# Patient Record
Sex: FEMALE | Race: WHITE | NOT HISPANIC OR LATINO | Employment: OTHER | URBAN - METROPOLITAN AREA
[De-identification: names, ages, dates, MRNs, and addresses within clinical notes are randomized per-mention and may not be internally consistent; named-entity substitution may affect disease eponyms.]

---

## 2017-01-20 ENCOUNTER — LAB CONVERSION - ENCOUNTER (OUTPATIENT)
Dept: OTHER | Facility: OTHER | Age: 77
End: 2017-01-20

## 2017-01-20 LAB
DEPRECATED RDW RBC AUTO: 14.3 % (ref 11–15)
HCT VFR BLD AUTO: 39.9 % (ref 35–45)
HGB BLD-MCNC: 13.1 G/DL (ref 11.7–15.5)
MCH RBC QN AUTO: 31.6 PG (ref 27–33)
MCHC RBC AUTO-ENTMCNC: 32.9 G/DL (ref 32–36)
MCV RBC AUTO: 96.3 FL (ref 80–100)
PLATELET # BLD AUTO: 255 THOUSAND/UL (ref 140–400)
PMV BLD AUTO: 8.2 FL (ref 7.5–11.5)
RBC # BLD AUTO: 4.15 MILLION/UL (ref 3.8–5.1)
TSH SERPL DL<=0.05 MIU/L-ACNC: 2.53 MIU/L (ref 0.4–4.5)
WBC # BLD AUTO: 5.1 THOUSAND/UL (ref 3.8–10.8)

## 2017-01-25 ENCOUNTER — ALLSCRIPTS OFFICE VISIT (OUTPATIENT)
Dept: OTHER | Facility: OTHER | Age: 77
End: 2017-01-25

## 2017-06-01 ENCOUNTER — ALLSCRIPTS OFFICE VISIT (OUTPATIENT)
Dept: OTHER | Facility: OTHER | Age: 77
End: 2017-06-01

## 2017-07-07 ENCOUNTER — GENERIC CONVERSION - ENCOUNTER (OUTPATIENT)
Dept: OTHER | Facility: OTHER | Age: 77
End: 2017-07-07

## 2017-07-25 ENCOUNTER — GENERIC CONVERSION - ENCOUNTER (OUTPATIENT)
Dept: OTHER | Facility: OTHER | Age: 77
End: 2017-07-25

## 2018-01-13 VITALS
HEART RATE: 76 BPM | RESPIRATION RATE: 16 BRPM | SYSTOLIC BLOOD PRESSURE: 128 MMHG | TEMPERATURE: 97.8 F | WEIGHT: 211 LBS | BODY MASS INDEX: 36.02 KG/M2 | DIASTOLIC BLOOD PRESSURE: 72 MMHG | HEIGHT: 64 IN

## 2018-01-15 VITALS
DIASTOLIC BLOOD PRESSURE: 72 MMHG | HEART RATE: 72 BPM | BODY MASS INDEX: 35.77 KG/M2 | WEIGHT: 209.5 LBS | RESPIRATION RATE: 20 BRPM | TEMPERATURE: 97.5 F | HEIGHT: 64 IN | SYSTOLIC BLOOD PRESSURE: 124 MMHG

## 2018-05-21 DIAGNOSIS — M19.90 ARTHRITIS: Primary | ICD-10-CM

## 2018-05-22 RX ORDER — MELOXICAM 15 MG/1
TABLET ORAL
Qty: 90 TABLET | Refills: 3 | Status: SHIPPED | OUTPATIENT
Start: 2018-05-22 | End: 2019-03-23 | Stop reason: SDUPTHER

## 2018-08-22 ENCOUNTER — OFFICE VISIT (OUTPATIENT)
Dept: FAMILY MEDICINE CLINIC | Facility: CLINIC | Age: 78
End: 2018-08-22
Payer: MEDICARE

## 2018-08-22 VITALS
RESPIRATION RATE: 16 BRPM | SYSTOLIC BLOOD PRESSURE: 132 MMHG | TEMPERATURE: 99 F | HEART RATE: 68 BPM | BODY MASS INDEX: 36.5 KG/M2 | DIASTOLIC BLOOD PRESSURE: 74 MMHG | WEIGHT: 213.8 LBS | HEIGHT: 64 IN

## 2018-08-22 DIAGNOSIS — N39.0 URINARY TRACT INFECTION WITHOUT HEMATURIA, SITE UNSPECIFIED: Primary | ICD-10-CM

## 2018-08-22 DIAGNOSIS — R30.0 DYSURIA: ICD-10-CM

## 2018-08-22 LAB
SL AMB  POCT GLUCOSE, UA: ABNORMAL
SL AMB LEUKOCYTE ESTERASE,UA: ABNORMAL
SL AMB POCT BILIRUBIN,UA: ABNORMAL
SL AMB POCT BLOOD,UA: ABNORMAL
SL AMB POCT COLOR,UA: YELLOW
SL AMB POCT KETONES,UA: ABNORMAL
SL AMB POCT NITRITE,UA: ABNORMAL
SL AMB POCT PH,UA: 5
SL AMB POCT SPECIFIC GRAVITY,UA: 1
SL AMB POCT URINE PROTEIN: ABNORMAL
SL AMB POCT UROBILINOGEN: ABNORMAL

## 2018-08-22 PROCEDURE — 99213 OFFICE O/P EST LOW 20 MIN: CPT | Performed by: FAMILY MEDICINE

## 2018-08-22 PROCEDURE — 81002 URINALYSIS NONAUTO W/O SCOPE: CPT | Performed by: FAMILY MEDICINE

## 2018-08-22 RX ORDER — DOXYCYCLINE HYCLATE 100 MG/1
100 CAPSULE ORAL EVERY 12 HOURS SCHEDULED
Qty: 20 CAPSULE | Refills: 0 | Status: SHIPPED | OUTPATIENT
Start: 2018-08-22 | End: 2018-09-02

## 2018-08-22 RX ORDER — AMLODIPINE BESYLATE 5 MG/1
1 TABLET ORAL DAILY
COMMUNITY
Start: 2016-01-17 | End: 2018-11-12 | Stop reason: SDUPTHER

## 2018-08-22 RX ORDER — B-COMPLEX WITH VITAMIN C
1 CAPSULE ORAL DAILY
COMMUNITY
End: 2018-10-04 | Stop reason: ALTCHOICE

## 2018-08-22 RX ORDER — DIPHENHYDRAMINE HYDROCHLORIDE 25 MG/1
1 TABLET ORAL DAILY
COMMUNITY
End: 2018-10-04 | Stop reason: ALTCHOICE

## 2018-08-22 RX ORDER — PHENAZOPYRIDINE HYDROCHLORIDE 200 MG/1
200 TABLET, FILM COATED ORAL
Qty: 9 TABLET | Refills: 0 | Status: SHIPPED | OUTPATIENT
Start: 2018-08-22 | End: 2018-09-02

## 2018-08-22 RX ORDER — LISINOPRIL 5 MG/1
1 TABLET ORAL DAILY
COMMUNITY
Start: 2011-06-29 | End: 2018-11-12 | Stop reason: SDUPTHER

## 2018-08-22 NOTE — PROGRESS NOTES
Patient ID: Nhi Willson is a 76 y o  female  HPI: 76 y  o female presenting with dysuria and urinary frequency  2 weeks ago she was treated with keflex and symptoms have reoccurred  SUBJECTIVE    Family History   Problem Relation Age of Onset    No Known Problems Mother      Social History     Social History    Marital status:      Spouse name: N/A    Number of children: N/A    Years of education: N/A     Occupational History    Not on file  Social History Main Topics    Smoking status: Former Smoker    Smokeless tobacco: Never Used    Alcohol use No    Drug use: No    Sexual activity: Not on file     Other Topics Concern    Not on file     Social History Narrative    No narrative on file     History reviewed  No pertinent past medical history    Past Surgical History:   Procedure Laterality Date    EYE SURGERY       Allergies   Allergen Reactions    Ciprofloxacin      Chest discomfort and dizziness    Diclofenac     Diphenhydramine        Current Outpatient Prescriptions:     amLODIPine (NORVASC) 5 mg tablet, Take 1 tablet by mouth daily, Disp: , Rfl:     aspirin 81 MG tablet, Take 2 tablets by mouth daily, Disp: , Rfl:     B Complex-C CAPS, Take 1 capsule by mouth daily, Disp: , Rfl:     Biotin (BIOTIN 5000) 5 MG CAPS, Take 1 capsule by mouth daily, Disp: , Rfl:     Cholecalciferol (RA VITAMIN D-3) 5000 units capsule, Take 1 tablet by mouth daily, Disp: , Rfl:     lisinopril (ZESTRIL) 5 mg tablet, Take 1 tablet by mouth daily, Disp: , Rfl:     Melatonin 5 MG TABS, Take by mouth, Disp: , Rfl:     meloxicam (MOBIC) 15 mg tablet, TAKE 1 TABLET EVERY DAY WITH FOOD, Disp: 90 tablet, Rfl: 3    Multiple Vitamins-Minerals (CENTRUM SILVER PO), Take 1 tablet by mouth daily, Disp: , Rfl:     Review of Systems  Constitutional:     Denies fever, chills ,fatigue ,weakness ,weight loss, weight gain     ENT: Denies earache ,loss of hearing ,nosebleed, nasal discharge,nasal congestion ,sore throat ,hoarseness  Pulmonary: Denies shortness of breath ,cough  ,dyspnea on exertion, orthopnea  ,PND   Cardiovascular:  Denies bradycardia , tachycardia  ,palpations, lower extremity edema leg, claudication  Breast:  Denies new or changing breast lumps ,nipple discharge ,nipple changes  Abdomen:  Denies abdominal pain , anorexia , indigestion, nausea, vomiting, constipation, diarrhea  Musculoskeletal: Denies myalgias, arthralgias, joint swelling, joint stiffness , limb pain, limb swelling  Gu: + dysuria,+ polyuria  Skin: Denies skin rash, skin lesion, skin wound, itching, dry skin  Neuro: Denies headache, numbness, tingling, confusion, loss of consciousness, dizziness, vertigo  Psychiatric: Denies feelings of depression, suicidal ideation, anxiety, sleep disturbances    OBJECTIVE  /74 (BP Location: Right arm, Patient Position: Sitting, Cuff Size: Large)   Pulse 68   Temp 99 °F (37 2 °C)   Resp 16   Ht 5' 4" (1 626 m)   Wt 97 kg (213 lb 12 8 oz)   BMI 36 70 kg/m²   Constitutional:   NAD, well appearing and well nourished      ENT:   Conjunctiva and lids: no injection, edema, or discharge     Pupils and iris: REENA bilaterally    External inspection of ears and nose: normal without deformities or discharge  Otoscopic exam: Canals patent without erythema  Nasal mucosa, septum and turbinates: Normal or edema or discharge         Oropharynx:  Moist mucosa, normal tongue and tonsils without lesions  No erythema        Pulmonary:Respiratory effort normal rate and rhythm, no increased work of breathing   Auscultation of lungs:  Clear bilaterally with no adventitious breath sounds       Cardiovascular: regular rate and rhythm, S1 and S2, no murmur, no edema and/or varicosities of LE      Abdomen: Soft and non-distended     Positive bowel sounds      No heptomegaly or splenomegaly      Gu: no suprapubic tenderness or CVA tenderness, no urethral discharge  Lymphatic:  No anterior or posterior cervical lymphadenopathy         Musculoskeletal:  Gait and station: Normal gait      Digits and nails normal without clubbing or cyanosis       Inspection/palpation of joints, bones, and muscles:  No joint tenderness, swelling, full active and passive range of motion       Skin: Normal skin turgor and no rashes      Neuro:       Normal reflexes      Psych:   alert and oriented to person, place and time     normal mood and affect       Assessment/Plan:Diagnoses and all orders for this visit:    Dysuria  -     POCT urine dip    Urinary tract infection without hematuria, site unspecified  -     doxycycline hyclate (VIBRAMYCIN) 100 mg capsule; Take 1 capsule (100 mg total) by mouth every 12 (twelve) hours for 10 days  -     phenazopyridine (PYRIDIUM) 200 mg tablet; Take 1 tablet (200 mg total) by mouth 3 (three) times a day with meals    Other orders  -     Cholecalciferol (RA VITAMIN D-3) 5000 units capsule; Take 1 tablet by mouth daily  -     lisinopril (ZESTRIL) 5 mg tablet; Take 1 tablet by mouth daily  -     Melatonin 5 MG TABS; Take by mouth  -     Multiple Vitamins-Minerals (CENTRUM SILVER PO); Take 1 tablet by mouth daily  -     Biotin (BIOTIN 5000) 5 MG CAPS; Take 1 capsule by mouth daily  -     B Complex-C CAPS; Take 1 capsule by mouth daily  -     aspirin 81 MG tablet; Take 2 tablets by mouth daily  -     amLODIPine (NORVASC) 5 mg tablet; Take 1 tablet by mouth daily        Reviewed with patient plan to treat with the above      Patient instructed to call in 72 hours if not feeling better or if symptoms worsen

## 2018-08-27 ENCOUNTER — TELEPHONE (OUTPATIENT)
Dept: FAMILY MEDICINE CLINIC | Facility: CLINIC | Age: 78
End: 2018-08-27

## 2018-08-27 NOTE — TELEPHONE ENCOUNTER
HER UTI FEELS BETTER   BUT SHE FEELS SICK TO HER STOMACH AND NOT SLEEPING     CAN THIS BE FROM THE MEDS     PLEASE ADVISE

## 2018-08-28 DIAGNOSIS — R11.0 NAUSEA: Primary | ICD-10-CM

## 2018-08-28 RX ORDER — ONDANSETRON 4 MG/1
4 TABLET, FILM COATED ORAL EVERY 8 HOURS PRN
Qty: 20 TABLET | Refills: 1 | Status: SHIPPED | OUTPATIENT
Start: 2018-08-28 | End: 2018-09-28 | Stop reason: SDUPTHER

## 2018-08-31 ENCOUNTER — APPOINTMENT (OUTPATIENT)
Dept: LAB | Facility: CLINIC | Age: 78
End: 2018-08-31
Payer: MEDICARE

## 2018-08-31 ENCOUNTER — TELEPHONE (OUTPATIENT)
Dept: FAMILY MEDICINE CLINIC | Facility: CLINIC | Age: 78
End: 2018-08-31

## 2018-08-31 ENCOUNTER — OFFICE VISIT (OUTPATIENT)
Dept: FAMILY MEDICINE CLINIC | Facility: CLINIC | Age: 78
End: 2018-08-31
Payer: MEDICARE

## 2018-08-31 VITALS
TEMPERATURE: 99.3 F | DIASTOLIC BLOOD PRESSURE: 78 MMHG | WEIGHT: 213 LBS | HEART RATE: 68 BPM | HEIGHT: 64 IN | SYSTOLIC BLOOD PRESSURE: 122 MMHG | BODY MASS INDEX: 36.37 KG/M2

## 2018-08-31 DIAGNOSIS — R53.83 OTHER FATIGUE: ICD-10-CM

## 2018-08-31 DIAGNOSIS — I10 ESSENTIAL HYPERTENSION: ICD-10-CM

## 2018-08-31 DIAGNOSIS — F41.9 ANXIETY: ICD-10-CM

## 2018-08-31 DIAGNOSIS — N39.0 URINARY TRACT INFECTION WITHOUT HEMATURIA, SITE UNSPECIFIED: Primary | ICD-10-CM

## 2018-08-31 DIAGNOSIS — E78.00 HYPERCHOLESTEROLEMIA: ICD-10-CM

## 2018-08-31 DIAGNOSIS — N39.0 URINARY TRACT INFECTION WITHOUT HEMATURIA, SITE UNSPECIFIED: ICD-10-CM

## 2018-08-31 LAB
BASOPHILS # BLD AUTO: 0.03 THOUSANDS/ΜL (ref 0–0.1)
BASOPHILS NFR BLD AUTO: 0 % (ref 0–1)
EOSINOPHIL # BLD AUTO: 0.03 THOUSAND/ΜL (ref 0–0.61)
EOSINOPHIL NFR BLD AUTO: 0 % (ref 0–6)
ERYTHROCYTE [DISTWIDTH] IN BLOOD BY AUTOMATED COUNT: 13.9 % (ref 11.6–15.1)
HCT VFR BLD AUTO: 40.8 % (ref 34.8–46.1)
HGB BLD-MCNC: 13.1 G/DL (ref 11.5–15.4)
IMM GRANULOCYTES # BLD AUTO: 0.02 THOUSAND/UL (ref 0–0.2)
IMM GRANULOCYTES NFR BLD AUTO: 0 % (ref 0–2)
LYMPHOCYTES # BLD AUTO: 0.91 THOUSANDS/ΜL (ref 0.6–4.47)
LYMPHOCYTES NFR BLD AUTO: 13 % (ref 14–44)
MCH RBC QN AUTO: 32.1 PG (ref 26.8–34.3)
MCHC RBC AUTO-ENTMCNC: 32.1 G/DL (ref 31.4–37.4)
MCV RBC AUTO: 100 FL (ref 82–98)
MONOCYTES # BLD AUTO: 0.37 THOUSAND/ΜL (ref 0.17–1.22)
MONOCYTES NFR BLD AUTO: 5 % (ref 4–12)
NEUTROPHILS # BLD AUTO: 5.48 THOUSANDS/ΜL (ref 1.85–7.62)
NEUTS SEG NFR BLD AUTO: 82 % (ref 43–75)
NRBC BLD AUTO-RTO: 0 /100 WBCS
PLATELET # BLD AUTO: 290 THOUSANDS/UL (ref 149–390)
PMV BLD AUTO: 9.8 FL (ref 8.9–12.7)
RBC # BLD AUTO: 4.08 MILLION/UL (ref 3.81–5.12)
SL AMB  POCT GLUCOSE, UA: NEGATIVE
SL AMB LEUKOCYTE ESTERASE,UA: NORMAL
SL AMB POCT BILIRUBIN,UA: NEGATIVE
SL AMB POCT BLOOD,UA: NEGATIVE
SL AMB POCT CLARITY,UA: CLEAR
SL AMB POCT COLOR,UA: YELLOW
SL AMB POCT KETONES,UA: NEGATIVE
SL AMB POCT NITRITE,UA: NEGATIVE
SL AMB POCT PH,UA: NEGATIVE
SL AMB POCT SPECIFIC GRAVITY,UA: 1.01
SL AMB POCT URINE PROTEIN: NEGATIVE
SL AMB POCT UROBILINOGEN: NEGATIVE
WBC # BLD AUTO: 6.84 THOUSAND/UL (ref 4.31–10.16)

## 2018-08-31 PROCEDURE — 85025 COMPLETE CBC W/AUTO DIFF WBC: CPT

## 2018-08-31 PROCEDURE — 36415 COLL VENOUS BLD VENIPUNCTURE: CPT

## 2018-08-31 PROCEDURE — 81002 URINALYSIS NONAUTO W/O SCOPE: CPT | Performed by: FAMILY MEDICINE

## 2018-08-31 PROCEDURE — 99214 OFFICE O/P EST MOD 30 MIN: CPT | Performed by: FAMILY MEDICINE

## 2018-08-31 RX ORDER — ALPRAZOLAM 0.25 MG/1
0.25 TABLET ORAL 3 TIMES DAILY PRN
Qty: 30 TABLET | Refills: 0 | Status: SHIPPED | OUTPATIENT
Start: 2018-08-31 | End: 2018-09-26 | Stop reason: SDUPTHER

## 2018-08-31 RX ORDER — CEPHALEXIN 500 MG/1
CAPSULE ORAL
COMMUNITY
Start: 2018-08-11 | End: 2018-09-02

## 2018-08-31 NOTE — TELEPHONE ENCOUNTER
If no bm with the linzess tab today, she should take 2 linzess tabs at same time tomorrow  If no bm after that, will need to go to er to makes sure no blockage

## 2018-08-31 NOTE — PROGRESS NOTES
Patient ID: Daniela Musa is a 76 y o  female  HPI: 76 y  o female presenting with constipation for the past 3 days  She is also finishing an antibiotic for recurrrent UTIs and we discussed her going for a formal culture next week  She would also like some labs and does complain of some fatigue  She also intermittently feels anxious and wants a prn med to have on hand  SUBJECTIVE    Family History   Problem Relation Age of Onset    No Known Problems Mother      Social History     Social History    Marital status:      Spouse name: N/A    Number of children: N/A    Years of education: N/A     Occupational History    Not on file  Social History Main Topics    Smoking status: Former Smoker    Smokeless tobacco: Never Used    Alcohol use No    Drug use: No    Sexual activity: Not on file     Other Topics Concern    Not on file     Social History Narrative    No narrative on file     No past medical history on file    Past Surgical History:   Procedure Laterality Date    EYE SURGERY       Allergies   Allergen Reactions    Ciprofloxacin      Chest discomfort and dizziness    Diclofenac     Diphenhydramine        Current Outpatient Prescriptions:     amLODIPine (NORVASC) 5 mg tablet, Take 1 tablet by mouth daily, Disp: , Rfl:     aspirin 81 MG tablet, Take 2 tablets by mouth daily, Disp: , Rfl:     B Complex-C CAPS, Take 1 capsule by mouth daily, Disp: , Rfl:     Biotin (BIOTIN 5000) 5 MG CAPS, Take 1 capsule by mouth daily, Disp: , Rfl:     cephalexin (KEFLEX) 500 mg capsule, , Disp: , Rfl:     Cholecalciferol (RA VITAMIN D-3) 5000 units capsule, Take 1 tablet by mouth daily, Disp: , Rfl:     doxycycline hyclate (VIBRAMYCIN) 100 mg capsule, Take 1 capsule (100 mg total) by mouth every 12 (twelve) hours for 10 days, Disp: 20 capsule, Rfl: 0    lisinopril (ZESTRIL) 5 mg tablet, Take 1 tablet by mouth daily, Disp: , Rfl:     Melatonin 5 MG TABS, Take by mouth, Disp: , Rfl:    meloxicam (MOBIC) 15 mg tablet, TAKE 1 TABLET EVERY DAY WITH FOOD, Disp: 90 tablet, Rfl: 3    Multiple Vitamins-Minerals (CENTRUM SILVER PO), Take 1 tablet by mouth daily, Disp: , Rfl:     ondansetron (ZOFRAN) 4 mg tablet, Take 1 tablet (4 mg total) by mouth every 8 (eight) hours as needed for nausea or vomiting, Disp: 20 tablet, Rfl: 1    phenazopyridine (PYRIDIUM) 200 mg tablet, Take 1 tablet (200 mg total) by mouth 3 (three) times a day with meals, Disp: 9 tablet, Rfl: 0    ALPRAZolam (XANAX) 0 25 mg tablet, Take 1 tablet (0 25 mg total) by mouth 3 (three) times a day as needed for anxiety, Disp: 30 tablet, Rfl: 0    Review of Systems  Constitutional:     Denies fever, chills ,+fatigue ,no weakness ,weight loss, or weight gain     ENT: Denies earache ,loss of hearing ,nosebleed, nasal discharge,nasal congestion ,sore throat ,hoarseness  Pulmonary: Denies shortness of breath ,cough  ,dyspnea on exertion, orthopnea  ,PND   Cardiovascular:  Denies bradycardia , tachycardia  ,palpations, lower extremity edema leg, claudication  Breast:  Denies new or changing breast lumps ,nipple discharge ,nipple changes  Abdomen:  Denies abdominal pain , anorexia , indigestion, nausea, vomiting, +constipation, no diarrhea  Musculoskeletal: Denies myalgias, arthralgias, joint swelling, joint stiffness , limb pain, limb swelling  Gu: denies dysuria, polyuria  Skin: Denies skin rash, skin lesion, skin wound, itching, dry skin  Neuro: Denies headache, numbness, tingling, confusion, loss of consciousness, dizziness, vertigo  Psychiatric: Denies feelings of depression, suicidal ideation, +anxiety without sleep disturbances    OBJECTIVE  /78   Pulse 68   Temp 99 3 °F (37 4 °C)   Ht 5' 4" (1 626 m)   Wt 96 6 kg (213 lb)   BMI 36 56 kg/m²   Constitutional:   NAD, well appearing and well nourished      ENT:   Conjunctiva and lids: no injection, edema, or discharge     Pupils and iris: REENA bilaterally    External inspection of ears and nose: normal without deformities or discharge  Otoscopic exam: Canals patent without erythema  Nasal mucosa, septum and turbinates: Normal or edema or discharge         Oropharynx:  Moist mucosa, normal tongue and tonsils without lesions  No erythema        Pulmonary:Respiratory effort normal rate and rhythm, no increased work of breathing  Auscultation of lungs:  Clear bilaterally with no adventitious breath sounds       Cardiovascular: regular rate and rhythm, S1 and S2, no murmur, no edema and/or varicosities of LE      Abdomen: Soft and non-distended     Positive bowel sounds      No heptomegaly or splenomegaly      Gu: no suprapubic tenderness or CVA tenderness, no urethral discharge  Lymphatic:  No anterior or posterior cervical lymphadenopathy         Musculoskeletal:  Gait and station: Normal gait      Digits and nails normal without clubbing or cyanosis       Inspection/palpation of joints, bones, and muscles:  No joint tenderness, swelling, full active and passive range of motion       Skin: Normal skin turgor and no rashes      Neuro:       Normal reflexes       Psych:   alert and oriented to person, place and time     normal mood and affect       Assessment/Plan:Diagnoses and all orders for this visit:    Urinary tract infection without hematuria, site unspecified  -     UA w Reflex to Microscopic w Reflex to Culture -Lab Collect  -     CBC and differential; Future  -     POCT urine dip    Essential hypertension  -     Comprehensive metabolic panel; Future    Anxiety  -     ALPRAZolam (XANAX) 0 25 mg tablet; Take 1 tablet (0 25 mg total) by mouth 3 (three) times a day as needed for anxiety    Other fatigue  -     TSH, 3rd generation; Future    Hypercholesterolemia  -     Lipid Panel with Direct LDL reflex; Future    Other orders  -     cephalexin (KEFLEX) 500 mg capsule; Reviewed with patient plan to treat with the above      Patient instructed to call in 72 hours if not feeling better or if symptoms worsen

## 2018-08-31 NOTE — TELEPHONE ENCOUNTER
Was seen today, If she doesn't have a BM today or grant then what should she do?  Its the weekend  Pl adv,

## 2018-09-02 ENCOUNTER — APPOINTMENT (EMERGENCY)
Dept: RADIOLOGY | Facility: HOSPITAL | Age: 78
End: 2018-09-02
Payer: MEDICARE

## 2018-09-02 ENCOUNTER — HOSPITAL ENCOUNTER (EMERGENCY)
Facility: HOSPITAL | Age: 78
Discharge: HOME | End: 2018-09-02
Attending: EMERGENCY MEDICINE | Admitting: EMERGENCY MEDICINE
Payer: MEDICARE

## 2018-09-02 VITALS
RESPIRATION RATE: 18 BRPM | BODY MASS INDEX: 34.99 KG/M2 | HEART RATE: 66 BPM | DIASTOLIC BLOOD PRESSURE: 70 MMHG | HEIGHT: 65 IN | TEMPERATURE: 97.8 F | SYSTOLIC BLOOD PRESSURE: 162 MMHG | OXYGEN SATURATION: 100 % | WEIGHT: 210 LBS

## 2018-09-02 DIAGNOSIS — K59.00 CONSTIPATION: Primary | ICD-10-CM

## 2018-09-02 DIAGNOSIS — N39.0 UTI (URINARY TRACT INFECTION): ICD-10-CM

## 2018-09-02 LAB
ANION GAP SERPL CALCULATED.3IONS-SCNC: 5 MMOL/L (ref 4–13)
BACTERIA UR QL AUTO: NORMAL /HPF
BILIRUB UR QL STRIP: NEGATIVE
BUN SERPL-MCNC: 13 MG/DL (ref 5–25)
CALCIUM SERPL-MCNC: 9.1 MG/DL (ref 8.3–10.1)
CHLORIDE SERPL-SCNC: 98 MMOL/L (ref 100–108)
CLARITY UR: CLEAR
CO2 SERPL-SCNC: 30 MMOL/L (ref 21–32)
COLOR UR: YELLOW
CREAT SERPL-MCNC: 0.81 MG/DL (ref 0.6–1.3)
GFR SERPL CREATININE-BSD FRML MDRD: 69 ML/MIN/1.73SQ M
GLUCOSE SERPL-MCNC: 117 MG/DL (ref 65–140)
GLUCOSE UR STRIP-MCNC: NEGATIVE MG/DL
HGB UR QL STRIP.AUTO: NEGATIVE
KETONES UR STRIP-MCNC: NEGATIVE MG/DL
LEUKOCYTE ESTERASE UR QL STRIP: ABNORMAL
NITRITE UR QL STRIP: NEGATIVE
NON-SQ EPI CELLS URNS QL MICRO: NORMAL /HPF
PH UR STRIP.AUTO: 6 [PH] (ref 4.5–8)
POTASSIUM SERPL-SCNC: 3.9 MMOL/L (ref 3.5–5.3)
PROT UR STRIP-MCNC: NEGATIVE MG/DL
RBC #/AREA URNS AUTO: NORMAL /HPF
SODIUM SERPL-SCNC: 133 MMOL/L (ref 136–145)
SP GR UR STRIP.AUTO: 1 (ref 1–1.03)
TSH SERPL DL<=0.05 MIU/L-ACNC: 1.27 UIU/ML (ref 0.36–3.74)
UROBILINOGEN UR QL STRIP.AUTO: 0.2 E.U./DL
WBC #/AREA URNS AUTO: NORMAL /HPF

## 2018-09-02 PROCEDURE — 99284 EMERGENCY DEPT VISIT MOD MDM: CPT

## 2018-09-02 PROCEDURE — 36415 COLL VENOUS BLD VENIPUNCTURE: CPT | Performed by: EMERGENCY MEDICINE

## 2018-09-02 PROCEDURE — 96361 HYDRATE IV INFUSION ADD-ON: CPT

## 2018-09-02 PROCEDURE — 80048 BASIC METABOLIC PNL TOTAL CA: CPT | Performed by: EMERGENCY MEDICINE

## 2018-09-02 PROCEDURE — 81001 URINALYSIS AUTO W/SCOPE: CPT | Performed by: EMERGENCY MEDICINE

## 2018-09-02 PROCEDURE — 96360 HYDRATION IV INFUSION INIT: CPT

## 2018-09-02 PROCEDURE — 84443 ASSAY THYROID STIM HORMONE: CPT | Performed by: EMERGENCY MEDICINE

## 2018-09-02 PROCEDURE — 74022 RADEX COMPL AQT ABD SERIES: CPT

## 2018-09-02 PROCEDURE — 82272 OCCULT BLD FECES 1-3 TESTS: CPT

## 2018-09-02 RX ORDER — BISACODYL 10 MG
10 SUPPOSITORY, RECTAL RECTAL ONCE
Status: COMPLETED | OUTPATIENT
Start: 2018-09-02 | End: 2018-09-02

## 2018-09-02 RX ORDER — BISACODYL 10 MG
10 SUPPOSITORY, RECTAL RECTAL DAILY
Qty: 12 SUPPOSITORY | Refills: 0 | Status: SHIPPED | OUTPATIENT
Start: 2018-09-02 | End: 2018-10-04 | Stop reason: ALTCHOICE

## 2018-09-02 RX ORDER — NITROFURANTOIN 25; 75 MG/1; MG/1
100 CAPSULE ORAL 2 TIMES DAILY
Qty: 14 CAPSULE | Refills: 0 | Status: SHIPPED | OUTPATIENT
Start: 2018-09-02 | End: 2018-09-02

## 2018-09-02 RX ORDER — NITROFURANTOIN 25; 75 MG/1; MG/1
100 CAPSULE ORAL ONCE
Qty: 1 CAPSULE | Refills: 0 | Status: SHIPPED | OUTPATIENT
Start: 2018-09-02 | End: 2018-09-02

## 2018-09-02 RX ORDER — NITROFURANTOIN 25; 75 MG/1; MG/1
100 CAPSULE ORAL 2 TIMES DAILY
Qty: 14 CAPSULE | Refills: 0 | Status: SHIPPED | OUTPATIENT
Start: 2018-09-02 | End: 2018-09-09

## 2018-09-02 RX ORDER — NITROFURANTOIN 25; 75 MG/1; MG/1
100 CAPSULE ORAL ONCE
Status: COMPLETED | OUTPATIENT
Start: 2018-09-02 | End: 2018-09-02

## 2018-09-02 RX ADMIN — NITROFURANTOIN (MONOHYDRATE/MACROCRYSTALS) 100 MG: 75; 25 CAPSULE ORAL at 10:02

## 2018-09-02 RX ADMIN — BISACODYL 10 MG: 10 SUPPOSITORY RECTAL at 10:32

## 2018-09-02 RX ADMIN — SODIUM CHLORIDE 1000 ML: 0.9 INJECTION, SOLUTION INTRAVENOUS at 08:57

## 2018-09-02 NOTE — DISCHARGE INSTRUCTIONS
Constipation   WHAT YOU NEED TO KNOW:   Constipation is when you have hard, dry bowel movements, or you go longer than usual between bowel movements  DISCHARGE INSTRUCTIONS:   Return to the emergency department if:   · You have blood in your bowel movements  · You have a fever and abdominal pain with the constipation  Contact your healthcare provider if:   · Your constipation gets worse  · You start to vomit  · You have questions or concerns about your condition or care  Medicines:   · Medicine or a fiber supplement  may help make your bowel movement softer  A laxative may help relax and loosen your intestines to help you have a bowel movement  You may also be given medicine to increase fluid in your intestines  The fluid may help move bowel movements through your intestines  · Take your medicine as directed  Contact your healthcare provider if you think your medicine is not helping or if you have side effects  Tell him of her if you are allergic to any medicine  Keep a list of the medicines, vitamins, and herbs you take  Include the amounts, and when and why you take them  Bring the list or the pill bottles to follow-up visits  Carry your medicine list with you in case of an emergency  Manage your constipation:   · Drink liquids as directed  You may need to drink extra liquids to help soften and move your bowels  Ask how much liquid to drink each day and which liquids are best for you  · Eat high-fiber foods  This may help decrease constipation by adding bulk to your bowel movements  High-fiber foods include fruit, vegetables, whole-grain breads and cereals, and beans  Your healthcare provider or dietitian can help you create a high-fiber meal plan  · Exercise regularly  Regular physical activity can help stimulate your intestines  Ask which exercises are best for you  · Schedule a time each day to have a bowel movement    This may help train your body to have regular bowel movements  Bend forward while you are on the toilet to help move the bowel movement out  Sit on the toilet for at least 10 minutes, even if you do not have a bowel movement  Follow up with your healthcare provider as directed:  Write down your questions so you remember to ask them during your visits  © 2017 [employer] Information is for End User's use only and may not be sold, redistributed or otherwise used for commercial purposes  All illustrations and images included in CareNotes® are the copyrighted property of [employer]  or [employer]  The above information is an  only  It is not intended as medical advice for individual conditions or treatments  Talk to your doctor, nurse or pharmacist before following any medical regimen to see if it is safe and effective for you

## 2018-09-02 NOTE — ED ATTENDING ATTESTATION
Rayshawn Banuelos MD, saw and evaluated the patient  I have discussed the patient with the resident/non-physician practitioner and agree with the resident's/non-physician practitioner's findings, Plan of Care, and MDM as documented in the resident's/non-physician practitioner's note, except where noted  All available labs and Radiology studies were reviewed  At this point I agree with the current assessment done in the Emergency Department  I have conducted an independent evaluation of this patient a history and physical is as follows:    Constipation  Pt has uti on antibiotics  Pt ahs had antibiotics X2 keflex both times Pt states since on antibiotics has had constipation no BM for 5 days  No abd pain Pt was put on Linez  For 2 days with no result   Also used miralax without help Pt took BP pills here PE: alert nad heart reg lungs clear abd soft nontender MDM: will get xray attempt enema and suppository check urine for uti  Critical Care Time  CritCare Time    Procedures

## 2018-09-02 NOTE — ED PROVIDER NOTES
History  Chief Complaint   Patient presents with    Constipation     pt reports constipation for 5 days after starting antibiotis for UTI  States no relief of constipation from saline enema at home, miralax, and lizness   Possible UTI      75-year-old female presents emergency department constipation as 5 days  Patient states that she was recently treated for a URI with 2 different antibiotics  Patient states that she initially presented to her primary care physician with urinary frequency as well as dysuria was given antibiotic for her UTI that she was diagnosed with  Patient states her symptoms continued for 7 days of antibiotics when backed her primary care physician and received Keflex for 5 days  Patient states that she is on her last day of Keflex and continues to have increased frequency of urination but no dysuria  Patient also states that since she began taking the antibiotics 5 days ago she has not had a bowel movement  Patient is concerned that she has had a bowel movement and has went to her primary care physician was given her some constipation medicines which has not helped over the past 3 days  History provided by:  Patient      Prior to Admission Medications   Prescriptions Last Dose Informant Patient Reported? Taking?    ALPRAZolam (XANAX) 0 25 mg tablet   No Yes   Sig: Take 1 tablet (0 25 mg total) by mouth 3 (three) times a day as needed for anxiety   B Complex-C CAPS  Self Yes Yes   Sig: Take 1 capsule by mouth daily   Biotin (BIOTIN 5000) 5 MG CAPS  Self Yes Yes   Sig: Take 1 capsule by mouth daily   Cholecalciferol (RA VITAMIN D-3) 5000 units capsule  Self Yes Yes   Sig: Take 1 tablet by mouth daily   Melatonin 5 MG TABS  Self Yes Yes   Sig: Take by mouth   Multiple Vitamins-Minerals (CENTRUM SILVER PO)  Self Yes Yes   Sig: Take 1 tablet by mouth daily   amLODIPine (NORVASC) 5 mg tablet  Self Yes Yes   Sig: Take 1 tablet by mouth daily   aspirin 81 MG tablet  Self Yes Yes Sig: Take 2 tablets by mouth daily   lisinopril (ZESTRIL) 5 mg tablet  Self Yes Yes   Sig: Take 1 tablet by mouth daily   meloxicam (MOBIC) 15 mg tablet  Self No Yes   Sig: TAKE 1 TABLET EVERY DAY WITH FOOD   ondansetron (ZOFRAN) 4 mg tablet  Self No Yes   Sig: Take 1 tablet (4 mg total) by mouth every 8 (eight) hours as needed for nausea or vomiting      Facility-Administered Medications: None       Past Medical History:   Diagnosis Date    Hypertension     UTI (urinary tract infection)        Past Surgical History:   Procedure Laterality Date    EYE SURGERY         Family History   Problem Relation Age of Onset    No Known Problems Mother     Diabetes Father     Stroke Father         syndrome     I have reviewed and agree with the history as documented  Social History   Substance Use Topics    Smoking status: Former Smoker     Quit date: 1990    Smokeless tobacco: Never Used    Alcohol use No        Review of Systems   Constitutional: Negative for chills, diaphoresis, fatigue and fever  HENT: Negative for congestion, ear discharge, facial swelling, hearing loss, rhinorrhea, sinus pain, sinus pressure, sneezing, sore throat, tinnitus and trouble swallowing  Eyes: Negative for pain, discharge and redness  Respiratory: Negative for cough, choking, chest tightness, shortness of breath, wheezing and stridor  Cardiovascular: Negative for chest pain, palpitations and leg swelling  Gastrointestinal: Positive for constipation  Negative for abdominal distention, abdominal pain, blood in stool, diarrhea, nausea and vomiting  Endocrine: Negative for cold intolerance, polydipsia and polyuria  Genitourinary: Positive for frequency  Negative for difficulty urinating, dysuria, enuresis, flank pain and hematuria  Musculoskeletal: Negative for arthralgias, back pain, gait problem and neck stiffness  Skin: Negative for rash and wound     Neurological: Negative for dizziness, seizures, syncope, weakness, numbness and headaches  Hematological: Negative for adenopathy  Psychiatric/Behavioral: Negative for agitation, confusion, hallucinations, sleep disturbance and suicidal ideas  All other systems reviewed and are negative  Physical Exam  ED Triage Vitals [09/02/18 0548]   Temperature Pulse Respirations Blood Pressure SpO2   97 8 °F (36 6 °C) 75 18 (!) 202/86 98 %      Temp Source Heart Rate Source Patient Position - Orthostatic VS BP Location FiO2 (%)   Tympanic Monitor Sitting Right arm --      Pain Score       No Pain           Orthostatic Vital Signs  Vitals:    09/02/18 0548 09/02/18 0718 09/02/18 0855 09/02/18 1000   BP: (!) 202/86 136/87 150/67 162/70   Pulse: 75 72 63 66   Patient Position - Orthostatic VS: Sitting Sitting Sitting Sitting       Physical Exam   Constitutional: She is oriented to person, place, and time  She appears well-developed and well-nourished  No distress  HENT:   Head: Normocephalic and atraumatic  Right Ear: External ear normal    Left Ear: External ear normal    Nose: No sinus tenderness  No epistaxis  Mouth/Throat: No oropharyngeal exudate  Eyes: Conjunctivae and EOM are normal  Pupils are equal, round, and reactive to light  Right eye exhibits no discharge  Left eye exhibits no discharge  Neck: No JVD present  Cardiovascular: Normal rate, regular rhythm, normal heart sounds and intact distal pulses  Exam reveals no gallop and no friction rub  No murmur heard  Pulmonary/Chest: Effort normal and breath sounds normal  No stridor  No respiratory distress  She has no wheezes  She has no rales  Abdominal: Soft  Bowel sounds are normal  She exhibits no distension and no mass  There is no tenderness  There is no rebound and no guarding  Genitourinary: Rectal exam shows guaiac negative stool  Musculoskeletal: Normal range of motion  She exhibits no edema, tenderness or deformity  Lymphadenopathy:     She has no cervical adenopathy     Neurological: She is alert and oriented to person, place, and time  She has normal strength  No cranial nerve deficit or sensory deficit  GCS eye subscore is 4  GCS verbal subscore is 5  GCS motor subscore is 6  Reflex Scores:       Patellar reflexes are 2+ on the right side and 2+ on the left side  UE and LE 5/5 strength, No focal neuro deficits  Skin: Skin is warm, dry and intact  Capillary refill takes less than 2 seconds  She is not diaphoretic  Psychiatric: She has a normal mood and affect  Her speech is normal and behavior is normal  Judgment and thought content normal    Nursing note and vitals reviewed  ED Medications  Medications   sodium chloride 0 9 % bolus 1,000 mL (1,000 mL Intravenous New Bag 9/2/18 0857)   bisacodyl (DULCOLAX) rectal suppository 10 mg (10 mg Rectal Given 9/2/18 1032)   nitrofurantoin (MACROBID) extended-release capsule 100 mg (100 mg Oral Given 9/2/18 1002)       Diagnostic Studies  Results Reviewed     Procedure Component Value Units Date/Time    Urine Microscopic [[id number]]  (Normal) Collected:  09/02/18 0736    Lab Status:  Final result Specimen:  Urine from Urine, Clean Catch Updated:  09/02/18 0911     RBC, UA None Seen /hpf      WBC, UA None Seen /hpf      Epithelial Cells Occasional /hpf      Bacteria, UA None Seen /hpf     Basic metabolic panel [[id number]]  (Abnormal) Collected:  09/02/18 0737    Lab Status:  Final result Specimen:  Blood from Arm, Right Updated:  09/02/18 3652     Sodium 133 (L) mmol/L      Potassium 3 9 mmol/L      Chloride 98 (L) mmol/L      CO2 30 mmol/L      ANION GAP 5 mmol/L      BUN 13 mg/dL      Creatinine 0 81 mg/dL      Glucose 117 mg/dL      Calcium 9 1 mg/dL      eGFR 69 ml/min/1 73sq m     Narrative:         National Kidney Disease Education Program recommendations are as follows:  GFR calculation is accurate only with a steady state creatinine  Chronic Kidney disease less than 60 ml/min/1 73 sq  meters  Kidney failure less than 15 ml/min/1 73 sq  meters  TSH [[id number]]  (Normal) Collected:  09/02/18 0737    Lab Status:  Final result Specimen:  Blood from Arm, Right Updated:  09/02/18 0826     TSH 3RD GENERATON 1 270 uIU/mL     Narrative:         Patients undergoing fluorescein dye angiography may retain small amounts of fluorescein in the body for 48-72 hours post procedure  Samples containing fluorescein can produce falsely depressed TSH values  If the patient had this procedure,a specimen should be resubmitted post fluorescein clearance  The recommended reference ranges for TSH during pregnancy are as follows:  First trimester 0 1 to 2 5 uIU/mL  Second trimester  0 2 to 3 0 uIU/mL  Third trimester 0 3 to 3 0 uIU/m      UA w Reflex to Microscopic w Reflex to Culture [[id number]]  (Abnormal) Collected:  09/02/18 0736    Lab Status:  Final result Specimen:  Urine from Urine, Clean Catch Updated:  09/02/18 0758     Color, UA Yellow     Clarity, UA Clear     Specific [place], UA 1 003     pH, UA 6 0     Leukocytes, UA Trace (A)     Nitrite, UA Negative     Protein, UA Negative mg/dl      Glucose, UA Negative mg/dl      Ketones, UA Negative mg/dl      Urobilinogen, UA 0 2 E U /dl      Bilirubin, UA Negative     Blood, UA Negative                 XR abdomen obstruction series    (Results Pending)         Procedures  Procedures      Phone Consults  ED Phone Contact    ED Course                               MDM  Number of Diagnoses or Management Options  Constipation: new and requires workup  UTI (urinary tract infection): new and requires workup  Diagnosis management comments: 64-year-old female constipation presents for evaluation  Patient's  Rectal exam showed no impaction, hemoccult was negative, however after rectal examination and internal hemorrhoid had some bleeding  Patient was given a soapsuds enema  Patient was found to have 2 separate trials of Keflex for this recurrent UTI    UA workup repeated showed leukocytes,  Patient was given a soapsuds enema which did not provide a bowel movement  Patient is given Dulcolax suppository  1   Constipation  - GoLYTELY,  Addition to Dulcolax suppository  - discharged to PCP for follow-up    2  UTI  - Macrobid x7 days b i d  CritCare Time    Disposition  Final diagnoses:   Constipation   UTI (urinary tract infection)     Time reflects when diagnosis was documented in both MDM as applicable and the Disposition within this note     Time User Action Codes Description Comment    9/2/2018 10:36 AM Heather Mclean Add [K59 00] Constipation     9/2/2018 10:38 AM Heather Mclean Add [N39 0] UTI (urinary tract infection)       ED Disposition     ED Disposition Condition Comment    Discharge  [facility] discharge to home/self care  Condition at discharge: Good        Follow-up Information     Follow up With Specialties Details Why Contact Info Additional [facility],  Family Medicine Call in 2 days  [address] [facility] [address] [place] [address],9 Floor Emergency Department Emergency Medicine  If symptoms worsen, As needed [address] [place] [address]  [phone]  ED, [address] [place] [address], Formerly [facility]          Patient's Medications   Discharge Prescriptions    BISACODYL (DULCOLAX) 10 MG SUPPOSITORY    Insert 1 suppository (10 mg total) into the rectum daily       Start Date: 9/2/2018  End Date: --       Order Dose: 10 mg       Quantity: 12 suppository    Refills: 0    NITROFURANTOIN (MACROBID) 100 MG CAPSULE    Take 1 capsule (100 mg total) by mouth 2 (two) times a day for 7 days       Start Date: 9/2/2018  End Date: 9/9/2018       Order Dose: 100 mg       Quantity: 14 capsule    Refills: 0    POLYETHYLENE GLYCOL (GOLYTELY) 4000 ML SOLUTION    Take 240 mL by mouth once for 1 dose       Start Date: 9/2/2018  End Date: 9/2/2018       Order Dose: 240 mL       Quantity: 240 mL    Refills: 0     No discharge procedures on file  ED Provider  Attending physically available and evaluated Jose Daniel Dariusz GROVER managed the patient along with the ED Attending      Electronically Signed by         Skylar Posey,   09/02/18 1943

## 2018-09-04 ENCOUNTER — APPOINTMENT (OUTPATIENT)
Dept: LAB | Facility: CLINIC | Age: 78
End: 2018-09-04
Payer: MEDICARE

## 2018-09-04 DIAGNOSIS — R53.83 OTHER FATIGUE: ICD-10-CM

## 2018-09-04 DIAGNOSIS — I10 ESSENTIAL HYPERTENSION: ICD-10-CM

## 2018-09-04 DIAGNOSIS — E78.00 HYPERCHOLESTEROLEMIA: ICD-10-CM

## 2018-09-04 LAB
ALBUMIN SERPL BCP-MCNC: 3.5 G/DL (ref 3.5–5)
ALP SERPL-CCNC: 101 U/L (ref 46–116)
ALT SERPL W P-5'-P-CCNC: 25 U/L (ref 12–78)
ANION GAP SERPL CALCULATED.3IONS-SCNC: 6 MMOL/L (ref 4–13)
AST SERPL W P-5'-P-CCNC: 21 U/L (ref 5–45)
BACTERIA UR QL AUTO: ABNORMAL /HPF
BILIRUB SERPL-MCNC: 0.56 MG/DL (ref 0.2–1)
BILIRUB UR QL STRIP: NEGATIVE
BUN SERPL-MCNC: 10 MG/DL (ref 5–25)
CALCIUM SERPL-MCNC: 8.7 MG/DL (ref 8.3–10.1)
CHLORIDE SERPL-SCNC: 99 MMOL/L (ref 100–108)
CHOLEST SERPL-MCNC: 152 MG/DL (ref 50–200)
CLARITY UR: CLEAR
CO2 SERPL-SCNC: 28 MMOL/L (ref 21–32)
COLOR UR: YELLOW
CREAT SERPL-MCNC: 0.73 MG/DL (ref 0.6–1.3)
GFR SERPL CREATININE-BSD FRML MDRD: 79 ML/MIN/1.73SQ M
GLUCOSE P FAST SERPL-MCNC: 104 MG/DL (ref 65–99)
GLUCOSE UR STRIP-MCNC: NEGATIVE MG/DL
HDLC SERPL-MCNC: 76 MG/DL (ref 40–60)
HGB UR QL STRIP.AUTO: NEGATIVE
HYALINE CASTS #/AREA URNS LPF: ABNORMAL /LPF
KETONES UR STRIP-MCNC: ABNORMAL MG/DL
LDLC SERPL CALC-MCNC: 62 MG/DL (ref 0–100)
LEUKOCYTE ESTERASE UR QL STRIP: ABNORMAL
NITRITE UR QL STRIP: NEGATIVE
NON-SQ EPI CELLS URNS QL MICRO: ABNORMAL /HPF
PH UR STRIP.AUTO: 6.5 [PH] (ref 4.5–8)
POTASSIUM SERPL-SCNC: 3.9 MMOL/L (ref 3.5–5.3)
PROT SERPL-MCNC: 7.4 G/DL (ref 6.4–8.2)
PROT UR STRIP-MCNC: NEGATIVE MG/DL
RBC #/AREA URNS AUTO: ABNORMAL /HPF
SODIUM SERPL-SCNC: 133 MMOL/L (ref 136–145)
SP GR UR STRIP.AUTO: 1.01 (ref 1–1.03)
TRIGL SERPL-MCNC: 68 MG/DL
TSH SERPL DL<=0.05 MIU/L-ACNC: 1.89 UIU/ML (ref 0.36–3.74)
UROBILINOGEN UR QL STRIP.AUTO: 0.2 E.U./DL
WBC #/AREA URNS AUTO: ABNORMAL /HPF

## 2018-09-04 PROCEDURE — 80061 LIPID PANEL: CPT

## 2018-09-04 PROCEDURE — 80053 COMPREHEN METABOLIC PANEL: CPT

## 2018-09-04 PROCEDURE — 81001 URINALYSIS AUTO W/SCOPE: CPT | Performed by: FAMILY MEDICINE

## 2018-09-04 PROCEDURE — 84443 ASSAY THYROID STIM HORMONE: CPT

## 2018-09-04 PROCEDURE — 36415 COLL VENOUS BLD VENIPUNCTURE: CPT

## 2018-09-05 ENCOUNTER — TELEPHONE (OUTPATIENT)
Dept: FAMILY MEDICINE CLINIC | Facility: CLINIC | Age: 78
End: 2018-09-05

## 2018-09-05 DIAGNOSIS — N39.0 URINARY TRACT INFECTION WITHOUT HEMATURIA, SITE UNSPECIFIED: Primary | ICD-10-CM

## 2018-09-05 NOTE — TELEPHONE ENCOUNTER
Was in the ER [facility] on Sunday for constipation and uti, was given the colonoscopy prep and did have a BM and on Miralax ,  She is also on antib for uti,    Do you need to see? Can you view the ER notes

## 2018-09-05 NOTE — TELEPHONE ENCOUNTER
She will need a formal urine culture once off antibiotic for 3 days and if constipation persists, I need to see her

## 2018-09-07 ENCOUNTER — OFFICE VISIT (OUTPATIENT)
Dept: FAMILY MEDICINE CLINIC | Facility: CLINIC | Age: 78
End: 2018-09-07
Payer: MEDICARE

## 2018-09-07 ENCOUNTER — TELEPHONE (OUTPATIENT)
Dept: FAMILY MEDICINE CLINIC | Facility: CLINIC | Age: 78
End: 2018-09-07

## 2018-09-07 VITALS
HEIGHT: 65 IN | SYSTOLIC BLOOD PRESSURE: 132 MMHG | WEIGHT: 211.1 LBS | BODY MASS INDEX: 35.17 KG/M2 | HEART RATE: 68 BPM | TEMPERATURE: 98.9 F | DIASTOLIC BLOOD PRESSURE: 72 MMHG

## 2018-09-07 DIAGNOSIS — N39.0 URINARY TRACT INFECTION WITHOUT HEMATURIA, SITE UNSPECIFIED: Primary | ICD-10-CM

## 2018-09-07 LAB
SL AMB  POCT GLUCOSE, UA: ABNORMAL
SL AMB LEUKOCYTE ESTERASE,UA: ABNORMAL
SL AMB POCT BILIRUBIN,UA: ABNORMAL
SL AMB POCT BLOOD,UA: ABNORMAL
SL AMB POCT CLARITY,UA: CLEAR
SL AMB POCT COLOR,UA: YELLOW
SL AMB POCT KETONES,UA: ABNORMAL
SL AMB POCT NITRITE,UA: ABNORMAL
SL AMB POCT PH,UA: 5
SL AMB POCT SPECIFIC GRAVITY,UA: 1.02
SL AMB POCT URINE PROTEIN: ABNORMAL
SL AMB POCT UROBILINOGEN: ABNORMAL

## 2018-09-07 PROCEDURE — 99213 OFFICE O/P EST LOW 20 MIN: CPT | Performed by: FAMILY MEDICINE

## 2018-09-07 PROCEDURE — 81002 URINALYSIS NONAUTO W/O SCOPE: CPT | Performed by: FAMILY MEDICINE

## 2018-09-07 RX ORDER — DOXYCYCLINE HYCLATE 100 MG/1
100 CAPSULE ORAL EVERY 12 HOURS SCHEDULED
Qty: 20 CAPSULE | Refills: 0 | Status: SHIPPED | OUTPATIENT
Start: 2018-09-07 | End: 2018-09-17

## 2018-09-07 RX ORDER — POLYETHYLENE GLYCOL 3350 17 G/17G
17 POWDER, FOR SOLUTION ORAL DAILY
COMMUNITY
End: 2018-10-04 | Stop reason: ALTCHOICE

## 2018-09-07 NOTE — PROGRESS NOTES
Patient ID: Mustapha Madsen is a 76 y o  female  HPI: 76 y  o female presenting with recurrent utis  She had a neg KUB and I had a long discussion with patient and her daughter about the probability that she most likely isn't empying her bladder  I gave them the choice of treating this infection and then putting her on a prophylactic antibiotic vs  Sending her to urology for a cystoscopy   She would like to try the first suggestion  SUBJECTIVE    Family History   Problem Relation Age of Onset    No Known Problems Mother     Diabetes Father     Stroke Father         syndrome     Social History     Social History    Marital status:      Spouse name: N/A    Number of children: N/A    Years of education: N/A     Occupational History    Not on file       Social History Main Topics    Smoking status: Former Smoker     Quit date: 1990    Smokeless tobacco: Never Used    Alcohol use No    Drug use: No    Sexual activity: Not on file     Other Topics Concern    Not on file     Social History Narrative    Daily coffee consumption (__cups/day)     Daily cola consumption (__cans/day)     Daily tea consumption (__cups/day)      Past Medical History:   Diagnosis Date    Hypertension     UTI (urinary tract infection)      Past Surgical History:   Procedure Laterality Date    EYE SURGERY       Allergies   Allergen Reactions    Ciprofloxacin      Chest discomfort and dizziness    Diclofenac     Diphenhydramine        Current Outpatient Prescriptions:     ALPRAZolam (XANAX) 0 25 mg tablet, Take 1 tablet (0 25 mg total) by mouth 3 (three) times a day as needed for anxiety, Disp: 30 tablet, Rfl: 0    amLODIPine (NORVASC) 5 mg tablet, Take 1 tablet by mouth daily, Disp: , Rfl:     aspirin 81 MG tablet, Take 2 tablets by mouth daily, Disp: , Rfl:     B Complex-C CAPS, Take 1 capsule by mouth daily, Disp: , Rfl:     Biotin (BIOTIN 5000) 5 MG CAPS, Take 1 capsule by mouth daily, Disp: , Rfl:    Cholecalciferol (RA VITAMIN D-3) 5000 units capsule, Take 1 tablet by mouth daily, Disp: , Rfl:     lisinopril (ZESTRIL) 5 mg tablet, Take 1 tablet by mouth daily, Disp: , Rfl:     Melatonin 5 MG TABS, Take by mouth, Disp: , Rfl:     meloxicam (MOBIC) 15 mg tablet, TAKE 1 TABLET EVERY DAY WITH FOOD, Disp: 90 tablet, Rfl: 3    Multiple Vitamins-Minerals (CENTRUM SILVER PO), Take 1 tablet by mouth daily, Disp: , Rfl:     nitrofurantoin (MACROBID) 100 mg capsule, Take 1 capsule (100 mg total) by mouth 2 (two) times a day for 7 days, Disp: 14 capsule, Rfl: 0    ondansetron (ZOFRAN) 4 mg tablet, Take 1 tablet (4 mg total) by mouth every 8 (eight) hours as needed for nausea or vomiting, Disp: 20 tablet, Rfl: 1    polyethylene glycol (MIRALAX) 17 g packet, Take 17 g by mouth daily, Disp: , Rfl:     bisacodyl (DULCOLAX) 10 mg suppository, Insert 1 suppository (10 mg total) into the rectum daily (Patient not taking: Reported on 9/7/2018 ), Disp: 12 suppository, Rfl: 0    doxycycline hyclate (VIBRAMYCIN) 100 mg capsule, Take 1 capsule (100 mg total) by mouth every 12 (twelve) hours for 10 days, Disp: 20 capsule, Rfl: 0    polyethylene glycol (GOLYTELY) 4000 mL solution, Take 240 mL by mouth once for 1 dose, Disp: 240 mL, Rfl: 0    Review of Systems  Constitutional:     Denies fever, chills ,fatigue ,weakness ,weight loss, weight gain     ENT: Denies earache ,loss of hearing ,nosebleed, nasal discharge,nasal congestion ,sore throat ,hoarseness  Pulmonary: Denies shortness of breath ,cough  ,dyspnea on exertion, orthopnea  ,PND   Cardiovascular:  Denies bradycardia , tachycardia  ,palpations, lower extremity edema leg, claudication  Breast:  Denies new or changing breast lumps ,nipple discharge ,nipple changes  Abdomen:  Denies abdominal pain , anorexia , indigestion, nausea, vomiting, constipation, diarrhea  Musculoskeletal: Denies myalgias, arthralgias, joint swelling, joint stiffness , limb pain, limb swelling  Gu: +dysuria,+ polyuria  Skin: Denies skin rash, skin lesion, skin wound, itching, dry skin  Neuro: Denies headache, numbness, tingling, confusion, loss of consciousness, dizziness, vertigo  Psychiatric: Denies feelings of depression, suicidal ideation, anxiety, sleep disturbances    OBJECTIVE  /72   Pulse 68   Temp 98 9 °F (37 2 °C)   Ht 5' 5" (1 651 m)   Wt 95 8 kg (211 lb 1 6 oz)   BMI 35 13 kg/m²   Constitutional:   NAD, well appearing and well nourished      ENT:   Conjunctiva and lids: no injection, edema, or discharge     Pupils and iris: REENA bilaterally    External inspection of ears and nose: normal without deformities or discharge  Otoscopic exam: Canals patent without erythema  Nasal mucosa, septum and turbinates: Normal or edema or discharge       Oropharynx:  Moist mucosa, normal tongue and tonsils without lesions  No erythema        Pulmonary:Respiratory effort normal rate and rhythm, no increased work of breathing   Auscultation of lungs:  Clear bilaterally with no adventitious breath sounds       Cardiovascular: regular rate and rhythm, S1 and S2, no murmur, no edema and/or varicosities of LE     Abdomen: Soft and non-distended     Positive bowel sounds      No heptomegaly or splenomegaly      Gu: no suprapubic tenderness or CVA tenderness, no urethral discharge  Lymphatic:  No anterior or posterior cervical lymphadenopathy         Musculoskeletal:  Gait and station: Normal gait      Digits and nails normal without clubbing or cyanosis       Inspection/palpation of joints, bones, and muscles:  No joint tenderness, swelling, full active and passive range of motion       Skin: Normal skin turgor and no rashes      Neuro:    Normal reflexes      Psych:   alert and oriented to person, place and time     normal mood and affect       Assessment/Plan:Diagnoses and all orders for this visit:    Urinary tract infection without hematuria, site unspecified  -     doxycycline hyclate (VIBRAMYCIN) 100 mg capsule; Take 1 capsule (100 mg total) by mouth every 12 (twelve) hours for 10 days  -     POCT urine dip    Other orders  -     polyethylene glycol (MIRALAX) 17 g packet; Take 17 g by mouth daily        Reviewed with patient plan to treat with the above      Patient instructed to call in 72 hours if not feeling better or if symptoms worsen

## 2018-09-13 ENCOUNTER — OFFICE VISIT (OUTPATIENT)
Dept: FAMILY MEDICINE CLINIC | Facility: CLINIC | Age: 78
End: 2018-09-13
Payer: MEDICARE

## 2018-09-13 ENCOUNTER — TELEPHONE (OUTPATIENT)
Dept: UROLOGY | Facility: AMBULATORY SURGERY CENTER | Age: 78
End: 2018-09-13

## 2018-09-13 VITALS
HEART RATE: 72 BPM | SYSTOLIC BLOOD PRESSURE: 142 MMHG | HEIGHT: 65 IN | TEMPERATURE: 99.5 F | RESPIRATION RATE: 16 BRPM | BODY MASS INDEX: 34.72 KG/M2 | DIASTOLIC BLOOD PRESSURE: 80 MMHG | WEIGHT: 208.4 LBS

## 2018-09-13 DIAGNOSIS — N39.0 URINARY TRACT INFECTION WITHOUT HEMATURIA, SITE UNSPECIFIED: Primary | ICD-10-CM

## 2018-09-13 LAB
SL AMB  POCT GLUCOSE, UA: ABNORMAL
SL AMB LEUKOCYTE ESTERASE,UA: ABNORMAL
SL AMB POCT BILIRUBIN,UA: ABNORMAL
SL AMB POCT BLOOD,UA: 5
SL AMB POCT CLARITY,UA: CLEAR
SL AMB POCT COLOR,UA: YELLOW
SL AMB POCT KETONES,UA: ABNORMAL
SL AMB POCT NITRITE,UA: ABNORMAL
SL AMB POCT PH,UA: 5
SL AMB POCT SPECIFIC GRAVITY,UA: 1.01
SL AMB POCT URINE PROTEIN: ABNORMAL
SL AMB POCT UROBILINOGEN: 0.2

## 2018-09-13 PROCEDURE — 81002 URINALYSIS NONAUTO W/O SCOPE: CPT | Performed by: FAMILY MEDICINE

## 2018-09-13 PROCEDURE — 99213 OFFICE O/P EST LOW 20 MIN: CPT | Performed by: FAMILY MEDICINE

## 2018-09-13 NOTE — TELEPHONE ENCOUNTER
New patient being referred for same day appointment for UTI antibiotic resistant  The PCP wants her seen as soon as possible  Triaged to nurses for same day appointment  Reason for appointment/Complaint/Diagnosis : antibiotic resistant UTI    Insurance: Medicare A/B, [employer]    History of Cancer? no                       If yes, what kind? none    Previous urologist?     no                  Records requested/where? [facility]    Outside testing/where? none    Location Preference for office visit?  [facility]

## 2018-09-13 NOTE — TELEPHONE ENCOUNTER
Spoke with Sarah Jose @ Dr Yolande Miller office  Scheduled patient for 9/14/18 at [facility]  Sarah Jose will contact patient with appt details

## 2018-09-13 NOTE — PROGRESS NOTES
Patient ID: Renay Rg is a 76 y o  female  HPI: 76 y  o female presents for follow up s/p hospitalization for a uti and retreatment for unresolved infection one week later in office  She finished a full course of keflex and doxycyline  She continues to complain of continued symtpoms of bladder spasms, dysuria, and urinary frequency  She denies any hematuria, fever or chills  SUBJECTIVE    Family History   Problem Relation Age of Onset    No Known Problems Mother     Diabetes Father     Stroke Father         syndrome     Social History     Social History    Marital status:      Spouse name: N/A    Number of children: N/A    Years of education: N/A     Occupational History    Not on file       Social History Main Topics    Smoking status: Former Smoker     Quit date: 1990    Smokeless tobacco: Never Used    Alcohol use No    Drug use: No    Sexual activity: Not on file     Other Topics Concern    Not on file     Social History Narrative    Daily coffee consumption (__cups/day)     Daily cola consumption (__cans/day)     Daily tea consumption (__cups/day)      Past Medical History:   Diagnosis Date    Hypertension     UTI (urinary tract infection)      Past Surgical History:   Procedure Laterality Date    EYE SURGERY       Allergies   Allergen Reactions    Ciprofloxacin      Chest discomfort and dizziness    Diclofenac     Diphenhydramine        Current Outpatient Prescriptions:     ALPRAZolam (XANAX) 0 25 mg tablet, Take 1 tablet (0 25 mg total) by mouth 3 (three) times a day as needed for anxiety, Disp: 30 tablet, Rfl: 0    amLODIPine (NORVASC) 5 mg tablet, Take 1 tablet by mouth daily, Disp: , Rfl:     aspirin 81 MG tablet, Take 2 tablets by mouth daily, Disp: , Rfl:     B Complex-C CAPS, Take 1 capsule by mouth daily, Disp: , Rfl:     Biotin (BIOTIN 5000) 5 MG CAPS, Take 1 capsule by mouth daily, Disp: , Rfl:     bisacodyl (DULCOLAX) 10 mg suppository, Insert 1 suppository (10 mg total) into the rectum daily, Disp: 12 suppository, Rfl: 0    Cholecalciferol (RA VITAMIN D-3) 5000 units capsule, Take 1 tablet by mouth daily, Disp: , Rfl:     doxycycline hyclate (VIBRAMYCIN) 100 mg capsule, Take 1 capsule (100 mg total) by mouth every 12 (twelve) hours for 10 days, Disp: 20 capsule, Rfl: 0    lisinopril (ZESTRIL) 5 mg tablet, Take 1 tablet by mouth daily, Disp: , Rfl:     Melatonin 5 MG TABS, Take by mouth, Disp: , Rfl:     meloxicam (MOBIC) 15 mg tablet, TAKE 1 TABLET EVERY DAY WITH FOOD, Disp: 90 tablet, Rfl: 3    Multiple Vitamins-Minerals (CENTRUM SILVER PO), Take 1 tablet by mouth daily, Disp: , Rfl:     ondansetron (ZOFRAN) 4 mg tablet, Take 1 tablet (4 mg total) by mouth every 8 (eight) hours as needed for nausea or vomiting, Disp: 20 tablet, Rfl: 1    polyethylene glycol (MIRALAX) 17 g packet, Take 17 g by mouth daily, Disp: , Rfl:     polyethylene glycol (GOLYTELY) 4000 mL solution, Take 240 mL by mouth once for 1 dose, Disp: 240 mL, Rfl: 0    Review of Systems  Constitutional:     Denies fever, chills ,fatigue ,weakness ,weight loss, weight gain     ENT: Denies earache ,loss of hearing ,nosebleed, nasal discharge,nasal congestion ,sore throat ,hoarseness  Pulmonary: Denies shortness of breath ,cough  ,dyspnea on exertion, orthopnea  ,PND   Cardiovascular:  Denies bradycardia , tachycardia  ,palpations, lower extremity edema leg, claudication  Breast:  Denies new or changing breast lumps ,nipple discharge ,nipple changes  Abdomen:  Denies abdominal pain , anorexia , indigestion, nausea, vomiting, constipation, diarrhea  Musculoskeletal: Denies myalgias, arthralgias, joint swelling, joint stiffness , limb pain, limb swelling  Gu: +dysuria, polyuria and intermittent bladder spasms  Skin: Denies skin rash, skin lesion, skin wound, itching, dry skin  Neuro: Denies headache, numbness, tingling, confusion, loss of consciousness, dizziness, vertigo  Psychiatric: Denies feelings of depression, suicidal ideation, anxiety, sleep disturbances    OBJECTIVE  /80   Pulse 72   Temp 99 5 °F (37 5 °C)   Resp 16   Ht 5' 5" (1 651 m)   Wt 94 5 kg (208 lb 6 4 oz)   BMI 34 68 kg/m²   Constitutional:   NAD, well appearing and well nourished      ENT:   Conjunctiva and lids: no injection, edema, or discharge     Pupils and iris: REENA bilaterally    External inspection of ears and nose: normal without deformities or discharge  Otoscopic exam: Canals patent without erythema  Nasal mucosa, septum and turbinates: Normal or edema or discharge         Oropharynx:  Moist mucosa, normal tongue and tonsils without lesions  No erythema        Pulmonary:Respiratory effort normal rate and rhythm, no increased work of breathing  Auscultation of lungs:  Clear bilaterally with no adventitious breath sounds       Cardiovascular: regular rate and rhythm, S1 and S2, no murmur, no edema and/or varicosities of LE      Abdomen: Soft and non-distended     Positive bowel sounds      No heptomegaly or splenomegaly      Gu: no suprapubic tenderness or CVA tenderness, no urethral discharge  Lymphatic:  No anterior or posterior cervical lymphadenopathy         Musculoskeletal:  Gait and station: Normal gait      Digits and nails normal without clubbing or cyanosis       Inspection/palpation of joints, bones, and muscles:  No joint tenderness, swelling, full active and passive range of motion       Skin: Normal skin turgor and no rashes      Neuro:      Normal reflexes      Psych:   alert and oriented to person, place and time     normal mood and affect       Assessment/Plan:Diagnoses and all orders for this visit:    Urinary tract infection without hematuria, site unspecified  -     Cancel: POCT urine dip  -     US kidney and bladder; Future  -     POCT urine dip      Our office called over to [facility] and they will see patient in office tomorrow       Reviewed with patient plan to treat with   Patient instructed to call in 72 hours if not feeling better or if symptoms worsen

## 2018-09-14 ENCOUNTER — OFFICE VISIT (OUTPATIENT)
Dept: UROLOGY | Facility: CLINIC | Age: 78
End: 2018-09-14
Payer: MEDICARE

## 2018-09-14 VITALS
DIASTOLIC BLOOD PRESSURE: 60 MMHG | SYSTOLIC BLOOD PRESSURE: 136 MMHG | WEIGHT: 207.8 LBS | HEART RATE: 90 BPM | HEIGHT: 63 IN | BODY MASS INDEX: 36.82 KG/M2

## 2018-09-14 DIAGNOSIS — R35.0 URINARY FREQUENCY: ICD-10-CM

## 2018-09-14 DIAGNOSIS — R35.1 NOCTURIA: Primary | ICD-10-CM

## 2018-09-14 LAB
POST-VOID RESIDUAL VOLUME, ML POC: 0 ML
SL AMB  POCT GLUCOSE, UA: NORMAL
SL AMB LEUKOCYTE ESTERASE,UA: NORMAL
SL AMB POCT BLOOD,UA: NORMAL
SL AMB POCT CLARITY,UA: CLEAR
SL AMB POCT COLOR,UA: YELLOW
SL AMB POCT KETONES,UA: NORMAL
SL AMB POCT NITRITE,UA: NORMAL
SL AMB POCT PH,UA: 5
SL AMB POCT SPECIFIC GRAVITY,UA: 1
SL AMB POCT URINE PROTEIN: NORMAL

## 2018-09-14 PROCEDURE — 51798 US URINE CAPACITY MEASURE: CPT | Performed by: PHYSICIAN ASSISTANT

## 2018-09-14 PROCEDURE — 99203 OFFICE O/P NEW LOW 30 MIN: CPT | Performed by: PHYSICIAN ASSISTANT

## 2018-09-14 PROCEDURE — 81002 URINALYSIS NONAUTO W/O SCOPE: CPT | Performed by: PHYSICIAN ASSISTANT

## 2018-09-14 PROCEDURE — 87086 URINE CULTURE/COLONY COUNT: CPT | Performed by: PHYSICIAN ASSISTANT

## 2018-09-14 NOTE — PROGRESS NOTES
1  Nocturia  POCT Measure PVR    POCT urine dip    Urine culture   2  Urinary frequency  Mirabegron ER 25 MG TB24         Assessment and plan:       1  Urinary urgency, frequency, suprapubic pressure  - we will send patient's urine sample out for urine culture to ensure no true urinary infection  - we discussed other etiologies that could be contributing to her urinary frequency and urgency outside of urinary infection  - patient has been scheduled for an ultrasound of the kidney and bladder through her primary care provider and I agree with this further evaluation  - we also discussed management of the urinary symptoms with a beta 3 agonist versus anticholinergic  Side effect profile reviewed  Patient will be initiated on Myrbetriq 25 mg daily  Should her insurance not cover this medications, recommendations would be for Sanctura 20mg daily  - patient will follow up in 6 weeks time with a PVR in symptom reassessment  - patient verbalized understanding  All questions answered  Donaldo Zuñiga PA-C      Chief Complaint     Chief Complaint   Patient presents with    Recurrent UTi         History of Present Illness     Daniela Musa is a 76 y o  female presenting as a new patient today for recurrent urinary infections  Patient was in the emergency department 09/02/2018 for constipation and possible urinary infection  Patient was treated 2 separate antibiotics for urinary infection which precipitated severe constipation  Patient unfortunately has not had any urine cultures performed this interval   Her urinalysis had revealed leukocytes, however has been nitrite negative  Patient states her urinary symptoms include urinary urgency, frequency, and suprapubic pressure  She denies any dysuria, gross hematuria, flank pain, nausea, vomiting, fevers, or chills  Patient states that her urinary symptoms have been progressive over the past few months    She denies any precipitating or alleviating factors  Patient is currently completing a course of doxycycline  She did has not felt that any of the antibiotics have improved her urinary symptoms  Urine dip in the office today is leukocyte, nitrite, and blood negative  Patient admits to occasional constipation in which she takes as needed MiraLax which provides good control  She typically drinks 1 cup of coffee in the morning followed by multiple cups of water throughout the day  She makes a conscious effort to remain well hydrated on a daily basis  Postvoid residual revealed 0 mL  Laboratory     Lab Results   Component Value Date    CREATININE 0 73 09/04/2018       Review of Systems     Review of Systems   Constitutional: Negative for activity change, appetite change, chills, diaphoresis, fatigue, fever and unexpected weight change  Respiratory: Negative for chest tightness and shortness of breath  Cardiovascular: Negative for chest pain, palpitations and leg swelling  Gastrointestinal: Negative for abdominal distention, abdominal pain, constipation, diarrhea, nausea and vomiting  Genitourinary: Positive for frequency, pelvic pain and urgency  Negative for decreased urine volume, difficulty urinating, dysuria, enuresis, flank pain, genital sores and hematuria  Musculoskeletal: Negative for back pain, gait problem and myalgias  Skin: Negative for color change, pallor, rash and wound  Psychiatric/Behavioral: Negative for behavioral problems  The patient is not nervous/anxious  Allergies     Allergies   Allergen Reactions    Ciprofloxacin      Chest discomfort and dizziness    Diclofenac     Diphenhydramine        Physical Exam     Physical Exam   Constitutional: She appears well-developed and well-nourished  No distress  HENT:   Head: Normocephalic and atraumatic  Eyes: Conjunctivae are normal    Neck: Normal range of motion  No tracheal deviation present     Pulmonary/Chest: Effort normal    Skin: She is not diaphoretic  Vital Signs     Vitals:    09/14/18 0929   BP: 136/60   Pulse: 90   Weight: 94 3 kg (207 lb 12 8 oz)   Height: 5' 3" (1 6 m)         Current Medications       Current Outpatient Prescriptions:     ALPRAZolam (XANAX) 0 25 mg tablet, Take 1 tablet (0 25 mg total) by mouth 3 (three) times a day as needed for anxiety, Disp: 30 tablet, Rfl: 0    amLODIPine (NORVASC) 5 mg tablet, Take 1 tablet by mouth daily, Disp: , Rfl:     aspirin 81 MG tablet, Take 2 tablets by mouth daily, Disp: , Rfl:     B Complex-C CAPS, Take 1 capsule by mouth daily, Disp: , Rfl:     Biotin (BIOTIN 5000) 5 MG CAPS, Take 1 capsule by mouth daily, Disp: , Rfl:     Cholecalciferol (RA VITAMIN D-3) 5000 units capsule, Take 1 tablet by mouth daily, Disp: , Rfl:     doxycycline hyclate (VIBRAMYCIN) 100 mg capsule, Take 1 capsule (100 mg total) by mouth every 12 (twelve) hours for 10 days, Disp: 20 capsule, Rfl: 0    lisinopril (ZESTRIL) 5 mg tablet, Take 1 tablet by mouth daily, Disp: , Rfl:     Melatonin 5 MG TABS, Take by mouth, Disp: , Rfl:     meloxicam (MOBIC) 15 mg tablet, TAKE 1 TABLET EVERY DAY WITH FOOD, Disp: 90 tablet, Rfl: 3    Multiple Vitamins-Minerals (CENTRUM SILVER PO), Take 1 tablet by mouth daily, Disp: , Rfl:     ondansetron (ZOFRAN) 4 mg tablet, Take 1 tablet (4 mg total) by mouth every 8 (eight) hours as needed for nausea or vomiting, Disp: 20 tablet, Rfl: 1    polyethylene glycol (MIRALAX) 17 g packet, Take 17 g by mouth daily, Disp: , Rfl:     bisacodyl (DULCOLAX) 10 mg suppository, Insert 1 suppository (10 mg total) into the rectum daily (Patient not taking: Reported on 9/14/2018 ), Disp: 12 suppository, Rfl: 0    Mirabegron ER 25 MG TB24, Take 25 mg by mouth daily, Disp: 30 mg, Rfl: 3    polyethylene glycol (GOLYTELY) 4000 mL solution, Take 240 mL by mouth once for 1 dose, Disp: 240 mL, Rfl: 0      Active Problems     There is no problem list on file for this patient          Past Medical History     Past Medical History:   Diagnosis Date    Hypertension     UTI (urinary tract infection)          Surgical History     Past Surgical History:   Procedure Laterality Date    EYE SURGERY           Family History     Family History   Problem Relation Age of Onset    No Known Problems Mother     Diabetes Father     Stroke Father         syndrome         Social History     Social History       Radiology

## 2018-09-15 LAB — BACTERIA UR CULT: NORMAL

## 2018-09-18 ENCOUNTER — TELEPHONE (OUTPATIENT)
Dept: FAMILY MEDICINE CLINIC | Facility: CLINIC | Age: 78
End: 2018-09-18

## 2018-09-18 ENCOUNTER — TELEPHONE (OUTPATIENT)
Dept: UROLOGY | Facility: CLINIC | Age: 78
End: 2018-09-18

## 2018-09-18 NOTE — TELEPHONE ENCOUNTER
Per chart notes ,patient contacted PCP today and was advised to stop antibiotic and use Zofran as needed for nausea

## 2018-09-18 NOTE — TELEPHONE ENCOUNTER
Most recent urine culture did not have any growth  I believe patient was on doxycycline at the time of urine culture however so may have been sterilized at that point  The patient is having significant difficulties with the doxycycline my recommendations would be to discontinue and monitor urinary symptoms  She should contact Dr Maggie Chris however who prescribed the medication  Patient should complete course of antibiosis prior to initiation of Myrbetriq

## 2018-09-18 NOTE — TELEPHONE ENCOUNTER
She took last antibiotic yesterday, for UTI, she has been nauseous ever since  Urology prescribed Myrbetriq, they wanted to check with you to see if she should continue the Zofran ? U/C showed no growth

## 2018-09-18 NOTE — TELEPHONE ENCOUNTER
Josie Jasmyn patient, seen in follow up on 9/14/18 with [facility] [place],  Patient was prescribed Myrbetriq 25 mg,  Patient states she completed Doxycycline yesterday and has had a lot of nausea,  Doxycycline was ordered for UTI by PCP, Dr Agatha Marcelino, patient states she has a prescription for Zofran,  I advised her to contact Dr Agatha Marcelino regarding the nausea  Patient inquires if she should delay starting Myrbetriq due to her nausea she has not started yet  Advised patient to hold off on starting a new medication until her nausea is resolved  Patient inquired about urine culture results from 9/14/18, informed patient urine culture no growth  Please advise of further recommendation and route to [facility] pool

## 2018-09-21 ENCOUNTER — HOSPITAL ENCOUNTER (OUTPATIENT)
Dept: RADIOLOGY | Facility: MEDICAL CENTER | Age: 78
Discharge: HOME | End: 2018-09-21
Payer: MEDICARE

## 2018-09-21 DIAGNOSIS — N39.0 URINARY TRACT INFECTION WITHOUT HEMATURIA, SITE UNSPECIFIED: ICD-10-CM

## 2018-09-21 PROCEDURE — 76770 US EXAM ABDO BACK WALL COMP: CPT

## 2018-09-24 NOTE — TELEPHONE ENCOUNTER
We should monitor for results of her ultrasound of the kidney and bladder  Patient should initiate her Myrbetriq medication to address her urinary frequency and urgency  In the meantime she should be properly hydrated, avoid bladder irritants, ensure she is not having constipation as this can exacerbate lower urinary symptoms

## 2018-09-24 NOTE — TELEPHONE ENCOUNTER
Patient called stating she had ultrasound done on 9/21/18 and she is calling for results  Advised patient this testing was ordered by [place] [place] DO and results will be sent to her to review , advised patient that results are not yet available and to contact Dr Umang Lamb office  Patient further updates she had been constipated, had bowel movement today and now his urinary urgency/frequency  She had completed 10 days of Doxycycline and has not started Myrbetriq as she was experiencing nausea  Patient had contacted Dr Mitch Carter early last week regarding nausea she was experiencing and has prescription for Zofran prn  Patient states she is still experiencing intermittent nausea, she is using Zofran as needed, she denies vomiting , denies diarrhea, denies fever, denies chills  Advised her to also discuss these symptoms with PCP office  Her urinary complaint is increased urinary frequency/urgency   She is calling for recommendation  Advised her update will be sent to [facility] to review and clinical will call her back with recommendation

## 2018-09-24 NOTE — TELEPHONE ENCOUNTER
Called and spoke to patient , instructed to start Myrbetriq to address her urinary urgency/ frequency and that we will also monitor for her ultrasound results  Instructed to hydrate well, avoid bladder irritants and monitor for signs of constipation

## 2018-09-25 ENCOUNTER — TELEPHONE (OUTPATIENT)
Dept: FAMILY MEDICINE CLINIC | Facility: CLINIC | Age: 78
End: 2018-09-25

## 2018-09-25 NOTE — TELEPHONE ENCOUNTER
Patient left message requesting ultrasound results  Ultrasound is final      Please advise and route to [facility] clinical pool  Thank you

## 2018-09-25 NOTE — TELEPHONE ENCOUNTER
Ultrasound is negative for obstructive uropathy and is demonstrating excellent bladder emptying  Last urine culture was negative for any growth  Patient should remain well hydrated, managed bowel movements, and continue with Myrbetriq  Follow-up as scheduled for review  Thank you

## 2018-09-26 DIAGNOSIS — F41.9 ANXIETY: ICD-10-CM

## 2018-09-26 RX ORDER — ALPRAZOLAM 0.25 MG/1
0.25 TABLET ORAL 3 TIMES DAILY PRN
Qty: 90 TABLET | Refills: 0 | Status: SHIPPED | OUTPATIENT
Start: 2018-09-26 | End: 2018-11-06 | Stop reason: SDUPTHER

## 2018-09-26 NOTE — TELEPHONE ENCOUNTER
Called and spoke to patient, advised ultrasound was reviewed by [facility] and that it was negative for any obstructive uropathy and demonstrating excellent bladder emptying, advised last urine culture negative for any growth,  Recommendation is to stay well hydrated, manage bowel movements, avoid constipation and continue Myrbetriq  Follow up as scheduled

## 2018-09-28 DIAGNOSIS — R11.0 NAUSEA: Primary | ICD-10-CM

## 2018-09-28 RX ORDER — ONDANSETRON 4 MG/1
4 TABLET, FILM COATED ORAL EVERY 8 HOURS PRN
Qty: 30 TABLET | Refills: 0 | Status: SHIPPED | OUTPATIENT
Start: 2018-09-28 | End: 2018-10-04 | Stop reason: ALTCHOICE

## 2018-09-28 RX ORDER — PROMETHAZINE HYDROCHLORIDE 25 MG/1
25 TABLET ORAL EVERY 8 HOURS PRN
Qty: 30 TABLET | Refills: 0 | Status: SHIPPED | OUTPATIENT
Start: 2018-09-28 | End: 2019-09-19 | Stop reason: ALTCHOICE

## 2018-10-04 ENCOUNTER — OFFICE VISIT (OUTPATIENT)
Dept: FAMILY MEDICINE CLINIC | Facility: CLINIC | Age: 78
End: 2018-10-04
Payer: MEDICARE

## 2018-10-04 VITALS
SYSTOLIC BLOOD PRESSURE: 120 MMHG | DIASTOLIC BLOOD PRESSURE: 78 MMHG | HEIGHT: 63 IN | TEMPERATURE: 99.7 F | HEART RATE: 78 BPM | WEIGHT: 202 LBS | BODY MASS INDEX: 35.79 KG/M2

## 2018-10-04 DIAGNOSIS — R68.81 EARLY SATIETY: ICD-10-CM

## 2018-10-04 DIAGNOSIS — R11.0 NAUSEA: Primary | ICD-10-CM

## 2018-10-04 PROCEDURE — 99213 OFFICE O/P EST LOW 20 MIN: CPT | Performed by: FAMILY MEDICINE

## 2018-10-05 NOTE — PROGRESS NOTES
Patient ID: Shelia Hilario is a 76 y o  female  HPI: 76 y  o female presents for evaluation of ongoing nausea, early satiety and bloating  She denies any change in her bowels, vomiting, acid reflux, epigastric pain, or heartburn symptoms  SUBJECTIVE    Family History   Problem Relation Age of Onset    No Known Problems Mother     Diabetes Father     Stroke Father         syndrome     Social History     Social History    Marital status:      Spouse name: N/A    Number of children: N/A    Years of education: N/A     Occupational History    Not on file       Social History Main Topics    Smoking status: Former Smoker     Quit date: 1990    Smokeless tobacco: Never Used    Alcohol use No    Drug use: No    Sexual activity: Not on file     Other Topics Concern    Not on file     Social History Narrative    Daily coffee consumption (__cups/day)     Daily cola consumption (__cans/day)     Daily tea consumption (__cups/day)      Past Medical History:   Diagnosis Date    Hypertension     UTI (urinary tract infection)      Past Surgical History:   Procedure Laterality Date    EYE SURGERY       Allergies   Allergen Reactions    Ciprofloxacin      Chest discomfort and dizziness    Diclofenac     Diphenhydramine        Current Outpatient Prescriptions:     ALPRAZolam (XANAX) 0 25 mg tablet, Take 1 tablet (0 25 mg total) by mouth 3 (three) times a day as needed for anxiety, Disp: 90 tablet, Rfl: 0    amLODIPine (NORVASC) 5 mg tablet, Take 1 tablet by mouth daily, Disp: , Rfl:     lisinopril (ZESTRIL) 5 mg tablet, Take 1 tablet by mouth daily, Disp: , Rfl:     Melatonin 5 MG TABS, Take by mouth, Disp: , Rfl:     meloxicam (MOBIC) 15 mg tablet, TAKE 1 TABLET EVERY DAY WITH FOOD, Disp: 90 tablet, Rfl: 3    Mirabegron ER 25 MG TB24, Take 25 mg by mouth daily, Disp: 30 mg, Rfl: 3    promethazine (PHENERGAN) 25 mg tablet, Take 1 tablet (25 mg total) by mouth every 8 (eight) hours as needed for nausea or vomiting, Disp: 30 tablet, Rfl: 0    Review of Systems  Constitutional:     Denies fever, chills ,fatigue ,weakness ,weight loss, weight gain     ENT: Denies earache ,loss of hearing ,nosebleed, nasal discharge,nasal congestion ,sore throat ,hoarseness  Pulmonary: Denies shortness of breath ,cough  ,dyspnea on exertion, orthopnea  ,PND   Cardiovascular:  Denies bradycardia , tachycardia  ,palpations, lower extremity edema leg, claudication  Breast:  Denies new or changing breast lumps ,nipple discharge ,nipple changes  Abdomen:  Denies abdominal pain , anorexia , indigestion,, vomiting, constipation, diarrhea + nausea and early satiety  Musculoskeletal: Denies myalgias, arthralgias, joint swelling, joint stiffness , limb pain, limb swelling  Gu: denies dysuria, polyuria  Skin: Denies skin rash, skin lesion, skin wound, itching, dry skin  Neuro: Denies headache, numbness, tingling, confusion, loss of consciousness, dizziness, vertigo  Psychiatric: Denies feelings of depression, suicidal ideation, anxiety, sleep disturbances    OBJECTIVE  /78   Pulse 78   Temp 99 7 °F (37 6 °C)   Ht 5' 3" (1 6 m)   Wt 91 6 kg (202 lb)   BMI 35 78 kg/m²   Constitutional:   NAD, well appearing and well nourished      ENT:   Conjunctiva and lids: no injection, edema, or discharge     Pupils and iris: REENA bilaterally    External inspection of ears and nose: normal without deformities or discharge  Otoscopic exam: Canals patent without erythema  Nasal mucosa, septum and turbinates: Normal or edema or discharge         Oropharynx:  Moist mucosa, normal tongue and tonsils without lesions  No erythema        Pulmonary:Respiratory effort normal rate and rhythm, no increased work of breathing   Auscultation of lungs:  Clear bilaterally with no adventitious breath sounds       Cardiovascular: regular rate and rhythm, S1 and S2, no murmur, no edema and/or varicosities of LE      Abdomen: Soft and non-distended ; no rigidity, no rebound or guarding    Positive bowel sounds      No heptomegaly or splenomegaly      Gu: no suprapubic tenderness or CVA tenderness, no urethral discharge  Lymphatic:  No anterior or posterior cervical lymphadenopathy         Musculoskeletal:  Gait and station: Normal gait    Digits and nails normal without clubbing or cyanosis       Inspection/palpation of joints, bones, and muscles:  No joint tenderness, swelling, full active and passive range of motion       Skin: Normal skin turgor and no rashes      Neuro:    Normal reflexes     Psych:   alert and oriented to person, place and time     normal mood and affect       Assessment/Plan:Diagnoses and all orders for this visit:    Nausea  -     NM gastric emptying; Future    Early satiety  -     NM gastric emptying; Future      Pt instructed to continue with promethazine prn nausea in interim and eat small, more frequent meals in interim  Will await gastric emptying study results to rule out gastroparesis and will treat accordingly pending results

## 2018-10-18 ENCOUNTER — HOSPITAL ENCOUNTER (OUTPATIENT)
Dept: RADIOLOGY | Facility: HOSPITAL | Age: 78
Discharge: HOME | End: 2018-10-18
Payer: MEDICARE

## 2018-10-18 DIAGNOSIS — R68.81 EARLY SATIETY: ICD-10-CM

## 2018-10-18 DIAGNOSIS — R11.0 NAUSEA: ICD-10-CM

## 2018-10-18 PROCEDURE — 78264 GASTRIC EMPTYING IMG STUDY: CPT

## 2018-10-18 PROCEDURE — A9541 TC99M SULFUR COLLOID: HCPCS

## 2018-10-19 DIAGNOSIS — R14.0 BLOATING: ICD-10-CM

## 2018-10-19 DIAGNOSIS — R11.0 NAUSEA: Primary | ICD-10-CM

## 2018-11-06 DIAGNOSIS — F41.9 ANXIETY: ICD-10-CM

## 2018-11-07 RX ORDER — ALPRAZOLAM 0.25 MG/1
0.25 TABLET ORAL 3 TIMES DAILY PRN
Qty: 90 TABLET | Refills: 0 | Status: SHIPPED | OUTPATIENT
Start: 2018-11-07 | End: 2018-12-04 | Stop reason: SDUPTHER

## 2018-11-09 ENCOUNTER — OFFICE VISIT (OUTPATIENT)
Dept: UROLOGY | Facility: CLINIC | Age: 78
End: 2018-11-09
Payer: MEDICARE

## 2018-11-09 VITALS — WEIGHT: 203 LBS | HEIGHT: 63 IN | BODY MASS INDEX: 35.97 KG/M2

## 2018-11-09 DIAGNOSIS — R35.0 FREQUENCY OF URINATION: Primary | ICD-10-CM

## 2018-11-09 LAB — POST-VOID RESIDUAL VOLUME, ML POC: 253 ML

## 2018-11-09 PROCEDURE — 51798 US URINE CAPACITY MEASURE: CPT | Performed by: PHYSICIAN ASSISTANT

## 2018-11-09 PROCEDURE — 99213 OFFICE O/P EST LOW 20 MIN: CPT | Performed by: PHYSICIAN ASSISTANT

## 2018-11-09 NOTE — PROGRESS NOTES
1  Frequency of urination  POCT Measure PVR         Assessment and plan:       1  Urinary urgency, frequency, suprapubic pressure   - improved well on Myrbetriq 25 mg daily  - she will continue to take this medication on a daily basis  We reviewed the importance of proper hydration, timed voiding, and avoidance of constipation  - patient will follow up in 1 year with a PVR for symptom reassessment  There were to contact us sooner with any urologic concern  All questions answered  Ken Jarquin PA-C      Chief Complaint     Chief Complaint   Patient presents with    Nocturia    Urinary Frequency    Urinary Tract Infection         History of Present Illness     Jose Alejandro Stoddard is a 76 y o  pre of recurrent urinary infections lower urinary tract symptoms presenting for follow-up  Patient had been seen in consultation in September 2018 due to recurrent uric the area infections  She was treated on multiple antibiotics, however did not have a culture proven infection  Patient's main urinary in symptoms included urinary frequency, urgency, suprapubic pressure  Patient did admit to occasional constipation as well  She has demonstrated adequate bladder emptying in the past with 0 mL postvoid residual     At her last appointment she was initiated on Myrbetriq 25 mg daily  Patient had ultrasound of the kidney and bladder 09/21/2018 which was negative for any urologic abnormalities  She had an estimated voided postvoid residual at that time approximately 93 mL  Patient had a bladder scan in the office of 253 mL, however was able to void approximately 120 mL  Patient has noticed symptom improvement  She denies any further suprapubic pressure  She feels like she can hold her urine for an adequate amount of time and denies any urinary incontinence  She denies any dysuria, gross hematuria, flank pain, fevers, or chills     Laboratory     Lab Results   Component Value Date    CREATININE 0 73 09/04/2018       Review of Systems     Review of Systems   Constitutional: Negative for activity change, appetite change, chills, diaphoresis, fatigue, fever and unexpected weight change  Respiratory: Negative for chest tightness and shortness of breath  Cardiovascular: Negative for chest pain, palpitations and leg swelling  Gastrointestinal: Negative for abdominal distention, abdominal pain, constipation, diarrhea, nausea and vomiting  Genitourinary: Negative for decreased urine volume, difficulty urinating, dysuria, enuresis, flank pain, frequency, genital sores, hematuria and urgency  Musculoskeletal: Negative for back pain, gait problem and myalgias  Skin: Negative for color change, pallor, rash and wound  Psychiatric/Behavioral: Negative for behavioral problems  The patient is not nervous/anxious  Allergies     Allergies   Allergen Reactions    Ciprofloxacin      Chest discomfort and dizziness    Diclofenac     Diphenhydramine        Physical Exam     Physical Exam   Constitutional: She is oriented to person, place, and time  She appears well-developed and well-nourished  No distress  HENT:   Head: Normocephalic and atraumatic  Eyes: Conjunctivae are normal    Neck: Normal range of motion  No tracheal deviation present  Pulmonary/Chest: Effort normal    Musculoskeletal: Normal range of motion  She exhibits no edema or deformity  Neurological: She is alert and oriented to person, place, and time  Skin: Skin is warm and dry  She is not diaphoretic  No erythema  No pallor  Psychiatric: She has a normal mood and affect   Her behavior is normal          Vital Signs     Vitals:    11/09/18 0907   Weight: 92 1 kg (203 lb)   Height: 5' 3" (1 6 m)         Current Medications       Current Outpatient Prescriptions:     ALPRAZolam (XANAX) 0 25 mg tablet, Take 1 tablet (0 25 mg total) by mouth 3 (three) times a day as needed for anxiety, Disp: 90 tablet, Rfl: 0    amLODIPine (NORVASC) 5 mg tablet, Take 1 tablet by mouth daily, Disp: , Rfl:     lisinopril (ZESTRIL) 5 mg tablet, Take 1 tablet by mouth daily, Disp: , Rfl:     Melatonin 3 MG CAPS, Take 3 mg by mouth  , Disp: , Rfl:     meloxicam (MOBIC) 15 mg tablet, TAKE 1 TABLET EVERY DAY WITH FOOD, Disp: 90 tablet, Rfl: 3    Mirabegron ER 25 MG TB24, Take 25 mg by mouth daily, Disp: 30 mg, Rfl: 3    Multiple Vitamins-Minerals (PRESERVISION AREDS PO), Take by mouth, Disp: , Rfl:     promethazine (PHENERGAN) 25 mg tablet, Take 1 tablet (25 mg total) by mouth every 8 (eight) hours as needed for nausea or vomiting (Patient not taking: Reported on 11/9/2018 ), Disp: 30 tablet, Rfl: 0      Active Problems     There is no problem list on file for this patient          Past Medical History     Past Medical History:   Diagnosis Date    Hypertension     UTI (urinary tract infection)          Surgical History     Past Surgical History:   Procedure Laterality Date    EYE SURGERY           Family History     Family History   Problem Relation Age of Onset    No Known Problems Mother     Diabetes Father     Stroke Father         syndrome         Social History     Social History       Radiology

## 2018-11-12 DIAGNOSIS — I10 ESSENTIAL HYPERTENSION: Primary | ICD-10-CM

## 2018-11-12 RX ORDER — LISINOPRIL 5 MG/1
TABLET ORAL
Qty: 90 TABLET | Refills: 3 | Status: SHIPPED | OUTPATIENT
Start: 2018-11-12 | End: 2020-02-04

## 2018-11-12 RX ORDER — AMLODIPINE BESYLATE 5 MG/1
TABLET ORAL
Qty: 90 TABLET | Refills: 3 | Status: SHIPPED | OUTPATIENT
Start: 2018-11-12 | End: 2020-02-04

## 2018-11-23 ENCOUNTER — TELEPHONE (OUTPATIENT)
Dept: GASTROENTEROLOGY | Facility: CLINIC | Age: 78
End: 2018-11-23

## 2018-11-27 ENCOUNTER — IMMUNIZATION (OUTPATIENT)
Dept: FAMILY MEDICINE CLINIC | Facility: CLINIC | Age: 78
End: 2018-11-27
Payer: MEDICARE

## 2018-11-27 DIAGNOSIS — Z23 NEED FOR IMMUNIZATION AGAINST INFLUENZA: Primary | ICD-10-CM

## 2018-11-27 PROCEDURE — G0008 ADMIN INFLUENZA VIRUS VAC: HCPCS

## 2018-11-27 PROCEDURE — 90662 IIV NO PRSV INCREASED AG IM: CPT

## 2018-12-04 DIAGNOSIS — F41.9 ANXIETY: ICD-10-CM

## 2018-12-04 RX ORDER — ALPRAZOLAM 0.25 MG/1
TABLET ORAL
Qty: 90 TABLET | Refills: 0 | Status: SHIPPED | OUTPATIENT
Start: 2018-12-04 | End: 2019-03-21 | Stop reason: SDUPTHER

## 2018-12-26 DIAGNOSIS — R35.0 URINARY FREQUENCY: ICD-10-CM

## 2018-12-26 NOTE — TELEPHONE ENCOUNTER
Called and spoke to patient and advised her that prescription for Myrbetriq 25 mg, 30 day supply with 11 refills was sent to her [facility]

## 2018-12-26 NOTE — TELEPHONE ENCOUNTER
[facility] patient, seen in follow up on 11/9/18 with VON Rodrigez,  Spoke to patient she has good symptom control on Myrbetriq 25 mg daily,  She is scheduled for one year follow up on 11/12/19   Confirmed pharmacy [facility], [place], patient would like 30 day supply with refills  Advised message will be sent to provider to request refill

## 2019-01-21 DIAGNOSIS — R35.0 URINARY FREQUENCY: ICD-10-CM

## 2019-01-21 NOTE — TELEPHONE ENCOUNTER
[facility] patient, seen in follow up on 11/9/18 with VON Shetty urinary urgency, reporting good response on Myrbetriq 25 mg daily, scheduled for one year follow up on 11/12/19  Refill request entered

## 2019-01-21 NOTE — TELEPHONE ENCOUNTER
Patient called requesting a 90 day mail order supply of Mirabegron ER 25 mg be sent to [address]  Their fax # is [phone]  Any questions, she can be reached at [phone]

## 2019-02-26 ENCOUNTER — TELEPHONE (OUTPATIENT)
Dept: UROLOGY | Facility: AMBULATORY SURGERY CENTER | Age: 79
End: 2019-02-26

## 2019-02-26 ENCOUNTER — TELEPHONE (OUTPATIENT)
Dept: FAMILY MEDICINE CLINIC | Facility: CLINIC | Age: 79
End: 2019-02-26

## 2019-02-26 DIAGNOSIS — R35.0 URINARY FREQUENCY: Primary | ICD-10-CM

## 2019-02-26 DIAGNOSIS — R30.0 DYSURIA: ICD-10-CM

## 2019-02-26 NOTE — TELEPHONE ENCOUNTER
[facility] patient, seen in follow up on 11/9/18 with VON ROJAS, history of recurrent UTI, urinary urgency/frequency  Called and spoke to patient, since last Thursday she has been experiencing increased urinary frequency and occasional dysuria and occasional cloudy urine  She is currently on Myrbetriq  She denies fever and denies chills  Instructed patient to increase hydration and to go [facility] testing  Patient will go to [facility] 2/27/19 in the AM for testing as the lab closes at 1 pm today and she prefers this lab location  Orders entered, instructed patient to hydrate well with water, continue Myrbetriq and that urinalysis results within 24 hours and urine culture within 2 to 3 days and office will call her with results  Pharmacy is [address] [place] [address]

## 2019-02-26 NOTE — TELEPHONE ENCOUNTER
She has a urologist you ref'd to for UTI's, she is on med for OAB,  She has uti symp  Do you want her to call the urologist or come in for nurse visit to ck urine    Pl ad

## 2019-02-26 NOTE — TELEPHONE ENCOUNTER
Patient called thinks she may be getting a UTI she has increased frequency and slight burning at times

## 2019-02-27 ENCOUNTER — APPOINTMENT (OUTPATIENT)
Dept: LAB | Facility: CLINIC | Age: 79
End: 2019-02-27
Payer: MEDICARE

## 2019-02-27 DIAGNOSIS — R35.0 URINARY FREQUENCY: ICD-10-CM

## 2019-02-27 DIAGNOSIS — R30.0 DYSURIA: ICD-10-CM

## 2019-02-27 DIAGNOSIS — N30.00 ACUTE CYSTITIS WITHOUT HEMATURIA: Primary | ICD-10-CM

## 2019-02-27 LAB
BACTERIA UR QL AUTO: ABNORMAL /HPF
BILIRUB UR QL STRIP: NEGATIVE
CLARITY UR: ABNORMAL
COLOR UR: YELLOW
GLUCOSE UR STRIP-MCNC: NEGATIVE MG/DL
HGB UR QL STRIP.AUTO: ABNORMAL
HYALINE CASTS #/AREA URNS LPF: ABNORMAL /LPF
KETONES UR STRIP-MCNC: NEGATIVE MG/DL
LEUKOCYTE ESTERASE UR QL STRIP: ABNORMAL
NITRITE UR QL STRIP: NEGATIVE
NON-SQ EPI CELLS URNS QL MICRO: ABNORMAL /HPF
PH UR STRIP.AUTO: 6 [PH] (ref 4.5–8)
PROT UR STRIP-MCNC: ABNORMAL MG/DL
RBC #/AREA URNS AUTO: ABNORMAL /HPF
SP GR UR STRIP.AUTO: 1.01 (ref 1–1.03)
UROBILINOGEN UR QL STRIP.AUTO: 0.2 E.U./DL
WBC #/AREA URNS AUTO: ABNORMAL /HPF

## 2019-02-27 PROCEDURE — 87077 CULTURE AEROBIC IDENTIFY: CPT

## 2019-02-27 PROCEDURE — 87086 URINE CULTURE/COLONY COUNT: CPT

## 2019-02-27 PROCEDURE — 87186 SC STD MICRODIL/AGAR DIL: CPT

## 2019-02-27 PROCEDURE — 81001 URINALYSIS AUTO W/SCOPE: CPT

## 2019-02-27 RX ORDER — NITROFURANTOIN 25; 75 MG/1; MG/1
100 CAPSULE ORAL 2 TIMES DAILY
Qty: 10 CAPSULE | Refills: 0 | Status: SHIPPED | OUTPATIENT
Start: 2019-02-27 | End: 2019-03-04

## 2019-02-27 NOTE — TELEPHONE ENCOUNTER
Called and spoke to patient, advised her that urinalysis is positive for infection and that urine culture is pending  Advised patient that prescription for antibiotic, Nitrofurantoin, 1 tablet twice a day for 5 days was sent to her [facility]  Instructed to start today and ok to continue her daily Myrbetriq  Advised patient that she will be contact if antibiotic needs to change after final urine culture results

## 2019-02-27 NOTE — TELEPHONE ENCOUNTER
UA was positive  Prescribed nitrofurantoin to her pharmacy  If culture shows resistant bacteria, we will change antibiotic

## 2019-02-27 NOTE — PROGRESS NOTES
UA revealed microhematuria and moderate bacteria  Sent prescription of nitrofurantoin to her preferred pharmacy    Will await sensitivities and change antibiotic if needed

## 2019-03-01 LAB — BACTERIA UR CULT: ABNORMAL

## 2019-03-04 NOTE — TELEPHONE ENCOUNTER
Called and spoke to patient, advised final urine culture from 2/27/19 was sensitive to Nitrofurantoin, the antibiotic she was prescribed  Patient states that she completed antibiotic yesterday  She did have a return of increased urgency and frequency of urination last night  Symptoms have improved today  Patient denies fever and denies chills  Advised patient to go for a repeat urine culture this Wednesday 3/6/19 to check for resolution of infection now that she has completed 5 days of antibiotic  Instructed patient to increase water intake and to avoid bladder irritants such as caffeine, ice tea, spicy/acidic foods  Advised ok to drink cranberry juice and use OTC probiotic  Order entered for urine culture  Instructed patient to call office on Friday 3/6/19 for results

## 2019-03-06 ENCOUNTER — APPOINTMENT (OUTPATIENT)
Dept: LAB | Facility: CLINIC | Age: 79
End: 2019-03-06
Payer: MEDICARE

## 2019-03-06 DIAGNOSIS — R35.0 URINARY FREQUENCY: ICD-10-CM

## 2019-03-06 PROCEDURE — 87086 URINE CULTURE/COLONY COUNT: CPT

## 2019-03-07 LAB — BACTERIA UR CULT: NORMAL

## 2019-03-07 NOTE — TELEPHONE ENCOUNTER
Please let the patient know that her urine specimen yesterday showed no bacteria  This is great news

## 2019-03-07 NOTE — TELEPHONE ENCOUNTER
Called and informed patient repeat urine culture has no bacteria, this is good news  Patient will continue her Myrbetriq and call us with any further concerns

## 2019-03-21 ENCOUNTER — TELEPHONE (OUTPATIENT)
Dept: FAMILY MEDICINE CLINIC | Facility: CLINIC | Age: 79
End: 2019-03-21

## 2019-03-21 DIAGNOSIS — F41.9 ANXIETY: ICD-10-CM

## 2019-03-21 RX ORDER — ALPRAZOLAM 0.25 MG/1
0.25 TABLET ORAL 3 TIMES DAILY PRN
Qty: 90 TABLET | Refills: 0 | Status: SHIPPED | OUTPATIENT
Start: 2019-03-21 | End: 2019-08-30 | Stop reason: SDUPTHER

## 2019-03-23 DIAGNOSIS — M19.90 ARTHRITIS: ICD-10-CM

## 2019-03-23 RX ORDER — MELOXICAM 15 MG/1
TABLET ORAL
Qty: 90 TABLET | Refills: 3 | Status: SHIPPED | OUTPATIENT
Start: 2019-03-23 | End: 2019-11-22 | Stop reason: ALTCHOICE

## 2019-08-30 DIAGNOSIS — F41.9 ANXIETY: ICD-10-CM

## 2019-08-30 RX ORDER — ALPRAZOLAM 0.25 MG/1
0.25 TABLET ORAL 3 TIMES DAILY PRN
Qty: 90 TABLET | Refills: 0 | Status: SHIPPED | OUTPATIENT
Start: 2019-08-30 | End: 2019-12-02 | Stop reason: ALTCHOICE

## 2019-09-19 ENCOUNTER — OFFICE VISIT (OUTPATIENT)
Dept: FAMILY MEDICINE CLINIC | Facility: CLINIC | Age: 79
End: 2019-09-19
Payer: MEDICARE

## 2019-09-19 VITALS
DIASTOLIC BLOOD PRESSURE: 82 MMHG | BODY MASS INDEX: 34.29 KG/M2 | SYSTOLIC BLOOD PRESSURE: 132 MMHG | HEART RATE: 70 BPM | WEIGHT: 193.5 LBS | TEMPERATURE: 99.1 F | HEIGHT: 63 IN

## 2019-09-19 DIAGNOSIS — Z00.00 MEDICARE ANNUAL WELLNESS VISIT, INITIAL: Primary | ICD-10-CM

## 2019-09-19 DIAGNOSIS — E78.00 HYPERCHOLESTEROLEMIA: ICD-10-CM

## 2019-09-19 DIAGNOSIS — I10 ESSENTIAL HYPERTENSION: ICD-10-CM

## 2019-09-19 DIAGNOSIS — M25.562 CHRONIC PAIN OF LEFT KNEE: ICD-10-CM

## 2019-09-19 DIAGNOSIS — M81.0 OSTEOPOROSIS, UNSPECIFIED OSTEOPOROSIS TYPE, UNSPECIFIED PATHOLOGICAL FRACTURE PRESENCE: ICD-10-CM

## 2019-09-19 DIAGNOSIS — G89.29 CHRONIC PAIN OF LEFT KNEE: ICD-10-CM

## 2019-09-19 DIAGNOSIS — Z23 NEED FOR VACCINATION: ICD-10-CM

## 2019-09-19 PROCEDURE — 90714 TD VACC NO PRESV 7 YRS+ IM: CPT | Performed by: FAMILY MEDICINE

## 2019-09-19 PROCEDURE — 90471 IMMUNIZATION ADMIN: CPT | Performed by: FAMILY MEDICINE

## 2019-09-19 PROCEDURE — G0008 ADMIN INFLUENZA VIRUS VAC: HCPCS | Performed by: FAMILY MEDICINE

## 2019-09-19 PROCEDURE — G0439 PPPS, SUBSEQ VISIT: HCPCS | Performed by: FAMILY MEDICINE

## 2019-09-19 PROCEDURE — 90662 IIV NO PRSV INCREASED AG IM: CPT | Performed by: FAMILY MEDICINE

## 2019-09-19 PROCEDURE — 99213 OFFICE O/P EST LOW 20 MIN: CPT | Performed by: FAMILY MEDICINE

## 2019-09-19 NOTE — PATIENT INSTRUCTIONS
Medicare Preventive Visit Patient Instructions  Thank you for completing your Welcome to Medicare Visit or Medicare Annual Wellness Visit today  Your next wellness visit will be due in one year (9/19/2020)  The screening/preventive services that you may require over the next 5-10 years are detailed below  Some tests may not apply to you based off risk factors and/or age  Screening tests ordered at today's visit but not completed yet may show as past due  Also, please note that scanned in results may not display below  Preventive Screenings:  Service Recommendations Previous Testing/Comments   Colorectal Cancer Screening  * Colonoscopy    * Fecal Occult Blood Test (FOBT)/Fecal Immunochemical Test (FIT)  * Fecal DNA/Cologuard Test  * Flexible Sigmoidoscopy Age: 54-65 years old   Colonoscopy: every 10 years (may be performed more frequently if at higher risk)  OR  FOBT/FIT: every 1 year  OR  Cologuard: every 3 years  OR  Sigmoidoscopy: every 5 years  Screening may be recommended earlier than age 48 if at higher risk for colorectal cancer  Also, an individualized decision between you and your healthcare provider will decide whether screening between the ages of 74-80 would be appropriate  Colonoscopy: Not on file  FOBT/FIT: Not on file  Cologuard: Not on file  Sigmoidoscopy: Not on file         Breast Cancer Screening Age: 36 years old  Frequency: every 1-2 years  Not required if history of left and right mastectomy Mammogram: 09/30/2010       Cervical Cancer Screening Between the ages of 21-29, pap smear recommended once every 3 years  Between the ages of 33-67, can perform pap smear with HPV co-testing every 5 years     Recommendations may differ for women with a history of total hysterectomy, cervical cancer, or abnormal pap smears in past  Pap Smear: Not on file    Screening Not Indicated   Hepatitis C Screening Once for adults born between [facility]  More frequently in patients at high risk for Hepatitis C Hep C Antibody: Not on file       Diabetes Screening 1-2 times per year if you're at risk for diabetes or have pre-diabetes Fasting glucose: 104 mg/dL   A1C: 5 7 % of total Hgb       Cholesterol Screening Once every 5 years if you don't have a lipid disorder  May order more often based on risk factors  Lipid panel: 09/04/2018    Screening Current     Other Preventive Screenings Covered by Medicare:  1  Abdominal Aortic Aneurysm (AAA) Screening: covered once if your at risk  You're considered to be at risk if you have a family history of AAA  2  Lung Cancer Screening: covers low dose CT scan once per year if you meet all of the following conditions: (1) Age 50-69; (2) No signs or symptoms of lung cancer; (3) Current smoker or have quit smoking within the last 15 years; (4) You have a tobacco smoking history of at least 30 pack years (packs per day multiplied by number of years you smoked); (5) You get a written order from a healthcare provider  3  Glaucoma Screening: covered annually if you're considered high risk: (1) You have diabetes OR (2) Family history of glaucoma OR (3)  aged 48 and older OR (3)  American aged 72 and older  3  Osteoporosis Screening: covered every 2 years if you meet one of the following conditions: (1) You're estrogen deficient and at risk for osteoporosis based off medical history and other findings; (2) Have a vertebral abnormality; (3) On glucocorticoid therapy for more than 3 months; (4) Have primary hyperparathyroidism; (5) On osteoporosis medications and need to assess response to drug therapy  · Last bone density test (DXA Scan): Not on file  5  HIV Screening: covered annually if you're between the age of 12-76  Also covered annually if you are younger than 13 and older than 72 with risk factors for HIV infection  For pregnant patients, it is covered up to 3 times per pregnancy      Immunizations:  Immunization Recommendations   Influenza Vaccine Annual influenza vaccination during flu season is recommended for all persons aged >= 6 months who do not have contraindications   Pneumococcal Vaccine (Prevnar and Pneumovax)  * Prevnar = PCV13  * Pneumovax = PPSV23   Adults 25-60 years old: 1-3 doses may be recommended based on certain risk factors  Adults 72 years old: Prevnar (PCV13) vaccine recommended followed by Pneumovax (PPSV23) vaccine  If already received PPSV23 since turning 65, then PCV13 recommended at least one year after PPSV23 dose  Hepatitis B Vaccine 3 dose series if at intermediate or high risk (ex: diabetes, end stage renal disease, liver disease)   Tetanus (Td) Vaccine - COST NOT COVERED BY MEDICARE PART B Following completion of primary series, a booster dose should be given every 10 years to maintain immunity against tetanus  Td may also be given as tetanus wound prophylaxis  Tdap Vaccine - COST NOT COVERED BY MEDICARE PART B Recommended at least once for all adults  For pregnant patients, recommended with each pregnancy  Shingles Vaccine (Shingrix) - COST NOT COVERED BY MEDICARE PART B  2 shot series recommended in those aged 48 and above     Health Maintenance Due:      Topic Date Due    DXA SCAN  1940     Immunizations Due:      Topic Date Due    DTaP,Tdap,and Td Vaccines (1 - Tdap) 12/[birth year]/1959    INFLUENZA VACCINE  07/01/2019     Advance Directives   What are advance directives? Advance directives are legal documents that state your wishes and plans for medical care  These plans are made ahead of time in case you lose your ability to make decisions for yourself  Advance directives can apply to any medical decision, such as the treatments you want, and if you want to donate organs  What are the types of advance directives? There are many types of advance directives, and each state has rules about how to use them  You may choose a combination of any of the following:  · Living will:   This is a written record of the treatment you want  You can also choose which treatments you do not want, which to limit, and which to stop at a certain time  This includes surgery, medicine, IV fluid, and tube feedings  · Durable power of  for healthcare [facility]): This is a written record that states who you want to make healthcare choices for you when you are unable to make them for yourself  This person, called a proxy, is usually a family member or a friend  You may choose more than 1 proxy  · Do not resuscitate (DNR) order:  A DNR order is used in case your heart stops beating or you stop breathing  It is a request not to have certain forms of treatment, such as CPR  A DNR order may be included in other types of advance directives  · Medical directive: This covers the care that you want if you are in a coma, near death, or unable to make decisions for yourself  You can list the treatments you want for each condition  Treatment may include pain medicine, surgery, blood transfusions, dialysis, IV or tube feedings, and a ventilator (breathing machine)  · Values history: This document has questions about your views, beliefs, and how you feel and think about life  This information can help others choose the care that you would choose  Why are advance directives important? An advance directive helps you control your care  Although spoken wishes may be used, it is better to have your wishes written down  Spoken wishes can be misunderstood, or not followed  Treatments may be given even if you do not want them  An advance directive may make it easier for your family to make difficult choices about your care  Weight Management   Why it is important to manage your weight:  Being overweight increases your risk of health conditions such as heart disease, high blood pressure, type 2 diabetes, and certain types of cancer  It can also increase your risk for osteoarthritis, sleep apnea, and other respiratory problems  Aim for a slow, steady weight loss  Even a small amount of weight loss can lower your risk of health problems  How to lose weight safely:  A safe and healthy way to lose weight is to eat fewer calories and get regular exercise  You can lose up about 1 pound a week by decreasing the number of calories you eat by 500 calories each day  Healthy meal plan for weight management:  A healthy meal plan includes a variety of foods, contains fewer calories, and helps you stay healthy  A healthy meal plan includes the following:  · Eat whole-grain foods more often  A healthy meal plan should contain fiber  Fiber is the part of grains, fruits, and vegetables that is not broken down by your body  Whole-grain foods are healthy and provide extra fiber in your diet  Some examples of whole-grain foods are whole-wheat breads and pastas, oatmeal, brown rice, and bulgur  · Eat a variety of vegetables every day  Include dark, leafy greens such as spinach, kale, jonna greens, and mustard greens  Eat yellow and orange vegetables such as carrots, sweet potatoes, and winter squash  · Eat a variety of fruits every day  Choose fresh or canned fruit (canned in its own juice or light syrup) instead of juice  Fruit juice has very little or no fiber  · Eat low-fat dairy foods  Drink fat-free (skim) milk or 1% milk  Eat fat-free yogurt and low-fat cottage cheese  Try low-fat cheeses such as mozzarella and other reduced-fat cheeses  · Choose meat and other protein foods that are low in fat  Choose beans or other legumes such as split peas or lentils  Choose fish, skinless poultry (chicken or turkey), or lean cuts of red meat (beef or pork)  Before you cook meat or poultry, cut off any visible fat  · Use less fat and oil  Try baking foods instead of frying them  Add less fat, such as margarine, sour cream, regular salad dressing and mayonnaise to foods  Eat fewer high-fat foods   Some examples of high-fat foods include french fries, doughnuts, ice cream, and cakes   · Eat fewer sweets  Limit foods and drinks that are high in sugar  This includes candy, cookies, regular soda, and sweetened drinks  Exercise:  Exercise at least 30 minutes per day on most days of the week  Some examples of exercise include walking, biking, dancing, and swimming  You can also fit in more physical activity by taking the stairs instead of the elevator or parking farther away from stores  Ask your healthcare provider about the best exercise plan for you  © Copyright [employer] 2018 Information is for End User's use only and may not be sold, redistributed or otherwise used for commercial purposes   All illustrations and images included in CareNotes® are the copyrighted property of [employer]  or [address] [place] [address]

## 2019-09-19 NOTE — PROGRESS NOTES
Patient ID: Randall Philip is a [de-identified] y o  female  HPI: [de-identified] y  o female presents for followup of hypertension, hypercholesterolemia, is due for a dexa and complains of left knee pain and instabiltiy  She has an appt coming up with ortho  SUBJECTIVE    Family History   Problem Relation Age of Onset    No Known Problems Mother     Diabetes Father     Stroke Father         syndrome     Social History     Socioeconomic History    Marital status:       Spouse name: Not on file    Number of children: Not on file    Years of education: Not on file    Highest education level: Not on file   Occupational History    Not on file   Social Needs    Financial resource strain: Not on file    Food insecurity:     Worry: Not on file     Inability: Not on file    Transportation needs:     Medical: Not on file     Non-medical: Not on file   Tobacco Use    Smoking status: Former Smoker     Last attempt to quit:      Years since quittin 7    Smokeless tobacco: Never Used   Substance and Sexual Activity    Alcohol use: No    Drug use: No    Sexual activity: Not on file   Lifestyle    Physical activity:     Days per week: Not on file     Minutes per session: Not on file    Stress: Not on file   Relationships    Social connections:     Talks on phone: Not on file     Gets together: Not on file     Attends [employer] service: Not on file     Active member of club or organization: Not on file     Attends meetings of clubs or organizations: Not on file     Relationship status: Not on file    Intimate partner violence:     Fear of current or ex partner: Not on file     Emotionally abused: Not on file     Physically abused: Not on file     Forced sexual activity: Not on file   Other Topics Concern    Not on file   Social History Narrative    Daily coffee consumption (__cups/day)     Daily cola consumption (__cans/day)     Daily tea consumption (__cups/day)      Past Medical History:   Diagnosis Date    Hypertension     UTI (urinary tract infection)      Past Surgical History:   Procedure Laterality Date    EYE SURGERY       Allergies   Allergen Reactions    Ciprofloxacin      Chest discomfort and dizziness    Diclofenac     Diphenhydramine        Current Outpatient Medications:     ALPRAZolam (XANAX) 0 25 mg tablet, Take 1 tablet (0 25 mg total) by mouth 3 (three) times a day as needed for anxiety, Disp: 90 tablet, Rfl: 0    amLODIPine (NORVASC) 5 mg tablet, TAKE 1 TABLET EVERY DAY, Disp: 90 tablet, Rfl: 3    lisinopril (ZESTRIL) 5 mg tablet, TAKE 1 TABLET EVERY DAY, Disp: 90 tablet, Rfl: 3    Melatonin 3 MG CAPS, Take 3 mg by mouth  , Disp: , Rfl:     meloxicam (MOBIC) 15 mg tablet, TAKE 1 TABLET EVERY DAY WITH FOOD, Disp: 90 tablet, Rfl: 3    Mirabegron ER 25 MG TB24, Take 25 mg by mouth daily, Disp: 90 tablet, Rfl: 3    Multiple Vitamins-Minerals (PRESERVISION AREDS PO), Take by mouth, Disp: , Rfl:     Review of Systems  Constitutional:     Denies fever, chills ,fatigue ,weakness ,weight loss, weight gain     ENT: Denies earache ,loss of hearing ,nosebleed, nasal discharge,nasal congestion ,sore throat ,hoarseness  Pulmonary: Denies shortness of breath ,cough  ,dyspnea on exertion, orthopnea  ,PND   Cardiovascular:  Denies bradycardia , tachycardia  ,palpations, lower extremity edema leg, claudication  Breast:  Denies new or changing breast lumps ,nipple discharge ,nipple changes  Abdomen:  Denies abdominal pain , anorexia , indigestion, nausea, vomiting, constipation, diarrhea  Musculoskeletal: Denies myalgias,  joint swelling, joint stiffness , limb pain, limb swelling+ left knee pain and instability  Gu: denies dysuria, polyuria  Skin: Denies skin rash, skin lesion, skin wound, itching, dry skin  Neuro: Denies headache, numbness, tingling, confusion, loss of consciousness, dizziness, vertigo  Psychiatric: Denies feelings of depression, suicidal ideation, anxiety, sleep disturbances    OBJECTIVE  /82   Pulse 70   Temp 99 1 °F (37 3 °C)   Ht 5' 3" (1 6 m)   Wt 87 8 kg (193 lb 8 oz)   BMI 34 28 kg/m²   Constitutional:   NAD, well appearing and well nourished      ENT:   Conjunctiva and lids: no injection, edema, or discharge     Pupils and iris: REENA bilaterally    External inspection of ears and nose: normal without deformities or discharge  Otoscopic exam: Canals patent without erythema  Nasal mucosa, septum and turbinates: Normal or edema or discharge         Oropharynx:  Moist mucosa, normal tongue and tonsils without lesions  No erythema        Pulmonary:Respiratory effort normal rate and rhythm, no increased work of breathing  Auscultation of lungs:  Clear bilaterally with no adventitious breath sounds       Cardiovascular: regular rate and rhythm, S1 and S2, no murmur, no edema and/or varicosities of LE      Abdomen: Soft and non-distended     Positive bowel sounds      No heptomegaly or splenomegaly      Gu: no suprapubic tenderness or CVA tenderness, no urethral discharge  Lymphatic:  No anterior or posterior cervical lymphadenopathy         Musculoskeletal:  Gait and station: Normal gait      Digits and nails normal without clubbing or cyanosis       Inspection/palpation of joints, bones, and muscles:  No joint tenderness, swelling, full active and passive range of motion    + valgus deformity of both knees bilaterally and crepitus bilaterally   Skin: Normal skin turgor and no rashes      Neuro:      Normal reflexes     Psych:   alert and oriented to person, place and time     normal mood and affect       Assessment/Plan:Diagnoses and all orders for this visit:    Medicare annual wellness visit, initial    Chronic pain of left knee  -     Brace    Essential hypertension  -     Comprehensive metabolic panel;  Future    Osteoporosis, unspecified osteoporosis type, unspecified pathological fracture presence  -     DXA bone density spine hip and pelvis; Future    Need for vaccination  -     TD VACCINE GREATER THAN OR EQUAL TO 8YO PRESERVATIVE FREE IM  -     influenza vaccine, 8100-1523, high-dose, PF 0 5 mL (FLUZONE HIGH-DOSE)    Hypercholesterolemia  -     Lipid Panel with Direct LDL reflex; Future        Reviewed with patient plan to treat with plan as above      Patient instructed to call in 72 hours if not feeling better or if symptoms worsen

## 2019-09-19 NOTE — PROGRESS NOTES
Assessment and Plan:     Problem List Items Addressed This Visit     None           Preventive health issues were discussed with patient, and age appropriate screening tests were ordered as noted in patient's After Visit Summary  Personalized health advice and appropriate referrals for health education or preventive services given if needed, as noted in patient's After Visit Summary  History of Present Illness:     Patient presents for Medicare Annual Wellness visit  She is due for a td, flu shot and a dexa scan   She is up to date with all other preventative care  Patient Care Team:  Boubacar Worrell DO as PCP - MD Amy Díaz MD     Problem List:     There is no problem list on file for this patient  Past Medical and Surgical History:     Past Medical History:   Diagnosis Date    Hypertension     UTI (urinary tract infection)      Past Surgical History:   Procedure Laterality Date    EYE SURGERY        Family History:     Family History   Problem Relation Age of Onset    No Known Problems Mother     Diabetes Father     Stroke Father         syndrome      Social History:     Social History     Socioeconomic History    Marital status:       Spouse name: Not on file    Number of children: Not on file    Years of education: Not on file    Highest education level: Not on file   Occupational History    Not on file   Social Needs    Financial resource strain: Not on file    Food insecurity:     Worry: Not on file     Inability: Not on file    Transportation needs:     Medical: Not on file     Non-medical: Not on file   Tobacco Use    Smoking status: Former Smoker     Last attempt to quit:      Years since quittin 7    Smokeless tobacco: Never Used   Substance and Sexual Activity    Alcohol use: No    Drug use: No    Sexual activity: Not on file   Lifestyle    Physical activity:     Days per week: Not on file Minutes per session: Not on file    Stress: Not on file   Relationships    Social connections:     Talks on phone: Not on file     Gets together: Not on file     Attends Caodaism service: Not on file     Active member of club or organization: Not on file     Attends meetings of clubs or organizations: Not on file     Relationship status: Not on file    Intimate partner violence:     Fear of current or ex partner: Not on file     Emotionally abused: Not on file     Physically abused: Not on file     Forced sexual activity: Not on file   Other Topics Concern    Not on file   Social History Narrative    Daily coffee consumption (__cups/day)     Daily cola consumption (__cans/day)     Daily tea consumption (__cups/day)        Medications and Allergies:     Current Outpatient Medications   Medication Sig Dispense Refill    ALPRAZolam (XANAX) 0 25 mg tablet Take 1 tablet (0 25 mg total) by mouth 3 (three) times a day as needed for anxiety 90 tablet 0    amLODIPine (NORVASC) 5 mg tablet TAKE 1 TABLET EVERY DAY 90 tablet 3    lisinopril (ZESTRIL) 5 mg tablet TAKE 1 TABLET EVERY DAY 90 tablet 3    Melatonin 3 MG CAPS Take 3 mg by mouth        meloxicam (MOBIC) 15 mg tablet TAKE 1 TABLET EVERY DAY WITH FOOD 90 tablet 3    Mirabegron ER 25 MG TB24 Take 25 mg by mouth daily 90 tablet 3    Multiple Vitamins-Minerals (PRESERVISION AREDS PO) Take by mouth      promethazine (PHENERGAN) 25 mg tablet Take 1 tablet (25 mg total) by mouth every 8 (eight) hours as needed for nausea or vomiting (Patient not taking: Reported on 11/9/2018 ) 30 tablet 0     No current facility-administered medications for this visit        Allergies   Allergen Reactions    Ciprofloxacin      Chest discomfort and dizziness    Diclofenac     Diphenhydramine       Immunizations:     Immunization History   Administered Date(s) Administered    Influenza Split High Dose Preservative Free IM 11/08/2012, 11/05/2013, 11/05/2013, 10/14/2014, 11/03/2015, 09/21/2016    Influenza TIV (IM) 10/12/2017    Influenza, high dose seasonal 0 5 mL 11/27/2018    Pneumococcal Conjugate 13-Valent 11/03/2015    Pneumococcal Polysaccharide PPV23 10/30/2006, 06/01/2017    Zoster 04/08/2009      Health Maintenance:         Topic Date Due    DXA SCAN  1940         Topic Date Due    DTaP,Tdap,and Td Vaccines (1 - Tdap) 12/15/1959    INFLUENZA VACCINE  07/01/2019      Medicare Health Risk Assessment:     There were no vitals taken for this visit  Omega Tello is here for her Subsequent Wellness visit  Last Medicare Wellness visit information reviewed, patient interviewed, no change since last AWV  Health Risk Assessment:   Patient rates overall health as good  Patient feels that their physical health rating is same  Eyesight was rated as slightly worse  Hearing was rated as slightly worse  Patient feels that their emotional and mental health rating is same  Pain experienced in the last 7 days has been some  Patient's pain rating has been 5/10  Patient states that she has experienced no weight loss or gain in last 6 months  Depression Screening:   PHQ-2 Score: 0      Fall Risk Screening: In the past year, patient has experienced: no history of falling in past year      Urinary Incontinence Screening:   Patient has not leaked urine accidently in the last six months  Home Safety:  Patient has trouble with stairs inside or outside of their home  Patient has working smoke alarms and has working carbon monoxide detector  Home safety hazards include: none  Nutrition:   Current diet is Regular, Low Saturated Fat and Limited junk food  Medications:   Patient is currently taking over-the-counter supplements  OTC medications include: see medication list  Patient is able to manage medications       Activities of Daily Living (ADLs)/Instrumental Activities of Daily Living (IADLs):   Walk and transfer into and out of bed and chair?: Yes  Dress and groom yourself?: Yes    Bathe or shower yourself?: Yes    Feed yourself? Yes  Do your laundry/housekeeping?: Yes  Manage your money, pay your bills and track your expenses?: Yes  Make your own meals?: Yes    Do your own shopping?: Yes    Previous Hospitalizations:   Any hospitalizations or ED visits within the last 12 months?: No      Advance Care Planning:   Living will: Yes    Durable POA for healthcare: Yes    Advanced directive: Yes    Advanced directive counseling given: Yes    Five wishes given: Yes    Patient declined ACP directive: No    End of Life Decisions reviewed with patient: Yes    Provider agrees with end of life decisions: Yes      PREVENTIVE SCREENINGS      Cardiovascular Screening:    General: Screening Current      Diabetes Screening:       Due for: Blood Glucose      Colorectal Cancer Screening:     General: Screening Current      Breast Cancer Screening:       Due for: Mammogram        Cervical Cancer Screening:    General: Screening Not Indicated      Osteoporosis Screening:    General: History Osteoporosis    Due for: DXA Appendicular      Abdominal Aortic Aneurysm (AAA) Screening:        General: Screening Not Indicated      Lung Cancer Screening:     General: Screening Not Indicated    BMI Counseling: Body mass index is 34 28 kg/m²  The BMI is above normal  Nutrition recommendations include reducing portion sizes and decreasing overall calorie intake    Lolis Raphael, DO

## 2019-09-20 ENCOUNTER — TELEPHONE (OUTPATIENT)
Dept: FAMILY MEDICINE CLINIC | Facility: CLINIC | Age: 79
End: 2019-09-20

## 2019-09-20 NOTE — TELEPHONE ENCOUNTER
Please call Johana, a neoprene brace, not hinged or lockable- one seh can velcro with a knee cut out and use delano of osteoarthritis of knee

## 2019-09-20 NOTE — TELEPHONE ENCOUNTER
They need to know what type of knee brace, (hinged, j brace, free style )  and a different diagnosis, insurance will not cover pain    Re Fax order to :                       [phone]

## 2019-09-23 DIAGNOSIS — M17.12 OSTEOARTHRITIS OF LEFT KNEE, UNSPECIFIED OSTEOARTHRITIS TYPE: Primary | ICD-10-CM

## 2019-11-04 DIAGNOSIS — R35.0 URINARY FREQUENCY: ICD-10-CM

## 2019-11-04 RX ORDER — MIRABEGRON 25 MG/1
TABLET, FILM COATED, EXTENDED RELEASE ORAL
Qty: 90 TABLET | Refills: 3 | Status: SHIPPED | OUTPATIENT
Start: 2019-11-04 | End: 2020-02-11 | Stop reason: SDUPTHER

## 2019-11-21 ENCOUNTER — TRANSITIONAL CARE MANAGEMENT (OUTPATIENT)
Dept: FAMILY MEDICINE CLINIC | Facility: CLINIC | Age: 79
End: 2019-11-21

## 2019-11-21 ENCOUNTER — NURSING HOME VISIT (OUTPATIENT)
Dept: GERIATRICS | Facility: CLINIC | Age: 79
End: 2019-11-21
Payer: MEDICARE

## 2019-11-21 VITALS
DIASTOLIC BLOOD PRESSURE: 58 MMHG | WEIGHT: 200.8 LBS | SYSTOLIC BLOOD PRESSURE: 125 MMHG | RESPIRATION RATE: 17 BRPM | HEART RATE: 85 BPM | TEMPERATURE: 98 F | BODY MASS INDEX: 35.57 KG/M2 | OXYGEN SATURATION: 96 %

## 2019-11-21 DIAGNOSIS — R26.2 AMBULATORY DYSFUNCTION: Primary | ICD-10-CM

## 2019-11-21 DIAGNOSIS — Z96.651 AFTERCARE FOLLOWING RIGHT KNEE JOINT REPLACEMENT SURGERY: ICD-10-CM

## 2019-11-21 DIAGNOSIS — F41.9 ANXIETY: ICD-10-CM

## 2019-11-21 DIAGNOSIS — K59.03 DRUG-INDUCED CONSTIPATION: ICD-10-CM

## 2019-11-21 DIAGNOSIS — Z47.1 AFTERCARE FOLLOWING RIGHT KNEE JOINT REPLACEMENT SURGERY: ICD-10-CM

## 2019-11-21 DIAGNOSIS — I10 ESSENTIAL HYPERTENSION: ICD-10-CM

## 2019-11-21 PROCEDURE — 99309 SBSQ NF CARE MODERATE MDM 30: CPT | Performed by: NURSE PRACTITIONER

## 2019-11-21 NOTE — ASSESSMENT & PLAN NOTE
· Right knee staples intact, clean and dry without signs symptoms of infection  · Continue with scheduled Tylenol, p r n  oxycodone for pain control  · Patient using occasional dosing of 2 5/5 mg depending on pain counseled on using 30-45 minutes prior to therapy and in HS as needed  · Continue PT OT for strength and balance training  · Will request incentive spirometer so patient can keep lungs clear  · On b i d  aspirin for DVT prophylaxis  · Monitor closely  · Has follow-up ortho appointment on 12/09

## 2019-11-21 NOTE — PROGRESS NOTES
[facility]  [address]  [phone]  [facility] Progress Note  [id number]      NAME: Tee Kwan  AGE: [de-identified] y o   SEX: female    DATE OF ENCOUNTER: 11/21/2019    Assessment and Plan     Problem List Items Addressed This Visit        Digestive    Drug-induced constipation     · Currently taking senna S b i d  which not enough  · Will switch to Colace and MiraLax, hold MiraLax for loose stool  · Ensure adequate hydration and dietary fiber  · Continue to monitor            Cardiovascular and Mediastinum    Hypertension     · BP is fairly stable at present  · Continue lisinopril, Norvasc at this time  · Will monitor closely in case patient needs titration of medications            Other    Aftercare following right knee joint replacement surgery     · Right knee staples intact, clean and dry without signs symptoms of infection  · Continue with scheduled Tylenol, p r n  oxycodone for pain control  · Patient using occasional dosing of 2 5/5 mg depending on pain counseled on using 30-45 minutes prior to therapy and in HS as needed  · Continue PT OT for strength and balance training  · Will request incentive spirometer so patient can keep lungs clear  · On b i d  aspirin for DVT prophylaxis  · Monitor closely  · Has follow-up ortho appointment on 12/09         Ambulatory dysfunction - Primary     · Continue PT OT via skilled level for strength and balance training after knee replacement  · Fall precautions         Anxiety     · Has Xanax on med chart but not using  · This is high risk medication for falls in elderly so will decrease to the 2 times a dosing of initially attempt to taper off  · Would recommend discussing with PCP long acting antidepressant/antianxiety medication rather than short-acting               No orders of the defined types were placed in this encounter       - Counseling Documentation: patient was counseled regarding: instructions for management, patient and family education and impressions    Chief Complaint     No chief complaint on file  History of Present Illness     Mrs Harriet Ta is an 18-year-old female who is here for rehab status post right total knee replacement  Patient states she is doing well without complaint  Pain is managed with ice, scheduled Tylenol, p r n  Oxy IR report constipation is requesting something stronger than which she has  Patient denies CP/SOB/cough  Denies GI/ distress  Patient is participating in therapy and feels she is doing well  She states she is eating and sleeping well  She states she has a follow-up with Orthopedics on 12/09  She was seen for wound care rounds and knee is looking okay  The following portions of the patient's history were reviewed and updated as appropriate: allergies, current medications, past family history, past medical history, past social history, past surgical history and problem list     Review of Systems     Review of Systems   Constitutional: Negative for activity change, appetite change, chills and fatigue  HENT: Negative for congestion  Respiratory: Negative for cough and shortness of breath  Cardiovascular: Negative for chest pain  Gastrointestinal: Positive for constipation  Negative for abdominal pain, diarrhea, nausea and vomiting  Genitourinary: Negative for difficulty urinating  Musculoskeletal: Positive for arthralgias and gait problem  Skin: Positive for wound (Surgical)  Neurological: Negative for dizziness and light-headedness  Psychiatric/Behavioral: Positive for decreased concentration (forgetful)         Active Problem List     Patient Active Problem List   Diagnosis    Aftercare following right knee joint replacement surgery    Drug-induced constipation    Ambulatory dysfunction    Hypertension    Anxiety       Objective     /58   Pulse 85   Temp 98 °F (36 7 °C)   Resp 17   Wt 91 1 kg (200 lb 12 8 oz)   SpO2 96%   BMI 35 57 kg/m² Physical Exam   Constitutional: She is oriented to person, place, and time  She appears well-developed and well-nourished  No distress  HENT:   Head: Normocephalic  Cardiovascular: Normal rate and normal heart sounds  Exam reveals no gallop and no friction rub  No murmur heard  Pulmonary/Chest: Effort normal and breath sounds normal  No respiratory distress  She has no wheezes  She has no rales  Abdominal: Soft  Bowel sounds are normal  She exhibits no distension  There is no tenderness  There is no rebound  Musculoskeletal: Normal range of motion  Neurological: She is alert and oriented to person, place, and time  Skin: Skin is warm and dry  She is not diaphoretic  Right midline incision of knee  Staples intact, clean dry and intact mild ecchymosis surrounding knee but no signs or symptoms of infection   Psychiatric: She has a normal mood and affect  Her behavior is normal    Nursing note and vitals reviewed  Pertinent Laboratory/Diagnostic Studies:  Labs reviewed in facility paper chart  Current Medications   Medications were reviewed and updated  Please refer to paper chart for updated list of medications      PALAK Torres  11/21/2019 9:30 AM

## 2019-11-21 NOTE — ASSESSMENT & PLAN NOTE
· Currently taking senna S b i d  which not enough  · Will switch to Colace and MiraLax, hold MiraLax for loose stool  · Ensure adequate hydration and dietary fiber  · Continue to monitor

## 2019-11-21 NOTE — ASSESSMENT & PLAN NOTE
· Has Xanax on med chart but not using  · This is high risk medication for falls in elderly so will decrease to the 2 times a dosing of initially attempt to taper off  · Would recommend discussing with PCP long acting antidepressant/antianxiety medication rather than short-acting

## 2019-11-21 NOTE — ASSESSMENT & PLAN NOTE
· Continue PT OT via skilled level for strength and balance training after knee replacement  · Fall precautions

## 2019-11-21 NOTE — ASSESSMENT & PLAN NOTE
· BP is fairly stable at present  · Continue lisinopril, Norvasc at this time  · Will monitor closely in case patient needs titration of medications

## 2019-11-22 ENCOUNTER — NURSING HOME VISIT (OUTPATIENT)
Dept: GERIATRICS | Facility: CLINIC | Age: 79
End: 2019-11-22
Payer: MEDICARE

## 2019-11-22 VITALS
RESPIRATION RATE: 17 BRPM | WEIGHT: 200.8 LBS | SYSTOLIC BLOOD PRESSURE: 128 MMHG | OXYGEN SATURATION: 99 % | TEMPERATURE: 98 F | HEART RATE: 84 BPM | DIASTOLIC BLOOD PRESSURE: 58 MMHG | BODY MASS INDEX: 35.57 KG/M2

## 2019-11-22 DIAGNOSIS — M25.561 ACUTE PAIN OF RIGHT KNEE: ICD-10-CM

## 2019-11-22 DIAGNOSIS — Z96.651 AFTERCARE FOLLOWING RIGHT KNEE JOINT REPLACEMENT SURGERY: Primary | ICD-10-CM

## 2019-11-22 DIAGNOSIS — F41.9 ANXIETY: ICD-10-CM

## 2019-11-22 DIAGNOSIS — Z47.1 AFTERCARE FOLLOWING RIGHT KNEE JOINT REPLACEMENT SURGERY: Primary | ICD-10-CM

## 2019-11-22 DIAGNOSIS — D64.9 ANEMIA, UNSPECIFIED TYPE: ICD-10-CM

## 2019-11-22 DIAGNOSIS — K59.03 DRUG-INDUCED CONSTIPATION: ICD-10-CM

## 2019-11-22 DIAGNOSIS — R26.2 AMBULATORY DYSFUNCTION: ICD-10-CM

## 2019-11-22 DIAGNOSIS — I10 ESSENTIAL HYPERTENSION: ICD-10-CM

## 2019-11-22 PROCEDURE — 99316 NF DSCHRG MGMT 30 MIN+: CPT | Performed by: NURSE PRACTITIONER

## 2019-11-22 RX ORDER — ACETAMINOPHEN 160 MG
2000 TABLET,DISINTEGRATING ORAL DAILY
COMMUNITY

## 2019-11-22 RX ORDER — OXYCODONE HYDROCHLORIDE 5 MG/1
5 TABLET ORAL EVERY 6 HOURS PRN
Qty: 10 TABLET | Refills: 0 | Status: SHIPPED | OUTPATIENT
Start: 2019-11-22 | End: 2020-02-06 | Stop reason: CLARIF

## 2019-11-22 RX ORDER — ACETAMINOPHEN 325 MG/1
975 TABLET ORAL EVERY 8 HOURS
COMMUNITY
End: 2021-12-02 | Stop reason: HOSPADM

## 2019-11-22 RX ORDER — ASPIRIN 81 MG/1
81 TABLET, CHEWABLE ORAL 2 TIMES DAILY
COMMUNITY
End: 2020-09-24 | Stop reason: ALTCHOICE

## 2019-11-22 NOTE — PROGRESS NOTES
[address] [facility]  [address]  [address]  [phone]  [facility] DISCHARGE Note  [id number]      NAME: Leonarda Sanchez  AGE: [de-identified] y o  SEX: female    DATE OF ENCOUNTER: 11/22/2019    Assessment and Plan     Problem List Items Addressed This Visit        Digestive    Drug-induced constipation     · Stable at present  · Reviewed with patient that risk for constipation is increased while using narcotics  · Increase dietary fiber fluids, mobility  · MiraLax p r n , Colace daily  · Follow-up outpatient            Cardiovascular and Mediastinum    Hypertension     · Blood pressure stable during stay  · Continue amlodipine  · And lisinopril follow-up outpatient with monitoring  · Continue lifestyle and dietary interventions            Other    Aftercare following right knee joint replacement surgery - Primary     · Continue PT OT:  Recommend home care as is taxing effort for patient to leave home secondary to recent knee surgery and use of an assistive device  · Celebrex was discontinued during stay, aspirin b i d  continues until seen by Ortho  · Follow-up with Ortho is 12/5 per patient  · Monitor incision for signs and symptoms of infection  · Fall precautions  · Ensure adequate hydration and nutrition  · Recommend home care SN to monitor wound healing         Relevant Medications    oxyCODONE (ROXICODONE) 5 mg immediate release tablet    Ambulatory dysfunction     · Continue PT OT  Fall precautions         Anxiety     · Hand X decrease to b i d , which continue decreasing dose and taper off  · Recommend discussing with PCP start of long acting antianxiety medicine as benzos inc risk for falls  · Continues melatonin for rest and sleep         Right knee pain     · Pain fairly controlled with Tylenol    · Wean down Tylenol as pain becomes more controlled  · Recommend ice on knee  · Continue Neo stocking  · Continue PT OT  · Will continue Oxy IR 5 mg-1/2 to 1 tab p o  q 6 hours p r n  for transitional pain E scribed #10  Tabs   · patient aware to monitor for increased or different pain and notify Ortho if it occur  · Patient aware to monitor for constipation  · Patient aware she cannot drive while using oxycodone         Relevant Medications    oxyCODONE (ROXICODONE) 5 mg immediate release tablet    Anemia     · H/h 9 2/27 8 at rehab  · No active bleeding, s/p TKR  · F/u outpt for long term management               No orders of the defined types were placed in this encounter       - Counseling Documentation: patient was counseled regarding: instructions for management, patient and family education and impressions    Chief Complaint     No chief complaint on file  History of Present Illness     Mrs Kitty Askew is an 49-year-old female who comes to [address] [place] [address] for right TKR  Patient was admitted 11/18/2019 and will discharge home with family on 11/22/2019  PMH includes HTN, anxiety, overactive bladder  During stay vital signs were monitored and found to be stable  Patient's incision was monitored by wound care nurse team as well as nursing, is currently clean dry and intact with ecchymosis mild edema  Patient reports pain is controlled scheduled Tylenol and p r n  Oxy IR  Patient is using b i d  aspirin  Her Celebrex was DC'd during stay  Patient participated in therapy  She is currently ambulating with walker and supervision  She has 1 assist for ADLs  Slums was 26/30 (within normal limits)  Patient was initially scheduled to discharge on Tuesday  Patient & her family requesting that she be discharged on Saturday  We did discuss with therapy who is confident that patient will do well at home  We discussed with nursing and looked at knee which is clean dry and intact with ecchymosis and mild edema  We discussed with Dr Nohemy Navas who is okay with patient discharging to home with family as long as there are no medical needs    Patient has history of hypertension which is controlled during rehab so she is okay for patient to be discharged  Patient did reports pain is controlled with Tylenol and  Oxy IR  Patient denies CP/SOB//cough  Denies GI/ distress  Is ambulating and does not fear falling  Feels that she will do better at home  Her niece is a physical therapist and will be watching over her also  She has follow-up with Ortho on 12/05 and she has no concerns presently      The following portions of the patient's history were reviewed and updated as appropriate: allergies, current medications, past family history, past medical history, past social history, past surgical history and problem list     Review of Systems     Review of Systems   Constitutional: Negative for activity change, appetite change, chills and fatigue  HENT: Negative for congestion  Respiratory: Negative for cough and shortness of breath  Cardiovascular: Negative for chest pain  Gastrointestinal: Positive for constipation (occ - miralax helps)  Negative for abdominal pain, diarrhea, nausea and vomiting  Genitourinary: Negative for difficulty urinating  Musculoskeletal: Positive for arthralgias (right knee pain) and gait problem  Skin: Positive for wound (right knee, surg)  Neurological: Negative for dizziness and light-headedness  Active Problem List     Patient Active Problem List   Diagnosis    Aftercare following right knee joint replacement surgery    Drug-induced constipation    Ambulatory dysfunction    Hypertension    Anxiety    Right knee pain    Anemia       Objective     /58   Pulse 84   Temp 98 °F (36 7 °C)   Resp 17   Wt 91 1 kg (200 lb 12 8 oz)   SpO2 99%   BMI 35 57 kg/m²     Physical Exam   Constitutional: She is oriented to person, place, and time  She appears well-developed and well-nourished  No distress  HENT:   Head: Normocephalic  Cardiovascular: Normal rate and normal heart sounds   Exam reveals no gallop and no friction rub  No murmur heard  Pulmonary/Chest: Effort normal and breath sounds normal  No respiratory distress  She has no wheezes  She has no rales  Abdominal: Soft  Bowel sounds are normal  She exhibits no distension  There is no tenderness  There is no rebound  Musculoskeletal: Normal range of motion  Neurological: She is alert and oriented to person, place, and time  Skin: Skin is warm and dry  She is not diaphoretic  Midline knee incision with staples - cd&i  Ecchymosis, mild swelling  No s/s infection  See kV wound notes for details   Psychiatric: She has a normal mood and affect  Her behavior is normal    Nursing note and vitals reviewed  Pertinent Laboratory/Diagnostic Studies:  Labs reviewed in facility paper chart  Current Medications   Medications were reviewed in [facility] and updated in [facility]  Med list was reviewed with pt  Rx #10 oxyIR escribed to pt's pharmacy        Current Outpatient Medications:     acetaminophen (TYLENOL) 325 mg tablet, Take 975 mg by mouth every 8 (eight) hours, Disp: , Rfl:     aspirin 81 mg chewable tablet, Chew 81 mg 2 (two) times a day, Disp: , Rfl:     Cholecalciferol (VITAMIN D3) 50 MCG (2000 UT) capsule, Take 2,000 Units by mouth daily, Disp: , Rfl:     ALPRAZolam (XANAX) 0 25 mg tablet, Take 1 tablet (0 25 mg total) by mouth 3 (three) times a day as needed for anxiety, Disp: 90 tablet, Rfl: 0    amLODIPine (NORVASC) 5 mg tablet, TAKE 1 TABLET EVERY DAY, Disp: 90 tablet, Rfl: 3    lisinopril (ZESTRIL) 5 mg tablet, TAKE 1 TABLET EVERY DAY, Disp: 90 tablet, Rfl: 3    Melatonin 3 MG CAPS, Take 3 mg by mouth  , Disp: , Rfl:     Multiple Vitamins-Minerals (PRESERVISION AREDS PO), Take by mouth, Disp: , Rfl:     MYRBETRIQ 25 MG TB24, TAKE 1 TABLET EVERY DAY, Disp: 90 tablet, Rfl: 3    oxyCODONE (ROXICODONE) 5 mg immediate release tablet, Take 1 tablet (5 mg total) by mouth every 6 (six) hours as needed for severe pain (use 1/2-1 tab per dose)Max Daily Amount: 20 mg, Disp: 10 tablet, Rfl: 0    >30min spent on discharge:  Pt visit, med rec, coordination of care, chart review  PCP update:  Twin texted info to Dr Portia Feldman to fax rehab info to office      PALAK Salas  11/22/2019 2:26 PM

## 2019-11-22 NOTE — ASSESSMENT & PLAN NOTE
· Stable at present  · Reviewed with patient that risk for constipation is increased while using narcotics  · Increase dietary fiber fluids, mobility  · MiraLax p r n , Colace daily  · Follow-up outpatient

## 2019-11-22 NOTE — ASSESSMENT & PLAN NOTE
· Hand X decrease to b i d , which continue decreasing dose and taper off  · Recommend discussing with PCP start of long acting antianxiety medicine as benzos inc risk for falls      · Continues melatonin for rest and sleep

## 2019-11-22 NOTE — ASSESSMENT & PLAN NOTE
· Blood pressure stable during stay  · Continue amlodipine  · And lisinopril follow-up outpatient with monitoring  · Continue lifestyle and dietary interventions

## 2019-11-22 NOTE — ASSESSMENT & PLAN NOTE
· Continue PT OT:  Recommend home care as is taxing effort for patient to leave home secondary to recent knee surgery and use of an assistive device  · Celebrex was discontinued during stay, aspirin b i d  continues until seen by Ortho  · Follow-up with Ortho is 12/5 per patient  · Monitor incision for signs and symptoms of infection  · Fall precautions  · Ensure adequate hydration and nutrition  · Recommend home care SN to monitor wound healing

## 2019-11-22 NOTE — ASSESSMENT & PLAN NOTE
· Pain fairly controlled with Tylenol  · Wean down Tylenol as pain becomes more controlled  · Recommend ice on knee  · Continue Neo stocking  · Continue PT OT  · Will continue Oxy IR 5 mg-1/2 to 1 tab p o  q 6 hours p r n  for transitional pain E scribed #10  Tabs   · patient aware to monitor for increased or different pain and notify Ortho if it occur  · Patient aware to monitor for constipation      · Patient aware she cannot drive while using oxycodone

## 2019-11-27 ENCOUNTER — HOSPITAL ENCOUNTER (INPATIENT)
Facility: HOSPITAL | Age: 79
LOS: 1 days | Discharge: HOME | DRG: 812 | End: 2019-11-28
Attending: EMERGENCY MEDICINE | Admitting: INTERNAL MEDICINE
Payer: MEDICARE

## 2019-11-27 ENCOUNTER — APPOINTMENT (EMERGENCY)
Dept: CT IMAGING | Facility: HOSPITAL | Age: 79
DRG: 812 | End: 2019-11-27
Payer: MEDICARE

## 2019-11-27 DIAGNOSIS — K92.2 GI BLEED: ICD-10-CM

## 2019-11-27 DIAGNOSIS — R55 SYNCOPE: ICD-10-CM

## 2019-11-27 DIAGNOSIS — D64.9 ANEMIA: Primary | ICD-10-CM

## 2019-11-27 LAB
ABO GROUP BLD: NORMAL
ALBUMIN SERPL BCP-MCNC: 2.5 G/DL (ref 3.5–5)
ALP SERPL-CCNC: 76 U/L (ref 46–116)
ALT SERPL W P-5'-P-CCNC: 22 U/L (ref 12–78)
ANION GAP SERPL CALCULATED.3IONS-SCNC: 10 MMOL/L (ref 4–13)
APTT PPP: 25 SECONDS (ref 23–37)
AST SERPL W P-5'-P-CCNC: 19 U/L (ref 5–45)
BASOPHILS # BLD AUTO: 0.02 THOUSANDS/ΜL (ref 0–0.1)
BASOPHILS NFR BLD AUTO: 0 % (ref 0–1)
BILIRUB SERPL-MCNC: 0.38 MG/DL (ref 0.2–1)
BLD GP AB SCN SERPL QL: NEGATIVE
BUN SERPL-MCNC: 51 MG/DL (ref 5–25)
CALCIUM SERPL-MCNC: 7.8 MG/DL (ref 8.3–10.1)
CHLORIDE SERPL-SCNC: 107 MMOL/L (ref 100–108)
CO2 SERPL-SCNC: 24 MMOL/L (ref 21–32)
CREAT SERPL-MCNC: 0.87 MG/DL (ref 0.6–1.3)
EOSINOPHIL # BLD AUTO: 0.05 THOUSAND/ΜL (ref 0–0.61)
EOSINOPHIL NFR BLD AUTO: 0 % (ref 0–6)
ERYTHROCYTE [DISTWIDTH] IN BLOOD BY AUTOMATED COUNT: 16.5 % (ref 11.6–15.1)
GFR SERPL CREATININE-BSD FRML MDRD: 63 ML/MIN/1.73SQ M
GLUCOSE SERPL-MCNC: 178 MG/DL (ref 65–140)
HCT VFR BLD AUTO: 17.3 % (ref 34.8–46.1)
HGB BLD-MCNC: 5.3 G/DL (ref 11.5–15.4)
IMM GRANULOCYTES # BLD AUTO: 0.13 THOUSAND/UL (ref 0–0.2)
IMM GRANULOCYTES NFR BLD AUTO: 1 % (ref 0–2)
INR PPP: 1.2 (ref 0.84–1.19)
LYMPHOCYTES # BLD AUTO: 1.46 THOUSANDS/ΜL (ref 0.6–4.47)
LYMPHOCYTES NFR BLD AUTO: 12 % (ref 14–44)
MCH RBC QN AUTO: 33.8 PG (ref 26.8–34.3)
MCHC RBC AUTO-ENTMCNC: 30.6 G/DL (ref 31.4–37.4)
MCV RBC AUTO: 110 FL (ref 82–98)
MONOCYTES # BLD AUTO: 0.45 THOUSAND/ΜL (ref 0.17–1.22)
MONOCYTES NFR BLD AUTO: 4 % (ref 4–12)
NEUTROPHILS # BLD AUTO: 9.8 THOUSANDS/ΜL (ref 1.85–7.62)
NEUTS SEG NFR BLD AUTO: 83 % (ref 43–75)
NRBC BLD AUTO-RTO: 0 /100 WBCS
PLATELET # BLD AUTO: 316 THOUSANDS/UL (ref 149–390)
PMV BLD AUTO: 9.8 FL (ref 8.9–12.7)
POTASSIUM SERPL-SCNC: 4.2 MMOL/L (ref 3.5–5.3)
PROT SERPL-MCNC: 5.5 G/DL (ref 6.4–8.2)
PROTHROMBIN TIME: 14.6 SECONDS (ref 11.6–14.5)
RBC # BLD AUTO: 1.57 MILLION/UL (ref 3.81–5.12)
RH BLD: POSITIVE
SODIUM SERPL-SCNC: 141 MMOL/L (ref 136–145)
SPECIMEN EXPIRATION DATE: NORMAL
TROPONIN I SERPL-MCNC: 0.02 NG/ML
TSH SERPL DL<=0.05 MIU/L-ACNC: 3.34 UIU/ML (ref 0.36–3.74)
WBC # BLD AUTO: 11.91 THOUSAND/UL (ref 4.31–10.16)

## 2019-11-27 PROCEDURE — 86920 COMPATIBILITY TEST SPIN: CPT

## 2019-11-27 PROCEDURE — 86901 BLOOD TYPING SEROLOGIC RH(D): CPT | Performed by: PHYSICIAN ASSISTANT

## 2019-11-27 PROCEDURE — 85610 PROTHROMBIN TIME: CPT | Performed by: PHYSICIAN ASSISTANT

## 2019-11-27 PROCEDURE — 36415 COLL VENOUS BLD VENIPUNCTURE: CPT

## 2019-11-27 PROCEDURE — 74178 CT ABD&PLV WO CNTR FLWD CNTR: CPT

## 2019-11-27 PROCEDURE — 84443 ASSAY THYROID STIM HORMONE: CPT | Performed by: PHYSICIAN ASSISTANT

## 2019-11-27 PROCEDURE — 1123F ACP DISCUSS/DSCN MKR DOCD: CPT | Performed by: PHYSICIAN ASSISTANT

## 2019-11-27 PROCEDURE — 80053 COMPREHEN METABOLIC PANEL: CPT | Performed by: EMERGENCY MEDICINE

## 2019-11-27 PROCEDURE — 85025 COMPLETE CBC W/AUTO DIFF WBC: CPT | Performed by: EMERGENCY MEDICINE

## 2019-11-27 PROCEDURE — 84484 ASSAY OF TROPONIN QUANT: CPT | Performed by: EMERGENCY MEDICINE

## 2019-11-27 PROCEDURE — 30233N1 TRANSFUSION OF NONAUTOLOGOUS RED BLOOD CELLS INTO PERIPHERAL VEIN, PERCUTANEOUS APPROACH: ICD-10-PCS | Performed by: EMERGENCY MEDICINE

## 2019-11-27 PROCEDURE — 86850 RBC ANTIBODY SCREEN: CPT | Performed by: PHYSICIAN ASSISTANT

## 2019-11-27 PROCEDURE — 99285 EMERGENCY DEPT VISIT HI MDM: CPT

## 2019-11-27 PROCEDURE — P9051 BLOOD, L/R, CMV-NEG: HCPCS

## 2019-11-27 PROCEDURE — 82272 OCCULT BLD FECES 1-3 TESTS: CPT

## 2019-11-27 PROCEDURE — 93005 ELECTROCARDIOGRAM TRACING: CPT

## 2019-11-27 PROCEDURE — 99291 CRITICAL CARE FIRST HOUR: CPT | Performed by: PHYSICIAN ASSISTANT

## 2019-11-27 PROCEDURE — 96374 THER/PROPH/DIAG INJ IV PUSH: CPT

## 2019-11-27 PROCEDURE — 36430 TRANSFUSION BLD/BLD COMPNT: CPT

## 2019-11-27 PROCEDURE — 86900 BLOOD TYPING SEROLOGIC ABO: CPT | Performed by: PHYSICIAN ASSISTANT

## 2019-11-27 PROCEDURE — 96361 HYDRATE IV INFUSION ADD-ON: CPT

## 2019-11-27 PROCEDURE — 85730 THROMBOPLASTIN TIME PARTIAL: CPT | Performed by: PHYSICIAN ASSISTANT

## 2019-11-27 RX ORDER — ONDANSETRON 2 MG/ML
4 INJECTION INTRAMUSCULAR; INTRAVENOUS ONCE
Status: COMPLETED | OUTPATIENT
Start: 2019-11-27 | End: 2019-11-27

## 2019-11-27 RX ORDER — OXYCODONE HYDROCHLORIDE 5 MG/1
5 TABLET ORAL ONCE
Status: COMPLETED | OUTPATIENT
Start: 2019-11-27 | End: 2019-11-27

## 2019-11-27 RX ORDER — ONDANSETRON 2 MG/ML
1 INJECTION INTRAMUSCULAR; INTRAVENOUS ONCE
Status: COMPLETED | OUTPATIENT
Start: 2019-11-27 | End: 2019-11-27

## 2019-11-27 RX ADMIN — ONDANSETRON 4 MG: 2 INJECTION INTRAMUSCULAR; INTRAVENOUS at 21:20

## 2019-11-27 RX ADMIN — IOHEXOL 100 ML: 350 INJECTION, SOLUTION INTRAVENOUS at 23:36

## 2019-11-27 RX ADMIN — OXYCODONE HYDROCHLORIDE 5 MG: 5 TABLET ORAL at 21:18

## 2019-11-27 RX ADMIN — SODIUM CHLORIDE 250 ML: 0.9 INJECTION, SOLUTION INTRAVENOUS at 21:11

## 2019-11-28 VITALS
BODY MASS INDEX: 36.91 KG/M2 | RESPIRATION RATE: 18 BRPM | WEIGHT: 208.34 LBS | HEART RATE: 90 BPM | OXYGEN SATURATION: 94 % | DIASTOLIC BLOOD PRESSURE: 57 MMHG | SYSTOLIC BLOOD PRESSURE: 124 MMHG | TEMPERATURE: 98.4 F | HEIGHT: 63 IN

## 2019-11-28 PROBLEM — R19.5 GUAIAC POSITIVE STOOLS: Status: ACTIVE | Noted: 2019-11-28

## 2019-11-28 PROBLEM — I10 ESSENTIAL HYPERTENSION: Chronic | Status: ACTIVE | Noted: 2019-11-21

## 2019-11-28 PROBLEM — D64.9 SYMPTOMATIC ANEMIA: Status: ACTIVE | Noted: 2019-11-28

## 2019-11-28 LAB
ABO GROUP BLD BPU: NORMAL
ABO GROUP BLD BPU: NORMAL
ANION GAP SERPL CALCULATED.3IONS-SCNC: 8 MMOL/L (ref 4–13)
ATRIAL RATE: 84 BPM
BASOPHILS # BLD AUTO: 0.03 THOUSANDS/ΜL (ref 0–0.1)
BASOPHILS NFR BLD AUTO: 0 % (ref 0–1)
BPU ID: NORMAL
BPU ID: NORMAL
BUN SERPL-MCNC: 45 MG/DL (ref 5–25)
CALCIUM SERPL-MCNC: 8.5 MG/DL (ref 8.3–10.1)
CHLORIDE SERPL-SCNC: 108 MMOL/L (ref 100–108)
CO2 SERPL-SCNC: 27 MMOL/L (ref 21–32)
CREAT SERPL-MCNC: 0.88 MG/DL (ref 0.6–1.3)
CROSSMATCH: NORMAL
CROSSMATCH: NORMAL
EOSINOPHIL # BLD AUTO: 0.02 THOUSAND/ΜL (ref 0–0.61)
EOSINOPHIL NFR BLD AUTO: 0 % (ref 0–6)
ERYTHROCYTE [DISTWIDTH] IN BLOOD BY AUTOMATED COUNT: 21.2 % (ref 11.6–15.1)
FOLATE SERPL-MCNC: >20 NG/ML (ref 3.1–17.5)
GFR SERPL CREATININE-BSD FRML MDRD: 62 ML/MIN/1.73SQ M
GLUCOSE SERPL-MCNC: 105 MG/DL (ref 65–140)
HCT VFR BLD AUTO: 23.5 % (ref 34.8–46.1)
HCT VFR BLD AUTO: 24.5 % (ref 34.8–46.1)
HGB BLD-MCNC: 7.4 G/DL (ref 11.5–15.4)
HGB BLD-MCNC: 7.5 G/DL (ref 11.5–15.4)
IMM GRANULOCYTES # BLD AUTO: 0.11 THOUSAND/UL (ref 0–0.2)
IMM GRANULOCYTES NFR BLD AUTO: 1 % (ref 0–2)
LYMPHOCYTES # BLD AUTO: 1.18 THOUSANDS/ΜL (ref 0.6–4.47)
LYMPHOCYTES NFR BLD AUTO: 9 % (ref 14–44)
MCH RBC QN AUTO: 31.1 PG (ref 26.8–34.3)
MCHC RBC AUTO-ENTMCNC: 31.5 G/DL (ref 31.4–37.4)
MCV RBC AUTO: 99 FL (ref 82–98)
MONOCYTES # BLD AUTO: 0.82 THOUSAND/ΜL (ref 0.17–1.22)
MONOCYTES NFR BLD AUTO: 7 % (ref 4–12)
NEUTROPHILS # BLD AUTO: 10.45 THOUSANDS/ΜL (ref 1.85–7.62)
NEUTS SEG NFR BLD AUTO: 83 % (ref 43–75)
NRBC BLD AUTO-RTO: 0 /100 WBCS
P AXIS: 40 DEGREES
PLATELET # BLD AUTO: 280 THOUSANDS/UL (ref 149–390)
PMV BLD AUTO: 9.3 FL (ref 8.9–12.7)
POTASSIUM SERPL-SCNC: 4.2 MMOL/L (ref 3.5–5.3)
PR INTERVAL: 170 MS
QRS AXIS: 59 DEGREES
QRSD INTERVAL: 92 MS
QT INTERVAL: 376 MS
QTC INTERVAL: 437 MS
RBC # BLD AUTO: 2.38 MILLION/UL (ref 3.81–5.12)
SODIUM SERPL-SCNC: 143 MMOL/L (ref 136–145)
T WAVE AXIS: 44 DEGREES
UNIT DISPENSE STATUS: NORMAL
UNIT DISPENSE STATUS: NORMAL
UNIT PRODUCT CODE: NORMAL
UNIT PRODUCT CODE: NORMAL
UNIT RH: NORMAL
UNIT RH: NORMAL
VENTRICULAR RATE: 81 BPM
VIT B12 SERPL-MCNC: 355 PG/ML (ref 100–900)
WBC # BLD AUTO: 12.61 THOUSAND/UL (ref 4.31–10.16)

## 2019-11-28 PROCEDURE — 80048 BASIC METABOLIC PNL TOTAL CA: CPT | Performed by: PHYSICIAN ASSISTANT

## 2019-11-28 PROCEDURE — 85025 COMPLETE CBC W/AUTO DIFF WBC: CPT | Performed by: PHYSICIAN ASSISTANT

## 2019-11-28 PROCEDURE — 82746 ASSAY OF FOLIC ACID SERUM: CPT | Performed by: PHYSICIAN ASSISTANT

## 2019-11-28 PROCEDURE — 99236 HOSP IP/OBS SAME DATE HI 85: CPT | Performed by: INTERNAL MEDICINE

## 2019-11-28 PROCEDURE — 82607 VITAMIN B-12: CPT | Performed by: PHYSICIAN ASSISTANT

## 2019-11-28 PROCEDURE — 36415 COLL VENOUS BLD VENIPUNCTURE: CPT | Performed by: PHYSICIAN ASSISTANT

## 2019-11-28 PROCEDURE — C9113 INJ PANTOPRAZOLE SODIUM, VIA: HCPCS | Performed by: PHYSICIAN ASSISTANT

## 2019-11-28 PROCEDURE — 85018 HEMOGLOBIN: CPT | Performed by: PHYSICIAN ASSISTANT

## 2019-11-28 PROCEDURE — 85014 HEMATOCRIT: CPT | Performed by: PHYSICIAN ASSISTANT

## 2019-11-28 PROCEDURE — 93010 ELECTROCARDIOGRAM REPORT: CPT | Performed by: INTERNAL MEDICINE

## 2019-11-28 PROCEDURE — P9051 BLOOD, L/R, CMV-NEG: HCPCS

## 2019-11-28 RX ORDER — PANTOPRAZOLE SODIUM 40 MG/1
40 TABLET, DELAYED RELEASE ORAL DAILY
Qty: 30 TABLET | Refills: 0 | Status: SHIPPED | OUTPATIENT
Start: 2019-11-28 | End: 2019-12-18 | Stop reason: SDUPTHER

## 2019-11-28 RX ORDER — OXYBUTYNIN CHLORIDE 5 MG/1
5 TABLET, EXTENDED RELEASE ORAL DAILY
Status: DISCONTINUED | OUTPATIENT
Start: 2019-11-28 | End: 2019-11-28 | Stop reason: HOSPADM

## 2019-11-28 RX ORDER — AMLODIPINE BESYLATE 5 MG/1
5 TABLET ORAL DAILY
Status: DISCONTINUED | OUTPATIENT
Start: 2019-11-28 | End: 2019-11-28 | Stop reason: HOSPADM

## 2019-11-28 RX ORDER — OXYCODONE HYDROCHLORIDE 5 MG/1
2.5 TABLET ORAL EVERY 4 HOURS PRN
Status: DISCONTINUED | OUTPATIENT
Start: 2019-11-28 | End: 2019-11-28 | Stop reason: HOSPADM

## 2019-11-28 RX ORDER — LISINOPRIL 5 MG/1
5 TABLET ORAL DAILY
Status: DISCONTINUED | OUTPATIENT
Start: 2019-11-28 | End: 2019-11-28 | Stop reason: HOSPADM

## 2019-11-28 RX ORDER — OXYCODONE HYDROCHLORIDE 5 MG/1
5 TABLET ORAL EVERY 6 HOURS PRN
Status: DISCONTINUED | OUTPATIENT
Start: 2019-11-28 | End: 2019-11-28 | Stop reason: HOSPADM

## 2019-11-28 RX ORDER — ACETAMINOPHEN 325 MG/1
975 TABLET ORAL EVERY 8 HOURS
Status: DISCONTINUED | OUTPATIENT
Start: 2019-11-28 | End: 2019-11-28 | Stop reason: HOSPADM

## 2019-11-28 RX ORDER — LANOLIN ALCOHOL/MO/W.PET/CERES
3 CREAM (GRAM) TOPICAL ONCE
Status: DISCONTINUED | OUTPATIENT
Start: 2019-11-28 | End: 2019-11-28 | Stop reason: HOSPADM

## 2019-11-28 RX ORDER — ONDANSETRON 2 MG/ML
4 INJECTION INTRAMUSCULAR; INTRAVENOUS EVERY 6 HOURS PRN
Status: DISCONTINUED | OUTPATIENT
Start: 2019-11-28 | End: 2019-11-28 | Stop reason: HOSPADM

## 2019-11-28 RX ORDER — ALPRAZOLAM 0.25 MG/1
0.25 TABLET ORAL 3 TIMES DAILY PRN
Status: DISCONTINUED | OUTPATIENT
Start: 2019-11-28 | End: 2019-11-28 | Stop reason: HOSPADM

## 2019-11-28 RX ORDER — METOCLOPRAMIDE HYDROCHLORIDE 5 MG/ML
10 INJECTION INTRAMUSCULAR; INTRAVENOUS ONCE
Status: COMPLETED | OUTPATIENT
Start: 2019-11-28 | End: 2019-11-28

## 2019-11-28 RX ORDER — PANTOPRAZOLE SODIUM 40 MG/1
40 INJECTION, POWDER, FOR SOLUTION INTRAVENOUS EVERY 12 HOURS SCHEDULED
Status: DISCONTINUED | OUTPATIENT
Start: 2019-11-28 | End: 2019-11-28 | Stop reason: HOSPADM

## 2019-11-28 RX ORDER — SODIUM CHLORIDE 9 MG/ML
100 INJECTION, SOLUTION INTRAVENOUS CONTINUOUS
Status: DISCONTINUED | OUTPATIENT
Start: 2019-11-28 | End: 2019-11-28 | Stop reason: HOSPADM

## 2019-11-28 RX ORDER — PANTOPRAZOLE SODIUM 40 MG/1
40 INJECTION, POWDER, FOR SOLUTION INTRAVENOUS EVERY 12 HOURS SCHEDULED
Status: DISCONTINUED | OUTPATIENT
Start: 2019-11-28 | End: 2019-11-28

## 2019-11-28 RX ADMIN — SODIUM CHLORIDE 100 ML/HR: 0.9 INJECTION, SOLUTION INTRAVENOUS at 07:49

## 2019-11-28 RX ADMIN — PANTOPRAZOLE SODIUM 40 MG: 40 INJECTION, POWDER, FOR SOLUTION INTRAVENOUS at 02:33

## 2019-11-28 RX ADMIN — CALCIUM GLUCONATE 1 G: 98 INJECTION, SOLUTION INTRAVENOUS at 01:50

## 2019-11-28 RX ADMIN — METOCLOPRAMIDE 10 MG: 5 INJECTION, SOLUTION INTRAMUSCULAR; INTRAVENOUS at 00:58

## 2019-11-28 RX ADMIN — OXYCODONE HYDROCHLORIDE 2.5 MG: 5 TABLET ORAL at 13:40

## 2019-11-28 NOTE — DISCHARGE INSTRUCTIONS
Anemia   WHAT YOU NEED TO KNOW:   Anemia is a low number of red blood cells or a low amount of hemoglobin in your red blood cells  Hemoglobin is a protein that helps carry oxygen throughout your body  Red blood cells use iron to create hemoglobin  Anemia may develop if your body does not have enough iron  It may also develop if your body does not make enough red blood cells or they die faster than your body can make them  DISCHARGE INSTRUCTIONS:   Call 911 or have someone call 911 for any of the following:   · You lose consciousness  · You have severe chest pain  Seek care immediately if:   · You have dark or bloody bowel movements  Contact your healthcare provider if:   · Your symptoms are worse, even after treatment  · You have questions or concerns about your condition or care  Medicines:   · Iron or folic acid supplements  help increase your red blood cell and hemoglobin levels  · Vitamin B12 injections  may help boost your red blood cell level and decrease your symptoms  Ask your healthcare provider how to inject B12  · Take your medicine as directed  Contact your healthcare provider if you think your medicine is not helping or if you have side effects  Tell him of her if you are allergic to any medicine  Keep a list of the medicines, vitamins, and herbs you take  Include the amounts, and when and why you take them  Bring the list or the pill bottles to follow-up visits  Carry your medicine list with you in case of an emergency  Prevent anemia:  Eat healthy foods rich in iron and vitamin C  Nuts, meat, dark leafy green vegetables, and beans are high in iron and protein  Vitamin C helps your body absorb iron  Foods rich in vitamin C include oranges and other citrus fruits  Ask your healthcare provider for a list of other foods that are high in iron or vitamin C  Ask if you need to be on a special diet     Follow up with your healthcare provider as directed:  Write down your questions so you remember to ask them during your visits  © 2017 [address] [employer] Information is for End User's use only and may not be sold, redistributed or otherwise used for commercial purposes  All illustrations and images included in CareNotes® are the copyrighted property of A D A M [employer] Inc  or [employer]  The above information is an  only  It is not intended as medical advice for individual conditions or treatments  Talk to your doctor, nurse or pharmacist before following any medical regimen to see if it is safe and effective for you

## 2019-11-28 NOTE — CONSULTS
Consultation - [address] [facility]  Juani Lopez [de-identified] y o  female MRN: [id number]  Unit/Bed#: S -01 Encounter: [id number]        ASSESSMENT/PLAN:   [de-identified]y o  year old female with a PMH of recent right TKA and hypertension presented to the hospital after syncopal episode  1  Acute blood loss anemia  2  Hemorrhoids     -she presented to the hospital with a hemoglobin of 5 3, she was given 2 units packed red blood cells and her hemoglobin came up to 7 4    -she states she has not seen any rectal bleeding or melena at home; rectal exam today reveals brown stool  Nonbleeding hemorrhoid noted, likely secondary to constipation    -she did have vomiting yesterday, none today  She denies hematemesis  -lower GI bleed CT scan was negative   -on physical exam, she has significant swelling in her right knee  Her acute blood-loss anemia may be secondary to her recent knee surgery  Dr Shreya Leahy discussed case with Dr Killian Sanchez    -She was on NSAIDs however she has no alarm symptoms to suggest PUD  -Continue to monitor hemoglobin and transfuse as necessary, continue to monitor bowel movements for color and consistency  -no indication for urgent endoscopy at this time    3  Constipation   -she states she has chronic constipation and has struggled with this for many years however recently this became worse after she started taking the oxycodone after her knee surgery    -she is using MiraLax 17 g daily at home, she can increase this to twice daily  -follow up as an outpatient for chronic constipation    4  Pancreatic lesions   -multiple subcentimeter pancreatic cystic lesions noted incidentally on CT   -radiology recommended repeat imaging with MRI/MRCP with and without contrast in 2 years  Could consider this sooner as an outpatient to further define the lesions     -recommend follow-up in the office to discuss    Inpatient consult to gastroenterology  Consult performed by: Arzella Aschoff, JEFFREY  Consult ordered by: Flaco Washington PA-C          Reason for Consult / Principal Problem: Symptomatic anemia    HPI: Eve Brown is a [de-identified]y o  year old female with a PMH of recent right TKA and hypertension presented to the hospital after syncopal episode  She states she passed out while having a bowel movement yesterday and she notes she was lightheaded prior to this  She had an episode of nausea and vomiting yesterday which has resolved today  She denies any hematemesis  She denies any , abdominal pain, hematochezia, melena, change in appetite, unintended weight loss, diarrhea or jaundice  She does note that she has chronic constipation which has acutely worsened recently as she has been on oxycodone after her knee surgery  She is using MiraLax for this at home  She is also taking meloxicam   She denies blood thinners aside from baby aspirin  She states she had an upper endoscopy about 20 years ago and she recalls this being normal, she has not had a colonoscopy  She denies any family history of gastric, pancreatic or colon cancer  Review of Systems: as per HPI  Review of Systems   Constitutional: Negative for activity change, appetite change, chills, fatigue, fever and unexpected weight change  HENT: Negative for mouth sores, sore throat and trouble swallowing  Respiratory: Negative for shortness of breath  Cardiovascular: Negative for chest pain  Gastrointestinal: Negative for abdominal distention, abdominal pain, blood in stool, constipation, diarrhea, nausea and vomiting  Skin: Negative for color change, pallor, rash and wound  Neurological: Positive for syncope and light-headedness  Negative for tremors  All other systems reviewed and are negative        Historical Information   Past Medical History:   Diagnosis Date    Hypertension     UTI (urinary tract infection)      Past Surgical History:   Procedure Laterality Date    EYE SURGERY      JOINT REPLACEMENT Right Knee 11/15/19     Social History   Social History     Substance and Sexual Activity   Alcohol Use No     Social History     Substance and Sexual Activity   Drug Use No     Social History     Tobacco Use   Smoking Status Former Smoker    Last attempt to quit: [place] Years since quittin 9   Smokeless Tobacco Never Used     Family History   Problem Relation Age of Onset    No Known Problems Mother     Diabetes Father     Stroke Father         syndrome       Meds/Allergies     Medications Prior to Admission   Medication    acetaminophen (TYLENOL) 325 mg tablet    ALPRAZolam (XANAX) 0 25 mg tablet    amLODIPine (NORVASC) 5 mg tablet    aspirin 81 mg chewable tablet    Cholecalciferol (VITAMIN D3) 50 MCG (2000) capsule    lisinopril (ZESTRIL) 5 mg tablet    Melatonin 3 MG CAPS    Multiple Vitamins-Minerals (PRESERVISION AREDS PO)    MYRBETRIQ 25 MG TB24    oxyCODONE (ROXICODONE) 5 mg immediate release tablet     Current Facility-Administered Medications   Medication Dose Route Frequency    acetaminophen (TYLENOL) tablet 975 mg  975 mg Oral Q8H    ALPRAZolam (XANAX) tablet 0 25 mg  0 25 mg Oral TID PRN    amLODIPine (NORVASC) tablet 5 mg  5 mg Oral Daily    lisinopril (ZESTRIL) tablet 5 mg  5 mg Oral Daily    melatonin tablet 3 mg  3 mg Oral Once    ondansetron (ZOFRAN) injection 4 mg  4 mg Intravenous Q6H PRN    oxybutynin (DITROPAN-XL) 24 hr tablet 5 mg  5 mg Oral Daily    oxyCODONE (ROXICODONE) IR tablet 5 mg  5 mg Oral Q6H PRN    pantoprazole (PROTONIX) injection 40 mg  40 mg Intravenous Q12H FLORENTIN    sodium chloride 0 9 % infusion  100 mL/hr Intravenous Continuous       Allergies   Allergen Reactions    Ciprofloxacin      Chest discomfort and dizziness    Diclofenac     Diphenhydramine        Objective     Blood pressure 124/57, pulse 90, temperature 98 4 °F (36 9 °C), temperature source Oral, resp   rate 18, height 5' 3" (1 6 m), weight 94 5 kg (208 lb 5 4 oz), SpO2 94 %       Intake/Output Summary (Last 24 hours) at 11/28/2019 1254  Last data filed at 11/28/2019 0745  Gross per 24 hour   Intake 950 ml   Output 925 ml   Net 25 ml       PHYSICAL EXAM     Physical Exam   Constitutional: She is oriented to person, place, and time  She appears well-developed and well-nourished  No distress  HENT:   Head: Normocephalic and atraumatic  Eyes: Right eye exhibits no discharge  Left eye exhibits no discharge  No scleral icterus  Neck: Neck supple  No tracheal deviation present  Cardiovascular: Normal rate, regular rhythm and normal heart sounds  Exam reveals no gallop and no friction rub  No murmur heard  Pulmonary/Chest: Effort normal and breath sounds normal  No stridor  No respiratory distress  She has no wheezes  She has no rales  Abdominal: Soft  Bowel sounds are normal  She exhibits no distension and no mass  There is no tenderness  There is no rebound and no guarding  Genitourinary:   Genitourinary Comments: Rectal exam showed brown stool, no masses appreciated  Small nonbleeding hemorrhoid noted   Musculoskeletal:   Right knee swollen   Neurological: She is alert and oriented to person, place, and time  Skin: Skin is warm and dry  Psychiatric: She has a normal mood and affect         Lab Results:   CBC:   Lab Results   Component Value Date    WBC 12 61 (H) 11/28/2019    HGB 7 4 (L) 11/28/2019    HCT 23 5 (L) 11/28/2019    MCV 99 (H) 11/28/2019     11/28/2019    MCH 31 1 11/28/2019    MCHC 31 5 11/28/2019    RDW 21 2 (H) 11/28/2019    MPV 9 3 11/28/2019    NRBC 0 11/28/2019   ,   CMP:   Lab Results   Component Value Date    K 4 2 11/28/2019     11/28/2019    CO2 27 11/28/2019    BUN 45 (H) 11/28/2019    CREATININE 0 88 11/28/2019    CALCIUM 8 5 11/28/2019    AST 19 11/27/2019    ALT 22 11/27/2019    ALKPHOS 76 11/27/2019    EGFR 62 11/28/2019   ,   Lipase: No results found for: LIPASE,  PT/INR:   Lab Results   Component Value Date    INR 1 20 (H) 11/27/2019   ,   Troponin:   Lab Results   Component Value Date    TROPONINI 0 02 11/27/2019   ,   Magnesium: No components found for: MAG,   Phosphorous: No results found for: PHOS  Imaging Studies: I have personally reviewed pertinent reports  CT ABDOMEN AND PELVIS - WITHOUT AND WITH IV CONTRAST     INDICATION:   anemia, +guaiac      COMPARISON: None      TECHNIQUE:  CT examination of the abdomen and pelvis was performed both prior to and after the administration of intravenous contrast  Axial, sagittal, and coronal 2D reformatted images were created from the source data and submitted for interpretation      Radiation dose length product (DLP) for this visit:  3004 mGy-cm   This examination, like all CT scans performed in the [facility], was performed utilizing techniques to minimize radiation dose exposure, including the use of iterative   reconstruction and automated exposure control      IV Contrast:  100 mL of iohexol (OMNIPAQUE)  Enteric Contrast:  Enteric contrast was not administered      FINDINGS:     ABDOMEN     BOWEL:  There is no active extravasation of intravenous contrast into the lumen of stomach, small bowel, or large bowel loops  There is no abnormal bowel wall thickening or pathologic bowel wall enhancement  Colonic diverticulosis without evidence of   acute diverticulitis          LOWER CHEST:  Subsegmental atelectasis versus scarring in the right lower lobe  Elevated right hemidiaphragm      LIVER/BILIARY TREE:  Unremarkable      GALLBLADDER:  There are gallstone(s) within the gallbladder, without pericholecystic inflammatory changes      SPLEEN:  Unremarkable      PANCREAS:  Several subcentimeter cystic foci throughout the pancreas  For example, 9 mm in the head (series 6, image 69), 6 mm in the body (series 6, image 66), and 9 mm in the tail (series 6, image 59)    The main pancreatic duct is normal in caliber      ADRENAL GLANDS:  Unremarkable      KIDNEYS/URETERS: Unremarkable  No hydronephrosis      PELVIS     REPRODUCTIVE ORGANS:  Unremarkable for patient's age      URINARY BLADDER:  Moderately distended      APPENDIX: No findings to suggest appendicitis      ADDITIONAL ABDOMINAL AND PELVIC STRUCTURES     ABDOMINOPELVIC CAVITY:  No ascites or free intraperitoneal air  No lymphadenopathy  ABDOMINAL WALL/INGUINAL REGIONS:  Small fat-containing umbilical hernia      OSSEOUS STRUCTURES:  No acute fracture or destructive osseous lesion  Degenerative changes throughout the lumbar spine      IMPRESSION:     No CT evidence of active high volume gastrointestinal hemorrhage      Colonic diverticulosis without evidence of acute diverticulitis      Multiple subcentimeter pancreatic cystic lesions  Recommend next follow-up in 2 years  Preferred imaging modality: abdomen MRI and MRCP with and without IV contrast, or triple phase abdomen CT with IV contrast, or abdomen MRI and MRCP without IV   contrast      The recommendations regarding pancreatic findings assumes that patient does not have family history of pancreatic cancer nor have any symptoms potentially attributable to pancreatic cystic lesions (hyperamylasemia, recent-onset diabetes, severe   epigastric pain, weight loss, steatorrhea, or jaundice ) If these conditions are not true, then management should be deferred to judgement of specialists such as gastroenterologists or oncologic surgeons  Recommendations are based on recent consensus   statements on management of pancreatic cystic lesions from [address] [place] [address] Gastro[employer]ology Association, [employer] of Radiology, the journal Pancreatology, and our own institutional consensus  Patient was seen and examined by Dr Bambi Block  All payne medical decisions were made by Dr Bambi Block  Thank you for allowing us to participate in the care of this present patient  We will follow-up with you closely

## 2019-11-28 NOTE — H&P
H&P- Karina Pino 1940, [de-identified] y o  female MRN: [id number]  Unit/Bed#: S -01 Encounter: [id number]  Primary Care Provider: Rodrigo Dunn DO   Date and time admitted to hospital: 11/27/2019  7:22 PM    Guaiac positive stools  Assessment & Plan  · Patient has guaiac-positive stool in the emergency department  Does not complain of any sanjay red bleeding, however she has been constipated as of late  · CT scan of the abdomen and pelvis did reveal high volume GI bleed  · No vomiting, hematemesis  · Gastroenterology consultation  Anxiety  Assessment & Plan  · Continue Xanax  Essential hypertension  Assessment & Plan   Blood pressure is reviewed and acceptable   o Last recorded pressure: 134/61   Continue amlodipine, lisinopril   Monitor blood pressure  Drug-induced constipation  Assessment & Plan  · Patient had been constipated for a week, likely secondary to opioid use  · Has had bowel movements today, continue with bowel regimen  * Symptomatic anemia  Assessment & Plan  · Symptomatic due to lightheadedness, syncopal episode  She also reports generalized weakness  · Hemoglobin today is 5 3, last documented hemoglobin was on 11/17/2019 and it was 9 7  · Only evidence of active bleeding at this time is guaiac-positive stool in the emergency department  · Patient has also been severely constipated recently, likely secondary to oxycodone use  · She is postop secondary to right total knee arthroplasty, with no neurovascular complaints, pain is controlled  Her right leg is swollen, however suspects may be postop  She has orthopedic appointment on 12/05/2019  · Obtain folate and B12 levels as the anemia is macrocytic  · Consented and transfused for 2 units of blood  · Monitor hemoglobin levels      VTE Prophylaxis: Pharmacologic VTE Prophylaxis contraindicated due to GI bleed  / sequential compression device   Code Status: FULL CODE  POLST: POLST form is not discussed and not completed at this time  Discussion with family: patient and daughters    Anticipated Length of Stay:  Patient will be admitted on an Inpatient basis with an anticipated length of stay of greater than 2 midnights  Justification for Hospital Stay: asymptomatic anemia    Total Time for Visit, including Counseling / Coordination of Care: 1 hour  Greater than 50% of this total time spent on direct patient counseling and coordination of care  Chief Complaint:   Weakness, fatigue, syncope    History of Present Illness:    Mora Mena is a [de-identified] y o  female who is approximately 2 weeks postop from right total knee arthroplasty who presents with reports of increasing weakness, fatigue, as well as 1 syncopal episode today  The single episode happened while she was in the bathroom  She has been constipated for the past week and a half secondary to opioid use after her right knee replacement  Reports that she did have 2 good bowel movements earlier today  She does not have any blood in her stool, dark or tarry stools  Denies any vomiting, nausea  Reports the pain has been controlled in her right knee  She is sensation in her right leg as well as her left leg  No new medications besides the oxycodone  Denies any headaches  Has never had colonoscopy in the past     Review of Systems:    Review of Systems   Constitutional: Positive for fatigue  Negative for activity change, appetite change, chills and fever  HENT: Negative for congestion, rhinorrhea, sinus pressure and sore throat  Eyes: Negative for photophobia, pain and visual disturbance  Respiratory: Negative for cough, shortness of breath and wheezing  Cardiovascular: Negative for chest pain, palpitations and leg swelling  Gastrointestinal: Positive for constipation  Negative for abdominal distention, abdominal pain, diarrhea, nausea and vomiting  Endocrine: Negative for cold intolerance, heat intolerance, polydipsia and polyuria  Genitourinary: Negative for difficulty urinating, dysuria, flank pain, frequency and hematuria  Musculoskeletal: Negative for arthralgias, back pain and joint swelling  Skin: Negative for color change, pallor and rash  Allergic/Immunologic: Negative  Neurological: Positive for syncope and weakness  Negative for dizziness, light-headedness and headaches  Hematological: Negative  Psychiatric/Behavioral: Negative  Past Medical and Surgical History:     Past Medical History:   Diagnosis Date    Hypertension     UTI (urinary tract infection)        Past Surgical History:   Procedure Laterality Date    EYE SURGERY      JOINT REPLACEMENT Right     Knee 11/15/19       Meds/Allergies:    Prior to Admission medications    Medication Sig Start Date End Date Taking?  Authorizing Provider   acetaminophen (TYLENOL) 325 mg tablet Take 975 mg by mouth every 8 (eight) hours   Yes Historical Provider, MD   ALPRAZolam (XANAX) 0 25 mg tablet Take 1 tablet (0 25 mg total) by mouth 3 (three) times a day as needed for anxiety 8/30/19  Yes Agnieszka James,    amLODIPine (NORVASC) 5 mg tablet TAKE 1 TABLET EVERY DAY 11/12/18  Yes Gabriela James DO   aspirin 81 mg chewable tablet Chew 81 mg 2 (two) times a day   Yes Historical Provider, MD   Cholecalciferol (VITAMIN D3) 50 MCG (2000 UT) capsule Take 2,000 Units by mouth daily   Yes Historical Provider, MD   lisinopril (ZESTRIL) 5 mg tablet TAKE 1 TABLET EVERY DAY 11/12/18  Yes Gabriela James DO   Melatonin 3 MG CAPS Take 3 mg by mouth     Yes Historical Provider, MD   Multiple Vitamins-Minerals (PRESERVISION AREDS PO) Take by mouth   Yes Historical Provider, MD   MYRBETRIQ 25 MG TB24 TAKE 1 TABLET EVERY DAY 11/4/19  Yes Krysta Crandall PA-C   oxyCODONE (ROXICODONE) 5 mg immediate release tablet Take 1 tablet (5 mg total) by mouth every 6 (six) hours as needed for severe pain (use 1/2-1 tab per dose)Max Daily Amount: 20 mg 19  Yes PALAK Mehta     I have reviewed home medications with patient personally  Allergies: Allergies   Allergen Reactions    Ciprofloxacin      Chest discomfort and dizziness    Diclofenac     Diphenhydramine        Social History:     Marital Status:    Occupation: non-contributory   Patient Pre-hospital Living Situation: home  Patient Pre-hospital Level of Mobility: full  Patient Pre-hospital Diet Restrictions: none   Substance Use History:   Social History     Substance and Sexual Activity   Alcohol Use No     Social History     Tobacco Use   Smoking Status Former Smoker    Last attempt to quit: [employer] Years since quittin 9   Smokeless Tobacco Never Used     Social History     Substance and Sexual Activity   Drug Use No       Family History:    Family History   Problem Relation Age of Onset    No Known Problems Mother     Diabetes Father     Stroke Father         syndrome       Physical Exam:     Vitals:   Blood Pressure: 134/61 (19)  Pulse: 92 (19)  Temperature: 98 3 °F (36 8 °C) (19)  Temp Source: Oral (19)  Respirations: 18 (19)  Height: 5' 3" (160 cm) (19)  Weight - Scale: 94 5 kg (208 lb 5 4 oz) (19)  SpO2: 96 % (19)    Physical Exam   Constitutional: She is oriented to person, place, and time  She appears well-developed and well-nourished  No distress  HENT:   Head: Normocephalic and atraumatic  Mouth/Throat: Oropharynx is clear and moist    Eyes: Pupils are equal, round, and reactive to light  EOM are normal  No scleral icterus  Neck: Neck supple  Cardiovascular: Normal rate, regular rhythm, normal heart sounds and intact distal pulses  No murmur heard  Pulses:       Dorsalis pedis pulses are 2+ on the right side, and 2+ on the left side  Posterior tibial pulses are 2+ on the right side, and 2+ on the left side     Pulmonary/Chest: Effort normal and breath sounds normal  No respiratory distress  She has no wheezes  She has no rales  Abdominal: Soft  Bowel sounds are normal  She exhibits no distension  There is no tenderness  There is no rebound and no guarding  Musculoskeletal: She exhibits edema (right leg)  She exhibits no deformity  Some bruising and swelling of the right leg, however she is neurovascularly intact  No reports of pain  Neurological: She is alert and oriented to person, place, and time  Skin: Skin is warm and dry  Capillary refill takes less than 2 seconds  No rash noted  She is not diaphoretic  No erythema  There is pallor  Psychiatric: She has a normal mood and affect  Nursing note and vitals reviewed  Additional Data:     Lab Results: I have personally reviewed pertinent reports  Results from last 7 days   Lab Units 11/1940   WBC Thousand/uL 11 91*   HEMOGLOBIN g/dL 5 3*   HEMATOCRIT % 17 3*   PLATELETS Thousands/uL 316   NEUTROS PCT % 83*   LYMPHS PCT % 12*   MONOS PCT % 4   EOS PCT % 0     Results from last 7 days   Lab Units 11/1940   SODIUM mmol/L 141   POTASSIUM mmol/L 4 2   CHLORIDE mmol/L 107   CO2 mmol/L 24   BUN mg/dL 51*   CREATININE mg/dL 0 87   ANION GAP mmol/L 10   CALCIUM mg/dL 7 8*   ALBUMIN g/dL 2 5*   TOTAL BILIRUBIN mg/dL 0 38   ALK PHOS U/L 76   ALT U/L 22   AST U/L 19   GLUCOSE RANDOM mg/dL 178*     Results from last 7 days   Lab Units 11/1940   INR  1 20*                   Imaging: I have personally reviewed pertinent reports  CT high volume lower GI bleed   Final Result by Naila Le MD (11/28 0005)      No CT evidence of active high volume gastrointestinal hemorrhage  Colonic diverticulosis without evidence of acute diverticulitis  Multiple subcentimeter pancreatic cystic lesions  Recommend next follow-up in 2 years   Preferred imaging modality: abdomen MRI and MRCP with and without IV contrast, or triple phase abdomen CT with IV contrast, or abdomen MRI and MRCP without IV    contrast       The recommendations regarding pancreatic findings assumes that patient does not have family history of pancreatic cancer nor have any symptoms potentially attributable to pancreatic cystic lesions (hyperamylasemia, recent-onset diabetes, severe    epigastric pain, weight loss, steatorrhea, or jaundice ) If these conditions are not true, then management should be deferred to judgement of specialists such as gastroenterologists or oncologic surgeons  Recommendations are based on recent consensus    statements on management of pancreatic cystic lesions from [address] [place] [address] [employer], [employer], the journal Pancreatology, and our own institutional consensus  1            Workstation performed: [id number]             EKG, Pathology, and Other Studies Reviewed on Admission:   · None     Allscripts / Epic Records Reviewed: Yes     ** Please Note: This note has been constructed using a voice recognition system   **

## 2019-11-28 NOTE — ED PROVIDER NOTES
Pt Name: Candiss Severe  MRN: [id number]  [place]: 1940  Age/Sex: [de-identified] y o  female  Date of evaluation: 2019  PCP: Jennifer Norris, [address] [place] [address]    Chief Complaint   Patient presents with    Syncope     Per EMS, 911 was called due to pt feeling lightheaded  Witnessed syncopal episode by EMS  Pt had right knee replaced 11/15/19  Pt denies SOB/CP  Pt was at [facility] for rehab and was discharged this passed saturday  (+) Vomiting  HPI    Chancy Johnny presents to the Emergency Department complaining of Syncope  Candiss Severe is a [de-identified] y o  female who presents due to Syncope  Pt brought via EMS for syncopal episode witnessed by family, with prior lightheaded sensations all day  Significant PMH 2 weeks post-op right total knee arthroplasty performed at [facility]  Pt reports no prior similar episodes, no prodromal symptoms of dizziness, CP, SOB, headache, visual disturbances  Family reports patient back to baseline, no headstrike, fall            History provided by:  Patient and relative   used: No    Syncope   Associated symptoms: weakness    Associated symptoms: no chest pain, no diaphoresis, no dizziness, no fever, no headaches, no palpitations, no seizures and no shortness of breath          Past Medical and Surgical History    Past Medical History:   Diagnosis Date    Hypertension     UTI (urinary tract infection)        Past Surgical History:   Procedure Laterality Date    EYE SURGERY      JOINT REPLACEMENT Right     Knee 11/15/19       Family History   Problem Relation Age of Onset    No Known Problems Mother     Diabetes Father     Stroke Father         syndrome       Social History     Tobacco Use    Smoking status: Former Smoker     Last attempt to quit:      Years since quittin 9    Smokeless tobacco: Never Used   Substance Use Topics    Alcohol use: No    Drug use: No       Allergies    Allergies   Allergen Reactions    Ciprofloxacin      Chest discomfort and dizziness    Diclofenac     Diphenhydramine        Home Medications:    Prior to Admission medications    Medication Sig Start Date End Date Taking? Authorizing Provider   acetaminophen (TYLENOL) 325 mg tablet Take 975 mg by mouth every 8 (eight) hours   Yes Historical Provider, MD   ALPRAZolam (XANAX) 0 25 mg tablet Take 1 tablet (0 25 mg total) by mouth 3 (three) times a day as needed for anxiety 8/30/19  Yes Pooja James, DO   amLODIPine (NORVASC) 5 mg tablet TAKE 1 TABLET EVERY DAY 11/12/18  Yes Gabriela James, DO   aspirin 81 mg chewable tablet Chew 81 mg 2 (two) times a day   Yes Historical Provider, MD   Cholecalciferol (VITAMIN D3) 50 MCG (2000 UT) capsule Take 2,000 Units by mouth daily   Yes Historical Provider, MD   lisinopril (ZESTRIL) 5 mg tablet TAKE 1 TABLET EVERY DAY 11/12/18  Yes Gabriela James DO   Melatonin 3 MG CAPS Take 3 mg by mouth     Yes Historical Provider, MD   Multiple Vitamins-Minerals (PRESERVISION AREDS PO) Take by mouth   Yes Historical Provider, MD   MYRBETRIQ 25 MG TB24 TAKE 1 TABLET EVERY DAY 11/4/19  Yes Krysta Crandall PA-C   oxyCODONE (ROXICODONE) 5 mg immediate release tablet Take 1 tablet (5 mg total) by mouth every 6 (six) hours as needed for severe pain (use 1/2-1 tab per dose)Max Daily Amount: 20 mg 11/22/19  Yes PALAK Andre           Review of Systems    Review of Systems   Constitutional: Positive for fatigue  Negative for activity change, appetite change, chills, diaphoresis and fever  HENT: Positive for tinnitus  Negative for congestion, drooling, ear discharge, ear pain, facial swelling, hearing loss, postnasal drip, rhinorrhea, sinus pressure, sinus pain, sore throat, trouble swallowing and voice change  Eyes: Negative for photophobia, pain, discharge and visual disturbance     Respiratory: Negative for apnea, cough, choking, chest tightness, shortness of breath, wheezing and stridor  Cardiovascular: Positive for syncope  Negative for chest pain, palpitations and leg swelling  Gastrointestinal: Negative for abdominal pain  Musculoskeletal: Negative for arthralgias, joint swelling, neck pain and neck stiffness  Skin: Positive for pallor  Negative for rash  Neurological: Positive for syncope, weakness and light-headedness  Negative for dizziness, tremors, seizures, facial asymmetry, speech difficulty, numbness and headaches  Psychiatric/Behavioral: The patient is not nervous/anxious  All other systems reviewed and are negative  All other systems reviewed and negative  Physical Exam      ED Triage Vitals   Temperature Pulse Respirations Blood Pressure SpO2   11/27/19 1929 11/27/19 1929 11/27/19 1929 11/27/19 1929 11/27/19 1929   97 5 °F (36 4 °C) 79 16 134/59 96 %      Temp Source Heart Rate Source Patient Position - Orthostatic VS BP Location FiO2 (%)   11/27/19 1929 11/27/19 1929 11/27/19 1929 11/27/19 1929 --   Oral Monitor Sitting Right arm       Pain Score       11/27/19 2213       5               Physical Exam   Constitutional: She is oriented to person, place, and time  She appears well-developed and well-nourished  No distress  HENT:   Head: Normocephalic and atraumatic  Right Ear: Hearing, tympanic membrane, external ear and ear canal normal  No lacerations  No drainage, swelling or tenderness  No foreign bodies  No mastoid tenderness  Tympanic membrane is not injected, not scarred, not perforated, not erythematous, not retracted and not bulging  No middle ear effusion  No hemotympanum  No decreased hearing is noted  Left Ear: Hearing, tympanic membrane, external ear and ear canal normal  No lacerations  No drainage, swelling or tenderness  No foreign bodies  No mastoid tenderness  Tympanic membrane is not injected, not scarred, not perforated, not erythematous, not retracted and not bulging  No middle ear effusion  No hemotympanum   No decreased hearing is noted  Nose: Nose normal  Right sinus exhibits no maxillary sinus tenderness and no frontal sinus tenderness  Left sinus exhibits no maxillary sinus tenderness and no frontal sinus tenderness  Mouth/Throat: Uvula is midline, oropharynx is clear and moist and mucous membranes are normal  No trismus in the jaw  No uvula swelling  No oropharyngeal exudate, posterior oropharyngeal edema, posterior oropharyngeal erythema or tonsillar abscesses  Tonsils are 1+ on the right  Tonsils are 1+ on the left  No tonsillar exudate  Eyes: Pupils are equal, round, and reactive to light  Conjunctivae and EOM are normal  Right eye exhibits no discharge  Left eye exhibits no discharge  Neck: Normal range of motion  Neck supple  No hepatojugular reflux and no JVD present  Carotid bruit is not present  Cardiovascular: Normal rate, regular rhythm, normal heart sounds, intact distal pulses and normal pulses  No extrasystoles are present  PMI is not displaced  Exam reveals no gallop and no friction rub  Pulses:       Radial pulses are 2+ on the right side, and 2+ on the left side  Dorsalis pedis pulses are 2+ on the right side, and 2+ on the left side  Posterior tibial pulses are 2+ on the right side, and 2+ on the left side  Pulmonary/Chest: Effort normal and breath sounds normal  No stridor  No respiratory distress  She has no wheezes  She has no rales  She exhibits no tenderness  Abdominal: Soft  Bowel sounds are normal  She exhibits no distension and no mass  There is no hepatosplenomegaly  There is no tenderness  There is no rigidity, no rebound, no guarding, no CVA tenderness, no tenderness at McBurney's point and negative Núñez's sign  No hernia  Negative Núñez's  Negative Appendiceal signs (Psoas, Rovsing's, Obturator)  Negative Peritoneal Signs   Genitourinary: Rectal exam shows guaiac positive stool (brown, no melena or brb)  Musculoskeletal: Normal range of motion  Lymphadenopathy:     She has no cervical adenopathy  Neurological: She is alert and oriented to person, place, and time  Skin: Skin is warm and dry  Capillary refill takes less than 2 seconds  No rash noted  She is not diaphoretic  There is pallor  Nursing note and vitals reviewed            Diagnostic Results    ECG      Labs:    Results for orders placed or performed during the hospital encounter of 11/27/19   CBC and differential   Result Value Ref Range    WBC 11 91 (H) 4 31 - 10 16 Thousand/uL    RBC 1 57 (L) 3 81 - 5 12 Million/uL    Hemoglobin 5 3 (LL) 11 5 - 15 4 g/dL    Hematocrit 17 3 (L) 34 8 - 46 1 %     (H) 82 - 98 fL    MCH 33 8 26 8 - 34 3 pg    MCHC 30 6 (L) 31 4 - 37 4 g/dL    RDW 16 5 (H) 11 6 - 15 1 %    MPV 9 8 8 9 - 12 7 fL    Platelets 378 074 - 734 Thousands/uL    nRBC 0 /100 WBCs    Neutrophils Relative 83 (H) 43 - 75 %    Immat GRANS % 1 0 - 2 %    Lymphocytes Relative 12 (L) 14 - 44 %    Monocytes Relative 4 4 - 12 %    Eosinophils Relative 0 0 - 6 %    Basophils Relative 0 0 - 1 %    Neutrophils Absolute 9 80 (H) 1 85 - 7 62 Thousands/µL    Immature Grans Absolute 0 13 0 00 - 0 20 Thousand/uL    Lymphocytes Absolute 1 46 0 60 - 4 47 Thousands/µL    Monocytes Absolute 0 45 0 17 - 1 22 Thousand/µL    Eosinophils Absolute 0 05 0 00 - 0 61 Thousand/µL    Basophils Absolute 0 02 0 00 - 0 10 Thousands/µL   Comprehensive metabolic panel   Result Value Ref Range    Sodium 141 136 - 145 mmol/L    Potassium 4 2 3 5 - 5 3 mmol/L    Chloride 107 100 - 108 mmol/L    CO2 24 21 - 32 mmol/L    ANION GAP 10 4 - 13 mmol/L    BUN 51 (H) 5 - 25 mg/dL    Creatinine 0 87 0 60 - 1 30 mg/dL    Glucose 178 (H) 65 - 140 mg/dL    Calcium 7 8 (L) 8 3 - 10 1 mg/dL    AST 19 5 - 45 U/L    ALT 22 12 - 78 U/L    Alkaline Phosphatase 76 46 - 116 U/L    Total Protein 5 5 (L) 6 4 - 8 2 g/dL    Albumin 2 5 (L) 3 5 - 5 0 g/dL    Total Bilirubin 0 38 0 20 - 1 00 mg/dL    eGFR 63 ml/min/1 73sq m   Troponin I Result Value Ref Range    Troponin I 0 02 <=0 04 ng/mL   TSH, 3rd generation with Free T4 reflex   Result Value Ref Range    TSH 3RD GENERATON 3 339 0 358 - 3 740 uIU/mL   Protime-INR   Result Value Ref Range    Protime 14 6 (H) 11 6 - 14 5 seconds    INR 1 20 (H) 0 84 - 1 19   APTT   Result Value Ref Range    PTT 25 23 - 37 seconds   Vitamin B12   Result Value Ref Range    Vitamin B-12 355 100 - 900 pg/mL   CBC and differential   Result Value Ref Range    WBC 12 61 (H) 4 31 - 10 16 Thousand/uL    RBC 2 38 (L) 3 81 - 5 12 Million/uL    Hemoglobin 7 4 (L) 11 5 - 15 4 g/dL    Hematocrit 23 5 (L) 34 8 - 46 1 %    MCV 99 (H) 82 - 98 fL    MCH 31 1 26 8 - 34 3 pg    MCHC 31 5 31 4 - 37 4 g/dL    RDW 21 2 (H) 11 6 - 15 1 %    MPV 9 3 8 9 - 12 7 fL    Platelets 967 375 - 180 Thousands/uL    nRBC 0 /100 WBCs    Neutrophils Relative 83 (H) 43 - 75 %    Immat GRANS % 1 0 - 2 %    Lymphocytes Relative 9 (L) 14 - 44 %    Monocytes Relative 7 4 - 12 %    Eosinophils Relative 0 0 - 6 %    Basophils Relative 0 0 - 1 %    Neutrophils Absolute 10 45 (H) 1 85 - 7 62 Thousands/µL    Immature Grans Absolute 0 11 0 00 - 0 20 Thousand/uL    Lymphocytes Absolute 1 18 0 60 - 4 47 Thousands/µL    Monocytes Absolute 0 82 0 17 - 1 22 Thousand/µL    Eosinophils Absolute 0 02 0 00 - 0 61 Thousand/µL    Basophils Absolute 0 03 0 00 - 0 10 Thousands/µL   Basic metabolic panel   Result Value Ref Range    Sodium 143 136 - 145 mmol/L    Potassium 4 2 3 5 - 5 3 mmol/L    Chloride 108 100 - 108 mmol/L    CO2 27 21 - 32 mmol/L    ANION GAP 8 4 - 13 mmol/L    BUN 45 (H) 5 - 25 mg/dL    Creatinine 0 88 0 60 - 1 30 mg/dL    Glucose 105 65 - 140 mg/dL    Calcium 8 5 8 3 - 10 1 mg/dL    eGFR 62 ml/min/1 73sq m   Folate   Result Value Ref Range    Folate >20 0 (H) 3 1 - 17 5 ng/mL   Hemoglobin and hematocrit, blood   Result Value Ref Range    Hemoglobin 7 5 (L) 11 5 - 15 4 g/dL    Hematocrit 24 5 (L) 34 8 - 46 1 %   ECG 12 lead   Result Value Ref Range Ventricular Rate 81 BPM    Atrial Rate 84 BPM    UT Interval 170 ms    QRSD Interval 92 ms    QT Interval 376 ms    QTC Interval 437 ms    P Axis 40 degrees    QRS Axis 59 degrees    T Wave Axis 44 degrees   Type and screen   Result Value Ref Range    ABO Grouping B     Rh Factor Positive     Antibody Screen Negative     Specimen Expiration Date [id number]    Prepare RBC:Has consent been obtained? Yes, 2 Units, CMV Negative   Result Value Ref Range    Unit Product Code K0004B05     Unit Number [id number]     Unit ABO O     Unit [facility] POS     Crossmatch Compatible     Unit Dispense Status Presumed Trans     Unit Product Code I8190U95     Unit Number [id number]     Unit ABO B     Unit RH POS     Crossmatch Compatible     Unit Dispense Status Presumed Trans        All labs reviewed and utilized in the medical decision making process    Radiology:    CT high volume lower GI bleed   Final Result      No CT evidence of active high volume gastrointestinal hemorrhage  Colonic diverticulosis without evidence of acute diverticulitis  Multiple subcentimeter pancreatic cystic lesions  Recommend next follow-up in 2 years  Preferred imaging modality: abdomen MRI and MRCP with and without IV contrast, or triple phase abdomen CT with IV contrast, or abdomen MRI and MRCP without IV    contrast       The recommendations regarding pancreatic findings assumes that patient does not have family history of pancreatic cancer nor have any symptoms potentially attributable to pancreatic cystic lesions (hyperamylasemia, recent-onset diabetes, severe    epigastric pain, weight loss, steatorrhea, or jaundice ) If these conditions are not true, then management should be deferred to judgement of specialists such as gastroenterologists or oncologic surgeons   Recommendations are based on recent consensus    statements on management of pancreatic cystic lesions from [address] [place] [address], [employer] of Radiology, the journal Pancreatology, and our own institutional consensus  1            Workstation performed: [id number]             All radiology studies independently viewed by me and interpreted by the radiologist     Procedure    ECG 12 Lead Documentation Only  Date/Time: 11/27/2019 7:31 PM  Performed by: Melchor Schmidt PA-C  Authorized by: Melchor Schmidt PA-C     ECG reviewed by me, the ED Provider: yes    Patient location:  ED  Previous ECG:     Previous ECG:  Unavailable    Comparison to cardiac monitor: Yes    Interpretation:     Interpretation: normal    Rate:     ECG rate:  81    ECG rate assessment: normal    Rhythm:     Rhythm: sinus rhythm    Ectopy:     Ectopy: none    QRS:     QRS axis:  Normal    QRS intervals:  Normal  Conduction:     Conduction: normal    ST segments:     ST segments:  Normal  T waves:     T waves: normal    CriticalCare Time  Performed by: Melchor Schmidt PA-C  Authorized by: Melchor Schmidt PA-C     Critical care provider statement:     Critical care time (minutes):  30    Critical care time was exclusive of:  Separately billable procedures and treating other patients    Critical care was necessary to treat or prevent imminent or life-threatening deterioration of the following conditions: blood transfusion      Critical care was time spent personally by me on the following activities:  Blood draw for specimens, obtaining history from patient or surrogate, development of treatment plan with patient or surrogate, discussions with consultants, discussions with primary provider, evaluation of patient's response to treatment, examination of patient, interpretation of cardiac output measurements, ordering and performing treatments and interventions, ordering and review of laboratory studies, ordering and review of radiographic studies, re-evaluation of patient's condition and review of old charts          Assessment and Plan    MDM  Number of Diagnoses or Management Options  Anemia: new, needed workup  GI bleed: new, needed workup  Syncope: new, needed workup     Amount and/or Complexity of Data Reviewed  Clinical lab tests: reviewed and ordered  Tests in the radiology section of CPT®: ordered and reviewed  Tests in the medicine section of CPT®: reviewed and ordered  Obtain history from someone other than the patient: yes  Review and summarize past medical records: yes  Discuss the patient with other providers: yes  Independent visualization of images, tracings, or specimens: yes    Risk of Complications, Morbidity, and/or Mortality  Presenting problems: high  Diagnostic procedures: high  Management options: high    Patient Progress  Patient progress: stable      Initial ED assessment:  Abhinav Gaming is a [de-identified] y o  female with significant PMH for HTN, Postop 2 weeks R knee arthroscopy who brought to ED via EMS for Syncope  Vitals signs reviewed  Physical examination remarkable for pallor  Initial Ddx  includes but is not limited to:   vasovagal syncope, cardiac arrhythmia, MI, ACS, PE, metabolic abnormality, concussion, intracranial process, seizure, anemia, GI bleed, dehydration, ectopic pregnancy    Initial ED plan:   Plan will be to perform diagnostic testing of CBC, CMP, TSH, Troponin, Coags, Occult stool, EKG and treat symptomatically  Final ED summary/disposition:    Discussed course of care with patient, who is agreeable to admission for further eval, treatment  Called inpatient Amie Cespedes PA-C/Hospitalist who is aware of the patient, case discussed including HPI, pertinent PMH, ED Course, and workup   Hospitalist agreed with plan and will accept for admission/observation under the medicine service      MDM  Reviewed previous: labs  Interpretation: labs, ECG and CT scan        ED Course of Care and Re-Assessments    ED Course as of Dec 02 0444   Wed Nov 27, 2019   2045 Troponin I: 0 02                          Medications   ondansetron (FOR EMS ONLY) (ZOFRAN) 4 mg/2 mL injection 4 mg (0 mg Does not apply Given to EMS 11/27/19 1949)   sodium chloride 0 9 % bolus 250 mL (0 mL Intravenous Stopped 11/27/19 2213)   oxyCODONE (ROXICODONE) IR tablet 5 mg (5 mg Oral Given 11/27/19 2118)   ondansetron (ZOFRAN) injection 4 mg (4 mg Intravenous Given 11/27/19 2120)   iohexol (OMNIPAQUE) 350 MG/ML injection (MULTI-DOSE) 100 mL (100 mL Intravenous Given 11/27/19 2336)   metoclopramide (REGLAN) injection 10 mg (10 mg Intravenous Given 11/28/19 0058)   calcium gluconate 1 g in sodium chloride 0 9 % 100 mL IVPB (1 g Intravenous New Bag 11/28/19 0150)         FINAL IMPRESSION    Final diagnoses:   Syncope   Anemia   GI bleed         DISPOSITION/PLAN  Time reflects when diagnosis was documented in both MDM as applicable and the Disposition within this note     Time User Action Codes Description Comment    11/28/2019 12:34 AM Larayne Gang Add [R55] Syncope     11/28/2019 12:34 AM Larayne Gang Add [D64 9] Anemia     11/28/2019 12:34 AM Larayne Gang Add [K92 2] GI bleed     11/28/2019 12:34 AM Larayne Gang Modify [R55] Syncope     11/28/2019 12:34 AM Larayne Gang Modify [D64 9] Anemia       ED Disposition     ED Disposition Condition Date/Time Comment    Admit Stable Thu Nov 28, 2019 12:34 AM Case was discussed with [facility] and the patient's admission status was agreed to be Admission Status: inpatient status to the service of Dr Mario Kaba   Follow-up Information     Follow up With Specialties Details Why DO [facility] Follow up  [id number] [address]  [facility] [address] [address] [phone]                PATIENT REFERRED TO:    Ruben Payne DO  [address]    [address] [phone]    Follow up        DISCHARGE MEDICATIONS:    Discharge Medication List as of 11/28/2019  2:09 PM      START taking these medications    Details   pantoprazole (PROTONIX) 40 mg tablet Take 1 tablet (40 mg total) by mouth daily, Starting Thu 11/28/2019, Normal         CONTINUE these medications which have NOT CHANGED    Details   acetaminophen (TYLENOL) 325 mg tablet Take 975 mg by mouth every 8 (eight) hours, Historical Med      ALPRAZolam (XANAX) 0 25 mg tablet Take 1 tablet (0 25 mg total) by mouth 3 (three) times a day as needed for anxiety, Starting Fri 8/30/2019, Normal      amLODIPine (NORVASC) 5 mg tablet TAKE 1 TABLET EVERY DAY, Normal      aspirin 81 mg chewable tablet Chew 81 mg 2 (two) times a day, Historical Med      Cholecalciferol (VITAMIN D3) 50 MCG (2000 UT) capsule Take 2,000 Units by mouth daily, Historical Med      lisinopril (ZESTRIL) 5 mg tablet TAKE 1 TABLET EVERY DAY, Normal      Melatonin 3 MG CAPS Take 3 mg by mouth  , Historical Med      Multiple Vitamins-Minerals (PRESERVISION AREDS PO) Take by mouth, Historical Med      MYRBETRIQ 25 MG TB24 TAKE 1 TABLET EVERY DAY, Normal      oxyCODONE (ROXICODONE) 5 mg immediate release tablet Take 1 tablet (5 mg total) by mouth every 6 (six) hours as needed for severe pain (use 1/2-1 tab per dose)Max Daily Amount: 20 mg, Starting Fri 11/22/2019, Normal             Outpatient Discharge Orders   Ambulatory referral to Gastroenterology   Standing Status: Future Standing Exp   Date: 11/28/20      Discharge Diet     Activity as tolerated            JEFFREY Robles, [place]  12/02/19 8951

## 2019-11-28 NOTE — PLAN OF CARE
Problem: Potential for Falls  Goal: Patient will remain free of falls  Description  INTERVENTIONS:  - Assess patient frequently for physical needs  -  Identify cognitive and physical deficits and behaviors that affect risk of falls    -  [place] fall precautions as indicated by assessment   - Educate patient/family on patient safety including physical limitations  - Instruct patient to call for assistance with activity based on assessment  - Modify environment to reduce risk of injury  - Consider OT/PT consult to assist with strengthening/mobility  Outcome: Progressing

## 2019-11-28 NOTE — ASSESSMENT & PLAN NOTE
· Patient had been constipated for a week, likely secondary to opioid use  · Has had bowel movements today, continue with bowel regimen

## 2019-11-28 NOTE — DISCHARGE SUMMARY
Discharge Summary - [facility] Internal Medicine    Patient Information: Maria E Naranjo [de-identified] y o  female MRN: [id number]  Unit/Bed#: S -01 Encounter: [id number]    Discharging Physician / Practitioner: Erum Stover MD  PCP: Kinjal Sultana DO  Admission Date: 11/27/2019  Discharge Date: 11/28/19    Disposition:     Home    Reason for Admission:  Anemia    Discharge Diagnoses:     Principal Problem:    Symptomatic anemia  Active Problems:    Drug-induced constipation    Essential hypertension    Anxiety    Guaiac positive stools  Resolved Problems:    * No resolved hospital problems  *      Consultations During Hospital Stay:  Hayley Bio and    Procedures Performed:     · CTA abdomen and pelvis :  Diverticulosis noted but no evidence of any active bleeding    Significant Findings / Test Results:     · As above      Hospital Course:     Maria E Naranjo is a [de-identified] y o  female patient who originally presented to the hospital on 11/27/2019 due to syncopal episode  Patient recently had right total knee replacement, when she was discharged from hospital her hemoglobin was 9 9  She went to rehab and then went to home  She reports while she was in bathroom felt very lightheaded and dizzy and passed out  She was brought to the emergency department for evaluation  She was noted to have a hemoglobin of 5 2  Stool was positive for occult blood  Stool was brown  Patient denied any rectal bleeding or melena  She was subsequently admitted and received 2 units of packed red blood cells  She felt lot better  Patient was seen by Gastroenterology and recommended no further inpatient workup  Patient is being discharged home in stable condition  She should follow with GI for nonemergent colonoscopy endoscopy  Discussed with patient and family they are agreeable  Patient was was not admitted as inpatient given severity of anemia and suspected GI bleeding    However patient improved quickly and being discharged with outpatient workup  Condition at Discharge: good     Discharge Day Visit / Exam:     Subjective:  Feels better today and anxious to go home  Vitals: Blood Pressure: 124/57 (11/28/19 0800)  Pulse: 90 (11/28/19 0800)  Temperature: 98 4 °F (36 9 °C) (11/28/19 0800)  Temp Source: Oral (11/28/19 0800)  Respirations: 18 (11/28/19 0800)  Height: 5' 3" (160 cm) (11/28/19 0136)  Weight - Scale: 94 5 kg (208 lb 5 4 oz) (11/28/19 0136)  SpO2: 94 % (11/28/19 0800)  Exam:   Physical Exam     Gen -Patient comfortable   Pallor  Neck- Supple  No thyromegaly or lymphadenopathy  Lungs-Clear bilaterally without any wheeze or rales   Heart S1-S2, regular rate and rhythm, systolic murmur  Abdomen-soft nontender, no organomegaly  Bowel sounds present  Extremities-no cyanosi,  clubbing or edema  Skin- no rash  Neuro-nonfocal       Discussion with Family:  Daughter at bedside    Discharge instructions/Information to patient and family:   See after visit summary for information provided to patient and family  Provisions for Follow-Up Care:  See after visit summary for information related to follow-up care and any pertinent home health orders  Planned Readmission:  No     Discharge Statement:  I spent 35 minutes discharging the patient  This time was spent on the day of discharge  I had direct contact with the patient on the day of discharge  Greater than 50% of the total time was spent examining patient, answering all patient questions, arranging and discussing plan of care with patient as well as directly providing post-discharge instructions  Additional time then spent on discharge activities  Discharge Medications:  See after visit summary for reconciled discharge medications provided to patient and family        ** Please Note: This note has been constructed using a voice recognition system **

## 2019-11-28 NOTE — ASSESSMENT & PLAN NOTE
 Blood pressure is reviewed and acceptable   o Last recorded pressure: 134/61   Continue amlodipine, lisinopril   Monitor blood pressure

## 2019-11-28 NOTE — ASSESSMENT & PLAN NOTE
· Symptomatic due to lightheadedness, syncopal episode  She also reports generalized weakness  · Hemoglobin today is 5 3, last documented hemoglobin was on 11/17/2019 and it was 9 7  · Only evidence of active bleeding at this time is guaiac-positive stool in the emergency department  · Patient has also been severely constipated recently, likely secondary to oxycodone use  · She is postop secondary to right total knee arthroplasty, with no neurovascular complaints, pain is controlled  Her right leg is swollen, however suspects may be postop  She has orthopedic appointment on 12/05/2019  · Obtain folate and B12 levels as the anemia is macrocytic  · Consented and transfused for 2 units of blood  · Monitor hemoglobin levels

## 2019-11-29 ENCOUNTER — TRANSITIONAL CARE MANAGEMENT (OUTPATIENT)
Dept: FAMILY MEDICINE CLINIC | Facility: CLINIC | Age: 79
End: 2019-11-29

## 2019-11-29 PROCEDURE — 99223 1ST HOSP IP/OBS HIGH 75: CPT | Performed by: INTERNAL MEDICINE

## 2019-12-02 ENCOUNTER — OFFICE VISIT (OUTPATIENT)
Dept: FAMILY MEDICINE CLINIC | Facility: CLINIC | Age: 79
End: 2019-12-02
Payer: MEDICARE

## 2019-12-02 VITALS
HEART RATE: 80 BPM | HEIGHT: 63 IN | TEMPERATURE: 99.3 F | DIASTOLIC BLOOD PRESSURE: 78 MMHG | WEIGHT: 195 LBS | BODY MASS INDEX: 34.55 KG/M2 | SYSTOLIC BLOOD PRESSURE: 122 MMHG

## 2019-12-02 DIAGNOSIS — D64.9 ANEMIA, UNSPECIFIED TYPE: Primary | ICD-10-CM

## 2019-12-02 DIAGNOSIS — Z96.651 AFTERCARE FOLLOWING RIGHT KNEE JOINT REPLACEMENT SURGERY: ICD-10-CM

## 2019-12-02 DIAGNOSIS — R01.1 HEART MURMUR: ICD-10-CM

## 2019-12-02 DIAGNOSIS — R09.89 BRUIT OF LEFT CAROTID ARTERY: ICD-10-CM

## 2019-12-02 DIAGNOSIS — Z47.1 AFTERCARE FOLLOWING RIGHT KNEE JOINT REPLACEMENT SURGERY: ICD-10-CM

## 2019-12-02 PROCEDURE — 1111F DSCHRG MED/CURRENT MED MERGE: CPT | Performed by: FAMILY MEDICINE

## 2019-12-02 PROCEDURE — 99495 TRANSJ CARE MGMT MOD F2F 14D: CPT | Performed by: FAMILY MEDICINE

## 2019-12-03 ENCOUNTER — TELEPHONE (OUTPATIENT)
Dept: FAMILY MEDICINE CLINIC | Facility: CLINIC | Age: 79
End: 2019-12-03

## 2019-12-03 ENCOUNTER — TELEPHONE (OUTPATIENT)
Dept: UROLOGY | Facility: MEDICAL CENTER | Age: 79
End: 2019-12-03

## 2019-12-03 DIAGNOSIS — D64.9 ANEMIA, UNSPECIFIED TYPE: Primary | ICD-10-CM

## 2019-12-03 DIAGNOSIS — K92.1 MELENA: Primary | ICD-10-CM

## 2019-12-03 RX ORDER — IRON, FOLIC ACID, CYANOCOBALAMIN, ASCORBIC ACID, AND DOCUSATE SODIUM 90; 1; 12; 120; 50 MG/1; MG/1; UG/1; MG/1; MG/1
1 TABLET, FILM COATED ORAL DAILY
Qty: 30 EACH | Refills: 4 | Status: SHIPPED | OUTPATIENT
Start: 2019-12-03 | End: 2020-09-24 | Stop reason: ALTCHOICE

## 2019-12-03 NOTE — PROGRESS NOTES
Patient ID: Matthew Ramírez is a [de-identified] y o  female  HPI: [de-identified] y  o female presents after dischrge from a nursing home after rehab s/p right TKR,  She needs me to sign orders and review mediciatons  She was found to be anemic with a hgb of a5 7 and received two units of blood in a tranfusion, Blood count came up to 7 3  She was also found to have a new heart murmur and carotid bruit that was new  In hospital, she had black, tarry stools, but did not have a gi workup  She denies any dyspnea, lightheadedness or peripheral edema at present  SUBJECTIVE  TCM Call (since 11/2/2019)     Date and time call was made  11/29/2019 11:41 AM    Hospital care reviewed  Records reviewed    Patient was hospitialized at  [address] [place] [address]    Date of Admission  11/27/19    Date of discharge  11/28/19    Diagnosis  Symptomatic anemia    Disposition  Home    Were the patients medications reviewed and updated  No    Current Symptoms  None      TCM Call (since 11/2/2019)     Post hospital issues  None    Should patient be enrolled in anticoag monitoring? No    Scheduled for follow up? Yes    Did you obtain your prescribed medications  Yes    Do you need help managing your prescriptions or medications  No    Is transportation to your appointment needed  No    I have advised the patient to call PCP with any new or worsening symptoms  LORAINE Avitia     Living Arrangements  Family members    Are you recieving any outpatient services  No    Are you recieving home care services  No    Are you using any community resources  No    Current waiver services  No    Have you fallen in the last 12 months  No    Interperter language line needed  No    Counseling  Family        Family History   Problem Relation Age of Onset    No Known Problems Mother     Diabetes Father     Stroke Father         syndrome     Social History     Socioeconomic History    Marital status:       Spouse name: Not on file    Number of children: Not on file    Years of education: Not on file    Highest education level: Not on file   Occupational History    Not on file   Social Needs    Financial resource strain: Not on file    Food insecurity:     Worry: Not on file     Inability: Not on file    Transportation needs:     Medical: Not on file     Non-medical: Not on file   Tobacco Use    Smoking status: Former Smoker     Last attempt to quit:      Years since quittin     Smokeless tobacco: Never Used   Substance and Sexual Activity    Alcohol use: No    Drug use: Never    Sexual activity: Not Currently     Partners: Male     Birth control/protection: Post-menopausal   Lifestyle    Physical activity:     Days per week: Not on file     Minutes per session: Not on file    Stress: Not on file   Relationships    Social connections:     Talks on phone: Not on file     Gets together: Not on file     Attends Sikh service: Not on file     Active member of club or organization: Not on file     Attends meetings of clubs or organizations: Not on file     Relationship status: Not on file    Intimate partner violence:     Fear of current or ex partner: Not on file     Emotionally abused: Not on file     Physically abused: Not on file     Forced sexual activity: Not on file   Other Topics Concern    Not on file   Social History Narrative    Daily coffee consumption (__cups/day)     Daily cola consumption (__cans/day)     Daily tea consumption (__cups/day)      Past Medical History:   Diagnosis Date    Hypertension     UTI (urinary tract infection)      Past Surgical History:   Procedure Laterality Date    EYE SURGERY      JOINT REPLACEMENT Right     Knee 11/15/19     Allergies   Allergen Reactions    Ciprofloxacin      Chest discomfort and dizziness    Diclofenac     Diphenhydramine        Current Outpatient Medications:     acetaminophen (TYLENOL) 325 mg tablet, Take 975 mg by mouth every 8 (eight) hours, Disp: , Rfl:     amLODIPine (NORVASC) 5 mg tablet, TAKE 1 TABLET EVERY DAY, Disp: 90 tablet, Rfl: 3    aspirin 81 mg chewable tablet, Chew 81 mg 2 (two) times a day, Disp: , Rfl:     Cholecalciferol (VITAMIN D3) 50 MCG (2000 UT) capsule, Take 2,000 Units by mouth daily, Disp: , Rfl:     lisinopril (ZESTRIL) 5 mg tablet, TAKE 1 TABLET EVERY DAY, Disp: 90 tablet, Rfl: 3    Melatonin 3 MG CAPS, Take 3 mg by mouth  , Disp: , Rfl:     Multiple Vitamins-Minerals (PRESERVISION AREDS PO), Take by mouth, Disp: , Rfl:     MYRBETRIQ 25 MG TB24, TAKE 1 TABLET EVERY DAY, Disp: 90 tablet, Rfl: 3    oxyCODONE (ROXICODONE) 5 mg immediate release tablet, Take 1 tablet (5 mg total) by mouth every 6 (six) hours as needed for severe pain (use 1/2-1 tab per dose)Max Daily Amount: 20 mg, Disp: 10 tablet, Rfl: 0    pantoprazole (PROTONIX) 40 mg tablet, Take 1 tablet (40 mg total) by mouth daily, Disp: 30 tablet, Rfl: 0    Review of Systems  Constitutional:     Denies fever, chills ,fatigue ,weakness ,weight loss, weight gain     ENT: Denies earache ,loss of hearing ,nosebleed, nasal discharge,nasal congestion ,sore throat ,hoarseness  Pulmonary: Denies shortness of breath ,cough  ,dyspnea on exertion, orthopnea  ,PND   Cardiovascular:  Denies bradycardia , tachycardia  ,palpations, lower extremity edema leg, claudication  Breast:  Denies new or changing breast lumps ,nipple discharge ,nipple changes  Abdomen:  Denies abdominal pain , anorexia , indigestion, nausea, vomiting, constipation, diarrhea  Musculoskeletal: Denies myalgias, arthralgias, joint swelling, joint stiffness , limb pain, limb swelling  Gu: denies dysuria, polyuria  Skin: Denies skin rash, skin lesion, skin wound, itching, dry skin  Neuro: Denies headache, numbness, tingling, confusion, loss of consciousness, dizziness, vertigo  Psychiatric: Denies feelings of depression, suicidal ideation, anxiety, sleep disturbances    OBJECTIVE  /78   Pulse 80   Temp 99 3 °F (37 4 °C)   Ht 5' 3" (1 6 m)   Wt 88 5 kg (195 lb)   BMI 34 54 kg/m²   Constitutional:   NAD, well appearing and well nourished      ENT:   Conjunctiva and lids: no injection, edema, or discharge     Pupils and iris: REENA bilaterally    External inspection of ears and nose: normal without deformities or discharge  Otoscopic exam: Canals patent without erythema  Nasal mucosa, septum and turbinates: Normal or edema or discharge         Oropharynx:  Moist mucosa, normal tongue and tonsils without lesions  No erythema        Pulmonary:Respiratory effort normal rate and rhythm, no increased work of breathing  Auscultation of lungs:  Clear bilaterally with no adventitious breat+ murmur radiating to carotids, no edema and/or varicosities of LE      Abdomen: Soft and non-distended     Positive bowel sounds      No heptomegaly or splenomegaly      Gu: no suprapubic tenderness or CVA tenderness, no urethral discharge  Lymphatic:  No anterior or posterior cervical lymphadenopathy         Musculoskeletal:  Gait and station: Normal gait      Digits and nails normal without clubbing or cyanosis       Inspection/palpation of joints, bones, and muscles:  No joint tenderness, swelling, full active and passive range of motion       Skin: Normal skin turgor and no rashes      Neuro:      Normal reflexes       Psych:   alert and oriented to person, place and time     normal mood and affect     Assessment/Plan:  1- Anemia; check cbc, 3 hemoccult cat with results  If hgb has dropped or card are positive, pt will order EGD and colonoscopy  If negative, will inititate iron therapy with ferrous sequel and order endoscopies a few weeks out  2- left carotid bruit- check carotid duplex  3- heart murmur- will check 2 D echo  4- s/p TKR- will order PT , OT and VNA services      Reviewed with patient plan to treat with   Patient instructed to call  if not feeling better or if symptoms worsen

## 2019-12-03 NOTE — TELEPHONE ENCOUNTER
Can we call to see if we can move these up? Pt has new onset heart murmur and bruit following a joint replacement and profound post op anemia

## 2019-12-03 NOTE — TELEPHONE ENCOUNTER
She was to call back to let you know when she could get in for testing, she has Echo and Carotid ultrasound in [facility] on January 15th       She thinks she is getting a urinary tract infection so she will call her urologist

## 2019-12-03 NOTE — TELEPHONE ENCOUNTER
Patient of Miya Crespo seen in OS office  Patient daughter called in and advised that the patient had knee surgery and advised that for the past 3 days has had cloudy urine, burning, and urinary frequency  Patient daughter would like to know if an antibiotic can be called in to the pharmacy because the patient can not move around to much  Please advise

## 2019-12-03 NOTE — TELEPHONE ENCOUNTER
Pt aware - states she does not have anyone in mind, would like you to put order in for Luis Penaloza   Also, stated that she had 3 bowel movements that were light brown today

## 2019-12-03 NOTE — TELEPHONE ENCOUNTER
Called and spoke with patient at this time  She recently had knee replacement surgery  For the past 3 days she has had urinary frequency, dysuria and cloudy urine  She feels like she is getting a UTI  She has previously been prescribed macrobid and would like a prescription sent to her preferred pharmacy as she is unable to travel to [facility] lab and provide a sample  Please advise

## 2019-12-04 DIAGNOSIS — N30.00 ACUTE CYSTITIS WITHOUT HEMATURIA: Primary | ICD-10-CM

## 2019-12-04 RX ORDER — NITROFURANTOIN 25; 75 MG/1; MG/1
100 CAPSULE ORAL 2 TIMES DAILY
Qty: 3 CAPSULE | Refills: 0 | Status: SHIPPED | OUTPATIENT
Start: 2019-12-04 | End: 2019-12-04 | Stop reason: SDUPTHER

## 2019-12-04 RX ORDER — NITROFURANTOIN 25; 75 MG/1; MG/1
100 CAPSULE ORAL 2 TIMES DAILY
Qty: 6 CAPSULE | Refills: 0 | Status: SHIPPED | OUTPATIENT
Start: 2019-12-04 | End: 2019-12-07

## 2019-12-04 NOTE — TELEPHONE ENCOUNTER
Called and made patient aware that a Prescription of macrobid sent to her pharmacy  Patient understood

## 2019-12-04 NOTE — PHYSICIAN ADVISOR
Current patient class: Inpatient  The patient is currently on Hospital Day: 2 at [facility]      The patient was admitted to the hospital at [id number] on 11/28/19 for the following diagnosis:  Syncope [R55]  Vomiting [R11 10]  Anemia [D64 9]  GI bleed [K92 2]       There is documentation in the medical record of an expected length of stay of at least 2 midnights  The patient is therefore expected to satisfy the 2 midnight benchmark and given the 2 midnight presumption is appropriate for INPATIENT ADMISSION  Given this expectation of a satisfying stay, CMS instructs us that the patient is most often appropriate for inpatient admission under part A provided medical necessity is documented in the chart  After review of the relevant documentation, labs, vital signs and test results, the patient is appropriate for INPATIENT ADMISSION  Admission to the hospital as an inpatient is a complex decision making process which requires the practitioner to consider the patients presenting complaint, history and physical examination and all relevant testing  With this in mind, in this case, the patient was deemed appropriate for INPATIENT ADMISSION  After review of the documentation and testing available at the time of the admission I concur with this clinical determination of medical necessity  Rationale is as follows: The patient is a [de-identified] yrs old Female who presented to the ED at 11/27/2019  7:22 PM with a chief complaint of Syncope (Per EMS, 911 was called due to pt feeling lightheaded  Witnessed syncopal episode by EMS  Pt had right knee replaced 11/15/19  Pt denies SOB/CP  Pt was at [facility] for rehab and was discharged this passed saturday  (+) Vomiting )     Patient admitted with a report of increasing weakness and fatigue and having a syncopal episode  It is noted that she recently had a total right knee arthroplasty    Abnormal labs include a BUN of 51 and a hemoglobin of 5 3   She was transfused and her hemoglobin marguerite to 7 5  A CT revealed no evidence of active high volume GI bleeding  She was evaluated by GI and they agreed with the diagnosis of acute blood loss anemia but no GI intervention was necessary  It is noted that the patient improved much quicker than expected and was discharged in satisfactory condition  However, with the severity of the anemia and her presentation, a two night admission class to the hospital would be considered appropriate  The patients vitals on arrival were ED Triage Vitals   Temperature Pulse Respirations Blood Pressure SpO2   11/27/19 1929 11/27/19 1929 11/27/19 1929 11/27/19 1929 11/27/19 1929   97 5 °F (36 4 °C) 79 16 134/59 96 %      Temp Source Heart Rate Source Patient Position - Orthostatic VS BP Location FiO2 (%)   11/27/19 1929 11/27/19 1929 11/27/19 1929 11/27/19 1929 --   Oral Monitor Sitting Right arm       Pain Score       11/27/19 2213       5           Past Medical History:   Diagnosis Date    Hypertension     UTI (urinary tract infection)      Past Surgical History:   Procedure Laterality Date    EYE SURGERY      JOINT REPLACEMENT Right     Knee 11/15/19           Consults have been placed to:   IP CONSULT TO GASTROENTEROLOGY    Vitals:    11/28/19 0322 11/28/19 0405 11/28/19 0610 11/28/19 0800   BP: 134/60 129/58 128/60 124/57   BP Location: Right arm  Right arm Right arm   Pulse: 88 94 92 90   Resp: 18 18 18 18   Temp: 98 °F (36 7 °C) 98 8 °F (37 1 °C) 98 6 °F (37 °C) 98 4 °F (36 9 °C)   TempSrc: Oral Oral Oral Oral   SpO2: 94% 95% 92% 94%   Weight:       Height:           Most recent labs:    No results for input(s): WBC, HGB, HCT, PLT, K, NA, CALCIUM, BUN, CREATININE, LIPASE, AMYLASE, INR, TROPONINI, CKTOTAL, AST, ALT, ALKPHOS, BILITOT in the last 72 hours  Scheduled Meds:  Continuous Infusions:  No current facility-administered medications for this encounter  PRN Meds:      Surgical procedures (if appropriate):

## 2019-12-04 NOTE — TELEPHONE ENCOUNTER
Aliyah Heard  The original script for the Macrobid was 100 mg 3 capsule 2x/day  The patient already picked up that script  I assume you wanted 3 days worth of Macrobid  Could you please send another script with 3 capsules    Thanks

## 2019-12-04 NOTE — TELEPHONE ENCOUNTER
Patient is calling to ask for a script for urine culture  Stated visiting nurse is coming tomorrow and she can draw labs  She is not sure where she will take it

## 2019-12-04 NOTE — TELEPHONE ENCOUNTER
Contacted and spoke with patient's daughter, Caleb Sloan, about visiting nurse coming to patient's home tomorrow and can obtain specimen at that time  Caleb Sloan states patient already started the [address] today and is concerned patient only has 3 days worth of medication  Discussed with Luissurinder Torress will not obtain culture while patient is taking Macrobid  If patient remains symptomatic after completion of the [address] will obtain urine testing at that time  Caleb Sloan requesting our office to give "standing order" to [employer] so they can obtain testing when needed  Shaniqua Jasso I will speak with [facility]  Spoke with Mauricio Nyhan at [employer] [phone] and explained the situation to her  Plan is patient's visiting nurse, Sadie Garay, will evaluate patient's symptoms after completion of the Macrobid and if patient remains symptomatic Sadie Garay will contact our office for urine testing order  No " standing order" given to [facility]

## 2019-12-05 ENCOUNTER — OFFICE VISIT (OUTPATIENT)
Dept: GASTROENTEROLOGY | Facility: AMBULARY SURGERY CENTER | Age: 79
End: 2019-12-05
Payer: MEDICARE

## 2019-12-05 VITALS
WEIGHT: 195 LBS | RESPIRATION RATE: 16 BRPM | TEMPERATURE: 98.9 F | DIASTOLIC BLOOD PRESSURE: 60 MMHG | SYSTOLIC BLOOD PRESSURE: 128 MMHG | BODY MASS INDEX: 34.55 KG/M2 | HEIGHT: 63 IN | HEART RATE: 78 BPM

## 2019-12-05 DIAGNOSIS — D50.8 OTHER IRON DEFICIENCY ANEMIA: Primary | ICD-10-CM

## 2019-12-05 DIAGNOSIS — K92.1 MELENA: ICD-10-CM

## 2019-12-05 PROBLEM — D64.9 ABSOLUTE ANEMIA: Status: ACTIVE | Noted: 2019-12-05

## 2019-12-05 PROCEDURE — 99214 OFFICE O/P EST MOD 30 MIN: CPT | Performed by: INTERNAL MEDICINE

## 2019-12-05 NOTE — ASSESSMENT & PLAN NOTE
Appear to most likely from peptic ulcer disease especially with history of NSAID use in the past   Rule out colorectal lesions including polyps or malignancy  Patient never had colonoscopy in the past     -continue Protonix    -Schedule for both EGD and colonoscopy soon     -High-fiber diet     -Patient was given instructions about the colonoscopy prep     -Patient was explained about  the risks and benefits of the procedure  Risks including but not limited to bleeding, infection, perforation were explained in detail   Also explained about less than 100% sensitivity with the exam and other alternatives     -advised patient to call immediately for any recurrent episodes of darker stool with dizziness

## 2019-12-05 NOTE — TELEPHONE ENCOUNTER
Called and scheduled appoint for echo for tomorrow at 9:00 am at [facility] ,[address] aware and will keep appoint on 1-15-19 for the carotid ultrasound

## 2019-12-05 NOTE — PROGRESS NOTES
Follow-up Note -  [facility]  Jose Alejandro Nettles 1940 female         Reason:  Anemia, Received a call from Dr Faith Hanks to see pt ASAP    HPI:  Ms Brandi Mcgregor was admitted to [facility] on November 27th because of syncopal episode and the hemoglobin drop from 7 7 to 5 3  She reports having dark colored stool at that time  She had brown colored bowel movement at the hospital and the stool was negative for occult blood  She was discharged home on Protonix  Denies any abdominal pain, nausea or vomiting  She gives history of Mobic use regularly prior to her right total knee replacement on November 15th  She had 1 episode of dark colored bowel movement couple of days ago but having brown colored stool now  She never had colonoscopy in the past     REVIEW OF SYSTEMS: Review of Systems   Constitutional: Negative for activity change, appetite change, chills, diaphoresis, fatigue, fever and unexpected weight change  HENT: Negative for ear discharge, ear pain, facial swelling, hearing loss, nosebleeds, sore throat, tinnitus and voice change  Eyes: Negative for pain, discharge, redness, itching and visual disturbance  Respiratory: Negative for apnea, cough, chest tightness, shortness of breath and wheezing  Cardiovascular: Negative for chest pain and palpitations  Gastrointestinal:        As noted in HPI   Endocrine: Negative for cold intolerance, heat intolerance and polyuria  Genitourinary: Negative for difficulty urinating, dysuria, flank pain, hematuria and urgency  Musculoskeletal: Positive for arthralgias  Negative for back pain, gait problem, joint swelling and myalgias  Skin: Negative for rash and wound  Neurological: Negative for dizziness, tremors, seizures, speech difficulty, light-headedness, numbness and headaches  Hematological: Negative for adenopathy  Does not bruise/bleed easily     Psychiatric/Behavioral: Negative for agitation, behavioral problems and confusion  The patient is not nervous/anxious  Past Medical History:   Diagnosis Date    Hypertension     UTI (urinary tract infection)       Past Surgical History:   Procedure Laterality Date    EYE SURGERY      JOINT REPLACEMENT Right     Knee 11/15/19     Social History     Socioeconomic History    Marital status:       Spouse name: Not on file    Number of children: Not on file    Years of education: Not on file    Highest education level: Not on file   Occupational History    Not on file   Social Needs    Financial resource strain: Not on file    Food insecurity:     Worry: Not on file     Inability: Not on file    Transportation needs:     Medical: Not on file     Non-medical: Not on file   Tobacco Use    Smoking status: Former Smoker     Last attempt to quit: 1990     Years since quittin 9    Smokeless tobacco: Never Used   Substance and Sexual Activity    Alcohol use: No    Drug use: Never    Sexual activity: Not Currently     Partners: Male     Birth control/protection: Post-menopausal   Lifestyle    Physical activity:     Days per week: Not on file     Minutes per session: Not on file    Stress: Not on file   Relationships    Social connections:     Talks on phone: Not on file     Gets together: Not on file     Attends Hinduism service: Not on file     Active member of club or organization: Not on file     Attends meetings of clubs or organizations: Not on file     Relationship status: Not on file    Intimate partner violence:     Fear of current or ex partner: Not on file     Emotionally abused: Not on file     Physically abused: Not on file     Forced sexual activity: Not on file   Other Topics Concern    Not on file   Social History Narrative    Daily coffee consumption (__cups/day)     Daily cola consumption (__cans/day)     Daily tea consumption (__cups/day)      Family History   Problem Relation Age of Onset    No Known Problems Mother    Juan C Cam Diabetes Father     Stroke Father         syndrome     Ciprofloxacin; Diclofenac; and Diphenhydramine  Current Outpatient Medications   Medication Sig Dispense Refill    acetaminophen (TYLENOL) 325 mg tablet Take 975 mg by mouth every 8 (eight) hours      amLODIPine (NORVASC) 5 mg tablet TAKE 1 TABLET EVERY DAY 90 tablet 3    aspirin 81 mg chewable tablet Chew 81 mg 2 (two) times a day      Cholecalciferol (VITAMIN D3) 50 MCG (2000 UT) capsule Take 2,000 Units by mouth daily      Fe Cbn-Fe Gluc-FA-B12-C-DSS (FERRALET 90) 90-1 MG TABS Take 1 tablet by mouth daily 30 each 4    lisinopril (ZESTRIL) 5 mg tablet TAKE 1 TABLET EVERY DAY 90 tablet 3    Melatonin 3 MG CAPS Take 3 mg by mouth        Multiple Vitamins-Minerals (PRESERVISION AREDS PO) Take by mouth      MYRBETRIQ 25 MG TB24 TAKE 1 TABLET EVERY DAY 90 tablet 3    nitrofurantoin (MACROBID) 100 mg capsule Take 1 capsule (100 mg total) by mouth 2 (two) times a day for 3 days 6 capsule 0    oxyCODONE (ROXICODONE) 5 mg immediate release tablet Take 1 tablet (5 mg total) by mouth every 6 (six) hours as needed for severe pain (use 1/2-1 tab per dose)Max Daily Amount: 20 mg 10 tablet 0    pantoprazole (PROTONIX) 40 mg tablet Take 1 tablet (40 mg total) by mouth daily 30 tablet 0    bisacodyl (DULCOLAX) 5 mg EC tablet Take 2 tablets (10 mg total) by mouth once for 1 dose 2 tablet 0    polyethylene glycol (GOLYTELY) 4000 mL solution Take 4,000 mL by mouth once for 1 dose 4000 mL 0     No current facility-administered medications for this visit  Blood pressure 128/60, pulse 78, temperature 98 9 °F (37 2 °C), temperature source Tympanic, resp  rate 16, height 5' 3" (1 6 m), weight 88 5 kg (195 lb)  PHYSICAL EXAM: Physical Exam   Constitutional: She is oriented to person, place, and time  She appears well-developed and well-nourished  HENT:   Head: Normocephalic and atraumatic     Nose: Nose normal    Mouth/Throat: Oropharynx is clear and moist  Eyes: Conjunctivae are normal  Right eye exhibits no discharge  Left eye exhibits no discharge  No scleral icterus  Neck: Neck supple  No JVD present  No tracheal deviation present  No thyromegaly present  Cardiovascular: Normal rate, regular rhythm and normal heart sounds  Exam reveals no gallop and no friction rub  No murmur heard  Pulmonary/Chest: Effort normal and breath sounds normal  No respiratory distress  She has no wheezes  She has no rales  She exhibits no tenderness  Abdominal: Soft  Bowel sounds are normal  She exhibits no distension and no mass  There is no tenderness  There is no rebound and no guarding  No hernia  Musculoskeletal: She exhibits no edema, tenderness or deformity  Lymphadenopathy:     She has no cervical adenopathy  Neurological: She is alert and oriented to person, place, and time  Skin: Skin is warm and dry  No rash noted  No erythema  Psychiatric: She has a normal mood and affect  Her behavior is normal  Thought content normal         Lab Results   Component Value Date    WBC 12 61 (H) 11/28/2019    HGB 7 5 (L) 11/28/2019    HCT 24 5 (L) 11/28/2019    MCV 99 (H) 11/28/2019     11/28/2019     Lab Results   Component Value Date    CALCIUM 8 5 11/28/2019     01/19/2017    K 4 2 11/28/2019    CO2 27 11/28/2019     11/28/2019    BUN 45 (H) 11/28/2019    CREATININE 0 88 11/28/2019     Lab Results   Component Value Date    ALT 22 11/27/2019    AST 19 11/27/2019    ALKPHOS 76 11/27/2019    BILITOT 0 5 01/19/2017     Lab Results   Component Value Date    INR 1 20 (H) 11/27/2019    PROTIME 14 6 (H) 11/27/2019       Ct High Volume Lower Gi Bleed    Result Date: 11/28/2019  Impression: No CT evidence of active high volume gastrointestinal hemorrhage  Colonic diverticulosis without evidence of acute diverticulitis  Multiple subcentimeter pancreatic cystic lesions  Recommend next follow-up in 2 years   Preferred imaging modality: abdomen MRI and MRCP with and without IV contrast, or triple phase abdomen CT with IV contrast, or abdomen MRI and MRCP without IV contrast  The recommendations regarding pancreatic findings assumes that patient does not have family history of pancreatic cancer nor have any symptoms potentially attributable to pancreatic cystic lesions (hyperamylasemia, recent-onset diabetes, severe epigastric pain, weight loss, steatorrhea, or jaundice ) If these conditions are not true, then management should be deferred to judgement of specialists such as gastroenterologists or oncologic surgeons  Recommendations are based on recent consensus statements on management of pancreatic cystic lesions from [address] [place] [address] Gastro[employer] Association, [employer] of [employer], the journal Pancreatology, and our own institutional consensus  1   Workstation performed: [id number]       ASSESSMENT & PLAN:    Melena  Appear to most likely from peptic ulcer disease especially with history of NSAID use in the past   Rule out colorectal lesions including polyps or malignancy  Patient never had colonoscopy in the past     -continue Protonix    -Schedule for both EGD and colonoscopy soon     -High-fiber diet     -Patient was given instructions about the colonoscopy prep     -Patient was explained about  the risks and benefits of the procedure  Risks including but not limited to bleeding, infection, perforation were explained in detail   Also explained about less than 100% sensitivity with the exam and other alternatives     -advised patient to call immediately for any recurrent episodes of darker stool with dizziness

## 2019-12-06 ENCOUNTER — HOSPITAL ENCOUNTER (OUTPATIENT)
Dept: NON INVASIVE DIAGNOSTICS | Facility: CLINIC | Age: 79
Discharge: HOME | End: 2019-12-06
Payer: MEDICARE

## 2019-12-06 ENCOUNTER — TELEPHONE (OUTPATIENT)
Dept: FAMILY MEDICINE CLINIC | Facility: CLINIC | Age: 79
End: 2019-12-06

## 2019-12-06 DIAGNOSIS — R01.1 HEART MURMUR: ICD-10-CM

## 2019-12-06 PROCEDURE — 93306 TTE W/DOPPLER COMPLETE: CPT

## 2019-12-06 PROCEDURE — 93306 TTE W/DOPPLER COMPLETE: CPT | Performed by: INTERNAL MEDICINE

## 2019-12-11 DIAGNOSIS — I05.0 MITRAL VALVE STENOSIS, UNSPECIFIED ETIOLOGY: Primary | ICD-10-CM

## 2019-12-12 ENCOUNTER — TELEPHONE (OUTPATIENT)
Dept: FAMILY MEDICINE CLINIC | Facility: CLINIC | Age: 79
End: 2019-12-12

## 2019-12-12 NOTE — TELEPHONE ENCOUNTER
Patient said you gave her a script for a right knee brace in September, she wants it faxed to [facility] @ [phone],   I don't see it in the chart,

## 2019-12-12 NOTE — TELEPHONE ENCOUNTER
Please write on a script knee brace with a patellar cut-out and fax it to all  Use osteoarthritis of right knee on it

## 2019-12-17 ENCOUNTER — ANESTHESIA EVENT (OUTPATIENT)
Dept: GASTROENTEROLOGY | Facility: HOSPITAL | Age: 79
End: 2019-12-17

## 2019-12-18 ENCOUNTER — HOSPITAL ENCOUNTER (OUTPATIENT)
Dept: GASTROENTEROLOGY | Facility: HOSPITAL | Age: 79
Setting detail: OUTPATIENT SURGERY
Discharge: HOME | End: 2019-12-18
Attending: INTERNAL MEDICINE | Admitting: INTERNAL MEDICINE
Payer: MEDICARE

## 2019-12-18 ENCOUNTER — ANESTHESIA (OUTPATIENT)
Dept: GASTROENTEROLOGY | Facility: HOSPITAL | Age: 79
End: 2019-12-18

## 2019-12-18 VITALS
HEIGHT: 63 IN | TEMPERATURE: 97.5 F | BODY MASS INDEX: 34.55 KG/M2 | HEART RATE: 71 BPM | RESPIRATION RATE: 19 BRPM | OXYGEN SATURATION: 100 % | SYSTOLIC BLOOD PRESSURE: 147 MMHG | WEIGHT: 195 LBS | DIASTOLIC BLOOD PRESSURE: 64 MMHG

## 2019-12-18 DIAGNOSIS — K26.9 DUODENAL ULCER: Primary | ICD-10-CM

## 2019-12-18 DIAGNOSIS — D64.9 ANEMIA: ICD-10-CM

## 2019-12-18 DIAGNOSIS — D50.8 OTHER IRON DEFICIENCY ANEMIA: ICD-10-CM

## 2019-12-18 DIAGNOSIS — K92.1 MELENA: ICD-10-CM

## 2019-12-18 PROCEDURE — NC001 PR NO CHARGE: Performed by: INTERNAL MEDICINE

## 2019-12-18 PROCEDURE — 43235 EGD DIAGNOSTIC BRUSH WASH: CPT | Performed by: INTERNAL MEDICINE

## 2019-12-18 PROCEDURE — 45378 DIAGNOSTIC COLONOSCOPY: CPT | Performed by: INTERNAL MEDICINE

## 2019-12-18 RX ORDER — PROPOFOL 10 MG/ML
INJECTION, EMULSION INTRAVENOUS AS NEEDED
Status: DISCONTINUED | OUTPATIENT
Start: 2019-12-18 | End: 2019-12-18 | Stop reason: SURG

## 2019-12-18 RX ORDER — LIDOCAINE HYDROCHLORIDE 10 MG/ML
0.5 INJECTION, SOLUTION EPIDURAL; INFILTRATION; INTRACAUDAL; PERINEURAL ONCE AS NEEDED
Status: DISCONTINUED | OUTPATIENT
Start: 2019-12-18 | End: 2019-12-22 | Stop reason: HOSPADM

## 2019-12-18 RX ORDER — PANTOPRAZOLE SODIUM 40 MG/1
40 TABLET, DELAYED RELEASE ORAL DAILY
Qty: 30 TABLET | Refills: 2 | Status: SHIPPED | OUTPATIENT
Start: 2019-12-18 | End: 2020-03-02 | Stop reason: SDUPTHER

## 2019-12-18 RX ORDER — PROPOFOL 10 MG/ML
INJECTION, EMULSION INTRAVENOUS CONTINUOUS PRN
Status: DISCONTINUED | OUTPATIENT
Start: 2019-12-18 | End: 2019-12-18 | Stop reason: SURG

## 2019-12-18 RX ORDER — SODIUM CHLORIDE, SODIUM LACTATE, POTASSIUM CHLORIDE, CALCIUM CHLORIDE 600; 310; 30; 20 MG/100ML; MG/100ML; MG/100ML; MG/100ML
125 INJECTION, SOLUTION INTRAVENOUS CONTINUOUS
Status: DISCONTINUED | OUTPATIENT
Start: 2019-12-18 | End: 2019-12-22 | Stop reason: HOSPADM

## 2019-12-18 RX ADMIN — PROPOFOL 50 MG: 10 INJECTION, EMULSION INTRAVENOUS at 12:18

## 2019-12-18 RX ADMIN — PROPOFOL 30 MG: 10 INJECTION, EMULSION INTRAVENOUS at 12:30

## 2019-12-18 RX ADMIN — PROPOFOL 130 MCG/KG/MIN: 10 INJECTION, EMULSION INTRAVENOUS at 12:19

## 2019-12-18 RX ADMIN — SODIUM CHLORIDE, SODIUM LACTATE, POTASSIUM CHLORIDE, AND CALCIUM CHLORIDE: .6; .31; .03; .02 INJECTION, SOLUTION INTRAVENOUS at 12:08

## 2019-12-18 NOTE — INTERVAL H&P NOTE
H&P reviewed  After examining the patient I find no changes in the patients condition since the H&P had been written      Vitals:    12/18/19 1200   BP: 170/69   Pulse: 72   Resp: 20   Temp: 97 6 °F (36 4 °C)   SpO2: 100%

## 2019-12-18 NOTE — DISCHARGE INSTRUCTIONS
Colonoscopy   WHAT YOU NEED TO KNOW:   A colonoscopy is a procedure to examine the inside of your colon (intestine) with a scope  Polyps or tissue growths may have been removed during your colonoscopy  It is normal to feel bloated and to have some abdominal discomfort  You should be passing gas  If you have hemorrhoids or you had polyps removed, you may have a small amount of bleeding  DISCHARGE INSTRUCTIONS:   Seek care immediately if:   · You have a large amount of bright red blood in your bowel movements  · Your abdomen is hard and firm and you have severe pain  · You have sudden trouble breathing  Contact your healthcare provider if:   · You develop a rash or hives  · You have a fever within 24 hours of your procedure       · You have not had a bowel movement for 3 days after your procedure  · You have questions or concerns about your condition or care  Activity:   · Do not lift, strain, or run  for 3 days after your procedure  · Rest after your procedure  You have been given medicine to relax you  Do not  drive or make important decisions until the day after your procedure  Return to your normal activity as directed  · Relieve gas and discomfort from bloating  by lying on your right side with a heating pad on your abdomen  You may need to take short walks to help the gas move out  Eat small meals until bloating is relieved  If you had polyps removed: For 7 days after your procedure:  · Do not  take aspirin  · Do not  go on long car rides  Follow up with your healthcare provider as directed:  Write down your questions so you remember to ask them during your visits  © 2017 [employer] is for End User's use only and may not be sold, redistributed or otherwise used for commercial purposes  All illustrations and images included in CareNotes® are the copyrighted property of [employer]  or Codey [employer]    The above information is an  only  It is not intended as medical advice for individual conditions or treatments  Talk to your doctor, nurse or pharmacist before following any medical regimen to see if it is safe and effective for you  Upper Endoscopy   WHAT YOU NEED TO KNOW:   An upper endoscopy is also called an upper gastrointestinal (GI) endoscopy, or an esophagogastroduodenoscopy (EGD)  You may feel bloated, gassy, or have some abdominal discomfort after your procedure  Your throat may be sore for 24 to 36 hours  You may burp or pass gas from air that is still inside your body  DISCHARGE INSTRUCTIONS:   Call 911 if:   · You have sudden chest pain or trouble breathing  Seek care immediately if:   · You feel dizzy or faint  · You have trouble swallowing  · You have severe throat pain  · Your bowel movements are very dark or black  · Your abdomen is hard and firm and you have severe pain  · You vomit blood  Contact your healthcare provider if:   · You feel full or bloated and cannot burp or pass gas  · You have not had a bowel movement for 3 days after your procedure  · You have neck pain  · You have a fever or chills  · You have nausea or are vomiting  · You have a rash or hives  · You have questions or concerns about your endoscopy  Relieve a sore throat:  Suck on throat lozenges or crushed ice  Gargle with a small amount of warm salt water  Mix 1 teaspoon of salt and 1 cup of warm water to make salt water  Relieve gas and discomfort from bloating:  Lie on your right side with a heating pad on your abdomen  Take short walks to help pass gas  Eat small meals until bloating is relieved  Rest after your procedure:  Do not drive or make important decisions until the day after your procedure  Return to your normal activity as directed  You can usually return to work the day after your procedure    Follow up with your healthcare provider as directed:  Write down your questions so you remember to ask them during your visits  © 2017 [address] [employer] Information is for End User's use only and may not be sold, redistributed or otherwise used for commercial purposes  All illustrations and images included in CareNotes® are the copyrighted property of A D A M [employer] Inc  or Codey Edwards  The above information is an  only  It is not intended as medical advice for individual conditions or treatments  Talk to your doctor, nurse or pharmacist before following any medical regimen to see if it is safe and effective for you

## 2019-12-18 NOTE — ANESTHESIA PREPROCEDURE EVALUATION
Review of Systems/Medical History  Patient summary reviewed  Chart reviewed  History of anesthetic complications PONV    Cardiovascular  EKG reviewed, Hypertension controlled, Valvular heart disease , mitral valve stenosis and mitral regurgitation,   Comment: Echo 70%EF  Mod MR, MOD MS,  Pulmonary       GI/Hepatic    Bowel prep            Endo/Other    Obesity    GYN       Hematology  Anemia ,     Musculoskeletal       Neurology   Psychology   Anxiety,              Physical Exam    Airway    Mallampati score: I  TM Distance: >3 FB  Neck ROM: full     Dental       Cardiovascular      Pulmonary      Other Findings  Upper partial denture      Anesthesia Plan  ASA Score- 3     Anesthesia Type- IV sedation with anesthesia with ASA Monitors  Additional Monitors:   Airway Plan:         Plan Factors-  Patient did not smoke on day of surgery  Induction-     Postoperative Plan-     Informed Consent- Anesthetic plan and risks discussed with patient  I personally reviewed this patient with the CRNA  Discussed and agreed on the Anesthesia Plan with the CRNA  Rio Leyva

## 2019-12-18 NOTE — ANESTHESIA POSTPROCEDURE EVALUATION
Post-Op Assessment Note    CV Status:  Stable  Pain Score: 0    Pain management: adequate     Mental Status:  Alert and awake   Hydration Status:  Euvolemic   PONV Controlled:  Controlled   Airway Patency:  Patent   Post Op Vitals Reviewed: Yes      Staff: CRNA           BP   116/58   Temp      Pulse  68 nsr   Resp   14   SpO2   100% ra

## 2019-12-23 ENCOUNTER — TELEPHONE (OUTPATIENT)
Dept: UROLOGY | Facility: MEDICAL CENTER | Age: 79
End: 2019-12-23

## 2019-12-23 DIAGNOSIS — N30.00 ACUTE CYSTITIS WITHOUT HEMATURIA: Primary | ICD-10-CM

## 2019-12-23 RX ORDER — NITROFURANTOIN 25; 75 MG/1; MG/1
100 CAPSULE ORAL 2 TIMES DAILY
Qty: 10 CAPSULE | Refills: 0 | Status: SHIPPED | OUTPATIENT
Start: 2019-12-23 | End: 2019-12-28

## 2019-12-23 NOTE — TELEPHONE ENCOUNTER
Called and spoke with patient at this time  Informed her of the prescription of macrobid sent to her preferred pharmacy  Also informed patient we will not be able to send antibiotics without a specimen after today  Also reiterated the need for follow up  Patient does have an appointment on 2/11/20 scheduled  Stressed the importance of this appointment  Patient verbalized understanding

## 2019-12-23 NOTE — TELEPHONE ENCOUNTER
I have sent a 5 day prescription of Macrobid to the patient's preferred pharmacy  This is the 2nd time in 3 weeks that she has called with the symptoms and being unable to provide a urine specimen  She has not been seen in greater than 1 year and was non compliant in follow-up  She should be scheduled in the near future for follow-up  I would like to make sure that we are treating her appropriately

## 2019-12-23 NOTE — TELEPHONE ENCOUNTER
Patient of the [place] office, known to our practice for nocturia and urinary frequency  Patient reports since Friday she has been having intermittent burning with urination and cloudy urine  Patient reports she has a hard time getting to and from the lab to provide a urine sample  Patient requesting a script for Macrobid  Please advise

## 2019-12-23 NOTE — TELEPHONE ENCOUNTER
Patient left a message on the Medication Refill voice mail line requesting a new prescription for Nitrofurantoin Mon (MACROBID) 100mg for a UTI she thinks she has  Last script was for 5 days worth of treatment  She would like it sent to [facility] in [place]  Request sent to Nursing Triage to assess current UTI

## 2019-12-24 ENCOUNTER — APPOINTMENT (OUTPATIENT)
Dept: LAB | Facility: CLINIC | Age: 79
End: 2019-12-24
Payer: MEDICARE

## 2019-12-24 DIAGNOSIS — D64.9 ANEMIA, UNSPECIFIED TYPE: ICD-10-CM

## 2019-12-24 DIAGNOSIS — K26.9 DUODENAL ULCER: ICD-10-CM

## 2019-12-24 LAB
ERYTHROCYTE [DISTWIDTH] IN BLOOD BY AUTOMATED COUNT: 17 % (ref 11.6–15.1)
HCT VFR BLD AUTO: 34.7 % (ref 34.8–46.1)
HGB BLD-MCNC: 10.2 G/DL (ref 11.5–15.4)
MCH RBC QN AUTO: 30.9 PG (ref 26.8–34.3)
MCHC RBC AUTO-ENTMCNC: 29.4 G/DL (ref 31.4–37.4)
MCV RBC AUTO: 105 FL (ref 82–98)
PLATELET # BLD AUTO: 278 THOUSANDS/UL (ref 149–390)
PMV BLD AUTO: 9.8 FL (ref 8.9–12.7)
RBC # BLD AUTO: 3.3 MILLION/UL (ref 3.81–5.12)
WBC # BLD AUTO: 8.27 THOUSAND/UL (ref 4.31–10.16)

## 2019-12-24 PROCEDURE — 85027 COMPLETE CBC AUTOMATED: CPT

## 2019-12-24 PROCEDURE — 36415 COLL VENOUS BLD VENIPUNCTURE: CPT

## 2019-12-24 PROCEDURE — 86677 HELICOBACTER PYLORI ANTIBODY: CPT

## 2019-12-25 LAB — H PYLORI IGG SER IA-ACNC: 0.53 INDEX VALUE (ref 0–0.79)

## 2019-12-26 LAB — H PYLORI IGM SER-ACNC: <9 UNITS (ref 0–8.9)

## 2019-12-27 ENCOUNTER — TELEPHONE (OUTPATIENT)
Dept: FAMILY MEDICINE CLINIC | Facility: CLINIC | Age: 79
End: 2019-12-27

## 2019-12-27 ENCOUNTER — TELEPHONE (OUTPATIENT)
Dept: GASTROENTEROLOGY | Facility: AMBULARY SURGERY CENTER | Age: 79
End: 2019-12-27

## 2019-12-27 NOTE — TELEPHONE ENCOUNTER
----- Message from Esmer Feldman MD sent at 12/26/2019  5:01 PM EST -----  Blood test for H pylori antibody came back negative

## 2020-01-03 ENCOUNTER — EVALUATION (OUTPATIENT)
Dept: PHYSICAL THERAPY | Facility: CLINIC | Age: 80
End: 2020-01-03
Payer: MEDICARE

## 2020-01-03 DIAGNOSIS — M25.561 ACUTE PAIN OF RIGHT KNEE: Primary | ICD-10-CM

## 2020-01-03 DIAGNOSIS — Z96.651 STATUS POST RIGHT KNEE REPLACEMENT: ICD-10-CM

## 2020-01-03 PROCEDURE — 97161 PT EVAL LOW COMPLEX 20 MIN: CPT | Performed by: PHYSICAL THERAPIST

## 2020-01-03 PROCEDURE — 97110 THERAPEUTIC EXERCISES: CPT | Performed by: PHYSICAL THERAPIST

## 2020-01-03 NOTE — LETTER
2020    Lashaun Shaw MD  [address]  [address] [facility]    Patient: Zion Rosado   YOB: 1940   Date of Visit: 1/3/2020     Encounter Diagnosis     ICD-10-CM    1  Acute pain of right knee M25 561    2  Status post right knee replacement Z96 [id number]        Dear Dr Manuelito Simms:    Thank you for your recent referral of Zion Rosado  Please review the attached evaluation summary from de's recent visit  Please verify that you agree with the plan of care by signing the attached order  If you have any questions or concerns, please do not hesitate to call  I sincerely appreciate the opportunity to share in the care of one of your patients and hope to have another opportunity to work with you in the near future  Sincerely,    Gil Mcfadden, PT      Referring Provider:      I certify that I have read the below Plan of Care and certify the need for these services furnished under this plan of treatment while under my care  Lashaun Shaw MD  [address]  [address]  [address]: [phone]          PT Evaluation     Today's date: 1/3/2020  Patient name: Zion Rosado  : 1940  MRN: [id number]  Referring provider: Rebecca Flores MD  Dx:   Encounter Diagnosis     ICD-10-CM    1  Acute pain of right knee M25 561    2  Status post right knee replacement [id number] [id number]        Start Time: 1215  Stop Time: 1300  Total time in clinic (min): 45 minutes    Assessment  Assessment details: Zion Rosado is a 78 y o  female s/p R TKR on 11/15/19  She demonstrates right knee pain, abnormal gait, decreased knee ROM, decreased LE strength, decreased balance, poor posture, and decreased function  Patient would benefit from skilled physical therapy in order to address said deficits and improve functional mobility     Impairments: abnormal gait, abnormal or restricted ROM, abnormal movement, activity intolerance, impaired balance, impaired physical strength, lacks appropriate home exercise program, pain with function, safety issue, weight-bearing intolerance, poor posture  and poor body mechanics  Understanding of Dx/Px/POC: good   Prognosis: good    Goals  STG:  Increase knee flexion > 8 degrees  Pt will go up/down stairs to basement    LTG:  Increase knee flexion to 120 degrees  Increase LE strength at least 1 grade   Decreased TUG by 3 seconds to improve gait safety  Decrease 5TSTS by 3 seconds     Plan  Plan details: Explained goals and POC to patient and daughter   Patient would benefit from: skilled physical therapy  Planned modality interventions: cryotherapy  Planned therapy interventions: patient education, postural training, self care, strengthening, stretching, therapeutic activities, therapeutic exercise, flexibility, graded activity, functional ROM exercises, body mechanics training, home exercise program, abdominal trunk stabilization, joint mobilization and manual therapy  Frequency: 2x week  Duration in weeks: 4  Treatment plan discussed with: family and patient        Subjective Evaluation    History of Present Illness  Mechanism of injury: Patient is s/p right knee replacement on 11/15/19  She did have rehab for 5 days and then got 4 weeks of ome PT, last visit was   1 step to get onto porch, and then 1 into house  Using shower chair, no problems getting socks and shoes on  Pt is doing light cleaning at home and very light cooking  Has not done laundry yet, it is in basement  Patient is using [facility] and feels better with use due to improving balance  Significantly pronated on R>L foot  Pt has started driving     Pain  Current pain ratin  At worst pain ratin    Patient Goals  Patient goal: cooking, laundry (in basement)         Objective     Active Range of Motion   Left Knee   Flexion: 112 degrees   Extension: 0 degrees     Right Knee   Flexion: 104 degrees   Extension: 4 degrees     Strength/Myotome Testing     Left Knee   Flexion: 4+  Extension: 5    Right Knee   Flexion: 5  Extension: 4+    Additional Strength Details  Hip flexion R 4/5, L 4-/5  Hip abduction 3-/5 bilaterally  Ankle DF 4+/5 bl     Significant trunk flexion and forward lean with ambulation  Pronated feet bl R>L with genuvalgus bl          Tests     Additional Tests Details  5TSTS: both arm rests 35 02 seconds   TU 09 seconds with both arm rests and SPC in R hand              Precautions: None       Manual  /3            Knee PROM NV                                                                    Exercise Diary  /3            Bike AAROM NV            Calf SB stretch  NV            Marches at bar NV            Hip abd standing NV            Hip ext standing NV            Heel/toe raises  NV            Mini squats NV            Side step at bar NV            Step up and over NV            Standing L/s extensions NV            LAQ NV            Heel slides NV            SLR flexion NV            Glute bridge NV            Clamshells (supine?) NV            Sit to stands from chair NV                                                                                 Modalities  1/3            CP PRN

## 2020-01-03 NOTE — PROGRESS NOTES
PT Evaluation     Today's date: 1/3/2020  Patient name: Bennett Sorensen  : 1940  MRN: [id number]  Referring provider: Cammy Skiff, MD  Dx:   Encounter Diagnosis     ICD-10-CM    1  Acute pain of right knee M25 561    2  Status post right knee replacement Z96 651        Start Time: 1215  Stop Time: 1300  Total time in clinic (min): 45 minutes    Assessment  Assessment details: Bennett Sorensen is a 78 y o  female s/p R TKR on 11/15/19  She demonstrates right knee pain, abnormal gait, decreased knee ROM, decreased LE strength, decreased balance, poor posture, and decreased function  Patient would benefit from skilled physical therapy in order to address said deficits and improve functional mobility     Impairments: abnormal gait, abnormal or restricted ROM, abnormal movement, activity intolerance, impaired balance, impaired physical strength, lacks appropriate home exercise program, pain with function, safety issue, weight-bearing intolerance, poor posture  and poor body mechanics  Understanding of Dx/Px/POC: good   Prognosis: good    Goals  STG:  Increase knee flexion > 8 degrees  Pt will go up/down stairs to basement    LTG:  Increase knee flexion to 120 degrees  Increase LE strength at least 1 grade   Decreased TUG by 3 seconds to improve gait safety  Decrease 5TSTS by 3 seconds     Plan  Plan details: Explained goals and POC to patient and daughter   Patient would benefit from: skilled physical therapy  Planned modality interventions: cryotherapy  Planned therapy interventions: patient education, postural training, self care, strengthening, stretching, therapeutic activities, therapeutic exercise, flexibility, graded activity, functional ROM exercises, body mechanics training, home exercise program, abdominal trunk stabilization, joint mobilization and manual therapy  Frequency: 2x week  Duration in weeks: 4  Treatment plan discussed with: family and patient        Subjective Evaluation    History of Present Illness  Mechanism of injury: Patient is s/p right knee replacement on 11/15/19  She did have rehab for 5 days and then got 4 weeks of ome PT, last visit was   1 step to get onto porch, and then 1 into house  Using shower chair, no problems getting socks and shoes on  Pt is doing light cleaning at home and very light cooking  Has not done laundry yet, it is in basement  Patient is using [facility] and feels better with use due to improving balance  Significantly pronated on R>L foot  Pt has started driving     Pain  Current pain ratin  At worst pain ratin    Patient Goals  Patient goal: cooking, laundry (in basement)         Objective     Active Range of Motion   Left Knee   Flexion: 112 degrees   Extension: 0 degrees     Right Knee   Flexion: 104 degrees   Extension: 4 degrees     Strength/Myotome Testing     Left Knee   Flexion: 4+  Extension: 5    Right Knee   Flexion: 5  Extension: 4+    Additional Strength Details  Hip flexion R 4/5, L 4-/5  Hip abduction 3-/5 bilaterally  Ankle DF 4+/5 bl     Significant trunk flexion and forward lean with ambulation  Pronated feet bl R>L with genuvalgus bl          Tests     Additional Tests Details  5TSTS: both arm rests 35 02 seconds   TU 09 seconds with both arm rests and SPC in R hand              Precautions: None       Manual  1/3            Knee PROM NV                                                                    Exercise Diary  /3            Bike AAROM NV            Calf SB stretch  NV            Marches at bar NV            Hip abd standing NV            Hip ext standing NV            Heel/toe raises  NV            Mini squats NV            Side step at bar NV            Step up and over NV            Standing L/s extensions NV            LAQ NV            Heel slides NV            SLR flexion NV            Glute bridge NV            Clamshells (supine?) NV            Sit to stands from chair NV Modalities  1/3            CP PRN

## 2020-01-07 ENCOUNTER — OFFICE VISIT (OUTPATIENT)
Dept: PHYSICAL THERAPY | Facility: CLINIC | Age: 80
End: 2020-01-07
Payer: MEDICARE

## 2020-01-07 DIAGNOSIS — Z96.651 STATUS POST RIGHT KNEE REPLACEMENT: ICD-10-CM

## 2020-01-07 DIAGNOSIS — M25.561 ACUTE PAIN OF RIGHT KNEE: Primary | ICD-10-CM

## 2020-01-07 PROCEDURE — 97112 NEUROMUSCULAR REEDUCATION: CPT

## 2020-01-07 PROCEDURE — 97110 THERAPEUTIC EXERCISES: CPT

## 2020-01-07 NOTE — PROGRESS NOTES
Daily Note     Today's date: 2020  Patient name: Abhinav Gaming  : 1940  MRN: [id number]  Referring provider: Anali Sandoval MD  Dx:   Encounter Diagnosis     ICD-10-CM    1  Acute pain of right knee M25 561    2  Status post right knee replacement Z96 651                   Subjective: Pt states no new complaints  Objective: See treatment diary below      Assessment: Tolerated treatment well  Patient would benefit from continued PT  Most difficulty noted w/ side stepping  Min discomfort noted w/ passive knee extension; no discomfort noted w/ flexion  Plan: Progress treatment as tolerated         Precautions: None       Manual  1/3 1/7           Knee PROM NV 10'                                                                     Exercise Diary  1/3 1/7           Bike AAROM NV 5' seat 16 5           Calf SB stretch  NV 20"x3 R           Marches at bar NV 15 ea           Hip abd standing NV 2x10 ea           Hip ext standing NV 2x10 ea           Heel/toe raises  NV 20 ea           Mini squats NV 2x10           Side step at bar NV 1 lap           Step up and over NV NV           Standing L/s extensions NV            LAQ NV 2x10           Heel slides NV 15x5"           SLR flexion NV NV           Glute bridge NV 15           Clamshells (supine?) NV            Sit to stands from chair NV NV                                                                                Modalities  1/3            CP PRN

## 2020-01-08 NOTE — PROGRESS NOTES
Daily Note     Today's date: 2020  Patient name: Zion Rosado  : 1940  MRN: [id number]  Referring provider: Rebecca Flores MD  Dx:   Encounter Diagnosis     ICD-10-CM    1  Acute pain of right knee M25 561    2  Status post right knee replacement Z96 651                   Subjective: Pt reports she was sore the night of last PT session; however, states she was feeling better the next day  Objective: See treatment diary below      Assessment: Tolerated treatment well  Patient would benefit from continued PT  Pt challenged by addition of step ups; fatigued w/ standing activities today and frequent rest needed  Increased difficulty noted when transitioning from sit to stand  Min discomfort noted w/ passive knee extension  Plan: Progress treatment as tolerated  Pt has MDV tomorrow, 1/10       Precautions: None       Manual  1/3 1/7 1/9          R Knee PROM NV 10'   10'                                                                  Exercise Diary  1/3 1/7 1/9          Bike AAROM NV 5' seat 16 5 5' seat 16          Calf SB stretch  NV 20"x3 R 20"x3 R          Marches at bar NV 15 ea 2x10 ea          Hip abd standing NV 2x10 ea 2x10 ea          Hip ext standing NV 2x10 ea 2x10 ea          Heel/toe raises  NV 20 ea 20 ea          Mini squats NV 2x10 2x10          Side step at bar NV 1 lap 2 laps          Step up and over NV NV 4" x10 up only          Standing L/s extensions NV            LAQ NV 2x10 3x10          Heel slides NV 15x5" 15x5"          SLR flexion NV NV 2x10          Glute bridge NV 15 2x10          Clamshells (supine?) NV  RTB 2x10          Sit to stands from chair NV NV NV                                                                               Modalities  1/3            CP PRN                                       1:1 [id number]

## 2020-01-09 ENCOUNTER — TRANSCRIBE ORDERS (OUTPATIENT)
Dept: PHYSICAL THERAPY | Facility: CLINIC | Age: 80
End: 2020-01-09

## 2020-01-09 ENCOUNTER — OFFICE VISIT (OUTPATIENT)
Dept: PHYSICAL THERAPY | Facility: CLINIC | Age: 80
End: 2020-01-09
Payer: MEDICARE

## 2020-01-09 DIAGNOSIS — M25.561 RIGHT KNEE PAIN, UNSPECIFIED CHRONICITY: Primary | ICD-10-CM

## 2020-01-09 DIAGNOSIS — Z96.651 STATUS POST RIGHT KNEE REPLACEMENT: ICD-10-CM

## 2020-01-09 DIAGNOSIS — M25.561 ACUTE PAIN OF RIGHT KNEE: Primary | ICD-10-CM

## 2020-01-09 DIAGNOSIS — Z96.651 PRESENCE OF RIGHT ARTIFICIAL KNEE JOINT: ICD-10-CM

## 2020-01-09 PROCEDURE — 97140 MANUAL THERAPY 1/> REGIONS: CPT

## 2020-01-09 PROCEDURE — 97110 THERAPEUTIC EXERCISES: CPT

## 2020-01-09 PROCEDURE — 97112 NEUROMUSCULAR REEDUCATION: CPT

## 2020-01-13 NOTE — PROGRESS NOTES
Daily Note     Today's date: 2020  Patient name: Neli Gaviria  : 1940  MRN: [id number]  Referring provider: Syo Whitehead MD  Dx:   Encounter Diagnosis     ICD-10-CM    1  Acute pain of right knee M25 561    2  Status post right knee replacement Z96 651                   Subjective: Pt states MD is pleased w/ current progress  Pt c/o L hip discomfort and feels she "overdid it" when performing home exercises, more specifically marches  Objective: See treatment diary below      Assessment: Tolerated treatment well  Patient would benefit from continued PT  Insufficient hip/knee flexion when transferring from stand to sit  4" step ups challenging for pt  Mild discomfort noted w/ PROM  Pt defers ice to home  Plan: Progress treatment as tolerated         Precautions: None       Manual  1/3 1/7 1/9 1/14         R Knee PROM NV 10'   10' 10'                                                                 Exercise Diary  1/3 1/7 1/9 1/14         Bike AAROM NV 5' seat 16 5 5' seat 16 5' seat 16         Calf SB stretch  NV 20"x3 R 20"x3 R 20"x3 R         Marches at bar NV 15 ea 2x10 ea NV         Hip abd standing NV 2x10 ea 2x10 ea 2x10 ea         Hip ext standing NV 2x10 ea 2x10 ea 2x10 ea         Heel/toe raises  NV 20 ea 20 ea 20 ea         Mini squats NV 2x10 2x10 To chair 3 foams x10         Side step at bar NV 1 lap 2 laps 2 laps         Step up and over NV NV 4" x10 up only 4" x10 up only         Standing L/s extensions NV            LAQ NV 2x10 3x10 3x10         Heel slides NV 15x5" 15x5" 15x5"         SLR flexion NV NV 2x10 2x10         Glute bridge NV 15 2x10 3x10         Clamshells (supine?) NV  RTB 2x10 RTB 2x10         Sit to stands from chair NV NV NV 2x5, 2 foams                                                                              Modalities  1/3            CP PRN

## 2020-01-14 ENCOUNTER — OFFICE VISIT (OUTPATIENT)
Dept: PHYSICAL THERAPY | Facility: CLINIC | Age: 80
End: 2020-01-14
Payer: MEDICARE

## 2020-01-14 DIAGNOSIS — Z96.651 STATUS POST RIGHT KNEE REPLACEMENT: ICD-10-CM

## 2020-01-14 DIAGNOSIS — M25.561 ACUTE PAIN OF RIGHT KNEE: Primary | ICD-10-CM

## 2020-01-14 PROCEDURE — 97110 THERAPEUTIC EXERCISES: CPT

## 2020-01-14 PROCEDURE — 97530 THERAPEUTIC ACTIVITIES: CPT

## 2020-01-14 PROCEDURE — 97140 MANUAL THERAPY 1/> REGIONS: CPT

## 2020-01-14 PROCEDURE — 97112 NEUROMUSCULAR REEDUCATION: CPT

## 2020-01-15 ENCOUNTER — APPOINTMENT (OUTPATIENT)
Dept: NON INVASIVE DIAGNOSTICS | Facility: HOSPITAL | Age: 80
End: 2020-01-15
Payer: MEDICARE

## 2020-01-15 ENCOUNTER — HOSPITAL ENCOUNTER (OUTPATIENT)
Dept: NON INVASIVE DIAGNOSTICS | Facility: HOSPITAL | Age: 80
Discharge: HOME | End: 2020-01-15
Payer: MEDICARE

## 2020-01-15 DIAGNOSIS — R09.89 BRUIT OF LEFT CAROTID ARTERY: ICD-10-CM

## 2020-01-15 PROCEDURE — 93880 EXTRACRANIAL BILAT STUDY: CPT

## 2020-01-15 PROCEDURE — 93880 EXTRACRANIAL BILAT STUDY: CPT | Performed by: SURGERY

## 2020-01-16 ENCOUNTER — APPOINTMENT (OUTPATIENT)
Dept: PHYSICAL THERAPY | Facility: CLINIC | Age: 80
End: 2020-01-16
Payer: MEDICARE

## 2020-01-20 ENCOUNTER — TELEPHONE (OUTPATIENT)
Dept: UROLOGY | Facility: MEDICAL CENTER | Age: 80
End: 2020-01-20

## 2020-01-20 DIAGNOSIS — R39.9 UTI SYMPTOMS: Primary | ICD-10-CM

## 2020-01-20 NOTE — TELEPHONE ENCOUNTER
Patient seen in the [facility] office by APs, known to our practice for urinary frequency, nocturia  Patient's last office visit 11/2018, patient has been noncompliant with follow up  Patient has follow up scheduled for 2/11/20

## 2020-01-20 NOTE — TELEPHONE ENCOUNTER
Strongly encourage urine testing  Does she have home health nursing to help collect a sample? Can follow up as scheduled

## 2020-01-20 NOTE — TELEPHONE ENCOUNTER
Patient followed by Renetta Meraz at the [facility] office  Patient called to report UTI symptoms: burning, frequency- every 20 minute, odor noted, no hematuria noted    Symptoms started on Friday evening  Patient can be reached at [phone]  Please call to advise  Thank you

## 2020-01-20 NOTE — TELEPHONE ENCOUNTER
Called [phone] and left message for patient to please contact office to speak with clinical in regards to urine testing

## 2020-01-21 ENCOUNTER — APPOINTMENT (OUTPATIENT)
Dept: LAB | Facility: CLINIC | Age: 80
End: 2020-01-21
Payer: MEDICARE

## 2020-01-21 ENCOUNTER — TELEPHONE (OUTPATIENT)
Dept: UROLOGY | Facility: CLINIC | Age: 80
End: 2020-01-21

## 2020-01-21 ENCOUNTER — APPOINTMENT (OUTPATIENT)
Dept: PHYSICAL THERAPY | Facility: CLINIC | Age: 80
End: 2020-01-21
Payer: MEDICARE

## 2020-01-21 DIAGNOSIS — R39.9 UTI SYMPTOMS: Primary | ICD-10-CM

## 2020-01-21 DIAGNOSIS — R39.9 UTI SYMPTOMS: ICD-10-CM

## 2020-01-21 LAB
BACTERIA UR QL AUTO: ABNORMAL /HPF
BILIRUB UR QL STRIP: NEGATIVE
CLARITY UR: ABNORMAL
COLOR UR: YELLOW
GLUCOSE UR STRIP-MCNC: NEGATIVE MG/DL
HGB UR QL STRIP.AUTO: ABNORMAL
HYALINE CASTS #/AREA URNS LPF: ABNORMAL /LPF
KETONES UR STRIP-MCNC: NEGATIVE MG/DL
LEUKOCYTE ESTERASE UR QL STRIP: ABNORMAL
NITRITE UR QL STRIP: POSITIVE
NON-SQ EPI CELLS URNS QL MICRO: ABNORMAL /HPF
PH UR STRIP.AUTO: 5.5 [PH]
PROT UR STRIP-MCNC: ABNORMAL MG/DL
RBC #/AREA URNS AUTO: ABNORMAL /HPF
SP GR UR STRIP.AUTO: 1.01 (ref 1–1.03)
UROBILINOGEN UR QL STRIP.AUTO: 0.2 E.U./DL
WBC #/AREA URNS AUTO: ABNORMAL /HPF

## 2020-01-21 PROCEDURE — 87077 CULTURE AEROBIC IDENTIFY: CPT

## 2020-01-21 PROCEDURE — 87186 SC STD MICRODIL/AGAR DIL: CPT

## 2020-01-21 PROCEDURE — 87086 URINE CULTURE/COLONY COUNT: CPT

## 2020-01-21 PROCEDURE — 81001 URINALYSIS AUTO W/SCOPE: CPT

## 2020-01-21 RX ORDER — NITROFURANTOIN 25; 75 MG/1; MG/1
100 CAPSULE ORAL 2 TIMES DAILY
Qty: 10 CAPSULE | Refills: 0 | Status: SHIPPED | OUTPATIENT
Start: 2020-01-21 | End: 2020-01-26

## 2020-01-21 NOTE — TELEPHONE ENCOUNTER
Called and spoke to patient  Explained that her urine was nitrite positive  Our office sent a 5 day prescription of nitrofurantoin to [facility] (her preferred pharmacy)  Explained to patient we will continue to monitor for culture results and adjust antibiotics if needed  Patient had no further questions at this time  ----- Message from Meghan Saldana PA-C sent at 1/21/2020  2:08 PM EST -----  Urine is nitrite positive  I have sent a 5 day prescription of nitrofurantoin to her preferred pharmacy  I will continue to monitor for culture results and adjust antibiotic if needed

## 2020-01-21 NOTE — TELEPHONE ENCOUNTER
Called and spoke with patient at this time  Informed her we would like her have to urine testing done  Orders have been placed for urinalysis and urine culture  Advised patient preliminary results can take up to 24 hrs and final results can take up to three days  Based on results we will call her back if antibiotic needed  Patient advised to aggressively hydrate in the meantime with 64oz or more of water per day  Patient reports she is looking forward to a call back this afternoon  Her preferred pharmacy was confirmed

## 2020-01-23 ENCOUNTER — OFFICE VISIT (OUTPATIENT)
Dept: PHYSICAL THERAPY | Facility: CLINIC | Age: 80
End: 2020-01-23
Payer: MEDICARE

## 2020-01-23 ENCOUNTER — TELEPHONE (OUTPATIENT)
Dept: UROLOGY | Facility: CLINIC | Age: 80
End: 2020-01-23

## 2020-01-23 DIAGNOSIS — Z96.651 STATUS POST RIGHT KNEE REPLACEMENT: ICD-10-CM

## 2020-01-23 DIAGNOSIS — M25.561 ACUTE PAIN OF RIGHT KNEE: Primary | ICD-10-CM

## 2020-01-23 LAB — BACTERIA UR CULT: ABNORMAL

## 2020-01-23 PROCEDURE — 97110 THERAPEUTIC EXERCISES: CPT | Performed by: PHYSICAL THERAPIST

## 2020-01-23 PROCEDURE — 97140 MANUAL THERAPY 1/> REGIONS: CPT | Performed by: PHYSICAL THERAPIST

## 2020-01-23 PROCEDURE — 97530 THERAPEUTIC ACTIVITIES: CPT | Performed by: PHYSICAL THERAPIST

## 2020-01-23 NOTE — TELEPHONE ENCOUNTER
Left a voicemail to inform her that an antibiotic was sent to her pharmacy per Billie Hurtado PA-C  For a urinary tract infection  Office phone number given if she has any questions or concerns

## 2020-01-23 NOTE — TELEPHONE ENCOUNTER
Called and spoke with patient at this time  She reports the message was confusing and wanted to clarify she should continue taking previously prescribed antibiotic  Confirmed she should continue the nitrofurantoin, it is appropriate  Patient verbalized understanding and will take it to completion

## 2020-01-23 NOTE — TELEPHONE ENCOUNTER
----- Message from Deidra Rahman PA-C sent at 1/23/2020  8:20 AM EST -----  Prescribed antibiotic is appropriate  She should continue this to completion as prescribed

## 2020-01-23 NOTE — PROGRESS NOTES
Daily Note     Today's date: 2020  Patient name: Leonarda Sanchez  : 1940  MRN: [id number]  Referring provider: Millie Corbin MD  Dx:   Encounter Diagnosis     ICD-10-CM    1  Acute pain of right knee M25 561    2  Status post right knee replacement Z96 651                   Subjective: Patient reports she strained her thigh doing exercise sat home last week and then had UTI early this week  She is feeling better from both  Objective: See treatment diary below      Assessment: Tolerated treatment well  Patient would benefit from continued PT  Patient with poor form with sit to stands today, even with foams  Unable to complete with use of UEs  Hold on this activity and continue with mini squats until pt can obtain proper form  Patient tolerated progressed weight well with LAQ  Minimal discomfort in knee with PROM, with good mobility  Plan: Progress treatment as tolerated         Precautions: None       Manual  1/3 1/7 1/9 1/14         R Knee PROM NV 10'   10' 10'                                                                 Exercise Diary  1/3 1/7 1/9 1/14 1/23        Bike AAROM NV 5' seat 16 5 5' seat 16 5' seat 16 5'        Calf SB stretch  NV 20"x3 R 20"x3 R 20"x3 R         Marches at bar NV 15 ea 2x10 ea NV         Hip abd standing NV 2x10 ea 2x10 ea 2x10 ea 2x10        Hip ext standing NV 2x10 ea 2x10 ea 2x10 ea 2x10        Heel/toe raises  NV 20 ea 20 ea 20 ea 2x10 ea         Side step at bar NV 1 lap 2 laps 2 laps 2 laps         Step up and over NV NV 4" x10 up only 4" x10 up only         Standing L/s extensions NV            LAQ NV 2x10 3x10 3x10 2x10 3#         Heel slides NV 15x5" 15x5" 15x5" 15x5        SLR flexion NV NV 2x10 2x10 2x10        Glute bridge NV 15 2x10 3x10 2x10         Clamshells (supine?) NV  RTB 2x10 RTB 2x10         Sit to stands from chair NV NV NV 2x5, 2 foams x5  HOLD        Mini squats      2x10 Modalities  1/3            CP PRN

## 2020-01-28 ENCOUNTER — OFFICE VISIT (OUTPATIENT)
Dept: PHYSICAL THERAPY | Facility: CLINIC | Age: 80
End: 2020-01-28
Payer: MEDICARE

## 2020-01-28 DIAGNOSIS — Z96.651 STATUS POST RIGHT KNEE REPLACEMENT: ICD-10-CM

## 2020-01-28 DIAGNOSIS — M25.561 ACUTE PAIN OF RIGHT KNEE: Primary | ICD-10-CM

## 2020-01-28 PROCEDURE — 97112 NEUROMUSCULAR REEDUCATION: CPT

## 2020-01-28 PROCEDURE — 97140 MANUAL THERAPY 1/> REGIONS: CPT

## 2020-01-28 PROCEDURE — 97110 THERAPEUTIC EXERCISES: CPT

## 2020-01-28 NOTE — PROGRESS NOTES
Daily Note     Today's date: 2020  Patient name: Lani Meyer  : 1940  MRN: [id number]  Referring provider: Oksana Angelucci, MD  Dx:   Encounter Diagnosis     ICD-10-CM    1  Acute pain of right knee M25 561    2  Status post right knee replacement Z96 651                   Subjective: Pt states her R knee is "doing well"; however, states she still does not feel 100% since recent UTI  Reports she has finished round of antibiotics, and denies fever,  Suggested she call MD about current sx  Pt c/o L hip discomfort since straining herself exercising (marching) at home about 3 weeks ago; feels discomfort may be "a little bit better" and "comes and goes"; states it is most bothersome when walking (first few steps)  Pt denies redness, swelling in L hip  Objective: See treatment diary below      Assessment: Tolerated treatment well  Patient would benefit from continued PT   PT, AF feels L hip discomfort may be a muscle strain, and suggested a sidelying hip flexor stretch; no increased sx noted w/ gentle stretch  No significant discomfort noted w/ TE ; mild fatigue noted w/ standing activities  Tolerates PROM well  Plan: Progress treatment as tolerated         Precautions: None       Manual  1/3 1/7 1/9 1/14  1/28       R Knee PROM NV 10'   10' 10'  10'       L sidelying hip flexor stretch      2' gentle                                                  Exercise Diary  1/3 1/7 1/9 1/14 1/23 1/28       Bike AAROM NV 5' seat 16 5 5' seat 16 5' seat 16 5' 5'       Calf SB stretch  NV 20"x3 R 20"x3 R 20"x3 R  20"x3       Marches at bar NV 15 ea 2x10 ea NV         Hip abd standing NV 2x10 ea 2x10 ea 2x10 ea 2x10 2x10 ea       Hip ext standing NV 2x10 ea 2x10 ea 2x10 ea 2x10 2x10 ea       Heel/toe raises  NV 20 ea 20 ea 20 ea 2x10 ea  2x10 ea       Side step at bar NV 1 lap 2 laps 2 laps 2 laps  2 laps       Step up and over NV NV 4" x10 up only 4" x10 up only         Standing L/s extensions NV LAQ NV 2x10 3x10 3x10 2x10 3#  3# 2x10       Heel slides NV 15x5" 15x5" 15x5" 15x5" 15x5"       SLR flexion NV NV 2x10 2x10 2x10 2x10       Glute bridge NV 15 2x10 3x10 2x10  2x10       Clamshells (supine?) NV  RTB 2x10 RTB 2x10         Sit to stands from chair NV NV NV 2x5, 2 foams x5  HOLD        Mini squats      2x10  2x10                                                               Modalities  1/3            CP PRN

## 2020-01-30 ENCOUNTER — OFFICE VISIT (OUTPATIENT)
Dept: PHYSICAL THERAPY | Facility: CLINIC | Age: 80
End: 2020-01-30
Payer: MEDICARE

## 2020-01-30 DIAGNOSIS — M25.561 ACUTE PAIN OF RIGHT KNEE: Primary | ICD-10-CM

## 2020-01-30 DIAGNOSIS — Z96.651 STATUS POST RIGHT KNEE REPLACEMENT: ICD-10-CM

## 2020-01-30 PROCEDURE — 97112 NEUROMUSCULAR REEDUCATION: CPT

## 2020-01-30 PROCEDURE — 97140 MANUAL THERAPY 1/> REGIONS: CPT

## 2020-01-30 PROCEDURE — 97110 THERAPEUTIC EXERCISES: CPT

## 2020-01-30 PROCEDURE — 97530 THERAPEUTIC ACTIVITIES: CPT

## 2020-01-30 NOTE — PROGRESS NOTES
Daily Note     Today's date: 2020  Patient name: Enoc Walker  : 1940  MRN: [id number]  Referring provider: Wil Thompson MD  Dx:   Encounter Diagnosis     ICD-10-CM    1  Acute pain of right knee M25 561    2  Status post right knee replacement Z96 651                   Subjective: Pt states she is feeling "a little better" than last visit; reports hip discomfort is "not as sharp", and discomfort is occasional, and not consistent  Pt states her knee is feeling well  Pt states she doesn't feel secure in the L LE because it "aches" when walking; reports no discomfort when at rest        Objective: See treatment diary below      Assessment: Tolerated treatment well  Patient exhibited good technique with therapeutic exercises  Pt challenged by mini squats  Tolerates addition of step up and overs well  Mild discomfort noted w/ PROM  Pt defers ice to home  Plan: Progress treatment as tolerated         Precautions: None       Manual  1/3 1/7 1/9 1/14  1/28 1/30      R Knee PROM NV 10'   10' 10'  10' 10'      L sidelying hip flexor stretch      2' gentle                                                  Exercise Diary  1/3 1/7 1/9 1/14 1/23 1/28 1/30      Bike AAROM NV 5' seat 16 5 5' seat 16 5' seat 16 5' 5' 5'      Calf SB stretch  NV 20"x3 R 20"x3 R 20"x3 R  20"x3 20"x3      Marches at bar NV 15 ea 2x10 ea NV         Hip abd standing NV 2x10 ea 2x10 ea 2x10 ea 2x10 2x10 ea 2x10 ea      Hip ext standing NV 2x10 ea 2x10 ea 2x10 ea 2x10 2x10 ea 2x10 ea      Heel/toe raises  NV 20 ea 20 ea 20 ea 2x10 ea  2x10 ea 2x10 ea      Side step at bar NV 1 lap 2 laps 2 laps 2 laps  2 laps 2 laps      Step up and over NV NV 4" x10 up only 4" x10 up only   4" x10 up+over      Standing L/s extensions NV            LAQ NV 2x10 3x10 3x10 2x10 3#  3# 2x10 3# 2x10      Heel slides NV 15x5" 15x5" 15x5" 15x5" 15x5" 15x5"      SLR flexion NV NV 2x10 2x10 2x10 2x10 2x10      Glute bridge NV 15 2x10 3x10 2x10 2x10 2x10      Clamshells (supine?) NV  RTB 2x10 RTB 2x10         Sit to stands from chair NV NV NV 2x5, 2 foams x5  HOLD        Mini squats      2x10  2x10 2x10                                                              Modalities  1/3            CP PRN

## 2020-02-04 ENCOUNTER — OFFICE VISIT (OUTPATIENT)
Dept: PHYSICAL THERAPY | Facility: CLINIC | Age: 80
End: 2020-02-04
Payer: MEDICARE

## 2020-02-04 DIAGNOSIS — M25.561 ACUTE PAIN OF RIGHT KNEE: Primary | ICD-10-CM

## 2020-02-04 DIAGNOSIS — I10 ESSENTIAL HYPERTENSION: ICD-10-CM

## 2020-02-04 DIAGNOSIS — Z96.651 STATUS POST RIGHT KNEE REPLACEMENT: ICD-10-CM

## 2020-02-04 PROCEDURE — 97110 THERAPEUTIC EXERCISES: CPT

## 2020-02-04 PROCEDURE — 97140 MANUAL THERAPY 1/> REGIONS: CPT

## 2020-02-04 PROCEDURE — 97112 NEUROMUSCULAR REEDUCATION: CPT

## 2020-02-04 RX ORDER — AMLODIPINE BESYLATE 5 MG/1
TABLET ORAL
Qty: 90 TABLET | Refills: 0 | Status: SHIPPED | OUTPATIENT
Start: 2020-02-04 | End: 2020-06-05

## 2020-02-04 RX ORDER — LISINOPRIL 5 MG/1
TABLET ORAL
Qty: 90 TABLET | Refills: 0 | Status: SHIPPED | OUTPATIENT
Start: 2020-02-04 | End: 2020-06-05

## 2020-02-04 NOTE — PROGRESS NOTES
Daily Note     Today's date: 2020  Patient name: Ochoa Carter  : 1940  MRN: [id number]  Referring provider: Shanta Jim MD  Dx:   Encounter Diagnosis     ICD-10-CM    1  Acute pain of right knee M25 561    2  Status post right knee replacement Z96 651                   Subjective: Pt reports her L hip was "really bothering her" last Thursday and Friday, and states the pain was a "little better" over the weekend because she "held off" on the exercises  Reports she has no pain when sitting/at rest   Pt reports she took pain medication this morning, which "took the edge off" (the pain in hip)  Objective: See treatment diary below      Assessment: Tolerated treatment well  Patient would benefit from continued PT  Held on bike, mini squats due to L hip discomfort; performed less reps w/ standing hip exercises as well  Min discomfort noted w/ PROM  Plan: Progress treatment as tolerated         Precautions: None       Manual  1/3 1/7 1/9 1/14  1/28 1/30 2/4     R Knee PROM NV 10'   10' 10'  10' 10' 10'     L sidelying hip flexor stretch      2' gentle                                                  Exercise Diary  1/3 1/7 1/9 1/14 1/23 1/28 1/30 2/4     Bike AAROM NV 5' seat 16 5 5' seat 16 5' seat 16 5' 5' 5' held     Calf SB stretch  NV 20"x3 R 20"x3 R 20"x3 R  20"x3 20"x3 20"x3     Marches at bar NV 15 ea 2x10 ea NV         Hip abd standing NV 2x10 ea 2x10 ea 2x10 ea 2x10 2x10 ea 2x10 ea x10 ea     Hip ext standing NV 2x10 ea 2x10 ea 2x10 ea 2x10 2x10 ea 2x10 ea 2x10 ea      Heel/toe raises  NV 20 ea 20 ea 20 ea 2x10 ea  2x10 ea 2x10 ea 2x10 ea     Side step at bar NV 1 lap 2 laps 2 laps 2 laps  2 laps 2 laps 2 laps     Step up and over NV NV 4" x10 up only 4" x10 up only   4" x10 up+over 4" x10 up and over     Standing L/s extensions NV            LAQ NV 2x10 3x10 3x10 2x10 3#  3# 2x10 3# 2x10 3# 2x10     Heel slides NV 15x5" 15x5" 15x5" 15x5" 15x5" 15x5" 15x5"     SLR flexion NV NV 2x10 2x10 2x10 2x10 2x10 2x10     Glute bridge NV 15 2x10 3x10 2x10  2x10 2x10 2x10     Clamshells (supine?) NV  RTB 2x10 RTB 2x10         Sit to stands from chair NV NV NV 2x5, 2 foams x5  HOLD        Mini squats      2x10  2x10 2x10 held                                                             Modalities  1/3            CP PRN

## 2020-02-05 ENCOUNTER — TELEPHONE (OUTPATIENT)
Dept: FAMILY MEDICINE CLINIC | Facility: CLINIC | Age: 80
End: 2020-02-05

## 2020-02-05 DIAGNOSIS — F41.9 ANXIETY: Primary | ICD-10-CM

## 2020-02-05 RX ORDER — ALPRAZOLAM 0.25 MG/1
0.25 TABLET ORAL 3 TIMES DAILY PRN
Qty: 90 TABLET | Refills: 3 | Status: SHIPPED | OUTPATIENT
Start: 2020-02-05 | End: 2020-04-01 | Stop reason: SDUPTHER

## 2020-02-05 NOTE — TELEPHONE ENCOUNTER
Patient requesting refills sen to [facility]     Alprazolam 0 25 mg   # 90   1 tid prn     Last OV 12/2/19, Next OV 02/06/20    This was discontinued from med list 11/28/19 after hospitalization

## 2020-02-06 ENCOUNTER — EVALUATION (OUTPATIENT)
Dept: PHYSICAL THERAPY | Facility: CLINIC | Age: 80
End: 2020-02-06
Payer: MEDICARE

## 2020-02-06 ENCOUNTER — OFFICE VISIT (OUTPATIENT)
Dept: FAMILY MEDICINE CLINIC | Facility: CLINIC | Age: 80
End: 2020-02-06
Payer: MEDICARE

## 2020-02-06 ENCOUNTER — TELEPHONE (OUTPATIENT)
Dept: FAMILY MEDICINE CLINIC | Facility: CLINIC | Age: 80
End: 2020-02-06

## 2020-02-06 VITALS
HEART RATE: 82 BPM | SYSTOLIC BLOOD PRESSURE: 142 MMHG | BODY MASS INDEX: 33.2 KG/M2 | TEMPERATURE: 98.8 F | WEIGHT: 187.38 LBS | HEIGHT: 63 IN | DIASTOLIC BLOOD PRESSURE: 60 MMHG

## 2020-02-06 DIAGNOSIS — M25.562 PAIN IN BOTH KNEES, UNSPECIFIED CHRONICITY: ICD-10-CM

## 2020-02-06 DIAGNOSIS — Z96.651 STATUS POST RIGHT KNEE REPLACEMENT: ICD-10-CM

## 2020-02-06 DIAGNOSIS — M25.561 ACUTE PAIN OF RIGHT KNEE: Primary | ICD-10-CM

## 2020-02-06 DIAGNOSIS — M25.561 PAIN IN BOTH KNEES, UNSPECIFIED CHRONICITY: ICD-10-CM

## 2020-02-06 DIAGNOSIS — M76.32 ILIOTIBIAL BAND SYNDROME OF LEFT SIDE: Primary | ICD-10-CM

## 2020-02-06 PROCEDURE — 97112 NEUROMUSCULAR REEDUCATION: CPT

## 2020-02-06 PROCEDURE — 1160F RVW MEDS BY RX/DR IN RCRD: CPT | Performed by: FAMILY MEDICINE

## 2020-02-06 PROCEDURE — 3077F SYST BP >= 140 MM HG: CPT | Performed by: FAMILY MEDICINE

## 2020-02-06 PROCEDURE — 97140 MANUAL THERAPY 1/> REGIONS: CPT

## 2020-02-06 PROCEDURE — 1036F TOBACCO NON-USER: CPT | Performed by: FAMILY MEDICINE

## 2020-02-06 PROCEDURE — 4040F PNEUMOC VAC/ADMIN/RCVD: CPT | Performed by: FAMILY MEDICINE

## 2020-02-06 PROCEDURE — 3078F DIAST BP <80 MM HG: CPT | Performed by: FAMILY MEDICINE

## 2020-02-06 PROCEDURE — 97110 THERAPEUTIC EXERCISES: CPT

## 2020-02-06 PROCEDURE — 99213 OFFICE O/P EST LOW 20 MIN: CPT | Performed by: FAMILY MEDICINE

## 2020-02-06 RX ORDER — COVID-19 ANTIGEN TEST
KIT MISCELLANEOUS
COMMUNITY
End: 2020-02-06 | Stop reason: ALTCHOICE

## 2020-02-06 RX ORDER — OXYCODONE HYDROCHLORIDE AND ACETAMINOPHEN 5; 325 MG/1; MG/1
1 TABLET ORAL EVERY 8 HOURS PRN
Qty: 90 TABLET | Refills: 0 | Status: SHIPPED | OUTPATIENT
Start: 2020-02-06 | End: 2020-03-02 | Stop reason: SDUPTHER

## 2020-02-06 RX ORDER — PREDNISONE 10 MG/1
TABLET ORAL
Qty: 26 TABLET | Refills: 0 | Status: SHIPPED | OUTPATIENT
Start: 2020-02-06 | End: 2020-09-24 | Stop reason: ALTCHOICE

## 2020-02-06 NOTE — TELEPHONE ENCOUNTER
She was talking to you today about bl wk, she thought she had it done after 12/24/19 but she didn't , hemoglobin was 10 2,    Let her know if she needs to go for bl wk again

## 2020-02-06 NOTE — PROGRESS NOTES
Daily Note     Today's date: 2020  Patient name: Carroll Shaikh  : 1940  MRN: [id number]  Referring provider: Aby Geol MD  Dx:   Encounter Diagnosis     ICD-10-CM    1  Acute pain of right knee M25 561    2  Status post right knee replacement Z96 651                   Subjective: Pt states the L hip feels "better" today and reports no increase in sx following last visit  Pt states her R knee feels good, and notes only an occasional "ache"  Objective: See treatment diary below      Assessment: Tolerated treatment well  Patient would benefit from continued PT  Increased muscle fatigue w/ supine SLR; all other TE tolerated well  Mild discomfort noted w/ PROM  Plan: Progress treatment as tolerated    RE NV     Precautions: None       Manual  1/3 1/7 1/9 1/14  1/28 1/30 2/4 2/6    R Knee PROM NV 10'   10' 10'  10' 10' 10' 10'    L sidelying hip flexor stretch      2' gentle                                                  Exercise Diary  1/3 1/7 1/9 1/14 1/23 1/28 1/30 2/4 2/6    Bike AAROM NV 5' seat 16 5 5' seat 16 5' seat 16 5' 5' 5' held held    Calf SB stretch  NV 20"x3 R 20"x3 R 20"x3 R  20"x3 20"x3 20"x3 20"x3    Marches at bar NV 15 ea 2x10 ea NV         Hip abd standing NV 2x10 ea 2x10 ea 2x10 ea 2x10 2x10 ea 2x10 ea x10 ea 2x10 ea    Hip ext standing NV 2x10 ea 2x10 ea 2x10 ea 2x10 2x10 ea 2x10 ea 2x10 ea  2x10 ea    Heel/toe raises  NV 20 ea 20 ea 20 ea 2x10 ea  2x10 ea 2x10 ea 2x10 ea 2x10 ea    Side step at bar NV 1 lap 2 laps 2 laps 2 laps  2 laps 2 laps 2 laps 2 laps    Step up and over NV NV 4" x10 up only 4" x10 up only   4" x10 up+over 4" x10 up and over 4" x10 up over    Standing L/s extensions NV            LAQ NV 2x10 3x10 3x10 2x10 3#  3# 2x10 3# 2x10 3# 2x10 3#  2x10    Heel slides NV 15x5" 15x5" 15x5" 15x5" 15x5" 15x5" 15x5" 15x5"    SLR flexion NV NV 2x10 2x10 2x10 2x10 2x10 2x10 2x10    Glute bridge NV 15 2x10 3x10 2x10  2x10 2x10 2x10 2x10    Clamshells (supine?) NV  RTB 2x10 RTB 2x10         Sit to stands from chair NV NV NV 2x5, 2 foams x5  HOLD        Mini squats      2x10  2x10 2x10 held                                                             Modalities  1/3            CP PRN

## 2020-02-07 ENCOUNTER — APPOINTMENT (OUTPATIENT)
Dept: PHYSICAL THERAPY | Facility: CLINIC | Age: 80
End: 2020-02-07
Payer: MEDICARE

## 2020-02-07 DIAGNOSIS — D64.9 ANEMIA, UNSPECIFIED TYPE: Primary | ICD-10-CM

## 2020-02-10 ENCOUNTER — APPOINTMENT (OUTPATIENT)
Dept: LAB | Facility: CLINIC | Age: 80
End: 2020-02-10
Payer: MEDICARE

## 2020-02-10 ENCOUNTER — EVALUATION (OUTPATIENT)
Dept: PHYSICAL THERAPY | Facility: CLINIC | Age: 80
End: 2020-02-10
Payer: MEDICARE

## 2020-02-10 DIAGNOSIS — M25.561 ACUTE PAIN OF RIGHT KNEE: Primary | ICD-10-CM

## 2020-02-10 DIAGNOSIS — D64.9 ANEMIA, UNSPECIFIED TYPE: ICD-10-CM

## 2020-02-10 DIAGNOSIS — Z96.651 STATUS POST RIGHT KNEE REPLACEMENT: ICD-10-CM

## 2020-02-10 DIAGNOSIS — M25.552 LEFT HIP PAIN: ICD-10-CM

## 2020-02-10 LAB
BASOPHILS # BLD AUTO: 0.02 THOUSANDS/ΜL (ref 0–0.1)
BASOPHILS NFR BLD AUTO: 0 % (ref 0–1)
EOSINOPHIL # BLD AUTO: 0 THOUSAND/ΜL (ref 0–0.61)
EOSINOPHIL NFR BLD AUTO: 0 % (ref 0–6)
ERYTHROCYTE [DISTWIDTH] IN BLOOD BY AUTOMATED COUNT: 15.3 % (ref 11.6–15.1)
HCT VFR BLD AUTO: 37.4 % (ref 34.8–46.1)
HGB BLD-MCNC: 11.6 G/DL (ref 11.5–15.4)
IMM GRANULOCYTES # BLD AUTO: 0.02 THOUSAND/UL (ref 0–0.2)
IMM GRANULOCYTES NFR BLD AUTO: 0 % (ref 0–2)
LYMPHOCYTES # BLD AUTO: 0.78 THOUSANDS/ΜL (ref 0.6–4.47)
LYMPHOCYTES NFR BLD AUTO: 9 % (ref 14–44)
MCH RBC QN AUTO: 31.2 PG (ref 26.8–34.3)
MCHC RBC AUTO-ENTMCNC: 31 G/DL (ref 31.4–37.4)
MCV RBC AUTO: 101 FL (ref 82–98)
MONOCYTES # BLD AUTO: 0.41 THOUSAND/ΜL (ref 0.17–1.22)
MONOCYTES NFR BLD AUTO: 5 % (ref 4–12)
NEUTROPHILS # BLD AUTO: 7.38 THOUSANDS/ΜL (ref 1.85–7.62)
NEUTS SEG NFR BLD AUTO: 86 % (ref 43–75)
NRBC BLD AUTO-RTO: 0 /100 WBCS
PLATELET # BLD AUTO: 357 THOUSANDS/UL (ref 149–390)
PMV BLD AUTO: 9.7 FL (ref 8.9–12.7)
RBC # BLD AUTO: 3.72 MILLION/UL (ref 3.81–5.12)
WBC # BLD AUTO: 8.61 THOUSAND/UL (ref 4.31–10.16)

## 2020-02-10 PROCEDURE — 36415 COLL VENOUS BLD VENIPUNCTURE: CPT

## 2020-02-10 PROCEDURE — 85025 COMPLETE CBC W/AUTO DIFF WBC: CPT

## 2020-02-10 PROCEDURE — 97112 NEUROMUSCULAR REEDUCATION: CPT | Performed by: PHYSICAL THERAPIST

## 2020-02-10 PROCEDURE — 97530 THERAPEUTIC ACTIVITIES: CPT | Performed by: PHYSICAL THERAPIST

## 2020-02-10 PROCEDURE — 97110 THERAPEUTIC EXERCISES: CPT | Performed by: PHYSICAL THERAPIST

## 2020-02-10 NOTE — PROGRESS NOTES
PT Re-Evaluation     Today's date: 2/10/2020  Patient name: Sherrie Lane  : 1940  MRN: [id number]  Referring provider: Elkin Bueno MD  Dx:   Encounter Diagnosis     ICD-10-CM    1  Acute pain of right knee M25 561    2  Status post right knee replacement Z96 651                   Assessment  Assessment details: Sherrie Lane is a 78 y o  female s/p R TKR on 11/15/19  She has demonstrated improved pain levels, increased knee ROM and strength, and increased function  Patient continues to have abnormal gait and balance, and decreased knee mobility  In addition, patient has been complaining of left hip and groin pain with symptoms consistent with ITB syndrome  Patient would benefit from continued physical therapy in order to address said deficits and improve functional mobility     Impairments: abnormal gait, abnormal or restricted ROM, abnormal movement, activity intolerance, impaired balance, impaired physical strength, lacks appropriate home exercise program, pain with function, safety issue, weight-bearing intolerance, poor posture  and poor body mechanics  Understanding of Dx/Px/POC: good   Prognosis: good    Goals  STG:  Increase knee flexion > 8 degrees-met  Pt will go up/down stairs to basement-partially me t    LTG:  Increase knee flexion to 120 degrees-progressing   Increase LE strength at least 1 grade   Decreased TUG by 3 seconds to improve gait safety  Decrease 5TSTS by 3 seconds     Plan  Patient would benefit from: skilled physical therapy  Planned modality interventions: cryotherapy  Planned therapy interventions: patient education, postural training, self care, strengthening, stretching, therapeutic activities, therapeutic exercise, flexibility, graded activity, functional ROM exercises, body mechanics training, home exercise program, abdominal trunk stabilization, joint mobilization and manual therapy  Frequency: 2x week  Duration in weeks: 4  Treatment plan discussed with: patient        Subjective Evaluation    History of Present Illness  Mechanism of injury: Patient reports her right knee has been doing very well  She had an episode of having pain in left hip/thigh which has caused more difficulty walking  She is using [facility] currently and feels confident  Has gone down to the basement 2x to do laundry  Pt complains of left hip/groin pain x~x3 weeks which has been limiting mobility    ----------------------------------------------------------------------------------------  Patient is s/p right knee replacement on 11/15/19  She did have rehab for 5 days and then got 4 weeks of ome PT, last visit was   1 step to get onto porch, and then 1 into house  Using shower chair, no problems getting socks and shoes on  Pt is doing light cleaning at home and very light cooking  Has not done laundry yet, it is in basement  Patient is using [facility] and feels better with use due to improving balance  Significantly pronated on R>L foot  Pt has started driving  Pain  Current pain ratin  At worst pain ratin    Patient Goals  Patient goal: cooking (met) , laundry (in basement)         Objective     Active Range of Motion   Left Knee   Flexion: 112 degrees   Extension: 0 degrees     Right Knee   Flexion: 114 degrees   Extension: 3 degrees     Strength/Myotome Testing     Left Knee   Flexion: 4+  Extension: 5    Right Knee   Flexion: 5  Extension: 5    Additional Strength Details  Hip flexion R 4+/5, L 4-/5  Hip abduction 3-/5 bilaterally  Ankle DF 4+/5 bl     Significant trunk flexion and forward lean with ambulation  Pronated feet bl R>L with genuvalgus bl          Tests     Left Hip   Negative LIBORIO, FADIR and scour       Additional Tests Details  Pain and tenderness along ITB, Greater trochanter, piriformis   5TSTS: both arm rests 35 02 seconds   5TSTS: 40 70 seconds at RE 2/10 with arm rests (pt reports slower because of back pain    TU 09 seconds with both arm rests and SPC in R hand   TU 96 sec at RE 2/10 with SPC and arm rests              Precautions: None       Manual    2/ 2/6 2/10   R Knee PROM  10' 10' 10' 10' NV   L sidelying hip flexor stretch  2' gentle                                      Exercise Diary   2 2/6 2/10   Bike AAROM 5' seat 16 5' 5' 5' held held 5'    Calf SB stretch  20"x3 R  20"x3 20"x3 20"x3 20"x3 20" x3   Marches at bar NV         Hip abd standing 2x10 ea 2x10 2x10 ea 2x10 ea x10 ea 2x10 ea 2x10    Hip ext standing 2x10 ea 2x10 2x10 ea 2x10 ea 2x10 ea  2x10 ea 2x10 ea   Heel/toe raises  20 ea 2x10 ea  2x10 ea 2x10 ea 2x10 ea 2x10 ea 2x10   Side step at bar 2 laps 2 laps  2 laps 2 laps 2 laps 2 laps    Step up and over 4" x10 up only   4" x10 up+over 4" x10 up and over 4" x10 up over    Standing L/s extensions          LAQ 3x10 2x10 3#  3# 2x10 3# 2x10 3# 2x10 3#  2x10    Heel slides 15x5" 15x5" 15x5" 15x5" 15x5" 15x5"    SLR flexion 2x10 2x10 2x10 2x10 2x10 2x10 2x10    Glute bridge 3x10 2x10  2x10 2x10 2x10 2x10 NV   Clamshells (supine?) RTB 2x10         Sit to stands from chair 2x5, 2 foams x5  HOLD        Mini squats   2x10  2x10 2x10 held                                                 Modalities  1/3            CP PRN

## 2020-02-10 NOTE — PROGRESS NOTES
Patient ID: Andrea Mckenzie is a 78 y o  female  HPI: 78 y  o female presenting with pain in lateral aspect of left hip down to left lateral knee  She is still doing PT after left TKR and still has pain and achiness of knees  SUBJECTIVE    Family History   Problem Relation Age of Onset    No Known Problems Mother     Diabetes Father     Stroke Father         syndrome     Social History     Socioeconomic History    Marital status:       Spouse name: Not on file    Number of children: Not on file    Years of education: Not on file    Highest education level: Not on file   Occupational History    Not on file   Social Needs    Financial resource strain: Not on file    Food insecurity:     Worry: Not on file     Inability: Not on file    Transportation needs:     Medical: Not on file     Non-medical: Not on file   Tobacco Use    Smoking status: Former Smoker     Last attempt to quit:      Years since quittin 1    Smokeless tobacco: Never Used   Substance and Sexual Activity    Alcohol use: No    Drug use: Never    Sexual activity: Not Currently     Partners: Male     Birth control/protection: Post-menopausal   Lifestyle    Physical activity:     Days per week: Not on file     Minutes per session: Not on file    Stress: Not on file   Relationships    Social connections:     Talks on phone: Not on file     Gets together: Not on file     Attends Sikh service: Not on file     Active member of club or organization: Not on file     Attends meetings of clubs or organizations: Not on file     Relationship status: Not on file    Intimate partner violence:     Fear of current or ex partner: Not on file     Emotionally abused: Not on file     Physically abused: Not on file     Forced sexual activity: Not on file   Other Topics Concern    Not on file   Social History Narrative    Daily coffee consumption (__cups/day)     Daily cola consumption (__cans/day)     Daily tea consumption (__cups/day)      Past Medical History:   Diagnosis Date    Hypertension     UTI (urinary tract infection)      Past Surgical History:   Procedure Laterality Date    EYE SURGERY      JOINT REPLACEMENT Right     Knee 11/15/19     Allergies   Allergen Reactions    Ciprofloxacin      Chest discomfort and dizziness    Diclofenac     Diphenhydramine        Current Outpatient Medications:     acetaminophen (TYLENOL) 325 mg tablet, Take 975 mg by mouth every 8 (eight) hours, Disp: , Rfl:     ALPRAZolam (XANAX) 0 25 mg tablet, Take 1 tablet (0 25 mg total) by mouth 3 (three) times a day as needed for anxiety, Disp: 90 tablet, Rfl: 3    amLODIPine (NORVASC) 5 mg tablet, TAKE 1 TABLET EVERY DAY, Disp: 90 tablet, Rfl: 0    aspirin 81 mg chewable tablet, Chew 81 mg 2 (two) times a day, Disp: , Rfl:     Cholecalciferol (VITAMIN D3) 50 MCG (2000 UT) capsule, Take 2,000 Units by mouth daily, Disp: , Rfl:     lisinopril (ZESTRIL) 5 mg tablet, TAKE 1 TABLET EVERY DAY, Disp: 90 tablet, Rfl: 0    Melatonin 3 MG CAPS, Take 3 mg by mouth  , Disp: , Rfl:     Multiple Vitamins-Minerals (PRESERVISION AREDS PO), Take by mouth, Disp: , Rfl:     MYRBETRIQ 25 MG TB24, TAKE 1 TABLET EVERY DAY, Disp: 90 tablet, Rfl: 3    pantoprazole (PROTONIX) 40 mg tablet, Take 1 tablet (40 mg total) by mouth daily, Disp: 30 tablet, Rfl: 2    bisacodyl (DULCOLAX) 5 mg EC tablet, Take 2 tablets (10 mg total) by mouth once for 1 dose, Disp: 2 tablet, Rfl: 0    Fe Cbn-Fe Gluc-FA-B12-C-DSS (FERRALET 90) 90-1 MG TABS, Take 1 tablet by mouth daily, Disp: 30 each, Rfl: 4    oxyCODONE-acetaminophen (PERCOCET) 5-325 mg per tablet, Take 1 tablet by mouth every 8 (eight) hours as needed for moderate painMax Daily Amount: 3 tablets, Disp: 90 tablet, Rfl: 0    polyethylene glycol (GOLYTELY) 4000 mL solution, Take 4,000 mL by mouth once for 1 dose, Disp: 4000 mL, Rfl: 0    predniSONE 10 mg tablet, 3 tabs po bid x2 days, then 2 tabs po bid x2 days, then 1 tab bid x2 days, then 1 daily until done , Disp: 26 tablet, Rfl: 0    Review of Systems  Constitutional:     Denies fever, chills ,fatigue ,weakness ,weight loss, weight gain     ENT: Denies earache ,loss of hearing ,nosebleed, nasal discharge,nasal congestion ,sore throat ,hoarseness  Pulmonary: Denies shortness of breath ,cough  ,dyspnea on exertion, orthopnea  ,PND   Cardiovascular:  Denies bradycardia , tachycardia  ,palpations, lower extremity edema leg, claudication  Breast:  Denies new or changing breast lumps ,nipple discharge ,nipple changes  Abdomen:  Denies abdominal pain , anorexia , indigestion, nausea, vomiting, constipation, diarrhea  Musculoskeletal: Denies myalgias, arthralgias, joint swelling, joint stiffness , limb pain, limb swelling  Gu: denies dysuria, polyuria  Skin: Denies skin rash, skin lesion, skin wound, itching, dry skin  Neuro: Denies headache, numbness, tingling, confusion, loss of consciousness, dizziness, vertigo  Psychiatric: Denies feelings of depression, suicidal ideation, anxiety, sleep disturbances    OBJECTIVE  /60 (BP Location: Left arm, Patient Position: Sitting, Cuff Size: Large)   Pulse 82   Temp 98 8 °F (37 1 °C) (Tympanic)   Ht 5' 3" (1 6 m)   Wt 85 kg (187 lb 6 oz)   BMI 33 19 kg/m²   Constitutional:   NAD, well appearing and well nourished      ENT:   Conjunctiva and lids: no injection, edema, or discharge     Pupils and iris: REENA bilaterally    External inspection of ears and nose: normal without deformities or discharge  Otoscopic exam: Canals patent without erythema  Nasal mucosa, septum and turbinates: Normal or edema or discharge         Oropharynx:  Moist mucosa, normal tongue and tonsils without lesions  No erythema        Pulmonary:Respiratory effort normal rate and rhythm, no increased work of breathing   Auscultation of lungs:  Clear bilaterally with no adventitious breath sounds       Cardiovascular: regular rate and rhythm, S1 and S2, no murmur, no edema and/or varicosities of LE      Abdomen: Soft and non-distended     Positive bowel sounds      No heptomegaly or splenomegaly      Gu: no suprapubic tenderness or CVA tenderness, no urethral discharge  Lymphatic:  No anterior or posterior cervical lymphadenopathy         Musculoskeletal:  Gait and station: Normal gait      Digits and nails normal without clubbing or cyanosis       Inspection/palpation of joints, bones, and muscles: + tenderness on palpation of left IT band and pain with active and passive range of motion       Skin: Normal skin turgor and no rashes      Neuro:      Normal reflexes      Psych:   alert and oriented to person, place and time     normal mood and affect       Assessment/Plan:Diagnoses and all orders for this visit:    Iliotibial band syndrome of left side  -     predniSONE 10 mg tablet; 3 tabs po bid x2 days, then 2 tabs po bid x2 days, then 1 tab bid x2 days, then 1 daily until done  Pain in both knees, unspecified chronicity  -     oxyCODONE-acetaminophen (PERCOCET) 5-325 mg per tablet; Take 1 tablet by mouth every 8 (eight) hours as needed for moderate painMax Daily Amount: 3 tablets    Other orders  -     Discontinue: Naproxen Sodium (ALEVE) 220 MG CAPS; Take by mouth        Reviewed with patient plan to treat with plan as above      Patient instructed to call in 72 hours if not feeling better or if symptoms worsen

## 2020-02-10 NOTE — LETTER
2020    Flor Scott MD  C[facility] D[facility]    Patient: Daniel Ramos   YOB: 1940   Date of Visit: 2/10/2020     Encounter Diagnosis     ICD-10-CM    1  Acute pain of right knee M25 561    2  Status post right knee replacement Z96 [id number]    3  Left hip pain M25 552        Dear Dr Shoemaker Lack:    Thank you for your recent referral of Daniel Ramos  Please review the attached evaluation summary from Jose Alejandro's recent visit  Please verify that you agree with the plan of care by signing the attached order  If you have any questions or concerns, please do not hesitate to call  I sincerely appreciate the opportunity to share in the care of one of your patients and hope to have another opportunity to work with you in the near future  Sincerely,    Allan Greenberg, PT      Referring Provider:      I certify that I have read the below Plan of Care and certify the need for these services furnished under this plan of treatment while under my care  Flor Scott MD  [facility] [address]   [address]: [phone]          PT Re-Evaluation     Today's date: 2/10/2020  Patient name: Daniel Ramos  : 1940  MRN: [id number]  Referring provider: Skinny Avalos MD  Dx:   Encounter Diagnosis     ICD-10-CM    1  Acute pain of right knee M25 561    2  Status post right knee replacement [id number] [id number]                   Assessment  Assessment details: Daniel Ramos is a 78 y o  female s/p R TKR on 11/15/19  She has demonstrated improved pain levels, increased knee ROM and strength, and increased function  Patient continues to have abnormal gait and balance, and decreased knee mobility  In addition, patient has been complaining of left hip and groin pain with symptoms consistent with ITB syndrome    Patient would benefit from continued physical therapy in order to address said deficits and improve functional mobility  Impairments: abnormal gait, abnormal or restricted ROM, abnormal movement, activity intolerance, impaired balance, impaired physical strength, lacks appropriate home exercise program, pain with function, safety issue, weight-bearing intolerance, poor posture  and poor body mechanics  Understanding of Dx/Px/POC: good   Prognosis: good    Goals  STG:  Increase knee flexion > 8 degrees-met  Pt will go up/down stairs to basement-partially me t    LTG:  Increase knee flexion to 120 degrees-progressing   Increase LE strength at least 1 grade   Decreased TUG by 3 seconds to improve gait safety  Decrease 5TSTS by 3 seconds     Plan  Patient would benefit from: skilled physical therapy  Planned modality interventions: cryotherapy  Planned therapy interventions: patient education, postural training, self care, strengthening, stretching, therapeutic activities, therapeutic exercise, flexibility, graded activity, functional ROM exercises, body mechanics training, home exercise program, abdominal trunk stabilization, joint mobilization and manual therapy  Frequency: 2x week  Duration in weeks: 4  Treatment plan discussed with: patient        Subjective Evaluation    History of Present Illness  Mechanism of injury: Patient reports her right knee has been doing very well  She had an episode of having pain in left hip/thigh which has caused more difficulty walking  She is using [facility] currently and feels confident  Has gone down to the basement 2x to do laundry  Pt complains of left hip/groin pain x~x3 weeks which has been limiting mobility    ----------------------------------------------------------------------------------------  Patient is s/p right knee replacement on 11/15/19  She did have rehab for 5 days and then got 4 weeks of ome PT, last visit was Monday 12/30  1 step to get onto porch, and then 1 into house  Using shower chair, no problems getting socks and shoes on   Pt is doing light cleaning at home and very light cooking  Has not done laundry yet, it is in basement  Patient is using [facility] and feels better with use due to improving balance  Significantly pronated on R>L foot  Pt has started driving  Pain  Current pain ratin  At worst pain ratin    Patient Goals  Patient goal: cooking (met) , laundry (in basement)         Objective     Active Range of Motion   Left Knee   Flexion: 112 degrees   Extension: 0 degrees     Right Knee   Flexion: 114 degrees   Extension: 3 degrees     Strength/Myotome Testing     Left Knee   Flexion: 4+  Extension: 5    Right Knee   Flexion: 5  Extension: 5    Additional Strength Details  Hip flexion R 4+/5, L 4-/5  Hip abduction 3-/5 bilaterally  Ankle DF 4+/5 bl     Significant trunk flexion and forward lean with ambulation  Pronated feet bl R>L with genuvalgus bl          Tests     Left Hip   Negative LIBORIO, FADIR and scour       Additional Tests Details  Pain and tenderness along ITB, Greater trochanter, piriformis   5TSTS: both arm rests 35 02 seconds   5TSTS: 40 70 seconds at RE 2/10 with arm rests (pt reports slower because of back pain    TU 09 seconds with both arm rests and SPC in R hand   TU 96 sec at RE 10 with SPC and arm rests              Precautions: None       Manual    2/ 2/6 2/10   R Knee PROM  10' 10' 10' 10' NV   L sidelying hip flexor stretch  2' gentle                                      Exercise Diary   2/4 2/6 10   Bike AAROM 5' seat 16 5' 5' 5' held held 5'    Calf SB stretch  20"x3 R  20"x3 20"x3 20"x3 20"x3 20" x3   Marches at bar NV         Hip abd standing 2x10 ea 2x10 2x10 ea 2x10 ea x10 ea 2x10 ea 2x10    Hip ext standing 2x10 ea 2x10 2x10 ea 2x10 ea 2x10 ea  2x10 ea 2x10 ea   Heel/toe raises  20 ea 2x10 ea  2x10 ea 2x10 ea 2x10 ea 2x10 ea 2x10   Side step at bar 2 laps 2 laps  2 laps 2 laps 2 laps 2 laps    Step up and over 4" x10 up only   4" x10 up+over 4" x10 up and over 4" x10 up over    Standing L/s extensions          LAQ 3x10 2x10 3#  3# 2x10 3# 2x10 3# 2x10 3#  2x10    Heel slides 15x5" 15x5" 15x5" 15x5" 15x5" 15x5"    SLR flexion 2x10 2x10 2x10 2x10 2x10 2x10 2x10    Glute bridge 3x10 2x10  2x10 2x10 2x10 2x10 NV   Clamshells (supine?) RTB 2x10         Sit to stands from chair 2x5, 2 foams x5  HOLD        Mini squats   2x10  2x10 2x10 held                                                 Modalities  1/3            CP PRN

## 2020-02-11 ENCOUNTER — OFFICE VISIT (OUTPATIENT)
Dept: UROLOGY | Facility: CLINIC | Age: 80
End: 2020-02-11
Payer: MEDICARE

## 2020-02-11 ENCOUNTER — TRANSCRIBE ORDERS (OUTPATIENT)
Dept: PHYSICAL THERAPY | Facility: CLINIC | Age: 80
End: 2020-02-11

## 2020-02-11 VITALS
HEART RATE: 96 BPM | BODY MASS INDEX: 33.31 KG/M2 | DIASTOLIC BLOOD PRESSURE: 88 MMHG | SYSTOLIC BLOOD PRESSURE: 150 MMHG | HEIGHT: 63 IN | WEIGHT: 188 LBS

## 2020-02-11 DIAGNOSIS — R35.1 NOCTURIA: Primary | ICD-10-CM

## 2020-02-11 DIAGNOSIS — R35.0 URINARY FREQUENCY: ICD-10-CM

## 2020-02-11 DIAGNOSIS — M25.561 RIGHT KNEE PAIN, UNSPECIFIED CHRONICITY: Primary | ICD-10-CM

## 2020-02-11 DIAGNOSIS — M25.552 PAIN OF LEFT HIP JOINT: Primary | ICD-10-CM

## 2020-02-11 LAB — POST-VOID RESIDUAL VOLUME, ML POC: 166 ML

## 2020-02-11 PROCEDURE — 51798 US URINE CAPACITY MEASURE: CPT | Performed by: PHYSICIAN ASSISTANT

## 2020-02-11 PROCEDURE — 3077F SYST BP >= 140 MM HG: CPT | Performed by: PHYSICIAN ASSISTANT

## 2020-02-11 PROCEDURE — 1036F TOBACCO NON-USER: CPT | Performed by: PHYSICIAN ASSISTANT

## 2020-02-11 PROCEDURE — 1160F RVW MEDS BY RX/DR IN RCRD: CPT | Performed by: PHYSICIAN ASSISTANT

## 2020-02-11 PROCEDURE — 3079F DIAST BP 80-89 MM HG: CPT | Performed by: PHYSICIAN ASSISTANT

## 2020-02-11 PROCEDURE — 99213 OFFICE O/P EST LOW 20 MIN: CPT | Performed by: PHYSICIAN ASSISTANT

## 2020-02-11 PROCEDURE — 4040F PNEUMOC VAC/ADMIN/RCVD: CPT | Performed by: PHYSICIAN ASSISTANT

## 2020-02-11 PROCEDURE — 3008F BODY MASS INDEX DOCD: CPT | Performed by: PHYSICIAN ASSISTANT

## 2020-02-11 NOTE — PROGRESS NOTES
1  Nocturia  POCT Measure PVR   2  Urinary frequency  Mirabegron ER (MYRBETRIQ) 25 MG TB24         Assessment and plan:       1  Urinary urgency, frequency, suprapubic pressure   - improved controlled Myrbetriq 25 mg daily  - she will continue to take this medication on a daily basis  We reviewed the importance of proper hydration, timed voiding, and avoidance of constipation  - patient is not a good candidate for anticholinergic therapy given her age and risk for confusion   - patient will follow up in 1 year with a PVR for symptom reassessment  There were to contact us sooner with any urologic concern  All questions answered  Patient's blood pressure is elevated in the office today 150/88  Patient reports that she forgets to take her blood pressure medication yesterday and today  She is counseled to take this when she gets home and continue to monitor  Contact primary care with continued elevated levels  Andrea Ayala PA-C      Chief Complaint     Chief Complaint   Patient presents with    Urinary Frequency         History of Present Illness     Jose Alejandro Hunt Said is a 78 y o  presenting for follow up of recurrent urinary infections lower urinary tract symptoms presenting for follow-up  Patient had been seen in consultation in September 2018 due to recurrent urinary tract infections  She was treated on multiple antibiotics, however did not have a culture proven infection  Patient's main urinary in symptoms included urinary frequency, urgency, suprapubic pressure  Patient did admit to occasional constipation as well  Patient recently completed a course of antibiotics in the end of January 2020 pre culture positive E coli urinary infection  Patient states that she had recent knee arthroplasty and had a catheter in prior to her most recent urinary infection  Previously was previously initiated on Myrbetriq 25 mg daily  Patient has noticed symptom improvement    She denies any further suprapubic pressure  She feels like she can hold her urine for an adequate amount of time and denies any urinary incontinence  She denies any dysuria, gross hematuria, flank pain, fevers, or chills  Patient had ultrasound of the kidney and bladder 09/21/2018 which was negative for any urologic abnormalities  She had an estimated voided postvoid residual at that time approximately 93 mL  PVR 166mL  Urinary Incontinence Screening      Most Recent Value   Urinary Incontinence   Urinary Incontinence? No   Incomplete emptying? Yes [not always ]   Urinary frequency? Yes   Urinary urgency? No   Urinary hesitancy? No   Nocturia (waking up to use the bathroom)? Yes [2x]   Straining (having to push to go)? No   Weak stream?  Yes [not always ]   Intermittent stream?  Yes   Post void dribbling? No          Laboratory     Lab Results   Component Value Date    CREATININE 0 88 11/28/2019       Review of Systems     Review of Systems   Constitutional: Negative for activity change, appetite change, chills, diaphoresis, fatigue, fever and unexpected weight change  Respiratory: Negative for chest tightness and shortness of breath  Cardiovascular: Negative for chest pain, palpitations and leg swelling  Gastrointestinal: Negative for abdominal distention, abdominal pain, constipation, diarrhea, nausea and vomiting  Genitourinary: Negative for decreased urine volume, difficulty urinating, dysuria, enuresis, flank pain, frequency, genital sores, hematuria and urgency  Nocturia 2 times nightly   Musculoskeletal: Positive for arthralgias (Recent right knee arthroplasty)  Negative for back pain, gait problem and myalgias  Skin: Negative for color change, pallor, rash and wound  Psychiatric/Behavioral: Negative for behavioral problems  The patient is not nervous/anxious            Allergies     Allergies   Allergen Reactions    Ciprofloxacin      Chest discomfort and dizziness    Diclofenac  Diphenhydramine        Physical Exam     Physical Exam   Constitutional: She is oriented to person, place, and time  She appears well-developed and well-nourished  No distress  HENT:   Head: Normocephalic and atraumatic  Eyes: Conjunctivae are normal    Neck: Normal range of motion  No tracheal deviation present  Pulmonary/Chest: Effort normal    Musculoskeletal: She exhibits no deformity  Ambulates with cane assistance  Very mild bilateral lower extremity edema   Neurological: She is alert and oriented to person, place, and time  Skin: Skin is warm and dry  She is not diaphoretic  No erythema  No pallor  Psychiatric: She has a normal mood and affect   Her behavior is normal          Vital Signs     Vitals:    02/11/20 1252   BP: 150/88   Pulse: 96   Weight: 85 3 kg (188 lb)   Height: 5' 3" (1 6 m)         Current Medications       Current Outpatient Medications:     acetaminophen (TYLENOL) 325 mg tablet, Take 975 mg by mouth every 8 (eight) hours, Disp: , Rfl:     ALPRAZolam (XANAX) 0 25 mg tablet, Take 1 tablet (0 25 mg total) by mouth 3 (three) times a day as needed for anxiety, Disp: 90 tablet, Rfl: 3    amLODIPine (NORVASC) 5 mg tablet, TAKE 1 TABLET EVERY DAY, Disp: 90 tablet, Rfl: 0    aspirin 81 mg chewable tablet, Chew 81 mg 2 (two) times a day, Disp: , Rfl:     Cholecalciferol (VITAMIN D3) 50 MCG (2000 UT) capsule, Take 2,000 Units by mouth daily, Disp: , Rfl:     lisinopril (ZESTRIL) 5 mg tablet, TAKE 1 TABLET EVERY DAY, Disp: 90 tablet, Rfl: 0    Melatonin 3 MG CAPS, Take 3 mg by mouth  , Disp: , Rfl:     Mirabegron ER (MYRBETRIQ) 25 MG TB24, Take 25 mg by mouth daily, Disp: 90 tablet, Rfl: 3    Multiple Vitamins-Minerals (PRESERVISION AREDS PO), Take by mouth, Disp: , Rfl:     oxyCODONE-acetaminophen (PERCOCET) 5-325 mg per tablet, Take 1 tablet by mouth every 8 (eight) hours as needed for moderate painMax Daily Amount: 3 tablets, Disp: 90 tablet, Rfl: 0    pantoprazole (PROTONIX) 40 mg tablet, Take 1 tablet (40 mg total) by mouth daily, Disp: 30 tablet, Rfl: 2    predniSONE 10 mg tablet, 3 tabs po bid x2 days, then 2 tabs po bid x2 days, then 1 tab bid x2 days, then 1 daily until done , Disp: 26 tablet, Rfl: 0    bisacodyl (DULCOLAX) 5 mg EC tablet, Take 2 tablets (10 mg total) by mouth once for 1 dose, Disp: 2 tablet, Rfl: 0    Fe Cbn-Fe Gluc-FA-B12-C-DSS (FERRALET 90) 90-1 MG TABS, Take 1 tablet by mouth daily, Disp: 30 each, Rfl: 4    polyethylene glycol (GOLYTELY) 4000 mL solution, Take 4,000 mL by mouth once for 1 dose, Disp: 4000 mL, Rfl: 0      Active Problems     Patient Active Problem List   Diagnosis    Aftercare following right knee joint replacement surgery    Drug-induced constipation    Ambulatory dysfunction    Essential hypertension    Anxiety    Right knee pain    Anemia    Symptomatic anemia    Guaiac positive stools    Absolute anemia    Melena         Past Medical History     Past Medical History:   Diagnosis Date    Hypertension     UTI (urinary tract infection)          Surgical History     Past Surgical History:   Procedure Laterality Date    EYE SURGERY      JOINT REPLACEMENT Right     Knee 11/15/19         Family History     Family History   Problem Relation Age of Onset    No Known Problems Mother     Diabetes Father     Stroke Father         syndrome         Social History     Social History       Radiology

## 2020-02-13 ENCOUNTER — OFFICE VISIT (OUTPATIENT)
Dept: PHYSICAL THERAPY | Facility: CLINIC | Age: 80
End: 2020-02-13
Payer: MEDICARE

## 2020-02-13 ENCOUNTER — TELEPHONE (OUTPATIENT)
Dept: FAMILY MEDICINE CLINIC | Facility: CLINIC | Age: 80
End: 2020-02-13

## 2020-02-13 DIAGNOSIS — M25.561 ACUTE PAIN OF RIGHT KNEE: Primary | ICD-10-CM

## 2020-02-13 DIAGNOSIS — Z96.651 STATUS POST RIGHT KNEE REPLACEMENT: ICD-10-CM

## 2020-02-13 DIAGNOSIS — M25.552 LEFT HIP PAIN: ICD-10-CM

## 2020-02-13 PROCEDURE — 97140 MANUAL THERAPY 1/> REGIONS: CPT | Performed by: PHYSICAL THERAPIST

## 2020-02-13 PROCEDURE — 97110 THERAPEUTIC EXERCISES: CPT | Performed by: PHYSICAL THERAPIST

## 2020-02-13 PROCEDURE — 97112 NEUROMUSCULAR REEDUCATION: CPT | Performed by: PHYSICAL THERAPIST

## 2020-02-13 NOTE — TELEPHONE ENCOUNTER
If she is interested, I can write an order for PT to do ultrasound treatments to her ileotibial band

## 2020-02-13 NOTE — TELEPHONE ENCOUNTER
Patient calling with update  Patient has one more prednisone pill left  She still has pain in the leg until about lunch time

## 2020-02-13 NOTE — PROGRESS NOTES
Daily Note     Today's date: 2020  Patient name: Daniel Ramos  : 1940  MRN: [id number]  Referring provider: Skinny Avalos MD  Dx:   Encounter Diagnosis     ICD-10-CM    1  Acute pain of right knee M25 561    2  Status post right knee replacement Z96 651    3  Left hip pain M25 552                   Subjective: Patient reports she didn't feel too bad after last session  She is a bit tender In left hip  Objective: See treatment diary below      Assessment: Tolerated treatment well  Patient would benefit from continued PT  Patient has tenderness with manuals to left hip, especially at piriformis and at greater trochanter  She tolerates exercises well  Mild tenderness with right knee PROM at end range of flexion  Plan: Progress treatment as tolerated         Precautions: None       Manual   1/28 1/30 2/4 2/6 2/10 2/13   R Knee PROM  10' 10' 10' 10' NV 6'   L sidelying hip flexor stretch  2' gentle        therabar roller ITB L side       6'   Left piriformis release        5'   Supine L ITB stretch        NV       Exercise Diary  1/30 2/4 2/6 2/10 2/13   Bike AAROM 5' held held 5'  5'    Calf SB stretch  20"x3 20"x3 20"x3 20" x3 20" x3    Marches at bar        Hip abd standing 2x10 ea x10 ea 2x10 ea 2x10  2# 2x10 bl    Hip ext standing 2x10 ea 2x10 ea  2x10 ea 2x10 ea 2# 2x10 bl     Heel/toe raises  2x10 ea 2x10 ea 2x10 ea 2x10    Side step at bar 2 laps 2 laps 2 laps     Step up and over 4" x10 up+over 4" x10 up and over 4" x10 up over     Standing L/s extensions        LAQ 3# 2x10 3# 2x10 3#  2x10     Heel slides 15x5" 15x5" 15x5"     SLR flexion 2x10 2x10 2x10 2x10  2x10   Glute bridge 2x10 2x10 2x10 NV    Clamshells      NV  No band   Sit to stands from chair        Mini squats  2x10 held                                          Modalities  1/3       CP PRN

## 2020-02-14 DIAGNOSIS — M76.32 ILIOTIBIAL BAND SYNDROME, LEFT LEG: Primary | ICD-10-CM

## 2020-02-18 ENCOUNTER — OFFICE VISIT (OUTPATIENT)
Dept: PHYSICAL THERAPY | Facility: CLINIC | Age: 80
End: 2020-02-18
Payer: MEDICARE

## 2020-02-18 DIAGNOSIS — M25.552 LEFT HIP PAIN: ICD-10-CM

## 2020-02-18 DIAGNOSIS — M25.561 ACUTE PAIN OF RIGHT KNEE: Primary | ICD-10-CM

## 2020-02-18 DIAGNOSIS — Z96.651 STATUS POST RIGHT KNEE REPLACEMENT: ICD-10-CM

## 2020-02-18 PROCEDURE — 97140 MANUAL THERAPY 1/> REGIONS: CPT

## 2020-02-18 PROCEDURE — 97110 THERAPEUTIC EXERCISES: CPT

## 2020-02-18 PROCEDURE — 97112 NEUROMUSCULAR REEDUCATION: CPT

## 2020-02-18 NOTE — PROGRESS NOTES
Daily Note     Today's date: 2020  Patient name: Maria E Naranjo  : 1940  MRN: [id number]  Referring provider: Swathi Morgan MD  Dx:   Encounter Diagnosis     ICD-10-CM    1  Acute pain of right knee M25 561    2  Status post right knee replacement Z96 651    3  Left hip pain M25 552                   Subjective: Pt states her hip feels "a little better", and notes no adverse effects following last tx         Objective: See treatment diary below      Assessment: Tolerated treatment well  Patient would benefit from continued PT  Pt challenged by supine SLR due lumbar discomfort  TTP of L superior glutes, mid ITB, and piriformis persists  Mild discomfort noted w/ end range passive knee flexion  Plan: Progress treatment as tolerated  Precautions: None       Manual     R Knee PROM 7'      6'   L sidelying hip flexor stretch          therabar roller ITB L side 4'      6'   Left piriformis release  5'      5'   Supine L ITB stretch        NV       Exercise Diary     Bike AAROM 5'    5'    Calf SB stretch  20"x3    20" x3    Marches at bar        Hip abd standing 2# 2x10 ea    2# 2x10 bl    Hip ext standing 2# 2x10 ea    2# 2x10 bl     Heel/toe raises     2x10    Side step at bar NV ?  2 laps     Step up and over NV ?   4" x10 up over     Standing L/s extensions        LAQ 3# 2x10       Heel slides 15x5"       SLR flexion x10   2x10  2x10   Glute bridge x10   NV    Clamshells      NV  No band   Sit to stands from chair        Mini squats   held                                          Modalities  1/3       CP PRN

## 2020-02-19 NOTE — PROGRESS NOTES
Daily Note     Today's date: 2020  Patient name: Desiree Ellis  : 1940  MRN: [id number]  Referring provider: Eliza Sotomayor MD  Dx:   Encounter Diagnosis     ICD-10-CM    1  Acute pain of right knee M25 561    2  Status post right knee replacement Z96 651    3  Left hip pain M25 552                   Subjective: Pt states no adverse effects following last visit  Objective: See treatment diary below      Assessment: Tolerated treatment well  Patient would benefit from continued PT   4" step ups mildly challenging  TTP of L mid ITB and piriformis persists  Mild discomfort noted w/ PROM  Plan: Progress treatment as tolerated  Precautions: None       Manual     R Knee PROM 7' 7'     6'   L sidelying hip flexor stretch          therabar roller ITB L side 4' 4'     6'   Left piriformis release  5' 4'     5'   Supine L ITB stretch        NV       Exercise Diary     Bike AAROM 5' 5'   5'    Calf SB stretch  20"x3 20"x3   20" x3    Marches at bar        Hip abd standing 2# 2x10 ea 2# 2x10 ea   2# 2x10 bl    Hip ext standing 2# 2x10 ea 2# 2x10 ea   2# 2x10 bl     Heel/toe raises         Side step at bar NV ? 2 laps      Step up and over NV ?  4" x10      Standing L/s extensions        LAQ 3# 2x10 3# 2x10      Heel slides 15x5"       SLR flexion x10 2x10  2x10  2x10   Glute bridge x10 x10  NV    Clamshells      NV  No band   Sit to stands from chair        Mini squats   held                                          Modalities  1/3       CP PRN

## 2020-02-20 ENCOUNTER — OFFICE VISIT (OUTPATIENT)
Dept: PHYSICAL THERAPY | Facility: CLINIC | Age: 80
End: 2020-02-20
Payer: MEDICARE

## 2020-02-20 DIAGNOSIS — Z96.651 STATUS POST RIGHT KNEE REPLACEMENT: ICD-10-CM

## 2020-02-20 DIAGNOSIS — M25.552 LEFT HIP PAIN: ICD-10-CM

## 2020-02-20 DIAGNOSIS — M25.561 ACUTE PAIN OF RIGHT KNEE: Primary | ICD-10-CM

## 2020-02-20 PROCEDURE — 97112 NEUROMUSCULAR REEDUCATION: CPT

## 2020-02-20 PROCEDURE — 97140 MANUAL THERAPY 1/> REGIONS: CPT

## 2020-02-20 PROCEDURE — 97110 THERAPEUTIC EXERCISES: CPT

## 2020-02-25 ENCOUNTER — OFFICE VISIT (OUTPATIENT)
Dept: PHYSICAL THERAPY | Facility: CLINIC | Age: 80
End: 2020-02-25
Payer: MEDICARE

## 2020-02-25 DIAGNOSIS — M25.552 LEFT HIP PAIN: ICD-10-CM

## 2020-02-25 DIAGNOSIS — M25.561 ACUTE PAIN OF RIGHT KNEE: Primary | ICD-10-CM

## 2020-02-25 DIAGNOSIS — Z96.651 STATUS POST RIGHT KNEE REPLACEMENT: ICD-10-CM

## 2020-02-25 PROCEDURE — 97110 THERAPEUTIC EXERCISES: CPT

## 2020-02-25 PROCEDURE — 97140 MANUAL THERAPY 1/> REGIONS: CPT

## 2020-02-25 PROCEDURE — 97112 NEUROMUSCULAR REEDUCATION: CPT

## 2020-02-25 NOTE — PROGRESS NOTES
Daily Note     Today's date: 2020  Patient name: Mich Valdez  : 1940  MRN: [id number]  Referring provider: Payton Oakes MD  Dx:   Encounter Diagnosis     ICD-10-CM    1  Acute pain of right knee M25 561    2  Status post right knee replacement Z96 651    3  Left hip pain M25 552                   Subjective: Pt states her L hip is feeling "a little better" and the R knee is "achy" today  Objective: See treatment diary below      Assessment: Tolerated treatment well  Patient exhibited good technique with therapeutic exercises  Minimal discomfort noted w/ PROM end range  TTP of L ITB persists  4" step ups challenging for pt  Plan: Progress treatment as tolerated  Precautions: None       Manual     R Knee PROM 7' 7' 7'    6'   L sidelying hip flexor stretch          therabar roller ITB L side 4' 4' 4'    6'   Left piriformis release  5' 4' 4'    5'   Supine L ITB stretch        NV       Exercise Diary     Bike AAROM 5' 5' 5'  5'    Calf SB stretch  20"x3 20"x3 20"x3  20" x3    Marches at bar        Hip abd standing 2# 2x10 ea 2# 2x10 ea 2# 2x10 ea  2# 2x10 bl    Hip ext standing 2# 2x10 ea 2# 2x10 ea 2# 2x10 ea  2# 2x10 bl     Heel/toe raises         Side step at bar NV ? 2 laps 2 laps     Step up and over NV ?  4" x10 4"x10     Standing L/s extensions        LAQ 3# 2x10 3# 2x10 3# 2x10     Heel slides 15x5"       SLR flexion x10 2x10 2x10  2x10   Glute bridge x10 x10 x15     Clamshells      NV  No band   Sit to stands from chair        Mini squats   held                                          Modalities  1/3       CP PRN

## 2020-02-26 NOTE — PROGRESS NOTES
Daily Note     Today's date: 2020  Patient name: Neli Gaviria  : 1940  MRN: [id number]  Referring provider: Soy Whitehead MD  Dx:   Encounter Diagnosis     ICD-10-CM    1  Acute pain of right knee M25 561    2  Status post right knee replacement Z96 651    3  Left hip pain M25 552                   Subjective: Pt states she is "a little stiff today"  Pt states she occasionally ambulates w/out SPC at home  Objective: See treatment diary below      Assessment: Tolerated treatment well  Patient would benefit from continued PT  Tolerates addition of SLS well  TTP L piriformis persists  No sig pain noted w/ knee PROM  Plan: Progress treatment as tolerated  Precautions: None       Manual     R Knee PROM 7' 7' 7' 7'   6'   L sidelying hip flexor stretch          therabar roller ITB L side 4' 4' 4' 4'   6'   Left piriformis release  5' 4' 4' 4'   5'   Supine L ITB stretch        NV       Exercise Diary      Bike AAROM 5' 5' 5' 5'    Calf SB stretch  20"x3 20"x3 20"x3 20"x3    Marches at bar        Hip abd standing 2# 2x10 ea 2# 2x10 ea 2# 2x10 ea 2# 2x10 ea    Hip ext standing 2# 2x10 ea 2# 2x10 ea 2# 2x10 ea 2# 2x10 ea    Heel/toe raises         Side step at bar NV ? 2 laps 2 laps 2 laps    Step up and over NV ?  4" x10 4"x10 4"x10    Standing L/s extensions        LAQ 3# 2x10 3# 2x10 3# 2x10 3# 2x10    Heel slides 15x5"       SLR flexion x10 2x10 2x10 2x10    Glute bridge x10 x10 x15 x15    Clamshells         Sit to stands from chair        Mini squats   held      SLS    10"x5                                Modalities  1/3       CP PRN

## 2020-02-27 ENCOUNTER — OFFICE VISIT (OUTPATIENT)
Dept: PHYSICAL THERAPY | Facility: CLINIC | Age: 80
End: 2020-02-27
Payer: MEDICARE

## 2020-02-27 DIAGNOSIS — Z96.651 STATUS POST RIGHT KNEE REPLACEMENT: ICD-10-CM

## 2020-02-27 DIAGNOSIS — M25.552 LEFT HIP PAIN: ICD-10-CM

## 2020-02-27 DIAGNOSIS — M25.561 ACUTE PAIN OF RIGHT KNEE: Primary | ICD-10-CM

## 2020-02-27 PROCEDURE — 97110 THERAPEUTIC EXERCISES: CPT

## 2020-02-27 PROCEDURE — 97140 MANUAL THERAPY 1/> REGIONS: CPT

## 2020-02-27 PROCEDURE — 97112 NEUROMUSCULAR REEDUCATION: CPT

## 2020-03-02 ENCOUNTER — OFFICE VISIT (OUTPATIENT)
Dept: PHYSICAL THERAPY | Facility: CLINIC | Age: 80
End: 2020-03-02
Payer: MEDICARE

## 2020-03-02 ENCOUNTER — TELEPHONE (OUTPATIENT)
Dept: GASTROENTEROLOGY | Facility: AMBULARY SURGERY CENTER | Age: 80
End: 2020-03-02

## 2020-03-02 DIAGNOSIS — M25.552 LEFT HIP PAIN: ICD-10-CM

## 2020-03-02 DIAGNOSIS — M25.561 PAIN IN BOTH KNEES, UNSPECIFIED CHRONICITY: ICD-10-CM

## 2020-03-02 DIAGNOSIS — M25.562 PAIN IN BOTH KNEES, UNSPECIFIED CHRONICITY: ICD-10-CM

## 2020-03-02 DIAGNOSIS — D64.9 ANEMIA: ICD-10-CM

## 2020-03-02 DIAGNOSIS — Z96.651 STATUS POST RIGHT KNEE REPLACEMENT: ICD-10-CM

## 2020-03-02 DIAGNOSIS — M25.561 ACUTE PAIN OF RIGHT KNEE: Primary | ICD-10-CM

## 2020-03-02 PROCEDURE — 97112 NEUROMUSCULAR REEDUCATION: CPT

## 2020-03-02 PROCEDURE — 97140 MANUAL THERAPY 1/> REGIONS: CPT

## 2020-03-02 PROCEDURE — 97110 THERAPEUTIC EXERCISES: CPT

## 2020-03-02 RX ORDER — OXYCODONE HYDROCHLORIDE AND ACETAMINOPHEN 5; 325 MG/1; MG/1
1 TABLET ORAL EVERY 8 HOURS PRN
Qty: 90 TABLET | Refills: 0 | Status: SHIPPED | OUTPATIENT
Start: 2020-03-02 | End: 2020-04-01 | Stop reason: SDUPTHER

## 2020-03-02 RX ORDER — PANTOPRAZOLE SODIUM 40 MG/1
40 TABLET, DELAYED RELEASE ORAL DAILY
Qty: 30 TABLET | Refills: 11 | Status: SHIPPED | OUTPATIENT
Start: 2020-03-02 | End: 2020-10-09

## 2020-03-02 NOTE — PROGRESS NOTES
Daily Note     Today's date: 3/2/2020  Patient name: Patrick Acosta  : 1940  MRN: [id number]  Referring provider: Demetria Garcia MD  Dx:   Encounter Diagnosis     ICD-10-CM    1  Acute pain of right knee M25 561    2  Status post right knee replacement Z96 651    3  Left hip pain M25 552                   Subjective: Pt states her knee feels good, but continues to have L hip stiffness in AM     Objective: See treatment diary below      Assessment: Tolerated treatment well  Patient would benefit from continued PT   TTP L sup glute piriformis persists  No sig pain noted w/ knee PROM  Added clamshells to program; increased difficulty noted on L  Plan: Progress treatment as tolerated  Precautions: None       Manual  2/18 2/20 2/25 2/27 3/2  2/13   R Knee PROM 7' 7' 7' 7' 7'  6'   L sidelying hip flexor stretch          therabar roller ITB L side 4' 4' 4' 4' 4'  6'   Left piriformis release  5' 4' 4' 4' 4'  5'   Supine L ITB stretch        NV       Exercise Diary  2/18 2/20 2/25 2/27 3/2   Bike AAROM 5' 5' 5' 5' 5'   Calf SB stretch  20"x3 20"x3 20"x3 20"x3 20"x3   Marches at bar        Hip abd standing 2# 2x10 ea 2# 2x10 ea 2# 2x10 ea 2# 2x10 ea 2# 2x10 ea   Hip ext standing 2# 2x10 ea 2# 2x10 ea 2# 2x10 ea 2# 2x10 ea 2# 2x10 ea   Heel/toe raises         Side step at bar NV ? 2 laps 2 laps 2 laps 2 laps   Step up and over NV ?  4" x10 4"x10 4"x10 4"x10   Standing L/s extensions        LAQ 3# 2x10 3# 2x10 3# 2x10 3# 2x10 3# 2x10   Heel slides 15x5"       SLR flexion x10 2x10 2x10 2x10 2x10   Glute bridge x10 x10 x15 x15 2x10   Clamshells      10 ea   Sit to stands from chair        Mini squats   held      SLS    10"x5 10"x5                               Modalities  1/3       CP PRN

## 2020-03-02 NOTE — TELEPHONE ENCOUNTER
GI Physician: Dr Wilcox Seek    Refill Request for Medication: PANTOPRAZOLE    Dose: 40 MG    Quantity: 30 TABS    Pharmacy:  [address] [place] [address]

## 2020-03-04 ENCOUNTER — OFFICE VISIT (OUTPATIENT)
Dept: PHYSICAL THERAPY | Facility: CLINIC | Age: 80
End: 2020-03-04
Payer: MEDICARE

## 2020-03-04 DIAGNOSIS — M25.552 LEFT HIP PAIN: ICD-10-CM

## 2020-03-04 DIAGNOSIS — M25.561 ACUTE PAIN OF RIGHT KNEE: Primary | ICD-10-CM

## 2020-03-04 DIAGNOSIS — Z96.651 STATUS POST RIGHT KNEE REPLACEMENT: ICD-10-CM

## 2020-03-04 PROCEDURE — 97112 NEUROMUSCULAR REEDUCATION: CPT | Performed by: PHYSICAL THERAPIST

## 2020-03-04 PROCEDURE — 97110 THERAPEUTIC EXERCISES: CPT | Performed by: PHYSICAL THERAPIST

## 2020-03-04 PROCEDURE — 97140 MANUAL THERAPY 1/> REGIONS: CPT | Performed by: PHYSICAL THERAPIST

## 2020-03-04 NOTE — PROGRESS NOTES
Daily Note     Today's date: 3/4/2020  Patient name: Matthew Ramírez  : 1940  MRN: [id number]  Referring provider: Fatuma Mart MD  Dx:   Encounter Diagnosis     ICD-10-CM    1  Acute pain of right knee M25 561    2  Status post right knee replacement Z96 651    3  Left hip pain M25 552                   Subjective: Patient reports she still gets sore in hip and knee, especially after getting up from sitting for a while  Objective: See treatment diary below      Assessment: Tolerated treatment well  Patient would benefit from continued PT  Patient tolerated progression in weighs and function well  She has less tenderness at piriformis today  She continues to be tender along ITB with roller  Continue progressing patients strength and functional activities as tolerated  Plan: Progress treatment as tolerated  Precautions: None       Manual  2/18 2/20 2/25 2/27 3/2 3/4 2/13   R Knee PROM 7' 7' 7' 7' 7' 6' 6'   L sidelying hip flexor stretch          therabar roller ITB L side 4' 4' 4' 4' 4' 4' 6'   Left piriformis release  5' 4' 4' 4' 4' 4' 5'   Supine L ITB stretch        NV       Exercise Diary  2/18 2/20 2/25 2/27 3/2 3/4   Bike AAROM 5' 5' 5' 5' 5' 5'   Calf SB stretch  20"x3 20"x3 20"x3 20"x3 20"x3 20" x3    Marches at bar      3# 2x10    Hip abd standing 2# 2x10 ea 2# 2x10 ea 2# 2x10 ea 2# 2x10 ea 2# 2x10 ea 3#  2x10     Hip ext standing 2# 2x10 ea 2# 2x10 ea 2# 2x10 ea 2# 2x10 ea 2# 2x10 ea 3# 2x10   Heel/toe raises          Side step at bar NV ? 2 laps 2 laps 2 laps 2 laps With TB NV   Step up and over NV ?  4" x10 4"x10 4"x10 4"x10 6" x10 with SPC and rail   LAQ 3# 2x10 3# 2x10 3# 2x10 3# 2x10 3# 2x10 5# 2x10     Knee ext machine NV   Heel slides 15x5"        SLR flexion x10 2x10 2x10 2x10 2x10 2x10    Glute bridge x10 x10 x15 x15 2x10 2x10    Clamshells      10 ea NV   Sit to stands from chair         Mini squats   held       SLS    10"x5 10"x5 Modalities  1/3       CP PRN

## 2020-03-10 ENCOUNTER — EVALUATION (OUTPATIENT)
Dept: PHYSICAL THERAPY | Facility: CLINIC | Age: 80
End: 2020-03-10
Payer: MEDICARE

## 2020-03-10 DIAGNOSIS — M25.561 ACUTE PAIN OF RIGHT KNEE: Primary | ICD-10-CM

## 2020-03-10 DIAGNOSIS — Z96.651 STATUS POST RIGHT KNEE REPLACEMENT: ICD-10-CM

## 2020-03-10 DIAGNOSIS — M25.552 LEFT HIP PAIN: ICD-10-CM

## 2020-03-10 PROCEDURE — 97530 THERAPEUTIC ACTIVITIES: CPT | Performed by: PHYSICAL THERAPIST

## 2020-03-10 PROCEDURE — 97140 MANUAL THERAPY 1/> REGIONS: CPT | Performed by: PHYSICAL THERAPIST

## 2020-03-10 PROCEDURE — 97110 THERAPEUTIC EXERCISES: CPT | Performed by: PHYSICAL THERAPIST

## 2020-03-10 NOTE — PROGRESS NOTES
PT Re-Evaluation     Today's date: 3/10/2020  Patient name: Lani Meyer  : 1940  MRN: [id number]  Referring provider: Oksana Angelucci, MD  Dx:   Encounter Diagnosis     ICD-10-CM    1  Acute pain of right knee M25 561    2  Status post right knee replacement Z96 651    3  Left hip pain M25 552                   Assessment  Assessment details: Lani Meyer is a 78 y o  female s/p R TKR on 11/15/19  She has continued to improved pain , increased knee ROM and strength, and increased function though she has continued left anterior and lateral hip pain  Patient continues to have slight deficits in gait and balance, and decreased knee mobility  Patient is slowly pateauing with physical therapy  She would benefit from 2 more ontinued physical therapy in order to address said deficits and improve functional mobility with progression to d/c with HEP     Impairments: abnormal gait, abnormal or restricted ROM, abnormal movement, activity intolerance, impaired balance, impaired physical strength, lacks appropriate home exercise program, pain with function, safety issue, weight-bearing intolerance, poor posture  and poor body mechanics  Understanding of Dx/Px/POC: good   Prognosis: good    Goals  STG:  Increase knee flexion > 8 degrees-met  Pt will go up/down stairs to basement-partially met    LTG:  Increase knee flexion to 120 degrees-mostly met   Increase LE strength at least 1 grade -met  Decreased TUG by 3 seconds to improve gait safety-not met   Decrease 5TSTS by 3 seconds -met    Plan  Patient would benefit from: skilled physical therapy  Planned modality interventions: cryotherapy  Planned therapy interventions: patient education, postural training, self care, strengthening, stretching, therapeutic activities, therapeutic exercise, flexibility, graded activity, functional ROM exercises, body mechanics training, home exercise program, abdominal trunk stabilization, joint mobilization and manual therapy  Frequency: 2x week  Duration in weeks: 2  Treatment plan discussed with: patient        Subjective Evaluation    History of Present Illness  Mechanism of injury: Patient reports she continues to feel better and her right leg feels stronger than her left leg with mobility at times  She goes down into basement 1-2x/wk to do laundry  She goes down backward and goes up one step at a time  ------------------------------------------------------------------------------------------  Patient reports her right knee has been doing very well  She had an episode of having pain in left hip/thigh which has caused more difficulty walking  She is using [facility] currently and feels confident  Has gone down to the basement 2x to do laundry  Pt complains of left hip/groin pain x~x3 weeks which has been limiting mobility    ----------------------------------------------------------------------------------------  Patient is s/p right knee replacement on 11/15/19  She did have rehab for 5 days and then got 4 weeks of ome PT, last visit was   1 step to get onto porch, and then 1 into house  Using shower chair, no problems getting socks and shoes on  Pt is doing light cleaning at home and very light cooking  Has not done laundry yet, it is in basement  Patient is using [facility] and feels better with use due to improving balance  Significantly pronated on R>L foot  Pt has started driving     Pain  Current pain ratin  At worst pain ratin  Location: 4/10 at worst R knee, 4/10 at worst in left hip     Patient Goals  Patient goal: cooking (met) , laundry (in basement)         Objective     Active Range of Motion   Left Knee   Flexion: 112 degrees   Extension: 0 degrees     Right Knee   Flexion: 117 degrees   Extension: 3 degrees     Strength/Myotome Testing     Left Knee   Flexion: 4+  Extension: 5    Right Knee   Flexion: 5  Extension: 5    Additional Strength Details  Hip flexion R 5/5, L 4/5  Hip abduction 3-/5 bilaterally  Ankle DF 4+/5 bl     Significant trunk flexion and forward lean with ambulation  Pronated feet bl R>L with genuvalgus bl          Tests     Left Hip   Negative LIBORIO, ALAYNAIR and sharon  Additional Tests Details  Pain and tenderness along ITB, Greater trochanter, piriformis   5TSTS: both arm rests 35 02 seconds   5TSTS: 40 70 seconds at RE 2/10 with arm rests (pt reports slower because of back pain  5TSTS: 32 52 seconds at RE 3/10 with both arm rests     TU 09 seconds with both arm rests and SPC in R hand   TU 96 sec at RE 2/10 with SPC and arm rests   TU 13 sec at RE 3/10 with SPC and arm rest              Precautions: None     Manual  2/18 2/20 2/25 2/27 3/2 3/4 3/10   R Knee PROM 7' 7' 7' 7' 7' 6' 5'   L sidelying hip flexor stretch          therabar roller ITB L side 4' 4' 4' 4' 4' 4' 5'   Left piriformis release  5' 4' 4' 4' 4' 4' 5'   Supine L ITB stretch               Exercise Diary   3/2 3/4 3/10   Bike AAROM 5' 5' 5' 5' 5' 5' 5'   Calf SB stretch  20"x3 20"x3 20"x3 20"x3 20"x3 20" x3     Marches at bar      3# 2x10  3# 2x10   Hip abd standing 2# 2x10 ea 2# 2x10 ea 2# 2x10 ea 2# 2x10 ea 2# 2x10 ea 3#  2x10   3# 2x10   Hip ext standing 2# 2x10 ea 2# 2x10 ea 2# 2x10 ea 2# 2x10 ea 2# 2x10 ea 3# 2x10 3# 2x10   Heel/toe raises           Side step at bar NV ? 2 laps 2 laps 2 laps 2 laps With TB NV yellow TB x2 laps    Step up and over NV ?  4" x10 4"x10 4"x10 4"x10 6" x10 with SPC and rail    LAQ 3# 2x10 3# 2x10 3# 2x10 3# 2x10 3# 2x10 5# 2x10     Knee ext machine NV knee ext machine 11# x10 shameka coty knees    Heel slides 15x5"         SLR flexion x10 2x10 2x10 2x10 2x10 2x10  x10 bl    Glute bridge x10 x10 x15 x15 2x10 2x10  x10     Clamshells      10 ea NV NV   Sit to stands from chair          Mini squats   held        SLS    10"x5 10"x5                                       Modalities  1/3       CP PRN

## 2020-03-10 NOTE — LETTER
March 10, 2020    Elizabeth Dolan MD  C/Talon Yeung    Patient: Liz Hernadez   YOB: 1940   Date of Visit: 3/10/2020     Encounter Diagnosis     ICD-10-CM    1  Acute pain of right knee M25 561    2  Status post right knee replacement Z96 651    3  Left hip pain M25 552        Dear Dr Chris Parmar:    Thank you for your recent referral of Liz Hernadez  Please review the attached evaluation summary from de's recent visit  Please verify that you agree with the plan of care by signing the attached order  If you have any questions or concerns, please do not hesitate to call  I sincerely appreciate the opportunity to share in the care of one of your patients and hope to have another opportunity to work with you in the near future  Sincerely,    Nicci Soto, PT      Referring Provider:      I certify that I have read the below Plan of Care and certify the need for these services furnished under this plan of treatment while under my care  Elizabeth Dolan MD  [address]: [phone]          PT Re-Evaluation     Today's date: 3/10/2020  Patient name: Liz Hernadez  : 1940  MRN: [id number]  Referring provider: Dante Garcia MD  Dx:   Encounter Diagnosis     ICD-10-CM    1  Acute pain of right knee M25 561    2  Status post right knee replacement Z96 651    3  Left hip pain M25 552                   Assessment  Assessment details: Liz Hernadez is a 78 y o  female s/p R TKR on 11/15/19  She has continued to improved pain , increased knee ROM and strength, and increased function though she has continued left anterior and lateral hip pain  Patient continues to have slight deficits in gait and balance, and decreased knee mobility  Patient is slowly pateauing with physical therapy   She would benefit from 2 more ontinued physical therapy in order to address said deficits and improve functional mobility with progression to d/c with HEP  Impairments: abnormal gait, abnormal or restricted ROM, abnormal movement, activity intolerance, impaired balance, impaired physical strength, lacks appropriate home exercise program, pain with function, safety issue, weight-bearing intolerance, poor posture  and poor body mechanics  Understanding of Dx/Px/POC: good   Prognosis: good    Goals  STG:  Increase knee flexion > 8 degrees-met  Pt will go up/down stairs to basement-partially met    LTG:  Increase knee flexion to 120 degrees-mostly met   Increase LE strength at least 1 grade -met  Decreased TUG by 3 seconds to improve gait safety-not met   Decrease 5TSTS by 3 seconds -met    Plan  Patient would benefit from: skilled physical therapy  Planned modality interventions: cryotherapy  Planned therapy interventions: patient education, postural training, self care, strengthening, stretching, therapeutic activities, therapeutic exercise, flexibility, graded activity, functional ROM exercises, body mechanics training, home exercise program, abdominal trunk stabilization, joint mobilization and manual therapy  Frequency: 2x week  Duration in weeks: 2  Treatment plan discussed with: patient        Subjective Evaluation    History of Present Illness  Mechanism of injury: Patient reports she continues to feel better and her right leg feels stronger than her left leg with mobility at times  She goes down into basement 1-2x/wk to do laundry  She goes down backward and goes up one step at a time  ------------------------------------------------------------------------------------------  Patient reports her right knee has been doing very well  She had an episode of having pain in left hip/thigh which has caused more difficulty walking  She is using [facility] currently and feels confident  Has gone down to the basement 2x to do laundry      Pt complains of left hip/groin pain x~x3 weeks which has been limiting mobility    ----------------------------------------------------------------------------------------  Patient is s/p right knee replacement on 11/15/19  She did have rehab for 5 days and then got 4 weeks of ome PT, last visit was   1 step to get onto porch, and then 1 into house  Using shower chair, no problems getting socks and shoes on  Pt is doing light cleaning at home and very light cooking  Has not done laundry yet, it is in basement  Patient is using [facility] and feels better with use due to improving balance  Significantly pronated on R>L foot  Pt has started driving  Pain  Current pain ratin  At worst pain ratin  Location: 4/10 at worst R knee, 4/10 at worst in left hip     Patient Goals  Patient goal: cooking (met) , laundry (in basement)         Objective     Active Range of Motion   Left Knee   Flexion: 112 degrees   Extension: 0 degrees     Right Knee   Flexion: 117 degrees   Extension: 3 degrees     Strength/Myotome Testing     Left Knee   Flexion: 4+  Extension: 5    Right Knee   Flexion: 5  Extension: 5    Additional Strength Details  Hip flexion R 5/5, L 4/5  Hip abduction 3-/5 bilaterally  Ankle DF 4+/5 bl     Significant trunk flexion and forward lean with ambulation  Pronated feet bl R>L with genuvalgus bl          Tests     Left Hip   Negative LIBORIO, FADIR and scour       Additional Tests Details  Pain and tenderness along ITB, Greater trochanter, piriformis   5TSTS: both arm rests 35 02 seconds   5TSTS: 40 70 seconds at RE 2/10 with arm rests (pt reports slower because of back pain  5TSTS: 32 52 seconds at RE 3/10 with both arm rests     TU 09 seconds with both arm rests and SPC in R hand   TU 96 sec at RE 2/10 with SPC and arm rests   TU 13 sec at RE 3/10 with SPC and arm rest              Precautions: None     Manual   3/ 3/ 3/10   R Knee PROM 7' 7' 7' 7' 7' 6' 5'   L sidelying hip flexor stretch therabar roller ITB L side 4' 4' 4' 4' 4' 4' 5'   Left piriformis release  5' 4' 4' 4' 4' 4' 5'   Supine L ITB stretch               Exercise Diary  2/18 2/20 2/25 2/27 3/2 3/4 3/10   Bike AAROM 5' 5' 5' 5' 5' 5' 5'   Calf SB stretch  20"x3 20"x3 20"x3 20"x3 20"x3 20" x3     Marches at bar      3# 2x10  3# 2x10   Hip abd standing 2# 2x10 ea 2# 2x10 ea 2# 2x10 ea 2# 2x10 ea 2# 2x10 ea 3#  2x10   3# 2x10   Hip ext standing 2# 2x10 ea 2# 2x10 ea 2# 2x10 ea 2# 2x10 ea 2# 2x10 ea 3# 2x10 3# 2x10   Heel/toe raises           Side step at bar NV ? 2 laps 2 laps 2 laps 2 laps With TB NV yellow TB x2 laps    Step up and over NV ?  4" x10 4"x10 4"x10 4"x10 6" x10 with SPC and rail    LAQ 3# 2x10 3# 2x10 3# 2x10 3# 2x10 3# 2x10 5# 2x10     Knee ext machine NV knee ext machine 11# x10 shameka coty knees    Heel slides 15x5"         SLR flexion x10 2x10 2x10 2x10 2x10 2x10  x10 bl    Glute bridge x10 x10 x15 x15 2x10 2x10  x10     Clamshells      10 ea NV NV   Sit to stands from chair          Mini squats   held        SLS    10"x5 10"x5                                       Modalities  1/3       CP PRN

## 2020-03-12 ENCOUNTER — OFFICE VISIT (OUTPATIENT)
Dept: PHYSICAL THERAPY | Facility: CLINIC | Age: 80
End: 2020-03-12
Payer: MEDICARE

## 2020-03-12 DIAGNOSIS — Z96.651 STATUS POST RIGHT KNEE REPLACEMENT: ICD-10-CM

## 2020-03-12 DIAGNOSIS — M25.552 LEFT HIP PAIN: ICD-10-CM

## 2020-03-12 DIAGNOSIS — M25.561 ACUTE PAIN OF RIGHT KNEE: Primary | ICD-10-CM

## 2020-03-12 PROCEDURE — 97140 MANUAL THERAPY 1/> REGIONS: CPT

## 2020-03-12 PROCEDURE — 97110 THERAPEUTIC EXERCISES: CPT

## 2020-03-12 PROCEDURE — 97112 NEUROMUSCULAR REEDUCATION: CPT

## 2020-03-12 NOTE — PROGRESS NOTES
Daily Note     Today's date: 3/12/2020  Patient name: Ines Kellogg  : 1940  MRN: [id number]  Referring provider: Didier Cruz MD  Dx:   Encounter Diagnosis     ICD-10-CM    1  Acute pain of right knee M25 561    2  Status post right knee replacement Z96 651    3  Left hip pain M25 552                   Subjective: Pt states no new complaints  Objective: See treatment diary below      Assessment: Tolerated treatment well  Patient would benefit from continued PT   TTP L piriformis persists  No sig discomfort noted w/ knee PROM  Pt unable to perform knee extension machine due to pain; perform LAQ w/ lighter wt nv  Plan: Progress treatment as tolerated  Precautions: None     Manual  3/12      3/10   R Knee PROM 7'      5'   L sidelying hip flexor stretch          therabar roller ITB L side 5'      5'   Left piriformis release  3'      5'   Supine L ITB stretch               Exercise Diary  3/12      3/10   Bike AAROM 5'      5'   Calf SB stretch  20"x3         Marches at bar 3# 2x10      3# 2x10   Hip abd standing 3# 2x10 ea      3# 2x10   Hip ext standing 3# 2x10 ea      3# 2x10   Heel/toe raises           Side step at bar YTB 3 laps     With TB NV yellow TB x2 laps    Step up and over      6" x10 with SPC and rail    Knee ext machine 11#  Knee p!  LAQ 4# nv?     knee ext machine 11# x10 shameka coty knees    Heel slides          SLR flexion 2x10      x10 bl    Glute bridge 2x10      x10     Clamshells  x15 ea      NV   Sit to stands from chair          Mini squats           SLS                                            Modalities  1/3       CP PRN

## 2020-03-16 ENCOUNTER — APPOINTMENT (OUTPATIENT)
Dept: PHYSICAL THERAPY | Facility: CLINIC | Age: 80
End: 2020-03-16
Payer: MEDICARE

## 2020-03-19 ENCOUNTER — APPOINTMENT (OUTPATIENT)
Dept: PHYSICAL THERAPY | Facility: CLINIC | Age: 80
End: 2020-03-19
Payer: MEDICARE

## 2020-03-24 ENCOUNTER — APPOINTMENT (OUTPATIENT)
Dept: PHYSICAL THERAPY | Facility: CLINIC | Age: 80
End: 2020-03-24
Payer: MEDICARE

## 2020-03-26 ENCOUNTER — APPOINTMENT (OUTPATIENT)
Dept: PHYSICAL THERAPY | Facility: CLINIC | Age: 80
End: 2020-03-26
Payer: MEDICARE

## 2020-03-31 ENCOUNTER — APPOINTMENT (OUTPATIENT)
Dept: PHYSICAL THERAPY | Facility: CLINIC | Age: 80
End: 2020-03-31
Payer: MEDICARE

## 2020-04-01 DIAGNOSIS — M25.561 PAIN IN BOTH KNEES, UNSPECIFIED CHRONICITY: ICD-10-CM

## 2020-04-01 DIAGNOSIS — F41.9 ANXIETY: ICD-10-CM

## 2020-04-01 DIAGNOSIS — M25.562 PAIN IN BOTH KNEES, UNSPECIFIED CHRONICITY: ICD-10-CM

## 2020-04-01 RX ORDER — ALPRAZOLAM 0.25 MG/1
0.25 TABLET ORAL 3 TIMES DAILY PRN
Qty: 90 TABLET | Refills: 3 | Status: SHIPPED | OUTPATIENT
Start: 2020-04-01 | End: 2021-01-18 | Stop reason: SDUPTHER

## 2020-04-01 RX ORDER — OXYCODONE HYDROCHLORIDE AND ACETAMINOPHEN 5; 325 MG/1; MG/1
1 TABLET ORAL EVERY 8 HOURS PRN
Qty: 90 TABLET | Refills: 0 | Status: SHIPPED | OUTPATIENT
Start: 2020-04-01 | End: 2020-05-20 | Stop reason: SDUPTHER

## 2020-04-07 ENCOUNTER — TELEMEDICINE (OUTPATIENT)
Dept: FAMILY MEDICINE CLINIC | Facility: CLINIC | Age: 80
End: 2020-04-07
Payer: MEDICARE

## 2020-04-07 DIAGNOSIS — N39.0 URINARY TRACT INFECTION WITHOUT HEMATURIA, SITE UNSPECIFIED: Primary | ICD-10-CM

## 2020-04-07 PROCEDURE — 99442 PR PHYS/QHP TELEPHONE EVALUATION 11-20 MIN: CPT | Performed by: FAMILY MEDICINE

## 2020-04-07 RX ORDER — DOXYCYCLINE HYCLATE 100 MG/1
100 CAPSULE ORAL EVERY 12 HOURS SCHEDULED
Qty: 20 CAPSULE | Refills: 0 | Status: SHIPPED | OUTPATIENT
Start: 2020-04-07 | End: 2020-04-17

## 2020-04-07 RX ORDER — PHENAZOPYRIDINE HYDROCHLORIDE 200 MG/1
200 TABLET, FILM COATED ORAL
Qty: 9 TABLET | Refills: 0 | Status: SHIPPED | OUTPATIENT
Start: 2020-04-07 | End: 2020-09-24 | Stop reason: ALTCHOICE

## 2020-05-20 DIAGNOSIS — M25.561 PAIN IN BOTH KNEES, UNSPECIFIED CHRONICITY: ICD-10-CM

## 2020-05-20 DIAGNOSIS — M25.562 PAIN IN BOTH KNEES, UNSPECIFIED CHRONICITY: ICD-10-CM

## 2020-05-20 RX ORDER — OXYCODONE HYDROCHLORIDE AND ACETAMINOPHEN 5; 325 MG/1; MG/1
1 TABLET ORAL EVERY 8 HOURS PRN
Qty: 90 TABLET | Refills: 0 | Status: SHIPPED | OUTPATIENT
Start: 2020-05-20 | End: 2020-09-24 | Stop reason: ALTCHOICE

## 2020-06-04 DIAGNOSIS — I10 ESSENTIAL HYPERTENSION: ICD-10-CM

## 2020-06-05 RX ORDER — AMLODIPINE BESYLATE 5 MG/1
TABLET ORAL
Qty: 90 TABLET | Refills: 0 | Status: SHIPPED | OUTPATIENT
Start: 2020-06-05 | End: 2020-09-03

## 2020-06-05 RX ORDER — LISINOPRIL 5 MG/1
TABLET ORAL
Qty: 90 TABLET | Refills: 0 | Status: SHIPPED | OUTPATIENT
Start: 2020-06-05 | End: 2020-09-03

## 2020-09-02 DIAGNOSIS — I10 ESSENTIAL HYPERTENSION: ICD-10-CM

## 2020-09-03 RX ORDER — AMLODIPINE BESYLATE 5 MG/1
TABLET ORAL
Qty: 90 TABLET | Refills: 0 | Status: SHIPPED | OUTPATIENT
Start: 2020-09-03 | End: 2020-11-21

## 2020-09-03 RX ORDER — LISINOPRIL 5 MG/1
TABLET ORAL
Qty: 90 TABLET | Refills: 0 | Status: SHIPPED | OUTPATIENT
Start: 2020-09-03 | End: 2020-11-21

## 2020-09-24 ENCOUNTER — OFFICE VISIT (OUTPATIENT)
Dept: FAMILY MEDICINE CLINIC | Facility: CLINIC | Age: 80
End: 2020-09-24
Payer: MEDICARE

## 2020-09-24 VITALS
HEIGHT: 63 IN | OXYGEN SATURATION: 99 % | SYSTOLIC BLOOD PRESSURE: 124 MMHG | TEMPERATURE: 99 F | DIASTOLIC BLOOD PRESSURE: 80 MMHG | BODY MASS INDEX: 32.6 KG/M2 | WEIGHT: 184 LBS | HEART RATE: 78 BPM

## 2020-09-24 DIAGNOSIS — R31.9 URINARY TRACT INFECTION WITH HEMATURIA, SITE UNSPECIFIED: ICD-10-CM

## 2020-09-24 DIAGNOSIS — I10 ESSENTIAL HYPERTENSION: ICD-10-CM

## 2020-09-24 DIAGNOSIS — Z78.0 ASYMPTOMATIC POSTMENOPAUSAL ESTROGEN DEFICIENCY: ICD-10-CM

## 2020-09-24 DIAGNOSIS — Z23 NEED FOR VACCINATION: ICD-10-CM

## 2020-09-24 DIAGNOSIS — E78.00 HYPERCHOLESTEROLEMIA: ICD-10-CM

## 2020-09-24 DIAGNOSIS — D64.9 ANEMIA, UNSPECIFIED TYPE: ICD-10-CM

## 2020-09-24 DIAGNOSIS — Z00.00 MEDICARE ANNUAL WELLNESS VISIT, SUBSEQUENT: Primary | ICD-10-CM

## 2020-09-24 DIAGNOSIS — N39.0 URINARY TRACT INFECTION WITH HEMATURIA, SITE UNSPECIFIED: ICD-10-CM

## 2020-09-24 LAB
SL AMB  POCT GLUCOSE, UA: NEGATIVE
SL AMB LEUKOCYTE ESTERASE,UA: ABNORMAL
SL AMB POCT BILIRUBIN,UA: NEGATIVE
SL AMB POCT BLOOD,UA: ABNORMAL
SL AMB POCT CLARITY,UA: CLEAR
SL AMB POCT COLOR,UA: YELLOW
SL AMB POCT KETONES,UA: NEGATIVE
SL AMB POCT NITRITE,UA: NEGATIVE
SL AMB POCT PH,UA: 5
SL AMB POCT SPECIFIC GRAVITY,UA: 1.02
SL AMB POCT URINE PROTEIN: ABNORMAL
SL AMB POCT UROBILINOGEN: 0.2

## 2020-09-24 PROCEDURE — G0008 ADMIN INFLUENZA VIRUS VAC: HCPCS | Performed by: FAMILY MEDICINE

## 2020-09-24 PROCEDURE — G0439 PPPS, SUBSEQ VISIT: HCPCS | Performed by: FAMILY MEDICINE

## 2020-09-24 PROCEDURE — 81002 URINALYSIS NONAUTO W/O SCOPE: CPT | Performed by: FAMILY MEDICINE

## 2020-09-24 PROCEDURE — 90662 IIV NO PRSV INCREASED AG IM: CPT | Performed by: FAMILY MEDICINE

## 2020-09-24 PROCEDURE — 99214 OFFICE O/P EST MOD 30 MIN: CPT | Performed by: FAMILY MEDICINE

## 2020-09-24 RX ORDER — CEPHALEXIN 500 MG/1
500 CAPSULE ORAL EVERY 8 HOURS SCHEDULED
Qty: 30 CAPSULE | Refills: 0 | Status: ON HOLD | OUTPATIENT
Start: 2020-09-24 | End: 2020-09-30 | Stop reason: SDUPTHER

## 2020-09-24 NOTE — PROGRESS NOTES
Assessment and Plan:     Problem List Items Addressed This Visit     None      Pt is due for a dexa scan  Preventive health issues were discussed with patient, and age appropriate screening tests were ordered as noted in patient's After Visit Summary  Personalized health advice and appropriate referrals for health education or preventive services given if needed, as noted in patient's After Visit Summary  History of Present Illness:     Patient presents for Medicare Annual Wellness visit    Patient Care Team:  Pollo Kilgore DO as PCP - MD Genesis Jaime MD     Problem List:     Patient Active Problem List   Diagnosis    Aftercare following right knee joint replacement surgery    Drug-induced constipation    Ambulatory dysfunction    Essential hypertension    Anxiety    Right knee pain    Anemia    Symptomatic anemia    Guaiac positive stools    Absolute anemia    Melena      Past Medical and Surgical History:     Past Medical History:   Diagnosis Date    Hypertension     UTI (urinary tract infection)      Past Surgical History:   Procedure Laterality Date    EYE SURGERY      JOINT REPLACEMENT Right     Knee 11/15/19      Family History:     Family History   Problem Relation Age of Onset    No Known Problems Mother     Diabetes Father     Stroke Father         syndrome      Social History:        Social History     Socioeconomic History    Marital status:       Spouse name: None    Number of children: None    Years of education: None    Highest education level: None   Occupational History    None   Social Needs    Financial resource strain: None    Food insecurity     Worry: None     Inability: None    Transportation needs     Medical: None     Non-medical: None   Tobacco Use    Smoking status: Former Smoker     Last attempt to quit:      Years since quittin 7    Smokeless tobacco: Never Used   Substance and Sexual Activity    Alcohol use: No    Drug use: Never    Sexual activity: Not Currently     Partners: Male     Birth control/protection: Post-menopausal   Lifestyle    Physical activity     Days per week: None     Minutes per session: None    Stress: None   Relationships    Social connections     Talks on phone: None     Gets together: None     Attends Cheondoism service: None     Active member of club or organization: None     Attends meetings of clubs or organizations: None     Relationship status: None    Intimate partner violence     Fear of current or ex partner: None     Emotionally abused: None     Physically abused: None     Forced sexual activity: None   Other Topics Concern    None   Social History Narrative    Daily coffee consumption (__cups/day)     Daily cola consumption (__cans/day)     Daily tea consumption (__cups/day)       Medications and Allergies:     Current Outpatient Medications   Medication Sig Dispense Refill    acetaminophen (TYLENOL) 325 mg tablet Take 975 mg by mouth every 8 (eight) hours      ALPRAZolam (XANAX) 0 25 mg tablet Take 1 tablet (0 25 mg total) by mouth 3 (three) times a day as needed for anxiety 90 tablet 3    amLODIPine (NORVASC) 5 mg tablet TAKE 1 TABLET EVERY DAY 90 tablet 0    Cholecalciferol (VITAMIN D3) 50 MCG (2000 UT) capsule Take 2,000 Units by mouth daily      lisinopril (ZESTRIL) 5 mg tablet TAKE 1 TABLET EVERY DAY 90 tablet 0    Melatonin 3 MG CAPS Take 3 mg by mouth        Mirabegron ER (MYRBETRIQ) 25 MG TB24 Take 25 mg by mouth daily 90 tablet 3    Multiple Vitamins-Minerals (PRESERVISION AREDS PO) Take by mouth      pantoprazole (PROTONIX) 40 mg tablet Take 1 tablet (40 mg total) by mouth daily 30 tablet 11     No current facility-administered medications for this visit        Allergies   Allergen Reactions    Ciprofloxacin      Chest discomfort and dizziness    Diclofenac     Diphenhydramine       Immunizations:     Immunization History Administered Date(s) Administered    Influenza Split High Dose Preservative Free IM 11/08/2012, 11/05/2013, 11/05/2013, 10/14/2014, 11/03/2015, 09/21/2016    Influenza TIV (IM) 10/12/2017    Influenza, high dose seasonal 0 7 mL 11/27/2018, 09/19/2019    Pneumococcal Conjugate 13-Valent 11/03/2015    Pneumococcal Polysaccharide PPV23 10/30/2006, 06/01/2017    TD (adult) Preservative Free 09/19/2019    Tdap 12/08/1999    Zoster 04/08/2009      Health Maintenance:         Topic Date Due    DXA SCAN  1940         Topic Date Due    Influenza Vaccine  07/01/2020      Medicare Health Risk Assessment:     /80   Pulse 78   Temp 99 °F (37 2 °C)   Ht 5' 3" (1 6 m)   Wt 83 5 kg (184 lb)   SpO2 99%   BMI 32 59 kg/m²      Indio Williamson is here for her Subsequent Wellness visit  Last Medicare Wellness visit information reviewed, patient interviewed, no change since last AWV  Health Risk Assessment:   Patient rates overall health as good  Patient feels that their physical health rating is same  Eyesight was rated as same  Hearing was rated as same  Patient feels that their emotional and mental health rating is same  Pain experienced in the last 7 days has been some  Patient's pain rating has been 3/10  Patient states that she has experienced no weight loss or gain in last 6 months  Depression Screening:   PHQ-2 Score: 2      Fall Risk Screening: In the past year, patient has experienced: no history of falling in past year      Urinary Incontinence Screening:   Patient has not leaked urine accidently in the last six months  Home Safety:  Patient has trouble with stairs inside or outside of their home  Patient has working smoke alarms and has working carbon monoxide detector  Home safety hazards include: none  Nutrition:   Current diet is Regular and Limited junk food  Medications:   Patient is currently taking over-the-counter supplements   OTC medications include: see medication list  Patient is able to manage medications  Activities of Daily Living (ADLs)/Instrumental Activities of Daily Living (IADLs):   Walk and transfer into and out of bed and chair?: Yes  Dress and groom yourself?: Yes    Bathe or shower yourself?: Yes    Feed yourself? Yes  Do your laundry/housekeeping?: Yes  Manage your money, pay your bills and track your expenses?: Yes  Make your own meals?: Yes    Do your own shopping?: Yes    Previous Hospitalizations:   Any hospitalizations or ED visits within the last 12 months?: No      Advance Care Planning:   Living will: Yes    Durable POA for healthcare: Yes    Advanced directive: Yes    Advanced directive counseling given: Yes    Five wishes given: Yes    Patient declined ACP directive: No    End of Life Decisions reviewed with patient: Yes    Provider agrees with end of life decisions: Yes      Cognitive Screening:   Provider or family/friend/caregiver concerned regarding cognition?: No    PREVENTIVE SCREENINGS      Cardiovascular Screening:    General: Screening Current      Diabetes Screening:     General: Screening Current      Colorectal Cancer Screening:     General: Screening Current      Cervical Cancer Screening:    General: Screening Not Indicated      Osteoporosis Screening:    General: Risks and Benefits Discussed    Due for: DXA Appendicular      Abdominal Aortic Aneurysm (AAA) Screening:        General: Screening Current and Screening Not Indicated      Lung Cancer Screening:     General: Screening Not Indicated      Hepatitis C Screening:    General: Screening Current    BMI Counseling: Body mass index is 32 59 kg/m²  The BMI is above normal  Nutrition recommendations include reducing portion sizes, decreasing overall calorie intake and moderation in carbohydrate intake      Lary Nassar DO

## 2020-09-24 NOTE — PATIENT INSTRUCTIONS
Medicare Preventive Visit Patient Instructions  Thank you for completing your Welcome to Medicare Visit or Medicare Annual Wellness Visit today  Your next wellness visit will be due in one year (9/24/2021)  The screening/preventive services that you may require over the next 5-10 years are detailed below  Some tests may not apply to you based off risk factors and/or age  Screening tests ordered at today's visit but not completed yet may show as past due  Also, please note that scanned in results may not display below  Preventive Screenings:  Service Recommendations Previous Testing/Comments   Colorectal Cancer Screening  * Colonoscopy    * Fecal Occult Blood Test (FOBT)/Fecal Immunochemical Test (FIT)  * Fecal DNA/Cologuard Test  * Flexible Sigmoidoscopy Age: 54-65 years old   Colonoscopy: every 10 years (may be performed more frequently if at higher risk)  OR  FOBT/FIT: every 1 year  OR  Cologuard: every 3 years  OR  Sigmoidoscopy: every 5 years  Screening may be recommended earlier than age 48 if at higher risk for colorectal cancer  Also, an individualized decision between you and your healthcare provider will decide whether screening between the ages of 74-80 would be appropriate  Colonoscopy: 12/18/2019  FOBT/FIT: Not on file  Cologuard: Not on file  Sigmoidoscopy: Not on file         Breast Cancer Screening Age: 36 years old  Frequency: every 1-2 years  Not required if history of left and right mastectomy Mammogram: 09/30/2010       Cervical Cancer Screening Between the ages of 21-29, pap smear recommended once every 3 years  Between the ages of 33-67, can perform pap smear with HPV co-testing every 5 years     Recommendations may differ for women with a history of total hysterectomy, cervical cancer, or abnormal pap smears in past  Pap Smear: Not on file    Screening Not Indicated   Hepatitis C Screening Once for adults born between 1945 and 1965  More frequently in patients at high risk for Hepatitis C Hep C Antibody: Not on file       Diabetes Screening 1-2 times per year if you're at risk for diabetes or have pre-diabetes Fasting glucose: 104 mg/dL   A1C: 5 7 % of total Hgb    Screening Current   Cholesterol Screening Once every 5 years if you don't have a lipid disorder  May order more often based on risk factors  Lipid panel: 09/04/2018    Screening Current     Other Preventive Screenings Covered by Medicare:  1  Abdominal Aortic Aneurysm (AAA) Screening: covered once if your at risk  You're considered to be at risk if you have a family history of AAA  2  Lung Cancer Screening: covers low dose CT scan once per year if you meet all of the following conditions: (1) Age 50-69; (2) No signs or symptoms of lung cancer; (3) Current smoker or have quit smoking within the last 15 years; (4) You have a tobacco smoking history of at least 30 pack years (packs per day multiplied by number of years you smoked); (5) You get a written order from a healthcare provider  3  Glaucoma Screening: covered annually if you're considered high risk: (1) You have diabetes OR (2) Family history of glaucoma OR (3)  aged 48 and older OR (3)  American aged 72 and older  3  Osteoporosis Screening: covered every 2 years if you meet one of the following conditions: (1) You're estrogen deficient and at risk for osteoporosis based off medical history and other findings; (2) Have a vertebral abnormality; (3) On glucocorticoid therapy for more than 3 months; (4) Have primary hyperparathyroidism; (5) On osteoporosis medications and need to assess response to drug therapy  · Last bone density test (DXA Scan): Not on file  5  HIV Screening: covered annually if you're between the age of 12-76  Also covered annually if you are younger than 13 and older than 72 with risk factors for HIV infection  For pregnant patients, it is covered up to 3 times per pregnancy      Immunizations:  Immunization Recommendations   Influenza Vaccine Annual influenza vaccination during flu season is recommended for all persons aged >= 6 months who do not have contraindications   Pneumococcal Vaccine (Prevnar and Pneumovax)  * Prevnar = PCV13  * Pneumovax = PPSV23   Adults 25-60 years old: 1-3 doses may be recommended based on certain risk factors  Adults 72 years old: Prevnar (PCV13) vaccine recommended followed by Pneumovax (PPSV23) vaccine  If already received PPSV23 since turning 65, then PCV13 recommended at least one year after PPSV23 dose  Hepatitis B Vaccine 3 dose series if at intermediate or high risk (ex: diabetes, end stage renal disease, liver disease)   Tetanus (Td) Vaccine - COST NOT COVERED BY MEDICARE PART B Following completion of primary series, a booster dose should be given every 10 years to maintain immunity against tetanus  Td may also be given as tetanus wound prophylaxis  Tdap Vaccine - COST NOT COVERED BY MEDICARE PART B Recommended at least once for all adults  For pregnant patients, recommended with each pregnancy  Shingles Vaccine (Shingrix) - COST NOT COVERED BY MEDICARE PART B  2 shot series recommended in those aged 48 and above     Health Maintenance Due:      Topic Date Due    DXA SCAN  1940     Immunizations Due:      Topic Date Due    Influenza Vaccine  07/01/2020     Advance Directives   What are advance directives? Advance directives are legal documents that state your wishes and plans for medical care  These plans are made ahead of time in case you lose your ability to make decisions for yourself  Advance directives can apply to any medical decision, such as the treatments you want, and if you want to donate organs  What are the types of advance directives? There are many types of advance directives, and each state has rules about how to use them  You may choose a combination of any of the following:  · Living will: This is a written record of the treatment you want   You can also choose which treatments you do not want, which to limit, and which to stop at a certain time  This includes surgery, medicine, IV fluid, and tube feedings  · Durable power of  for healthcare [facility]): This is a written record that states who you want to make healthcare choices for you when you are unable to make them for yourself  This person, called a proxy, is usually a family member or a friend  You may choose more than 1 proxy  · Do not resuscitate (DNR) order:  A DNR order is used in case your heart stops beating or you stop breathing  It is a request not to have certain forms of treatment, such as CPR  A DNR order may be included in other types of advance directives  · Medical directive: This covers the care that you want if you are in a coma, near death, or unable to make decisions for yourself  You can list the treatments you want for each condition  Treatment may include pain medicine, surgery, blood transfusions, dialysis, IV or tube feedings, and a ventilator (breathing machine)  · Values history: This document has questions about your views, beliefs, and how you feel and think about life  This information can help others choose the care that you would choose  Why are advance directives important? An advance directive helps you control your care  Although spoken wishes may be used, it is better to have your wishes written down  Spoken wishes can be misunderstood, or not followed  Treatments may be given even if you do not want them  An advance directive may make it easier for your family to make difficult choices about your care  Weight Management   Why it is important to manage your weight:  Being overweight increases your risk of health conditions such as heart disease, high blood pressure, type 2 diabetes, and certain types of cancer  It can also increase your risk for osteoarthritis, sleep apnea, and other respiratory problems  Aim for a slow, steady weight loss   Even a small amount of weight loss can lower your risk of health problems  How to lose weight safely:  A safe and healthy way to lose weight is to eat fewer calories and get regular exercise  You can lose up about 1 pound a week by decreasing the number of calories you eat by 500 calories each day  Healthy meal plan for weight management:  A healthy meal plan includes a variety of foods, contains fewer calories, and helps you stay healthy  A healthy meal plan includes the following:  · Eat whole-grain foods more often  A healthy meal plan should contain fiber  Fiber is the part of grains, fruits, and vegetables that is not broken down by your body  Whole-grain foods are healthy and provide extra fiber in your diet  Some examples of whole-grain foods are whole-wheat breads and pastas, oatmeal, brown rice, and bulgur  · Eat a variety of vegetables every day  Include dark, leafy greens such as spinach, kale, jonna greens, and mustard greens  Eat yellow and orange vegetables such as carrots, sweet potatoes, and winter squash  · Eat a variety of fruits every day  Choose fresh or canned fruit (canned in its own juice or light syrup) instead of juice  Fruit juice has very little or no fiber  · Eat low-fat dairy foods  Drink fat-free (skim) milk or 1% milk  Eat fat-free yogurt and low-fat cottage cheese  Try low-fat cheeses such as mozzarella and other reduced-fat cheeses  · Choose meat and other protein foods that are low in fat  Choose beans or other legumes such as split peas or lentils  Choose fish, skinless poultry (chicken or turkey), or lean cuts of red meat (beef or pork)  Before you cook meat or poultry, cut off any visible fat  · Use less fat and oil  Try baking foods instead of frying them  Add less fat, such as margarine, sour cream, regular salad dressing and mayonnaise to foods  Eat fewer high-fat foods  Some examples of high-fat foods include french fries, doughnuts, ice cream, and cakes  · Eat fewer sweets    Limit foods and drinks that are high in sugar  This includes candy, cookies, regular soda, and sweetened drinks  Exercise:  Exercise at least 30 minutes per day on most days of the week  Some examples of exercise include walking, biking, dancing, and swimming  You can also fit in more physical activity by taking the stairs instead of the elevator or parking farther away from stores  Ask your healthcare provider about the best exercise plan for you  © Copyright [employer] 2018 Information is for End User's use only and may not be sold, redistributed or otherwise used for commercial purposes   All illustrations and images included in CareNotes® are the copyrighted property of [employer]  or [address] [place] [address]

## 2020-09-28 NOTE — PROGRESS NOTES
Patient ID: Natalie Woodard is a 78 y o  female  HPI: 78 y  o female presents for follow up of hypertension, dysuria, urinary frequency and urgency, anemia follow up, and hyprcholesterolemia  She denies any fever, flank pain or chills  SUBJECTIVE    Family History   Problem Relation Age of Onset    No Known Problems Mother     Diabetes Father     Stroke Father         syndrome     Social History     Socioeconomic History    Marital status:       Spouse name: Not on file    Number of children: Not on file    Years of education: Not on file    Highest education level: Not on file   Occupational History    Not on file   Social Needs    Financial resource strain: Not on file    Food insecurity     Worry: Not on file     Inability: Not on file    Transportation needs     Medical: Not on file     Non-medical: Not on file   Tobacco Use    Smoking status: Former Smoker     Last attempt to quit:      Years since quittin 7    Smokeless tobacco: Never Used   Substance and Sexual Activity    Alcohol use: No    Drug use: Never    Sexual activity: Not Currently     Partners: Male     Birth control/protection: Post-menopausal   Lifestyle    Physical activity     Days per week: Not on file     Minutes per session: Not on file    Stress: Not on file   Relationships    Social connections     Talks on phone: Not on file     Gets together: Not on file     Attends [employer] service: Not on file     Active member of club or organization: Not on file     Attends meetings of clubs or organizations: Not on file     Relationship status: Not on file    Intimate partner violence     Fear of current or ex partner: Not on file     Emotionally abused: Not on file     Physically abused: Not on file     Forced sexual activity: Not on file   Other Topics Concern    Not on file   Social History Narrative    Daily coffee consumption (__cups/day)     Daily cola consumption (__cans/day)     Daily tea consumption (__cups/day)      Past Medical History:   Diagnosis Date    Hypertension     UTI (urinary tract infection)      Past Surgical History:   Procedure Laterality Date    EYE SURGERY      JOINT REPLACEMENT Right     Knee 11/15/19     Allergies   Allergen Reactions    Ciprofloxacin      Chest discomfort and dizziness    Diclofenac     Diphenhydramine        Current Outpatient Medications:     acetaminophen (TYLENOL) 325 mg tablet, Take 975 mg by mouth every 8 (eight) hours, Disp: , Rfl:     ALPRAZolam (XANAX) 0 25 mg tablet, Take 1 tablet (0 25 mg total) by mouth 3 (three) times a day as needed for anxiety, Disp: 90 tablet, Rfl: 3    amLODIPine (NORVASC) 5 mg tablet, TAKE 1 TABLET EVERY DAY, Disp: 90 tablet, Rfl: 0    Cholecalciferol (VITAMIN D3) 50 MCG (2000 UT) capsule, Take 2,000 Units by mouth daily, Disp: , Rfl:     lisinopril (ZESTRIL) 5 mg tablet, TAKE 1 TABLET EVERY DAY, Disp: 90 tablet, Rfl: 0    Melatonin 3 MG CAPS, Take 3 mg by mouth  , Disp: , Rfl:     Mirabegron ER (MYRBETRIQ) 25 MG TB24, Take 25 mg by mouth daily, Disp: 90 tablet, Rfl: 3    Multiple Vitamins-Minerals (PRESERVISION AREDS PO), Take by mouth, Disp: , Rfl:     pantoprazole (PROTONIX) 40 mg tablet, Take 1 tablet (40 mg total) by mouth daily, Disp: 30 tablet, Rfl: 11    cephalexin (KEFLEX) 500 mg capsule, Take 1 capsule (500 mg total) by mouth every 8 (eight) hours for 10 days, Disp: 30 capsule, Rfl: 0    Review of Systems  Constitutional:     Denies fever, chills ,fatigue ,weakness ,weight loss, weight gain     ENT: Denies earache ,loss of hearing ,nosebleed, nasal discharge,nasal congestion ,sore throat ,hoarseness  Pulmonary: Denies shortness of breath ,cough  ,dyspnea on exertion, orthopnea  ,PND   Cardiovascular:  Denies bradycardia , tachycardia  ,palpations, lower extremity edema leg, claudication  Breast:  Denies new or changing breast lumps ,nipple discharge ,nipple changes  Abdomen:  Denies abdominal pain , anorexia , indigestion, nausea, vomiting, constipation, diarrhea  Musculoskeletal: Denies myalgias, arthralgias, joint swelling, joint stiffness , limb pain, limb swelling  Gu: +dysuria, polyuria and urinary frequency  Skin: Denies skin rash, skin lesion, skin wound, itching, dry skin  Neuro: Denies headache, numbness, tingling, confusion, loss of consciousness, dizziness, vertigo  Psychiatric: Denies feelings of depression, suicidal ideation, anxiety, sleep disturbances    OBJECTIVE  /80   Pulse 78   Temp 99 °F (37 2 °C)   Ht 5' 3" (1 6 m)   Wt 83 5 kg (184 lb)   SpO2 99%   BMI 32 59 kg/m²   Constitutional:   NAD, well appearing and well nourished      ENT:   Conjunctiva and lids: no injection, edema, or discharge     Pupils and iris: REENA bilaterally    External inspection of ears and nose: normal without deformities or discharge  Otoscopic exam: Canals patent without erythema  Nasal mucosa, septum and turbinates: Normal or edema or discharge         Oropharynx:  Moist mucosa, normal tongue and tonsils without lesions  No erythema        Pulmonary:Respiratory effort normal rate and rhythm, no increased work of breathing   Auscultation of lungs:  Clear bilaterally with no adventitious breath sounds       Cardiovascular: regular rate and rhythm, S1 and S2, no murmur, no edema and/or varicosities of LE      Abdomen: Soft and non-distended     Positive bowel sounds      No heptomegaly or splenomegaly      Gu: no suprapubic tenderness or CVA tenderness, no urethral discharge  Lymphatic:  No anterior or posterior cervical lymphadenopathy         Musculoskeletal:  Gait and station: Normal gait      Digits and nails normal without clubbing or cyanosis       Inspection/palpation of joints, bones, and muscles:  No joint tenderness, swelling, full active and passive range of motion       Skin: Normal skin turgor and no rashes      Neuro:       Normal reflexes       Psych:   alert and oriented to person, place and time     normal mood and affect       Assessment/Plan:Diagnoses and all orders for this visit:    Medicare annual wellness visit, subsequent    Essential hypertension  -     Comprehensive metabolic panel; Future    Urinary tract infection with hematuria, site unspecified  -     cephalexin (KEFLEX) 500 mg capsule; Take 1 capsule (500 mg total) by mouth every 8 (eight) hours for 10 days  -     POCT urine dip    Anemia, unspecified type  -     CBC and Platelet; Future    Hypercholesterolemia  -     Lipid Panel with Direct LDL reflex; Future    Asymptomatic postmenopausal estrogen deficiency  -     DXA bone density spine hip and pelvis; Future    Need for vaccination  -     influenza vaccine, high-dose, PF 0 7 mL (FLUZONE HIGH-DOSE)        Reviewed with patient plan to treat with above plan      Patient instructed to call in 72 hours if not feeling better or if symptoms worsen

## 2020-09-29 ENCOUNTER — APPOINTMENT (EMERGENCY)
Dept: RADIOLOGY | Facility: HOSPITAL | Age: 80
End: 2020-09-29
Payer: MEDICARE

## 2020-09-29 ENCOUNTER — APPOINTMENT (OUTPATIENT)
Dept: CT IMAGING | Facility: HOSPITAL | Age: 80
End: 2020-09-29
Payer: MEDICARE

## 2020-09-29 ENCOUNTER — HOSPITAL ENCOUNTER (OUTPATIENT)
Facility: HOSPITAL | Age: 80
Setting detail: OBSERVATION
Discharge: HOME HEALTH | End: 2020-09-30
Attending: EMERGENCY MEDICINE | Admitting: INTERNAL MEDICINE
Payer: MEDICARE

## 2020-09-29 DIAGNOSIS — M25.552 LEFT HIP PAIN: ICD-10-CM

## 2020-09-29 DIAGNOSIS — R31.9 URINARY TRACT INFECTION WITH HEMATURIA, SITE UNSPECIFIED: ICD-10-CM

## 2020-09-29 DIAGNOSIS — M16.12 OSTEOARTHRITIS OF LEFT HIP, UNSPECIFIED OSTEOARTHRITIS TYPE: ICD-10-CM

## 2020-09-29 DIAGNOSIS — R79.89 ELEVATED LFTS: ICD-10-CM

## 2020-09-29 DIAGNOSIS — R26.2 AMBULATORY DYSFUNCTION: Primary | ICD-10-CM

## 2020-09-29 DIAGNOSIS — N39.0 URINARY TRACT INFECTION WITH HEMATURIA, SITE UNSPECIFIED: ICD-10-CM

## 2020-09-29 LAB
ALBUMIN SERPL BCP-MCNC: 3.5 G/DL (ref 3.5–5)
ALP SERPL-CCNC: 213 U/L (ref 46–116)
ALT SERPL W P-5'-P-CCNC: 115 U/L (ref 12–78)
ANION GAP SERPL CALCULATED.3IONS-SCNC: 11 MMOL/L (ref 4–13)
AST SERPL W P-5'-P-CCNC: 96 U/L (ref 5–45)
BASOPHILS # BLD AUTO: 0.03 THOUSANDS/ΜL (ref 0–0.1)
BASOPHILS NFR BLD AUTO: 0 % (ref 0–1)
BILIRUB SERPL-MCNC: 0.41 MG/DL (ref 0.2–1)
BUN SERPL-MCNC: 21 MG/DL (ref 5–25)
CALCIUM SERPL-MCNC: 9.4 MG/DL (ref 8.3–10.1)
CHLORIDE SERPL-SCNC: 104 MMOL/L (ref 100–108)
CO2 SERPL-SCNC: 25 MMOL/L (ref 21–32)
CREAT SERPL-MCNC: 0.7 MG/DL (ref 0.6–1.3)
EOSINOPHIL # BLD AUTO: 0.02 THOUSAND/ΜL (ref 0–0.61)
EOSINOPHIL NFR BLD AUTO: 0 % (ref 0–6)
ERYTHROCYTE [DISTWIDTH] IN BLOOD BY AUTOMATED COUNT: 13.4 % (ref 11.6–15.1)
GFR SERPL CREATININE-BSD FRML MDRD: 82 ML/MIN/1.73SQ M
GLUCOSE SERPL-MCNC: 137 MG/DL (ref 65–140)
HCT VFR BLD AUTO: 39.2 % (ref 34.8–46.1)
HGB BLD-MCNC: 12.6 G/DL (ref 11.5–15.4)
IMM GRANULOCYTES # BLD AUTO: 0.04 THOUSAND/UL (ref 0–0.2)
IMM GRANULOCYTES NFR BLD AUTO: 1 % (ref 0–2)
LYMPHOCYTES # BLD AUTO: 0.58 THOUSANDS/ΜL (ref 0.6–4.47)
LYMPHOCYTES NFR BLD AUTO: 9 % (ref 14–44)
MCH RBC QN AUTO: 33.1 PG (ref 26.8–34.3)
MCHC RBC AUTO-ENTMCNC: 32.1 G/DL (ref 31.4–37.4)
MCV RBC AUTO: 103 FL (ref 82–98)
MONOCYTES # BLD AUTO: 0.16 THOUSAND/ΜL (ref 0.17–1.22)
MONOCYTES NFR BLD AUTO: 2 % (ref 4–12)
NEUTROPHILS # BLD AUTO: 5.84 THOUSANDS/ΜL (ref 1.85–7.62)
NEUTS SEG NFR BLD AUTO: 88 % (ref 43–75)
NRBC BLD AUTO-RTO: 0 /100 WBCS
PLATELET # BLD AUTO: 259 THOUSANDS/UL (ref 149–390)
PMV BLD AUTO: 9.6 FL (ref 8.9–12.7)
POTASSIUM SERPL-SCNC: 4.3 MMOL/L (ref 3.5–5.3)
PROT SERPL-MCNC: 7.8 G/DL (ref 6.4–8.2)
RBC # BLD AUTO: 3.81 MILLION/UL (ref 3.81–5.12)
SODIUM SERPL-SCNC: 140 MMOL/L (ref 136–145)
WBC # BLD AUTO: 6.67 THOUSAND/UL (ref 4.31–10.16)

## 2020-09-29 PROCEDURE — 36415 COLL VENOUS BLD VENIPUNCTURE: CPT | Performed by: PHYSICIAN ASSISTANT

## 2020-09-29 PROCEDURE — 73502 X-RAY EXAM HIP UNI 2-3 VIEWS: CPT

## 2020-09-29 PROCEDURE — 73700 CT LOWER EXTREMITY W/O DYE: CPT

## 2020-09-29 PROCEDURE — 80053 COMPREHEN METABOLIC PANEL: CPT | Performed by: PHYSICIAN ASSISTANT

## 2020-09-29 PROCEDURE — 99220 PR INITIAL OBSERVATION CARE/DAY 70 MINUTES: CPT | Performed by: INTERNAL MEDICINE

## 2020-09-29 PROCEDURE — 99204 OFFICE O/P NEW MOD 45 MIN: CPT | Performed by: PHYSICIAN ASSISTANT

## 2020-09-29 PROCEDURE — 99285 EMERGENCY DEPT VISIT HI MDM: CPT

## 2020-09-29 PROCEDURE — 85025 COMPLETE CBC W/AUTO DIFF WBC: CPT | Performed by: PHYSICIAN ASSISTANT

## 2020-09-29 PROCEDURE — 1123F ACP DISCUSS/DSCN MKR DOCD: CPT | Performed by: PHYSICIAN ASSISTANT

## 2020-09-29 PROCEDURE — 99285 EMERGENCY DEPT VISIT HI MDM: CPT | Performed by: PHYSICIAN ASSISTANT

## 2020-09-29 RX ORDER — OXYCODONE HYDROCHLORIDE 5 MG/1
2.5 TABLET ORAL EVERY 6 HOURS PRN
Status: DISCONTINUED | OUTPATIENT
Start: 2020-09-29 | End: 2020-09-30 | Stop reason: HOSPADM

## 2020-09-29 RX ORDER — DOCUSATE SODIUM 100 MG/1
100 CAPSULE, LIQUID FILLED ORAL 2 TIMES DAILY PRN
Status: DISCONTINUED | OUTPATIENT
Start: 2020-09-29 | End: 2020-09-30 | Stop reason: HOSPADM

## 2020-09-29 RX ORDER — PANTOPRAZOLE SODIUM 40 MG/1
40 TABLET, DELAYED RELEASE ORAL
Status: DISCONTINUED | OUTPATIENT
Start: 2020-09-29 | End: 2020-09-30 | Stop reason: HOSPADM

## 2020-09-29 RX ORDER — LISINOPRIL 5 MG/1
5 TABLET ORAL DAILY
Status: DISCONTINUED | OUTPATIENT
Start: 2020-09-29 | End: 2020-09-30 | Stop reason: HOSPADM

## 2020-09-29 RX ORDER — LANOLIN ALCOHOL/MO/W.PET/CERES
3 CREAM (GRAM) TOPICAL
Status: DISCONTINUED | OUTPATIENT
Start: 2020-09-29 | End: 2020-09-30 | Stop reason: HOSPADM

## 2020-09-29 RX ORDER — AMLODIPINE BESYLATE 5 MG/1
5 TABLET ORAL DAILY
Status: DISCONTINUED | OUTPATIENT
Start: 2020-09-29 | End: 2020-09-30 | Stop reason: HOSPADM

## 2020-09-29 RX ORDER — HEPARIN SODIUM 5000 [USP'U]/ML
5000 INJECTION, SOLUTION INTRAVENOUS; SUBCUTANEOUS EVERY 8 HOURS SCHEDULED
Status: DISCONTINUED | OUTPATIENT
Start: 2020-09-29 | End: 2020-09-30 | Stop reason: HOSPADM

## 2020-09-29 RX ORDER — MELATONIN
2000 DAILY
Status: DISCONTINUED | OUTPATIENT
Start: 2020-09-29 | End: 2020-09-30 | Stop reason: HOSPADM

## 2020-09-29 RX ORDER — LIDOCAINE 50 MG/G
1 PATCH TOPICAL ONCE
Status: COMPLETED | OUTPATIENT
Start: 2020-09-29 | End: 2020-09-29

## 2020-09-29 RX ORDER — OXYCODONE HYDROCHLORIDE 5 MG/1
5 TABLET ORAL EVERY 6 HOURS PRN
Status: DISCONTINUED | OUTPATIENT
Start: 2020-09-29 | End: 2020-09-30 | Stop reason: HOSPADM

## 2020-09-29 RX ORDER — HYDROMORPHONE HCL/PF 1 MG/ML
0.2 SYRINGE (ML) INJECTION EVERY 6 HOURS PRN
Status: DISCONTINUED | OUTPATIENT
Start: 2020-09-29 | End: 2020-09-30 | Stop reason: HOSPADM

## 2020-09-29 RX ORDER — ACETAMINOPHEN 325 MG/1
975 TABLET ORAL EVERY 8 HOURS
Status: DISCONTINUED | OUTPATIENT
Start: 2020-09-29 | End: 2020-09-30 | Stop reason: HOSPADM

## 2020-09-29 RX ORDER — POLYETHYLENE GLYCOL 3350 17 G/17G
17 POWDER, FOR SOLUTION ORAL DAILY PRN
Status: DISCONTINUED | OUTPATIENT
Start: 2020-09-29 | End: 2020-09-30 | Stop reason: HOSPADM

## 2020-09-29 RX ORDER — ACETAMINOPHEN 325 MG/1
650 TABLET ORAL ONCE
Status: COMPLETED | OUTPATIENT
Start: 2020-09-29 | End: 2020-09-29

## 2020-09-29 RX ORDER — ALPRAZOLAM 0.25 MG/1
0.25 TABLET ORAL 3 TIMES DAILY PRN
Status: DISCONTINUED | OUTPATIENT
Start: 2020-09-29 | End: 2020-09-30 | Stop reason: HOSPADM

## 2020-09-29 RX ORDER — CYCLOBENZAPRINE HCL 10 MG
5 TABLET ORAL 3 TIMES DAILY PRN
Status: DISCONTINUED | OUTPATIENT
Start: 2020-09-29 | End: 2020-09-30 | Stop reason: HOSPADM

## 2020-09-29 RX ORDER — FENTANYL CITRATE 50 UG/ML
25 INJECTION, SOLUTION INTRAMUSCULAR; INTRAVENOUS ONCE
Status: COMPLETED | OUTPATIENT
Start: 2020-09-29 | End: 2020-09-29

## 2020-09-29 RX ORDER — CEPHALEXIN 500 MG/1
500 CAPSULE ORAL EVERY 8 HOURS SCHEDULED
Status: DISCONTINUED | OUTPATIENT
Start: 2020-09-29 | End: 2020-09-30 | Stop reason: HOSPADM

## 2020-09-29 RX ADMIN — HEPARIN SODIUM 5000 UNITS: 5000 INJECTION INTRAVENOUS; SUBCUTANEOUS at 08:28

## 2020-09-29 RX ADMIN — CEPHALEXIN 500 MG: 500 CAPSULE ORAL at 15:34

## 2020-09-29 RX ADMIN — CEPHALEXIN 500 MG: 500 CAPSULE ORAL at 22:03

## 2020-09-29 RX ADMIN — PANTOPRAZOLE SODIUM 40 MG: 40 TABLET, DELAYED RELEASE ORAL at 08:22

## 2020-09-29 RX ADMIN — ACETAMINOPHEN 975 MG: 325 TABLET, FILM COATED ORAL at 22:03

## 2020-09-29 RX ADMIN — FENTANYL CITRATE 25 MCG: 50 INJECTION INTRAMUSCULAR; INTRAVENOUS at 06:36

## 2020-09-29 RX ADMIN — ACETAMINOPHEN 975 MG: 325 TABLET, FILM COATED ORAL at 15:34

## 2020-09-29 RX ADMIN — HEPARIN SODIUM 5000 UNITS: 5000 INJECTION INTRAVENOUS; SUBCUTANEOUS at 22:03

## 2020-09-29 RX ADMIN — CEPHALEXIN 500 MG: 500 CAPSULE ORAL at 08:27

## 2020-09-29 RX ADMIN — HEPARIN SODIUM 5000 UNITS: 5000 INJECTION INTRAVENOUS; SUBCUTANEOUS at 15:35

## 2020-09-29 RX ADMIN — Medication 2000 UNITS: at 08:27

## 2020-09-29 RX ADMIN — MELATONIN 3 MG: at 22:03

## 2020-09-29 RX ADMIN — LISINOPRIL 5 MG: 5 TABLET ORAL at 08:27

## 2020-09-29 RX ADMIN — OXYCODONE HYDROCHLORIDE 2.5 MG: 5 TABLET ORAL at 08:26

## 2020-09-29 RX ADMIN — AMLODIPINE BESYLATE 5 MG: 5 TABLET ORAL at 08:27

## 2020-09-29 RX ADMIN — ACETAMINOPHEN 650 MG: 325 TABLET, FILM COATED ORAL at 05:00

## 2020-09-29 RX ADMIN — OXYCODONE HYDROCHLORIDE 5 MG: 5 TABLET ORAL at 11:52

## 2020-09-29 RX ADMIN — LIDOCAINE 5% 1 PATCH: 700 PATCH TOPICAL at 05:00

## 2020-09-29 RX ADMIN — HYDROMORPHONE HYDROCHLORIDE 0.2 MG: 1 INJECTION, SOLUTION INTRAMUSCULAR; INTRAVENOUS; SUBCUTANEOUS at 08:27

## 2020-09-30 VITALS
OXYGEN SATURATION: 96 % | TEMPERATURE: 98.7 F | DIASTOLIC BLOOD PRESSURE: 72 MMHG | RESPIRATION RATE: 17 BRPM | WEIGHT: 181.6 LBS | HEART RATE: 67 BPM | BODY MASS INDEX: 32.17 KG/M2 | SYSTOLIC BLOOD PRESSURE: 158 MMHG

## 2020-09-30 LAB
ALBUMIN SERPL BCP-MCNC: 3.3 G/DL (ref 3.5–5)
ALP SERPL-CCNC: 180 U/L (ref 46–116)
ALT SERPL W P-5'-P-CCNC: 85 U/L (ref 12–78)
ANION GAP SERPL CALCULATED.3IONS-SCNC: 10 MMOL/L (ref 4–13)
AST SERPL W P-5'-P-CCNC: 60 U/L (ref 5–45)
BASOPHILS # BLD AUTO: 0.03 THOUSANDS/ΜL (ref 0–0.1)
BASOPHILS NFR BLD AUTO: 0 % (ref 0–1)
BILIRUB SERPL-MCNC: 0.57 MG/DL (ref 0.2–1)
BUN SERPL-MCNC: 21 MG/DL (ref 5–25)
CALCIUM ALBUM COR SERPL-MCNC: 9.9 MG/DL (ref 8.3–10.1)
CALCIUM SERPL-MCNC: 9.3 MG/DL (ref 8.3–10.1)
CHLORIDE SERPL-SCNC: 105 MMOL/L (ref 100–108)
CO2 SERPL-SCNC: 24 MMOL/L (ref 21–32)
CREAT SERPL-MCNC: 0.69 MG/DL (ref 0.6–1.3)
EOSINOPHIL # BLD AUTO: 0.09 THOUSAND/ΜL (ref 0–0.61)
EOSINOPHIL NFR BLD AUTO: 1 % (ref 0–6)
ERYTHROCYTE [DISTWIDTH] IN BLOOD BY AUTOMATED COUNT: 13.5 % (ref 11.6–15.1)
GFR SERPL CREATININE-BSD FRML MDRD: 82 ML/MIN/1.73SQ M
GLUCOSE SERPL-MCNC: 137 MG/DL (ref 65–140)
HCT VFR BLD AUTO: 35.2 % (ref 34.8–46.1)
HGB BLD-MCNC: 11.5 G/DL (ref 11.5–15.4)
IMM GRANULOCYTES # BLD AUTO: 0.04 THOUSAND/UL (ref 0–0.2)
IMM GRANULOCYTES NFR BLD AUTO: 1 % (ref 0–2)
LYMPHOCYTES # BLD AUTO: 0.77 THOUSANDS/ΜL (ref 0.6–4.47)
LYMPHOCYTES NFR BLD AUTO: 12 % (ref 14–44)
MCH RBC QN AUTO: 33.3 PG (ref 26.8–34.3)
MCHC RBC AUTO-ENTMCNC: 32.7 G/DL (ref 31.4–37.4)
MCV RBC AUTO: 102 FL (ref 82–98)
MONOCYTES # BLD AUTO: 0.48 THOUSAND/ΜL (ref 0.17–1.22)
MONOCYTES NFR BLD AUTO: 7 % (ref 4–12)
NEUTROPHILS # BLD AUTO: 5.28 THOUSANDS/ΜL (ref 1.85–7.62)
NEUTS SEG NFR BLD AUTO: 79 % (ref 43–75)
NRBC BLD AUTO-RTO: 0 /100 WBCS
PLATELET # BLD AUTO: 261 THOUSANDS/UL (ref 149–390)
PMV BLD AUTO: 9.2 FL (ref 8.9–12.7)
POTASSIUM SERPL-SCNC: 4.4 MMOL/L (ref 3.5–5.3)
PROT SERPL-MCNC: 7 G/DL (ref 6.4–8.2)
RBC # BLD AUTO: 3.45 MILLION/UL (ref 3.81–5.12)
SODIUM SERPL-SCNC: 139 MMOL/L (ref 136–145)
WBC # BLD AUTO: 6.69 THOUSAND/UL (ref 4.31–10.16)

## 2020-09-30 PROCEDURE — 80053 COMPREHEN METABOLIC PANEL: CPT | Performed by: NURSE PRACTITIONER

## 2020-09-30 PROCEDURE — 99217 PR OBSERVATION CARE DISCHARGE MANAGEMENT: CPT | Performed by: PHYSICIAN ASSISTANT

## 2020-09-30 PROCEDURE — 85025 COMPLETE CBC W/AUTO DIFF WBC: CPT | Performed by: NURSE PRACTITIONER

## 2020-09-30 PROCEDURE — 97166 OT EVAL MOD COMPLEX 45 MIN: CPT

## 2020-09-30 PROCEDURE — 97163 PT EVAL HIGH COMPLEX 45 MIN: CPT

## 2020-09-30 PROCEDURE — 99213 OFFICE O/P EST LOW 20 MIN: CPT | Performed by: PHYSICIAN ASSISTANT

## 2020-09-30 RX ORDER — POLYETHYLENE GLYCOL 3350 17 G/17G
17 POWDER, FOR SOLUTION ORAL DAILY PRN
Refills: 0
Start: 2020-09-30 | End: 2021-04-13 | Stop reason: ALTCHOICE

## 2020-09-30 RX ORDER — OXYCODONE HYDROCHLORIDE 5 MG/1
5 TABLET ORAL EVERY 6 HOURS PRN
Qty: 30 TABLET | Refills: 0 | Status: SHIPPED | OUTPATIENT
Start: 2020-09-30 | End: 2020-10-10

## 2020-09-30 RX ORDER — CEPHALEXIN 500 MG/1
500 CAPSULE ORAL EVERY 8 HOURS SCHEDULED
Refills: 0
Start: 2020-09-30 | End: 2020-10-04

## 2020-09-30 RX ORDER — METHYLPREDNISOLONE 4 MG/1
TABLET ORAL
Qty: 1 EACH | Refills: 0 | Status: SHIPPED | OUTPATIENT
Start: 2020-09-30 | End: 2020-10-09 | Stop reason: ALTCHOICE

## 2020-09-30 RX ADMIN — Medication 2000 UNITS: at 08:44

## 2020-09-30 RX ADMIN — HEPARIN SODIUM 5000 UNITS: 5000 INJECTION INTRAVENOUS; SUBCUTANEOUS at 13:54

## 2020-09-30 RX ADMIN — ACETAMINOPHEN 975 MG: 325 TABLET, FILM COATED ORAL at 13:53

## 2020-09-30 RX ADMIN — OXYCODONE HYDROCHLORIDE 5 MG: 5 TABLET ORAL at 04:40

## 2020-09-30 RX ADMIN — CEPHALEXIN 500 MG: 500 CAPSULE ORAL at 06:26

## 2020-09-30 RX ADMIN — ACETAMINOPHEN 975 MG: 325 TABLET, FILM COATED ORAL at 06:26

## 2020-09-30 RX ADMIN — HEPARIN SODIUM 5000 UNITS: 5000 INJECTION INTRAVENOUS; SUBCUTANEOUS at 06:27

## 2020-09-30 RX ADMIN — PANTOPRAZOLE SODIUM 40 MG: 40 TABLET, DELAYED RELEASE ORAL at 06:26

## 2020-09-30 RX ADMIN — AMLODIPINE BESYLATE 5 MG: 5 TABLET ORAL at 08:43

## 2020-09-30 RX ADMIN — CEPHALEXIN 500 MG: 500 CAPSULE ORAL at 13:53

## 2020-09-30 RX ADMIN — LISINOPRIL 5 MG: 5 TABLET ORAL at 08:43

## 2020-10-01 ENCOUNTER — TRANSITIONAL CARE MANAGEMENT (OUTPATIENT)
Dept: FAMILY MEDICINE CLINIC | Facility: CLINIC | Age: 80
End: 2020-10-01

## 2020-10-08 ENCOUNTER — TELEPHONE (OUTPATIENT)
Dept: FAMILY MEDICINE CLINIC | Facility: CLINIC | Age: 80
End: 2020-10-08

## 2020-10-09 ENCOUNTER — OFFICE VISIT (OUTPATIENT)
Dept: FAMILY MEDICINE CLINIC | Facility: CLINIC | Age: 80
End: 2020-10-09
Payer: MEDICARE

## 2020-10-09 VITALS
RESPIRATION RATE: 18 BRPM | WEIGHT: 178 LBS | DIASTOLIC BLOOD PRESSURE: 62 MMHG | BODY MASS INDEX: 31.54 KG/M2 | HEIGHT: 63 IN | SYSTOLIC BLOOD PRESSURE: 138 MMHG | HEART RATE: 85 BPM | TEMPERATURE: 98.9 F

## 2020-10-09 DIAGNOSIS — D64.9 ANEMIA: ICD-10-CM

## 2020-10-09 DIAGNOSIS — M54.32 SCIATICA OF LEFT SIDE: Primary | ICD-10-CM

## 2020-10-09 PROCEDURE — 99495 TRANSJ CARE MGMT MOD F2F 14D: CPT | Performed by: FAMILY MEDICINE

## 2020-10-09 PROCEDURE — 1111F DSCHRG MED/CURRENT MED MERGE: CPT | Performed by: FAMILY MEDICINE

## 2020-10-09 RX ORDER — PREDNISONE 10 MG/1
TABLET ORAL
Qty: 26 TABLET | Refills: 0 | Status: SHIPPED | OUTPATIENT
Start: 2020-10-09 | End: 2021-04-13 | Stop reason: ALTCHOICE

## 2020-10-09 RX ORDER — PANTOPRAZOLE SODIUM 40 MG/1
40 TABLET, DELAYED RELEASE ORAL DAILY
Qty: 60 TABLET | Refills: 3 | Status: SHIPPED | OUTPATIENT
Start: 2020-10-09 | End: 2021-07-16 | Stop reason: SDUPTHER

## 2020-10-20 ENCOUNTER — TELEPHONE (OUTPATIENT)
Dept: FAMILY MEDICINE CLINIC | Facility: CLINIC | Age: 80
End: 2020-10-20

## 2020-10-20 DIAGNOSIS — D50.8 OTHER IRON DEFICIENCY ANEMIA: Primary | ICD-10-CM

## 2020-10-21 ENCOUNTER — TELEMEDICINE (OUTPATIENT)
Dept: FAMILY MEDICINE CLINIC | Facility: CLINIC | Age: 80
End: 2020-10-21
Payer: MEDICARE

## 2020-10-21 DIAGNOSIS — N30.00 ACUTE CYSTITIS WITHOUT HEMATURIA: Primary | ICD-10-CM

## 2020-10-21 PROCEDURE — 99441 PR PHYS/QHP TELEPHONE EVALUATION 5-10 MIN: CPT | Performed by: FAMILY MEDICINE

## 2020-10-21 RX ORDER — NITROFURANTOIN 25; 75 MG/1; MG/1
100 CAPSULE ORAL 2 TIMES DAILY
Qty: 10 CAPSULE | Refills: 0 | Status: SHIPPED | OUTPATIENT
Start: 2020-10-21 | End: 2020-10-26

## 2020-10-23 ENCOUNTER — TELEPHONE (OUTPATIENT)
Dept: FAMILY MEDICINE CLINIC | Facility: CLINIC | Age: 80
End: 2020-10-23

## 2020-10-23 DIAGNOSIS — M54.32 SCIATICA OF LEFT SIDE: Primary | ICD-10-CM

## 2020-10-23 RX ORDER — OXYCODONE HYDROCHLORIDE 5 MG/1
5 TABLET ORAL EVERY 4 HOURS PRN
Qty: 30 TABLET | Refills: 0 | Status: SHIPPED | OUTPATIENT
Start: 2020-10-23 | End: 2021-04-13 | Stop reason: ALTCHOICE

## 2020-10-26 ENCOUNTER — TELEPHONE (OUTPATIENT)
Dept: FAMILY MEDICINE CLINIC | Facility: CLINIC | Age: 80
End: 2020-10-26

## 2020-10-26 DIAGNOSIS — N39.0 URINARY TRACT INFECTION WITHOUT HEMATURIA, SITE UNSPECIFIED: Primary | ICD-10-CM

## 2020-11-11 ENCOUNTER — LAB (OUTPATIENT)
Dept: LAB | Facility: CLINIC | Age: 80
End: 2020-11-11
Payer: MEDICARE

## 2020-11-11 LAB

## 2020-11-11 PROCEDURE — 87086 URINE CULTURE/COLONY COUNT: CPT | Performed by: FAMILY MEDICINE

## 2020-11-11 PROCEDURE — 81001 URINALYSIS AUTO W/SCOPE: CPT | Performed by: FAMILY MEDICINE

## 2020-11-11 PROCEDURE — 87186 SC STD MICRODIL/AGAR DIL: CPT | Performed by: FAMILY MEDICINE

## 2020-11-11 PROCEDURE — 87077 CULTURE AEROBIC IDENTIFY: CPT | Performed by: FAMILY MEDICINE

## 2020-11-12 DIAGNOSIS — N39.0 URINARY TRACT INFECTION WITHOUT HEMATURIA, SITE UNSPECIFIED: Primary | ICD-10-CM

## 2020-11-12 RX ORDER — DOXYCYCLINE HYCLATE 100 MG/1
100 CAPSULE ORAL EVERY 12 HOURS SCHEDULED
Qty: 20 CAPSULE | Refills: 0 | Status: SHIPPED | OUTPATIENT
Start: 2020-11-12 | End: 2020-11-22

## 2020-11-13 LAB — BACTERIA UR CULT: ABNORMAL

## 2020-11-20 DIAGNOSIS — I10 ESSENTIAL HYPERTENSION: ICD-10-CM

## 2020-11-21 RX ORDER — LISINOPRIL 5 MG/1
TABLET ORAL
Qty: 90 TABLET | Refills: 0 | Status: SHIPPED | OUTPATIENT
Start: 2020-11-21 | End: 2021-02-16

## 2020-11-21 RX ORDER — AMLODIPINE BESYLATE 5 MG/1
TABLET ORAL
Qty: 90 TABLET | Refills: 0 | Status: SHIPPED | OUTPATIENT
Start: 2020-11-21 | End: 2021-02-16

## 2020-12-10 ENCOUNTER — TELEPHONE (OUTPATIENT)
Dept: FAMILY MEDICINE CLINIC | Facility: CLINIC | Age: 80
End: 2020-12-10

## 2020-12-10 DIAGNOSIS — N39.0 URINARY TRACT INFECTION WITHOUT HEMATURIA, SITE UNSPECIFIED: Primary | ICD-10-CM

## 2020-12-10 RX ORDER — CEPHALEXIN 500 MG/1
500 CAPSULE ORAL EVERY 8 HOURS SCHEDULED
Qty: 30 CAPSULE | Refills: 0 | Status: SHIPPED | OUTPATIENT
Start: 2020-12-10 | End: 2020-12-20

## 2021-01-12 ENCOUNTER — TELEPHONE (OUTPATIENT)
Dept: FAMILY MEDICINE CLINIC | Facility: CLINIC | Age: 81
End: 2021-01-12

## 2021-01-12 DIAGNOSIS — N39.0 RECURRENT UTI: ICD-10-CM

## 2021-01-12 DIAGNOSIS — N39.0 URINARY TRACT INFECTION WITHOUT HEMATURIA, SITE UNSPECIFIED: Primary | ICD-10-CM

## 2021-01-12 RX ORDER — DOXYCYCLINE HYCLATE 100 MG/1
100 CAPSULE ORAL EVERY 12 HOURS SCHEDULED
Qty: 20 CAPSULE | Refills: 0 | Status: SHIPPED | OUTPATIENT
Start: 2021-01-12 | End: 2021-01-22

## 2021-01-12 RX ORDER — PHENAZOPYRIDINE HYDROCHLORIDE 200 MG/1
200 TABLET, FILM COATED ORAL
Qty: 9 TABLET | Refills: 0 | Status: SHIPPED | OUTPATIENT
Start: 2021-01-12 | End: 2021-03-15 | Stop reason: SDUPTHER

## 2021-01-12 NOTE — TELEPHONE ENCOUNTER
Patient called  You treated her last month for UTI  She is starting with frequent urination again  She was up every half hour last night and then she felt like she was lucid dreaming   Please advise

## 2021-01-12 NOTE — TELEPHONE ENCOUNTER
I will retreat her  Im sending in pyridium for 3 days and an antibiotic  After this course is done, she should wait 3 days and go for a urine culture    I put order in already

## 2021-01-18 DIAGNOSIS — F41.9 ANXIETY: ICD-10-CM

## 2021-01-18 NOTE — TELEPHONE ENCOUNTER
Per PDMP last fill 09/24/20   Last OV 10/21/20, Next OV 03/24/21     10/23/2020 1 10/23/2020 OXYCODONE HCL 5 MG TABLET 30 0 5 [facility]O [id number] [facility] ([id number]) 0 45  0 MME Medicare [place]   09/30/2020 1 09/30/2020 OXYCODONE HCL 5 MG TABLET 30 0 7 LUCIANO ENG [id number] [facility] ([id number]) 0 32 14 MME Medicare [place]   09/24/2020 1 04/01/2020 ALPRAZOLAM 0 25 MG TABLET 90 0 30 [facility] BRO [id number] [facility] ([id number]) 2 Medicare [place]

## 2021-01-19 RX ORDER — ALPRAZOLAM 0.25 MG/1
0.25 TABLET ORAL 3 TIMES DAILY PRN
Qty: 90 TABLET | Refills: 3 | Status: SHIPPED | OUTPATIENT
Start: 2021-01-19 | End: 2021-12-02 | Stop reason: HOSPADM

## 2021-01-21 ENCOUNTER — IMMUNIZATIONS (OUTPATIENT)
Dept: FAMILY MEDICINE CLINIC | Facility: HOSPITAL | Age: 81
End: 2021-01-21

## 2021-01-21 DIAGNOSIS — Z23 ENCOUNTER FOR IMMUNIZATION: Primary | ICD-10-CM

## 2021-01-21 PROCEDURE — 0001A SARS-COV-2 / COVID-19 MRNA VACCINE (PFIZER-BIONTECH) 30 MCG: CPT

## 2021-01-21 PROCEDURE — 91300 SARS-COV-2 / COVID-19 MRNA VACCINE (PFIZER-BIONTECH) 30 MCG: CPT

## 2021-01-25 ENCOUNTER — LAB (OUTPATIENT)
Dept: LAB | Facility: CLINIC | Age: 81
End: 2021-01-25
Payer: MEDICARE

## 2021-01-25 ENCOUNTER — TRANSCRIBE ORDERS (OUTPATIENT)
Dept: LAB | Facility: CLINIC | Age: 81
End: 2021-01-25

## 2021-01-25 DIAGNOSIS — D50.8 OTHER IRON DEFICIENCY ANEMIA: ICD-10-CM

## 2021-01-25 DIAGNOSIS — D64.9 ANEMIA, UNSPECIFIED TYPE: ICD-10-CM

## 2021-01-25 DIAGNOSIS — I10 ESSENTIAL HYPERTENSION: ICD-10-CM

## 2021-01-25 DIAGNOSIS — E78.00 HYPERCHOLESTEROLEMIA: ICD-10-CM

## 2021-01-25 DIAGNOSIS — N39.0 RECURRENT UTI: ICD-10-CM

## 2021-01-25 DIAGNOSIS — R79.89 ELEVATED LFTS: ICD-10-CM

## 2021-01-25 PROCEDURE — 87086 URINE CULTURE/COLONY COUNT: CPT

## 2021-01-26 LAB — BACTERIA UR CULT: NORMAL

## 2021-02-09 DIAGNOSIS — N39.0 URINARY TRACT INFECTION WITHOUT HEMATURIA, SITE UNSPECIFIED: Primary | ICD-10-CM

## 2021-02-09 RX ORDER — CEPHALEXIN 500 MG/1
500 CAPSULE ORAL EVERY 8 HOURS SCHEDULED
Qty: 30 CAPSULE | Refills: 0 | Status: SHIPPED | OUTPATIENT
Start: 2021-02-09 | End: 2021-02-19

## 2021-02-09 RX ORDER — PHENAZOPYRIDINE HYDROCHLORIDE 200 MG/1
200 TABLET, FILM COATED ORAL
Qty: 9 TABLET | Refills: 0 | Status: SHIPPED | OUTPATIENT
Start: 2021-02-09 | End: 2021-03-15 | Stop reason: ALTCHOICE

## 2021-02-09 NOTE — TELEPHONE ENCOUNTER
She is having pressure and burning, frequency while urinating, symptoms started Thursday,    She has her 2nd covid vaccine rafi'd for Thursday, if an antib is given will it interfer?    [facility]

## 2021-02-09 NOTE — TELEPHONE ENCOUNTER
An antibiotic will NOT interfere with second covid vaccine  I'll send in an antibiotic and some pyridium for the burning

## 2021-02-11 ENCOUNTER — IMMUNIZATIONS (OUTPATIENT)
Dept: FAMILY MEDICINE CLINIC | Facility: HOSPITAL | Age: 81
End: 2021-02-11

## 2021-02-11 DIAGNOSIS — Z23 ENCOUNTER FOR IMMUNIZATION: Primary | ICD-10-CM

## 2021-02-11 PROCEDURE — 0002A SARS-COV-2 / COVID-19 MRNA VACCINE (PFIZER-BIONTECH) 30 MCG: CPT

## 2021-02-11 PROCEDURE — 91300 SARS-COV-2 / COVID-19 MRNA VACCINE (PFIZER-BIONTECH) 30 MCG: CPT

## 2021-02-16 DIAGNOSIS — I10 ESSENTIAL HYPERTENSION: ICD-10-CM

## 2021-02-16 RX ORDER — AMLODIPINE BESYLATE 5 MG/1
TABLET ORAL
Qty: 90 TABLET | Refills: 0 | Status: SHIPPED | OUTPATIENT
Start: 2021-02-16 | End: 2021-05-10 | Stop reason: SDUPTHER

## 2021-02-16 RX ORDER — LISINOPRIL 5 MG/1
TABLET ORAL
Qty: 90 TABLET | Refills: 0 | Status: SHIPPED | OUTPATIENT
Start: 2021-02-16 | End: 2021-05-10 | Stop reason: SDUPTHER

## 2021-03-15 ENCOUNTER — TELEPHONE (OUTPATIENT)
Dept: FAMILY MEDICINE CLINIC | Facility: CLINIC | Age: 81
End: 2021-03-15

## 2021-03-15 DIAGNOSIS — N39.0 URINARY TRACT INFECTION WITHOUT HEMATURIA, SITE UNSPECIFIED: ICD-10-CM

## 2021-03-15 RX ORDER — PHENAZOPYRIDINE HYDROCHLORIDE 200 MG/1
200 TABLET, FILM COATED ORAL
Qty: 9 TABLET | Refills: 0 | Status: SHIPPED | OUTPATIENT
Start: 2021-03-15 | End: 2021-04-13 | Stop reason: ALTCHOICE

## 2021-03-15 RX ORDER — CEPHALEXIN 500 MG/1
500 CAPSULE ORAL EVERY 8 HOURS SCHEDULED
Qty: 30 CAPSULE | Refills: 0 | Status: SHIPPED | OUTPATIENT
Start: 2021-03-15 | End: 2021-03-25

## 2021-03-15 NOTE — TELEPHONE ENCOUNTER
Patient called  She has been having pressure, burning, and frequency starting last week  It has been happening off/on x 1 week   Do you want her to come in and give a sample or do you want to send something in to [facility]

## 2021-03-22 ENCOUNTER — TELEPHONE (OUTPATIENT)
Dept: FAMILY MEDICINE CLINIC | Facility: CLINIC | Age: 81
End: 2021-03-22

## 2021-03-22 ENCOUNTER — TELEMEDICINE (OUTPATIENT)
Dept: FAMILY MEDICINE CLINIC | Facility: CLINIC | Age: 81
End: 2021-03-22
Payer: MEDICARE

## 2021-03-22 DIAGNOSIS — N39.0 URINARY TRACT INFECTION WITHOUT HEMATURIA, SITE UNSPECIFIED: Primary | ICD-10-CM

## 2021-03-22 PROCEDURE — 99213 OFFICE O/P EST LOW 20 MIN: CPT | Performed by: NURSE PRACTITIONER

## 2021-03-22 NOTE — TELEPHONE ENCOUNTER
Offer a virtual with Armani Minor ; I am full- she can do a phone encounter if pt is not computer [place]

## 2021-03-22 NOTE — PROGRESS NOTES
Virtual Brief Visit    Assessment/Plan:    Problem List Items Addressed This Visit        Genitourinary    UTI (urinary tract infection) - Primary    Relevant Orders    Urine culture            Reason for visit is No chief complaint on file  Encounter provider Clare Fitch    Provider located at [address] [place] [id number]    Recent Visits  Date Type Provider Dept   03/15/21 Telephone [facility] recent visits within past 7 days and meeting all other requirements     Today's Visits  Date Type Provider Dept   03/22/21 Telephone Ronald Palafox today's visits and meeting all other requirements     Future Appointments  No visits were found meeting these conditions  Showing future appointments within next 150 days and meeting all other requirements        After connecting through telephone, the patient was identified by name and date of birth  Enocmary lou Walker was informed that this is a telemedicine visit and that the visit is being conducted through telephone  My office door was closed  No one else was in the room  She acknowledged consent and understanding of privacy and security of the platform  The patient has agreed to participate and understands she can discontinue the visit at any time  It was my intent to perform this visit via video technology but the patient was not able to do a video connection so the visit was completed via audio telephone only  Patient is aware this is a billable service  Genaro Rollins is a 78 y o  female     78 y  o female presenting with back pain that progressed to chest pain and frequent bowel movements yesterday that resolved on its own today  She reports that the pain started in back along waist for a few hours than moved to right side of chest below bra line   She did not have diarrhea but was going to the bathroom for bowel movements multiple times during the pain episode  She took a pink liquid and the symptoms improved  She is currently being treated for a urinary tract infection with cephalexin for 10 days and as approximately two days left  Patient denies fever, generalized body aches or respiratory symptoms but did have a short bout of chills  Discussed with patient possible kidney stone passing but unsure so will obtain a urinae culture after completion of antibiotics         Past Medical History:   Diagnosis Date    Hypertension     UTI (urinary tract infection)        Past Surgical History:   Procedure Laterality Date    EYE SURGERY      JOINT REPLACEMENT Right     Knee 11/15/19       Current Outpatient Medications   Medication Sig Dispense Refill    acetaminophen (TYLENOL) 325 mg tablet Take 975 mg by mouth every 8 (eight) hours      ALPRAZolam (XANAX) 0 25 mg tablet Take 1 tablet (0 25 mg total) by mouth 3 (three) times a day as needed for anxiety 90 tablet 3    amLODIPine (NORVASC) 5 mg tablet TAKE 1 TABLET EVERY DAY 90 tablet 0    b complex vitamins tablet Take 1 tablet by mouth daily      cephalexin (KEFLEX) 500 mg capsule Take 1 capsule (500 mg total) by mouth every 8 (eight) hours for 10 days 30 capsule 0    Cholecalciferol (VITAMIN D3) 50 MCG (2000 UT) capsule Take 2,000 Units by mouth daily      lisinopril (ZESTRIL) 5 mg tablet TAKE 1 TABLET EVERY DAY 90 tablet 0    Melatonin 3 MG CAPS Take 3 mg by mouth        Mirabegron ER (MYRBETRIQ) 25 MG TB24 Take 25 mg by mouth daily 90 tablet 3    Multiple Vitamins-Minerals (PRESERVISION AREDS PO) Take by mouth      oxyCODONE (ROXICODONE) 5 mg immediate release tablet Take 1 tablet (5 mg total) by mouth every 4 (four) hours as needed for moderate painMax Daily Amount: 30 mg 30 tablet 0    pantoprazole (PROTONIX) 40 mg tablet Take 1 tablet (40 mg total) by mouth daily 60 tablet 3    phenazopyridine (PYRIDIUM) 200 mg tablet Take 1 tablet (200 mg total) by mouth 3 (three) times a day with meals 9 tablet 0    polyethylene glycol (MIRALAX) 17 g packet Take 17 g by mouth daily as needed (constipation)  0    predniSONE 10 mg tablet 3 tabs po bid x2 days, then 2 tabs po bid x2 days, then 1 tab bid x2 days, then 1 daily until done  26 tablet 0     No current facility-administered medications for this visit  Allergies   Allergen Reactions    Ciprofloxacin      Chest discomfort and dizziness    Diclofenac     Diphenhydramine        Review of Systems   Constitutional: Positive for chills  Negative for fatigue and fever  HENT: Negative  Eyes: Negative  Respiratory: Negative for cough, chest tightness, shortness of breath and wheezing  Cardiovascular: Positive for chest pain  Negative for palpitations and leg swelling  Gastrointestinal: Negative for abdominal distention, abdominal pain, constipation, diarrhea and nausea  Frequent bowel movements   Endocrine: Negative  Genitourinary: Negative  Musculoskeletal: Positive for back pain  Skin: Negative for color change and rash  Allergic/Immunologic: Negative  Neurological: Negative for dizziness, weakness, light-headedness and headaches  Hematological: Negative  Psychiatric/Behavioral: Negative  There were no vitals filed for this visit  (Vital signs not performed during visit due to limitations of telemedicine format along with patient's availability to needed equipment)    I spent 15 minutes directly with the patient during this visit    Ben Gentilerudy 20 acknowledges that she has consented to an online visit or consultation  She understands that the online visit is based solely on information provided by her, and that, in the absence of a face-to-face physical evaluation by the physician, the diagnosis she receives is both limited and provisional in terms of accuracy and completeness   This is not intended to replace a full medical face-to-face evaluation by the physician  Jose Alejandro Lerma understands and accepts these terms

## 2021-03-26 ENCOUNTER — APPOINTMENT (OUTPATIENT)
Dept: LAB | Facility: CLINIC | Age: 81
End: 2021-03-26
Payer: MEDICARE

## 2021-03-26 DIAGNOSIS — N39.0 URINARY TRACT INFECTION WITHOUT HEMATURIA, SITE UNSPECIFIED: ICD-10-CM

## 2021-03-26 LAB
BASOPHILS # BLD AUTO: 0.04 THOUSANDS/ΜL (ref 0–0.1)
BASOPHILS NFR BLD AUTO: 1 % (ref 0–1)
EOSINOPHIL # BLD AUTO: 0.09 THOUSAND/ΜL (ref 0–0.61)
EOSINOPHIL NFR BLD AUTO: 2 % (ref 0–6)
ERYTHROCYTE [DISTWIDTH] IN BLOOD BY AUTOMATED COUNT: 13.3 % (ref 11.6–15.1)
FERRITIN SERPL-MCNC: 100 NG/ML (ref 8–388)
HCT VFR BLD AUTO: 36.4 % (ref 34.8–46.1)
HGB BLD-MCNC: 11.6 G/DL (ref 11.5–15.4)
IMM GRANULOCYTES # BLD AUTO: 0.03 THOUSAND/UL (ref 0–0.2)
IMM GRANULOCYTES NFR BLD AUTO: 1 % (ref 0–2)
IRON SATN MFR SERPL: 28 %
IRON SERPL-MCNC: 97 UG/DL (ref 50–170)
LYMPHOCYTES # BLD AUTO: 0.92 THOUSANDS/ΜL (ref 0.6–4.47)
LYMPHOCYTES NFR BLD AUTO: 19 % (ref 14–44)
MCH RBC QN AUTO: 33.3 PG (ref 26.8–34.3)
MCHC RBC AUTO-ENTMCNC: 31.9 G/DL (ref 31.4–37.4)
MCV RBC AUTO: 105 FL (ref 82–98)
MONOCYTES # BLD AUTO: 0.43 THOUSAND/ΜL (ref 0.17–1.22)
MONOCYTES NFR BLD AUTO: 9 % (ref 4–12)
NEUTROPHILS # BLD AUTO: 3.27 THOUSANDS/ΜL (ref 1.85–7.62)
NEUTS SEG NFR BLD AUTO: 68 % (ref 43–75)
NRBC BLD AUTO-RTO: 0 /100 WBCS
PLATELET # BLD AUTO: 241 THOUSANDS/UL (ref 149–390)
PMV BLD AUTO: 9.7 FL (ref 8.9–12.7)
RBC # BLD AUTO: 3.48 MILLION/UL (ref 3.81–5.12)
TIBC SERPL-MCNC: 342 UG/DL (ref 250–450)
WBC # BLD AUTO: 4.78 THOUSAND/UL (ref 4.31–10.16)

## 2021-03-26 PROCEDURE — 85025 COMPLETE CBC W/AUTO DIFF WBC: CPT

## 2021-03-26 PROCEDURE — 36415 COLL VENOUS BLD VENIPUNCTURE: CPT

## 2021-03-26 PROCEDURE — 83540 ASSAY OF IRON: CPT

## 2021-03-26 PROCEDURE — 87086 URINE CULTURE/COLONY COUNT: CPT

## 2021-03-26 PROCEDURE — 83550 IRON BINDING TEST: CPT

## 2021-03-26 PROCEDURE — 82728 ASSAY OF FERRITIN: CPT

## 2021-03-27 LAB — BACTERIA UR CULT: NORMAL

## 2021-04-05 ENCOUNTER — OFFICE VISIT (OUTPATIENT)
Dept: UROLOGY | Facility: CLINIC | Age: 81
End: 2021-04-05
Payer: MEDICARE

## 2021-04-05 VITALS — HEIGHT: 63 IN | BODY MASS INDEX: 31.53 KG/M2 | DIASTOLIC BLOOD PRESSURE: 64 MMHG | SYSTOLIC BLOOD PRESSURE: 136 MMHG

## 2021-04-05 DIAGNOSIS — R39.9 UTI SYMPTOMS: Primary | ICD-10-CM

## 2021-04-05 DIAGNOSIS — N95.2 ATROPHIC VAGINITIS: ICD-10-CM

## 2021-04-05 LAB — POST-VOID RESIDUAL VOLUME, ML POC: 32 ML

## 2021-04-05 PROCEDURE — 51798 US URINE CAPACITY MEASURE: CPT | Performed by: PHYSICIAN ASSISTANT

## 2021-04-05 PROCEDURE — 99213 OFFICE O/P EST LOW 20 MIN: CPT | Performed by: PHYSICIAN ASSISTANT

## 2021-04-05 NOTE — PROGRESS NOTES
1  UTI symptoms  POCT Measure PVR    Urine culture    Urine culture   2  Atrophic vaginitis  conjugated estrogens (PREMARIN) vaginal cream    DISCONTINUED: conjugated estrogens (PREMARIN) vaginal cream       Assessment and plan:       1  Urinary urgency, frequency, suprapubic pressure   - well controlled on Myrbetriq 25 mg daily  2  Recurrent UTIs  - Discussed conservative measures with proper hydration, cranberry supplementation, avoidance constipation, and probiotics  -   The importance of urine testing will concern for infection  Standing urine culture provided  -   Will start topical estrogen supplementation  Use and side effect profile reviewed  Follow-up 6 months re-evaluation  - she will continue to take this medication on a daily basis  We reviewed the importance of proper hydration, timed voiding, and avoidance of constipation  - patient is not a good candidate for anticholinergic therapy given her age and risk for confusion   - patient will follow up in 1 year with a PVR for symptom reassessment  There were to contact us sooner with any urologic concern  All questions answered  Beatrice Kelly PA-C      Chief Complaint     Recurrent UTI    History of Present Illness     Maulik Pretty is a 78 y o  presenting for follow up of recurrent urinary infections lower urinary tract symptoms presenting for follow-up  Patient had been seen in consultation in September 2018 due to recurrent urinary tract infections  She was treated on multiple antibiotics, however did not have a culture proven infection  Patient's main urinary in symptoms included urinary frequency, urgency, suprapubic pressure  Patient did admit to occasional constipation as well  Previously was previously initiated on Myrbetriq 25 mg daily  Patient has noticed symptom improvement  Patient states that she has to stop  The Myrbetriq and had worsening frequency and nocturia    She has since restarted with improvement of her symptoms  Patient's main concern is recurrent urinary infections  Per review of her [facility] she had 2 positive cultures over the past year  She does state that she has been treated with multiple rounds of antibiotics by her primary care provider however  Most recent urine culture perform the other week is negative for bacterial growth  Patient had ultrasound of the kidney and bladder 09/21/2018 which was negative for any urologic abnormalities  She had an estimated voided postvoid residual at that time approximately 93 mL  PVR 32mL    Laboratory     Lab Results   Component Value Date    CREATININE 0 69 09/30/2020       Review of Systems     Review of Systems   Constitutional: Negative for activity change, appetite change, chills, diaphoresis, fatigue, fever and unexpected weight change  Respiratory: Negative for chest tightness and shortness of breath  Cardiovascular: Negative for chest pain, palpitations and leg swelling  Gastrointestinal: Negative for abdominal distention, abdominal pain, constipation, diarrhea, nausea and vomiting  Genitourinary: Negative for decreased urine volume, difficulty urinating, dysuria, enuresis, flank pain, frequency, genital sores, hematuria and urgency  Nocturia 2 times nightly   Musculoskeletal: Positive for arthralgias (Recent right knee arthroplasty)  Negative for back pain, gait problem and myalgias  Skin: Negative for color change, pallor, rash and wound  Psychiatric/Behavioral: Negative for behavioral problems  The patient is not nervous/anxious  Allergies     Allergies   Allergen Reactions    Ciprofloxacin      Chest discomfort and dizziness    Diclofenac     Diphenhydramine        Physical Exam     Physical Exam  Constitutional:       General: She is not in acute distress  Appearance: She is well-developed  She is not diaphoretic  HENT:      Head: Normocephalic and atraumatic     Eyes: Conjunctiva/sclera: Conjunctivae normal    Neck:      Musculoskeletal: Normal range of motion  Trachea: No tracheal deviation  Pulmonary:      Effort: Pulmonary effort is normal    Musculoskeletal:         General: No deformity  Comments: Ambulates with cane assistance  Very mild bilateral lower extremity edema   Skin:     General: Skin is warm and dry  Coloration: Skin is not pale  Findings: No erythema  Neurological:      Mental Status: She is alert and oriented to person, place, and time     Psychiatric:         Behavior: Behavior normal            Vital Signs     Vitals:    04/05/21 1256   BP: 136/64   Height: 5' 3" (1 6 m)         Current Medications       Current Outpatient Medications:     acetaminophen (TYLENOL) 325 mg tablet, Take 975 mg by mouth every 8 (eight) hours, Disp: , Rfl:     ALPRAZolam (XANAX) 0 25 mg tablet, Take 1 tablet (0 25 mg total) by mouth 3 (three) times a day as needed for anxiety, Disp: 90 tablet, Rfl: 3    amLODIPine (NORVASC) 5 mg tablet, TAKE 1 TABLET EVERY DAY, Disp: 90 tablet, Rfl: 0    b complex vitamins tablet, Take 1 tablet by mouth daily, Disp: , Rfl:     Cholecalciferol (VITAMIN D3) 50 MCG (2000 UT) capsule, Take 2,000 Units by mouth daily, Disp: , Rfl:     lisinopril (ZESTRIL) 5 mg tablet, TAKE 1 TABLET EVERY DAY, Disp: 90 tablet, Rfl: 0    Melatonin 3 MG CAPS, Take 3 mg by mouth  , Disp: , Rfl:     Mirabegron ER (MYRBETRIQ) 25 MG TB24, Take 25 mg by mouth daily, Disp: 90 tablet, Rfl: 3    Multiple Vitamins-Minerals (PRESERVISION AREDS PO), Take by mouth, Disp: , Rfl:     pantoprazole (PROTONIX) 40 mg tablet, Take 1 tablet (40 mg total) by mouth daily, Disp: 60 tablet, Rfl: 3    conjugated estrogens (PREMARIN) vaginal cream, Insert 1 g into the vagina 3 (three) times a week, Disp: 30 g, Rfl: 3    oxyCODONE (ROXICODONE) 5 mg immediate release tablet, Take 1 tablet (5 mg total) by mouth every 4 (four) hours as needed for moderate painMax Daily Amount: 30 mg (Patient not taking: Reported on 4/5/2021), Disp: 30 tablet, Rfl: 0    phenazopyridine (PYRIDIUM) 200 mg tablet, Take 1 tablet (200 mg total) by mouth 3 (three) times a day with meals (Patient not taking: Reported on 4/5/2021), Disp: 9 tablet, Rfl: 0    polyethylene glycol (MIRALAX) 17 g packet, Take 17 g by mouth daily as needed (constipation) (Patient not taking: Reported on 4/5/2021), Disp:  , Rfl: 0    predniSONE 10 mg tablet, 3 tabs po bid x2 days, then 2 tabs po bid x2 days, then 1 tab bid x2 days, then 1 daily until done   (Patient not taking: Reported on 4/5/2021), Disp: 26 tablet, Rfl: 0      Active Problems     Patient Active Problem List   Diagnosis    Aftercare following right knee joint replacement surgery    Drug-induced constipation    Ambulatory dysfunction    Essential hypertension    Anxiety    Right knee pain    Anemia    Symptomatic anemia    Guaiac positive stools    Absolute anemia    Melena    Osteoarthritis of left hip    UTI (urinary tract infection)    Elevated LFTs         Past Medical History     Past Medical History:   Diagnosis Date    Hypertension     UTI (urinary tract infection)          Surgical History     Past Surgical History:   Procedure Laterality Date    EYE SURGERY      JOINT REPLACEMENT Right     Knee 11/15/19         Family History     Family History   Problem Relation Age of Onset    No Known Problems Mother     Diabetes Father     Stroke Father         syndrome         Social History     Social History       Radiology

## 2021-04-13 ENCOUNTER — OFFICE VISIT (OUTPATIENT)
Dept: FAMILY MEDICINE CLINIC | Facility: CLINIC | Age: 81
End: 2021-04-13
Payer: MEDICARE

## 2021-04-13 VITALS
TEMPERATURE: 98.4 F | BODY MASS INDEX: 32.07 KG/M2 | DIASTOLIC BLOOD PRESSURE: 82 MMHG | WEIGHT: 181 LBS | HEART RATE: 63 BPM | OXYGEN SATURATION: 96 % | SYSTOLIC BLOOD PRESSURE: 134 MMHG | HEIGHT: 63 IN

## 2021-04-13 DIAGNOSIS — E78.00 HYPERCHOLESTEROLEMIA: ICD-10-CM

## 2021-04-13 DIAGNOSIS — I10 ESSENTIAL HYPERTENSION: Primary | ICD-10-CM

## 2021-04-13 DIAGNOSIS — M19.90 OSTEOARTHRITIS, UNSPECIFIED OSTEOARTHRITIS TYPE, UNSPECIFIED SITE: ICD-10-CM

## 2021-04-13 PROCEDURE — 99214 OFFICE O/P EST MOD 30 MIN: CPT | Performed by: FAMILY MEDICINE

## 2021-04-13 RX ORDER — MELOXICAM 15 MG/1
15 TABLET ORAL DAILY
Qty: 30 TABLET | Refills: 5 | Status: SHIPPED | OUTPATIENT
Start: 2021-04-13 | End: 2022-01-25 | Stop reason: ALTCHOICE

## 2021-04-13 NOTE — PROGRESS NOTES
BMI Counseling: Body mass index is 32 06 kg/m²  The BMI is above normal  Nutrition recommendations include reducing portion sizes, decreasing overall calorie intake and moderation in carbohydrate intake  Patient ID: Toribio Munoz is a 78 y o  female  HPI: 78 y  o female presents for follow up hypertension and hypercholesterolemia  Pt denies any dizziness,  chest pain, palpitations, or dypsnea with exertion  She complains of diffuse pain in knees, hips and spine and is interested in trying a once a day antiinflammatory as tylenol offers no relief  SUBJECTIVE    Family History   Problem Relation Age of Onset    No Known Problems Mother     Diabetes Father     Stroke Father         syndrome     Social History     Socioeconomic History    Marital status:       Spouse name: Not on file    Number of children: Not on file    Years of education: Not on file    Highest education level: Not on file   Occupational History    Not on file   Social Needs    Financial resource strain: Not on file    Food insecurity     Worry: Not on file     Inability: Not on file    Transportation needs     Medical: Not on file     Non-medical: Not on file   Tobacco Use    Smoking status: Former Smoker     Quit date: 1990     Years since quittin 3    Smokeless tobacco: Never Used   Substance and Sexual Activity    Alcohol use: No    Drug use: Never    Sexual activity: Not Currently     Partners: Male     Birth control/protection: Post-menopausal   Lifestyle    Physical activity     Days per week: Not on file     Minutes per session: Not on file    Stress: Not on file   Relationships    Social connections     Talks on phone: Not on file     Gets together: Not on file     Attends Je[employer] witness service: Not on file     Active member of club or organization: Not on file     Attends meetings of clubs or organizations: Not on file     Relationship status: Not on file    Intimate partner violence     Fear of current or ex partner: Not on file     Emotionally abused: Not on file     Physically abused: Not on file     Forced sexual activity: Not on file   Other Topics Concern    Not on file   Social History Narrative    Daily coffee consumption (__cups/day)     Daily cola consumption (__cans/day)     Daily tea consumption (__cups/day)      Past Medical History:   Diagnosis Date    Hypertension     UTI (urinary tract infection)      Past Surgical History:   Procedure Laterality Date    EYE SURGERY      JOINT REPLACEMENT Right     Knee 11/15/19     Allergies   Allergen Reactions    Ciprofloxacin      Chest discomfort and dizziness    Diclofenac     Diphenhydramine        Current Outpatient Medications:     acetaminophen (TYLENOL) 325 mg tablet, Take 975 mg by mouth every 8 (eight) hours, Disp: , Rfl:     ALPRAZolam (XANAX) 0 25 mg tablet, Take 1 tablet (0 25 mg total) by mouth 3 (three) times a day as needed for anxiety, Disp: 90 tablet, Rfl: 3    amLODIPine (NORVASC) 5 mg tablet, TAKE 1 TABLET EVERY DAY, Disp: 90 tablet, Rfl: 0    b complex vitamins tablet, Take 1 tablet by mouth daily, Disp: , Rfl:     Cholecalciferol (VITAMIN D3) 50 MCG (2000 UT) capsule, Take 2,000 Units by mouth daily, Disp: , Rfl:     lisinopril (ZESTRIL) 5 mg tablet, TAKE 1 TABLET EVERY DAY, Disp: 90 tablet, Rfl: 0    Melatonin 3 MG CAPS, Take 3 mg by mouth  , Disp: , Rfl:     Mirabegron ER (MYRBETRIQ) 25 MG TB24, Take 25 mg by mouth daily, Disp: 90 tablet, Rfl: 3    Multiple Vitamins-Minerals (PRESERVISION AREDS PO), Take by mouth, Disp: , Rfl:     pantoprazole (PROTONIX) 40 mg tablet, Take 1 tablet (40 mg total) by mouth daily, Disp: 60 tablet, Rfl: 3    estradiol (ESTRACE) 0 1 mg/g vaginal cream, Insert 1 g into the vagina 2 (two) times a week, Disp: 42 5 g, Rfl: 1    meloxicam (MOBIC) 15 mg tablet, Take 1 tablet (15 mg total) by mouth daily, Disp: 30 tablet, Rfl: 5    Review of Systems  Constitutional:     Denies fever, chills ,fatigue ,weakness ,weight loss, weight gain     ENT: Denies earache ,loss of hearing ,nosebleed, nasal discharge,nasal congestion ,sore throat ,hoarseness  Pulmonary: Denies shortness of breath ,cough  ,dyspnea on exertion, orthopnea  ,PND   Cardiovascular:  Denies bradycardia , tachycardia  ,palpations, lower extremity edema leg, claudication  Breast:  Denies new or changing breast lumps ,nipple discharge ,nipple changes  Abdomen:  Denies abdominal pain , anorexia , indigestion, nausea, vomiting, constipation, diarrhea  Musculoskeletal: Denies myalgias, + diffusearthralgias,no  joint swelling,+ joint stiffness , denies any limb pain,or  limb swelling  Gu: denies dysuria, polyuria  Skin: Denies skin rash, skin lesion, skin wound, itching, dry skin  Neuro: Denies headache, numbness, tingling, confusion, loss of consciousness, dizziness, vertigo  Psychiatric: Denies feelings of depression, suicidal ideation, anxiety, sleep disturbances    OBJECTIVE  /82   Pulse 63   Temp 98 4 °F (36 9 °C)   Ht 5' 3" (1 6 m)   Wt 82 1 kg (181 lb)   SpO2 96%   BMI 32 06 kg/m²   Constitutional:   NAD, well appearing and well nourished      ENT:   Conjunctiva and lids: no injection, edema, or discharge     Pupils and iris: REENA bilaterally    External inspection of ears and nose: normal without deformities or discharge  Otoscopic exam: Canals patent without erythema  Nasal mucosa, septum and turbinates: Normal or edema or discharge         Oropharynx:  Moist mucosa, normal tongue and tonsils without lesions  No erythema        Pulmonary:Respiratory effort normal rate and rhythm, no increased work of breathing   Auscultation of lungs:  Clear bilaterally with no adventitious breath sounds       Cardiovascular: regular rate and rhythm, S1 and S2, no murmur, no edema and/or varicosities of LE      Abdomen: Soft and non-distended     Positive bowel sounds      No heptomegaly or splenomegaly      Gu: no suprapubic tenderness or CVA tenderness, no urethral discharge  Lymphatic:  No anterior or posterior cervical lymphadenopathy         Musculoskeletal:  Gait and station: Normal gait      Digits and nails normal without clubbing or cyanosis       Inspection/palpation of joints, bones, and muscles:  No joint tenderness, swelling, full active and passive range of motion       Skin: Normal skin turgor and no rashes      Neuro:    Normal reflexes    Psych:   alert and oriented to person, place and time     normal mood and affect       Assessment/Plan:Diagnoses and all orders for this visit:    Essential hypertension  -     Comprehensive metabolic panel; Future    Hypercholesterolemia  -     Lipid Panel with Direct LDL reflex; Future    Osteoarthritis, unspecified osteoarthritis type, unspecified site  -     meloxicam (MOBIC) 15 mg tablet; Take 1 tablet (15 mg total) by mouth daily        Reviewed with patient plan to treat with above plan      Patient instructed to call in 72 hours if not feeling better or if symptoms worse

## 2021-04-16 ENCOUNTER — TELEPHONE (OUTPATIENT)
Dept: UROLOGY | Facility: MEDICAL CENTER | Age: 81
End: 2021-04-16

## 2021-04-16 DIAGNOSIS — N95.2 ATROPHIC VAGINITIS: Primary | ICD-10-CM

## 2021-04-16 RX ORDER — ESTRADIOL 0.1 MG/G
1 CREAM VAGINAL 2 TIMES WEEKLY
Qty: 42.5 G | Refills: 1 | Status: SHIPPED | OUTPATIENT
Start: 2021-04-19 | End: 2021-10-24 | Stop reason: HOSPADM

## 2021-04-16 NOTE — TELEPHONE ENCOUNTER
Patient at the [place] office, patient known for uti and atrophic vaginitis  Patient last office visit 4/5/2021    Per notes   2  Recurrent UTIs  - Discussed conservative measures with proper hydration, cranberry supplementation, avoidance constipation, and probiotics  -   The importance of urine testing will concern for infection  Standing urine culture provided  -   Will start topical estrogen supplementation  Use and side effect profile reviewed  Follow-up 6 months re-evaluation      Patient calling to advise premarin is not covered under insurance and would like to know if there is another option    Will route to provider

## 2021-04-16 NOTE — TELEPHONE ENCOUNTER
Called and spoke to at this time    Advised per   Hayley Rey PA-C      4/16/21 10:51 AM  Note     Resubmitted for estradiol/estrace to [facility], will see how pricing works out let me know        Patient verbalized understanding and is thankful for call

## 2021-04-16 NOTE — TELEPHONE ENCOUNTER
Patient managed by Nuzhat hines in [place]  Patient is unable to get medication Premarin as the price is not covered by insurance and out of pocket cost is over $[phone] 00  Patient would like to know what else she can try  Please advise

## 2021-04-27 DIAGNOSIS — R35.0 URINARY FREQUENCY: ICD-10-CM

## 2021-04-27 NOTE — TELEPHONE ENCOUNTER
Patient left a message on the Medication Refill voice mail line requesting a new prescription for Myrbetriq 25mg, 90 day supply to [facility] mail order pharmacy

## 2021-04-29 RX ORDER — MIRABEGRON 25 MG/1
25 TABLET, FILM COATED, EXTENDED RELEASE ORAL DAILY
Qty: 90 TABLET | Refills: 3 | Status: SHIPPED | OUTPATIENT
Start: 2021-04-29 | End: 2022-05-25 | Stop reason: SDUPTHER

## 2021-04-29 NOTE — TELEPHONE ENCOUNTER
The patient was last seen on 4/5/21 by Elbert Wagner PA-C in the [facility] location; continuation of the medication was authorized at that time    Request for same, 90 day supply with 3 refills was queued and forwarded to the Advanced Practitioner covering the [facility] location for approval

## 2021-05-10 DIAGNOSIS — I10 ESSENTIAL HYPERTENSION: ICD-10-CM

## 2021-05-10 RX ORDER — LISINOPRIL 5 MG/1
5 TABLET ORAL DAILY
Qty: 90 TABLET | Refills: 0 | Status: SHIPPED | OUTPATIENT
Start: 2021-05-10 | End: 2021-07-06

## 2021-05-10 RX ORDER — AMLODIPINE BESYLATE 5 MG/1
5 TABLET ORAL DAILY
Qty: 90 TABLET | Refills: 0 | Status: SHIPPED | OUTPATIENT
Start: 2021-05-10 | End: 2021-07-06

## 2021-07-05 DIAGNOSIS — I10 ESSENTIAL HYPERTENSION: ICD-10-CM

## 2021-07-06 RX ORDER — LISINOPRIL 5 MG/1
5 TABLET ORAL DAILY
Qty: 90 TABLET | Refills: 0 | Status: SHIPPED | OUTPATIENT
Start: 2021-07-06 | End: 2021-09-17

## 2021-07-06 RX ORDER — AMLODIPINE BESYLATE 5 MG/1
5 TABLET ORAL DAILY
Qty: 90 TABLET | Refills: 0 | Status: SHIPPED | OUTPATIENT
Start: 2021-07-06 | End: 2021-09-17

## 2021-07-16 DIAGNOSIS — D64.9 ANEMIA: ICD-10-CM

## 2021-07-16 RX ORDER — PANTOPRAZOLE SODIUM 40 MG/1
40 TABLET, DELAYED RELEASE ORAL DAILY
Qty: 60 TABLET | Refills: 3 | Status: SHIPPED | OUTPATIENT
Start: 2021-07-16 | End: 2022-04-22

## 2021-07-19 ENCOUNTER — TELEPHONE (OUTPATIENT)
Dept: FAMILY MEDICINE CLINIC | Facility: CLINIC | Age: 81
End: 2021-07-19

## 2021-07-19 NOTE — TELEPHONE ENCOUNTER
Pt thinks her UTI is flaring  Pt wants to know if she needs to be seen or can a culture can be done  Please advise

## 2021-07-20 ENCOUNTER — APPOINTMENT (OUTPATIENT)
Dept: LAB | Facility: CLINIC | Age: 81
End: 2021-07-20
Payer: MEDICARE

## 2021-07-20 DIAGNOSIS — N39.0 URINARY TRACT INFECTION WITHOUT HEMATURIA, SITE UNSPECIFIED: Primary | ICD-10-CM

## 2021-07-20 DIAGNOSIS — N39.0 URINARY TRACT INFECTION WITHOUT HEMATURIA, SITE UNSPECIFIED: ICD-10-CM

## 2021-07-20 PROCEDURE — 87186 SC STD MICRODIL/AGAR DIL: CPT

## 2021-07-20 PROCEDURE — 87086 URINE CULTURE/COLONY COUNT: CPT

## 2021-07-20 PROCEDURE — 87077 CULTURE AEROBIC IDENTIFY: CPT

## 2021-07-20 RX ORDER — CEPHALEXIN 500 MG/1
500 CAPSULE ORAL EVERY 8 HOURS SCHEDULED
Qty: 30 CAPSULE | Refills: 0 | Status: SHIPPED | OUTPATIENT
Start: 2021-07-20 | End: 2021-07-30

## 2021-07-20 RX ORDER — PHENAZOPYRIDINE HYDROCHLORIDE 200 MG/1
200 TABLET, FILM COATED ORAL
Qty: 9 TABLET | Refills: 0 | Status: SHIPPED | OUTPATIENT
Start: 2021-07-20 | End: 2021-08-13

## 2021-07-22 LAB — BACTERIA UR CULT: ABNORMAL

## 2021-08-13 ENCOUNTER — HOSPITAL ENCOUNTER (EMERGENCY)
Facility: HOSPITAL | Age: 81
End: 2021-08-14
Attending: EMERGENCY MEDICINE | Admitting: EMERGENCY MEDICINE
Payer: MEDICARE

## 2021-08-13 ENCOUNTER — APPOINTMENT (EMERGENCY)
Dept: RADIOLOGY | Facility: HOSPITAL | Age: 81
End: 2021-08-13
Payer: MEDICARE

## 2021-08-13 DIAGNOSIS — S72.001A CLOSED RIGHT HIP FRACTURE, INITIAL ENCOUNTER (HCC): Primary | ICD-10-CM

## 2021-08-13 DIAGNOSIS — N30.00 ACUTE CYSTITIS: ICD-10-CM

## 2021-08-13 LAB
ALBUMIN SERPL BCP-MCNC: 4.1 G/DL (ref 3.4–4.8)
ALP SERPL-CCNC: 157.9 U/L (ref 35–140)
ALT SERPL W P-5'-P-CCNC: 14 U/L (ref 5–54)
ANION GAP SERPL CALCULATED.3IONS-SCNC: 9 MMOL/L (ref 4–13)
AST SERPL W P-5'-P-CCNC: 20 U/L (ref 15–41)
BACTERIA UR QL AUTO: ABNORMAL /HPF
BASOPHILS # BLD AUTO: 0.02 THOUSANDS/ΜL (ref 0–0.1)
BASOPHILS NFR BLD AUTO: 0 % (ref 0–1)
BILIRUB SERPL-MCNC: 0.41 MG/DL (ref 0.3–1.2)
BILIRUB UR QL STRIP: NEGATIVE
BUN SERPL-MCNC: 23 MG/DL (ref 6–20)
CALCIUM SERPL-MCNC: 8.9 MG/DL (ref 8.4–10.2)
CHLORIDE SERPL-SCNC: 101 MMOL/L (ref 96–108)
CLARITY UR: ABNORMAL
CO2 SERPL-SCNC: 25 MMOL/L (ref 22–33)
COLOR UR: YELLOW
CREAT SERPL-MCNC: 0.84 MG/DL (ref 0.4–1.1)
EOSINOPHIL # BLD AUTO: 0.11 THOUSAND/ΜL (ref 0–0.61)
EOSINOPHIL NFR BLD AUTO: 2 % (ref 0–6)
ERYTHROCYTE [DISTWIDTH] IN BLOOD BY AUTOMATED COUNT: 13.2 % (ref 11.6–15.1)
GFR SERPL CREATININE-BSD FRML MDRD: 65 ML/MIN/1.73SQ M
GLUCOSE SERPL-MCNC: 113 MG/DL (ref 65–140)
GLUCOSE UR STRIP-MCNC: NEGATIVE MG/DL
HCT VFR BLD AUTO: 34.4 % (ref 34.8–46.1)
HGB BLD-MCNC: 11.3 G/DL (ref 11.5–15.4)
HGB UR QL STRIP.AUTO: ABNORMAL
IMM GRANULOCYTES # BLD AUTO: 0.01 THOUSAND/UL (ref 0–0.2)
IMM GRANULOCYTES NFR BLD AUTO: 0 % (ref 0–2)
KETONES UR STRIP-MCNC: NEGATIVE MG/DL
LEUKOCYTE ESTERASE UR QL STRIP: ABNORMAL
LYMPHOCYTES # BLD AUTO: 0.6 THOUSANDS/ΜL (ref 0.6–4.47)
LYMPHOCYTES NFR BLD AUTO: 12 % (ref 14–44)
MCH RBC QN AUTO: 33.1 PG (ref 26.8–34.3)
MCHC RBC AUTO-ENTMCNC: 32.8 G/DL (ref 31.4–37.4)
MCV RBC AUTO: 101 FL (ref 82–98)
MONOCYTES # BLD AUTO: 0.4 THOUSAND/ΜL (ref 0.17–1.22)
MONOCYTES NFR BLD AUTO: 8 % (ref 4–12)
NEUTROPHILS # BLD AUTO: 3.95 THOUSANDS/ΜL (ref 1.85–7.62)
NEUTS SEG NFR BLD AUTO: 78 % (ref 43–75)
NITRITE UR QL STRIP: NEGATIVE
NON-SQ EPI CELLS URNS QL MICRO: ABNORMAL /HPF
PH UR STRIP.AUTO: 5.5 [PH]
PLATELET # BLD AUTO: 207 THOUSANDS/UL (ref 149–390)
PMV BLD AUTO: 9 FL (ref 8.9–12.7)
POTASSIUM SERPL-SCNC: 4.2 MMOL/L (ref 3.5–5)
PROT SERPL-MCNC: 7 G/DL (ref 6.4–8.3)
PROT UR STRIP-MCNC: ABNORMAL MG/DL
RBC # BLD AUTO: 3.41 MILLION/UL (ref 3.81–5.12)
RBC #/AREA URNS AUTO: ABNORMAL /HPF
SODIUM SERPL-SCNC: 135 MMOL/L (ref 133–145)
SP GR UR STRIP.AUTO: 1.02 (ref 1–1.03)
UROBILINOGEN UR QL STRIP.AUTO: 0.2 E.U./DL
WBC # BLD AUTO: 5.09 THOUSAND/UL (ref 4.31–10.16)
WBC #/AREA URNS AUTO: ABNORMAL /HPF

## 2021-08-13 PROCEDURE — 99285 EMERGENCY DEPT VISIT HI MDM: CPT | Performed by: PHYSICIAN ASSISTANT

## 2021-08-13 PROCEDURE — 99285 EMERGENCY DEPT VISIT HI MDM: CPT

## 2021-08-13 PROCEDURE — 96361 HYDRATE IV INFUSION ADD-ON: CPT

## 2021-08-13 PROCEDURE — 96376 TX/PRO/DX INJ SAME DRUG ADON: CPT

## 2021-08-13 PROCEDURE — 81001 URINALYSIS AUTO W/SCOPE: CPT | Performed by: PHYSICIAN ASSISTANT

## 2021-08-13 PROCEDURE — 73564 X-RAY EXAM KNEE 4 OR MORE: CPT

## 2021-08-13 PROCEDURE — 87186 SC STD MICRODIL/AGAR DIL: CPT | Performed by: PHYSICIAN ASSISTANT

## 2021-08-13 PROCEDURE — 85025 COMPLETE CBC W/AUTO DIFF WBC: CPT | Performed by: PHYSICIAN ASSISTANT

## 2021-08-13 PROCEDURE — 87077 CULTURE AEROBIC IDENTIFY: CPT | Performed by: PHYSICIAN ASSISTANT

## 2021-08-13 PROCEDURE — 36415 COLL VENOUS BLD VENIPUNCTURE: CPT | Performed by: PHYSICIAN ASSISTANT

## 2021-08-13 PROCEDURE — 96365 THER/PROPH/DIAG IV INF INIT: CPT

## 2021-08-13 PROCEDURE — 87086 URINE CULTURE/COLONY COUNT: CPT | Performed by: PHYSICIAN ASSISTANT

## 2021-08-13 PROCEDURE — 73502 X-RAY EXAM HIP UNI 2-3 VIEWS: CPT

## 2021-08-13 PROCEDURE — 80053 COMPREHEN METABOLIC PANEL: CPT | Performed by: PHYSICIAN ASSISTANT

## 2021-08-13 PROCEDURE — 96375 TX/PRO/DX INJ NEW DRUG ADDON: CPT

## 2021-08-13 RX ORDER — SODIUM CHLORIDE 9 MG/ML
75 INJECTION, SOLUTION INTRAVENOUS CONTINUOUS
Status: DISCONTINUED | OUTPATIENT
Start: 2021-08-13 | End: 2021-08-14 | Stop reason: HOSPADM

## 2021-08-13 RX ORDER — ACETAMINOPHEN 325 MG/1
975 TABLET ORAL ONCE
Status: COMPLETED | OUTPATIENT
Start: 2021-08-13 | End: 2021-08-13

## 2021-08-13 RX ORDER — FENTANYL CITRATE 50 UG/ML
75 INJECTION, SOLUTION INTRAMUSCULAR; INTRAVENOUS ONCE
Status: COMPLETED | OUTPATIENT
Start: 2021-08-13 | End: 2021-08-13

## 2021-08-13 RX ORDER — FENTANYL CITRATE 50 UG/ML
50 INJECTION, SOLUTION INTRAMUSCULAR; INTRAVENOUS ONCE
Status: COMPLETED | OUTPATIENT
Start: 2021-08-13 | End: 2021-08-13

## 2021-08-13 RX ORDER — LISINOPRIL 5 MG/1
5 TABLET ORAL ONCE
Status: COMPLETED | OUTPATIENT
Start: 2021-08-13 | End: 2021-08-13

## 2021-08-13 RX ORDER — CEFAZOLIN SODIUM 2 G/50ML
2000 SOLUTION INTRAVENOUS ONCE
Status: COMPLETED | OUTPATIENT
Start: 2021-08-13 | End: 2021-08-13

## 2021-08-13 RX ORDER — ONDANSETRON 2 MG/ML
4 INJECTION INTRAMUSCULAR; INTRAVENOUS ONCE
Status: COMPLETED | OUTPATIENT
Start: 2021-08-13 | End: 2021-08-13

## 2021-08-13 RX ADMIN — LISINOPRIL 5 MG: 5 TABLET ORAL at 20:03

## 2021-08-13 RX ADMIN — MORPHINE SULFATE 2 MG: 2 INJECTION, SOLUTION INTRAMUSCULAR; INTRAVENOUS at 21:17

## 2021-08-13 RX ADMIN — ONDANSETRON 4 MG: 2 INJECTION INTRAMUSCULAR; INTRAVENOUS at 19:57

## 2021-08-13 RX ADMIN — SODIUM CHLORIDE 75 ML/HR: 0.9 INJECTION, SOLUTION INTRAVENOUS at 21:13

## 2021-08-13 RX ADMIN — FENTANYL CITRATE 50 MCG: 50 INJECTION, SOLUTION INTRAMUSCULAR; INTRAVENOUS at 18:19

## 2021-08-13 RX ADMIN — FENTANYL CITRATE 75 MCG: 50 INJECTION, SOLUTION INTRAMUSCULAR; INTRAVENOUS at 19:28

## 2021-08-13 RX ADMIN — CEFAZOLIN SODIUM 2000 MG: 2 SOLUTION INTRAVENOUS at 19:28

## 2021-08-13 RX ADMIN — MORPHINE SULFATE 2 MG: 2 INJECTION, SOLUTION INTRAMUSCULAR; INTRAVENOUS at 23:20

## 2021-08-13 RX ADMIN — SODIUM CHLORIDE 500 ML: 0.9 INJECTION, SOLUTION INTRAVENOUS at 20:05

## 2021-08-13 RX ADMIN — ACETAMINOPHEN 975 MG: 325 TABLET, FILM COATED ORAL at 23:20

## 2021-08-13 NOTE — ED PROVIDER NOTES
History  Chief Complaint   Patient presents with    Fall     pt s/p fall, pain right hip to right knee, pt states she might have a uti based on urinary frequency and burning  78year old female presents via EMS with R hip radiating to R knee pain s/p fall just prior to arrival  She reports she was holding something in each hand and had trouble with her balance  She denies lightheadedness or dizziness prior to falling  Denies head strike or LOC  Does note urinary frequency similar to her prior UTIs  She is s/p R knee replacement by Dr Olu Purcell, [facility]  Prior to Admission Medications   Prescriptions Last Dose Informant Patient Reported? Taking?    ALPRAZolam (XANAX) 0 25 mg tablet 8/12/2021 at Unknown time Self No Yes   Sig: Take 1 tablet (0 25 mg total) by mouth 3 (three) times a day as needed for anxiety   Cholecalciferol (VITAMIN D3) 50 MCG (2000 UT) capsule 8/13/2021 at Unknown time Self Yes Yes   Sig: Take 2,000 Units by mouth daily   Melatonin 3 MG CAPS 8/12/2021 at Unknown time Self Yes Yes   Sig: Take 3 mg by mouth     Mirabegron ER (Myrbetriq) 25 MG TB24 8/12/2021 at Unknown time  No Yes   Sig: Take 25 mg by mouth daily   Multiple Vitamins-Minerals (PRESERVISION AREDS PO) 8/13/2021 at Unknown time Self Yes Yes   Sig: Take by mouth   acetaminophen (TYLENOL) 325 mg tablet 8/13/2021 at Unknown time Self Yes Yes   Sig: Take 975 mg by mouth every 8 (eight) hours   amLODIPine (NORVASC) 5 mg tablet 8/12/2021 at Unknown time  No Yes   Sig: TAKE 1 TABLET (5 MG TOTAL) BY MOUTH DAILY   b complex vitamins tablet 8/13/2021 at Unknown time Self Yes Yes   Sig: Take 1 tablet by mouth daily   estradiol (ESTRACE) 0 1 mg/g vaginal cream   No No   Sig: Insert 1 g into the vagina 2 (two) times a week   lisinopril (ZESTRIL) 5 mg tablet 8/12/2021 at Unknown time  No Yes   Sig: TAKE 1 TABLET (5 MG TOTAL) BY MOUTH DAILY   meloxicam (MOBIC) 15 mg tablet 8/13/2021 at Unknown time  No Yes   Sig: Take 1 tablet (15 mg total) by mouth daily   pantoprazole (PROTONIX) 40 mg tablet 2021 at Unknown time  No Yes   Sig: Take 1 tablet (40 mg total) by mouth daily      Facility-Administered Medications: None       Past Medical History:   Diagnosis Date    Hypertension     UTI (urinary tract infection)        Past Surgical History:   Procedure Laterality Date    EYE SURGERY      JOINT REPLACEMENT Right     Knee 11/15/19       Family History   Problem Relation Age of Onset    No Known Problems Mother     Diabetes Father     Stroke Father         syndrome     I have reviewed and agree with the history as documented  E-Cigarette/Vaping     E-Cigarette/Vaping Substances     Social History     Tobacco Use    Smoking status: Former Smoker     Quit date:      Years since quittin 6    Smokeless tobacco: Never Used   Substance Use Topics    Alcohol use: No    Drug use: Never       Review of Systems   Constitutional: Negative for fever  HENT: Negative for nosebleeds  Eyes: Negative for redness  Respiratory: Negative for shortness of breath  Cardiovascular: Negative for chest pain  Gastrointestinal: Negative for blood in stool  Genitourinary: Negative for hematuria  Musculoskeletal: Positive for arthralgias and gait problem  Skin: Negative for rash  Neurological: Negative for seizures  Psychiatric/Behavioral: Negative for behavioral problems  Physical Exam  Physical Exam  Constitutional:       Appearance: Normal appearance  HENT:      Head: Normocephalic and atraumatic  Right Ear: External ear normal       Left Ear: External ear normal    Eyes:      Extraocular Movements: Extraocular movements intact  Pulmonary:      Effort: Pulmonary effort is normal    Musculoskeletal:      Cervical back: Normal range of motion  Comments: RLE shortened and externally rotated  +1 pitting edema  Pulses and sensation intact  Significant pain in R hip with any attempt at ROM     Skin:     General: Skin is warm and dry  Neurological:      General: No focal deficit present  Mental Status: She is alert  Psychiatric:         Mood and Affect: Mood normal          Behavior: Behavior normal          Vital Signs  ED Triage Vitals   Temperature Pulse Respirations Blood Pressure SpO2   08/13/21 1741 08/13/21 1741 08/13/21 1741 08/13/21 1741 08/13/21 1741   98 2 °F (36 8 °C) 86 18 (!) 178/92 98 %      Temp Source Heart Rate Source Patient Position - Orthostatic VS BP Location FiO2 (%)   08/13/21 1741 08/13/21 1741 08/13/21 1741 08/13/21 1741 --   Oral Monitor Sitting Left arm       Pain Score       08/13/21 1819       Worst Possible Pain           Vitals:    08/13/21 1741 08/13/21 1959 08/13/21 2003   BP: (!) 178/92 167/52 (!) 172/62   Pulse: 86 79    Patient Position - Orthostatic VS: Sitting Lying          Visual Acuity      ED Medications  Medications   sodium chloride 0 9 % infusion (has no administration in time range)   sodium chloride 0 9 % bolus 500 mL (500 mL Intravenous New Bag 8/13/21 2005)   morphine injection 2 mg (has no administration in time range)   fentanyl citrate (PF) 100 MCG/2ML 50 mcg (50 mcg Intravenous Given 8/13/21 1819)   fentanyl citrate (PF) 100 MCG/2ML 75 mcg (75 mcg Intravenous Given 8/13/21 1928)   ceFAZolin (ANCEF) IVPB (premix in dextrose) 2,000 mg 50 mL (0 mg Intravenous Stopped 8/13/21 1957)   lisinopril (ZESTRIL) tablet 5 mg (5 mg Oral Given 8/13/21 2003)   ondansetron (ZOFRAN) injection 4 mg (4 mg Intravenous Given 8/13/21 1957)       Diagnostic Studies  Results Reviewed     Procedure Component Value Units Date/Time    Urine Microscopic [[id number]]  (Abnormal) Collected: 08/13/21 1838    Lab Status: Final result Specimen: Urine, Clean Catch Updated: 08/13/21 1912     RBC, UA None Seen /hpf      WBC, UA Innumerable /hpf      Epithelial Cells None Seen /hpf      Bacteria, UA Moderate /hpf     Urine culture [[id number]] Collected: 08/13/21 1838    Lab Status:  In process Specimen: Urine, Clean Catch Updated: 08/13/21 1912    UA w Reflex to Microscopic w Reflex to Culture [[id number]]  (Abnormal) Collected: 08/13/21 1838    Lab Status: Final result Specimen: Urine, Clean Catch Updated: 08/13/21 1857     Color, UA Yellow     Clarity, UA Cloudy     Specific [place], UA 1 020     pH, UA 5 5     Leukocytes, UA 3+     Nitrite, UA Negative     Protein, UA Trace mg/dl      Glucose, UA Negative mg/dl      Ketones, UA Negative mg/dl      Urobilinogen, UA 0 2 E U /dl      Bilirubin, UA Negative     Blood, UA Trace-Intact    Comprehensive metabolic panel [[id number]]  (Abnormal) Collected: 08/13/21 1806    Lab Status: Final result Specimen: Blood from Arm, Right Updated: 08/13/21 1836     Sodium 135 mmol/L      Potassium 4 2 mmol/L      Chloride 101 mmol/L      CO2 25 mmol/L      ANION GAP 9 mmol/L      BUN 23 mg/dL      Creatinine 0 84 mg/dL      Glucose 113 mg/dL      Calcium 8 9 mg/dL      AST 20 U/L      ALT 14 U/L      Alkaline Phosphatase 157 9 U/L      Total Protein 7 0 g/dL      Albumin 4 1 g/dL      Total Bilirubin 0 41 mg/dL      eGFR 65 ml/min/1 73sq m     Narrative:      [place] guidelines for Chronic Kidney Disease (CKD):     Stage 1 with normal or high GFR (GFR > 90 mL/min/1 73 square meters)    Stage 2 Mild CKD (GFR = 60-89 mL/min/1 73 square meters)    Stage 3A Moderate CKD (GFR = 45-59 mL/min/1 73 square meters)    Stage 3B Moderate CKD (GFR = 30-44 mL/min/1 73 square meters)    Stage 4 Severe CKD (GFR = 15-29 mL/min/1 73 square meters)    Stage 5 End Stage CKD (GFR <15 mL/min/1 73 square meters)  Note: GFR calculation is accurate only with a steady state creatinine    CBC and differential [[id number]]  (Abnormal) Collected: 08/13/21 1806    Lab Status: Final result Specimen: Blood from Arm, Right Updated: 08/13/21 1811     WBC 5 09 Thousand/uL      RBC 3 41 Million/uL      Hemoglobin 11 3 g/dL      Hematocrit 34 4 %       fL      MCH 33 1 pg      MCHC 32 8 g/dL      RDW 13 2 %      MPV 9 0 fL      Platelets 701 Thousands/uL      Neutrophils Relative 78 %      Immat GRANS % 0 %      Lymphocytes Relative 12 %      Monocytes Relative 8 %      Eosinophils Relative 2 %      Basophils Relative 0 %      Neutrophils Absolute 3 95 Thousands/µL      Immature Grans Absolute 0 01 Thousand/uL      Lymphocytes Absolute 0 60 Thousands/µL      Monocytes Absolute 0 40 Thousand/µL      Eosinophils Absolute 0 11 Thousand/µL      Basophils Absolute 0 02 Thousands/µL                  XR hip/pelv 2-3 vws right if performed   ED Interpretation by Lawrence Henley PA-C (08/13 1939)   R hip fracture      XR knee 4+ vw right injury   ED Interpretation by Lawrence Henley PA-C (08/13 1939)   Intact hardware                 Procedures  Procedures         ED Course  ED Course as of Aug 13 2028   Fri Aug 13, 2021   2000 We do not have ortho surgery here, patient initially did not want to go to [place], however she ultimately decided that she would go to [place] after I explained to her that these [address] was full  I was then informed that the [place] was full as well  At this point, patient was agreeable to go to [facility] where her orthopedic surgeon, Dr Neelima Paez, is  She previously had her right knee operated on at [facility]      2009 [facility] with Harinder Schumacher transfer center at [facility]  Harinder Schumacher will get someone on the phone and call back      2021 No Ortho beds at [facility]      2021 Patient and family updated  Nausea improved, pain worsening again      2026 Accepted trauma ED at [facility] Dr Hernandez Speak 22yo+      Most Recent Value   SBIRT (23 yo +)   In order to provide better care to our patients, we are screening all of our patients for alcohol and drug use  Would it be okay to ask you these screening questions? Yes Filed at: 08/13/2021 1832   Initial Alcohol Screen: US AUDIT-C    1   How often do you have a drink containing alcohol?  0 Filed at: 08/13/2021 1832   2  How many drinks containing alcohol do you have on a typical day you are drinking? 0 Filed at: 08/13/2021 1832   3a  Male UNDER 65: How often do you have five or more drinks on one occasion? 0 Filed at: 08/13/2021 1832   3b  FEMALE Any Age, or MALE 65+: How often do you have 4 or more drinks on one occassion? 0 Filed at: 08/13/2021 1832   Audit-C Score  0 Filed at: 08/13/2021 1832   SHU: How many times in the past year have you    Used an illegal drug or used a prescription medication for non-medical reasons? Never Filed at: 08/13/2021 1832                    MDM    Disposition  Final diagnoses:   Closed right hip fracture, initial encounter ([address] )   Acute cystitis     Time reflects when diagnosis was documented in both MDM as applicable and the Disposition within this note     Time User Action Codes Description Comment    8/13/2021  7:40 PM Maylin Mcguire [[id number] 001A] Closed right hip fracture, initial encounter ([address] )     8/13/2021  7:41 PM Maylin Mcguire [N30 [id number]] Acute cystitis       ED Disposition     ED Disposition Condition Date/Time Comment    Transfer to Another Facility-In Network  Fri Aug 13, 2021  7:40 PM Jose Alejandro Bedolla should be transferred out to [facility]          MD Documentation      Most Recent Value   Patient Condition  The patient has been stabilized such that within reasonable medical probability, no material deterioration of the patient condition or the condition of the unborn child(meghan) is likely to result from the transfer   Reason for Transfer  Level of Care needed not available at this facility   Benefits of Transfer  Specialized equipment and/or services available at the receiving facility (Include comment)________________________ [ortho]   Risks of Transfer  Potential for delay in receiving treatment, Potential deterioration of medical condition, Loss of IV, Increased discomfort during transfer Accepting Physician  [address] Name, [facility]    (Name & Tel number)  Little Parrish MD  [facility]   Provider Certification  General risk, such as traffic hazards, adverse weather conditions, rough terrain or turbulence, possible failure of equipment (including vehicle or aircraft), or consequences of actions of persons outside the control of the transport personnel, Risk of worsening condition, The possibility of a transport vehicle being unavailable      RN Documentation      Most [facility] Name, [address]   [facility]    (Name & Tel number)  Dixie      Follow-up Information    None         Patient's Medications   Discharge Prescriptions    No medications on file     No discharge procedures on file      PDMP Review       Value Time User    PDMP Reviewed  Yes 1/18/2021  2:28 PM Chelita Roque          ED Provider  Electronically Signed by           Alberto Terry PA-C  08/13/21 2028

## 2021-08-14 VITALS
OXYGEN SATURATION: 96 % | DIASTOLIC BLOOD PRESSURE: 44 MMHG | RESPIRATION RATE: 18 BRPM | SYSTOLIC BLOOD PRESSURE: 149 MMHG | TEMPERATURE: 98.2 F | HEART RATE: 78 BPM

## 2021-08-14 PROCEDURE — 96361 HYDRATE IV INFUSION ADD-ON: CPT

## 2021-08-14 PROCEDURE — 96376 TX/PRO/DX INJ SAME DRUG ADON: CPT

## 2021-08-14 RX ADMIN — MORPHINE SULFATE 2 MG: 2 INJECTION, SOLUTION INTRAMUSCULAR; INTRAVENOUS at 00:30

## 2021-08-14 NOTE — ED NOTES
Pt transferring to [facility] by [facility]   Accepting Dr Loaiza    Report to [facility]  08/13/21 [id number]

## 2021-08-14 NOTE — EMTALA/ACUTE CARE TRANSFER
[facility] EMERGENCY DEPARTMENT  [address] [id number]  Dept: [phone]      EMTALA TRANSFER CONSENT    NAME Tao Platt                                         1940                              MRN [id number]    I have been informed of my rights regarding examination, treatment, and transfer   by Dr Melba Shoemaker DO    Benefits: Specialized equipment and/or services available at the receiving facility (Include comment)________________________ (ortho)    Risks: Potential for delay in receiving treatment, Potential deterioration of medical condition, Loss of IV, Increased discomfort during transfer      Consent for Transfer:  I acknowledge that my medical condition has been evaluated and explained to me by the emergency department physician or other qualified medical person and/or my attending physician, who has recommended that I be transferred to the service of  Accepting Physician: Elizabeth Quintero at [address] Name, [facility]  The above potential benefits of such transfer, the potential risks associated with such transfer, and the probable risks of not being transferred have been explained to me, and I fully understand them  The doctor has explained that, in my case, the benefits of transfer outweigh the risks  I agree to be transferred  I authorize the performance of emergency medical procedures and treatments upon me in both transit and upon arrival at the receiving facility  Additionally, I authorize the release of any and all medical records to the receiving facility and request they be transported with me, if possible  I understand that the safest mode of transportation during a medical emergency is an ambulance and that the Hospital advocates the use of this mode of transport   Risks of traveling to the receiving facility by car, including absence of medical control, life sustaining equipment, such as oxygen, and medical personnel has been explained to me and I fully understand them  (ELIDIA CORRECT BOX BELOW)  [  ]  I consent to the stated transfer and to be transported by ambulance/helicopter  [  ]  I consent to the stated transfer, but refuse transportation by ambulance and accept full responsibility for my transportation by car  I understand the risks of non-ambulance transfers and I exonerate the Hospital and its staff from any deterioration in my condition that results from this refusal     X___________________________________________    DATE  21  TIME________  Signature of patient or legally responsible individual signing on patient behalf           RELATIONSHIP TO PATIENT_________________________          Provider Certification    NAME [facility] 1940                              MRN [id number]    A medical screening exam was performed on the above named patient  Based on the examination:    Condition Necessitating Transfer The primary encounter diagnosis was Closed right hip fracture, initial encounter ([facility] )  A diagnosis of Acute cystitis was also pertinent to this visit      Patient Condition: The patient has been stabilized such that within reasonable medical probability, no material deterioration of the patient condition or the condition of the unborn child(meghan) is likely to result from the transfer    Reason for Transfer: Level of Care needed not available at this facility    Transfer Requirements: Facility [facility]   · Space available and qualified personnel available for treatment as acknowledged by Jose Sosa  · Agreed to accept transfer and to provide appropriate medical treatment as acknowledged by       Klaus  · Appropriate medical records of the examination and treatment of the patient are provided at the time of transfer   [address] _______  · Transfer will be performed by qualified personnel from    and appropriate transfer equipment as required, including the use of necessary and appropriate life support measures  Provider Certification: I have examined the patient and explained the following risks and benefits of being transferred/refusing transfer to the patient/family:  General risk, such as traffic hazards, adverse weather conditions, rough terrain or turbulence, possible failure of equipment (including vehicle or aircraft), or consequences of actions of persons outside the control of the transport personnel, Risk of worsening condition, The possibility of a transport vehicle being unavailable      Based on these reasonable risks and benefits to the patient and/or the unborn child(meghan), and based upon the information available at the time of the patients examination, I certify that the medical benefits reasonably to be expected from the provision of appropriate medical treatments at another medical facility outweigh the increasing risks, if any, to the individuals medical condition, and in the case of labor to the unborn child, from effecting the transfer      X____________________________________________ DATE 08/13/21        TIME_______      ORIGINAL - SEND TO MEDICAL RECORDS   COPY - SEND WITH PATIENT DURING TRANSFER

## 2021-08-14 NOTE — ED NOTES
Called report to [facility] spoke to accepting RN all questions answered      Bobbi Bella, RN  08/14/21 0150

## 2021-08-15 LAB — BACTERIA UR CULT: ABNORMAL

## 2021-09-03 ENCOUNTER — TELEPHONE (OUTPATIENT)
Dept: FAMILY MEDICINE CLINIC | Facility: CLINIC | Age: 81
End: 2021-09-03

## 2021-09-03 NOTE — TELEPHONE ENCOUNTER
Patient called  She was discharged from [address] [place] [address] today after a hip replacement   Scheduled a CLAUDETTE for 09/09   Please call patient to follow up

## 2021-09-03 NOTE — TELEPHONE ENCOUNTER
As per Dr Geraldo Somers, hold call till Tuesday and will talk to [facility]

## 2021-09-07 ENCOUNTER — TELEPHONE (OUTPATIENT)
Dept: FAMILY MEDICINE CLINIC | Facility: CLINIC | Age: 81
End: 2021-09-07

## 2021-09-07 NOTE — TELEPHONE ENCOUNTER
----- Message from Stephanie Sandoval sent at 9/7/2021  7:26 AM EDT -----  Regarding: DEXA Procedure/Order Due as part of Osteoporosis Measure  Hello,         The attached patient has been identified on the Osteoporosis Measure Report Post Fracture within the past 6 months  A DEXA order already exists in the patient chart, this is just a reminder to encourage the patient to have the DEXA scheduled and completed at their earliest convenience  The measure can also be met with an RX order for Osteoporosis if appropriate  The patient is scheduled for an appointment on 9-9-2021    Thank you,  Value Based Department

## 2021-09-09 ENCOUNTER — OFFICE VISIT (OUTPATIENT)
Dept: FAMILY MEDICINE CLINIC | Facility: CLINIC | Age: 81
End: 2021-09-09

## 2021-09-09 ENCOUNTER — TRANSITIONAL CARE MANAGEMENT (OUTPATIENT)
Dept: FAMILY MEDICINE CLINIC | Facility: CLINIC | Age: 81
End: 2021-09-09

## 2021-09-09 VITALS
TEMPERATURE: 99 F | HEART RATE: 72 BPM | HEIGHT: 63 IN | SYSTOLIC BLOOD PRESSURE: 130 MMHG | DIASTOLIC BLOOD PRESSURE: 80 MMHG | BODY MASS INDEX: 35.08 KG/M2 | WEIGHT: 198 LBS

## 2021-09-09 DIAGNOSIS — I50.9 CONGESTIVE HEART FAILURE, UNSPECIFIED HF CHRONICITY, UNSPECIFIED HEART FAILURE TYPE (HCC): ICD-10-CM

## 2021-09-09 DIAGNOSIS — I10 ESSENTIAL HYPERTENSION: ICD-10-CM

## 2021-09-09 DIAGNOSIS — I48.0 PAROXYSMAL ATRIAL FIBRILLATION (HCC): Primary | ICD-10-CM

## 2021-09-09 DIAGNOSIS — Z13.820 SCREENING FOR OSTEOPOROSIS: ICD-10-CM

## 2021-09-09 DIAGNOSIS — I48.91 ATRIAL FIBRILLATION, UNSPECIFIED TYPE (HCC): ICD-10-CM

## 2021-09-09 DIAGNOSIS — D64.9 ANEMIA, UNSPECIFIED TYPE: ICD-10-CM

## 2021-09-09 PROCEDURE — 1111F DSCHRG MED/CURRENT MED MERGE: CPT | Performed by: FAMILY MEDICINE

## 2021-09-09 PROCEDURE — 99496 TRANSJ CARE MGMT HIGH F2F 7D: CPT | Performed by: FAMILY MEDICINE

## 2021-09-09 RX ORDER — FUROSEMIDE 20 MG/1
20 TABLET ORAL DAILY
COMMUNITY
Start: 2021-09-08 | End: 2022-01-21 | Stop reason: SDUPTHER

## 2021-09-09 RX ORDER — LIDOCAINE 4 G/G
1 PATCH TOPICAL DAILY
COMMUNITY
Start: 2021-09-03 | End: 2022-01-25 | Stop reason: ALTCHOICE

## 2021-09-09 RX ORDER — WARFARIN SODIUM 5 MG/1
5 TABLET ORAL DAILY
COMMUNITY
Start: 2021-09-03 | End: 2021-09-09 | Stop reason: SDUPTHER

## 2021-09-09 RX ORDER — OXYCODONE HYDROCHLORIDE 5 MG/1
5 TABLET ORAL EVERY 4 HOURS PRN
COMMUNITY
Start: 2021-09-02 | End: 2021-09-12

## 2021-09-09 RX ORDER — FERROUS SULFATE 325(65) MG
325 TABLET ORAL
COMMUNITY
Start: 2021-09-03 | End: 2021-10-03

## 2021-09-09 RX ORDER — WARFARIN SODIUM 5 MG/1
5 TABLET ORAL DAILY
Qty: 90 TABLET | Refills: 3 | Status: SHIPPED | OUTPATIENT
Start: 2021-09-09 | End: 2021-12-02 | Stop reason: HOSPADM

## 2021-09-13 ENCOUNTER — TELEPHONE (OUTPATIENT)
Dept: FAMILY MEDICINE CLINIC | Facility: CLINIC | Age: 81
End: 2021-09-13

## 2021-09-13 NOTE — TELEPHONE ENCOUNTER
Im sending this up front (in bin); needs to be faxed with message on front page to get drawn this week

## 2021-09-13 NOTE — PROGRESS NOTES
Patient ID: Cathleen Kelsey is a 78 y o  female  HPI: 78 y  o female presents for post hospital eval of afib, s/p CHF,   Hypertension and anemia  She waas started on meds for rate control, coumadin and is on a low sodium diet  seh is followign with cardiology closely  She  is doing well at this time  She denies any chest pain, edema , dypsnea, or palpitations  TCM Call (since 2021)     Date and time call was made  2021  2:34 PM    Hospital care reviewed  Records reviewed    Patient was hospitialized at  [facility]        Date of Admission  21    Date of discharge  21    Diagnosis  paroxymal atrial fib    [facility]  Rehabilitation center    Were the patients medications reviewed and updated  Yes    Current Symptoms  Fatigue    Fatigue severity  Mild      TCM Call (since 2021)     Post hospital issues  None    Should patient be enrolled in anticoag monitoring? No    Scheduled for follow up? Yes    Did you obtain your prescribed medications  Yes    Do you need help managing your prescriptions or medications  Yes    Is transportation to your appointment needed  Yes    I have advised the patient to call PCP with any new or worsening symptoms  sarwat bradford          SUBJECTIVE    Family History   Problem Relation Age of Onset    No Known Problems Mother     Diabetes Father     Stroke Father         syndrome     Social History     Socioeconomic History    Marital status:       Spouse name: Not on file    Number of children: Not on file    Years of education: Not on file    Highest education level: Not on file   Occupational History    Not on file   Tobacco Use    Smoking status: Former Smoker     Quit date:      Years since quittin 7    Smokeless tobacco: Never Used   Substance and Sexual Activity    Alcohol use: No    Drug use: Never    Sexual activity: Not Currently     Partners: Male     Birth control/protection: Post-menopausal   Other Topics Concern    Not on file   Social History Narrative    Daily coffee consumption (__cups/day)     Daily cola consumption (__cans/day)     Daily tea consumption (__cups/day)      Social Determinants of Health     Financial Resource Strain:     Difficulty of Paying Living Expenses:    Food Insecurity:     Worried About Running Out of Food in the Last Year:     Ran Out of Food in the Last Year:    Transportation Needs:     Lack of Transportation (Medical):      Lack of Transportation (Non-Medical):    Physical Activity:     Days of Exercise per Week:     Minutes of Exercise per Session:    Stress:     Feeling of Stress :    Social Connections:     Frequency of Communication with Friends and Family:     Frequency of Social Gatherings with Friends and Family:     Attends Orthodox Services:     Active Member of Clubs or Organizations:     Attends Club or Organization Meetings:     Marital Status:    Intimate Partner Violence:     Fear of Current or Ex-Partner:     Emotionally Abused:     Physically Abused:     Sexually Abused:      Past Medical History:   Diagnosis Date    Hypertension     UTI (urinary tract infection)      Past Surgical History:   Procedure Laterality Date    EYE SURGERY      JOINT REPLACEMENT Right     Knee 11/15/19     Allergies   Allergen Reactions    Ciprofloxacin      Chest discomfort and dizziness    Diclofenac     Diphenhydramine        Current Outpatient Medications:     acetaminophen (TYLENOL) 325 mg tablet, Take 975 mg by mouth every 8 (eight) hours, Disp: , Rfl:     ALPRAZolam (XANAX) 0 25 mg tablet, Take 1 tablet (0 25 mg total) by mouth 3 (three) times a day as needed for anxiety, Disp: 90 tablet, Rfl: 3    amLODIPine (NORVASC) 5 mg tablet, TAKE 1 TABLET (5 MG TOTAL) BY MOUTH DAILY, Disp: 90 tablet, Rfl: 0    b complex vitamins tablet, Take 1 tablet by mouth daily, Disp: , Rfl:     Cholecalciferol (VITAMIN D3) 50 MCG (2000 UT) capsule, Take 2,000 Units by mouth daily, Disp: , Rfl:     estradiol (ESTRACE) 0 1 mg/g vaginal cream, Insert 1 g into the vagina 2 (two) times a week, Disp: 42 5 g, Rfl: 1    ferrous sulfate 325 (65 Fe) mg tablet, Take 325 mg by mouth, Disp: , Rfl:     furosemide (LASIX) 20 mg tablet, Take 20 mg by mouth daily, Disp: , Rfl:     Lidocaine 4 % PTCH, Place 1 patch on the skin daily, Disp: , Rfl:     lisinopril (ZESTRIL) 5 mg tablet, TAKE 1 TABLET (5 MG TOTAL) BY MOUTH DAILY, Disp: 90 tablet, Rfl: 0    Melatonin 3 MG CAPS, Take 3 mg by mouth  , Disp: , Rfl:     meloxicam (MOBIC) 15 mg tablet, Take 1 tablet (15 mg total) by mouth daily, Disp: 30 tablet, Rfl: 5    metoprolol tartrate (LOPRESSOR) 25 mg tablet, Take 1 tablet (25 mg total) by mouth 2 (two) times a day, Disp: 180 tablet, Rfl: 3    Mirabegron ER (Myrbetriq) 25 MG TB24, Take 25 mg by mouth daily, Disp: 90 tablet, Rfl: 3    Multiple Vitamins-Minerals (PRESERVISION AREDS PO), Take by mouth, Disp: , Rfl:     warfarin (COUMADIN) 5 mg tablet, Take 1 tablet (5 mg total) by mouth daily, Disp: 90 tablet, Rfl: 3    pantoprazole (PROTONIX) 40 mg tablet, Take 1 tablet (40 mg total) by mouth daily, Disp: 60 tablet, Rfl: 3    Review of Systems  Constitutional:     Denies fever, chills ,fatigue ,weakness ,weight loss, weight gain     ENT: Denies earache ,loss of hearing ,nosebleed, nasal discharge,nasal congestion ,sore throat ,hoarseness  Pulmonary: Denies shortness of breath ,cough  ,dyspnea on exertion, orthopnea  ,PND   Cardiovascular:  Denies bradycardia , tachycardia  ,palpations, lower extremity edema leg, claudication  Breast:  Denies new or changing breast lumps ,nipple discharge ,nipple changes  Abdomen:  Denies abdominal pain , anorexia , indigestion, nausea, vomiting, constipation, diarrhea  Musculoskeletal: Denies myalgias, arthralgias, joint swelling, joint stiffness , limb pain, limb swelling  Gu: denies dysuria, polyuria  Skin: Denies skin rash, skin lesion, skin wound, itching, dry skin  Neuro: Denies headache, numbness, tingling, confusion, loss of consciousness, dizziness, vertigo  Psychiatric: Denies feelings of depression, suicidal ideation, anxiety, sleep disturbances    OBJECTIVE  /80   Pulse 72   Temp 99 °F (37 2 °C)   Ht 5' 3" (1 6 m)   Wt 89 8 kg (198 lb)   BMI 35 07 kg/m²   Constitutional:   NAD, well appearing and well nourished      ENT:   Conjunctiva and lids: no injection, edema, or discharge     Pupils and iris: REENA bilaterally    External inspection of ears and nose: normal without deformities or discharge  Otoscopic exam: Canals patent without erythema  Nasal mucosa, septum and turbinates: Normal or edema or discharge         Oropharynx:  Moist mucosa, normal tongue and tonsils without lesions  No erythema        Pulmonary:Respiratory effort normal rate and rhythm, no increased work of breathing  Auscultation of lungs:  Clear bilaterally with no adventitious breath sounds       Cardiovascular: regular rate and rhythm, S1 and S2, no murmur, no edema and/or varicosities of LE      Abdomen: Soft and non-distended     Positive bowel sounds      No heptomegaly or splenomegaly      Gu: no suprapubic tenderness or CVA tenderness, no urethral discharge  Lymphatic:  No anterior or posterior cervical lymphadenopathy         Musculoskeletal:  Gait and station: Normal gait      Digits and nails normal without clubbing or cyanosis       Inspection/palpation of joints, bones, and muscles:  No joint tenderness, swelling, full active and passive range of motion       Skin: Normal skin turgor and no rashes      Neuro:      Normal reflexes     Psych:   alert and oriented to person, place and time     normal mood and affect       Assessment/Plan:Diagnoses and all orders for this visit:    Paroxysmal atrial fibrillation (HCC)  Comments:  I will be managing her coumadin  Will check INR and aim to keep INR ideally around 2 5- 3      Orders:  -     Protime-INR; Future    Congestive heart failure, unspecified HF chronicity, unspecified heart failure type ([facility] )  Comments:  Check bnp, continue low iron diet  Orders:  -     NT-BNP PRO; Future    Anemia, unspecified type  Comments:  Continue with iron and check hgb in one month  Orders:  -     Hemoglobin; Future    Essential hypertension  Comments:  STable on current meds  Orders:  -     metoprolol tartrate (LOPRESSOR) 25 mg tablet; Take 1 tablet (25 mg total) by mouth 2 (two) times a day    Atrial fibrillation, unspecified type (HCC)  -     warfarin (COUMADIN) 5 mg tablet; Take 1 tablet (5 mg total) by mouth daily    Screening for osteoporosis    Other orders  -     Discontinue: warfarin (COUMADIN) 5 mg tablet; Take 5 mg by mouth daily  -     oxyCODONE (ROXICODONE) 5 mg immediate release tablet; Take 5 mg by mouth every 4 (four) hours as needed  -     Discontinue: metoprolol tartrate (LOPRESSOR) 25 mg tablet; Take 25 mg by mouth 2 (two) times a day  -     Lidocaine 4 % PTCH; Place 1 patch on the skin daily  -     furosemide (LASIX) 20 mg tablet; Take 20 mg by mouth daily  -     ferrous sulfate 325 (65 Fe) mg tablet; Take 325 mg by mouth        Reviewed with patient plan to treat with above plan      Patient instructed to call in 72 hours if not feeling better or if symptoms worsen

## 2021-09-17 ENCOUNTER — ANTICOAG VISIT (OUTPATIENT)
Dept: FAMILY MEDICINE CLINIC | Facility: CLINIC | Age: 81
End: 2021-09-17
Payer: MEDICARE

## 2021-09-17 DIAGNOSIS — I10 ESSENTIAL HYPERTENSION: ICD-10-CM

## 2021-09-17 LAB — SL AMB POCT INR: 3.4

## 2021-09-17 PROCEDURE — 85610 PROTHROMBIN TIME: CPT | Performed by: FAMILY MEDICINE

## 2021-09-17 RX ORDER — AMLODIPINE BESYLATE 5 MG/1
TABLET ORAL
Qty: 90 TABLET | Refills: 0 | Status: SHIPPED | OUTPATIENT
Start: 2021-09-17 | End: 2022-04-22

## 2021-09-17 RX ORDER — LISINOPRIL 5 MG/1
TABLET ORAL
Qty: 90 TABLET | Refills: 0 | Status: SHIPPED | OUTPATIENT
Start: 2021-09-17 | End: 2022-04-22

## 2021-09-20 ENCOUNTER — TELEPHONE (OUTPATIENT)
Dept: FAMILY MEDICINE CLINIC | Facility: CLINIC | Age: 81
End: 2021-09-20

## 2021-09-20 NOTE — TELEPHONE ENCOUNTER
Patient called  She is having frequent urination with burning sensation   She is asking if you can send an antibiotic to [address] [place] [address] to check with pharmacy

## 2021-09-21 DIAGNOSIS — N39.0 URINARY TRACT INFECTION WITHOUT HEMATURIA, SITE UNSPECIFIED: Primary | ICD-10-CM

## 2021-09-21 RX ORDER — CEPHALEXIN 500 MG/1
500 CAPSULE ORAL EVERY 8 HOURS SCHEDULED
Qty: 30 CAPSULE | Refills: 0 | Status: SHIPPED | OUTPATIENT
Start: 2021-09-21 | End: 2021-10-01

## 2021-09-21 NOTE — TELEPHONE ENCOUNTER
She rec'd med yesterday, it's not as strong as you usually give, can you send the difference to the pharm

## 2021-09-24 ENCOUNTER — ANTICOAG VISIT (OUTPATIENT)
Dept: FAMILY MEDICINE CLINIC | Facility: CLINIC | Age: 81
End: 2021-09-24

## 2021-09-24 LAB — SL AMB POCT INR: 3.4

## 2021-09-30 ENCOUNTER — ANTICOAG VISIT (OUTPATIENT)
Dept: FAMILY MEDICINE CLINIC | Facility: CLINIC | Age: 81
End: 2021-09-30

## 2021-09-30 LAB
INR PPP: 2.2 (ref 0.84–1.19)
SL AMB POCT INR: 2.2

## 2021-09-30 NOTE — PROGRESS NOTES
Ambulatory Anticoagulation:    Patient is currently taking warfarin for anticoagulation  her INR goal is 2 0-3 0   her latest INR is 2 2  Lab Results   Component Value Date    INR 3 4 09/24/2021    INR 3 4 09/17/2021    INR 1 20 (H) 11/27/2019       According to the ACCP Guidelines, I recommend the following regarding her warfarin dosing:    Therapeutic INR 2 0-3 0: No change to current dosing, recheck INR in 4 weeks      Please call patient and review plan with the patient/caregiver

## 2021-10-18 ENCOUNTER — RA CDI HCC (OUTPATIENT)
Dept: OTHER | Facility: HOSPITAL | Age: 81
End: 2021-10-18

## 2021-10-18 ENCOUNTER — TELEPHONE (OUTPATIENT)
Dept: FAMILY MEDICINE CLINIC | Facility: CLINIC | Age: 81
End: 2021-10-18

## 2021-10-18 DIAGNOSIS — N39.0 URINARY TRACT INFECTION WITHOUT HEMATURIA, SITE UNSPECIFIED: Primary | ICD-10-CM

## 2021-10-18 RX ORDER — PHENAZOPYRIDINE HYDROCHLORIDE 200 MG/1
200 TABLET, FILM COATED ORAL
Qty: 9 TABLET | Refills: 0 | Status: SHIPPED | OUTPATIENT
Start: 2021-10-18 | End: 2022-01-25 | Stop reason: ALTCHOICE

## 2021-10-18 RX ORDER — SULFAMETHOXAZOLE AND TRIMETHOPRIM 800; 160 MG/1; MG/1
1 TABLET ORAL EVERY 12 HOURS SCHEDULED
Qty: 20 TABLET | Refills: 0 | Status: SHIPPED | OUTPATIENT
Start: 2021-10-18 | End: 2021-10-24 | Stop reason: HOSPADM

## 2021-10-22 ENCOUNTER — HOSPITAL ENCOUNTER (INPATIENT)
Facility: HOSPITAL | Age: 81
LOS: 1 days | Discharge: HOME | DRG: 151 | End: 2021-10-24
Attending: EMERGENCY MEDICINE | Admitting: INTERNAL MEDICINE
Payer: MEDICARE

## 2021-10-22 ENCOUNTER — TELEPHONE (OUTPATIENT)
Dept: FAMILY MEDICINE CLINIC | Facility: CLINIC | Age: 81
End: 2021-10-22

## 2021-10-22 DIAGNOSIS — I48.91 ATRIAL FIBRILLATION, UNSPECIFIED TYPE (HCC): ICD-10-CM

## 2021-10-22 DIAGNOSIS — R79.1 SUPRATHERAPEUTIC INR: ICD-10-CM

## 2021-10-22 DIAGNOSIS — R04.0 LEFT-SIDED EPISTAXIS: Primary | ICD-10-CM

## 2021-10-22 LAB
APTT PPP: 81 SECONDS (ref 23–37)
BACTERIA UR QL AUTO: NORMAL /HPF
BASOPHILS # BLD AUTO: 0.03 THOUSANDS/ΜL (ref 0–0.1)
BASOPHILS NFR BLD AUTO: 0 % (ref 0–1)
BILIRUB UR QL STRIP: NEGATIVE
CLARITY UR: CLEAR
COLOR UR: YELLOW
EOSINOPHIL # BLD AUTO: 0.11 THOUSAND/ΜL (ref 0–0.61)
EOSINOPHIL NFR BLD AUTO: 2 % (ref 0–6)
ERYTHROCYTE [DISTWIDTH] IN BLOOD BY AUTOMATED COUNT: 14.2 % (ref 11.6–15.1)
GLUCOSE UR STRIP-MCNC: NEGATIVE MG/DL
HCT VFR BLD AUTO: 35.2 % (ref 34.8–46.1)
HCT VFR BLD AUTO: 37.5 % (ref 34.8–46.1)
HGB BLD-MCNC: 11.1 G/DL (ref 11.5–15.4)
HGB BLD-MCNC: 11.7 G/DL (ref 11.5–15.4)
HGB UR QL STRIP.AUTO: ABNORMAL
IMM GRANULOCYTES # BLD AUTO: 0.03 THOUSAND/UL (ref 0–0.2)
IMM GRANULOCYTES NFR BLD AUTO: 0 % (ref 0–2)
INR PPP: 1.09 (ref 0.84–1.19)
INR PPP: 7.33 (ref 0.84–1.19)
KETONES UR STRIP-MCNC: NEGATIVE MG/DL
LEUKOCYTE ESTERASE UR QL STRIP: ABNORMAL
LYMPHOCYTES # BLD AUTO: 0.9 THOUSANDS/ΜL (ref 0.6–4.47)
LYMPHOCYTES NFR BLD AUTO: 13 % (ref 14–44)
MCH RBC QN AUTO: 31.4 PG (ref 26.8–34.3)
MCHC RBC AUTO-ENTMCNC: 31.2 G/DL (ref 31.4–37.4)
MCV RBC AUTO: 101 FL (ref 82–98)
MONOCYTES # BLD AUTO: 0.35 THOUSAND/ΜL (ref 0.17–1.22)
MONOCYTES NFR BLD AUTO: 5 % (ref 4–12)
NEUTROPHILS # BLD AUTO: 5.79 THOUSANDS/ΜL (ref 1.85–7.62)
NEUTS SEG NFR BLD AUTO: 80 % (ref 43–75)
NITRITE UR QL STRIP: NEGATIVE
NON-SQ EPI CELLS URNS QL MICRO: NORMAL /HPF
NRBC BLD AUTO-RTO: 0 /100 WBCS
PH UR STRIP.AUTO: 5 [PH] (ref 4.5–8)
PLATELET # BLD AUTO: 205 THOUSANDS/UL (ref 149–390)
PLATELET # BLD AUTO: 211 THOUSANDS/UL (ref 149–390)
PMV BLD AUTO: 8.8 FL (ref 8.9–12.7)
PMV BLD AUTO: 9.2 FL (ref 8.9–12.7)
PROT UR STRIP-MCNC: NEGATIVE MG/DL
PROTHROMBIN TIME: 14.1 SECONDS (ref 11.6–14.5)
PROTHROMBIN TIME: 60.4 SECONDS (ref 11.6–14.5)
RBC # BLD AUTO: 3.73 MILLION/UL (ref 3.81–5.12)
RBC #/AREA URNS AUTO: NORMAL /HPF
SP GR UR STRIP.AUTO: <=1.005 (ref 1–1.03)
UROBILINOGEN UR QL STRIP.AUTO: 0.2 E.U./DL
WBC # BLD AUTO: 7.21 THOUSAND/UL (ref 4.31–10.16)
WBC #/AREA URNS AUTO: NORMAL /HPF

## 2021-10-22 PROCEDURE — 85610 PROTHROMBIN TIME: CPT | Performed by: STUDENT IN AN ORGANIZED HEALTH CARE EDUCATION/TRAINING PROGRAM

## 2021-10-22 PROCEDURE — 85049 AUTOMATED PLATELET COUNT: CPT | Performed by: HOSPITALIST

## 2021-10-22 PROCEDURE — 99285 EMERGENCY DEPT VISIT HI MDM: CPT | Performed by: EMERGENCY MEDICINE

## 2021-10-22 PROCEDURE — 1124F ACP DISCUSS-NO DSCNMKR DOCD: CPT | Performed by: EMERGENCY MEDICINE

## 2021-10-22 PROCEDURE — 87086 URINE CULTURE/COLONY COUNT: CPT

## 2021-10-22 PROCEDURE — 99220 PR INITIAL OBSERVATION CARE/DAY 70 MINUTES: CPT | Performed by: HOSPITALIST

## 2021-10-22 PROCEDURE — 30903 CONTROL OF NOSEBLEED: CPT | Performed by: EMERGENCY MEDICINE

## 2021-10-22 PROCEDURE — 85025 COMPLETE CBC W/AUTO DIFF WBC: CPT | Performed by: EMERGENCY MEDICINE

## 2021-10-22 PROCEDURE — 85014 HEMATOCRIT: CPT | Performed by: HOSPITALIST

## 2021-10-22 PROCEDURE — 85018 HEMOGLOBIN: CPT | Performed by: HOSPITALIST

## 2021-10-22 PROCEDURE — 81001 URINALYSIS AUTO W/SCOPE: CPT

## 2021-10-22 PROCEDURE — 85610 PROTHROMBIN TIME: CPT | Performed by: EMERGENCY MEDICINE

## 2021-10-22 PROCEDURE — 99284 EMERGENCY DEPT VISIT MOD MDM: CPT

## 2021-10-22 PROCEDURE — 36415 COLL VENOUS BLD VENIPUNCTURE: CPT | Performed by: EMERGENCY MEDICINE

## 2021-10-22 PROCEDURE — 2Y41X5Z PACKING OF NASAL REGION USING PACKING MATERIAL: ICD-10-PCS | Performed by: EMERGENCY MEDICINE

## 2021-10-22 PROCEDURE — 93005 ELECTROCARDIOGRAM TRACING: CPT

## 2021-10-22 PROCEDURE — 85730 THROMBOPLASTIN TIME PARTIAL: CPT | Performed by: EMERGENCY MEDICINE

## 2021-10-22 RX ORDER — FUROSEMIDE 20 MG/1
20 TABLET ORAL DAILY
Status: DISCONTINUED | OUTPATIENT
Start: 2021-10-23 | End: 2021-10-24 | Stop reason: HOSPADM

## 2021-10-22 RX ORDER — OXYMETAZOLINE HYDROCHLORIDE 0.05 G/100ML
2 SPRAY NASAL ONCE
Status: COMPLETED | OUTPATIENT
Start: 2021-10-22 | End: 2021-10-22

## 2021-10-22 RX ORDER — CEPHALEXIN 250 MG/1
500 CAPSULE ORAL ONCE
Status: COMPLETED | OUTPATIENT
Start: 2021-10-22 | End: 2021-10-22

## 2021-10-22 RX ORDER — AMLODIPINE BESYLATE 5 MG/1
5 TABLET ORAL DAILY
Status: DISCONTINUED | OUTPATIENT
Start: 2021-10-23 | End: 2021-10-24 | Stop reason: HOSPADM

## 2021-10-22 RX ORDER — OXYBUTYNIN CHLORIDE 5 MG/1
5 TABLET, EXTENDED RELEASE ORAL DAILY
Status: DISCONTINUED | OUTPATIENT
Start: 2021-10-23 | End: 2021-10-24 | Stop reason: HOSPADM

## 2021-10-22 RX ORDER — TRANEXAMIC ACID 100 MG/ML
1000 INJECTION, SOLUTION INTRAVENOUS ONCE
Status: COMPLETED | OUTPATIENT
Start: 2021-10-22 | End: 2021-10-22

## 2021-10-22 RX ORDER — LISINOPRIL 5 MG/1
5 TABLET ORAL DAILY
Status: DISCONTINUED | OUTPATIENT
Start: 2021-10-23 | End: 2021-10-24 | Stop reason: HOSPADM

## 2021-10-22 RX ORDER — ALPRAZOLAM 0.25 MG/1
0.25 TABLET ORAL 3 TIMES DAILY PRN
Status: DISCONTINUED | OUTPATIENT
Start: 2021-10-22 | End: 2021-10-24 | Stop reason: HOSPADM

## 2021-10-22 RX ORDER — ACETAMINOPHEN 325 MG/1
975 TABLET ORAL EVERY 8 HOURS
Status: DISCONTINUED | OUTPATIENT
Start: 2021-10-22 | End: 2021-10-24 | Stop reason: HOSPADM

## 2021-10-22 RX ORDER — ONDANSETRON 2 MG/ML
4 INJECTION INTRAMUSCULAR; INTRAVENOUS EVERY 6 HOURS PRN
Status: DISCONTINUED | OUTPATIENT
Start: 2021-10-22 | End: 2021-10-24 | Stop reason: HOSPADM

## 2021-10-22 RX ORDER — HYDRALAZINE HYDROCHLORIDE 20 MG/ML
5 INJECTION INTRAMUSCULAR; INTRAVENOUS EVERY 6 HOURS PRN
Status: DISCONTINUED | OUTPATIENT
Start: 2021-10-22 | End: 2021-10-24 | Stop reason: HOSPADM

## 2021-10-22 RX ORDER — PHYTONADIONE 5 MG/1
5 TABLET ORAL ONCE
Status: COMPLETED | OUTPATIENT
Start: 2021-10-22 | End: 2021-10-22

## 2021-10-22 RX ORDER — LANOLIN ALCOHOL/MO/W.PET/CERES
3 CREAM (GRAM) TOPICAL
Status: DISCONTINUED | OUTPATIENT
Start: 2021-10-22 | End: 2021-10-24 | Stop reason: HOSPADM

## 2021-10-22 RX ADMIN — ACETAMINOPHEN 975 MG: 325 TABLET, FILM COATED ORAL at 17:10

## 2021-10-22 RX ADMIN — PHYTONADIONE 5 MG: 10 INJECTION, EMULSION INTRAMUSCULAR; INTRAVENOUS; SUBCUTANEOUS at 15:35

## 2021-10-22 RX ADMIN — CEPHALEXIN 500 MG: 250 CAPSULE ORAL at 12:47

## 2021-10-22 RX ADMIN — TRANEXAMIC ACID 1000 MG: 1 INJECTION, SOLUTION INTRAVENOUS at 10:34

## 2021-10-22 RX ADMIN — METOPROLOL TARTRATE 25 MG: 25 TABLET, FILM COATED ORAL at 17:10

## 2021-10-22 RX ADMIN — Medication 4500 UNITS: at 14:25

## 2021-10-22 RX ADMIN — PHYTONADIONE 5 MG: 5 TABLET ORAL at 12:26

## 2021-10-22 RX ADMIN — OXYMETAZOLINE HYDROCHLORIDE 2 SPRAY: 0.05 SPRAY NASAL at 10:34

## 2021-10-23 LAB
ATRIAL RATE: 70 BPM
BACTERIA UR CULT: NORMAL
BASOPHILS # BLD AUTO: 0.02 THOUSANDS/ΜL (ref 0–0.1)
BASOPHILS NFR BLD AUTO: 0 % (ref 0–1)
EOSINOPHIL # BLD AUTO: 0.19 THOUSAND/ΜL (ref 0–0.61)
EOSINOPHIL NFR BLD AUTO: 4 % (ref 0–6)
ERYTHROCYTE [DISTWIDTH] IN BLOOD BY AUTOMATED COUNT: 14.4 % (ref 11.6–15.1)
HCT VFR BLD AUTO: 36.3 % (ref 34.8–46.1)
HGB BLD-MCNC: 11.3 G/DL (ref 11.5–15.4)
IMM GRANULOCYTES # BLD AUTO: 0.01 THOUSAND/UL (ref 0–0.2)
IMM GRANULOCYTES NFR BLD AUTO: 0 % (ref 0–2)
INR PPP: 1.02 (ref 0.84–1.19)
LYMPHOCYTES # BLD AUTO: 1.31 THOUSANDS/ΜL (ref 0.6–4.47)
LYMPHOCYTES NFR BLD AUTO: 25 % (ref 14–44)
MCH RBC QN AUTO: 31.2 PG (ref 26.8–34.3)
MCHC RBC AUTO-ENTMCNC: 31.1 G/DL (ref 31.4–37.4)
MCV RBC AUTO: 100 FL (ref 82–98)
MONOCYTES # BLD AUTO: 0.52 THOUSAND/ΜL (ref 0.17–1.22)
MONOCYTES NFR BLD AUTO: 10 % (ref 4–12)
NEUTROPHILS # BLD AUTO: 3.24 THOUSANDS/ΜL (ref 1.85–7.62)
NEUTS SEG NFR BLD AUTO: 61 % (ref 43–75)
NRBC BLD AUTO-RTO: 0 /100 WBCS
P AXIS: 47 DEGREES
PLATELET # BLD AUTO: 252 THOUSANDS/UL (ref 149–390)
PMV BLD AUTO: 9.2 FL (ref 8.9–12.7)
PR INTERVAL: 170 MS
PROTHROMBIN TIME: 13.4 SECONDS (ref 11.6–14.5)
QRS AXIS: 53 DEGREES
QRSD INTERVAL: 88 MS
QT INTERVAL: 396 MS
QTC INTERVAL: 418 MS
RBC # BLD AUTO: 3.62 MILLION/UL (ref 3.81–5.12)
T WAVE AXIS: 48 DEGREES
VENTRICULAR RATE: 67 BPM
WBC # BLD AUTO: 5.29 THOUSAND/UL (ref 4.31–10.16)

## 2021-10-23 PROCEDURE — 93010 ELECTROCARDIOGRAM REPORT: CPT | Performed by: INTERNAL MEDICINE

## 2021-10-23 PROCEDURE — 85025 COMPLETE CBC W/AUTO DIFF WBC: CPT | Performed by: HOSPITALIST

## 2021-10-23 PROCEDURE — 99232 SBSQ HOSP IP/OBS MODERATE 35: CPT | Performed by: INTERNAL MEDICINE

## 2021-10-23 PROCEDURE — 85610 PROTHROMBIN TIME: CPT | Performed by: HOSPITALIST

## 2021-10-23 RX ADMIN — ACETAMINOPHEN 975 MG: 325 TABLET, FILM COATED ORAL at 15:59

## 2021-10-23 RX ADMIN — METOPROLOL TARTRATE 25 MG: 25 TABLET, FILM COATED ORAL at 08:55

## 2021-10-23 RX ADMIN — LISINOPRIL 5 MG: 5 TABLET ORAL at 08:56

## 2021-10-23 RX ADMIN — Medication 3 MG: at 00:11

## 2021-10-23 RX ADMIN — ACETAMINOPHEN 975 MG: 325 TABLET, FILM COATED ORAL at 08:55

## 2021-10-23 RX ADMIN — OXYBUTYNIN CHLORIDE 5 MG: 5 TABLET, EXTENDED RELEASE ORAL at 08:56

## 2021-10-23 RX ADMIN — ACETAMINOPHEN 975 MG: 325 TABLET, FILM COATED ORAL at 00:11

## 2021-10-23 RX ADMIN — METOPROLOL TARTRATE 25 MG: 25 TABLET, FILM COATED ORAL at 17:13

## 2021-10-23 RX ADMIN — FUROSEMIDE 20 MG: 20 TABLET ORAL at 08:55

## 2021-10-23 RX ADMIN — AMLODIPINE BESYLATE 5 MG: 5 TABLET ORAL at 08:55

## 2021-10-24 VITALS
DIASTOLIC BLOOD PRESSURE: 58 MMHG | SYSTOLIC BLOOD PRESSURE: 132 MMHG | TEMPERATURE: 98.1 F | OXYGEN SATURATION: 95 % | RESPIRATION RATE: 18 BRPM | WEIGHT: 198 LBS | BODY MASS INDEX: 35.07 KG/M2 | HEART RATE: 58 BPM

## 2021-10-24 LAB
ANION GAP SERPL CALCULATED.3IONS-SCNC: 11 MMOL/L (ref 4–13)
BUN SERPL-MCNC: 25 MG/DL (ref 5–25)
CALCIUM SERPL-MCNC: 9.4 MG/DL (ref 8.3–10.1)
CHLORIDE SERPL-SCNC: 103 MMOL/L (ref 100–108)
CO2 SERPL-SCNC: 24 MMOL/L (ref 21–32)
CREAT SERPL-MCNC: 1.02 MG/DL (ref 0.6–1.3)
ERYTHROCYTE [DISTWIDTH] IN BLOOD BY AUTOMATED COUNT: 14.5 % (ref 11.6–15.1)
GFR SERPL CREATININE-BSD FRML MDRD: 51 ML/MIN/1.73SQ M
GLUCOSE SERPL-MCNC: 106 MG/DL (ref 65–140)
HCT VFR BLD AUTO: 37.3 % (ref 34.8–46.1)
HGB BLD-MCNC: 11.4 G/DL (ref 11.5–15.4)
INR PPP: 0.98 (ref 0.84–1.19)
MCH RBC QN AUTO: 31 PG (ref 26.8–34.3)
MCHC RBC AUTO-ENTMCNC: 30.6 G/DL (ref 31.4–37.4)
MCV RBC AUTO: 101 FL (ref 82–98)
PLATELET # BLD AUTO: 239 THOUSANDS/UL (ref 149–390)
PMV BLD AUTO: 9.3 FL (ref 8.9–12.7)
POTASSIUM SERPL-SCNC: 4.6 MMOL/L (ref 3.5–5.3)
PROTHROMBIN TIME: 13 SECONDS (ref 11.6–14.5)
RBC # BLD AUTO: 3.68 MILLION/UL (ref 3.81–5.12)
SODIUM SERPL-SCNC: 138 MMOL/L (ref 136–145)
WBC # BLD AUTO: 6.61 THOUSAND/UL (ref 4.31–10.16)

## 2021-10-24 PROCEDURE — 85027 COMPLETE CBC AUTOMATED: CPT

## 2021-10-24 PROCEDURE — 85610 PROTHROMBIN TIME: CPT

## 2021-10-24 PROCEDURE — 99239 HOSP IP/OBS DSCHRG MGMT >30: CPT | Performed by: INTERNAL MEDICINE

## 2021-10-24 PROCEDURE — 80048 BASIC METABOLIC PNL TOTAL CA: CPT

## 2021-10-24 PROCEDURE — 99233 SBSQ HOSP IP/OBS HIGH 50: CPT | Performed by: OTOLARYNGOLOGY

## 2021-10-24 RX ORDER — WARFARIN SODIUM 5 MG/1
5 TABLET ORAL DAILY
Qty: 30 TABLET | Refills: 0 | Status: SHIPPED | OUTPATIENT
Start: 2021-10-25 | End: 2021-12-02 | Stop reason: HOSPADM

## 2021-10-24 RX ORDER — OXYMETAZOLINE HYDROCHLORIDE 0.05 G/100ML
2 SPRAY NASAL 2 TIMES DAILY
Qty: 15 ML | Refills: 0 | Status: SHIPPED | OUTPATIENT
Start: 2021-10-24 | End: 2022-05-25

## 2021-10-24 RX ADMIN — OXYBUTYNIN CHLORIDE 5 MG: 5 TABLET, EXTENDED RELEASE ORAL at 08:41

## 2021-10-24 RX ADMIN — METOPROLOL TARTRATE 25 MG: 25 TABLET, FILM COATED ORAL at 08:41

## 2021-10-24 RX ADMIN — ACETAMINOPHEN 975 MG: 325 TABLET, FILM COATED ORAL at 00:36

## 2021-10-24 RX ADMIN — AMLODIPINE BESYLATE 5 MG: 5 TABLET ORAL at 08:41

## 2021-10-24 RX ADMIN — ACETAMINOPHEN 975 MG: 325 TABLET, FILM COATED ORAL at 08:41

## 2021-10-24 RX ADMIN — LISINOPRIL 5 MG: 5 TABLET ORAL at 08:41

## 2021-10-24 RX ADMIN — FUROSEMIDE 20 MG: 20 TABLET ORAL at 08:41

## 2021-10-25 ENCOUNTER — OFFICE VISIT (OUTPATIENT)
Dept: FAMILY MEDICINE CLINIC | Facility: CLINIC | Age: 81
End: 2021-10-25
Payer: MEDICARE

## 2021-10-25 ENCOUNTER — TELEPHONE (OUTPATIENT)
Dept: OTHER | Facility: OTHER | Age: 81
End: 2021-10-25

## 2021-10-25 VITALS
HEIGHT: 63 IN | SYSTOLIC BLOOD PRESSURE: 120 MMHG | TEMPERATURE: 98.2 F | DIASTOLIC BLOOD PRESSURE: 76 MMHG | WEIGHT: 178 LBS | BODY MASS INDEX: 31.54 KG/M2 | HEART RATE: 64 BPM

## 2021-10-25 DIAGNOSIS — E28.319 ASYMPTOMATIC PREMATURE MENOPAUSE: ICD-10-CM

## 2021-10-25 DIAGNOSIS — S72.001A CLOSED RIGHT HIP FRACTURE, INITIAL ENCOUNTER (HCC): Primary | ICD-10-CM

## 2021-10-25 DIAGNOSIS — Z23 NEEDS FLU SHOT: ICD-10-CM

## 2021-10-25 DIAGNOSIS — I48.91 ATRIAL FIBRILLATION, UNSPECIFIED TYPE (HCC): ICD-10-CM

## 2021-10-25 PROCEDURE — 90662 IIV NO PRSV INCREASED AG IM: CPT | Performed by: FAMILY MEDICINE

## 2021-10-25 PROCEDURE — G0008 ADMIN INFLUENZA VIRUS VAC: HCPCS | Performed by: FAMILY MEDICINE

## 2021-10-25 PROCEDURE — 99214 OFFICE O/P EST MOD 30 MIN: CPT | Performed by: FAMILY MEDICINE

## 2021-10-27 ENCOUNTER — TELEPHONE (OUTPATIENT)
Dept: FAMILY MEDICINE CLINIC | Facility: CLINIC | Age: 81
End: 2021-10-27

## 2021-11-01 ENCOUNTER — TELEPHONE (OUTPATIENT)
Dept: FAMILY MEDICINE CLINIC | Facility: CLINIC | Age: 81
End: 2021-11-01

## 2021-11-05 ENCOUNTER — TELEPHONE (OUTPATIENT)
Dept: FAMILY MEDICINE CLINIC | Facility: CLINIC | Age: 81
End: 2021-11-05

## 2021-11-09 ENCOUNTER — ANTICOAG VISIT (OUTPATIENT)
Dept: FAMILY MEDICINE CLINIC | Facility: CLINIC | Age: 81
End: 2021-11-09

## 2021-11-09 ENCOUNTER — TELEPHONE (OUTPATIENT)
Dept: FAMILY MEDICINE CLINIC | Facility: CLINIC | Age: 81
End: 2021-11-09

## 2021-11-09 DIAGNOSIS — N95.2 ATROPHIC VAGINITIS: Primary | ICD-10-CM

## 2021-11-09 LAB — SL AMB POCT INR: 2.5

## 2021-11-09 RX ORDER — ESTRADIOL 0.1 MG/G
1 CREAM VAGINAL DAILY
Qty: 42.5 G | Refills: 3 | Status: SHIPPED | OUTPATIENT
Start: 2021-11-09 | End: 2022-03-10 | Stop reason: SDUPTHER

## 2021-11-29 ENCOUNTER — APPOINTMENT (EMERGENCY)
Dept: ULTRASOUND IMAGING | Facility: HOSPITAL | Age: 81
DRG: 560 | End: 2021-11-29
Payer: MEDICARE

## 2021-11-29 ENCOUNTER — HOSPITAL ENCOUNTER (INPATIENT)
Facility: HOSPITAL | Age: 81
LOS: 3 days | Discharge: NURSING, REHAB OR LONG-TERM CARE | DRG: 560 | End: 2021-12-02
Attending: EMERGENCY MEDICINE | Admitting: SURGERY
Payer: MEDICARE

## 2021-11-29 ENCOUNTER — APPOINTMENT (EMERGENCY)
Dept: RADIOLOGY | Facility: HOSPITAL | Age: 81
DRG: 560 | End: 2021-11-29
Payer: MEDICARE

## 2021-11-29 ENCOUNTER — APPOINTMENT (EMERGENCY)
Dept: CT IMAGING | Facility: HOSPITAL | Age: 81
DRG: 560 | End: 2021-11-29
Payer: MEDICARE

## 2021-11-29 DIAGNOSIS — S72.91XA CLOSED FRACTURE OF RIGHT FEMUR, UNSPECIFIED FRACTURE MORPHOLOGY, INITIAL ENCOUNTER (HCC): ICD-10-CM

## 2021-11-29 DIAGNOSIS — K80.50 CHOLEDOCHOLITHIASIS: ICD-10-CM
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
## 2021-11-29 LAB
ABO GROUP BLD: NORMAL
ALBUMIN SERPL BCP-MCNC: 3.3 G/DL (ref 3.5–5)
ALP SERPL-CCNC: 124 U/L (ref 46–116)
ALT SERPL W P-5'-P-CCNC: 17 U/L (ref 12–78)
ANION GAP SERPL CALCULATED.3IONS-SCNC: 10 MMOL/L (ref 4–13)
ANION GAP SERPL CALCULATED.3IONS-SCNC: 12 MMOL/L (ref 4–13)
APTT PPP: 61 SECONDS (ref 23–37)
AST SERPL W P-5'-P-CCNC: 20 U/L (ref 5–45)
BASE EXCESS BLDA CALC-SCNC: 2 MMOL/L (ref -2–3)
BASOPHILS # BLD AUTO: 0.02 THOUSANDS/ΜL (ref 0–0.1)
BASOPHILS # BLD MANUAL: 0 THOUSAND/UL (ref 0–0.1)
BASOPHILS NFR BLD AUTO: 0 % (ref 0–1)
BASOPHILS NFR MAR MANUAL: 0 % (ref 0–1)
BILIRUB DIRECT SERPL-MCNC: 0.09 MG/DL (ref 0–0.2)
BILIRUB SERPL-MCNC: 0.28 MG/DL (ref 0.2–1)
BLD GP AB SCN SERPL QL: NEGATIVE
BUN SERPL-MCNC: 20 MG/DL (ref 5–25)
BUN SERPL-MCNC: 23 MG/DL (ref 5–25)
CALCIUM SERPL-MCNC: 8.7 MG/DL (ref 8.3–10.1)
CALCIUM SERPL-MCNC: 8.8 MG/DL (ref 8.3–10.1)
CHLORIDE SERPL-SCNC: 104 MMOL/L (ref 100–108)
CHLORIDE SERPL-SCNC: 105 MMOL/L (ref 100–108)
CO2 SERPL-SCNC: 22 MMOL/L (ref 21–32)
CO2 SERPL-SCNC: 25 MMOL/L (ref 21–32)
CREAT SERPL-MCNC: 0.76 MG/DL (ref 0.6–1.3)
CREAT SERPL-MCNC: 0.95 MG/DL (ref 0.6–1.3)
EOSINOPHIL # BLD AUTO: 0.02 THOUSAND/ΜL (ref 0–0.61)
EOSINOPHIL # BLD MANUAL: 0 THOUSAND/UL (ref 0–0.4)
EOSINOPHIL NFR BLD AUTO: 0 % (ref 0–6)
EOSINOPHIL NFR BLD MANUAL: 0 % (ref 0–6)
ERYTHROCYTE [DISTWIDTH] IN BLOOD BY AUTOMATED COUNT: 15.5 % (ref 11.6–15.1)
ERYTHROCYTE [DISTWIDTH] IN BLOOD BY AUTOMATED COUNT: 15.6 % (ref 11.6–15.1)
GFR SERPL CREATININE-BSD FRML MDRD: 56 ML/MIN/1.73SQ M
GFR SERPL CREATININE-BSD FRML MDRD: 73 ML/MIN/1.73SQ M
GLUCOSE SERPL-MCNC: 129 MG/DL (ref 65–140)
GLUCOSE SERPL-MCNC: 151 MG/DL (ref 65–140)
GLUCOSE SERPL-MCNC: 156 MG/DL (ref 65–140)
HCO3 BLDA-SCNC: 26.7 MMOL/L (ref 24–30)
HCT VFR BLD AUTO: 33.2 % (ref 34.8–46.1)
HCT VFR BLD AUTO: 34.1 % (ref 34.8–46.1)
HCT VFR BLD CALC: 33 % (ref 34.8–46.1)
HGB BLD-MCNC: 10.4 G/DL (ref 11.5–15.4)
HGB BLD-MCNC: 10.8 G/DL (ref 11.5–15.4)
HGB BLDA-MCNC: 11.2 G/DL (ref 11.5–15.4)
IMM GRANULOCYTES # BLD AUTO: 0.02 THOUSAND/UL (ref 0–0.2)
IMM GRANULOCYTES NFR BLD AUTO: 0 % (ref 0–2)
INR PPP: 1.8 (ref 0.84–1.19)
INR PPP: 5.03 (ref 0.84–1.19)
LYMPHOCYTES # BLD AUTO: 0.49 THOUSAND/UL (ref 0.6–4.47)
LYMPHOCYTES # BLD AUTO: 0.61 THOUSANDS/ΜL (ref 0.6–4.47)
LYMPHOCYTES # BLD AUTO: 7 % (ref 14–44)
LYMPHOCYTES NFR BLD AUTO: 9 % (ref 14–44)
MCH RBC QN AUTO: 31.4 PG (ref 26.8–34.3)
MCH RBC QN AUTO: 31.8 PG (ref 26.8–34.3)
MCHC RBC AUTO-ENTMCNC: 31.3 G/DL (ref 31.4–37.4)
MCHC RBC AUTO-ENTMCNC: 31.7 G/DL (ref 31.4–37.4)
MCV RBC AUTO: 100 FL (ref 82–98)
MCV RBC AUTO: 100 FL (ref 82–98)
MONOCYTES # BLD AUTO: 0 THOUSAND/UL (ref 0–1.22)
MONOCYTES # BLD AUTO: 0.63 THOUSAND/ΜL (ref 0.17–1.22)
MONOCYTES NFR BLD AUTO: 9 % (ref 4–12)
MONOCYTES NFR BLD: 0 % (ref 4–12)
NEUTROPHILS # BLD AUTO: 5.77 THOUSANDS/ΜL (ref 1.85–7.62)
NEUTROPHILS # BLD MANUAL: 6.56 THOUSAND/UL (ref 1.85–7.62)
NEUTS BAND NFR BLD MANUAL: 3 % (ref 0–8)
NEUTS SEG NFR BLD AUTO: 82 % (ref 43–75)
NEUTS SEG NFR BLD AUTO: 90 % (ref 43–75)
NRBC BLD AUTO-RTO: 0 /100 WBCS
PCO2 BLD: 28 MMOL/L (ref 21–32)
PCO2 BLD: 41.7 MM HG (ref 42–50)
PH BLD: 7.42 [PH] (ref 7.3–7.4)
PLATELET # BLD AUTO: 199 THOUSANDS/UL (ref 149–390)
PLATELET # BLD AUTO: 209 THOUSANDS/UL (ref 149–390)
PLATELET BLD QL SMEAR: ADEQUATE
PMV BLD AUTO: 9.5 FL (ref 8.9–12.7)
PMV BLD AUTO: 9.6 FL (ref 8.9–12.7)
PO2 BLD: 30 MM HG (ref 35–45)
POTASSIUM BLD-SCNC: 5 MMOL/L (ref 3.5–5.3)
POTASSIUM SERPL-SCNC: 4 MMOL/L (ref 3.5–5.3)
POTASSIUM SERPL-SCNC: 4.2 MMOL/L (ref 3.5–5.3)
PROT SERPL-MCNC: 6.7 G/DL (ref 6.4–8.2)
PROTHROMBIN TIME: 20.7 SECONDS (ref 11.6–14.5)
PROTHROMBIN TIME: 45.3 SECONDS (ref 11.6–14.5)
RBC # BLD AUTO: 3.31 MILLION/UL (ref 3.81–5.12)
RBC # BLD AUTO: 3.4 MILLION/UL (ref 3.81–5.12)
RBC MORPH BLD: NORMAL
RH BLD: POSITIVE
SAO2 % BLD FROM PO2: 58 % (ref 60–85)
SODIUM BLD-SCNC: 139 MMOL/L (ref 136–145)
SODIUM SERPL-SCNC: 139 MMOL/L (ref 136–145)
SODIUM SERPL-SCNC: 139 MMOL/L (ref 136–145)
SPECIMEN EXPIRATION DATE: NORMAL
SPECIMEN SOURCE: ABNORMAL
WBC # BLD AUTO: 7.05 THOUSAND/UL (ref 4.31–10.16)
WBC # BLD AUTO: 7.07 THOUSAND/UL (ref 4.31–10.16)

## 2021-11-29 PROCEDURE — 72170 X-RAY EXAM OF PELVIS: CPT

## 2021-11-29 PROCEDURE — 85027 COMPLETE CBC AUTOMATED: CPT | Performed by: STUDENT IN AN ORGANIZED HEALTH CARE EDUCATION/TRAINING PROGRAM

## 2021-11-29 PROCEDURE — 85730 THROMBOPLASTIN TIME PARTIAL: CPT | Performed by: STUDENT IN AN ORGANIZED HEALTH CARE EDUCATION/TRAINING PROGRAM

## 2021-11-29 PROCEDURE — 99285 EMERGENCY DEPT VISIT HI MDM: CPT

## 2021-11-29 PROCEDURE — 73552 X-RAY EXAM OF FEMUR 2/>: CPT

## 2021-11-29 PROCEDURE — 85014 HEMATOCRIT: CPT

## 2021-11-29 PROCEDURE — 73501 X-RAY EXAM HIP UNI 1 VIEW: CPT

## 2021-11-29 PROCEDURE — 76705 ECHO EXAM OF ABDOMEN: CPT

## 2021-11-29 PROCEDURE — 80048 BASIC METABOLIC PNL TOTAL CA: CPT | Performed by: STUDENT IN AN ORGANIZED HEALTH CARE EDUCATION/TRAINING PROGRAM

## 2021-11-29 PROCEDURE — 86901 BLOOD TYPING SEROLOGIC RH(D): CPT | Performed by: STUDENT IN AN ORGANIZED HEALTH CARE EDUCATION/TRAINING PROGRAM

## 2021-11-29 PROCEDURE — 74177 CT ABD & PELVIS W/CONTRAST: CPT

## 2021-11-29 PROCEDURE — 85025 COMPLETE CBC W/AUTO DIFF WBC: CPT | Performed by: SURGERY

## 2021-11-29 PROCEDURE — 99222 1ST HOSP IP/OBS MODERATE 55: CPT | Performed by: SURGERY

## 2021-11-29 PROCEDURE — 86850 RBC ANTIBODY SCREEN: CPT | Performed by: STUDENT IN AN ORGANIZED HEALTH CARE EDUCATION/TRAINING PROGRAM

## 2021-11-29 PROCEDURE — 85007 BL SMEAR W/DIFF WBC COUNT: CPT | Performed by: STUDENT IN AN ORGANIZED HEALTH CARE EDUCATION/TRAINING PROGRAM

## 2021-11-29 PROCEDURE — 96375 TX/PRO/DX INJ NEW DRUG ADDON: CPT

## 2021-11-29 PROCEDURE — 80076 HEPATIC FUNCTION PANEL: CPT | Performed by: STUDENT IN AN ORGANIZED HEALTH CARE EDUCATION/TRAINING PROGRAM

## 2021-11-29 PROCEDURE — 99222 1ST HOSP IP/OBS MODERATE 55: CPT | Performed by: PHYSICIAN ASSISTANT

## 2021-11-29 PROCEDURE — 96374 THER/PROPH/DIAG INJ IV PUSH: CPT

## 2021-11-29 PROCEDURE — 70450 CT HEAD/BRAIN W/O DYE: CPT

## 2021-11-29 PROCEDURE — 82947 ASSAY GLUCOSE BLOOD QUANT: CPT

## 2021-11-29 PROCEDURE — 71045 X-RAY EXAM CHEST 1 VIEW: CPT

## 2021-11-29 PROCEDURE — 0SW9XJZ REVISION OF SYNTHETIC SUBSTITUTE IN RIGHT HIP JOINT, EXTERNAL APPROACH: ICD-10-PCS | Performed by: EMERGENCY MEDICINE

## 2021-11-29 PROCEDURE — 80048 BASIC METABOLIC PNL TOTAL CA: CPT | Performed by: SURGERY

## 2021-11-29 PROCEDURE — 86900 BLOOD TYPING SEROLOGIC ABO: CPT | Performed by: STUDENT IN AN ORGANIZED HEALTH CARE EDUCATION/TRAINING PROGRAM

## 2021-11-29 PROCEDURE — 84132 ASSAY OF SERUM POTASSIUM: CPT

## 2021-11-29 PROCEDURE — 85610 PROTHROMBIN TIME: CPT | Performed by: SURGERY

## 2021-11-29 PROCEDURE — 36415 COLL VENOUS BLD VENIPUNCTURE: CPT | Performed by: STUDENT IN AN ORGANIZED HEALTH CARE EDUCATION/TRAINING PROGRAM

## 2021-11-29 PROCEDURE — 27266 TREAT HIP DISLOCATION: CPT | Performed by: EMERGENCY MEDICINE

## 2021-11-29 PROCEDURE — 82803 BLOOD GASES ANY COMBINATION: CPT

## 2021-11-29 PROCEDURE — 84295 ASSAY OF SERUM SODIUM: CPT

## 2021-11-29 PROCEDURE — 73560 X-RAY EXAM OF KNEE 1 OR 2: CPT

## 2021-11-29 PROCEDURE — 71260 CT THORAX DX C+: CPT

## 2021-11-29 PROCEDURE — 85610 PROTHROMBIN TIME: CPT | Performed by: STUDENT IN AN ORGANIZED HEALTH CARE EDUCATION/TRAINING PROGRAM

## 2021-11-29 PROCEDURE — 72125 CT NECK SPINE W/O DYE: CPT

## 2021-11-29 RX ORDER — FUROSEMIDE 20 MG/1
20 TABLET ORAL DAILY
Status: DISCONTINUED | OUTPATIENT
Start: 2021-11-30 | End: 2021-12-02 | Stop reason: HOSPADM

## 2021-11-29 RX ORDER — LANOLIN ALCOHOL/MO/W.PET/CERES
3 CREAM (GRAM) TOPICAL
Status: DISCONTINUED | OUTPATIENT
Start: 2021-11-29 | End: 2021-12-02 | Stop reason: HOSPADM

## 2021-11-29 RX ORDER — ACETAMINOPHEN 325 MG/1
975 TABLET ORAL EVERY 8 HOURS SCHEDULED
Status: DISCONTINUED | OUTPATIENT
Start: 2021-11-29 | End: 2021-12-02 | Stop reason: HOSPADM

## 2021-11-29 RX ORDER — ALPRAZOLAM 0.25 MG/1
0.25 TABLET ORAL 3 TIMES DAILY PRN
Status: DISCONTINUED | OUTPATIENT
Start: 2021-11-29 | End: 2021-12-02 | Stop reason: HOSPADM

## 2021-11-29 RX ORDER — FENTANYL CITRATE 50 UG/ML
50 INJECTION, SOLUTION INTRAMUSCULAR; INTRAVENOUS ONCE
Status: COMPLETED | OUTPATIENT
Start: 2021-11-29 | End: 2021-11-29

## 2021-11-29 RX ORDER — OXYBUTYNIN CHLORIDE 5 MG/1
5 TABLET, EXTENDED RELEASE ORAL DAILY
Refills: 3 | Status: DISCONTINUED | OUTPATIENT
Start: 2021-11-30 | End: 2021-12-02 | Stop reason: HOSPADM

## 2021-11-29 RX ORDER — ONDANSETRON 2 MG/ML
4 INJECTION INTRAMUSCULAR; INTRAVENOUS EVERY 4 HOURS PRN
Status: DISCONTINUED | OUTPATIENT
Start: 2021-11-29 | End: 2021-12-02 | Stop reason: HOSPADM

## 2021-11-29 RX ORDER — SENNOSIDES 8.6 MG
2 TABLET ORAL
Status: DISCONTINUED | OUTPATIENT
Start: 2021-11-29 | End: 2021-12-02 | Stop reason: HOSPADM

## 2021-11-29 RX ORDER — PHENAZOPYRIDINE HYDROCHLORIDE 100 MG/1
200 TABLET, FILM COATED ORAL
Status: DISCONTINUED | OUTPATIENT
Start: 2021-11-30 | End: 2021-11-30

## 2021-11-29 RX ORDER — OXYCODONE HYDROCHLORIDE 5 MG/1
5 TABLET ORAL EVERY 4 HOURS PRN
Status: DISCONTINUED | OUTPATIENT
Start: 2021-11-29 | End: 2021-12-02 | Stop reason: HOSPADM

## 2021-11-29 RX ORDER — PROMETHAZINE HYDROCHLORIDE 25 MG/ML
25 INJECTION, SOLUTION INTRAMUSCULAR; INTRAVENOUS EVERY 4 HOURS PRN
Status: DISCONTINUED | OUTPATIENT
Start: 2021-11-29 | End: 2021-12-02 | Stop reason: HOSPADM

## 2021-11-29 RX ORDER — OXYMETAZOLINE HYDROCHLORIDE 0.05 G/100ML
2 SPRAY NASAL 2 TIMES DAILY
Status: DISPENSED | OUTPATIENT
Start: 2021-11-29 | End: 2021-11-30

## 2021-11-29 RX ORDER — KETAMINE HCL IN NACL, ISO-OSM 100MG/10ML
1 SYRINGE (ML) INJECTION ONCE
Status: COMPLETED | OUTPATIENT
Start: 2021-11-29 | End: 2021-11-29

## 2021-11-29 RX ORDER — PANTOPRAZOLE SODIUM 40 MG/1
40 TABLET, DELAYED RELEASE ORAL DAILY
Status: DISCONTINUED | OUTPATIENT
Start: 2021-11-30 | End: 2021-12-02 | Stop reason: HOSPADM

## 2021-11-29 RX ORDER — DOCUSATE SODIUM 100 MG/1
100 CAPSULE, LIQUID FILLED ORAL 2 TIMES DAILY
Status: DISCONTINUED | OUTPATIENT
Start: 2021-11-29 | End: 2021-12-02 | Stop reason: HOSPADM

## 2021-11-29 RX ORDER — ONDANSETRON 2 MG/ML
INJECTION INTRAMUSCULAR; INTRAVENOUS
Status: COMPLETED
Start: 2021-11-29 | End: 2021-11-29

## 2021-11-29 RX ORDER — OXYCODONE HYDROCHLORIDE 5 MG/1
2.5 TABLET ORAL EVERY 4 HOURS PRN
Status: DISCONTINUED | OUTPATIENT
Start: 2021-11-29 | End: 2021-12-02 | Stop reason: HOSPADM

## 2021-11-29 RX ADMIN — ACETAMINOPHEN 975 MG: 325 TABLET, FILM COATED ORAL at 22:38

## 2021-11-29 RX ADMIN — OXYCODONE HYDROCHLORIDE 2.5 MG: 5 TABLET ORAL at 20:07

## 2021-11-29 RX ADMIN — Medication 3 MG: at 22:38

## 2021-11-29 RX ADMIN — PHYTONADIONE 10 MG: 10 INJECTION, EMULSION INTRAMUSCULAR; INTRAVENOUS; SUBCUTANEOUS at 18:23

## 2021-11-29 RX ADMIN — FENTANYL CITRATE 50 MCG: 50 INJECTION, SOLUTION INTRAMUSCULAR; INTRAVENOUS at 11:28

## 2021-11-29 RX ADMIN — ONDANSETRON 4 MG: 2 INJECTION INTRAMUSCULAR; INTRAVENOUS at 13:02

## 2021-11-29 RX ADMIN — FENTANYL CITRATE 25 MCG: 50 INJECTION INTRAMUSCULAR; INTRAVENOUS at 11:42

## 2021-11-29 RX ADMIN — IOHEXOL 100 ML: 350 INJECTION, SOLUTION INTRAVENOUS at 11:54

## 2021-11-29 RX ADMIN — METOPROLOL TARTRATE 25 MG: 25 TABLET, FILM COATED ORAL at 20:05

## 2021-11-29 RX ADMIN — Medication 81 MG: at 11:34

## 2021-11-30 PROBLEM — K80.50 CHOLEDOCHOLITHIASIS: Status: ACTIVE | Noted: 2021-11-30

## 2021-11-30 PROBLEM — T14.8XXA HEMATOMA: Status: ACTIVE | Noted: 2021-11-30

## 2021-11-30 PROBLEM — S73.004A HIP DISLOCATION, RIGHT (HCC): Status: ACTIVE | Noted: 2021-11-30

## 2021-11-30 LAB
ALBUMIN SERPL BCP-MCNC: 2.9 G/DL (ref 3.5–5)
ALP SERPL-CCNC: 107 U/L (ref 46–116)
ALT SERPL W P-5'-P-CCNC: 16 U/L (ref 12–78)
ANION GAP SERPL CALCULATED.3IONS-SCNC: 9 MMOL/L (ref 4–13)
AST SERPL W P-5'-P-CCNC: 23 U/L (ref 5–45)
BASOPHILS # BLD AUTO: 0.01 THOUSANDS/ΜL (ref 0–0.1)
BASOPHILS NFR BLD AUTO: 0 % (ref 0–1)
BILIRUB DIRECT SERPL-MCNC: 0.13 MG/DL (ref 0–0.2)
BILIRUB SERPL-MCNC: 0.54 MG/DL (ref 0.2–1)
BUN SERPL-MCNC: 22 MG/DL (ref 5–25)
CALCIUM SERPL-MCNC: 8.7 MG/DL (ref 8.3–10.1)
CHLORIDE SERPL-SCNC: 103 MMOL/L (ref 100–108)
CO2 SERPL-SCNC: 25 MMOL/L (ref 21–32)
CREAT SERPL-MCNC: 0.95 MG/DL (ref 0.6–1.3)
EOSINOPHIL # BLD AUTO: 0.07 THOUSAND/ΜL (ref 0–0.61)
EOSINOPHIL NFR BLD AUTO: 1 % (ref 0–6)
ERYTHROCYTE [DISTWIDTH] IN BLOOD BY AUTOMATED COUNT: 15.8 % (ref 11.6–15.1)
FOLATE SERPL-MCNC: 8.7 NG/ML (ref 3.1–17.5)
GFR SERPL CREATININE-BSD FRML MDRD: 56 ML/MIN/1.73SQ M
GLUCOSE SERPL-MCNC: 129 MG/DL (ref 65–140)
HCT VFR BLD AUTO: 29.2 % (ref 34.8–46.1)
HGB BLD-MCNC: 9.3 G/DL (ref 11.5–15.4)
IMM GRANULOCYTES # BLD AUTO: 0.03 THOUSAND/UL (ref 0–0.2)
IMM GRANULOCYTES NFR BLD AUTO: 1 % (ref 0–2)
LYMPHOCYTES # BLD AUTO: 0.75 THOUSANDS/ΜL (ref 0.6–4.47)
LYMPHOCYTES NFR BLD AUTO: 13 % (ref 14–44)
MCH RBC QN AUTO: 31.6 PG (ref 26.8–34.3)
MCHC RBC AUTO-ENTMCNC: 31.8 G/DL (ref 31.4–37.4)
MCV RBC AUTO: 99 FL (ref 82–98)
MONOCYTES # BLD AUTO: 0.55 THOUSAND/ΜL (ref 0.17–1.22)
MONOCYTES NFR BLD AUTO: 10 % (ref 4–12)
NEUTROPHILS # BLD AUTO: 4.26 THOUSANDS/ΜL (ref 1.85–7.62)
NEUTS SEG NFR BLD AUTO: 75 % (ref 43–75)
NRBC BLD AUTO-RTO: 0 /100 WBCS
PLATELET # BLD AUTO: 198 THOUSANDS/UL (ref 149–390)
PMV BLD AUTO: 9.5 FL (ref 8.9–12.7)
POTASSIUM SERPL-SCNC: 4.4 MMOL/L (ref 3.5–5.3)
PROT SERPL-MCNC: 6.3 G/DL (ref 6.4–8.2)
RBC # BLD AUTO: 2.94 MILLION/UL (ref 3.81–5.12)
SODIUM SERPL-SCNC: 137 MMOL/L (ref 136–145)
VIT B12 SERPL-MCNC: 344 PG/ML (ref 100–900)
WBC # BLD AUTO: 5.67 THOUSAND/UL (ref 4.31–10.16)

## 2021-11-30 PROCEDURE — 97163 PT EVAL HIGH COMPLEX 45 MIN: CPT

## 2021-11-30 PROCEDURE — 99232 SBSQ HOSP IP/OBS MODERATE 35: CPT | Performed by: SURGERY

## 2021-11-30 PROCEDURE — 85025 COMPLETE CBC W/AUTO DIFF WBC: CPT | Performed by: NURSE PRACTITIONER

## 2021-11-30 PROCEDURE — 99222 1ST HOSP IP/OBS MODERATE 55: CPT | Performed by: INTERNAL MEDICINE

## 2021-11-30 PROCEDURE — 99231 SBSQ HOSP IP/OBS SF/LOW 25: CPT | Performed by: PHYSICIAN ASSISTANT

## 2021-11-30 PROCEDURE — 97116 GAIT TRAINING THERAPY: CPT

## 2021-11-30 PROCEDURE — 82746 ASSAY OF FOLIC ACID SERUM: CPT | Performed by: INTERNAL MEDICINE

## 2021-11-30 PROCEDURE — 80048 BASIC METABOLIC PNL TOTAL CA: CPT | Performed by: NURSE PRACTITIONER

## 2021-11-30 PROCEDURE — 82607 VITAMIN B-12: CPT | Performed by: INTERNAL MEDICINE

## 2021-11-30 PROCEDURE — 80076 HEPATIC FUNCTION PANEL: CPT | Performed by: STUDENT IN AN ORGANIZED HEALTH CARE EDUCATION/TRAINING PROGRAM

## 2021-11-30 PROCEDURE — 97167 OT EVAL HIGH COMPLEX 60 MIN: CPT

## 2021-11-30 RX ADMIN — ACETAMINOPHEN 975 MG: 325 TABLET, FILM COATED ORAL at 14:19

## 2021-11-30 RX ADMIN — ACETAMINOPHEN 975 MG: 325 TABLET, FILM COATED ORAL at 05:04

## 2021-11-30 RX ADMIN — PHENAZOPYRIDINE 200 MG: 100 TABLET ORAL at 08:07

## 2021-11-30 RX ADMIN — OXYMETAZOLINE HYDROCHLORIDE 2 SPRAY: 0.05 SPRAY NASAL at 08:14

## 2021-11-30 RX ADMIN — METOPROLOL TARTRATE 25 MG: 25 TABLET, FILM COATED ORAL at 17:33

## 2021-11-30 RX ADMIN — PANTOPRAZOLE SODIUM 40 MG: 40 TABLET, DELAYED RELEASE ORAL at 08:07

## 2021-11-30 RX ADMIN — ONDANSETRON 4 MG: 2 INJECTION INTRAMUSCULAR; INTRAVENOUS at 06:00

## 2021-11-30 RX ADMIN — FUROSEMIDE 20 MG: 20 TABLET ORAL at 08:07

## 2021-11-30 RX ADMIN — OXYCODONE HYDROCHLORIDE 5 MG: 5 TABLET ORAL at 08:44

## 2021-11-30 RX ADMIN — OXYBUTYNIN 5 MG: 5 TABLET, FILM COATED, EXTENDED RELEASE ORAL at 08:07

## 2021-11-30 RX ADMIN — METOPROLOL TARTRATE 25 MG: 25 TABLET, FILM COATED ORAL at 08:07

## 2021-11-30 RX ADMIN — ACETAMINOPHEN 975 MG: 325 TABLET, FILM COATED ORAL at 21:11

## 2021-11-30 RX ADMIN — Medication 3 MG: at 21:11

## 2021-11-30 RX ADMIN — ENOXAPARIN SODIUM 30 MG: 30 INJECTION SUBCUTANEOUS at 21:12
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
## 2021-12-01 LAB
ALBUMIN SERPL BCP-MCNC: 2.9 G/DL (ref 3.5–5)
ALP SERPL-CCNC: 97 U/L (ref 46–116)
ALT SERPL W P-5'-P-CCNC: 16 U/L (ref 12–78)
ANION GAP SERPL CALCULATED.3IONS-SCNC: 10 MMOL/L (ref 4–13)
AST SERPL W P-5'-P-CCNC: 21 U/L (ref 5–45)
BILIRUB DIRECT SERPL-MCNC: 0.12 MG/DL (ref 0–0.2)
BILIRUB SERPL-MCNC: 0.46 MG/DL (ref 0.2–1)
BUN SERPL-MCNC: 26 MG/DL (ref 5–25)
CALCIUM SERPL-MCNC: 8.6 MG/DL (ref 8.3–10.1)
CHLORIDE SERPL-SCNC: 105 MMOL/L (ref 100–108)
CO2 SERPL-SCNC: 24 MMOL/L (ref 21–32)
CREAT SERPL-MCNC: 0.95 MG/DL (ref 0.6–1.3)
ERYTHROCYTE [DISTWIDTH] IN BLOOD BY AUTOMATED COUNT: 16 % (ref 11.6–15.1)
FLUAV RNA RESP QL NAA+PROBE: NEGATIVE
FLUBV RNA RESP QL NAA+PROBE: NEGATIVE
GFR SERPL CREATININE-BSD FRML MDRD: 56 ML/MIN/1.73SQ M
GLUCOSE SERPL-MCNC: 131 MG/DL (ref 65–140)
HCT VFR BLD AUTO: 29.4 % (ref 34.8–46.1)
HGB BLD-MCNC: 9.3 G/DL (ref 11.5–15.4)
INR PPP: 1.14 (ref 0.84–1.19)
MCH RBC QN AUTO: 31.7 PG (ref 26.8–34.3)
MCHC RBC AUTO-ENTMCNC: 31.6 G/DL (ref 31.4–37.4)
MCV RBC AUTO: 100 FL (ref 82–98)
PLATELET # BLD AUTO: 187 THOUSANDS/UL (ref 149–390)
PMV BLD AUTO: 9.9 FL (ref 8.9–12.7)
POTASSIUM SERPL-SCNC: 4 MMOL/L (ref 3.5–5.3)
PROT SERPL-MCNC: 6.2 G/DL (ref 6.4–8.2)
PROTHROMBIN TIME: 14.6 SECONDS (ref 11.6–14.5)
RBC # BLD AUTO: 2.93 MILLION/UL (ref 3.81–5.12)
RSV RNA RESP QL NAA+PROBE: NEGATIVE
SARS-COV-2 RNA RESP QL NAA+PROBE: NEGATIVE
SODIUM SERPL-SCNC: 139 MMOL/L (ref 136–145)
WBC # BLD AUTO: 6.42 THOUSAND/UL (ref 4.31–10.16)

## 2021-12-01 PROCEDURE — 99232 SBSQ HOSP IP/OBS MODERATE 35: CPT | Performed by: INTERNAL MEDICINE

## 2021-12-01 PROCEDURE — 97116 GAIT TRAINING THERAPY: CPT

## 2021-12-01 PROCEDURE — 85610 PROTHROMBIN TIME: CPT | Performed by: STUDENT IN AN ORGANIZED HEALTH CARE EDUCATION/TRAINING PROGRAM

## 2021-12-01 PROCEDURE — 80076 HEPATIC FUNCTION PANEL: CPT | Performed by: PHYSICIAN ASSISTANT

## 2021-12-01 PROCEDURE — 0241U HB NFCT DS VIR RESP RNA 4 TRGT: CPT | Performed by: PHYSICIAN ASSISTANT

## 2021-12-01 PROCEDURE — 97110 THERAPEUTIC EXERCISES: CPT

## 2021-12-01 PROCEDURE — 99232 SBSQ HOSP IP/OBS MODERATE 35: CPT | Performed by: SURGERY

## 2021-12-01 PROCEDURE — 85027 COMPLETE CBC AUTOMATED: CPT | Performed by: PHYSICIAN ASSISTANT

## 2021-12-01 PROCEDURE — 80048 BASIC METABOLIC PNL TOTAL CA: CPT | Performed by: PHYSICIAN ASSISTANT

## 2021-12-01 RX ORDER — WARFARIN SODIUM 5 MG/1
5 TABLET ORAL
Status: DISCONTINUED | OUTPATIENT
Start: 2021-12-01 | End: 2021-12-02 | Stop reason: HOSPADM

## 2021-12-01 RX ADMIN — METOPROLOL TARTRATE 25 MG: 25 TABLET, FILM COATED ORAL at 08:39

## 2021-12-01 RX ADMIN — ENOXAPARIN SODIUM 30 MG: 30 INJECTION SUBCUTANEOUS at 21:02

## 2021-12-01 RX ADMIN — OXYBUTYNIN 5 MG: 5 TABLET, FILM COATED, EXTENDED RELEASE ORAL at 08:39

## 2021-12-01 RX ADMIN — PANTOPRAZOLE SODIUM 40 MG: 40 TABLET, DELAYED RELEASE ORAL at 08:39

## 2021-12-01 RX ADMIN — Medication 3 MG: at 21:02

## 2021-12-01 RX ADMIN — ACETAMINOPHEN 975 MG: 325 TABLET, FILM COATED ORAL at 14:20

## 2021-12-01 RX ADMIN — ACETAMINOPHEN 975 MG: 325 TABLET, FILM COATED ORAL at 21:02

## 2021-12-01 RX ADMIN — ACETAMINOPHEN 975 MG: 325 TABLET, FILM COATED ORAL at 05:26

## 2021-12-01 RX ADMIN — ENOXAPARIN SODIUM 30 MG: 30 INJECTION SUBCUTANEOUS at 08:39

## 2021-12-01 RX ADMIN — METOPROLOL TARTRATE 25 MG: 25 TABLET, FILM COATED ORAL at 17:54

## 2021-12-01 RX ADMIN — FUROSEMIDE 20 MG: 20 TABLET ORAL at 08:39

## 2021-12-01 RX ADMIN — WARFARIN SODIUM 5 MG: 5 TABLET ORAL at 17:54

## 2021-12-02 VITALS
OXYGEN SATURATION: 95 % | RESPIRATION RATE: 18 BRPM | HEIGHT: 63 IN | SYSTOLIC BLOOD PRESSURE: 155 MMHG | HEART RATE: 64 BPM | WEIGHT: 179.01 LBS | TEMPERATURE: 98 F | BODY MASS INDEX: 31.72 KG/M2 | DIASTOLIC BLOOD PRESSURE: 70 MMHG

## 2021-12-02 PROCEDURE — 99238 HOSP IP/OBS DSCHRG MGMT 30/<: CPT | Performed by: NURSE PRACTITIONER

## 2021-12-02 PROCEDURE — NC001 PR NO CHARGE: Performed by: NURSE PRACTITIONER

## 2021-12-02 RX ORDER — OXYCODONE HYDROCHLORIDE 5 MG/1
2.5 TABLET ORAL EVERY 4 HOURS PRN
Qty: 20 TABLET | Refills: 0 | Status: SHIPPED | OUTPATIENT
Start: 2021-12-02 | End: 2021-12-12

## 2021-12-02 RX ORDER — ALPRAZOLAM 0.25 MG/1
0.5 TABLET ORAL 3 TIMES DAILY PRN
Qty: 10 TABLET | Refills: 0 | Status: SHIPPED | OUTPATIENT
Start: 2021-12-02 | End: 2022-02-14

## 2021-12-02 RX ORDER — WARFARIN SODIUM 5 MG/1
TABLET ORAL
Qty: 10 TABLET | Refills: 0
Start: 2021-12-02 | End: 2022-04-26 | Stop reason: SDUPTHER

## 2021-12-02 RX ORDER — ACETAMINOPHEN 325 MG/1
975 TABLET ORAL EVERY 8 HOURS SCHEDULED
Qty: 30 TABLET | Refills: 0
Start: 2021-12-02

## 2021-12-02 RX ORDER — SENNOSIDES 8.6 MG
17.2 TABLET ORAL
Qty: 10 TABLET | Refills: 0
Start: 2021-12-02 | End: 2022-01-25

## 2021-12-02 RX ORDER — DOCUSATE SODIUM 100 MG/1
100 CAPSULE, LIQUID FILLED ORAL 2 TIMES DAILY
Qty: 10 CAPSULE | Refills: 0
Start: 2021-12-02 | End: 2022-01-25 | Stop reason: ALTCHOICE

## 2021-12-02 RX ADMIN — ACETAMINOPHEN 975 MG: 325 TABLET, FILM COATED ORAL at 05:00

## 2021-12-02 RX ADMIN — PANTOPRAZOLE SODIUM 40 MG: 40 TABLET, DELAYED RELEASE ORAL at 08:35

## 2021-12-02 RX ADMIN — ACETAMINOPHEN 975 MG: 325 TABLET, FILM COATED ORAL at 12:43

## 2021-12-02 RX ADMIN — OXYBUTYNIN 5 MG: 5 TABLET, FILM COATED, EXTENDED RELEASE ORAL at 08:35

## 2021-12-02 RX ADMIN — METOPROLOL TARTRATE 25 MG: 25 TABLET, FILM COATED ORAL at 08:35

## 2021-12-03 ENCOUNTER — TELEPHONE (OUTPATIENT)
Dept: OBGYN CLINIC | Facility: MEDICAL CENTER | Age: 81
End: 2021-12-03

## 2021-12-06 ENCOUNTER — TRANSITIONAL CARE MANAGEMENT (OUTPATIENT)
Dept: FAMILY MEDICINE CLINIC | Facility: CLINIC | Age: 81
End: 2021-12-06

## 2021-12-06 ENCOUNTER — TELEPHONE (OUTPATIENT)
Dept: GASTROENTEROLOGY | Facility: AMBULARY SURGERY CENTER | Age: 81
End: 2021-12-06

## 2021-12-06 ENCOUNTER — TELEPHONE (OUTPATIENT)
Dept: UROLOGY | Facility: AMBULATORY SURGERY CENTER | Age: 81
End: 2021-12-06

## 2021-12-07 ENCOUNTER — NURSING HOME VISIT (OUTPATIENT)
Dept: GERIATRICS | Facility: OTHER | Age: 81
End: 2021-12-07
Payer: MEDICARE

## 2021-12-07 DIAGNOSIS — I10 ESSENTIAL HYPERTENSION: ICD-10-CM

## 2021-12-07 DIAGNOSIS — F41.9 ANXIETY: ICD-10-CM

## 2021-12-07 DIAGNOSIS — S73.004D DISLOCATION OF RIGHT HIP, SUBSEQUENT ENCOUNTER: ICD-10-CM

## 2021-12-07 DIAGNOSIS — I48.91 ATRIAL FIBRILLATION, UNSPECIFIED TYPE (HCC): ICD-10-CM

## 2021-12-07 DIAGNOSIS — N32.81 OAB (OVERACTIVE BLADDER): ICD-10-CM

## 2021-12-07 DIAGNOSIS — R26.2 AMBULATORY DYSFUNCTION: Primary | ICD-10-CM

## 2021-12-07 PROCEDURE — 99306 1ST NF CARE HIGH MDM 50: CPT | Performed by: FAMILY MEDICINE

## 2021-12-08 PROBLEM — N32.81 OAB (OVERACTIVE BLADDER): Status: ACTIVE | Noted: 2021-12-08

## 2021-12-08 PROBLEM — I48.91 ATRIAL FIBRILLATION (HCC): Status: ACTIVE | Noted: 2021-12-08

## 2021-12-09 ENCOUNTER — NURSING HOME VISIT (OUTPATIENT)
Dept: GERIATRICS | Facility: OTHER | Age: 81
End: 2021-12-09
Payer: MEDICARE

## 2021-12-09 DIAGNOSIS — F41.9 ANXIETY: ICD-10-CM

## 2021-12-09 DIAGNOSIS — S73.004D DISLOCATION OF RIGHT HIP, SUBSEQUENT ENCOUNTER: Primary | ICD-10-CM

## 2021-12-09 DIAGNOSIS — R26.2 AMBULATORY DYSFUNCTION: ICD-10-CM

## 2021-12-09 DIAGNOSIS — I10 ESSENTIAL HYPERTENSION: ICD-10-CM

## 2021-12-09 PROCEDURE — 99309 SBSQ NF CARE MODERATE MDM 30: CPT | Performed by: NURSE PRACTITIONER

## 2021-12-10 ENCOUNTER — TELEPHONE (OUTPATIENT)
Dept: FAMILY MEDICINE CLINIC | Facility: CLINIC | Age: 81
End: 2021-12-10

## 2021-12-14 ENCOUNTER — NURSING HOME VISIT (OUTPATIENT)
Dept: GERIATRICS | Facility: OTHER | Age: 81
End: 2021-12-14
Payer: MEDICARE

## 2021-12-14 DIAGNOSIS — S73.004D DISLOCATION OF RIGHT HIP, SUBSEQUENT ENCOUNTER: ICD-10-CM

## 2021-12-14 DIAGNOSIS — I10 ESSENTIAL HYPERTENSION: ICD-10-CM

## 2021-12-14 DIAGNOSIS — I48.91 ATRIAL FIBRILLATION, UNSPECIFIED TYPE (HCC): ICD-10-CM

## 2021-12-14 DIAGNOSIS — R26.2 AMBULATORY DYSFUNCTION: ICD-10-CM

## 2021-12-14 DIAGNOSIS — J34.89 NASAL DRYNESS: Primary | ICD-10-CM

## 2021-12-14 PROCEDURE — 99309 SBSQ NF CARE MODERATE MDM 30: CPT | Performed by: NURSE PRACTITIONER

## 2021-12-15 PROBLEM — J34.89 NASAL DRYNESS: Status: ACTIVE | Noted: 2021-12-15

## 2021-12-16 ENCOUNTER — NURSING HOME VISIT (OUTPATIENT)
Dept: GERIATRICS | Facility: OTHER | Age: 81
End: 2021-12-16
Payer: MEDICARE

## 2021-12-16 DIAGNOSIS — F41.9 ANXIETY: ICD-10-CM

## 2021-12-16 DIAGNOSIS — I10 ESSENTIAL HYPERTENSION: ICD-10-CM

## 2021-12-16 DIAGNOSIS — R26.2 AMBULATORY DYSFUNCTION: Primary | ICD-10-CM

## 2021-12-16 DIAGNOSIS — I48.91 ATRIAL FIBRILLATION, UNSPECIFIED TYPE (HCC): ICD-10-CM

## 2021-12-16 DIAGNOSIS — S00.06XA INSECT BITE OF SCALP, INITIAL ENCOUNTER: ICD-10-CM

## 2021-12-16 DIAGNOSIS — J34.89 NASAL DRYNESS: ICD-10-CM
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
## 2021-12-16 DIAGNOSIS — N32.81 OAB (OVERACTIVE BLADDER): ICD-10-CM

## 2021-12-16 PROCEDURE — 99316 NF DSCHRG MGMT 30 MIN+: CPT | Performed by: NURSE PRACTITIONER

## 2021-12-20 ENCOUNTER — TELEPHONE (OUTPATIENT)
Dept: FAMILY MEDICINE CLINIC | Facility: CLINIC | Age: 81
End: 2021-12-20

## 2021-12-20 DIAGNOSIS — D64.9 ANEMIA, UNSPECIFIED TYPE: Primary | ICD-10-CM

## 2021-12-20 DIAGNOSIS — I48.91 ATRIAL FIBRILLATION, UNSPECIFIED TYPE (HCC): ICD-10-CM

## 2021-12-22 ENCOUNTER — TELEPHONE (OUTPATIENT)
Dept: FAMILY MEDICINE CLINIC | Facility: CLINIC | Age: 81
End: 2021-12-22

## 2021-12-22 ENCOUNTER — ANTICOAG VISIT (OUTPATIENT)
Dept: FAMILY MEDICINE CLINIC | Facility: CLINIC | Age: 81
End: 2021-12-22

## 2021-12-22 DIAGNOSIS — I48.91 ATRIAL FIBRILLATION, UNSPECIFIED TYPE (HCC): Primary | ICD-10-CM

## 2021-12-22 DIAGNOSIS — I10 ESSENTIAL HYPERTENSION: ICD-10-CM

## 2021-12-23 ENCOUNTER — OFFICE VISIT (OUTPATIENT)
Dept: FAMILY MEDICINE CLINIC | Facility: CLINIC | Age: 81
End: 2021-12-23

## 2021-12-23 VITALS
HEIGHT: 63 IN | TEMPERATURE: 97.9 F | SYSTOLIC BLOOD PRESSURE: 128 MMHG | BODY MASS INDEX: 32.96 KG/M2 | DIASTOLIC BLOOD PRESSURE: 80 MMHG | HEART RATE: 62 BPM | WEIGHT: 186 LBS

## 2021-12-23 DIAGNOSIS — T84.020D FAILURE OF RIGHT TOTAL HIP ARTHROPLASTY WITH DISLOCATION OF HIP, SUBSEQUENT ENCOUNTER: ICD-10-CM

## 2021-12-23 DIAGNOSIS — S73.034D ANTERIOR DISLOCATION OF RIGHT HIP, SUBSEQUENT ENCOUNTER: Primary | ICD-10-CM

## 2021-12-23 DIAGNOSIS — I10 ESSENTIAL HYPERTENSION: ICD-10-CM

## 2021-12-23 DIAGNOSIS — I48.91 ATRIAL FIBRILLATION, UNSPECIFIED TYPE (HCC): ICD-10-CM

## 2021-12-23 PROCEDURE — 1111F DSCHRG MED/CURRENT MED MERGE: CPT | Performed by: FAMILY MEDICINE

## 2021-12-23 PROCEDURE — 99496 TRANSJ CARE MGMT HIGH F2F 7D: CPT | Performed by: FAMILY MEDICINE

## 2021-12-27 ENCOUNTER — ANTICOAG VISIT (OUTPATIENT)
Dept: FAMILY MEDICINE CLINIC | Facility: CLINIC | Age: 81
End: 2021-12-27

## 2021-12-27 ENCOUNTER — TELEPHONE (OUTPATIENT)
Dept: FAMILY MEDICINE CLINIC | Facility: CLINIC | Age: 81
End: 2021-12-27

## 2021-12-27 LAB — SL AMB POCT INR: 2.9

## 2021-12-28 ENCOUNTER — TELEPHONE (OUTPATIENT)
Dept: FAMILY MEDICINE CLINIC | Facility: CLINIC | Age: 81
End: 2021-12-28

## 2021-12-28 ENCOUNTER — ANTICOAG VISIT (OUTPATIENT)
Dept: FAMILY MEDICINE CLINIC | Facility: CLINIC | Age: 81
End: 2021-12-28

## 2021-12-28 LAB — SL AMB POCT INR: 4.3

## 2021-12-28 NOTE — TELEPHONE ENCOUNTER
She was told to call if she hasn't heard from Dr Jose Carlos Sidhu and she hasn't  She is aware you are out of the office until Monday

## 2022-01-03 NOTE — TELEPHONE ENCOUNTER
She called both numbers you gave for Dr Carmelita David, these numbers are for Neurology not Physiatry, they both said they dont know why she would be calling Neurology,  Pl adv

## 2022-01-04 ENCOUNTER — PATIENT OUTREACH (OUTPATIENT)
Dept: FAMILY MEDICINE CLINIC | Facility: CLINIC | Age: 82
End: 2022-01-04

## 2022-01-04 ENCOUNTER — TELEPHONE (OUTPATIENT)
Dept: FAMILY MEDICINE CLINIC | Facility: CLINIC | Age: 82
End: 2022-01-04

## 2022-01-04 NOTE — PROGRESS NOTES
Spoke with Yannick Ross and gave her information for orthopedic appointment   1/11/22 at 2:30  [address] [facility]

## 2022-01-04 NOTE — TELEPHONE ENCOUNTER
Pt tried calling all these providers, either they are not there, the #s not valid or they are not Physiatrists,  What does she do?

## 2022-01-04 NOTE — PROGRESS NOTES
Spoke with Sarah Reid she has care [facility]   She anthony like to move her care to [address]  I will check with them about an appointment I will call Oly Dennis to check on blood work

## 2022-01-04 NOTE — PROGRESS NOTES
Spoke with [facility] health they will be out this week to draw blood work  Lab slip faxed to [phone]  Explained we are still working on PT orders

## 2022-01-04 NOTE — TELEPHONE ENCOUNTER
Here are a few names for Geriatric Physiatrists:  *They are not [facility] doctors      *Dr Mary Arredondo  - [phone]    *Dr Eliza Jacobsramo Physiatrists:    Dr Saini [facility] [phone]

## 2022-01-04 NOTE — PROGRESS NOTES
Spoke with Brynn Irene at [facility]' orthopedics  Appointment made for 1/11/22 at [facility]

## 2022-01-06 ENCOUNTER — TELEPHONE (OUTPATIENT)
Dept: OTHER | Facility: OTHER | Age: 82
End: 2022-01-06

## 2022-01-06 NOTE — TELEPHONE ENCOUNTER
Lab Result: PT-INR 6 0   Date/Time Drawn: 1/6/22 @ 1140   Ordering Provider: Francisca Sr [address] [place] [address] Name: Laron Mccarthy        The following critical/stat result was read back to the lab as stated above and [employer] to the on-call provider

## 2022-01-07 DIAGNOSIS — R79.1 ELEVATED INR: Primary | ICD-10-CM

## 2022-01-07 NOTE — TELEPHONE ENCOUNTER
Spoke to home health  She will let the nurse know its stat for Monday   Can you put this order in and I will fax it to [phone]

## 2022-01-07 NOTE — TELEPHONE ENCOUNTER
I told pt to hold her coumadin Fri , sat and  sun (she already took it Thurs) and you need to call her visiting nurse to draw on Monday but I want it to be run as a stat so I get it back before close of the office   Let me know if I need to make out a different lab slip

## 2022-01-11 ENCOUNTER — ANTICOAG VISIT (OUTPATIENT)
Dept: FAMILY MEDICINE CLINIC | Facility: CLINIC | Age: 82
End: 2022-01-11

## 2022-01-11 ENCOUNTER — OFFICE VISIT (OUTPATIENT)
Dept: OBGYN CLINIC | Facility: CLINIC | Age: 82
End: 2022-01-11
Payer: MEDICARE

## 2022-01-11 ENCOUNTER — HOSPITAL ENCOUNTER (OUTPATIENT)
Dept: RADIOLOGY | Facility: HOSPITAL | Age: 82
Discharge: HOME | End: 2022-01-11
Attending: ORTHOPAEDIC SURGERY
Payer: MEDICARE

## 2022-01-11 VITALS
SYSTOLIC BLOOD PRESSURE: 126 MMHG | BODY MASS INDEX: 32.96 KG/M2 | DIASTOLIC BLOOD PRESSURE: 72 MMHG | HEIGHT: 63 IN | WEIGHT: 186 LBS

## 2022-01-11 DIAGNOSIS — M17.12 PRIMARY OSTEOARTHRITIS OF LEFT KNEE: ICD-10-CM

## 2022-01-11 DIAGNOSIS — M25.562 LEFT KNEE PAIN, UNSPECIFIED CHRONICITY: ICD-10-CM

## 2022-01-11 DIAGNOSIS — S73.004S HIP DISLOCATION, RIGHT, SEQUELA: Primary | ICD-10-CM

## 2022-01-11 DIAGNOSIS — M25.552 LEFT HIP PAIN: ICD-10-CM

## 2022-01-11 LAB — INR PPP: 1.6 (ref 0.84–1.19)

## 2022-01-11 PROCEDURE — 73562 X-RAY EXAM OF KNEE 3: CPT

## 2022-01-11 PROCEDURE — 20610 DRAIN/INJ JOINT/BURSA W/O US: CPT | Performed by: PHYSICIAN ASSISTANT

## 2022-01-11 PROCEDURE — 73502 X-RAY EXAM HIP UNI 2-3 VIEWS: CPT

## 2022-01-11 PROCEDURE — 99204 OFFICE O/P NEW MOD 45 MIN: CPT | Performed by: PHYSICIAN ASSISTANT

## 2022-01-11 RX ORDER — KETOROLAC TROMETHAMINE 30 MG/ML
60 INJECTION, SOLUTION INTRAMUSCULAR; INTRAVENOUS
Status: COMPLETED | OUTPATIENT
Start: 2022-01-11 | End: 2022-01-11

## 2022-01-11 RX ORDER — BUPIVACAINE HYDROCHLORIDE 2.5 MG/ML
2 INJECTION, SOLUTION INFILTRATION; PERINEURAL
Status: COMPLETED | OUTPATIENT
Start: 2022-01-11 | End: 2022-01-11

## 2022-01-11 RX ORDER — METHYLPREDNISOLONE ACETATE 40 MG/ML
2 INJECTION, SUSPENSION INTRA-ARTICULAR; INTRALESIONAL; INTRAMUSCULAR; SOFT TISSUE
Status: COMPLETED | OUTPATIENT
Start: 2022-01-11 | End: 2022-01-11

## 2022-01-11 RX ADMIN — KETOROLAC TROMETHAMINE 60 MG: 30 INJECTION, SOLUTION INTRAMUSCULAR; INTRAVENOUS at 16:14

## 2022-01-11 RX ADMIN — BUPIVACAINE HYDROCHLORIDE 2 ML: 2.5 INJECTION, SOLUTION INFILTRATION; PERINEURAL at 16:14

## 2022-01-11 RX ADMIN — METHYLPREDNISOLONE ACETATE 2 ML: 40 INJECTION, SUSPENSION INTRA-ARTICULAR; INTRALESIONAL; INTRAMUSCULAR; SOFT TISSUE at 16:14

## 2022-01-11 NOTE — PROGRESS NOTES
Orthopedics          Kera Ji 78 y o  female MRN: [id number]      Chief Complaint:   left knee pain, history of right hip dislocation    HPI:   78 y  o female complaining of left knee pain  Patient had a right total total hip replacement in 2021 due to right femoral neck fracture and had a fall in late November and dislocated her right hip  Patient had a close reduction of her right hip at this time is been treated conservatively at this time  Patient is in a hip abduction brace  Patient states having pain localized to her left knee pain is aching nature worse weight-bearing mildly relieved with rest   Patient denies any pain in her right hip she is not in any formal physical therapy at this time she denies any significant right hip pain at this time  Denies any changes numbness tingling bilateral lower extremities  Review Of Systems:   · Skin: Normal  · Neuro: See HPI  · Musculoskeletal: See HPI  · All other systems reviewed and are negative    Past Medical History:   Past Medical History:   Diagnosis Date    Hypertension     Insect bite of scalp 2021    Nasal dryness 12/15/2021    UTI (urinary tract infection)        Past Surgical History:   Past Surgical History:   Procedure Laterality Date    EYE SURGERY      JOINT REPLACEMENT Right     Knee 11/15/19, Hip 2021       Family History:  Family history reviewed and non-contributory  Family History   Problem Relation Age of Onset    No Known Problems Mother     Diabetes Father     Stroke Father         syndrome         Social History:  Social History     Socioeconomic History    Marital status:       Spouse name: None    Number of children: None    Years of education: None    Highest education level: None   Occupational History    None   Tobacco Use    Smoking status: Former Smoker     Quit date:      Years since quittin 0    Smokeless tobacco: Never Used   Vaping Use    Vaping Use: Never used Substance and Sexual Activity    Alcohol use: No    Drug use: Never    Sexual activity: Not Currently     Partners: Male     Birth control/protection: Post-menopausal   Other Topics Concern    None   Social History Narrative    Daily coffee consumption (__cups/day)     Daily cola consumption (__cans/day)     Daily tea consumption (__cups/day)      Social Determinants of Health     Financial Resource Strain: Not on file   Food Insecurity: No Food Insecurity    Worried About Running Out of Food in the Last Year: Never true    Krishan of Food in the Last Year: Never true   Transportation Needs: No Transportation Needs    Lack of Transportation (Medical): No    Lack of Transportation (Non-Medical): No   Physical Activity: Not on file   Stress: Not on file   Social Connections: Not on file   Intimate Partner Violence: Not on file   Housing Stability: Not on file       Allergies:    Allergies   Allergen Reactions    Ciprofloxacin      Chest discomfort and dizziness    Diclofenac     Diphenhydramine        Labs:  0   Lab Value Date/Time    HCT 29 4 (L) 12/01/2021 0533    HCT 29 2 (L) 11/30/2021 0734    HCT 33 2 (L) 11/29/2021 2208    HCT 39 9 01/19/2017 0800    HGB 9 3 (L) 12/01/2021 0533    HGB 9 3 (L) 11/30/2021 0734    HGB 10 4 (L) 11/29/2021 2208    HGB 13 1 01/19/2017 0800    INR 1 60 (A) 01/11/2022 0000    WBC 6 42 12/01/2021 0533    WBC 5 67 11/30/2021 0734    WBC 7 07 11/29/2021 2208    WBC 5 1 01/19/2017 0800       Meds:    Current Outpatient Medications:     acetaminophen (TYLENOL) 325 mg tablet, Take 3 tablets (975 mg total) by mouth every 8 (eight) hours, Disp: 30 tablet, Rfl: 0    ALPRAZolam (XANAX) 0 25 mg tablet, Take 2 tablets (0 5 mg total) by mouth 3 (three) times a day as needed for anxiety for up to 10 days, Disp: 10 tablet, Rfl: 0    amLODIPine (NORVASC) 5 mg tablet, TAKE 1 TABLET EVERY DAY, Disp: 90 tablet, Rfl: 0    b complex vitamins tablet, Take 1 tablet by mouth daily, Disp: , Rfl:   Cholecalciferol (VITAMIN D3) 50 MCG (2000 UT) capsule, Take 2,000 Units by mouth daily, Disp: , Rfl:     docusate sodium (COLACE) 100 mg capsule, Take 1 capsule (100 mg total) by mouth 2 (two) times a day, Disp: 10 capsule, Rfl: 0    estradiol (ESTRACE VAGINAL) 0 1 mg/g vaginal cream, Insert 1 g into the vagina daily, Disp: 42 5 g, Rfl: 3    furosemide (LASIX) 20 mg tablet, Take 20 mg by mouth daily, Disp: , Rfl:     Lidocaine 4 % PTCH, Place 1 patch on the skin daily, Disp: , Rfl:     lisinopril (ZESTRIL) 5 mg tablet, TAKE 1 TABLET EVERY DAY, Disp: 90 tablet, Rfl: 0    Melatonin 3 MG CAPS, Take 3 mg by mouth  , Disp: , Rfl:     meloxicam (MOBIC) 15 mg tablet, Take 1 tablet (15 mg total) by mouth daily, Disp: 30 tablet, Rfl: 5    metoprolol tartrate (LOPRESSOR) 25 mg tablet, Take 1 tablet (25 mg total) by mouth 2 (two) times a day, Disp: 180 tablet, Rfl: 3    Mirabegron ER (Myrbetriq) 25 MG TB24, Take 25 mg by mouth daily, Disp: 90 tablet, Rfl: 3    Multiple Vitamins-Minerals (PRESERVISION AREDS PO), Take by mouth, Disp: , Rfl:     oxymetazoline (AFRIN) 0 05 % nasal spray, 2 sprays by Each Nare route 2 (two) times a day for 3 days As need for recurrent nose bleeds, please do not take for more than 3 days, Disp: 15 mL, Rfl: 0    pantoprazole (PROTONIX) 40 mg tablet, Take 1 tablet (40 mg total) by mouth daily, Disp: 60 tablet, Rfl: 3    phenazopyridine (PYRIDIUM) 200 mg tablet, Take 1 tablet (200 mg total) by mouth 3 (three) times a day with meals, Disp: 9 tablet, Rfl: 0    senna (SENOKOT) 8 6 mg, Take 2 tablets (17 2 mg total) by mouth daily at bedtime, Disp: 10 tablet, Rfl: 0    warfarin (COUMADIN) 5 mg tablet, 5mg tablet daily, monitor INR to keep between 2-3, Disp: 10 tablet, Rfl: 0      Physical Exam:     General Appearance:    Alert, cooperative, no distress, appears stated age   Head:    Normocephalic, without obvious abnormality, atraumatic   Eyes:    conjunctiva/corneas clear, both eyes Nose:   Nares normal, septum midline, no drainage    Throat:   Lips normal; teeth and gums normal   Neck:    symmetrical, trachea midline, ;     thyroid:  no enlargement/   Back:     Symmetric, no curvature, ROM normal   Lungs:   No audible wheezing or labored breathing   Chest Wall:    No tenderness or deformity    Heart:    Regular rate and rhythm               Pulses:   2+ and symmetric all extremities   Skin:   Skin color, texture, turgor normal, no rashes or lesions   Neurologic:   normal strength, sensation and reflexes     throughout       Musculoskeletal: left lower extremity  On examination of the left knee there is no effusion, no erythema significant valgus deformity  Range of motion to full active extension and flexion to greater than 120°  Pain on palpation medial and lateral joint lines  There is crepitus with range of motion, no warmth to palpation, bony enlargement noted  No pain on palpation pes anserine bursa region or distal iliotibial band  Stable to varus and valgus stress without pain or gapping  Negative anterior and posterior drawer testing  Sensation intact distal pulses present  Examination patient's right hip hip flexion to 60° limited by posterior precautions active hip flexion active abduction active knee extension ankle dorsi plantar flexion strength 5/5 sensation intact distal pulses present right lower extremity  Radiology:   I personally reviewed the films  X-rays left knee shows severe osteoarthritis joint space narrowing lateral compartment with severe osteophytes deformity      Large joint arthrocentesis: L knee  Universal Protocol:  Consent given by: patient  Patient understanding: patient states understanding of the procedure being performed  Site marked: the operative site was marked  Patient identity confirmed: verbally with patient    Supporting Documentation  Indications: pain   Procedure Details  Location: knee - L knee  Needle size: 22 G  Approach: superior  Medications administered: 2 mL bupivacaine 0 25 %; 2 mL methylPREDNISolone acetate 40 mg/mL; 60 mg ketorolac 60 mg/2 mL    Patient tolerance: patient tolerated the procedure well with no immediate complications          _*_*_*_*_*_*_*_*_*_*_*_*_*_*_*_*_*_*_*_*_*_*_*_*_*_*_*_*_*_*_*_*_*_*_*_*_*_*_*_*_*    Assessment:  78 y  o female with left knee pain due to osteoarthritis right total hip arthroplasty dislocation close reduction treated conservatively    Plan:   · Weight bearing as tolerated  bilateral lower extremity  · Patient interviewed and examined by Dr Gara Hodgkins and myself  · Left knee intra-articular corticosteroid Toradol injection administered as noted above  · Patient advised should they develop any increasing pain, redness, drainage, numbness, tingling or swelling surrounding the injection sight, they are to contact our office or present to the emergency department    · Strict posterior total hip precautions advised and reviewed with patient today  · Home physical therapy for abduction strengthening in out of hip abduction brace over the next 2 weeks time   · Physical therapy for 6-8 weeks time  · Advised patient should she have another dislocation then she may be candidate for total hip arthroplasty revision  · Follow up in 6 weeks or sooner if needed      Shira Rojas PA-C

## 2022-01-12 ENCOUNTER — PATIENT OUTREACH (OUTPATIENT)
Dept: FAMILY MEDICINE CLINIC | Facility: CLINIC | Age: 82
End: 2022-01-12

## 2022-01-12 NOTE — PROGRESS NOTES
Spoke with Jose Alejandro to make sure home health has drawn her blood work and they have  Orthopedics ordered PT and she will make sure [facility] hast he order to start working with her

## 2022-01-19 ENCOUNTER — TELEPHONE (OUTPATIENT)
Dept: OBGYN CLINIC | Facility: HOSPITAL | Age: 82
End: 2022-01-19

## 2022-01-19 NOTE — TELEPHONE ENCOUNTER
Pt sees Dr Phani Sadler the patient therapist  is calling asking for the  pt physical therapy plan and also   has questions about the brace and restrictions for physical therapy    # [phone]  Also # [phone]

## 2022-01-19 NOTE — TELEPHONE ENCOUNTER
LM on VM for Demond Burn to return call  Called second number in message which was patient's daughter  She stated the referral information for her mom was misplaced so they need it sent over to [facility] again via email  Advised we do not use email as it is not HIPAA compliant  Called to [facility] and got fax # [phone]  Please print and fax PT script to number provided

## 2022-01-20 LAB — INR PPP: 1.5 (ref 0.84–1.19)

## 2022-01-21 ENCOUNTER — ANTICOAG VISIT (OUTPATIENT)
Dept: FAMILY MEDICINE CLINIC | Facility: CLINIC | Age: 82
End: 2022-01-21

## 2022-01-21 ENCOUNTER — OFFICE VISIT (OUTPATIENT)
Dept: CARDIOLOGY CLINIC | Facility: CLINIC | Age: 82
End: 2022-01-21
Payer: MEDICARE

## 2022-01-21 VITALS
SYSTOLIC BLOOD PRESSURE: 170 MMHG | HEART RATE: 61 BPM | HEIGHT: 63 IN | BODY MASS INDEX: 33.66 KG/M2 | DIASTOLIC BLOOD PRESSURE: 72 MMHG | WEIGHT: 190 LBS

## 2022-01-21 DIAGNOSIS — N18.31 CHRONIC KIDNEY DISEASE, STAGE 3A (HCC): ICD-10-CM

## 2022-01-21 DIAGNOSIS — I48.0 PAROXYSMAL ATRIAL FIBRILLATION (HCC): Primary | ICD-10-CM

## 2022-01-21 DIAGNOSIS — I50.9 CONGESTIVE HEART FAILURE, UNSPECIFIED HF CHRONICITY, UNSPECIFIED HEART FAILURE TYPE (HCC): ICD-10-CM

## 2022-01-21 DIAGNOSIS — I48.91 ATRIAL FIBRILLATION, UNSPECIFIED TYPE (HCC): ICD-10-CM

## 2022-01-21 PROBLEM — I05.0 MITRAL STENOSIS: Status: ACTIVE | Noted: 2022-01-21

## 2022-01-21 PROCEDURE — 93000 ELECTROCARDIOGRAM COMPLETE: CPT | Performed by: INTERNAL MEDICINE

## 2022-01-21 PROCEDURE — 99204 OFFICE O/P NEW MOD 45 MIN: CPT | Performed by: INTERNAL MEDICINE

## 2022-01-21 RX ORDER — FUROSEMIDE 20 MG/1
20 TABLET ORAL DAILY
Qty: 90 TABLET | Refills: 3 | Status: SHIPPED | OUTPATIENT
Start: 2022-01-21 | End: 2023-01-21

## 2022-01-25 ENCOUNTER — OFFICE VISIT (OUTPATIENT)
Dept: FAMILY MEDICINE CLINIC | Facility: CLINIC | Age: 82
End: 2022-01-25
Payer: MEDICARE

## 2022-01-25 VITALS
SYSTOLIC BLOOD PRESSURE: 120 MMHG | WEIGHT: 190 LBS | TEMPERATURE: 97.4 F | HEART RATE: 66 BPM | OXYGEN SATURATION: 100 % | HEIGHT: 63 IN | BODY MASS INDEX: 33.66 KG/M2 | DIASTOLIC BLOOD PRESSURE: 66 MMHG

## 2022-01-25 DIAGNOSIS — I10 ESSENTIAL HYPERTENSION: ICD-10-CM

## 2022-01-25 DIAGNOSIS — I48.91 ATRIAL FIBRILLATION, UNSPECIFIED TYPE (HCC): ICD-10-CM

## 2022-01-25 DIAGNOSIS — Z00.00 MEDICARE ANNUAL WELLNESS VISIT, SUBSEQUENT: Primary | ICD-10-CM

## 2022-01-25 DIAGNOSIS — N32.81 OAB (OVERACTIVE BLADDER): ICD-10-CM

## 2022-01-25 DIAGNOSIS — N18.31 CHRONIC KIDNEY DISEASE, STAGE 3A (HCC): ICD-10-CM

## 2022-01-25 DIAGNOSIS — I50.9 CONGESTIVE HEART FAILURE, UNSPECIFIED HF CHRONICITY, UNSPECIFIED HEART FAILURE TYPE (HCC): ICD-10-CM

## 2022-01-25 PROCEDURE — G0438 PPPS, INITIAL VISIT: HCPCS | Performed by: FAMILY MEDICINE

## 2022-01-25 PROCEDURE — 99214 OFFICE O/P EST MOD 30 MIN: CPT | Performed by: FAMILY MEDICINE

## 2022-01-25 NOTE — PROGRESS NOTES
HEART AND VASCULAR  CARDIAC [address]    Outpatient New Consult  Today's Date: 01/21/22        Patient name: Rashaad Cool  YOB: 1940  Sex: female         Chief Complaint: Referal for afib from Dr Sosa Cancer      ASSESSMENT:  Problem List Items Addressed This Visit        Cardiovascular and Mediastinum    Atrial fibrillation ([facility] [address] ) - Primary    Relevant Medications    furosemide (LASIX) 20 mg tablet    Other Relevant Orders    POCT ECG    Echo complete w/ contrast if indicated    AMB extended holter monitor    Congestive heart failure, unspecified HF chronicity, unspecified heart failure type ([address] )       Genitourinary    Chronic kidney disease, stage 3a ([facility] [address] )    Relevant Medications    furosemide (LASIX) 20 mg tablet        77 yo female  1) Paroxysmal afib, likely from Mitral stenosis so on Warfarin, [id number]  Found post op after hip replacement Augu 2021 [facility] also had episode in [facility]  On metoprolol   2) Mitral stenosis, moderate mean grad 9mmHg, [facility], severe LAE  Aug 2021  Denies SOB  3) Chronic pitting edema  4) HTN, elevated today  5) Chronic anemia       PLAN:  1  Check TTE to asses Mitral stenosis  2  CHeck 2 week zio patch to assess afib burden  3  STart lasix 20mg daily for edema, likely related to mitral stenosis  May also help w HTN  4  Cont warfarin, given mitral stenosis not a DOAC canddiate  INR 1 5, Dr Sosa Cancer increased to 5mg daily except one, then 2 5mg  Recheck in 10 days    Follow up in: 9 monhts    Orders Placed This Encounter   Procedures    AMB extended holter monitor    POCT ECG    Echo complete w/ contrast if indicated     Medications Discontinued During This Encounter   Medication Reason    furosemide (LASIX) 20 mg tablet Reorder                 HPI/Subjective:   77 yo female  1) Paroxysmal afib, likely from Mitral stenosis so on Warfarin, [id number]  Found post op after hip replacement Augu 2021 [facility] also had episode in [facility]  On metoprolol   2) Mitral stenosis, moderate mean grad 9mmHg, [facility], severe LAE  Aug 2021  Denies SOB  3) Chronic pitting edema  4) HTN, elevated today  5) Chronic anemia       dalidia is pleasant 78year old  SHe has had hips and knees replacemed in last year, she has trouble walking, she is fairly sedentary  SHe denies palpitaitons, SOB  SHe was found afib after hip replacement but did not feel it  It was found post op on monitor  She had it once in rehab too  No cardiologist for her  SHe is not short of breath, no palpitaitons, she has chronic edema  SHe is on warfarin  INR subtherepeutic  Please note HPI is listed by problem with with update following it, it is copied again in the assessment above and reflects medical decision making as well  Complete 12 point ROS reviewed and otherwise non pertinent or negative except as per HPI pertinent positives in Cardiovascular and Respiratory emphasized  Please see paper chart for outpatient clinic patients where the patient completed the 12 point ROS survey  Past Medical History:   Diagnosis Date    Atrial fibrillation ([facility] [address] )     Hypertension     Insect bite of scalp 12/16/2021    Nasal dryness 12/15/2021    UTI (urinary tract infection)        Allergies   Allergen Reactions    Ciprofloxacin      Chest discomfort and dizziness    Diclofenac     Diphenhydramine      I reviewed the Home Medication list and Allergies in the chart     Scheduled Meds:  Current Outpatient Medications   Medication Sig Dispense Refill    acetaminophen (TYLENOL) 325 mg tablet Take 3 tablets (975 mg total) by mouth every 8 (eight) hours 30 tablet 0    amLODIPine (NORVASC) 5 mg tablet TAKE 1 TABLET EVERY DAY 90 tablet 0    b complex vitamins tablet Take 1 tablet by mouth daily      Cholecalciferol (VITAMIN D3) 50 MCG (2000 UT) capsule Take 2,000 Units by mouth daily      estradiol (ESTRACE VAGINAL) 0 1 mg/g vaginal cream Insert 1 g into the vagina daily (Patient taking differently: Insert 1 g into the vagina daily Once every other week ) 42 5 g 3    lisinopril (ZESTRIL) 5 mg tablet TAKE 1 TABLET EVERY DAY 90 tablet 0    Melatonin 3 MG CAPS Take 3 mg by mouth        metoprolol tartrate (LOPRESSOR) 25 mg tablet Take 1 tablet (25 mg total) by mouth 2 (two) times a day 180 tablet 3    Mirabegron ER (Myrbetriq) 25 MG TB24 Take 25 mg by mouth daily 90 tablet 3    Multiple Vitamins-Minerals (PRESERVISION AREDS PO) Take by mouth      pantoprazole (PROTONIX) 40 mg tablet Take 1 tablet (40 mg total) by mouth daily 60 tablet 3    warfarin (COUMADIN) 5 mg tablet 5mg tablet daily, monitor INR to keep between 2-3 10 tablet 0    ALPRAZolam (XANAX) 0 25 mg tablet Take 2 tablets (0 5 mg total) by mouth 3 (three) times a day as needed for anxiety for up to 10 days 10 tablet 0    docusate sodium (COLACE) 100 mg capsule Take 1 capsule (100 mg total) by mouth 2 (two) times a day (Patient not taking: Reported on 1/21/2022 ) 10 capsule 0    furosemide (LASIX) 20 mg tablet Take 1 tablet (20 mg total) by mouth daily 90 tablet 3    Lidocaine 4 % PTCH Place 1 patch on the skin daily (Patient not taking: Reported on 1/21/2022 )      meloxicam (MOBIC) 15 mg tablet Take 1 tablet (15 mg total) by mouth daily (Patient not taking: Reported on 1/21/2022 ) 30 tablet 5    oxymetazoline (AFRIN) 0 05 % nasal spray 2 sprays by Each Nare route 2 (two) times a day for 3 days As need for recurrent nose bleeds, please do not take for more than 3 days 15 mL 0    phenazopyridine (PYRIDIUM) 200 mg tablet Take 1 tablet (200 mg total) by mouth 3 (three) times a day with meals (Patient not taking: Reported on 1/21/2022 ) 9 tablet 0    senna (SENOKOT) 8 6 mg Take 2 tablets (17 2 mg total) by mouth daily at bedtime (Patient not taking: Reported on 1/21/2022 ) 10 tablet 0     No current facility-administered medications for this visit  PRN Meds:         Family History   Problem Relation Age of Onset    No Known Problems Mother     Diabetes Father     Stroke Father         syndrome       Social History     Socioeconomic History    Marital status:      Spouse name: Not on file    Number of children: Not on file    Years of education: Not on file    Highest education level: Not on file   Occupational History    Not on file   Tobacco Use    Smoking status: Former Smoker     Quit date:      Years since quittin 0    Smokeless tobacco: Never Used   Vaping Use    Vaping Use: Never used   Substance and Sexual Activity    Alcohol use: No    Drug use: Never    Sexual activity: Not Currently     Partners: Male     Birth control/protection: Post-menopausal   Other Topics Concern    Not on file   Social History Narrative    Daily coffee consumption (__cups/day)     Daily cola consumption (__cans/day)     Daily tea consumption (__cups/day)      Social Determinants of Health     Financial Resource Strain: Not on file   Food Insecurity: No Food Insecurity    Worried About Running Out of Food in the Last Year: Never true    Krishan of Food in the Last Year: Never true   Transportation Needs: No Transportation Needs    Lack of Transportation (Medical): No    Lack of Transportation (Non-Medical): No   Physical Activity: Not on file   Stress: Not on file   Social Connections: Not on file   Intimate Partner Violence: Not on file   Housing Stability: Not on file         OBJECTIVE:    /72   Pulse 61   Ht 5' 3" (1 6 m)   Wt 86 2 kg (190 lb)   BMI 33 66 kg/m²   Vitals:    22 1558   Weight: 86 2 kg (190 lb)     GEN: No acute distress, Alert and oriented, well appearing  HEENT:External ears normal, wearing a mask     EYES: Pupils equal, sclera anicteric, midline, normal conjuctiva  NECK: No JVD, supple, no obvious masses or thryomegaly or goiter  CARDIOVASCULAR:  RRR, 2/4 diastolic murmur, rub, gallops S1,S2  LUNGS: Clear To auscultation bilaterally, normal effort, no rales, rhonchi, crackles   ABDOMEN:  nondistended,  without obvious organomegaly or ascites  EXTREMITIES/VASCULAR: 2+ pitting maroi edema  warm an well perfused  PSYCH: Normal Affect,  linear speech pattern without evidence of psychosis  NEURO: Grossly intact, moving all extremiteis equal, face symmetric, alert and responsive, no obvious focal defecits   GAIT:  Ambulates w walker  HEME: No bleeding, bruising, petechia, purpura   SKIN: No significant rashes on visibile skin, warm, no diaphoresis or pallor  Lab Results:       LABS:      Chemistry        Component Value Date/Time     01/19/2017 0800    K 4 0 12/01/2021 0533    K 4 8 01/19/2017 0800     12/01/2021 0533     01/19/2017 0800    CO2 24 12/01/2021 0533    CO2 28 11/29/2021 1136    BUN 26 (H) 12/01/2021 0533    BUN 24 01/19/2017 0800    CREATININE 0 95 12/01/2021 0533    CREATININE 0 87 01/19/2017 0800        Component Value Date/Time    CALCIUM 8 6 12/01/2021 0533    CALCIUM 9 7 01/19/2017 0800    ALKPHOS 97 12/01/2021 0533    ALKPHOS 112 01/19/2017 0800    AST 21 12/01/2021 0533    AST 18 01/19/2017 0800    ALT 16 12/01/2021 0533    ALT 16 01/19/2017 0800    BILITOT 0 5 01/19/2017 0800            Lab Results   Component Value Date    CHOL 177 01/19/2017     Lab Results   Component Value Date    HDL 76 (H) 09/04/2018    HDL 73 01/19/2017     Lab Results   Component Value Date    LDLCALC 62 09/04/2018     Lab Results   Component Value Date    TRIG 68 09/04/2018    TRIG 83 01/19/2017     No results found for: CHOLHDL    IMAGING: XR hip/pelv 2-3 vws right if performed    Result Date: 1/12/2022  Narrative: RIGHT HIP INDICATION:   M25 552: Pain in left hip  COMPARISON:  11/29/2021  VIEWS:  XR HIP/PELV 2-3 VWS RIGHT W PELVIS IF PERFORMED FINDINGS: Diffuse osteopenia  Stably positioned total right hip arthroplasty    Femoral stem remains in neutral position  No acute right hip periprosthetic fracture identified  Degenerative changes are noted of the included lumbar spine  Impression: Stably positioned total right hip arthroplasty  No acute right hip periprosthetic fracture  Per history, the patient has left hip pain  Therefore dedicated left hip radiographs are recommended  Workstation performed: [id number]     XR knee 3 vw left non injury    Result Date: 2022  Narrative: LEFT KNEE INDICATION:   M25 562: Pain in left knee  COMPARISON:  None VIEWS:  XR KNEE 3 VW LEFT NON INJURY FINDINGS: Diffuse osteopenia  There is no acute fracture or dislocation  There is no joint effusion  Advanced tricompartmental degenerative changes as evidenced by joint space narrowing and marginal osteophyte formation, most severe in the lateral compartment  No lytic or blastic osseous lesion  Vascular calcifications noted  Impression: Diffuse osteopenia  No acute fracture or dislocation of the left knee  Advanced tricompartmental degenerative changes, most severe in the lateral compartment  Workstation performed: [id number]        Cardiac testing:   Results for orders placed during the hospital encounter of 19    Echo complete with contrast if indicated    Narrative  [place] [address], [address] [place] [address]  [phone]    Transthoracic Echocardiogram  2D, M-mode, Doppler, and Color Doppler    Study date:  06-Dec-2019    Patient: Verónica Martin  MR number: [id number]  Account number: [de-identified]  : 1940  Age: [de-identified] years  Gender: Female  Status: Outpatient  Location: [address] [place] [address] and Vascular Center  Height: 63 in  Weight: 194 7 lb  BP: 122/ 78 mmHg    Indications: Murmur      Diagnoses: R01 1 - Cardiac murmur, unspecified    Sonographer:  NELSY Munoz  Referring Physician:  Jacqueline Franco DO  [facility]  [facility]  Interpreting Physician:  Abbie Bruce MD    SUMMARY    LEFT VENTRICLE:  Systolic function was normal  Ejection fraction was estimated to be 70 %  There were no regional wall motion abnormalities  Wall thickness was mildly increased  LEFT ATRIUM:  The atrium was moderately dilated  MITRAL VALVE:  There was moderate annular calcification  There was moderately reduced leaflet separation  Transmitral velocity and gradient were increased due to stenosis, as well as concomitant increased transaortic flow  There was moderate stenosis  There was moderate regurgitation  Mean transmitral gradient was 8 mmHg  AORTIC VALVE:  Transaortic velocity and gradient were increased due to stenosis as well as concomitant increased transaortic flow  There was very mild stenosis  TRICUSPID VALVE:  There was mild regurgitation  Pulmonary artery systolic pressure was mildly increased  HISTORY: PRIOR HISTORY: Hypertension, Anemia, Recent TKR    PROCEDURE: The study was performed in the [facility] and [facility]  This was a routine study  The transthoracic approach was used  The study included complete 2D imaging, M-mode, complete spectral Doppler, and color Doppler  The  heart rate was 82 bpm, at the start of the study  Images were obtained from the parasternal, apical, subcostal, and suprasternal notch acoustic windows  Image quality was adequate  LEFT VENTRICLE: Size was normal  Systolic function was normal  Ejection fraction was estimated to be 70 %  There were no regional wall motion abnormalities  Wall thickness was mildly increased  DOPPLER: The study was not technically  sufficient to allow evaluation of LV diastolic function  RIGHT VENTRICLE: The size was normal  Systolic function was normal  Wall thickness was normal     LEFT ATRIUM: The atrium was moderately dilated  RIGHT ATRIUM: Size was normal     MITRAL VALVE: There was moderate annular calcification  There was moderately reduced leaflet separation   DOPPLER: Transmitral velocity and gradient were increased due to stenosis, as well as concomitant increased transaortic flow  There  was moderate stenosis  There was moderate regurgitation  AORTIC VALVE: The valve was trileaflet  Leaflets exhibited normal thickness, mild calcification, and mildly reduced cuspal separation  DOPPLER: Transaortic velocity and gradient were increased due to stenosis as well as concomitant  increased transaortic flow  There was very mild stenosis  There was no significant regurgitation  TRICUSPID VALVE: The valve structure was normal  There was normal leaflet separation  DOPPLER: The transtricuspid velocity was within the normal range  There was no evidence for stenosis  There was mild regurgitation  Pulmonary artery  systolic pressure was mildly increased  PULMONIC VALVE: Not well visualized  DOPPLER: The transpulmonic velocity was within the normal range  There was no significant regurgitation  PERICARDIUM: There was no pericardial effusion  The pericardium was normal in appearance  AORTA: The root exhibited normal size  SYSTEMIC VEINS: IVC: The inferior vena cava was normal in size  PULMONARY VEINS: DOPPLER: Doppler flow pattern was normal in the pulmonary vein(s)  MEASUREMENT TABLES    DOPPLER MEASUREMENTS  Mitral valve   (Reference normals)  Mean gradient   8 mmHg   (--)    SYSTEM MEASUREMENT TABLES    2D  %FS: 29 29 %  AV Diam: 3 12 cm  EDV(Teich): 50 02 ml  EF(Cube): 64 64 %  EF(Teich): 57 24 %  ESV(Cube): 14 84 ml  ESV(Teich): 21 39 ml  IVSd: 1 1 cm  LA Area: 24 62 cm2  LA Diam: 3 52 cm  LVEDV MOD A4C: 80 31 ml  LVEF MOD A4C: 71 7 %  LVESV MOD A4C: 22 73 ml  LVIDd: 3 48 cm  LVIDs: 2 46 cm  LVLd A4C: 7 99 cm  LVLs A4C: 5 79 cm  LVOT Diam: 1 99 cm  LVPWd: 1 cm  RA Area: 15 86 cm2  RV Diam: 3 26 cm  SI(Cube): 14 21 ml/m2  SI(Teich): 14 99 ml/m2  SV MOD A4C: 57 58 ml  SV(Cube): 27 14 ml  SV(Teich): 28 63 ml    CF  MR Area: 7 48 cm2    CW  AV Env  Ti: 332 72 ms  AV MaxP 41 mmHg  AV SI: 178 04 ml/m2  AV SV: 340 06 ml  AV VTI: 44 58 cm  AV Vmax: 2 31 m/s  AV Vmean: 1 34 m/s  AV meanP 2 mmHg  MV PHT: 102 75 ms  MVA By PHT: 2 14 cm2  TR MaxP 47 mmHg  TR Vmax: 2 97 m/s    MM  TAPSE: 2 44 cm    PW  MYAH (VTI): 2 99 cm2  MYAH Vmax: 2 57 cm2  AVC: 348 86 ms  E': 0 06 m/s  E/E': 33 51  LVOT Env  Ti: 310 54 ms  LVOT VTI: 42 7 cm  LVOT Vmax: 1 91 m/s  LVOT Vmean: 1 38 m/s  LVOT maxP 52 mmHg  LVOT meanP 07 mmHg  LVSI Dopp: 69 78 ml/m2  LVSV Dopp: 133 28 ml  MV A Benjamin: 1 96 m/s  MV Dec [place]: 8 64 m/s2  MV DecT: 225 66 ms  MV E Benjamin: 1 95 m/s  MV E/A Ratio: 1    [employer] Accredited Echocardiography Laboratory    Prepared and electronically signed by    Lenard Kam MD  Signed 06-Dec-2019 13:18:59    No results found for this or any previous visit  No results found for this or any previous visit  No results found for this or any previous visit            I reviewed and interpreted the following LABS/EKG/TELE/IMAGING and below is summary of my interpretation (if data available):    LABS:Cr 0 9  hgb 9 3    Current EKG and Rhythm Strip: NSR LAE Manual Repair Warning Statement: We plan on removing the manually selected variable below in favor of our much easier automatic structured text blocks found in the previous tab. We decided to do this to help make the flow better and give you the full power of structured data. Manual selection is never going to be ideal in our platform and I would encourage you to avoid using manual selection from this point on, especially since I will be sunsetting this feature. It is important that you do one of two things with the customized text below. First, you can save all of the text in a word file so you can have it for future reference. Second, transfer the text to the appropriate area in the Library tab. Lastly, if there is a flap or graft type which we do not have you need to let us know right away so I can add it in before the variable is hidden. No need to panic, we plan to give you roughly 6 months to make the change.

## 2022-01-25 NOTE — PROGRESS NOTES
Assessment and Plan:     Problem List Items Addressed This Visit     None           Preventive health issues were discussed with patient, and age appropriate screening tests were ordered as noted in patient's After Visit Summary  Personalized health advice and appropriate referrals for health education or preventive services given if needed, as noted in patient's After Visit Summary  History of Present Illness:     Patient presents for Medicare Annual Wellness visit    She is up to date with preventative care , but is refusing a mammogram       Patient Care Team:  Nadeem Monroe DO as PCP - MD Brian Solis MD     Problem List:     Patient Active Problem List   Diagnosis    Aftercare following right knee joint replacement surgery    Drug-induced constipation    Ambulatory dysfunction    Essential hypertension    Anxiety    Right knee pain    Anemia    Symptomatic anemia    Guaiac positive stools    Absolute anemia    Melena    Osteoarthritis of left hip    UTI (urinary tract infection)    Elevated LFTs    Epistaxis    Hip dislocation, right ([address] )    Choledocholithiasis    Hematoma    Fall    OAB (overactive bladder)    Atrial fibrillation (HCC)    Nasal dryness    Insect bite of scalp    Congestive heart failure, unspecified HF chronicity, unspecified heart failure type ([address] )    Chronic kidney disease, stage 3a ([address] )    Mitral stenosis      Past Medical and Surgical History:     Past Medical History:   Diagnosis Date    Atrial fibrillation ([address] )     Hypertension     Insect bite of scalp 12/16/2021    Nasal dryness 12/15/2021    UTI (urinary tract infection)      Past Surgical History:   Procedure Laterality Date    EYE SURGERY      JOINT REPLACEMENT Right     Knee 11/15/19, Hip 8/2021      Family History:     Family History   Problem Relation Age of Onset    No Known Problems Mother     Diabetes Father     Stroke Father syndrome      Social History:     Social History     Socioeconomic History    Marital status:      Spouse name: Not on file    Number of children: Not on file    Years of education: Not on file    Highest education level: Not on file   Occupational History    Not on file   Tobacco Use    Smoking status: Former Smoker     Quit date:      Years since quittin 0    Smokeless tobacco: Never Used   Vaping Use    Vaping Use: Never used   Substance and Sexual Activity    Alcohol use: No    Drug use: Never    Sexual activity: Not Currently     Partners: Male     Birth control/protection: Post-menopausal   Other Topics Concern    Not on file   Social History Narrative    Daily coffee consumption (__cups/day)     Daily cola consumption (__cans/day)     Daily tea consumption (__cups/day)      Social Determinants of Health     Financial Resource Strain: Not on file   Food Insecurity: No Food Insecurity    Worried About Running Out of Food in the Last Year: Never true    Krishan of Food in the Last Year: Never true   Transportation Needs: No Transportation Needs    Lack of Transportation (Medical): No    Lack of Transportation (Non-Medical):  No   Physical Activity: Not on file   Stress: Not on file   Social Connections: Not on file   Intimate Partner Violence: Not on file   Housing Stability: Not on file      Medications and Allergies:     Current Outpatient Medications   Medication Sig Dispense Refill    acetaminophen (TYLENOL) 325 mg tablet Take 3 tablets (975 mg total) by mouth every 8 (eight) hours 30 tablet 0    ALPRAZolam (XANAX) 0 25 mg tablet Take 2 tablets (0 5 mg total) by mouth 3 (three) times a day as needed for anxiety for up to 10 days 10 tablet 0    amLODIPine (NORVASC) 5 mg tablet TAKE 1 TABLET EVERY DAY 90 tablet 0    b complex vitamins tablet Take 1 tablet by mouth daily      Cholecalciferol (VITAMIN D3) 50 MCG (2000 UT) capsule Take 2,000 Units by mouth daily      docusate sodium (COLACE) 100 mg capsule Take 1 capsule (100 mg total) by mouth 2 (two) times a day (Patient not taking: Reported on 1/21/2022 ) 10 capsule 0    estradiol (ESTRACE VAGINAL) 0 1 mg/g vaginal cream Insert 1 g into the vagina daily (Patient taking differently: Insert 1 g into the vagina daily Once every other week ) 42 5 g 3    furosemide (LASIX) 20 mg tablet Take 1 tablet (20 mg total) by mouth daily 90 tablet 3    Lidocaine 4 % PTCH Place 1 patch on the skin daily (Patient not taking: Reported on 1/21/2022 )      lisinopril (ZESTRIL) 5 mg tablet TAKE 1 TABLET EVERY DAY 90 tablet 0    Melatonin 3 MG CAPS Take 3 mg by mouth        meloxicam (MOBIC) 15 mg tablet Take 1 tablet (15 mg total) by mouth daily (Patient not taking: Reported on 1/21/2022 ) 30 tablet 5    metoprolol tartrate (LOPRESSOR) 25 mg tablet Take 1 tablet (25 mg total) by mouth 2 (two) times a day 180 tablet 3    Mirabegron ER (Myrbetriq) 25 MG TB24 Take 25 mg by mouth daily 90 tablet 3    Multiple Vitamins-Minerals (PRESERVISION AREDS PO) Take by mouth      oxymetazoline (AFRIN) 0 05 % nasal spray 2 sprays by Each Nare route 2 (two) times a day for 3 days As need for recurrent nose bleeds, please do not take for more than 3 days 15 mL 0    pantoprazole (PROTONIX) 40 mg tablet Take 1 tablet (40 mg total) by mouth daily 60 tablet 3    phenazopyridine (PYRIDIUM) 200 mg tablet Take 1 tablet (200 mg total) by mouth 3 (three) times a day with meals (Patient not taking: Reported on 1/21/2022 ) 9 tablet 0    senna (SENOKOT) 8 6 mg Take 2 tablets (17 2 mg total) by mouth daily at bedtime (Patient not taking: Reported on 1/21/2022 ) 10 tablet 0    warfarin (COUMADIN) 5 mg tablet 5mg tablet daily, monitor INR to keep between 2-3 10 tablet 0     No current facility-administered medications for this visit       Allergies   Allergen Reactions    Ciprofloxacin      Chest discomfort and dizziness    Diclofenac     Diphenhydramine Immunizations:     Immunization History   Administered Date(s) Administered    COVID-19 PFIZER VACCINE 0 3 ML IM 01/21/2021, 02/11/2021    Influenza Split High Dose Preservative Free IM 11/08/2012, 11/05/2013, 11/05/2013, 10/14/2014, 11/03/2015, 09/21/2016    Influenza, high dose seasonal 0 7 mL 11/27/2018, 09/19/2019, 09/24/2020, 10/25/2021    Influenza, seasonal, injectable 10/12/2017    Pneumococcal Conjugate 13-Valent 11/03/2015    Pneumococcal Polysaccharide PPV23 10/30/2006, 06/01/2017    TD (adult) Preservative Free 09/19/2019    Tdap 12/08/1999    Zoster 04/08/2009      Health Maintenance:         Topic Date Due    DXA SCAN  Never done    Breast Cancer Screening: Mammogram  09/30/2011         Topic Date Due    COVID-19 Vaccine (3 - Booster for Pfizer series) 08/11/2021      Medicare Health Risk Assessment:     There were no vitals taken for this visit  Tori Bailey is here for her Subsequent Wellness visit  Last Medicare Wellness visit information reviewed, patient interviewed, no change since last AWV  Health Risk Assessment:   Patient rates overall health as fair  Patient feels that their physical health rating is same  Patient is satisfied with their life  Eyesight was rated as same  Hearing was rated as same  Patient feels that their emotional and mental health rating is same  Patients states they are never, rarely angry  Patient states they are sometimes unusually tired/fatigued  Pain experienced in the last 7 days has been none  Patient states that she has experienced no weight loss or gain in last 6 months  Depression Screening:   PHQ-2 Score: 2  PHQ-9 Score: 4      Fall Risk Screening:    In the past year, patient has experienced: history of falling in past year    Number of falls: 2 or more  Injured during fall?: Yes    Feels unsteady when standing or walking?: Yes    Worried about falling?: Yes      Urinary Incontinence Screening:   Patient has not leaked urine accidently in the last six months  Home Safety:  Patient has trouble with stairs inside or outside of their home  Patient has working smoke alarms and has working carbon monoxide detector  Home safety hazards include: none  Nutrition:   Current diet is Limited junk food  Medications:   Patient is currently taking over-the-counter supplements  OTC medications include: see medication list  Patient is able to manage medications  Activities of Daily Living (ADLs)/Instrumental Activities of Daily Living (IADLs):   Walk and transfer into and out of bed and chair?: Yes  Dress and groom yourself?: Yes    Bathe or shower yourself?: Yes    Feed yourself? Yes  Do your laundry/housekeeping?: Yes  Manage your money, pay your bills and track your expenses?: Yes  Make your own meals?: Yes    Do your own shopping?: No    Previous Hospitalizations:   Any hospitalizations or ED visits within the last 12 months?: Yes    How many hospitalizations have you had in the last year?: 3-4    Advance Care Planning:   Living will: Yes    Durable POA for healthcare:  Yes    Advanced directive: Yes    Advanced directive counseling given: Yes    Five wishes given: Yes    Patient declined ACP directive: No    End of Life Decisions reviewed with patient: Yes    Provider agrees with end of life decisions: Yes      Cognitive Screening:   Provider or family/friend/caregiver concerned regarding cognition?: No    PREVENTIVE SCREENINGS      Cardiovascular Screening:    General: Screening Not Indicated and History Lipid Disorder      Diabetes Screening:     General: Screening Current      Colorectal Cancer Screening:     General: Screening Current      Breast Cancer Screening:     General: Risks and Benefits Discussed and Patient Declines      Cervical Cancer Screening:    General: Screening Not Indicated      Osteoporosis Screening:    General: Screening Current      Abdominal Aortic Aneurysm (AAA) Screening:        General: Screening Current and Screening Not Indicated      Lung Cancer Screening:     General: Screening Not Indicated      Hepatitis C Screening:    General: Screening Current    Screening, Brief Intervention, and Referral to Treatment (SBIRT)    Screening    Typical number of drinks in a week: 0    Single Item Drug Screening:  How often have you used an illegal drug (including marijuana) or a prescription medication for non-medical reasons in the past year? never    Single Item Drug Screen Score: 0  Interpretation: Negative screen for possible drug use disorder      River Bullard, DO

## 2022-01-25 NOTE — PATIENT INSTRUCTIONS
Medicare Preventive Visit Patient Instructions  Thank you for completing your Welcome to Medicare Visit or Medicare Annual Wellness Visit today  Your next wellness visit will be due in one year (1/26/2023)  The screening/preventive services that you may require over the next 5-10 years are detailed below  Some tests may not apply to you based off risk factors and/or age  Screening tests ordered at today's visit but not completed yet may show as past due  Also, please note that scanned in results may not display below  Preventive Screenings:  Service Recommendations Previous Testing/Comments   Colorectal Cancer Screening  * Colonoscopy    * Fecal Occult Blood Test (FOBT)/Fecal Immunochemical Test (FIT)  * Fecal DNA/Cologuard Test  * Flexible Sigmoidoscopy Age: 54-65 years old   Colonoscopy: every 10 years (may be performed more frequently if at higher risk)  OR  FOBT/FIT: every 1 year  OR  Cologuard: every 3 years  OR  Sigmoidoscopy: every 5 years  Screening may be recommended earlier than age 48 if at higher risk for colorectal cancer  Also, an individualized decision between you and your healthcare provider will decide whether screening between the ages of 74-80 would be appropriate  Colonoscopy: 12/18/2019  FOBT/FIT: Not on file  Cologuard: Not on file  Sigmoidoscopy: Not on file          Breast Cancer Screening Age: 36 years old  Frequency: every 1-2 years  Not required if history of left and right mastectomy Mammogram: 09/30/2010        Cervical Cancer Screening Between the ages of 21-29, pap smear recommended once every 3 years  Between the ages of 33-67, can perform pap smear with HPV co-testing every 5 years     Recommendations may differ for women with a history of total hysterectomy, cervical cancer, or abnormal pap smears in past  Pap Smear: Not on file    Screening Not Indicated   Hepatitis C Screening Once for adults born between [facility]  More frequently in patients at high risk for Hepatitis C Hep C Antibody: Not on file        Diabetes Screening 1-2 times per year if you're at risk for diabetes or have pre-diabetes Fasting glucose: 104 mg/dL   A1C: No results in last 5 years    Screening Current   Cholesterol Screening Once every 5 years if you don't have a lipid disorder  May order more often based on risk factors  Lipid panel: 09/04/2018    Screening Not Indicated  History Lipid Disorder     Other Preventive Screenings Covered by Medicare:  1  Abdominal Aortic Aneurysm (AAA) Screening: covered once if your at risk  You're considered to be at risk if you have a family history of AAA  2  Lung Cancer Screening: covers low dose CT scan once per year if you meet all of the following conditions: (1) Age 50-69; (2) No signs or symptoms of lung cancer; (3) Current smoker or have quit smoking within the last 15 years; (4) You have a tobacco smoking history of at least 30 pack years (packs per day multiplied by number of years you smoked); (5) You get a written order from a healthcare provider  3  Glaucoma Screening: covered annually if you're considered high risk: (1) You have diabetes OR (2) Family history of glaucoma OR (3)  aged 48 and older OR (3)  American aged 72 and older  3  Osteoporosis Screening: covered every 2 years if you meet one of the following conditions: (1) You're estrogen deficient and at risk for osteoporosis based off medical history and other findings; (2) Have a vertebral abnormality; (3) On glucocorticoid therapy for more than 3 months; (4) Have primary hyperparathyroidism; (5) On osteoporosis medications and need to assess response to drug therapy  · Last bone density test (DXA Scan): Not on file  5  HIV Screening: covered annually if you're between the age of 12-76  Also covered annually if you are younger than 13 and older than 72 with risk factors for HIV infection   For pregnant patients, it is covered up to 3 times per pregnancy  Immunizations:  Immunization Recommendations   Influenza Vaccine Annual influenza vaccination during flu season is recommended for all persons aged >= 6 months who do not have contraindications   Pneumococcal Vaccine (Prevnar and Pneumovax)  * Prevnar = PCV13  * Pneumovax = PPSV23   Adults 25-60 years old: 1-3 doses may be recommended based on certain risk factors  Adults 72 years old: Prevnar (PCV13) vaccine recommended followed by Pneumovax (PPSV23) vaccine  If already received PPSV23 since turning 65, then PCV13 recommended at least one year after PPSV23 dose  Hepatitis B Vaccine 3 dose series if at intermediate or high risk (ex: diabetes, end stage renal disease, liver disease)   Tetanus (Td) Vaccine - COST NOT COVERED BY MEDICARE PART B Following completion of primary series, a booster dose should be given every 10 years to maintain immunity against tetanus  Td may also be given as tetanus wound prophylaxis  Tdap Vaccine - COST NOT COVERED BY MEDICARE PART B Recommended at least once for all adults  For pregnant patients, recommended with each pregnancy  Shingles Vaccine (Shingrix) - COST NOT COVERED BY MEDICARE PART B  2 shot series recommended in those aged 48 and above     Health Maintenance Due:      Topic Date Due    DXA SCAN  Never done    Breast Cancer Screening: Mammogram  09/30/2011     Immunizations Due:      Topic Date Due    COVID-19 Vaccine (3 - Booster for Cardenas Peter series) 08/11/2021     Advance Directives   What are advance directives? Advance directives are legal documents that state your wishes and plans for medical care  These plans are made ahead of time in case you lose your ability to make decisions for yourself  Advance directives can apply to any medical decision, such as the treatments you want, and if you want to donate organs  What are the types of advance directives? There are many types of advance directives, and each state has rules about how to use them  You may choose a combination of any of the following:  · Living will: This is a written record of the treatment you want  You can also choose which treatments you do not want, which to limit, and which to stop at a certain time  This includes surgery, medicine, IV fluid, and tube feedings  · Durable power of  for healthcare [facility]): This is a written record that states who you want to make healthcare choices for you when you are unable to make them for yourself  This person, called a proxy, is usually a family member or a friend  You may choose more than 1 proxy  · Do not resuscitate (DNR) order:  A DNR order is used in case your heart stops beating or you stop breathing  It is a request not to have certain forms of treatment, such as CPR  A DNR order may be included in other types of advance directives  · Medical directive: This covers the care that you want if you are in a coma, near death, or unable to make decisions for yourself  You can list the treatments you want for each condition  Treatment may include pain medicine, surgery, blood transfusions, dialysis, IV or tube feedings, and a ventilator (breathing machine)  · Values history: This document has questions about your views, beliefs, and how you feel and think about life  This information can help others choose the care that you would choose  Why are advance directives important? An advance directive helps you control your care  Although spoken wishes may be used, it is better to have your wishes written down  Spoken wishes can be misunderstood, or not followed  Treatments may be given even if you do not want them  An advance directive may make it easier for your family to make difficult choices about your care  Fall Prevention    Fall prevention  includes ways to make your home and other areas safer  It also includes ways you can move more carefully to prevent a fall   Health conditions that cause changes in your blood pressure, vision, or muscle strength and coordination may increase your risk for falls  Medicines may also increase your risk for falls if they make you dizzy, weak, or sleepy  Fall prevention tips:   · Stand or sit up slowly  · Use assistive devices as directed  · Wear shoes that fit well and have soles that   · Wear a personal alarm  · Stay active  · Manage your medical conditions  Home Safety Tips:  · Add items to prevent falls in the bathroom  · Keep paths clear  · Install bright lights in your home  · Keep items you use often on shelves within reach  · Paint or place reflective tape on the edges of your stairs  Weight Management   Why it is important to manage your weight:  Being overweight increases your risk of health conditions such as heart disease, high blood pressure, type 2 diabetes, and certain types of cancer  It can also increase your risk for osteoarthritis, sleep apnea, and other respiratory problems  Aim for a slow, steady weight loss  Even a small amount of weight loss can lower your risk of health problems  How to lose weight safely:  A safe and healthy way to lose weight is to eat fewer calories and get regular exercise  You can lose up about 1 pound a week by decreasing the number of calories you eat by 500 calories each day  Healthy meal plan for weight management:  A healthy meal plan includes a variety of foods, contains fewer calories, and helps you stay healthy  A healthy meal plan includes the following:  · Eat whole-grain foods more often  A healthy meal plan should contain fiber  Fiber is the part of grains, fruits, and vegetables that is not broken down by your body  Whole-grain foods are healthy and provide extra fiber in your diet  Some examples of whole-grain foods are whole-wheat breads and pastas, oatmeal, brown rice, and bulgur  · Eat a variety of vegetables every day    Include dark, leafy greens such as spinach, kale, jonna greens, and mustard greens  Eat yellow and orange vegetables such as carrots, sweet potatoes, and winter squash  · Eat a variety of fruits every day  Choose fresh or canned fruit (canned in its own juice or light syrup) instead of juice  Fruit juice has very little or no fiber  · Eat low-fat dairy foods  Drink fat-free (skim) milk or 1% milk  Eat fat-free yogurt and low-fat cottage cheese  Try low-fat cheeses such as mozzarella and other reduced-fat cheeses  · Choose meat and other protein foods that are low in fat  Choose beans or other legumes such as split peas or lentils  Choose fish, skinless poultry (chicken or turkey), or lean cuts of red meat (beef or pork)  Before you cook meat or poultry, cut off any visible fat  · Use less fat and oil  Try baking foods instead of frying them  Add less fat, such as margarine, sour cream, regular salad dressing and mayonnaise to foods  Eat fewer high-fat foods  Some examples of high-fat foods include french fries, doughnuts, ice cream, and cakes  · Eat fewer sweets  Limit foods and drinks that are high in sugar  This includes candy, cookies, regular soda, and sweetened drinks  Exercise:  Exercise at least 30 minutes per day on most days of the week  Some examples of exercise include walking, biking, dancing, and swimming  You can also fit in more physical activity by taking the stairs instead of the elevator or parking farther away from stores  Ask your healthcare provider about the best exercise plan for you  © Copyright [employer] 2018 Information is for End User's use only and may not be sold, redistributed or otherwise used for commercial purposes   All illustrations and images included in CareNotes® are the copyrighted property of A D A M , Inc  or [address] [place] [address] [employer]

## 2022-01-27 NOTE — PROGRESS NOTES
Patient ID: Rashaad Cool is a 78 y o  female  HPI: 78 y  o female presents for follow up hypertension , chf, afib,, ckd stage 3a, and overactive bladder    Pt denies any dizziness,  chest pain, palpitations,edema or dypsnea with exertion  SUBJECTIVE    Family History   Problem Relation Age of Onset    No Known Problems Mother     Diabetes Father     Stroke Father         syndrome     Social History     Socioeconomic History    Marital status:      Spouse name: Not on file    Number of children: Not on file    Years of education: Not on file    Highest education level: Not on file   Occupational History    Not on file   Tobacco Use    Smoking status: Former Smoker     Quit date:      Years since quittin 0    Smokeless tobacco: Never Used   Vaping Use    Vaping Use: Never used   Substance and Sexual Activity    Alcohol use: No    Drug use: Never    Sexual activity: Not Currently     Partners: Male     Birth control/protection: Post-menopausal   Other Topics Concern    Not on file   Social History Narrative    Daily coffee consumption (__cups/day)     Daily cola consumption (__cans/day)     Daily tea consumption (__cups/day)      Social Determinants of Health     Financial Resource Strain: Not on file   Food Insecurity: No Food Insecurity    Worried About Running Out of Food in the Last Year: Never true    Krishan of Food in the Last Year: Never true   Transportation Needs: No Transportation Needs    Lack of Transportation (Medical): No    Lack of Transportation (Non-Medical):  No   Physical Activity: Not on file   Stress: Not on file   Social Connections: Not on file   Intimate Partner Violence: Not on file   Housing Stability: Not on file     Past Medical History:   Diagnosis Date    Atrial fibrillation ([address] )     Hypertension     Insect bite of scalp 2021    Nasal dryness 12/15/2021    UTI (urinary tract infection)      Past Surgical History:   Procedure Laterality Date    EYE SURGERY      JOINT REPLACEMENT Right     Knee 11/15/19, Hip 8/2021     Allergies   Allergen Reactions    Ciprofloxacin      Chest discomfort and dizziness    Diclofenac     Diphenhydramine        Current Outpatient Medications:     acetaminophen (TYLENOL) 325 mg tablet, Take 3 tablets (975 mg total) by mouth every 8 (eight) hours, Disp: 30 tablet, Rfl: 0    ALPRAZolam (XANAX) 0 25 mg tablet, Take 2 tablets (0 5 mg total) by mouth 3 (three) times a day as needed for anxiety for up to 10 days, Disp: 10 tablet, Rfl: 0    amLODIPine (NORVASC) 5 mg tablet, TAKE 1 TABLET EVERY DAY, Disp: 90 tablet, Rfl: 0    b complex vitamins tablet, Take 1 tablet by mouth daily, Disp: , Rfl:     Cholecalciferol (VITAMIN D3) 50 MCG (2000 UT) capsule, Take 2,000 Units by mouth daily, Disp: , Rfl:     estradiol (ESTRACE VAGINAL) 0 1 mg/g vaginal cream, Insert 1 g into the vagina daily (Patient taking differently: Insert 1 g into the vagina daily Once every other week ), Disp: 42 5 g, Rfl: 3    furosemide (LASIX) 20 mg tablet, Take 1 tablet (20 mg total) by mouth daily, Disp: 90 tablet, Rfl: 3    lisinopril (ZESTRIL) 5 mg tablet, TAKE 1 TABLET EVERY DAY, Disp: 90 tablet, Rfl: 0    Melatonin 3 MG CAPS, Take 3 mg by mouth  , Disp: , Rfl:     metoprolol tartrate (LOPRESSOR) 25 mg tablet, Take 1 tablet (25 mg total) by mouth 2 (two) times a day, Disp: 180 tablet, Rfl: 3    Mirabegron ER (Myrbetriq) 25 MG TB24, Take 25 mg by mouth daily, Disp: 90 tablet, Rfl: 3    Multiple Vitamins-Minerals (PRESERVISION AREDS PO), Take by mouth, Disp: , Rfl:     oxymetazoline (AFRIN) 0 05 % nasal spray, 2 sprays by Each Nare route 2 (two) times a day for 3 days As need for recurrent nose bleeds, please do not take for more than 3 days, Disp: 15 mL, Rfl: 0    pantoprazole (PROTONIX) 40 mg tablet, Take 1 tablet (40 mg total) by mouth daily, Disp: 60 tablet, Rfl: 3    warfarin (COUMADIN) 5 mg tablet, 5mg tablet daily, monitor INR to keep between 2-3, Disp: 10 tablet, Rfl: 0    Review of Systems  Constitutional:     Denies fever, chills ,fatigue ,weakness ,weight loss, weight gain     ENT: Denies earache ,loss of hearing ,nosebleed, nasal discharge,nasal congestion ,sore throat ,hoarseness  Pulmonary: Denies shortness of breath ,cough  ,dyspnea on exertion, orthopnea  ,PND   Cardiovascular:  Denies bradycardia , tachycardia  ,palpations, lower extremity edema leg, claudication  Breast:  Denies new or changing breast lumps ,nipple discharge ,nipple changes  Abdomen:  Denies abdominal pain , anorexia , indigestion, nausea, vomiting, constipation, diarrhea  Musculoskeletal: Denies myalgias, arthralgias, joint swelling, joint stiffness , limb pain, limb swelling  Gu: denies dysuria, polyuria  Skin: Denies skin rash, skin lesion, skin wound, itching, dry skin  Neuro: Denies headache, numbness, tingling, confusion, loss of consciousness, dizziness, vertigo  Psychiatric: Denies feelings of depression, suicidal ideation, anxiety, sleep disturbances    OBJECTIVE  /66   Pulse 66   Temp (!) 97 4 °F (36 3 °C)   Ht 5' 3" (1 6 m)   Wt 86 2 kg (190 lb)   SpO2 100%   BMI 33 66 kg/m²   Constitutional:   NAD, well appearing and well nourished      ENT:   Conjunctiva and lids: no injection, edema, or discharge     Pupils and iris: REENA bilaterally    External inspection of ears and nose: normal without deformities or discharge  Otoscopic exam: Canals patent without erythema  Nasal mucosa, septum and turbinates: Normal or edema or discharge         Oropharynx:  Moist mucosa, normal tongue and tonsils without lesions  No erythema        Pulmonary:Respiratory effort normal rate and rhythm, no increased work of breathing   Auscultation of lungs:  Clear bilaterally with no adventitious breath sounds       Cardiovascular: regular rate and rhythm, S1 and S2, no murmur, no edema and/or varicosities of LE      Abdomen: Soft and non-distended     Positive bowel sounds      No heptomegaly or splenomegaly      Gu: no suprapubic tenderness or CVA tenderness, no urethral discharge  Lymphatic:  No anterior or posterior cervical lymphadenopathy         Musculoskeletal:  Gait and station: Normal gait      Digits and nails normal without clubbing or cyanosis       Inspection/palpation of joints, bones, and muscles:  No joint tenderness, swelling, full active and passive range of motion       Skin: Normal skin turgor and no rashes      Neuro:      Normal reflexes     Psych:   alert and oriented to person, place and time     normal mood and affect       Assessment/Plan:Diagnoses and all orders for this visit:    Medicare annual wellness visit, subsequent    Essential hypertension  Comments:  Conintue ace tx  Congestive heart failure, unspecified HF chronicity, unspecified heart failure type ([facility] )  Comments:  Cotninue daily weights, low sodium diet and diuretic tx  Atrial fibrillation, unspecified type ([facility] )  Comments:  Continue with rate control and anticoagulation  Chronic kidney disease, stage 3a ([facility] )  Comments:  Continue with surveillence  OAB (overactive bladder)  Comments:  Stable on current meds  I will see patient back in 4 mos or sooner prn

## 2022-01-31 ENCOUNTER — ANTICOAG VISIT (OUTPATIENT)
Dept: FAMILY MEDICINE CLINIC | Facility: CLINIC | Age: 82
End: 2022-01-31

## 2022-01-31 DIAGNOSIS — R79.1 ELEVATED INR: Primary | ICD-10-CM
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
## 2022-02-14 RX ORDER — ALPRAZOLAM 0.25 MG/1
TABLET ORAL
Qty: 90 TABLET | Refills: 3 | Status: SHIPPED | OUTPATIENT
Start: 2022-02-14

## 2022-02-21 ENCOUNTER — CLINICAL SUPPORT (OUTPATIENT)
Dept: CARDIOLOGY CLINIC | Facility: CLINIC | Age: 82
End: 2022-02-21
Payer: MEDICARE

## 2022-02-21 DIAGNOSIS — I48.0 PAROXYSMAL ATRIAL FIBRILLATION (HCC): ICD-10-CM

## 2022-02-21 PROCEDURE — 93248 EXT ECG>7D<15D REV&INTERPJ: CPT | Performed by: INTERNAL MEDICINE

## 2022-02-22 ENCOUNTER — OFFICE VISIT (OUTPATIENT)
Dept: OBGYN CLINIC | Facility: CLINIC | Age: 82
End: 2022-02-22
Payer: MEDICARE

## 2022-02-22 VITALS
BODY MASS INDEX: 33.66 KG/M2 | HEIGHT: 63 IN | HEART RATE: 67 BPM | WEIGHT: 190 LBS | DIASTOLIC BLOOD PRESSURE: 78 MMHG | SYSTOLIC BLOOD PRESSURE: 128 MMHG

## 2022-02-22 DIAGNOSIS — R26.81 GAIT INSTABILITY: ICD-10-CM

## 2022-02-22 DIAGNOSIS — S73.004S HIP DISLOCATION, RIGHT, SEQUELA: Primary | ICD-10-CM

## 2022-02-22 PROCEDURE — 99213 OFFICE O/P EST LOW 20 MIN: CPT | Performed by: ORTHOPAEDIC SURGERY

## 2022-02-22 NOTE — RESULT ENCOUNTER NOTE
Patient had a min HR of 47 bpm, max HR of 146 bpm, and avg HR of 62  bpm  Predominant underlying rhythm was Sinus Rhythm  First Degree AV  Block was present  48 Supraventricular Tachycardia runs occurred, the run  with the fastest interval lasting 6 beats with a max rate of 146 bpm, the  longest lasting 9 beats with an avg rate of 90 bpm  Some episodes of  Supraventricular Tachycardia may be possible Atrial Tachycardia with  variable block  Isolated SVEs were rare (<1 0%), SVE Couplets were rare  (<1 0%), and SVE Triplets were rare (<1 0%)  Isolated VEs were rare  (<1 0%), VE Couplets were rare (<1 0%), and no VE Triplets were present  Reviewed 2 week Zio  No afib seen  She has frequent short runs of PAT but no sustained afib  Please call Ms Booker Right and let her know Brissa Vaughan looks good, no afib seen

## 2022-02-22 NOTE — PROGRESS NOTES
Assessment:   Diagnosis ICD-10-CM Associated Orders   1  Hip dislocation, right, sequela  [id number] [id number] Ambulatory Referral to Home Health   2  Gait instability  [id number] Ambulatory Referral to Home Health       Plan:    - She has in home PT that needs to start working on her abductor strengthening to help stabilize the hip  Strengthening the quadriceps, glutes as well  Revisit posterior hip pre-cautions  - She should use a pillow between the legs when she side sleeps  It will keep the legs   - She is ok seated bending to tie her shoes  - She can do a seated foot cycle machine    To do next visit:  Return for Will call patient in 2 months  The above stated was discussed in layman's terms and the patient expressed understanding  All questions were answered to the patient's satisfaction  Scribe Attestation    I,:  Ileana Rivera am acting as a scribe while in the presence of the attending physician :       I,:  Louisa Marti MD personally performed the services described in this documentation    as scribed in my presence :             Subjective:   Shelia Hilario is a 78 y o  female who presents today for a 6 week follow-up for her right hip  Patient sustained a right total hip arthroplasty dislocation closed reduction which has been treated conservatively  She had been treating in a hip abduction brace  Patient has a history of a right total hip arthroplasty ( posterior approach) performed on 08/15/2021 by Dr Tommi Felty  She then had a fall in November 2021 where she dislocated the hip  She ambulates today with a walker with her daughter  She notes that she is very cautious getting back to things  She apprehensive with moving the hip and unsure what she's able to do  She has soreness in the posterior aspect of the hip and along the SI joint  At the patient's last visit she had a left knee cortisone injection  She states that the injection didn't help her out as much as she hoped  Review of systems negative unless otherwise specified in HPI  Review of Systems   Constitutional: Negative for chills, fever and unexpected weight change  HENT: Negative for hearing loss, nosebleeds and sore throat  Eyes: Negative for pain, redness and visual disturbance  Respiratory: Negative for cough, shortness of breath and wheezing  Cardiovascular: Negative for chest pain, palpitations and leg swelling  Gastrointestinal: Negative for abdominal pain and nausea  Genitourinary: Negative for dyspareunia, dysuria and frequency  Skin: Negative for rash and wound  Neurological: Negative for dizziness, numbness and headaches  Psychiatric/Behavioral: Negative for decreased concentration and suicidal ideas  The patient is not nervous/anxious          Past Medical History:   Diagnosis Date    Atrial fibrillation ([address] )     Hypertension     Insect bite of scalp 2021    Nasal dryness 12/15/2021    UTI (urinary tract infection)        Past Surgical History:   Procedure Laterality Date    EYE SURGERY      JOINT REPLACEMENT Right     Knee 11/15/19, Hip 2021       Family History   Problem Relation Age of Onset    No Known Problems Mother     Diabetes Father     Stroke Father         syndrome       Social History     Occupational History    Not on file   Tobacco Use    Smoking status: Former Smoker     Quit date:      Years since quittin 1    Smokeless tobacco: Never Used   Vaping Use    Vaping Use: Never used   Substance and Sexual Activity    Alcohol use: No    Drug use: Never    Sexual activity: Not Currently     Partners: Male     Birth control/protection: Post-menopausal         Current Outpatient Medications:     acetaminophen (TYLENOL) 325 mg tablet, Take 3 tablets (975 mg total) by mouth every 8 (eight) hours, Disp: 30 tablet, Rfl: 0    ALPRAZolam (XANAX) 0 25 mg tablet, take 1 tablet by mouth three times a day and if needed for anxiety, Disp: 90 tablet, Rfl: 3    amLODIPine (NORVASC) 5 mg tablet, TAKE 1 TABLET EVERY DAY, Disp: 90 tablet, Rfl: 0    b complex vitamins tablet, Take 1 tablet by mouth daily, Disp: , Rfl:     Cholecalciferol (VITAMIN D3) 50 MCG (2000 UT) capsule, Take 2,000 Units by mouth daily, Disp: , Rfl:     estradiol (ESTRACE VAGINAL) 0 1 mg/g vaginal cream, Insert 1 g into the vagina daily (Patient taking differently: Insert 1 g into the vagina daily Once every other week ), Disp: 42 5 g, Rfl: 3    furosemide (LASIX) 20 mg tablet, Take 1 tablet (20 mg total) by mouth daily, Disp: 90 tablet, Rfl: 3    lisinopril (ZESTRIL) 5 mg tablet, TAKE 1 TABLET EVERY DAY, Disp: 90 tablet, Rfl: 0    Melatonin 3 MG CAPS, Take 3 mg by mouth  , Disp: , Rfl:     metoprolol tartrate (LOPRESSOR) 25 mg tablet, Take 1 tablet (25 mg total) by mouth 2 (two) times a day, Disp: 180 tablet, Rfl: 3    Mirabegron ER (Myrbetriq) 25 MG TB24, Take 25 mg by mouth daily, Disp: 90 tablet, Rfl: 3    Multiple Vitamins-Minerals (PRESERVISION AREDS PO), Take by mouth, Disp: , Rfl:     oxymetazoline (AFRIN) 0 05 % nasal spray, 2 sprays by Each Nare route 2 (two) times a day for 3 days As need for recurrent nose bleeds, please do not take for more than 3 days, Disp: 15 mL, Rfl: 0    pantoprazole (PROTONIX) 40 mg tablet, Take 1 tablet (40 mg total) by mouth daily, Disp: 60 tablet, Rfl: 3    warfarin (COUMADIN) 5 mg tablet, 5mg tablet daily, monitor INR to keep between 2-3, Disp: 10 tablet, Rfl: 0    Allergies   Allergen Reactions    Ciprofloxacin      Chest discomfort and dizziness    Diclofenac     Diphenhydramine             Vitals:    02/22/22 1109   BP: 128/78   Pulse: 67       Objective:                    Right Hip Exam     Tenderness   The patient is experiencing tenderness in the posterior      Range of Motion   Flexion: 90   External rotation: 20   Internal rotation: 20     Muscle Strength   Abduction: 5/5   Adduction: 5/5     Tests   LIBORIO: negative    Other   Erythema: absent  Sensation: normal  Pulse: present            Diagnostics, reviewed and taken today if performed as documented:    None performed      The attending physician has personally reviewed the pertinent films in PACS and interpretation is as follows:      Procedures, if performed today:    Procedures    None performed      Portions of the record may have been created with voice recognition software  Occasional wrong word or "sound a like" substitutions may have occurred due to the inherent limitations of voice recognition software  Read the chart carefully and recognize, using context, where substitutions have occurred

## 2022-02-24 ENCOUNTER — TELEPHONE (OUTPATIENT)
Dept: CARDIOLOGY CLINIC | Facility: CLINIC | Age: 82
End: 2022-02-24

## 2022-02-24 NOTE — TELEPHONE ENCOUNTER
Spoke with patient about Zio results, let her know no afib, only short runs of PAT, per Dr Alex Murdock

## 2022-02-24 NOTE — TELEPHONE ENCOUNTER
----- Message from Celeste Damico MD sent at 2/22/2022  4:52 PM EST -----  Patient had a min HR of 47 bpm, max HR of 146 bpm, and avg HR of 62  bpm  Predominant underlying rhythm was Sinus Rhythm  First Degree AV  Block was present  48 Supraventricular Tachycardia runs occurred, the run  with the fastest interval lasting 6 beats with a max rate of 146 bpm, the  longest lasting 9 beats with an avg rate of 90 bpm  Some episodes of  Supraventricular Tachycardia may be possible Atrial Tachycardia with  variable block  Isolated SVEs were rare (<1 0%), SVE Couplets were rare  (<1 0%), and SVE Triplets were rare (<1 0%)  Isolated VEs were rare  (<1 0%), VE Couplets were rare (<1 0%), and no VE Triplets were present  Reviewed 2 week Zio  No afib seen  She has frequent short runs of PAT but no sustained afib  Please call Ms Saavedra Nickolas and let her know Quentin Gilbert looks good, no afib seen

## 2022-02-28 ENCOUNTER — TELEPHONE (OUTPATIENT)
Dept: FAMILY MEDICINE CLINIC | Facility: CLINIC | Age: 82
End: 2022-02-28

## 2022-02-28 NOTE — TELEPHONE ENCOUNTER
Pt called stating she got the results back from her holter monitor and it showed no a-fib the 2 weeks she wore it  Did show PAT  Pt is asking if she needs to continue her coumadin    Pl adv

## 2022-03-10 ENCOUNTER — HOSPITAL ENCOUNTER (OUTPATIENT)
Dept: NON INVASIVE DIAGNOSTICS | Facility: CLINIC | Age: 82
Discharge: HOME | End: 2022-03-10
Payer: MEDICARE

## 2022-03-10 ENCOUNTER — TELEPHONE (OUTPATIENT)
Dept: FAMILY MEDICINE CLINIC | Facility: CLINIC | Age: 82
End: 2022-03-10

## 2022-03-10 VITALS
HEART RATE: 62 BPM | BODY MASS INDEX: 33.66 KG/M2 | SYSTOLIC BLOOD PRESSURE: 128 MMHG | DIASTOLIC BLOOD PRESSURE: 78 MMHG | WEIGHT: 190 LBS | HEIGHT: 63 IN

## 2022-03-10 DIAGNOSIS — N95.2 ATROPHIC VAGINITIS: ICD-10-CM

## 2022-03-10 DIAGNOSIS — I48.0 PAROXYSMAL ATRIAL FIBRILLATION (HCC): ICD-10-CM

## 2022-03-10 LAB
AORTIC ROOT: 2.7 CM
AORTIC VALVE MEAN VELOCITY: 11.4 M/S
APICAL FOUR CHAMBER EJECTION FRACTION: 70 %
AV AREA BY CONTINUOUS VTI: 2.5 CM2
AV AREA PEAK VELOCITY: 2.1 CM2
AV LVOT MEAN GRADIENT: 3 MMHG
AV LVOT PEAK GRADIENT: 6 MMHG
AV MEAN GRADIENT: 6 MMHG
AV PEAK GRADIENT: 11 MMHG
AV VALVE AREA: 2.51 CM2
AV VELOCITY RATIO: 0.73
DOP CALC AO PEAK VEL: 1.69 M/S
DOP CALC AO VTI: 40.03 CM
DOP CALC LVOT AREA: 2.83 CM2
DOP CALC LVOT DIAMETER: 1.9 CM
DOP CALC LVOT PEAK VEL VTI: 35.4 CM
DOP CALC LVOT PEAK VEL: 1.24 M/S
DOP CALC LVOT STROKE INDEX: 55 ML/M2
DOP CALC LVOT STROKE VOLUME: 100.32 CM3
E WAVE DECELERATION TIME: 413 MS
FRACTIONAL SHORTENING: 37 % (ref 28–44)
INTERVENTRICULAR SEPTUM IN DIASTOLE (PARASTERNAL SHORT AXIS VIEW): 0.7 CM
INTERVENTRICULAR SEPTUM: 0.7 CM (ref 0.53–1)
LAAS-AP2: 23.9 CM2
LAAS-AP4: 20.5 CM2
LEFT ATRIUM AREA SYSTOLE SINGLE PLANE A4C: 22 CM2
LEFT ATRIUM SIZE: 3.6 CM
LEFT INTERNAL DIMENSION IN SYSTOLE: 3.1 CM (ref 2.88–4.36)
LEFT VENTRICULAR INTERNAL DIMENSION IN DIASTOLE: 4.9 CM (ref 4.73–7.05)
LEFT VENTRICULAR POSTERIOR WALL IN END DIASTOLE: 0.7 CM (ref 0.52–0.98)
LEFT VENTRICULAR STROKE VOLUME: 72 ML
LVSV (TEICH): 72 ML
MV E'TISSUE VEL-SEP: 5 CM/S
MV MEAN GRADIENT: 5 MMHG
MV PEAK A VEL: 1.6 M/S
MV PEAK E VEL: 191 CM/S
MV PEAK GRADIENT: 11 MMHG
MV STENOSIS PRESSURE HALF TIME: 120 MS
MV VALVE AREA P 1/2 METHOD: 1.83 CM2
PA SYSTOLIC PRESSURE: 40 MMHG
RIGHT ATRIUM AREA SYSTOLE A4C: 10.7 CM2
RIGHT VENTRICLE ID DIMENSION: 3.3 CM
SL CV LEFT ATRIUM LENGTH A2C: 5.7 CM
SL CV LV EF: 65
SL CV PED ECHO LEFT VENTRICLE DIASTOLIC VOLUME (MOD BIPLANE) 2D: 110 ML
SL CV PED ECHO LEFT VENTRICLE SYSTOLIC VOLUME (MOD BIPLANE) 2D: 38 ML
TR MAX PG: 35 MMHG
TR PEAK VELOCITY: 3 M/S
TRICUSPID VALVE PEAK REGURGITATION VELOCITY: 2.97 M/S
Z-SCORE OF INTERVENTRICULAR SEPTUM IN END DIASTOLE: -0.54
Z-SCORE OF LEFT VENTRICULAR DIMENSION IN END DIASTOLE: -1.64
Z-SCORE OF LEFT VENTRICULAR DIMENSION IN END SYSTOLE: -1.06
Z-SCORE OF LEFT VENTRICULAR POSTERIOR WALL IN END DIASTOLE: -0.43

## 2022-03-10 PROCEDURE — 93306 TTE W/DOPPLER COMPLETE: CPT | Performed by: INTERNAL MEDICINE

## 2022-03-10 PROCEDURE — 93306 TTE W/DOPPLER COMPLETE: CPT

## 2022-03-10 RX ORDER — ESTRADIOL 0.1 MG/G
1 CREAM VAGINAL DAILY
Qty: 42.5 G | Refills: 3 | Status: SHIPPED | OUTPATIENT
Start: 2022-03-10

## 2022-03-10 NOTE — TELEPHONE ENCOUNTER
Patient called stating her prescription for estradiol was cancelled   She is asking for prescription to be sent to [facility]

## 2022-03-11 ENCOUNTER — TELEPHONE (OUTPATIENT)
Dept: CARDIOLOGY CLINIC | Facility: CLINIC | Age: 82
End: 2022-03-11

## 2022-03-11 NOTE — TELEPHONE ENCOUNTER
----- Message from Ghazal Gomez MD sent at 3/10/2022  4:04 PM EST -----  Let Mariely Cortés know herheart looks strong, she has mild problem with mitral valve we can keep watching

## 2022-03-14 ENCOUNTER — ANTICOAG VISIT (OUTPATIENT)
Dept: FAMILY MEDICINE CLINIC | Facility: CLINIC | Age: 82
End: 2022-03-14

## 2022-03-14 LAB — SL AMB POCT INR: 2.3

## 2022-04-13 ENCOUNTER — ANTICOAG VISIT (OUTPATIENT)
Dept: FAMILY MEDICINE CLINIC | Facility: CLINIC | Age: 82
End: 2022-04-13

## 2022-04-21 DIAGNOSIS — I10 ESSENTIAL HYPERTENSION: ICD-10-CM

## 2022-04-21 DIAGNOSIS — D64.9 ANEMIA: ICD-10-CM

## 2022-04-22 RX ORDER — AMLODIPINE BESYLATE 5 MG/1
TABLET ORAL
Qty: 90 TABLET | Refills: 0 | Status: SHIPPED | OUTPATIENT
Start: 2022-04-22

## 2022-04-22 RX ORDER — PANTOPRAZOLE SODIUM 40 MG/1
TABLET, DELAYED RELEASE ORAL
Qty: 60 TABLET | Refills: 3 | Status: SHIPPED | OUTPATIENT
Start: 2022-04-22

## 2022-04-22 RX ORDER — LISINOPRIL 5 MG/1
TABLET ORAL
Qty: 90 TABLET | Refills: 0 | Status: SHIPPED | OUTPATIENT
Start: 2022-04-22 | End: 2022-05-02 | Stop reason: SDUPTHER

## 2022-04-26 DIAGNOSIS — I10 ESSENTIAL HYPERTENSION: ICD-10-CM
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
## 2022-04-26 RX ORDER — LISINOPRIL 5 MG/1
5 TABLET ORAL DAILY
Qty: 90 TABLET | Refills: 0 | Status: CANCELLED | OUTPATIENT
Start: 2022-04-26

## 2022-04-26 RX ORDER — WARFARIN SODIUM 5 MG/1
TABLET ORAL
Qty: 10 TABLET | Refills: 0
Start: 2022-04-26 | End: 2022-05-02 | Stop reason: SDUPTHER
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
## 2022-05-02 DIAGNOSIS — I10 ESSENTIAL HYPERTENSION: ICD-10-CM

## 2022-05-02 RX ORDER — LISINOPRIL 5 MG/1
5 TABLET ORAL DAILY
Qty: 90 TABLET | Refills: 0 | Status: SHIPPED | OUTPATIENT
Start: 2022-05-02 | End: 2022-08-03

## 2022-05-02 RX ORDER — WARFARIN SODIUM 5 MG/1
TABLET ORAL
Qty: 10 TABLET | Refills: 0 | Status: SHIPPED | OUTPATIENT
Start: 2022-05-02 | End: 2022-05-06
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
## 2022-05-06 RX ORDER — WARFARIN SODIUM 5 MG/1
TABLET ORAL
Qty: 10 TABLET | Refills: 0 | Status: SHIPPED | OUTPATIENT
Start: 2022-05-06 | End: 2022-05-25 | Stop reason: SDUPTHER

## 2022-05-18 ENCOUNTER — RA CDI HCC (OUTPATIENT)
Dept: OTHER | Facility: HOSPITAL | Age: 82
End: 2022-05-18

## 2022-05-18 NOTE — PROGRESS NOTES
[facility]  coding opportunities          Chart Reviewed number of suggestions sent to Provider: 1   [id number]    Patients Insurance     Medicare Insurance: [facility]

## 2022-05-20 ENCOUNTER — ANTICOAG VISIT (OUTPATIENT)
Dept: FAMILY MEDICINE CLINIC | Facility: CLINIC | Age: 82
End: 2022-05-20

## 2022-05-20 LAB — INR PPP: 3.1 (ref 0.84–1.19)

## 2022-05-25 ENCOUNTER — OFFICE VISIT (OUTPATIENT)
Dept: FAMILY MEDICINE CLINIC | Facility: CLINIC | Age: 82
End: 2022-05-25
Payer: MEDICARE

## 2022-05-25 VITALS
WEIGHT: 190 LBS | OXYGEN SATURATION: 97 % | BODY MASS INDEX: 33.66 KG/M2 | SYSTOLIC BLOOD PRESSURE: 130 MMHG | HEIGHT: 63 IN | DIASTOLIC BLOOD PRESSURE: 100 MMHG | TEMPERATURE: 98.6 F | HEART RATE: 61 BPM
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
## 2022-05-25 DIAGNOSIS — R35.0 URINARY FREQUENCY: ICD-10-CM

## 2022-05-25 DIAGNOSIS — R60.9 EDEMA, UNSPECIFIED TYPE: ICD-10-CM

## 2022-05-25 DIAGNOSIS — I10 ESSENTIAL HYPERTENSION: Primary | ICD-10-CM

## 2022-05-25 DIAGNOSIS — E78.00 HYPERCHOLESTEROLEMIA: ICD-10-CM

## 2022-05-25 DIAGNOSIS — Z78.0 ASYMPTOMATIC POSTMENOPAUSAL ESTROGEN DEFICIENCY: ICD-10-CM

## 2022-05-25 DIAGNOSIS — L30.9 ECZEMA, UNSPECIFIED TYPE: ICD-10-CM

## 2022-05-25 DIAGNOSIS — L85.3 DRY SKIN: ICD-10-CM

## 2022-05-25 DIAGNOSIS — R53.83 OTHER FATIGUE: ICD-10-CM

## 2022-05-25 PROCEDURE — 99214 OFFICE O/P EST MOD 30 MIN: CPT | Performed by: FAMILY MEDICINE

## 2022-05-25 RX ORDER — MIRABEGRON 25 MG/1
25 TABLET, FILM COATED, EXTENDED RELEASE ORAL DAILY
Qty: 90 TABLET | Refills: 3 | Status: SHIPPED | OUTPATIENT
Start: 2022-05-25

## 2022-05-25 RX ORDER — WARFARIN SODIUM 5 MG/1
TABLET ORAL
Qty: 90 TABLET | Refills: 3 | Status: SHIPPED | OUTPATIENT
Start: 2022-05-25

## 2022-05-25 RX ORDER — PREDNISONE 10 MG/1
TABLET ORAL
Qty: 26 TABLET | Refills: 0 | Status: SHIPPED | OUTPATIENT
Start: 2022-05-25 | End: 2022-06-10 | Stop reason: ALTCHOICE

## 2022-06-01 NOTE — PROGRESS NOTES
Patient ID: Ochoa Carter is a 78 y o  female  HPI: 78 y  o female presents for follow up , edema and hypercholesterolemia  Pt denies any dizziness,  chest pain, palpitations, or dypsnea with exertion  She is having a flair up of eczema and experiencing urinary frequency  She is also experiencing dry skin  SUBJECTIVE    Family History   Problem Relation Age of Onset    No Known Problems Mother     Diabetes Father     Stroke Father         syndrome     Social History     Socioeconomic History    Marital status:      Spouse name: Not on file    Number of children: Not on file    Years of education: Not on file    Highest education level: Not on file   Occupational History    Not on file   Tobacco Use    Smoking status: Former Smoker     Quit date:      Years since quittin 4    Smokeless tobacco: Never Used   Vaping Use    Vaping Use: Never used   Substance and Sexual Activity    Alcohol use: No    Drug use: Never    Sexual activity: Not Currently     Partners: Male     Birth control/protection: Post-menopausal   Other Topics Concern    Not on file   Social History Narrative    Daily coffee consumption (__cups/day)     Daily cola consumption (__cans/day)     Daily tea consumption (__cups/day)      Social Determinants of Health     Financial Resource Strain: Not on file   Food Insecurity: No Food Insecurity    Worried About Running Out of Food in the Last Year: Never true    Krishan of Food in the Last Year: Never true   Transportation Needs: No Transportation Needs    Lack of Transportation (Medical): No    Lack of Transportation (Non-Medical):  No   Physical Activity: Not on file   Stress: Not on file   Social Connections: Not on file   Intimate Partner Violence: Not on file   Housing Stability: Not on file     Past Medical History:   Diagnosis Date    Atrial fibrillation ([facility] [address] )     Hypertension     Insect bite of scalp 2021    Nasal dryness 12/15/2021    UTI (urinary tract infection)      Past Surgical History:   Procedure Laterality Date    EYE SURGERY      JOINT REPLACEMENT Right     Knee 11/15/19, Hip 8/2021     Allergies   Allergen Reactions    Ciprofloxacin      Chest discomfort and dizziness    Diclofenac     Diphenhydramine        Current Outpatient Medications:     acetaminophen (TYLENOL) 325 mg tablet, Take 3 tablets (975 mg total) by mouth every 8 (eight) hours, Disp: 30 tablet, Rfl: 0    ALPRAZolam (XANAX) 0 25 mg tablet, take 1 tablet by mouth three times a day and if needed for anxiety, Disp: 90 tablet, Rfl: 3    amLODIPine (NORVASC) 5 mg tablet, TAKE 1 TABLET EVERY DAY, Disp: 90 tablet, Rfl: 0    b complex vitamins tablet, Take 1 tablet by mouth daily, Disp: , Rfl:     Cholecalciferol (VITAMIN D3) 50 MCG (2000 UT) capsule, Take 2,000 Units by mouth daily, Disp: , Rfl:     COLLAGEN PO, Take by mouth, Disp: , Rfl:     estradiol (ESTRACE VAGINAL) 0 1 mg/g vaginal cream, Insert 1 g into the vagina daily, Disp: 42 5 g, Rfl: 3    furosemide (LASIX) 20 mg tablet, Take 1 tablet (20 mg total) by mouth daily, Disp: 90 tablet, Rfl: 3    lisinopril (ZESTRIL) 5 mg tablet, Take 1 tablet (5 mg total) by mouth daily, Disp: 90 tablet, Rfl: 0    Melatonin 3 MG CAPS, Take 3 mg by mouth  , Disp: , Rfl:     metoprolol tartrate (LOPRESSOR) 25 mg tablet, Take 1 tablet (25 mg total) by mouth 2 (two) times a day, Disp: 180 tablet, Rfl: 3    Mirabegron ER (Myrbetriq) 25 MG TB24, Take 25 mg by mouth daily, Disp: 90 tablet, Rfl: 3    Multiple Vitamins-Minerals (PRESERVISION AREDS PO), Take by mouth, Disp: , Rfl:     oxymetazoline (AFRIN) 0 05 % nasal spray, 2 sprays by Each Nare route 2 (two) times a day for 3 days As need for recurrent nose bleeds, please do not take for more than 3 days, Disp: 15 mL, Rfl: 0    pantoprazole (PROTONIX) 40 mg tablet, take 1 tablet by mouth once daily, Disp: 60 tablet, Rfl: 3    predniSONE 10 mg tablet, 3 tabs po bid x2 days, then 2 tabs po bid x2 days, then 1 tab bid x2 days, then 1 daily until done , Disp: 26 tablet, Rfl: 0    warfarin (COUMADIN) 5 mg tablet, 1 daily, Disp: 90 tablet, Rfl: 3    Review of Systems  Constitutional:     Denies fever, chills  ,weakness ,weight loss, weight gain  +fatigue   ENT: Denies earache ,loss of hearing ,nosebleed, nasal discharge,nasal congestion ,sore throat ,hoarseness  Pulmonary: Denies shortness of breath ,cough  ,dyspnea on exertion, orthopnea  ,PND   Cardiovascular:  Denies bradycardia , tachycardia  ,palpations, lower extremity edema leg, claudication  Breast:  Denies new or changing breast lumps ,nipple discharge ,nipple changes  Abdomen:  Denies abdominal pain , anorexia , indigestion, nausea, vomiting, constipation, diarrhea  Musculoskeletal: Denies myalgias, arthralgias, joint swelling, joint stiffness , limb pain, limb swelling  Gu: denies dysuria,+ chronic  polyuria  Skin: + erythematous, scaling  skin rash, + itching, +dry skin  Neuro: Denies headache, numbness, tingling, confusion, loss of consciousness, dizziness, vertigo  Psychiatric: Denies feelings of depression, suicidal ideation, anxiety, sleep disturbances    OBJECTIVE  /100   Pulse 61   Temp 98 6 °F (37 °C)   Ht 5' 3" (1 6 m)   Wt 86 2 kg (190 lb)   SpO2 97%   BMI 33 66 kg/m²   Constitutional:   NAD, well appearing and well nourished      ENT:   Conjunctiva and lids: no injection, edema, or discharge     Pupils and iris: REENA bilaterally    External inspection of ears and nose: normal without deformities or discharge  Otoscopic exam: Canals patent without erythema  Nasal mucosa, septum and turbinates: Normal or edema or discharge         Oropharynx:  Moist mucosa, normal tongue and tonsils without lesions  No erythema        Pulmonary:Respiratory effort normal rate and rhythm, no increased work of breathing   Auscultation of lungs:  Clear bilaterally with no adventitious breath sounds       Cardiovascular: regular rate and rhythm, S1 and S2, no murmur, no edema and/or varicosities of LE      Abdomen: Soft and non-distended     Positive bowel sounds      No heptomegaly or splenomegaly      Gu: no suprapubic tenderness or CVA tenderness, no urethral discharge  Lymphatic:  No anterior or posterior cervical lymphadenopathy         Musculoskeletal:  Gait and station: Normal gait      Digits and nails normal without clubbing or cyanosis       Inspection/palpation of joints, bones, and muscles:  No joint tenderness, swelling, full active and passive range of motion       Skin: Normal skin turgor and erythematous, scaling skin on extremities     Neuro:      Normal reflexes     Psych:   alert and oriented to person, place and time     normal mood and affect       Assessment/Plan:Diagnoses and all orders for this visit:    Essential hypertension  Comments:  Continue beta blocker therpay  Orders:  -     Comprehensive metabolic panel; Future    Hypercholesterolemia  Comments:  Continue fish oil therpay  Orders:  -     Lipid Panel with Direct LDL reflex; Future    Dry skin    Eczema, unspecified type  -     predniSONE 10 mg tablet; 3 tabs po bid x2 days, then 2 tabs po bid x2 days, then 1 tab bid x2 days, then 1 daily until done  Asymptomatic postmenopausal estrogen deficiency  -     DXA bone density spine hip and pelvis; Future    Other fatigue  -     TSH, 3rd generation; Future    Edema, unspecified type  Comments:  Continue prn lasix  Orders:  -     NT-BNP PRO; Future    Urinary frequency  Comments:  Trial of mitrabique therpay  Orders:  -     Mirabegron ER (Myrbetriq) 25 MG TB24; Take 25 mg by mouth daily    Fall, initial encounter  -     warfarin (COUMADIN) 5 mg tablet; 1 daily    Other orders  -     COLLAGEN PO; Take by mouth    I will see patient back in 4 mos or sooner prn

## 2022-06-02 ENCOUNTER — TELEPHONE (OUTPATIENT)
Dept: FAMILY MEDICINE CLINIC | Facility: CLINIC | Age: 82
End: 2022-06-02

## 2022-06-09 ENCOUNTER — TELEPHONE (OUTPATIENT)
Dept: FAMILY MEDICINE CLINIC | Facility: CLINIC | Age: 82
End: 2022-06-09

## 2022-06-09 NOTE — TELEPHONE ENCOUNTER
Warfarin does not do that  There are actual and reported side effects  Someone in the test group experienced this and therefore its listed but not an actual side effect    I would like to see her on Friday at 1015

## 2022-06-09 NOTE — TELEPHONE ENCOUNTER
Patient states that her rash is back  It is on her face and her chin  Patient is asking if the rash can be caused by warfarin  She states that the paper that comes with the warfarin, states that "peeling of the skin" could be a side effect  She is asking if there is anything she can do to make this go away? Please advise

## 2022-06-10 ENCOUNTER — OFFICE VISIT (OUTPATIENT)
Dept: FAMILY MEDICINE CLINIC | Facility: CLINIC | Age: 82
End: 2022-06-10
Payer: MEDICARE

## 2022-06-10 VITALS
WEIGHT: 186.8 LBS | HEIGHT: 63 IN | BODY MASS INDEX: 33.1 KG/M2 | OXYGEN SATURATION: 95 % | TEMPERATURE: 97.1 F | HEART RATE: 60 BPM

## 2022-06-10 DIAGNOSIS — L30.9 ECZEMA, UNSPECIFIED TYPE: Primary | ICD-10-CM

## 2022-06-10 PROCEDURE — 99213 OFFICE O/P EST LOW 20 MIN: CPT | Performed by: FAMILY MEDICINE

## 2022-06-10 RX ORDER — PIMECROLIMUS 10 MG/G
CREAM TOPICAL 2 TIMES DAILY
Qty: 30 G | Refills: 3 | Status: SHIPPED | OUTPATIENT
Start: 2022-06-10

## 2022-06-10 NOTE — PROGRESS NOTES
Patient ID: Leonarda Sanchez is a 78 y o  female  HPI: 78 y  o female presents for evaluation of eczema on the face  She had a course of oral steroids stat she took for her eczema and it cleared and came back  She has been moisturizing her face  SUBJECTIVE    Family History   Problem Relation Age of Onset    No Known Problems Mother     Diabetes Father     Stroke Father         syndrome     Social History     Socioeconomic History    Marital status:      Spouse name: Not on file    Number of children: Not on file    Years of education: Not on file    Highest education level: Not on file   Occupational History    Not on file   Tobacco Use    Smoking status: Former Smoker     Quit date:      Years since quittin 4    Smokeless tobacco: Never Used   Vaping Use    Vaping Use: Never used   Substance and Sexual Activity    Alcohol use: No    Drug use: Never    Sexual activity: Not Currently     Partners: Male     Birth control/protection: Post-menopausal   Other Topics Concern    Not on file   Social History Narrative    Daily coffee consumption (__cups/day)     Daily cola consumption (__cans/day)     Daily tea consumption (__cups/day)      Social Determinants of Health     Financial Resource Strain: Not on file   Food Insecurity: No Food Insecurity    Worried About Running Out of Food in the Last Year: Never true    Krishan of Food in the Last Year: Never true   Transportation Needs: No Transportation Needs    Lack of Transportation (Medical): No    Lack of Transportation (Non-Medical):  No   Physical Activity: Not on file   Stress: Not on file   Social Connections: Not on file   Intimate Partner Violence: Not on file   Housing Stability: Not on file     Past Medical History:   Diagnosis Date    Atrial fibrillation ([facility] [address] )     Hypertension     Insect bite of scalp 2021    Nasal dryness 12/15/2021    UTI (urinary tract infection)      Past Surgical History:   Procedure Laterality Date    EYE SURGERY      JOINT REPLACEMENT Right     Knee 11/15/19, Hip 8/2021     Allergies   Allergen Reactions    Ciprofloxacin      Chest discomfort and dizziness    Diclofenac     Diphenhydramine        Current Outpatient Medications:     acetaminophen (TYLENOL) 325 mg tablet, Take 3 tablets (975 mg total) by mouth every 8 (eight) hours, Disp: 30 tablet, Rfl: 0    ALPRAZolam (XANAX) 0 25 mg tablet, take 1 tablet by mouth three times a day and if needed for anxiety, Disp: 90 tablet, Rfl: 3    amLODIPine (NORVASC) 5 mg tablet, TAKE 1 TABLET EVERY DAY, Disp: 90 tablet, Rfl: 0    b complex vitamins tablet, Take 1 tablet by mouth daily, Disp: , Rfl:     Cholecalciferol (VITAMIN D3) 50 MCG (2000 UT) capsule, Take 2,000 Units by mouth daily, Disp: , Rfl:     COLLAGEN PO, Take by mouth, Disp: , Rfl:     estradiol (ESTRACE VAGINAL) 0 1 mg/g vaginal cream, Insert 1 g into the vagina daily, Disp: 42 5 g, Rfl: 3    furosemide (LASIX) 20 mg tablet, Take 1 tablet (20 mg total) by mouth daily, Disp: 90 tablet, Rfl: 3    lisinopril (ZESTRIL) 5 mg tablet, Take 1 tablet (5 mg total) by mouth daily, Disp: 90 tablet, Rfl: 0    Melatonin 3 MG CAPS, Take 3 mg by mouth  , Disp: , Rfl:     Mirabegron ER (Myrbetriq) 25 MG TB24, Take 25 mg by mouth daily, Disp: 90 tablet, Rfl: 3    Multiple Vitamins-Minerals (PRESERVISION AREDS PO), Take by mouth, Disp: , Rfl:     pantoprazole (PROTONIX) 40 mg tablet, take 1 tablet by mouth once daily, Disp: 60 tablet, Rfl: 3    pimecrolimus (ELIDEL) 1 % cream, Apply topically 2 (two) times a day, Disp: 30 g, Rfl: 3    warfarin (COUMADIN) 5 mg tablet, 1 daily, Disp: 90 tablet, Rfl: 3    metoprolol tartrate (LOPRESSOR) 25 mg tablet, Take 1 tablet (25 mg total) by mouth 2 (two) times a day, Disp: 180 tablet, Rfl: 3    oxymetazoline (AFRIN) 0 05 % nasal spray, 2 sprays by Each Nare route 2 (two) times a day for 3 days As need for recurrent nose bleeds, please do not take for more than 3 days, Disp: 15 mL, Rfl: 0    Review of Systems  Constitutional:     Denies fever, chills ,fatigue ,weakness ,weight loss, weight gain     ENT: Denies earache ,loss of hearing ,nosebleed, nasal discharge,nasal congestion ,sore throat ,hoarseness  Pulmonary: Denies shortness of breath ,cough  ,dyspnea on exertion, orthopnea  ,PND   Cardiovascular:  Denies bradycardia , tachycardia  ,palpations, lower extremity edema leg, claudication  Breast:  Denies new or changing breast lumps ,nipple discharge ,nipple changes  Abdomen:  Denies abdominal pain , anorexia , indigestion, nausea, vomiting, constipation, diarrhea  Musculoskeletal: Denies myalgias, arthralgias, joint swelling, joint stiffness , limb pain, limb swelling  Gu: denies dysuria, polyuria  Skin: Denies skin rash, skin lesion, skin wound,  Red, scaling itching dry skin on face  Neuro: Denies headache, numbness, tingling, confusion, loss of consciousness, dizziness, vertigo  Psychiatric: Denies feelings of depression, suicidal ideation, anxiety, sleep disturbances    OBJECTIVE  Pulse 60   Temp (!) 97 1 °F (36 2 °C)   Ht 5' 3" (1 6 m)   Wt 84 7 kg (186 lb 12 8 oz)   SpO2 95%   BMI 33 09 kg/m²   Constitutional:   NAD, well appearing and well nourished      ENT:   Conjunctiva and lids: no injection, edema, or discharge     Pupils and iris: REENA bilaterally    External inspection of ears and nose: normal without deformities or discharge  Otoscopic exam: Canals patent without erythema  Nasal mucosa, septum and turbinates: Normal or edema or discharge         Oropharynx:  Moist mucosa, normal tongue and tonsils without lesions  No erythema        Pulmonary:Respiratory effort normal rate and rhythm, no increased work of breathing   Auscultation of lungs:  Clear bilaterally with no adventitious breath sounds       Cardiovascular: regular rate and rhythm, S1 and S2, no murmur, no edema and/or varicosities of LE      Abdomen: Soft and non-distended     Positive bowel sounds      No heptomegaly or splenomegaly      Gu: no suprapubic tenderness or CVA tenderness, no urethral discharge  Lymphatic:  No anterior or posterior cervical lymphadenopathy         Musculoskeletal:  Gait and station: Normal gait      Digits and nails normal without clubbing or cyanosis       Inspection/palpation of joints, bones, and muscles:  No joint tenderness, swelling, full active and passive range of motion       Skin: Normal skin turgor and erythematous scaling rash in folds of her nose, forehead and chin area as well as under left eye    Neuro:    Normal reflexes     Psych:   alert and oriented to person, place and time     normal mood and affect       Assessment/Plan:Diagnoses and all orders for this visit:    Eczema, unspecified type  Comments:  Continue eucerin tx  Orders:  -     pimecrolimus (ELIDEL) 1 % cream; Apply topically 2 (two) times a day        Reviewed with patient plan to treat with above plan      Patient instructed to call in 72 hours if not feeling better or if symptoms worsen

## 2022-06-16 ENCOUNTER — TELEPHONE (OUTPATIENT)
Dept: FAMILY MEDICINE CLINIC | Facility: CLINIC | Age: 82
End: 2022-06-16

## 2022-06-16 ENCOUNTER — TELEPHONE (OUTPATIENT)
Dept: LAB | Facility: HOSPITAL | Age: 82
End: 2022-06-16

## 2022-06-16 NOTE — TELEPHONE ENCOUNTER
Home health nurse called to ask if it would be possible to coordinate the [facility] to come out and take the labs ordered for this patient    She usually does the INR and has a bit of a hard time with that, so she is not sure how she would get multiple tests drawn   She states patient does not have the best veins to work with

## 2022-06-16 NOTE — TELEPHONE ENCOUNTER
Spoke with patient to let her know [facility] now will be calling her to set up blood work appointment  Yasmin Ramos is fine with this     [facility] and they will call her to set up appointment

## 2022-06-29 ENCOUNTER — APPOINTMENT (OUTPATIENT)
Dept: LAB | Facility: HOSPITAL | Age: 82
End: 2022-06-29
Attending: FAMILY MEDICINE
Payer: MEDICARE

## 2022-06-29 ENCOUNTER — ANTICOAG VISIT (OUTPATIENT)
Dept: FAMILY MEDICINE CLINIC | Facility: CLINIC | Age: 82
End: 2022-06-29

## 2022-06-29 ENCOUNTER — TELEPHONE (OUTPATIENT)
Dept: LAB | Facility: HOSPITAL | Age: 82
End: 2022-06-29

## 2022-06-29 DIAGNOSIS — I10 ESSENTIAL HYPERTENSION: ICD-10-CM

## 2022-06-29 DIAGNOSIS — R60.9 EDEMA, UNSPECIFIED TYPE: ICD-10-CM

## 2022-06-29 DIAGNOSIS — R79.1 ELEVATED INR: ICD-10-CM

## 2022-06-29 DIAGNOSIS — R53.83 OTHER FATIGUE: ICD-10-CM

## 2022-06-29 DIAGNOSIS — E78.00 HYPERCHOLESTEROLEMIA: ICD-10-CM

## 2022-06-29 LAB
ALBUMIN SERPL BCP-MCNC: 3.6 G/DL (ref 3.5–5)
ALP SERPL-CCNC: 116 U/L (ref 46–116)
ALT SERPL W P-5'-P-CCNC: 18 U/L (ref 12–78)
ANION GAP SERPL CALCULATED.3IONS-SCNC: 3 MMOL/L (ref 4–13)
AST SERPL W P-5'-P-CCNC: 16 U/L (ref 5–45)
BILIRUB SERPL-MCNC: 0.52 MG/DL (ref 0.2–1)
BUN SERPL-MCNC: 34 MG/DL (ref 5–25)
CALCIUM SERPL-MCNC: 9.1 MG/DL (ref 8.3–10.1)
CHLORIDE SERPL-SCNC: 110 MMOL/L (ref 100–108)
CHOLEST SERPL-MCNC: 172 MG/DL
CO2 SERPL-SCNC: 28 MMOL/L (ref 21–32)
CREAT SERPL-MCNC: 0.81 MG/DL (ref 0.6–1.3)
GFR SERPL CREATININE-BSD FRML MDRD: 67 ML/MIN/1.73SQ M
GLUCOSE SERPL-MCNC: 95 MG/DL (ref 65–140)
HDLC SERPL-MCNC: 56 MG/DL
LDLC SERPL CALC-MCNC: 100 MG/DL (ref 0–100)
NT-PROBNP SERPL-MCNC: 554 PG/ML
POTASSIUM SERPL-SCNC: 4.3 MMOL/L (ref 3.5–5.3)
PROT SERPL-MCNC: 7.2 G/DL (ref 6.4–8.2)
SODIUM SERPL-SCNC: 141 MMOL/L (ref 136–145)
TRIGL SERPL-MCNC: 82 MG/DL
TSH SERPL DL<=0.05 MIU/L-ACNC: 1.88 UIU/ML (ref 0.45–4.5)

## 2022-06-29 PROCEDURE — 84443 ASSAY THYROID STIM HORMONE: CPT

## 2022-06-29 PROCEDURE — 83880 ASSAY OF NATRIURETIC PEPTIDE: CPT

## 2022-06-29 PROCEDURE — 80061 LIPID PANEL: CPT

## 2022-06-29 PROCEDURE — 80053 COMPREHEN METABOLIC PANEL: CPT

## 2022-06-29 NOTE — TELEPHONE ENCOUNTER
6/29 - called office - Daryn Mendoza not available - told her we could go to pt next Wed 7/6 - we do not have anyone in that area on Friday - they will call us back and let us know if that is OK

## 2022-06-30 ENCOUNTER — HOSPITAL ENCOUNTER (OUTPATIENT)
Dept: RADIOLOGY | Age: 82
Discharge: HOME | End: 2022-06-30
Payer: MEDICARE

## 2022-06-30 DIAGNOSIS — S72.001A CLOSED RIGHT HIP FRACTURE, INITIAL ENCOUNTER (HCC): ICD-10-CM

## 2022-06-30 DIAGNOSIS — E28.319 ASYMPTOMATIC PREMATURE MENOPAUSE: ICD-10-CM

## 2022-06-30 PROCEDURE — 77080 DXA BONE DENSITY AXIAL: CPT

## 2022-07-01 ENCOUNTER — APPOINTMENT (OUTPATIENT)
Dept: LAB | Facility: CLINIC | Age: 82
End: 2022-07-01
Payer: MEDICARE

## 2022-07-01 ENCOUNTER — ANTICOAG VISIT (OUTPATIENT)
Dept: FAMILY MEDICINE CLINIC | Facility: CLINIC | Age: 82
End: 2022-07-01

## 2022-07-08 ENCOUNTER — APPOINTMENT (OUTPATIENT)
Dept: LAB | Facility: CLINIC | Age: 82
End: 2022-07-08
Payer: MEDICARE

## 2022-07-08 ENCOUNTER — TELEPHONE (OUTPATIENT)
Dept: LAB | Facility: HOSPITAL | Age: 82
End: 2022-07-08

## 2022-07-08 ENCOUNTER — ANTICOAG VISIT (OUTPATIENT)
Dept: FAMILY MEDICINE CLINIC | Facility: CLINIC | Age: 82
End: 2022-07-08

## 2022-07-08 NOTE — PROGRESS NOTES
Ambulatory Anticoagulation:    Patient is currently taking warfarin for anticoagulation  her INR goal is 2 0-3 0   her latest INR is 2 41  Lab Results   Component Value Date    INR 2 41 (H) 07/08/2022    INR 2 21 (H) 07/01/2022    INR 4 26 (H) 06/29/2022     According to the ACCP Guidelines, I recommend the following regarding her warfarin dosing:    Therapeutic INR 2 0-3 0: No change to current dosing, recheck INR in 4 weeks    Please call patient and review plan with the patient/caregiver

## 2022-08-03 ENCOUNTER — ANTICOAG VISIT (OUTPATIENT)
Dept: FAMILY MEDICINE CLINIC | Facility: CLINIC | Age: 82
End: 2022-08-03

## 2022-08-03 ENCOUNTER — TELEPHONE (OUTPATIENT)
Dept: LAB | Facility: HOSPITAL | Age: 82
End: 2022-08-03

## 2022-08-03 ENCOUNTER — APPOINTMENT (OUTPATIENT)
Dept: LAB | Facility: HOSPITAL | Age: 82
End: 2022-08-03
Attending: FAMILY MEDICINE
Payer: MEDICARE

## 2022-08-03 DIAGNOSIS — I10 ESSENTIAL HYPERTENSION: ICD-10-CM

## 2022-08-03 DIAGNOSIS — I48.91 ATRIAL FIBRILLATION, UNSPECIFIED TYPE (HCC): ICD-10-CM

## 2022-08-03 LAB
INR PPP: 3.58 (ref 0.84–1.19)
PROTHROMBIN TIME: 36.3 SECONDS (ref 11.6–14.5)

## 2022-08-03 PROCEDURE — 36415 COLL VENOUS BLD VENIPUNCTURE: CPT

## 2022-08-03 PROCEDURE — 85610 PROTHROMBIN TIME: CPT

## 2022-08-03 RX ORDER — LISINOPRIL 5 MG/1
TABLET ORAL
Qty: 90 TABLET | Refills: 0 | Status: SHIPPED | OUTPATIENT
Start: 2022-08-03

## 2022-08-05 ENCOUNTER — APPOINTMENT (OUTPATIENT)
Dept: LAB | Facility: HOSPITAL | Age: 82
End: 2022-08-05
Attending: FAMILY MEDICINE
Payer: MEDICARE

## 2022-08-05 ENCOUNTER — NURSE TRIAGE (OUTPATIENT)
Dept: OTHER | Facility: OTHER | Age: 82
End: 2022-08-05

## 2022-08-05 ENCOUNTER — ANTICOAG VISIT (OUTPATIENT)
Dept: FAMILY MEDICINE CLINIC | Facility: CLINIC | Age: 82
End: 2022-08-05

## 2022-08-05 ENCOUNTER — TELEPHONE (OUTPATIENT)
Dept: LAB | Facility: HOSPITAL | Age: 82
End: 2022-08-05

## 2022-08-05 NOTE — TELEPHONE ENCOUNTER
Regarding: medication question  ----- Message from Andrea Solano sent at 8/5/2022  5:34 PM EDT -----  " I was told not to take my warfarin on Thursday and Friday until my INR results would come in   I am not sure if I should take it for the weekend "

## 2022-08-05 NOTE — TELEPHONE ENCOUNTER
Reason for Disposition   Caller has medicine question only, adult not sick, AND triager answers question    Answer Assessment - Initial Assessment Questions  1  NAME of MEDICATION: "What medicine are you calling about?"      Warfarin     2  QUESTION: "What is your question?" (e g , medication refill, side effect)      "Not sure how much I am supposed to take today based off of my INR from today"    3  PRESCRIBING HCP: "Who prescribed it?" Reason: if prescribed by specialist, call should be referred to that group  Dr Dee Almonte     4  SYMPTOMS: "Do you have any symptoms?"      Denies    5   SEVERITY: If symptoms are present, ask "Are they mild, moderate or severe?"     N/A    6  PREGNANCY:  "Is there any chance that you are pregnant?" "When was your last menstrual period?"     N/A    Protocols used: [facility]

## 2022-08-05 NOTE — TELEPHONE ENCOUNTER
Went over Warfarin therapy Instructions based on patients current INR from today  Pt verbalized understanding

## 2022-09-02 ENCOUNTER — APPOINTMENT (OUTPATIENT)
Dept: LAB | Facility: HOSPITAL | Age: 82
End: 2022-09-02
Attending: FAMILY MEDICINE
Payer: MEDICARE

## 2022-09-02 ENCOUNTER — TELEPHONE (OUTPATIENT)
Dept: LAB | Facility: HOSPITAL | Age: 82
End: 2022-09-02

## 2022-09-02 ENCOUNTER — ANTICOAG VISIT (OUTPATIENT)
Dept: FAMILY MEDICINE CLINIC | Facility: CLINIC | Age: 82
End: 2022-09-02

## 2022-09-02 DIAGNOSIS — I50.9 CONGESTIVE HEART FAILURE, UNSPECIFIED HF CHRONICITY, UNSPECIFIED HEART FAILURE TYPE (HCC): ICD-10-CM

## 2022-09-02 LAB — NT-PROBNP SERPL-MCNC: 675 PG/ML

## 2022-09-02 PROCEDURE — 83880 ASSAY OF NATRIURETIC PEPTIDE: CPT

## 2022-09-07 ENCOUNTER — TELEPHONE (OUTPATIENT)
Dept: LAB | Facility: HOSPITAL | Age: 82
End: 2022-09-07

## 2022-09-07 ENCOUNTER — TELEPHONE (OUTPATIENT)
Dept: FAMILY MEDICINE CLINIC | Facility: CLINIC | Age: 82
End: 2022-09-07

## 2022-09-07 ENCOUNTER — ANTICOAG VISIT (OUTPATIENT)
Dept: FAMILY MEDICINE CLINIC | Facility: CLINIC | Age: 82
End: 2022-09-07

## 2022-09-07 ENCOUNTER — APPOINTMENT (OUTPATIENT)
Dept: LAB | Facility: HOSPITAL | Age: 82
End: 2022-09-07
Attending: FAMILY MEDICINE
Payer: MEDICARE

## 2022-09-07 DIAGNOSIS — D64.9 ANEMIA, UNSPECIFIED TYPE: ICD-10-CM

## 2022-09-07 DIAGNOSIS — I48.0 PAROXYSMAL ATRIAL FIBRILLATION (HCC): ICD-10-CM

## 2022-09-07 DIAGNOSIS — I10 ESSENTIAL HYPERTENSION: ICD-10-CM

## 2022-09-07 LAB
ANION GAP SERPL CALCULATED.3IONS-SCNC: 9 MMOL/L (ref 4–13)
BUN SERPL-MCNC: 28 MG/DL (ref 5–25)
CALCIUM SERPL-MCNC: 9.4 MG/DL (ref 8.3–10.1)
CHLORIDE SERPL-SCNC: 108 MMOL/L (ref 96–108)
CO2 SERPL-SCNC: 22 MMOL/L (ref 21–32)
CREAT SERPL-MCNC: 0.92 MG/DL (ref 0.6–1.3)
GFR SERPL CREATININE-BSD FRML MDRD: 57 ML/MIN/1.73SQ M
GLUCOSE SERPL-MCNC: 95 MG/DL (ref 65–140)
HGB BLD-MCNC: 12.2 G/DL (ref 11.5–15.4)
INR PPP: 1.28 (ref 0.84–1.19)
POTASSIUM SERPL-SCNC: 4.1 MMOL/L (ref 3.5–5.3)
PROTHROMBIN TIME: 16.2 SECONDS (ref 11.6–14.5)
SODIUM SERPL-SCNC: 139 MMOL/L (ref 135–147)

## 2022-09-07 PROCEDURE — 85610 PROTHROMBIN TIME: CPT

## 2022-09-07 PROCEDURE — 80048 BASIC METABOLIC PNL TOTAL CA: CPT

## 2022-09-07 PROCEDURE — 36415 COLL VENOUS BLD VENIPUNCTURE: CPT

## 2022-09-07 PROCEDURE — 85018 HEMOGLOBIN: CPT

## 2022-09-07 NOTE — TELEPHONE ENCOUNTER
Patient daughter called again, stating that she is very concerned about her mother not giving all of the information at her appointment tomorrow because she has been forgetting a lot lately  She states that patient has "blood bleeders" in her left eye around the iris  She states it does not look like wet blood, just red around the iris  She states that when she spoke with patient, she denied any change of vision  I explained that we do not have daughter on the medical communication consent and can not discuss her health information with her without her consent  She states that she would just like for Dr James to be aware of this information, so it can be discussed when patient's other daughter brings her in for her appointment tomorrow

## 2022-09-07 NOTE — TELEPHONE ENCOUNTER
----- Message from Rocio Marie sent at 9/7/2022 11:26 AM EDT -----  Regarding: FW: NT_proBNP Sherrie Him 9/2    ----- Message -----  From: Tequila Prado  Sent: 9/7/2022  10:59 AM EDT  To: [facility]  Subject: NT_proBNP Sherrie Him 9/2                     I see the BNP for my mother was 54 from the 9/2 blood test  I heard part of the conversation when your office called re:results  Although she said she is not experiencing shortness of breath, she in fact had visible  swelling in both legs/calves around 7/9  I did NOT see  visible swelling early May  And no longer saw swelling on 9/2  The PT from [address] [place] [address] saw the swelling and suggested a cushion under the legs, as she had 'indentations' from the recliner footrest   She doubled her lasix for a few days in July  I encouraged her to make an appt  to be checked  But she did not  I am sending this    so you have complete info  My mother would be upset if she knew I sent this  Attached is a pic of her legs from July  I am her daughter Liam Souza and  live in [place]  I monitor her MyChart and can be reached at  at any time with any concerns

## 2022-09-08 ENCOUNTER — OFFICE VISIT (OUTPATIENT)
Dept: FAMILY MEDICINE CLINIC | Facility: CLINIC | Age: 82
End: 2022-09-08
Payer: MEDICARE

## 2022-09-08 VITALS
HEART RATE: 64 BPM | WEIGHT: 185 LBS | HEIGHT: 63 IN | DIASTOLIC BLOOD PRESSURE: 80 MMHG | SYSTOLIC BLOOD PRESSURE: 120 MMHG | BODY MASS INDEX: 32.78 KG/M2 | TEMPERATURE: 98.2 F

## 2022-09-08 DIAGNOSIS — R79.89 ELEVATED BRAIN NATRIURETIC PEPTIDE (BNP) LEVEL: Primary | ICD-10-CM

## 2022-09-08 PROCEDURE — 99214 OFFICE O/P EST MOD 30 MIN: CPT | Performed by: NURSE PRACTITIONER

## 2022-09-08 NOTE — PROGRESS NOTES
Assessment/Plan:     Diagnoses and all orders for this visit:    Elevated brain natriuretic peptide (BNP) level      Discussed with patient need to monitor her weight , breathing and keep legs elevated as much as possible  Patient instructed to call if no improvement in 72 hours or symptoms worsen    Subjective:      Patient ID: Shelia Hilario is a 78 y o  female  78 y  o female presenting for a follow-up on elevated BNP  Patient denies shortness of breath or lower extremity edema but her family disagrees  Patient continues to deny shortness of breath and occasionally her leg will be slightly swollen  Patient did not seem short of breath ambulating within the office  Family History   Problem Relation Age of Onset    No Known Problems Mother     Diabetes Father     Stroke Father         syndrome     Social History     Socioeconomic History    Marital status:      Spouse name: Not on file    Number of children: Not on file    Years of education: Not on file    Highest education level: Not on file   Occupational History    Not on file   Tobacco Use    Smoking status: Former Smoker     Quit date:      Years since quittin 7    Smokeless tobacco: Never Used   Vaping Use    Vaping Use: Never used   Substance and Sexual Activity    Alcohol use: No    Drug use: Never    Sexual activity: Not Currently     Partners: Male     Birth control/protection: Post-menopausal   Other Topics Concern    Not on file   Social History Narrative    Daily coffee consumption (__cups/day)     Daily cola consumption (__cans/day)     Daily tea consumption (__cups/day)      Social Determinants of Health     Financial Resource Strain: Not on file   Food Insecurity: No Food Insecurity    Worried About Running Out of Food in the Last Year: Never true    Krishan of Food in the Last Year: Never true   Transportation Needs: No Transportation Needs    Lack of Transportation (Medical):  No    Lack of Transportation (Non-Medical):  No   Physical Activity: Not on file   Stress: Not on file   Social Connections: Not on file   Intimate Partner Violence: Not on file   Housing Stability: Not on file     E-Cigarette/Vaping    E-Cigarette Use Never User      E-Cigarette/Vaping Substances     Past Medical History:   Diagnosis Date    Atrial fibrillation ([facility] [address] )     Hypertension     Insect bite of scalp 12/16/2021    Nasal dryness 12/15/2021    UTI (urinary tract infection)      Past Surgical History:   Procedure Laterality Date    EYE SURGERY      JOINT REPLACEMENT Right     Knee 11/15/19, Hip 8/2021     Allergies   Allergen Reactions    Ciprofloxacin      Chest discomfort and dizziness    Diclofenac     Diphenhydramine        Current Outpatient Medications:     acetaminophen (TYLENOL) 325 mg tablet, Take 3 tablets (975 mg total) by mouth every 8 (eight) hours, Disp: 30 tablet, Rfl: 0    ALPRAZolam (XANAX) 0 25 mg tablet, take 1 tablet by mouth three times a day and if needed for anxiety, Disp: 90 tablet, Rfl: 3    amLODIPine (NORVASC) 5 mg tablet, TAKE 1 TABLET EVERY DAY, Disp: 90 tablet, Rfl: 0    b complex vitamins tablet, Take 1 tablet by mouth daily, Disp: , Rfl:     Cholecalciferol (VITAMIN D3) 50 MCG (2000 UT) capsule, Take 2,000 Units by mouth daily, Disp: , Rfl:     COLLAGEN PO, Take by mouth, Disp: , Rfl:     estradiol (ESTRACE VAGINAL) 0 1 mg/g vaginal cream, Insert 1 g into the vagina daily, Disp: 42 5 g, Rfl: 3    furosemide (LASIX) 20 mg tablet, Take 1 tablet (20 mg total) by mouth daily, Disp: 90 tablet, Rfl: 3    lisinopril (ZESTRIL) 5 mg tablet, TAKE 1 TABLET EVERY DAY, Disp: 90 tablet, Rfl: 0    Melatonin 3 MG CAPS, Take 3 mg by mouth  , Disp: , Rfl:     Mirabegron ER (Myrbetriq) 25 MG TB24, Take 25 mg by mouth daily, Disp: 90 tablet, Rfl: 3    Multiple Vitamins-Minerals (PRESERVISION AREDS PO), Take by mouth, Disp: , Rfl:     pantoprazole (PROTONIX) 40 mg tablet, take 1 tablet by mouth once daily, Disp: 60 tablet, Rfl: 3    pimecrolimus (ELIDEL) 1 % cream, Apply topically 2 (two) times a day, Disp: 30 g, Rfl: 3    warfarin (COUMADIN) 5 mg tablet, 1 daily, Disp: 90 tablet, Rfl: 3    metoprolol tartrate (LOPRESSOR) 25 mg tablet, Take 1 tablet (25 mg total) by mouth 2 (two) times a day, Disp: 180 tablet, Rfl: 3    oxymetazoline (AFRIN) 0 05 % nasal spray, 2 sprays by Each Nare route 2 (two) times a day for 3 days As need for recurrent nose bleeds, please do not take for more than 3 days, Disp: 15 mL, Rfl: 0    Review of Systems   Constitutional: Negative  Respiratory: Negative for cough, chest tightness, shortness of breath and wheezing  Cardiovascular: Positive for leg swelling  Negative for chest pain and palpitations  Musculoskeletal: Negative  Neurological: Negative  Psychiatric/Behavioral: Negative  Objective:    /80   Pulse 64   Temp 98 2 °F (36 8 °C)   Ht 5' 3" (1 6 m)   Wt 83 9 kg (185 lb)   BMI 32 77 kg/m² (Reviewed)     Physical Exam  Vitals reviewed  Constitutional:       General: She is not in acute distress  Appearance: She is well-developed and well-groomed  She is not ill-appearing  HENT:      Head: Normocephalic and atraumatic  Eyes:      General: Lids are normal       Extraocular Movements: Extraocular movements intact  Conjunctiva/sclera: Conjunctivae normal       Pupils: Pupils are equal, round, and reactive to light  Neck:      Trachea: Trachea and phonation normal    Cardiovascular:      Rate and Rhythm: Normal rate and regular rhythm  Pulses:           Dorsalis pedis pulses are 2+ on the right side and 2+ on the left side  Posterior tibial pulses are 2+ on the right side and 2+ on the left side  Heart sounds: Normal heart sounds  Pulmonary:      Effort: Pulmonary effort is normal       Breath sounds: Normal breath sounds  Musculoskeletal:      Right lower leg: Edema ( trace) present        Left lower leg: Edema ( trace) present  Skin:     General: Skin is warm and dry  Capillary Refill: Capillary refill takes less than 2 seconds  Neurological:      General: No focal deficit present  Mental Status: She is alert and oriented to person, place, and time  Psychiatric:         Mood and Affect: Mood normal          Behavior: Behavior normal  Behavior is cooperative

## 2022-09-16 DIAGNOSIS — I10 ESSENTIAL HYPERTENSION: ICD-10-CM

## 2022-09-16 RX ORDER — AMLODIPINE BESYLATE 5 MG/1
TABLET ORAL
Qty: 90 TABLET | Refills: 0 | Status: SHIPPED | OUTPATIENT
Start: 2022-09-16

## 2022-09-21 ENCOUNTER — APPOINTMENT (OUTPATIENT)
Dept: LAB | Facility: HOSPITAL | Age: 82
End: 2022-09-21
Attending: FAMILY MEDICINE
Payer: MEDICARE

## 2022-09-22 ENCOUNTER — TELEPHONE (OUTPATIENT)
Dept: LAB | Facility: HOSPITAL | Age: 82
End: 2022-09-22

## 2022-09-22 ENCOUNTER — ANTICOAG VISIT (OUTPATIENT)
Dept: FAMILY MEDICINE CLINIC | Facility: CLINIC | Age: 82
End: 2022-09-22

## 2022-10-12 PROBLEM — N39.0 UTI (URINARY TRACT INFECTION): Status: RESOLVED | Noted: 2020-09-29 | Resolved: 2022-10-12

## 2022-10-19 ENCOUNTER — RA CDI HCC (OUTPATIENT)
Dept: OTHER | Facility: HOSPITAL | Age: 82
End: 2022-10-19

## 2022-10-19 ENCOUNTER — ANTICOAG VISIT (OUTPATIENT)
Dept: FAMILY MEDICINE CLINIC | Facility: CLINIC | Age: 82
End: 2022-10-19

## 2022-10-19 ENCOUNTER — APPOINTMENT (OUTPATIENT)
Dept: LAB | Facility: HOSPITAL | Age: 82
End: 2022-10-19
Attending: FAMILY MEDICINE
Payer: MEDICARE

## 2022-10-19 ENCOUNTER — TELEPHONE (OUTPATIENT)
Dept: LAB | Facility: HOSPITAL | Age: 82
End: 2022-10-19

## 2022-10-19 NOTE — PROGRESS NOTES
I13 0  [facility]  coding opportunities          Chart Reviewed number of suggestions sent to Provider: 1     Patients Insurance     Medicare Insurance: [employer]

## 2022-10-21 ENCOUNTER — APPOINTMENT (OUTPATIENT)
Dept: LAB | Facility: HOSPITAL | Age: 82
End: 2022-10-21
Attending: FAMILY MEDICINE
Payer: MEDICARE

## 2022-10-21 ENCOUNTER — ANTICOAG VISIT (OUTPATIENT)
Dept: FAMILY MEDICINE CLINIC | Facility: CLINIC | Age: 82
End: 2022-10-21

## 2022-10-22 DIAGNOSIS — I48.0 PAROXYSMAL ATRIAL FIBRILLATION (HCC): ICD-10-CM

## 2022-10-24 RX ORDER — FUROSEMIDE 20 MG/1
TABLET ORAL
Qty: 90 TABLET | Refills: 3 | Status: SHIPPED | OUTPATIENT
Start: 2022-10-24

## 2022-11-08 ENCOUNTER — OFFICE VISIT (OUTPATIENT)
Dept: FAMILY MEDICINE CLINIC | Facility: CLINIC | Age: 82
End: 2022-11-08

## 2022-11-08 VITALS
HEIGHT: 63 IN | TEMPERATURE: 98 F | DIASTOLIC BLOOD PRESSURE: 74 MMHG | HEART RATE: 67 BPM | BODY MASS INDEX: 35.08 KG/M2 | OXYGEN SATURATION: 96 % | SYSTOLIC BLOOD PRESSURE: 124 MMHG | WEIGHT: 198 LBS

## 2022-11-08 DIAGNOSIS — M25.50 ARTHRALGIA, UNSPECIFIED JOINT: Primary | ICD-10-CM

## 2022-11-08 DIAGNOSIS — Z23 NEED FOR VACCINATION: ICD-10-CM

## 2022-11-08 DIAGNOSIS — F41.9 ANXIETY: ICD-10-CM

## 2022-11-08 DIAGNOSIS — I48.91 ATRIAL FIBRILLATION, UNSPECIFIED TYPE (HCC): ICD-10-CM

## 2022-11-08 RX ORDER — ALPRAZOLAM 0.25 MG/1
0.25 TABLET ORAL 3 TIMES DAILY PRN
Qty: 90 TABLET | Refills: 3 | Status: SHIPPED | OUTPATIENT
Start: 2022-11-08

## 2022-11-08 RX ORDER — CELECOXIB 200 MG/1
200 CAPSULE ORAL DAILY
Qty: 30 CAPSULE | Refills: 3 | Status: SHIPPED | OUTPATIENT
Start: 2022-11-08

## 2022-11-09 NOTE — PROGRESS NOTES
Patient ID: Rosio Reina is a 78 y o  female  HPI: 78 y  o female presents for follow up  For afib, anxiety  Both are well controlled, but pt complains of diffuse arthralgia not being touched by tylenol  Since she is on warfarin, seh is limited as to what she can take  Pt denies any dizziness,  chest pain, palpitations, or dypsnea with exertion  SUBJECTIVE    Family History   Problem Relation Age of Onset   • No Known Problems Mother    • Diabetes Father    • Stroke Father         syndrome     Social History     Socioeconomic History   • Marital status:      Spouse name: Not on file   • Number of children: Not on file   • Years of education: Not on file   • Highest education level: Not on file   Occupational History   • Not on file   Tobacco Use   • Smoking status: Former Smoker     Quit date:      Years since quittin 8   • Smokeless tobacco: Never Used   Vaping Use   • Vaping Use: Never used   Substance and Sexual Activity   • Alcohol use: No   • Drug use: Never   • Sexual activity: Not Currently     Partners: Male     Birth control/protection: Post-menopausal   Other Topics Concern   • Not on file   Social History Narrative    Daily coffee consumption (__cups/day)     Daily cola consumption (__cans/day)     Daily tea consumption (__cups/day)      Social Determinants of Health     Financial Resource Strain: Not on file   Food Insecurity: No Food Insecurity   • Worried About Running Out of Food in the Last Year: Never true   • Ran Out of Food in the Last Year: Never true   Transportation Needs: No Transportation Needs   • Lack of Transportation (Medical): No   • Lack of Transportation (Non-Medical):  No   Physical Activity: Not on file   Stress: Not on file   Social Connections: Not on file   Intimate Partner Violence: Not on file   Housing Stability: Not on file     Past Medical History:   Diagnosis Date   • Atrial fibrillation [facility])    • Hypertension    • Insect bite of scalp 12/16/2021   • Nasal dryness 12/15/2021   • UTI (urinary tract infection)      Past Surgical History:   Procedure Laterality Date   • EYE SURGERY     • JOINT REPLACEMENT Right     Knee 11/15/19, Hip 8/2021     Allergies   Allergen Reactions   • Ciprofloxacin      Chest discomfort and dizziness   • Diclofenac    • Diphenhydramine        Current Outpatient Medications:   •  acetaminophen (TYLENOL) 325 mg tablet, Take 3 tablets (975 mg total) by mouth every 8 (eight) hours, Disp: 30 tablet, Rfl: 0  •  ALPRAZolam (XANAX) 0 25 mg tablet, Take 1 tablet (0 25 mg total) by mouth 3 (three) times a day as needed for anxiety, Disp: 90 tablet, Rfl: 3  •  amLODIPine (NORVASC) 5 mg tablet, TAKE 1 TABLET EVERY DAY, Disp: 90 tablet, Rfl: 0  •  b complex vitamins tablet, Take 1 tablet by mouth daily, Disp: , Rfl:   •  celecoxib (CeleBREX) 200 mg capsule, Take 1 capsule (200 mg total) by mouth daily, Disp: 30 capsule, Rfl: 3  •  Cholecalciferol (VITAMIN D3) 50 MCG (2000 UT) capsule, Take 2,000 Units by mouth daily, Disp: , Rfl:   •  COLLAGEN PO, Take by mouth, Disp: , Rfl:   •  estradiol (ESTRACE VAGINAL) 0 1 mg/g vaginal cream, Insert 1 g into the vagina daily, Disp: 42 5 g, Rfl: 3  •  furosemide (LASIX) 20 mg tablet, take 1 tablet by mouth once daily, Disp: 90 tablet, Rfl: 3  •  lisinopril (ZESTRIL) 5 mg tablet, TAKE 1 TABLET EVERY DAY, Disp: 90 tablet, Rfl: 0  •  Melatonin 3 MG CAPS, Take 3 mg by mouth  , Disp: , Rfl:   •  metoprolol tartrate (LOPRESSOR) 25 mg tablet, TAKE 1 TABLET TWICE DAILY, Disp: 180 tablet, Rfl: 3  •  Mirabegron ER (Myrbetriq) 25 MG TB24, Take 25 mg by mouth daily, Disp: 90 tablet, Rfl: 3  •  Multiple Vitamins-Minerals (PRESERVISION AREDS PO), Take by mouth, Disp: , Rfl:   •  pantoprazole (PROTONIX) 40 mg tablet, take 1 tablet by mouth once daily, Disp: 60 tablet, Rfl: 3  •  pimecrolimus (ELIDEL) 1 % cream, Apply topically 2 (two) times a day, Disp: 30 g, Rfl: 3  •  warfarin (COUMADIN) 5 mg tablet, 1 daily, Disp: 90 tablet, Rfl: 3  •  oxymetazoline (AFRIN) 0 05 % nasal spray, 2 sprays by Each Nare route 2 (two) times a day for 3 days As need for recurrent nose bleeds, please do not take for more than 3 days, Disp: 15 mL, Rfl: 0    Review of Systems  Constitutional:     Denies fever, chills ,fatigue ,weakness ,weight loss, weight gain     ENT: Denies earache ,loss of hearing ,nosebleed, nasal discharge,nasal congestion ,sore throat ,hoarseness  Pulmonary: Denies shortness of breath ,cough  ,dyspnea on exertion, orthopnea  ,PND   Cardiovascular:  Denies bradycardia , tachycardia  ,palpations, lower extremity edema leg, claudication  Breast:  Denies new or changing breast lumps ,nipple discharge ,nipple changes  Abdomen:  Denies abdominal pain , anorexia , indigestion, nausea, vomiting, constipation, diarrhea  Musculoskeletal: Denies myalgias, + diffusearthralgias, joint swelling, joint stiffness , limb pain, limb swelling  Gu: denies dysuria, polyuria  Skin: Denies skin rash, skin lesion, skin wound, itching, dry skin  Neuro: Denies headache, numbness, tingling, confusion, loss of consciousness, dizziness, vertigo  Psychiatric: Denies feelings of depression, suicidal ideation, anxiety, sleep disturbances    OBJECTIVE  /74   Pulse 67   Temp 98 °F (36 7 °C)   Ht 5' 3" (1 6 m)   Wt 89 8 kg (198 lb)   SpO2 96%   BMI 35 07 kg/m²   Constitutional:   NAD, well appearing and well nourished      ENT:   Conjunctiva and lids: no injection, edema, or discharge     Pupils and iris: REENA bilaterally    External inspection of ears and nose: normal without deformities or discharge  Otoscopic exam: Canals patent without erythema  Nasal mucosa, septum and turbinates: Normal or edema or discharge         Oropharynx:  Moist mucosa, normal tongue and tonsils without lesions  No erythema        Pulmonary:Respiratory effort normal rate and rhythm, no increased work of breathing   Auscultation of lungs:  Clear bilaterally with no adventitious breath sounds       Cardiovascular: regular rate and irregular rhythm, no  murmur, no edema and/or varicosities of LE      Abdomen: Soft and non-distended     Positive bowel sounds      No heptomegaly or splenomegaly      Gu: no suprapubic tenderness or CVA tenderness, no urethral discharge  Lymphatic:  No anterior or posterior cervical lymphadenopathy         Musculoskeletal:  Gait and station: Normal gait      Digits and nails normal without clubbing or cyanosis       Inspection/palpation of joints, bones, and muscles:  + crepitance of knees bilateally and nodules on fingers distally  Skin: Normal skin turgor and no rashes      Neuro:    Normal reflexes      Psych:   alert and oriented to person, place and time     normal mood and affect       Assessment/Plan:Diagnoses and all orders for this visit:    Arthralgia, unspecified joint  -     celecoxib (CeleBREX) 200 mg capsule; Take 1 capsule (200 mg total) by mouth daily    [facility], [facility]  Comments:  Continuew with rate control and anticoagulation     Anxiety  Comments:  Cotninue with occasional prn xanax therapy  Orders:  -     ALPRAZolam (XANAX) 0 25 mg tablet; Take 1 tablet (0 25 mg total) by mouth 3 (three) times a day as needed for anxiety    Need for vaccination  -     influenza vaccine, high-dose, PF 0 7 mL (FLUZONE HIGH-DOSE)        Reviewed with patient plan to treat with above plan      Patient instructed to call in 72 hours if not feeling better or if symptoms worse

## 2022-11-17 ENCOUNTER — TELEPHONE (OUTPATIENT)
Dept: FAMILY MEDICINE CLINIC | Facility: CLINIC | Age: 82
End: 2022-11-17

## 2022-11-17 ENCOUNTER — TELEPHONE (OUTPATIENT)
Dept: LAB | Facility: HOSPITAL | Age: 82
End: 2022-11-17

## 2022-11-17 NOTE — TELEPHONE ENCOUNTER
Pt called stating she was supposed to get blood drawn yesterday but no one came out?   States someone always comes out to her place to draw blood

## 2022-11-17 NOTE — TELEPHONE ENCOUNTER
Called [facility], they will contact patient  Called patient and apologized  We did not set up redraw   Advised mobile lab will be calling her

## 2022-11-30 ENCOUNTER — APPOINTMENT (OUTPATIENT)
Dept: LAB | Facility: HOSPITAL | Age: 82
End: 2022-11-30
Attending: FAMILY MEDICINE

## 2022-12-01 ENCOUNTER — TELEPHONE (OUTPATIENT)
Dept: LAB | Facility: HOSPITAL | Age: 82
End: 2022-12-01

## 2022-12-01 ENCOUNTER — ANTICOAG VISIT (OUTPATIENT)
Dept: FAMILY MEDICINE CLINIC | Facility: CLINIC | Age: 82
End: 2022-12-01

## 2022-12-03 DIAGNOSIS — D64.9 ANEMIA: ICD-10-CM

## 2022-12-05 RX ORDER — PANTOPRAZOLE SODIUM 40 MG/1
TABLET, DELAYED RELEASE ORAL
Qty: 60 TABLET | Refills: 3 | Status: SHIPPED | OUTPATIENT
Start: 2022-12-05

## 2022-12-28 ENCOUNTER — APPOINTMENT (OUTPATIENT)
Dept: LAB | Facility: HOSPITAL | Age: 82
End: 2022-12-28

## 2022-12-29 ENCOUNTER — TELEPHONE (OUTPATIENT)
Dept: LAB | Facility: HOSPITAL | Age: 82
End: 2022-12-29

## 2022-12-29 ENCOUNTER — ANTICOAG VISIT (OUTPATIENT)
Dept: FAMILY MEDICINE CLINIC | Facility: CLINIC | Age: 82
End: 2022-12-29

## 2023-01-01 ENCOUNTER — HOSPITAL ENCOUNTER (INPATIENT)
Facility: HOSPITAL | Age: 83
LOS: 12 days | Discharge: HOSPICE | DRG: 853 | End: 2023-11-29
Attending: EMERGENCY MEDICINE | Admitting: SURGERY
Payer: MEDICARE

## 2023-01-01 ENCOUNTER — APPOINTMENT (INPATIENT)
Dept: CT IMAGING | Facility: HOSPITAL | Age: 83
DRG: 853 | End: 2023-01-01
Payer: MEDICARE

## 2023-01-01 ENCOUNTER — HOME CARE VISIT (OUTPATIENT)
Dept: HOME HOSPICE | Facility: HOSPICE | Age: 83
End: 2023-01-01
Payer: MEDICARE

## 2023-01-01 ENCOUNTER — TELEPHONE (OUTPATIENT)
Dept: LAB | Facility: HOSPITAL | Age: 83
End: 2023-01-01

## 2023-01-01 ENCOUNTER — HOSPICE ADMISSION (OUTPATIENT)
Dept: HOME HOSPICE | Facility: HOSPICE | Age: 83
End: 2023-01-01
Payer: MEDICARE

## 2023-01-01 ENCOUNTER — HOME CARE VISIT (OUTPATIENT)
Dept: HOME HEALTH SERVICES | Facility: HOME HEALTHCARE | Age: 83
End: 2023-01-01
Payer: MEDICARE

## 2023-01-01 ENCOUNTER — APPOINTMENT (INPATIENT)
Dept: MRI IMAGING | Facility: HOSPITAL | Age: 83
DRG: 853 | End: 2023-01-01
Payer: MEDICARE

## 2023-01-01 ENCOUNTER — ANESTHESIA EVENT (INPATIENT)
Dept: PERIOP | Facility: HOSPITAL | Age: 83
DRG: 853 | End: 2023-01-01
Payer: MEDICARE

## 2023-01-01 ENCOUNTER — APPOINTMENT (EMERGENCY)
Dept: ULTRASOUND IMAGING | Facility: HOSPITAL | Age: 83
DRG: 853 | End: 2023-01-01
Payer: MEDICARE

## 2023-01-01 ENCOUNTER — APPOINTMENT (INPATIENT)
Dept: RADIOLOGY | Facility: HOSPITAL | Age: 83
DRG: 853 | End: 2023-01-01
Payer: MEDICARE

## 2023-01-01 ENCOUNTER — APPOINTMENT (INPATIENT)
Dept: NON INVASIVE DIAGNOSTICS | Facility: HOSPITAL | Age: 83
DRG: 853 | End: 2023-01-01
Payer: MEDICARE

## 2023-01-01 ENCOUNTER — TRANSITIONAL CARE MANAGEMENT (OUTPATIENT)
Dept: FAMILY MEDICINE CLINIC | Facility: CLINIC | Age: 83
End: 2023-01-01

## 2023-01-01 ENCOUNTER — APPOINTMENT (INPATIENT)
Dept: ULTRASOUND IMAGING | Facility: HOSPITAL | Age: 83
DRG: 853 | End: 2023-01-01
Payer: MEDICARE

## 2023-01-01 ENCOUNTER — ANESTHESIA (INPATIENT)
Dept: PERIOP | Facility: HOSPITAL | Age: 83
DRG: 853 | End: 2023-01-01
Payer: MEDICARE

## 2023-01-01 ENCOUNTER — APPOINTMENT (EMERGENCY)
Dept: CT IMAGING | Facility: HOSPITAL | Age: 83
DRG: 853 | End: 2023-01-01
Payer: MEDICARE

## 2023-01-01 VITALS
SYSTOLIC BLOOD PRESSURE: 114 MMHG | DIASTOLIC BLOOD PRESSURE: 66 MMHG | HEIGHT: 63 IN | WEIGHT: 211.2 LBS | BODY MASS INDEX: 37.42 KG/M2 | OXYGEN SATURATION: 94 % | RESPIRATION RATE: 18 BRPM | TEMPERATURE: 97.6 F | HEART RATE: 110 BPM

## 2023-01-01 DIAGNOSIS — C80.0 CARCINOMATOSIS (HCC): Primary | ICD-10-CM

## 2023-01-01 DIAGNOSIS — R47.81 SLURRED SPEECH: ICD-10-CM

## 2023-01-01 DIAGNOSIS — K81.9 CHOLECYSTITIS: ICD-10-CM

## 2023-01-01 DIAGNOSIS — F32.5 MAJOR DEPRESSIVE DISORDER WITH SINGLE EPISODE, IN FULL REMISSION (HCC): ICD-10-CM

## 2023-01-01 DIAGNOSIS — C80.0 CARCINOMATOSIS (HCC): ICD-10-CM

## 2023-01-01 DIAGNOSIS — Z51.5 HOSPICE CARE: Primary | ICD-10-CM

## 2023-01-01 DIAGNOSIS — Z51.5 PALLIATIVE CARE PATIENT: ICD-10-CM

## 2023-01-01 DIAGNOSIS — K82.2 PERFORATED GALLBLADDER: ICD-10-CM

## 2023-01-01 DIAGNOSIS — I50.9 CONGESTIVE HEART FAILURE, UNSPECIFIED HF CHRONICITY, UNSPECIFIED HEART FAILURE TYPE (HCC): ICD-10-CM

## 2023-01-01 DIAGNOSIS — K80.50 CHOLEDOCHOLITHIASIS: ICD-10-CM

## 2023-01-01 DIAGNOSIS — K65.3: ICD-10-CM

## 2023-01-01 DIAGNOSIS — N17.9 AKI (ACUTE KIDNEY INJURY) (HCC): ICD-10-CM

## 2023-01-01 DIAGNOSIS — R52 PAIN: ICD-10-CM

## 2023-01-01 DIAGNOSIS — F41.9 ANXIETY: ICD-10-CM

## 2023-01-01 DIAGNOSIS — N13.30 HYDRONEPHROSIS, UNSPECIFIED HYDRONEPHROSIS TYPE: ICD-10-CM

## 2023-01-01 LAB
4HR DELTA HS TROPONIN: 29 NG/L
ABO GROUP BLD BPU: NORMAL
ABO GROUP BLD: NORMAL
AFP-TM SERPL-MCNC: 1.59 NG/ML (ref 0–9)
ALBUMIN SERPL BCP-MCNC: 2.4 G/DL (ref 3.5–5)
ALBUMIN SERPL BCP-MCNC: 2.4 G/DL (ref 3.5–5)
ALBUMIN SERPL BCP-MCNC: 2.6 G/DL (ref 3.5–5)
ALBUMIN SERPL BCP-MCNC: 2.7 G/DL (ref 3.5–5)
ALBUMIN SERPL BCP-MCNC: 3.2 G/DL (ref 3.5–5)
ALBUMIN SERPL BCP-MCNC: 3.2 G/DL (ref 3.5–5)
ALBUMIN SERPL BCP-MCNC: 3.7 G/DL (ref 3.5–5)
ALP SERPL-CCNC: 101 U/L (ref 34–104)
ALP SERPL-CCNC: 126 U/L (ref 34–104)
ALP SERPL-CCNC: 359 U/L (ref 34–104)
ALP SERPL-CCNC: 75 U/L (ref 34–104)
ALP SERPL-CCNC: 91 U/L (ref 34–104)
ALP SERPL-CCNC: 93 U/L (ref 34–104)
ALP SERPL-CCNC: 94 U/L (ref 34–104)
ALT SERPL W P-5'-P-CCNC: 5 U/L (ref 7–52)
ALT SERPL W P-5'-P-CCNC: 7 U/L (ref 7–52)
ALT SERPL W P-5'-P-CCNC: 8 U/L (ref 7–52)
ALT SERPL W P-5'-P-CCNC: <3 U/L (ref 7–52)
ALT SERPL W P-5'-P-CCNC: <3 U/L (ref 7–52)
ANION GAP SERPL CALCULATED.3IONS-SCNC: 10 MMOL/L
ANION GAP SERPL CALCULATED.3IONS-SCNC: 10 MMOL/L
ANION GAP SERPL CALCULATED.3IONS-SCNC: 12 MMOL/L
ANION GAP SERPL CALCULATED.3IONS-SCNC: 13 MMOL/L
ANION GAP SERPL CALCULATED.3IONS-SCNC: 14 MMOL/L
ANION GAP SERPL CALCULATED.3IONS-SCNC: 14 MMOL/L
ANION GAP SERPL CALCULATED.3IONS-SCNC: 15 MMOL/L
ANION GAP SERPL CALCULATED.3IONS-SCNC: 17 MMOL/L
ANION GAP SERPL CALCULATED.3IONS-SCNC: 18 MMOL/L
ANION GAP SERPL CALCULATED.3IONS-SCNC: 8 MMOL/L
ANION GAP SERPL CALCULATED.3IONS-SCNC: 9 MMOL/L
AORTIC ROOT: 2.7 CM
AORTIC VALVE MEAN VELOCITY: 10.2 M/S
APICAL FOUR CHAMBER EJECTION FRACTION: 63 %
APTT PPP: 101 SECONDS (ref 23–37)
APTT PPP: 113 SECONDS (ref 23–37)
APTT PPP: 56 SECONDS (ref 23–37)
APTT PPP: 59 SECONDS (ref 23–37)
APTT PPP: 60 SECONDS (ref 23–37)
APTT PPP: 65 SECONDS (ref 23–37)
APTT PPP: 72 SECONDS (ref 23–37)
APTT PPP: 77 SECONDS (ref 23–37)
APTT PPP: 97 SECONDS (ref 23–37)
ASCENDING AORTA: 2.6 CM
AST SERPL W P-5'-P-CCNC: 12 U/L (ref 13–39)
AST SERPL W P-5'-P-CCNC: 13 U/L (ref 13–39)
AST SERPL W P-5'-P-CCNC: 14 U/L (ref 13–39)
AST SERPL W P-5'-P-CCNC: 14 U/L (ref 13–39)
AST SERPL W P-5'-P-CCNC: 17 U/L (ref 13–39)
AST SERPL W P-5'-P-CCNC: 21 U/L (ref 13–39)
AST SERPL W P-5'-P-CCNC: 22 U/L (ref 13–39)
ATRIAL RATE: 102 BPM
ATRIAL RATE: 102 BPM
ATRIAL RATE: 103 BPM
ATRIAL RATE: 109 BPM
ATRIAL RATE: 142 BPM
ATRIAL RATE: 99 BPM
ATRIAL RATE: 99 BPM
AV AREA BY CONTINUOUS VTI: 3.2 CM2
AV AREA PEAK VELOCITY: 3.3 CM2
AV LVOT MEAN GRADIENT: 6 MMHG
AV LVOT PEAK GRADIENT: 10 MMHG
AV MEAN GRADIENT: 4 MMHG
AV PEAK GRADIENT: 7 MMHG
AV VALVE AREA: 3.2 CM2
AV VELOCITY RATIO: 1.16
BACTERIA BLD CULT: NORMAL
BACTERIA BLD CULT: NORMAL
BACTERIA SPEC ANAEROBE CULT: ABNORMAL
BACTERIA SPEC ANAEROBE CULT: ABNORMAL
BACTERIA SPEC ANAEROBE CULT: NORMAL
BACTERIA SPEC BFLD CULT: ABNORMAL
BACTERIA UR CULT: NORMAL
BACTERIA UR QL AUTO: ABNORMAL /HPF
BASOPHILS # BLD AUTO: 0.01 THOUSANDS/ÂΜL (ref 0–0.1)
BASOPHILS # BLD MANUAL: 0 THOUSAND/UL (ref 0–0.1)
BASOPHILS NFR BLD AUTO: 0 % (ref 0–1)
BASOPHILS NFR MAR MANUAL: 0 % (ref 0–1)
BILIRUB DIRECT SERPL-MCNC: 0.2 MG/DL (ref 0–0.2)
BILIRUB SERPL-MCNC: 0.45 MG/DL (ref 0.2–1)
BILIRUB SERPL-MCNC: 0.47 MG/DL (ref 0.2–1)
BILIRUB SERPL-MCNC: 0.49 MG/DL (ref 0.2–1)
BILIRUB SERPL-MCNC: 0.55 MG/DL (ref 0.2–1)
BILIRUB SERPL-MCNC: 0.68 MG/DL (ref 0.2–1)
BILIRUB SERPL-MCNC: 0.91 MG/DL (ref 0.2–1)
BILIRUB SERPL-MCNC: 1.09 MG/DL (ref 0.2–1)
BILIRUB UR QL STRIP: NEGATIVE
BLD GP AB SCN SERPL QL: NEGATIVE
BPU ID: NORMAL
BUN SERPL-MCNC: 27 MG/DL (ref 5–25)
BUN SERPL-MCNC: 37 MG/DL (ref 5–25)
BUN SERPL-MCNC: 40 MG/DL (ref 5–25)
BUN SERPL-MCNC: 41 MG/DL (ref 5–25)
BUN SERPL-MCNC: 41 MG/DL (ref 5–25)
BUN SERPL-MCNC: 48 MG/DL (ref 5–25)
BUN SERPL-MCNC: 54 MG/DL (ref 5–25)
BUN SERPL-MCNC: 59 MG/DL (ref 5–25)
BUN SERPL-MCNC: 67 MG/DL (ref 5–25)
BUN SERPL-MCNC: 81 MG/DL (ref 5–25)
BUN SERPL-MCNC: 82 MG/DL (ref 5–25)
BUN SERPL-MCNC: 87 MG/DL (ref 5–25)
BUN SERPL-MCNC: 90 MG/DL (ref 5–25)
CA-I BLD-SCNC: 0.94 MMOL/L (ref 1.12–1.32)
CA-I BLD-SCNC: 1.05 MMOL/L (ref 1.12–1.32)
CA-I BLD-SCNC: 1.08 MMOL/L (ref 1.12–1.32)
CALCIUM ALBUM COR SERPL-MCNC: 8.8 MG/DL (ref 8.3–10.1)
CALCIUM ALBUM COR SERPL-MCNC: 9 MG/DL (ref 8.3–10.1)
CALCIUM ALBUM COR SERPL-MCNC: 9.1 MG/DL (ref 8.3–10.1)
CALCIUM ALBUM COR SERPL-MCNC: 9.4 MG/DL (ref 8.3–10.1)
CALCIUM ALBUM COR SERPL-MCNC: 9.4 MG/DL (ref 8.3–10.1)
CALCIUM SERPL-MCNC: 7 MG/DL (ref 8.4–10.2)
CALCIUM SERPL-MCNC: 7.7 MG/DL (ref 8.4–10.2)
CALCIUM SERPL-MCNC: 7.7 MG/DL (ref 8.4–10.2)
CALCIUM SERPL-MCNC: 7.8 MG/DL (ref 8.4–10.2)
CALCIUM SERPL-MCNC: 7.8 MG/DL (ref 8.4–10.2)
CALCIUM SERPL-MCNC: 7.9 MG/DL (ref 8.4–10.2)
CALCIUM SERPL-MCNC: 8 MG/DL (ref 8.4–10.2)
CALCIUM SERPL-MCNC: 8.1 MG/DL (ref 8.4–10.2)
CALCIUM SERPL-MCNC: 8.3 MG/DL (ref 8.4–10.2)
CALCIUM SERPL-MCNC: 8.4 MG/DL (ref 8.4–10.2)
CALCIUM SERPL-MCNC: 8.6 MG/DL (ref 8.4–10.2)
CALCIUM SERPL-MCNC: 8.8 MG/DL (ref 8.4–10.2)
CALCIUM SERPL-MCNC: 9.2 MG/DL (ref 8.4–10.2)
CANCER AG125 SERPL-ACNC: 310.6 U/ML (ref 0–35)
CANCER AG19-9 SERPL-ACNC: 389 U/ML (ref 0–35)
CARDIAC TROPONIN I PNL SERPL HS: 45 NG/L
CARDIAC TROPONIN I PNL SERPL HS: 74 NG/L
CEA SERPL-MCNC: 0.8 NG/ML (ref 0–3)
CHLORIDE SERPL-SCNC: 100 MMOL/L (ref 96–108)
CHLORIDE SERPL-SCNC: 100 MMOL/L (ref 96–108)
CHLORIDE SERPL-SCNC: 101 MMOL/L (ref 96–108)
CHLORIDE SERPL-SCNC: 102 MMOL/L (ref 96–108)
CHLORIDE SERPL-SCNC: 103 MMOL/L (ref 96–108)
CHLORIDE SERPL-SCNC: 103 MMOL/L (ref 96–108)
CHLORIDE SERPL-SCNC: 104 MMOL/L (ref 96–108)
CHLORIDE SERPL-SCNC: 106 MMOL/L (ref 96–108)
CHLORIDE SERPL-SCNC: 97 MMOL/L (ref 96–108)
CHLORIDE SERPL-SCNC: 98 MMOL/L (ref 96–108)
CHLORIDE SERPL-SCNC: 99 MMOL/L (ref 96–108)
CLARITY UR: ABNORMAL
CO2 SERPL-SCNC: 16 MMOL/L (ref 21–32)
CO2 SERPL-SCNC: 19 MMOL/L (ref 21–32)
CO2 SERPL-SCNC: 20 MMOL/L (ref 21–32)
CO2 SERPL-SCNC: 21 MMOL/L (ref 21–32)
CO2 SERPL-SCNC: 22 MMOL/L (ref 21–32)
CO2 SERPL-SCNC: 22 MMOL/L (ref 21–32)
CO2 SERPL-SCNC: 23 MMOL/L (ref 21–32)
CO2 SERPL-SCNC: 24 MMOL/L (ref 21–32)
CO2 SERPL-SCNC: 25 MMOL/L (ref 21–32)
CO2 SERPL-SCNC: 26 MMOL/L (ref 21–32)
CO2 SERPL-SCNC: 26 MMOL/L (ref 21–32)
COLOR UR: YELLOW
CREAT SERPL-MCNC: 1.67 MG/DL (ref 0.6–1.3)
CREAT SERPL-MCNC: 1.97 MG/DL (ref 0.6–1.3)
CREAT SERPL-MCNC: 1.99 MG/DL (ref 0.6–1.3)
CREAT SERPL-MCNC: 2.06 MG/DL (ref 0.6–1.3)
CREAT SERPL-MCNC: 2.19 MG/DL (ref 0.6–1.3)
CREAT SERPL-MCNC: 2.54 MG/DL (ref 0.6–1.3)
CREAT SERPL-MCNC: 2.66 MG/DL (ref 0.6–1.3)
CREAT SERPL-MCNC: 2.84 MG/DL (ref 0.6–1.3)
CREAT SERPL-MCNC: 2.97 MG/DL (ref 0.6–1.3)
CREAT SERPL-MCNC: 3.02 MG/DL (ref 0.6–1.3)
CREAT SERPL-MCNC: 3.27 MG/DL (ref 0.6–1.3)
CREAT SERPL-MCNC: 3.52 MG/DL (ref 0.6–1.3)
CREAT SERPL-MCNC: 3.54 MG/DL (ref 0.6–1.3)
CREAT UR-MCNC: 91.2 MG/DL
CROSSMATCH: NORMAL
CROSSMATCH: NORMAL
DOP CALC AO PEAK VEL: 1.34 M/S
DOP CALC AO VTI: 32.09 CM
DOP CALC LVOT AREA: 2.83 CM2
DOP CALC LVOT CARDIAC INDEX: 4.17 L/MIN/M2
DOP CALC LVOT CARDIAC OUTPUT: 8 L/MIN
DOP CALC LVOT DIAMETER: 1.9 CM
DOP CALC LVOT PEAK VEL VTI: 36.19 CM
DOP CALC LVOT PEAK VEL: 1.56 M/S
DOP CALC LVOT STROKE INDEX: 51 ML/M2
DOP CALC LVOT STROKE VOLUME: 102.56
DOP CALC MV VTI: 51.08 CM
E WAVE DECELERATION TIME: 335 MS
E/A RATIO: 1.01
EOSINOPHIL # BLD AUTO: 0 THOUSAND/ÂΜL (ref 0–0.61)
EOSINOPHIL # BLD AUTO: 0.01 THOUSAND/ÂΜL (ref 0–0.61)
EOSINOPHIL # BLD AUTO: 0.02 THOUSAND/ÂΜL (ref 0–0.61)
EOSINOPHIL # BLD MANUAL: 0 THOUSAND/UL (ref 0–0.4)
EOSINOPHIL NFR BLD AUTO: 0 % (ref 0–6)
EOSINOPHIL NFR BLD MANUAL: 0 % (ref 0–6)
ERYTHROCYTE [DISTWIDTH] IN BLOOD BY AUTOMATED COUNT: 13.3 % (ref 11.6–15.1)
ERYTHROCYTE [DISTWIDTH] IN BLOOD BY AUTOMATED COUNT: 13.4 % (ref 11.6–15.1)
ERYTHROCYTE [DISTWIDTH] IN BLOOD BY AUTOMATED COUNT: 13.6 % (ref 11.6–15.1)
ERYTHROCYTE [DISTWIDTH] IN BLOOD BY AUTOMATED COUNT: 13.6 % (ref 11.6–15.1)
ERYTHROCYTE [DISTWIDTH] IN BLOOD BY AUTOMATED COUNT: 13.7 % (ref 11.6–15.1)
ERYTHROCYTE [DISTWIDTH] IN BLOOD BY AUTOMATED COUNT: 13.9 % (ref 11.6–15.1)
ERYTHROCYTE [DISTWIDTH] IN BLOOD BY AUTOMATED COUNT: 14.1 % (ref 11.6–15.1)
ERYTHROCYTE [DISTWIDTH] IN BLOOD BY AUTOMATED COUNT: 14.1 % (ref 11.6–15.1)
ERYTHROCYTE [DISTWIDTH] IN BLOOD BY AUTOMATED COUNT: 14.2 % (ref 11.6–15.1)
ERYTHROCYTE [DISTWIDTH] IN BLOOD BY AUTOMATED COUNT: 14.3 % (ref 11.6–15.1)
FRACTIONAL SHORTENING: 39 (ref 28–44)
GFR SERPL CREATININE-BSD FRML MDRD: 11 ML/MIN/1.73SQ M
GFR SERPL CREATININE-BSD FRML MDRD: 11 ML/MIN/1.73SQ M
GFR SERPL CREATININE-BSD FRML MDRD: 12 ML/MIN/1.73SQ M
GFR SERPL CREATININE-BSD FRML MDRD: 13 ML/MIN/1.73SQ M
GFR SERPL CREATININE-BSD FRML MDRD: 13 ML/MIN/1.73SQ M
GFR SERPL CREATININE-BSD FRML MDRD: 14 ML/MIN/1.73SQ M
GFR SERPL CREATININE-BSD FRML MDRD: 15 ML/MIN/1.73SQ M
GFR SERPL CREATININE-BSD FRML MDRD: 16 ML/MIN/1.73SQ M
GFR SERPL CREATININE-BSD FRML MDRD: 20 ML/MIN/1.73SQ M
GFR SERPL CREATININE-BSD FRML MDRD: 21 ML/MIN/1.73SQ M
GFR SERPL CREATININE-BSD FRML MDRD: 22 ML/MIN/1.73SQ M
GFR SERPL CREATININE-BSD FRML MDRD: 22 ML/MIN/1.73SQ M
GFR SERPL CREATININE-BSD FRML MDRD: 27 ML/MIN/1.73SQ M
GLUCOSE SERPL-MCNC: 101 MG/DL (ref 65–140)
GLUCOSE SERPL-MCNC: 108 MG/DL (ref 65–140)
GLUCOSE SERPL-MCNC: 122 MG/DL (ref 65–140)
GLUCOSE SERPL-MCNC: 125 MG/DL (ref 65–140)
GLUCOSE SERPL-MCNC: 128 MG/DL (ref 65–140)
GLUCOSE SERPL-MCNC: 129 MG/DL (ref 65–140)
GLUCOSE SERPL-MCNC: 130 MG/DL (ref 65–140)
GLUCOSE SERPL-MCNC: 135 MG/DL (ref 65–140)
GLUCOSE SERPL-MCNC: 148 MG/DL (ref 65–140)
GLUCOSE SERPL-MCNC: 162 MG/DL (ref 65–140)
GLUCOSE SERPL-MCNC: 169 MG/DL (ref 65–140)
GLUCOSE SERPL-MCNC: 176 MG/DL (ref 65–140)
GLUCOSE SERPL-MCNC: 200 MG/DL (ref 65–140)
GLUCOSE SERPL-MCNC: 81 MG/DL (ref 65–140)
GLUCOSE SERPL-MCNC: 81 MG/DL (ref 65–140)
GLUCOSE SERPL-MCNC: 86 MG/DL (ref 65–140)
GLUCOSE SERPL-MCNC: 89 MG/DL (ref 65–140)
GLUCOSE SERPL-MCNC: 91 MG/DL (ref 65–140)
GLUCOSE SERPL-MCNC: 92 MG/DL (ref 65–140)
GLUCOSE SERPL-MCNC: 93 MG/DL (ref 65–140)
GLUCOSE SERPL-MCNC: 96 MG/DL (ref 65–140)
GLUCOSE SERPL-MCNC: 98 MG/DL (ref 65–140)
GLUCOSE UR STRIP-MCNC: NEGATIVE MG/DL
GRAM STN SPEC: ABNORMAL
HCT VFR BLD AUTO: 29.6 % (ref 34.8–46.1)
HCT VFR BLD AUTO: 30.8 % (ref 34.8–46.1)
HCT VFR BLD AUTO: 31.2 % (ref 34.8–46.1)
HCT VFR BLD AUTO: 31.4 % (ref 34.8–46.1)
HCT VFR BLD AUTO: 31.5 % (ref 34.8–46.1)
HCT VFR BLD AUTO: 32.3 % (ref 34.8–46.1)
HCT VFR BLD AUTO: 33.3 % (ref 34.8–46.1)
HCT VFR BLD AUTO: 33.7 % (ref 34.8–46.1)
HCT VFR BLD AUTO: 34.4 % (ref 34.8–46.1)
HCT VFR BLD AUTO: 39.2 % (ref 34.8–46.1)
HGB BLD-MCNC: 10 G/DL (ref 11.5–15.4)
HGB BLD-MCNC: 10.4 G/DL (ref 11.5–15.4)
HGB BLD-MCNC: 10.5 G/DL (ref 11.5–15.4)
HGB BLD-MCNC: 10.6 G/DL (ref 11.5–15.4)
HGB BLD-MCNC: 12.2 G/DL (ref 11.5–15.4)
HGB BLD-MCNC: 9.2 G/DL (ref 11.5–15.4)
HGB BLD-MCNC: 9.5 G/DL (ref 11.5–15.4)
HGB BLD-MCNC: 9.6 G/DL (ref 11.5–15.4)
HGB BLD-MCNC: 9.6 G/DL (ref 11.5–15.4)
HGB BLD-MCNC: 9.8 G/DL (ref 11.5–15.4)
HGB UR QL STRIP.AUTO: ABNORMAL
HYALINE CASTS #/AREA URNS LPF: ABNORMAL /LPF
IMM GRANULOCYTES # BLD AUTO: 0.1 THOUSAND/UL (ref 0–0.2)
IMM GRANULOCYTES # BLD AUTO: 0.12 THOUSAND/UL (ref 0–0.2)
IMM GRANULOCYTES # BLD AUTO: 0.16 THOUSAND/UL (ref 0–0.2)
IMM GRANULOCYTES # BLD AUTO: 0.18 THOUSAND/UL (ref 0–0.2)
IMM GRANULOCYTES # BLD AUTO: 0.19 THOUSAND/UL (ref 0–0.2)
IMM GRANULOCYTES NFR BLD AUTO: 1 % (ref 0–2)
IMM GRANULOCYTES NFR BLD AUTO: 1 % (ref 0–2)
IMM GRANULOCYTES NFR BLD AUTO: 2 % (ref 0–2)
INR PPP: 1.29 (ref 0.84–1.19)
INR PPP: 1.41 (ref 0.84–1.19)
INR PPP: 1.42 (ref 0.84–1.19)
INR PPP: 1.45 (ref 0.84–1.19)
INR PPP: 1.56 (ref 0.84–1.19)
INR PPP: 2.51 (ref 0.84–1.19)
INR PPP: 2.97 (ref 0.84–1.19)
INR PPP: 3.32 (ref 0.84–1.19)
INR PPP: 3.91 (ref 0.84–1.19)
INTERVENTRICULAR SEPTUM IN DIASTOLE (PARASTERNAL SHORT AXIS VIEW): 0.9 CM
INTERVENTRICULAR SEPTUM: 0.9 CM (ref 0.6–1.1)
KETONES UR STRIP-MCNC: NEGATIVE MG/DL
LAAS-AP2: 20.3 CM2
LAAS-AP4: 22.3 CM2
LACTATE SERPL-SCNC: 0.7 MMOL/L (ref 0.5–2)
LACTATE SERPL-SCNC: 1.1 MMOL/L (ref 0.5–2)
LACTATE SERPL-SCNC: 1.6 MMOL/L (ref 0.5–2)
LACTATE SERPL-SCNC: 2.1 MMOL/L (ref 0.5–2)
LEFT ATRIUM SIZE: 5.5 CM
LEFT ATRIUM VOLUME (MOD BIPLANE): 66 ML
LEFT ATRIUM VOLUME INDEX (MOD BIPLANE): 34.4 ML/M2
LEFT INTERNAL DIMENSION IN SYSTOLE: 2.7 CM (ref 2.1–4)
LEFT VENTRICULAR INTERNAL DIMENSION IN DIASTOLE: 4.4 CM (ref 3.5–6)
LEFT VENTRICULAR POSTERIOR WALL IN END DIASTOLE: 1 CM
LEFT VENTRICULAR STROKE VOLUME: 58 ML
LEUKOCYTE ESTERASE UR QL STRIP: ABNORMAL
LIPASE SERPL-CCNC: 16 U/L (ref 11–82)
LVSV (TEICH): 58 ML
LYMPHOCYTES # BLD AUTO: 0.21 THOUSANDS/ÂΜL (ref 0.6–4.47)
LYMPHOCYTES # BLD AUTO: 0.31 THOUSAND/UL (ref 0.6–4.47)
LYMPHOCYTES # BLD AUTO: 0.34 THOUSANDS/ÂΜL (ref 0.6–4.47)
LYMPHOCYTES # BLD AUTO: 0.35 THOUSANDS/ÂΜL (ref 0.6–4.47)
LYMPHOCYTES # BLD AUTO: 0.48 THOUSANDS/ÂΜL (ref 0.6–4.47)
LYMPHOCYTES # BLD AUTO: 0.5 THOUSANDS/ÂΜL (ref 0.6–4.47)
LYMPHOCYTES # BLD AUTO: 0.87 THOUSAND/UL (ref 0.6–4.47)
LYMPHOCYTES # BLD AUTO: 1.23 THOUSAND/UL (ref 0.6–4.47)
LYMPHOCYTES # BLD AUTO: 12 % (ref 14–44)
LYMPHOCYTES # BLD AUTO: 5 % (ref 14–44)
LYMPHOCYTES # BLD AUTO: 7 % (ref 14–44)
LYMPHOCYTES NFR BLD AUTO: 2 % (ref 14–44)
LYMPHOCYTES NFR BLD AUTO: 3 % (ref 14–44)
LYMPHOCYTES NFR BLD AUTO: 4 % (ref 14–44)
LYMPHOCYTES NFR BLD AUTO: 5 % (ref 14–44)
LYMPHOCYTES NFR BLD AUTO: 6 % (ref 14–44)
MAGNESIUM SERPL-MCNC: 1.6 MG/DL (ref 1.9–2.7)
MAGNESIUM SERPL-MCNC: 2.4 MG/DL (ref 1.9–2.7)
MAGNESIUM SERPL-MCNC: 2.5 MG/DL (ref 1.9–2.7)
MAGNESIUM SERPL-MCNC: 2.7 MG/DL (ref 1.9–2.7)
MCH RBC QN AUTO: 30 PG (ref 26.8–34.3)
MCH RBC QN AUTO: 30.2 PG (ref 26.8–34.3)
MCH RBC QN AUTO: 30.4 PG (ref 26.8–34.3)
MCH RBC QN AUTO: 30.6 PG (ref 26.8–34.3)
MCH RBC QN AUTO: 30.8 PG (ref 26.8–34.3)
MCH RBC QN AUTO: 30.9 PG (ref 26.8–34.3)
MCH RBC QN AUTO: 30.9 PG (ref 26.8–34.3)
MCH RBC QN AUTO: 31 PG (ref 26.8–34.3)
MCH RBC QN AUTO: 31.1 PG (ref 26.8–34.3)
MCH RBC QN AUTO: 31.2 PG (ref 26.8–34.3)
MCHC RBC AUTO-ENTMCNC: 29.7 G/DL (ref 31.4–37.4)
MCHC RBC AUTO-ENTMCNC: 30.8 G/DL (ref 31.4–37.4)
MCHC RBC AUTO-ENTMCNC: 31.1 G/DL (ref 31.4–37.4)
MCHC RBC AUTO-ENTMCNC: 31.1 G/DL (ref 31.4–37.4)
MCHC RBC AUTO-ENTMCNC: 31.2 G/DL (ref 31.4–37.4)
MCHC RBC AUTO-ENTMCNC: 31.7 G/DL (ref 31.4–37.4)
MCV RBC AUTO: 100 FL (ref 82–98)
MCV RBC AUTO: 101 FL (ref 82–98)
MCV RBC AUTO: 102 FL (ref 82–98)
MCV RBC AUTO: 97 FL (ref 82–98)
MCV RBC AUTO: 97 FL (ref 82–98)
MCV RBC AUTO: 98 FL (ref 82–98)
MCV RBC AUTO: 98 FL (ref 82–98)
MCV RBC AUTO: 99 FL (ref 82–98)
METAMYELOCYTES NFR BLD MANUAL: 1 % (ref 0–1)
MONOCYTES # BLD AUTO: 0.18 THOUSAND/UL (ref 0–1.22)
MONOCYTES # BLD AUTO: 0.37 THOUSAND/UL (ref 0–1.22)
MONOCYTES # BLD AUTO: 0.41 THOUSAND/UL (ref 0–1.22)
MONOCYTES # BLD AUTO: 0.48 THOUSAND/ÂΜL (ref 0.17–1.22)
MONOCYTES # BLD AUTO: 0.59 THOUSAND/ÂΜL (ref 0.17–1.22)
MONOCYTES # BLD AUTO: 0.62 THOUSAND/ÂΜL (ref 0.17–1.22)
MONOCYTES # BLD AUTO: 0.67 THOUSAND/ÂΜL (ref 0.17–1.22)
MONOCYTES # BLD AUTO: 1.08 THOUSAND/ÂΜL (ref 0.17–1.22)
MONOCYTES NFR BLD AUTO: 6 % (ref 4–12)
MONOCYTES NFR BLD AUTO: 6 % (ref 4–12)
MONOCYTES NFR BLD AUTO: 7 % (ref 4–12)
MONOCYTES NFR BLD AUTO: 7 % (ref 4–12)
MONOCYTES NFR BLD AUTO: 8 % (ref 4–12)
MONOCYTES NFR BLD: 3 % (ref 4–12)
MONOCYTES NFR BLD: 3 % (ref 4–12)
MONOCYTES NFR BLD: 4 % (ref 4–12)
MUCOUS THREADS UR QL AUTO: ABNORMAL
MV E'TISSUE VEL-SEP: 6 CM/S
MV MEAN GRADIENT: 5 MMHG
MV PEAK A VEL: 1.65 M/S
MV PEAK E VEL: 166 CM/S
MV PEAK GRADIENT: 12 MMHG
MV STENOSIS PRESSURE HALF TIME: 97 MS
MV VALVE AREA BY CONTINUITY EQUATION: 2.01 CM2
MV VALVE AREA P 1/2 METHOD: 2.27
NEUTROPHILS # BLD AUTO: 11.55 THOUSANDS/ÂΜL (ref 1.85–7.62)
NEUTROPHILS # BLD AUTO: 6.17 THOUSANDS/ÂΜL (ref 1.85–7.62)
NEUTROPHILS # BLD AUTO: 7.03 THOUSANDS/ÂΜL (ref 1.85–7.62)
NEUTROPHILS # BLD AUTO: 9.17 THOUSANDS/ÂΜL (ref 1.85–7.62)
NEUTROPHILS # BLD AUTO: 9.87 THOUSANDS/ÂΜL (ref 1.85–7.62)
NEUTROPHILS # BLD MANUAL: 11.11 THOUSAND/UL (ref 1.85–7.62)
NEUTROPHILS # BLD MANUAL: 5.65 THOUSAND/UL (ref 1.85–7.62)
NEUTROPHILS # BLD MANUAL: 8.64 THOUSAND/UL (ref 1.85–7.62)
NEUTS BAND NFR BLD MANUAL: 11 % (ref 0–8)
NEUTS SEG NFR BLD AUTO: 81 % (ref 43–75)
NEUTS SEG NFR BLD AUTO: 84 % (ref 43–75)
NEUTS SEG NFR BLD AUTO: 85 % (ref 43–75)
NEUTS SEG NFR BLD AUTO: 86 % (ref 43–75)
NEUTS SEG NFR BLD AUTO: 87 % (ref 43–75)
NEUTS SEG NFR BLD AUTO: 89 % (ref 43–75)
NEUTS SEG NFR BLD AUTO: 89 % (ref 43–75)
NEUTS SEG NFR BLD AUTO: 91 % (ref 43–75)
NITRITE UR QL STRIP: NEGATIVE
NON-SQ EPI CELLS URNS QL MICRO: ABNORMAL /HPF
NRBC BLD AUTO-RTO: 0 /100 WBCS
P AXIS: 42 DEGREES
P AXIS: 45 DEGREES
P AXIS: 45 DEGREES
P AXIS: 48 DEGREES
P AXIS: 56 DEGREES
P AXIS: 56 DEGREES
PH UR STRIP.AUTO: 5.5 [PH]
PHOSPHATE SERPL-MCNC: 4.3 MG/DL (ref 2.3–4.1)
PHOSPHATE SERPL-MCNC: 4.7 MG/DL (ref 2.3–4.1)
PHOSPHATE SERPL-MCNC: 4.7 MG/DL (ref 2.3–4.1)
PHOSPHATE SERPL-MCNC: 4.9 MG/DL (ref 2.3–4.1)
PHOSPHATE SERPL-MCNC: 4.9 MG/DL (ref 2.3–4.1)
PHOSPHATE SERPL-MCNC: 5.2 MG/DL (ref 2.3–4.1)
PHOSPHATE SERPL-MCNC: 5.3 MG/DL (ref 2.3–4.1)
PHOSPHATE SERPL-MCNC: 5.4 MG/DL (ref 2.3–4.1)
PHOSPHATE SERPL-MCNC: 5.9 MG/DL (ref 2.3–4.1)
PLATELET # BLD AUTO: 275 THOUSANDS/UL (ref 149–390)
PLATELET # BLD AUTO: 296 THOUSANDS/UL (ref 149–390)
PLATELET # BLD AUTO: 299 THOUSANDS/UL (ref 149–390)
PLATELET # BLD AUTO: 308 THOUSANDS/UL (ref 149–390)
PLATELET # BLD AUTO: 310 THOUSANDS/UL (ref 149–390)
PLATELET # BLD AUTO: 312 THOUSANDS/UL (ref 149–390)
PLATELET # BLD AUTO: 319 THOUSANDS/UL (ref 149–390)
PLATELET # BLD AUTO: 326 THOUSANDS/UL (ref 149–390)
PLATELET # BLD AUTO: 381 THOUSANDS/UL (ref 149–390)
PLATELET # BLD AUTO: 413 THOUSANDS/UL (ref 149–390)
PLATELET BLD QL SMEAR: ADEQUATE
PMV BLD AUTO: 10.4 FL (ref 8.9–12.7)
PMV BLD AUTO: 10.8 FL (ref 8.9–12.7)
PMV BLD AUTO: 10.9 FL (ref 8.9–12.7)
PMV BLD AUTO: 11.3 FL (ref 8.9–12.7)
PMV BLD AUTO: 9.3 FL (ref 8.9–12.7)
PMV BLD AUTO: 9.3 FL (ref 8.9–12.7)
PMV BLD AUTO: 9.4 FL (ref 8.9–12.7)
PMV BLD AUTO: 9.6 FL (ref 8.9–12.7)
POIKILOCYTOSIS BLD QL SMEAR: PRESENT
POTASSIUM SERPL-SCNC: 3.3 MMOL/L (ref 3.5–5.3)
POTASSIUM SERPL-SCNC: 3.6 MMOL/L (ref 3.5–5.3)
POTASSIUM SERPL-SCNC: 3.8 MMOL/L (ref 3.5–5.3)
POTASSIUM SERPL-SCNC: 3.8 MMOL/L (ref 3.5–5.3)
POTASSIUM SERPL-SCNC: 3.9 MMOL/L (ref 3.5–5.3)
POTASSIUM SERPL-SCNC: 3.9 MMOL/L (ref 3.5–5.3)
POTASSIUM SERPL-SCNC: 4 MMOL/L (ref 3.5–5.3)
POTASSIUM SERPL-SCNC: 4 MMOL/L (ref 3.5–5.3)
POTASSIUM SERPL-SCNC: 4.2 MMOL/L (ref 3.5–5.3)
POTASSIUM SERPL-SCNC: 4.3 MMOL/L (ref 3.5–5.3)
POTASSIUM SERPL-SCNC: 4.3 MMOL/L (ref 3.5–5.3)
POTASSIUM SERPL-SCNC: 4.4 MMOL/L (ref 3.5–5.3)
POTASSIUM SERPL-SCNC: 4.8 MMOL/L (ref 3.5–5.3)
PR INTERVAL: 122 MS
PR INTERVAL: 150 MS
PR INTERVAL: 156 MS
PR INTERVAL: 168 MS
PR INTERVAL: 168 MS
PR INTERVAL: 172 MS
PR INTERVAL: 172 MS
PROT SERPL-MCNC: 4.8 G/DL (ref 6.4–8.4)
PROT SERPL-MCNC: 4.8 G/DL (ref 6.4–8.4)
PROT SERPL-MCNC: 5 G/DL (ref 6.4–8.4)
PROT SERPL-MCNC: 5.1 G/DL (ref 6.4–8.4)
PROT SERPL-MCNC: 6.1 G/DL (ref 6.4–8.4)
PROT SERPL-MCNC: 6.4 G/DL (ref 6.4–8.4)
PROT SERPL-MCNC: 7 G/DL (ref 6.4–8.4)
PROT UR STRIP-MCNC: ABNORMAL MG/DL
PROTHROMBIN TIME: 16.8 SECONDS (ref 11.6–14.5)
PROTHROMBIN TIME: 17.9 SECONDS (ref 11.6–14.5)
PROTHROMBIN TIME: 18 SECONDS (ref 11.6–14.5)
PROTHROMBIN TIME: 18.4 SECONDS (ref 11.6–14.5)
PROTHROMBIN TIME: 19.4 SECONDS (ref 11.6–14.5)
PROTHROMBIN TIME: 28 SECONDS (ref 11.6–14.5)
PROTHROMBIN TIME: 31.9 SECONDS (ref 11.6–14.5)
PROTHROMBIN TIME: 34.8 SECONDS (ref 11.6–14.5)
PROTHROMBIN TIME: 39.4 SECONDS (ref 11.6–14.5)
QRS AXIS: 267 DEGREES
QRS AXIS: 50 DEGREES
QRS AXIS: 56 DEGREES
QRS AXIS: 58 DEGREES
QRS AXIS: 58 DEGREES
QRS AXIS: 59 DEGREES
QRS AXIS: 59 DEGREES
QRSD INTERVAL: 82 MS
QRSD INTERVAL: 86 MS
QRSD INTERVAL: 88 MS
QRSD INTERVAL: 90 MS
QT INTERVAL: 290 MS
QT INTERVAL: 344 MS
QT INTERVAL: 346 MS
QT INTERVAL: 352 MS
QT INTERVAL: 352 MS
QT INTERVAL: 358 MS
QT INTERVAL: 358 MS
QTC INTERVAL: 446 MS
QTC INTERVAL: 450 MS
QTC INTERVAL: 458 MS
QTC INTERVAL: 458 MS
QTC INTERVAL: 459 MS
QTC INTERVAL: 459 MS
QTC INTERVAL: 465 MS
RA PRESSURE ESTIMATED: 3 MMHG
RBC # BLD AUTO: 2.95 MILLION/UL (ref 3.81–5.12)
RBC # BLD AUTO: 3.06 MILLION/UL (ref 3.81–5.12)
RBC # BLD AUTO: 3.12 MILLION/UL (ref 3.81–5.12)
RBC # BLD AUTO: 3.16 MILLION/UL (ref 3.81–5.12)
RBC # BLD AUTO: 3.24 MILLION/UL (ref 3.81–5.12)
RBC # BLD AUTO: 3.25 MILLION/UL (ref 3.81–5.12)
RBC # BLD AUTO: 3.38 MILLION/UL (ref 3.81–5.12)
RBC # BLD AUTO: 3.4 MILLION/UL (ref 3.81–5.12)
RBC # BLD AUTO: 3.53 MILLION/UL (ref 3.81–5.12)
RBC # BLD AUTO: 3.95 MILLION/UL (ref 3.81–5.12)
RBC #/AREA URNS AUTO: ABNORMAL /HPF
RBC MORPH BLD: NORMAL
RBC MORPH BLD: NORMAL
RBC MORPH BLD: PRESENT
RH BLD: POSITIVE
RIGHT ATRIUM AREA SYSTOLE A4C: 13.2 CM2
RIGHT VENTRICLE ID DIMENSION: 3.4 CM
RV PSP: 51 MMHG
SL CV LEFT ATRIUM LENGTH A2C: 5.5 CM
SL CV LV EF: 70
SL CV PED ECHO LEFT VENTRICLE DIASTOLIC VOLUME (MOD BIPLANE) 2D: 86 ML
SL CV PED ECHO LEFT VENTRICLE SYSTOLIC VOLUME (MOD BIPLANE) 2D: 27 ML
SODIUM 24H UR-SCNC: 32 MOL/L
SODIUM SERPL-SCNC: 133 MMOL/L (ref 135–147)
SODIUM SERPL-SCNC: 134 MMOL/L (ref 135–147)
SODIUM SERPL-SCNC: 135 MMOL/L (ref 135–147)
SODIUM SERPL-SCNC: 136 MMOL/L (ref 135–147)
SODIUM SERPL-SCNC: 137 MMOL/L (ref 135–147)
SODIUM SERPL-SCNC: 138 MMOL/L (ref 135–147)
SODIUM SERPL-SCNC: 139 MMOL/L (ref 135–147)
SODIUM SERPL-SCNC: 140 MMOL/L (ref 135–147)
SODIUM SERPL-SCNC: 141 MMOL/L (ref 135–147)
SP GR UR STRIP.AUTO: 1.02 (ref 1–1.03)
SPECIMEN EXPIRATION DATE: NORMAL
T WAVE AXIS: -15 DEGREES
T WAVE AXIS: -3 DEGREES
T WAVE AXIS: -3 DEGREES
T WAVE AXIS: -8 DEGREES
T WAVE AXIS: -8 DEGREES
T WAVE AXIS: 73 DEGREES
T WAVE AXIS: 82 DEGREES
TR MAX PG: 48 MMHG
TR PEAK VELOCITY: 3.5 M/S
TRICUSPID ANNULAR PLANE SYSTOLIC EXCURSION: 2 CM
TRICUSPID VALVE PEAK REGURGITATION VELOCITY: 3.45 M/S
UNIT DISPENSE STATUS: NORMAL
UNIT PRODUCT CODE: NORMAL
UNIT PRODUCT VOLUME: 280 ML
UNIT PRODUCT VOLUME: 350 ML
UNIT PRODUCT VOLUME: 350 ML
UNIT RH: NORMAL
UROBILINOGEN UR STRIP-ACNC: <2 MG/DL
VENTRICULAR RATE: 102 BPM
VENTRICULAR RATE: 102 BPM
VENTRICULAR RATE: 103 BPM
VENTRICULAR RATE: 109 BPM
VENTRICULAR RATE: 142 BPM
VENTRICULAR RATE: 99 BPM
VENTRICULAR RATE: 99 BPM
WBC # BLD AUTO: 10.13 THOUSAND/UL (ref 4.31–10.16)
WBC # BLD AUTO: 10.28 THOUSAND/UL (ref 4.31–10.16)
WBC # BLD AUTO: 10.63 THOUSAND/UL (ref 4.31–10.16)
WBC # BLD AUTO: 11.08 THOUSAND/UL (ref 4.31–10.16)
WBC # BLD AUTO: 12.48 THOUSAND/UL (ref 4.31–10.16)
WBC # BLD AUTO: 13.24 THOUSAND/UL (ref 4.31–10.16)
WBC # BLD AUTO: 15.33 THOUSAND/UL (ref 4.31–10.16)
WBC # BLD AUTO: 6.14 THOUSAND/UL (ref 4.31–10.16)
WBC # BLD AUTO: 7.18 THOUSAND/UL (ref 4.31–10.16)
WBC # BLD AUTO: 8.31 THOUSAND/UL (ref 4.31–10.16)
WBC #/AREA URNS AUTO: ABNORMAL /HPF

## 2023-01-01 PROCEDURE — 85610 PROTHROMBIN TIME: CPT | Performed by: STUDENT IN AN ORGANIZED HEALTH CARE EDUCATION/TRAINING PROGRAM

## 2023-01-01 PROCEDURE — 80053 COMPREHEN METABOLIC PANEL: CPT

## 2023-01-01 PROCEDURE — 99222 1ST HOSP IP/OBS MODERATE 55: CPT | Performed by: SURGERY

## 2023-01-01 PROCEDURE — 86923 COMPATIBILITY TEST ELECTRIC: CPT

## 2023-01-01 PROCEDURE — G1004 CDSM NDSC: HCPCS

## 2023-01-01 PROCEDURE — C1729 CATH, DRAINAGE: HCPCS | Performed by: SURGERY

## 2023-01-01 PROCEDURE — 85027 COMPLETE CBC AUTOMATED: CPT

## 2023-01-01 PROCEDURE — 93010 ELECTROCARDIOGRAM REPORT: CPT | Performed by: INTERNAL MEDICINE

## 2023-01-01 PROCEDURE — 99232 SBSQ HOSP IP/OBS MODERATE 35: CPT | Performed by: INTERNAL MEDICINE

## 2023-01-01 PROCEDURE — 80053 COMPREHEN METABOLIC PANEL: CPT | Performed by: STUDENT IN AN ORGANIZED HEALTH CARE EDUCATION/TRAINING PROGRAM

## 2023-01-01 PROCEDURE — 93005 ELECTROCARDIOGRAM TRACING: CPT

## 2023-01-01 PROCEDURE — 70450 CT HEAD/BRAIN W/O DYE: CPT

## 2023-01-01 PROCEDURE — 99223 1ST HOSP IP/OBS HIGH 75: CPT | Performed by: INTERNAL MEDICINE

## 2023-01-01 PROCEDURE — NC001 PR NO CHARGE: Performed by: PHYSICIAN ASSISTANT

## 2023-01-01 PROCEDURE — 71250 CT THORAX DX C-: CPT

## 2023-01-01 PROCEDURE — 80048 BASIC METABOLIC PNL TOTAL CA: CPT

## 2023-01-01 PROCEDURE — 99223 1ST HOSP IP/OBS HIGH 75: CPT | Performed by: UROLOGY

## 2023-01-01 PROCEDURE — 85610 PROTHROMBIN TIME: CPT

## 2023-01-01 PROCEDURE — 87077 CULTURE AEROBIC IDENTIFY: CPT | Performed by: SURGERY

## 2023-01-01 PROCEDURE — 85027 COMPLETE CBC AUTOMATED: CPT | Performed by: PHYSICIAN ASSISTANT

## 2023-01-01 PROCEDURE — 87205 SMEAR GRAM STAIN: CPT | Performed by: SURGERY

## 2023-01-01 PROCEDURE — 80048 BASIC METABOLIC PNL TOTAL CA: CPT | Performed by: INTERNAL MEDICINE

## 2023-01-01 PROCEDURE — 30233K1 TRANSFUSION OF NONAUTOLOGOUS FROZEN PLASMA INTO PERIPHERAL VEIN, PERCUTANEOUS APPROACH: ICD-10-PCS | Performed by: SURGERY

## 2023-01-01 PROCEDURE — 84100 ASSAY OF PHOSPHORUS: CPT | Performed by: STUDENT IN AN ORGANIZED HEALTH CARE EDUCATION/TRAINING PROGRAM

## 2023-01-01 PROCEDURE — 99024 POSTOP FOLLOW-UP VISIT: CPT | Performed by: SURGERY

## 2023-01-01 PROCEDURE — 85730 THROMBOPLASTIN TIME PARTIAL: CPT

## 2023-01-01 PROCEDURE — 96375 TX/PRO/DX INJ NEW DRUG ADDON: CPT

## 2023-01-01 PROCEDURE — 0F9400Z DRAINAGE OF GALLBLADDER WITH DRAINAGE DEVICE, OPEN APPROACH: ICD-10-PCS | Performed by: SURGERY

## 2023-01-01 PROCEDURE — 88307 TISSUE EXAM BY PATHOLOGIST: CPT | Performed by: PATHOLOGY

## 2023-01-01 PROCEDURE — 82948 REAGENT STRIP/BLOOD GLUCOSE: CPT

## 2023-01-01 PROCEDURE — 83735 ASSAY OF MAGNESIUM: CPT

## 2023-01-01 PROCEDURE — 83735 ASSAY OF MAGNESIUM: CPT | Performed by: INTERNAL MEDICINE

## 2023-01-01 PROCEDURE — 88305 TISSUE EXAM BY PATHOLOGIST: CPT | Performed by: PATHOLOGY

## 2023-01-01 PROCEDURE — 84100 ASSAY OF PHOSPHORUS: CPT | Performed by: SURGERY

## 2023-01-01 PROCEDURE — 52332 CYSTOSCOPY AND TREATMENT: CPT | Performed by: UROLOGY

## 2023-01-01 PROCEDURE — 99233 SBSQ HOSP IP/OBS HIGH 50: CPT | Performed by: PHYSICIAN ASSISTANT

## 2023-01-01 PROCEDURE — 80076 HEPATIC FUNCTION PANEL: CPT | Performed by: INTERNAL MEDICINE

## 2023-01-01 PROCEDURE — 84100 ASSAY OF PHOSPHORUS: CPT

## 2023-01-01 PROCEDURE — 96368 THER/DIAG CONCURRENT INF: CPT

## 2023-01-01 PROCEDURE — 99291 CRITICAL CARE FIRST HOUR: CPT | Performed by: STUDENT IN AN ORGANIZED HEALTH CARE EDUCATION/TRAINING PROGRAM

## 2023-01-01 PROCEDURE — 93306 TTE W/DOPPLER COMPLETE: CPT | Performed by: INTERNAL MEDICINE

## 2023-01-01 PROCEDURE — NC001 PR NO CHARGE: Performed by: STUDENT IN AN ORGANIZED HEALTH CARE EDUCATION/TRAINING PROGRAM

## 2023-01-01 PROCEDURE — 87075 CULTR BACTERIA EXCEPT BLOOD: CPT | Performed by: SURGERY

## 2023-01-01 PROCEDURE — C9113 INJ PANTOPRAZOLE SODIUM, VIA: HCPCS

## 2023-01-01 PROCEDURE — 85730 THROMBOPLASTIN TIME PARTIAL: CPT | Performed by: PHYSICIAN ASSISTANT

## 2023-01-01 PROCEDURE — 0W9F0ZZ DRAINAGE OF ABDOMINAL WALL, OPEN APPROACH: ICD-10-PCS | Performed by: SURGERY

## 2023-01-01 PROCEDURE — 99222 1ST HOSP IP/OBS MODERATE 55: CPT | Performed by: INTERNAL MEDICINE

## 2023-01-01 PROCEDURE — 0FJ44ZZ INSPECTION OF GALLBLADDER, PERCUTANEOUS ENDOSCOPIC APPROACH: ICD-10-PCS | Performed by: SURGERY

## 2023-01-01 PROCEDURE — 86850 RBC ANTIBODY SCREEN: CPT

## 2023-01-01 PROCEDURE — 99291 CRITICAL CARE FIRST HOUR: CPT

## 2023-01-01 PROCEDURE — 99232 SBSQ HOSP IP/OBS MODERATE 35: CPT

## 2023-01-01 PROCEDURE — 99232 SBSQ HOSP IP/OBS MODERATE 35: CPT | Performed by: SURGERY

## 2023-01-01 PROCEDURE — 84300 ASSAY OF URINE SODIUM: CPT | Performed by: STUDENT IN AN ORGANIZED HEALTH CARE EDUCATION/TRAINING PROGRAM

## 2023-01-01 PROCEDURE — 85610 PROTHROMBIN TIME: CPT | Performed by: NURSE PRACTITIONER

## 2023-01-01 PROCEDURE — 99233 SBSQ HOSP IP/OBS HIGH 50: CPT | Performed by: STUDENT IN AN ORGANIZED HEALTH CARE EDUCATION/TRAINING PROGRAM

## 2023-01-01 PROCEDURE — 85007 BL SMEAR W/DIFF WBC COUNT: CPT

## 2023-01-01 PROCEDURE — 82330 ASSAY OF CALCIUM: CPT

## 2023-01-01 PROCEDURE — C2617 STENT, NON-COR, TEM W/O DEL: HCPCS | Performed by: UROLOGY

## 2023-01-01 PROCEDURE — 83735 ASSAY OF MAGNESIUM: CPT | Performed by: PHYSICIAN ASSISTANT

## 2023-01-01 PROCEDURE — 85025 COMPLETE CBC W/AUTO DIFF WBC: CPT | Performed by: STUDENT IN AN ORGANIZED HEALTH CARE EDUCATION/TRAINING PROGRAM

## 2023-01-01 PROCEDURE — 85730 THROMBOPLASTIN TIME PARTIAL: CPT | Performed by: NURSE PRACTITIONER

## 2023-01-01 PROCEDURE — G0299 HHS/HOSPICE OF RN EA 15 MIN: HCPCS

## 2023-01-01 PROCEDURE — 99291 CRITICAL CARE FIRST HOUR: CPT | Performed by: EMERGENCY MEDICINE

## 2023-01-01 PROCEDURE — 82105 ALPHA-FETOPROTEIN SERUM: CPT | Performed by: PHYSICIAN ASSISTANT

## 2023-01-01 PROCEDURE — 85730 THROMBOPLASTIN TIME PARTIAL: CPT | Performed by: SURGERY

## 2023-01-01 PROCEDURE — 83605 ASSAY OF LACTIC ACID: CPT

## 2023-01-01 PROCEDURE — 97110 THERAPEUTIC EXERCISES: CPT

## 2023-01-01 PROCEDURE — 99232 SBSQ HOSP IP/OBS MODERATE 35: CPT | Performed by: NURSE PRACTITIONER

## 2023-01-01 PROCEDURE — 99223 1ST HOSP IP/OBS HIGH 75: CPT | Performed by: STUDENT IN AN ORGANIZED HEALTH CARE EDUCATION/TRAINING PROGRAM

## 2023-01-01 PROCEDURE — 96366 THER/PROPH/DIAG IV INF ADDON: CPT

## 2023-01-01 PROCEDURE — 0WQF0ZZ REPAIR ABDOMINAL WALL, OPEN APPROACH: ICD-10-PCS | Performed by: SURGERY

## 2023-01-01 PROCEDURE — 84100 ASSAY OF PHOSPHORUS: CPT | Performed by: INTERNAL MEDICINE

## 2023-01-01 PROCEDURE — 84100 ASSAY OF PHOSPHORUS: CPT | Performed by: PHYSICIAN ASSISTANT

## 2023-01-01 PROCEDURE — 82330 ASSAY OF CALCIUM: CPT | Performed by: PHYSICIAN ASSISTANT

## 2023-01-01 PROCEDURE — 81001 URINALYSIS AUTO W/SCOPE: CPT

## 2023-01-01 PROCEDURE — 88342 IMHCHEM/IMCYTCHM 1ST ANTB: CPT | Performed by: PATHOLOGY

## 2023-01-01 PROCEDURE — 86901 BLOOD TYPING SEROLOGIC RH(D): CPT

## 2023-01-01 PROCEDURE — 97167 OT EVAL HIGH COMPLEX 60 MIN: CPT

## 2023-01-01 PROCEDURE — 87086 URINE CULTURE/COLONY COUNT: CPT

## 2023-01-01 PROCEDURE — 87070 CULTURE OTHR SPECIMN AEROBIC: CPT | Performed by: SURGERY

## 2023-01-01 PROCEDURE — 86304 IMMUNOASSAY TUMOR CA 125: CPT | Performed by: PHYSICIAN ASSISTANT

## 2023-01-01 PROCEDURE — 0T768DZ DILATION OF RIGHT URETER WITH INTRALUMINAL DEVICE, VIA NATURAL OR ARTIFICIAL OPENING ENDOSCOPIC: ICD-10-PCS | Performed by: UROLOGY

## 2023-01-01 PROCEDURE — 99233 SBSQ HOSP IP/OBS HIGH 50: CPT | Performed by: NURSE PRACTITIONER

## 2023-01-01 PROCEDURE — 84484 ASSAY OF TROPONIN QUANT: CPT | Performed by: EMERGENCY MEDICINE

## 2023-01-01 PROCEDURE — 36415 COLL VENOUS BLD VENIPUNCTURE: CPT

## 2023-01-01 PROCEDURE — 0DBU0ZX EXCISION OF OMENTUM, OPEN APPROACH, DIAGNOSTIC: ICD-10-PCS | Performed by: SURGERY

## 2023-01-01 PROCEDURE — 99285 EMERGENCY DEPT VISIT HI MDM: CPT

## 2023-01-01 PROCEDURE — 96376 TX/PRO/DX INJ SAME DRUG ADON: CPT

## 2023-01-01 PROCEDURE — 82570 ASSAY OF URINE CREATININE: CPT | Performed by: STUDENT IN AN ORGANIZED HEALTH CARE EDUCATION/TRAINING PROGRAM

## 2023-01-01 PROCEDURE — 74018 RADEX ABDOMEN 1 VIEW: CPT

## 2023-01-01 PROCEDURE — 76705 ECHO EXAM OF ABDOMEN: CPT

## 2023-01-01 PROCEDURE — 74176 CT ABD & PELVIS W/O CONTRAST: CPT

## 2023-01-01 PROCEDURE — 99232 SBSQ HOSP IP/OBS MODERATE 35: CPT | Performed by: UROLOGY

## 2023-01-01 PROCEDURE — 85610 PROTHROMBIN TIME: CPT | Performed by: PHYSICIAN ASSISTANT

## 2023-01-01 PROCEDURE — 85007 BL SMEAR W/DIFF WBC COUNT: CPT | Performed by: PHYSICIAN ASSISTANT

## 2023-01-01 PROCEDURE — C1769 GUIDE WIRE: HCPCS | Performed by: UROLOGY

## 2023-01-01 PROCEDURE — 87186 SC STD MICRODIL/AGAR DIL: CPT | Performed by: SURGERY

## 2023-01-01 PROCEDURE — 87040 BLOOD CULTURE FOR BACTERIA: CPT

## 2023-01-01 PROCEDURE — 71045 X-RAY EXAM CHEST 1 VIEW: CPT

## 2023-01-01 PROCEDURE — 97530 THERAPEUTIC ACTIVITIES: CPT

## 2023-01-01 PROCEDURE — 83605 ASSAY OF LACTIC ACID: CPT | Performed by: STUDENT IN AN ORGANIZED HEALTH CARE EDUCATION/TRAINING PROGRAM

## 2023-01-01 PROCEDURE — 80048 BASIC METABOLIC PNL TOTAL CA: CPT | Performed by: PHYSICIAN ASSISTANT

## 2023-01-01 PROCEDURE — 99233 SBSQ HOSP IP/OBS HIGH 50: CPT | Performed by: SURGERY

## 2023-01-01 PROCEDURE — 96365 THER/PROPH/DIAG IV INF INIT: CPT

## 2023-01-01 PROCEDURE — NC001 PR NO CHARGE: Performed by: SURGERY

## 2023-01-01 PROCEDURE — 86301 IMMUNOASSAY TUMOR CA 19-9: CPT | Performed by: PHYSICIAN ASSISTANT

## 2023-01-01 PROCEDURE — 88112 CYTOPATH CELL ENHANCE TECH: CPT | Performed by: PATHOLOGY

## 2023-01-01 PROCEDURE — 10330057 MEDICATION, GENERAL

## 2023-01-01 PROCEDURE — 97163 PT EVAL HIGH COMPLEX 45 MIN: CPT

## 2023-01-01 PROCEDURE — C1758 CATHETER, URETERAL: HCPCS | Performed by: UROLOGY

## 2023-01-01 PROCEDURE — 87086 URINE CULTURE/COLONY COUNT: CPT | Performed by: STUDENT IN AN ORGANIZED HEALTH CARE EDUCATION/TRAINING PROGRAM

## 2023-01-01 PROCEDURE — 74177 CT ABD & PELVIS W/CONTRAST: CPT

## 2023-01-01 PROCEDURE — 85025 COMPLETE CBC W/AUTO DIFF WBC: CPT | Performed by: SURGERY

## 2023-01-01 PROCEDURE — 83735 ASSAY OF MAGNESIUM: CPT | Performed by: SURGERY

## 2023-01-01 PROCEDURE — G0155 HHCP-SVS OF CSW,EA 15 MIN: HCPCS

## 2023-01-01 PROCEDURE — 99232 SBSQ HOSP IP/OBS MODERATE 35: CPT | Performed by: STUDENT IN AN ORGANIZED HEALTH CARE EDUCATION/TRAINING PROGRAM

## 2023-01-01 PROCEDURE — 74420 UROGRAPHY RTRGR +-KUB: CPT

## 2023-01-01 PROCEDURE — 85025 COMPLETE CBC W/AUTO DIFF WBC: CPT

## 2023-01-01 PROCEDURE — 0FB00ZX EXCISION OF LIVER, OPEN APPROACH, DIAGNOSTIC: ICD-10-PCS | Performed by: SURGERY

## 2023-01-01 PROCEDURE — 83735 ASSAY OF MAGNESIUM: CPT | Performed by: STUDENT IN AN ORGANIZED HEALTH CARE EDUCATION/TRAINING PROGRAM

## 2023-01-01 PROCEDURE — 76775 US EXAM ABDO BACK WALL LIM: CPT

## 2023-01-01 PROCEDURE — 87086 URINE CULTURE/COLONY COUNT: CPT | Performed by: UROLOGY

## 2023-01-01 PROCEDURE — 10330087 HSPC SERVICE INTENSITY ADD-ON

## 2023-01-01 PROCEDURE — 83690 ASSAY OF LIPASE: CPT

## 2023-01-01 PROCEDURE — BT1D1ZZ FLUOROSCOPY OF RIGHT KIDNEY, URETER AND BLADDER USING LOW OSMOLAR CONTRAST: ICD-10-PCS | Performed by: UROLOGY

## 2023-01-01 PROCEDURE — 88341 IMHCHEM/IMCYTCHM EA ADD ANTB: CPT | Performed by: PATHOLOGY

## 2023-01-01 PROCEDURE — 93306 TTE W/DOPPLER COMPLETE: CPT

## 2023-01-01 PROCEDURE — 80048 BASIC METABOLIC PNL TOTAL CA: CPT | Performed by: SURGERY

## 2023-01-01 PROCEDURE — 82378 CARCINOEMBRYONIC ANTIGEN: CPT | Performed by: PHYSICIAN ASSISTANT

## 2023-01-01 PROCEDURE — 85025 COMPLETE CBC W/AUTO DIFF WBC: CPT | Performed by: PHYSICIAN ASSISTANT

## 2023-01-01 PROCEDURE — 86900 BLOOD TYPING SEROLOGIC ABO: CPT

## 2023-01-01 DEVICE — INLAY OPTIMA URETERAL STENT W/O GUIDEWIRE
Type: IMPLANTABLE DEVICE | Site: URETER | Status: FUNCTIONAL
Brand: BARD® INLAY OPTIMA® URETERAL STENT

## 2023-01-01 RX ORDER — LIDOCAINE HYDROCHLORIDE 10 MG/ML
0.5 INJECTION, SOLUTION EPIDURAL; INFILTRATION; INTRACAUDAL; PERINEURAL ONCE AS NEEDED
Status: DISCONTINUED | OUTPATIENT
Start: 2023-01-01 | End: 2023-01-01 | Stop reason: HOSPADM

## 2023-01-01 RX ORDER — METOPROLOL TARTRATE 1 MG/ML
5 INJECTION, SOLUTION INTRAVENOUS EVERY 6 HOURS
Status: DISCONTINUED | OUTPATIENT
Start: 2023-01-01 | End: 2023-01-01

## 2023-01-01 RX ORDER — CHLORHEXIDINE GLUCONATE ORAL RINSE 1.2 MG/ML
15 SOLUTION DENTAL EVERY 12 HOURS SCHEDULED
Status: DISCONTINUED | OUTPATIENT
Start: 2023-01-01 | End: 2023-01-01

## 2023-01-01 RX ORDER — FENTANYL CITRATE/PF 50 MCG/ML
25 SYRINGE (ML) INJECTION
Status: DISCONTINUED | OUTPATIENT
Start: 2023-01-01 | End: 2023-01-01 | Stop reason: HOSPADM

## 2023-01-01 RX ORDER — HYDROMORPHONE HCL/PF 1 MG/ML
0.5 SYRINGE (ML) INJECTION
Status: DISCONTINUED | OUTPATIENT
Start: 2023-01-01 | End: 2023-01-01

## 2023-01-01 RX ORDER — MAGNESIUM HYDROXIDE 1200 MG/15ML
LIQUID ORAL AS NEEDED
Status: DISCONTINUED | OUTPATIENT
Start: 2023-01-01 | End: 2023-01-01 | Stop reason: HOSPADM

## 2023-01-01 RX ORDER — PANTOPRAZOLE SODIUM 40 MG/10ML
40 INJECTION, POWDER, LYOPHILIZED, FOR SOLUTION INTRAVENOUS EVERY 12 HOURS SCHEDULED
Status: DISCONTINUED | OUTPATIENT
Start: 2023-01-01 | End: 2023-01-01

## 2023-01-01 RX ORDER — PANTOPRAZOLE SODIUM 40 MG/10ML
40 INJECTION, POWDER, LYOPHILIZED, FOR SOLUTION INTRAVENOUS
Status: DISCONTINUED | OUTPATIENT
Start: 2023-01-01 | End: 2023-01-01

## 2023-01-01 RX ORDER — SODIUM CHLORIDE 9 MG/ML
5 INJECTION INTRAVENOUS DAILY
Qty: 300 ML | Refills: 0 | Status: SHIPPED | OUTPATIENT
Start: 2023-01-01 | End: 2023-12-02 | Stop reason: CLARIF

## 2023-01-01 RX ORDER — SODIUM CHLORIDE, SODIUM GLUCONATE, SODIUM ACETATE, POTASSIUM CHLORIDE, MAGNESIUM CHLORIDE, SODIUM PHOSPHATE, DIBASIC, AND POTASSIUM PHOSPHATE .53; .5; .37; .037; .03; .012; .00082 G/100ML; G/100ML; G/100ML; G/100ML; G/100ML; G/100ML; G/100ML
75 INJECTION, SOLUTION INTRAVENOUS CONTINUOUS
Status: DISCONTINUED | OUTPATIENT
Start: 2023-01-01 | End: 2023-01-01

## 2023-01-01 RX ORDER — PROMETHAZINE HYDROCHLORIDE 25 MG/ML
25 INJECTION, SOLUTION INTRAMUSCULAR; INTRAVENOUS EVERY 6 HOURS PRN
Status: DISCONTINUED | OUTPATIENT
Start: 2023-01-01 | End: 2023-01-01

## 2023-01-01 RX ORDER — HEPARIN SODIUM 1000 [USP'U]/ML
2000 INJECTION, SOLUTION INTRAVENOUS; SUBCUTANEOUS EVERY 6 HOURS PRN
Status: DISCONTINUED | OUTPATIENT
Start: 2023-01-01 | End: 2023-01-01

## 2023-01-01 RX ORDER — HYDROMORPHONE HCL IN WATER/PF 6 MG/30 ML
0.2 PATIENT CONTROLLED ANALGESIA SYRINGE INTRAVENOUS
Status: DISCONTINUED | OUTPATIENT
Start: 2023-01-01 | End: 2023-01-01 | Stop reason: HOSPADM

## 2023-01-01 RX ORDER — ALBUMIN, HUMAN INJ 5% 5 %
12.5 SOLUTION INTRAVENOUS ONCE
Status: COMPLETED | OUTPATIENT
Start: 2023-01-01 | End: 2023-01-01

## 2023-01-01 RX ORDER — METHOCARBAMOL 100 MG/ML
1000 INJECTION, SOLUTION INTRAMUSCULAR; INTRAVENOUS EVERY 8 HOURS SCHEDULED
Status: DISCONTINUED | OUTPATIENT
Start: 2023-01-01 | End: 2023-01-01

## 2023-01-01 RX ORDER — PANTOPRAZOLE SODIUM 40 MG/1
40 TABLET, DELAYED RELEASE ORAL
Status: DISCONTINUED | OUTPATIENT
Start: 2023-01-01 | End: 2023-01-01

## 2023-01-01 RX ORDER — SODIUM CHLORIDE, SODIUM LACTATE, POTASSIUM CHLORIDE, CALCIUM CHLORIDE 600; 310; 30; 20 MG/100ML; MG/100ML; MG/100ML; MG/100ML
INJECTION, SOLUTION INTRAVENOUS CONTINUOUS PRN
Status: DISCONTINUED | OUTPATIENT
Start: 2023-01-01 | End: 2023-01-01

## 2023-01-01 RX ORDER — CALCIUM GLUCONATE 20 MG/ML
1 INJECTION, SOLUTION INTRAVENOUS ONCE
Status: COMPLETED | OUTPATIENT
Start: 2023-01-01 | End: 2023-01-01

## 2023-01-01 RX ORDER — FUROSEMIDE 10 MG/ML
20 INJECTION INTRAMUSCULAR; INTRAVENOUS ONCE
Status: COMPLETED | OUTPATIENT
Start: 2023-01-01 | End: 2023-01-01

## 2023-01-01 RX ORDER — HYDROMORPHONE HCL IN WATER/PF 6 MG/30 ML
0.2 PATIENT CONTROLLED ANALGESIA SYRINGE INTRAVENOUS ONCE
Status: COMPLETED | OUTPATIENT
Start: 2023-01-01 | End: 2023-01-01

## 2023-01-01 RX ORDER — HYDROMORPHONE HCL/PF 1 MG/ML
0.5 SYRINGE (ML) INJECTION
Status: DISCONTINUED | OUTPATIENT
Start: 2023-01-01 | End: 2023-01-01 | Stop reason: HOSPADM

## 2023-01-01 RX ORDER — LORAZEPAM 2 MG/ML
0.5 INJECTION INTRAMUSCULAR
Status: DISCONTINUED | OUTPATIENT
Start: 2023-01-01 | End: 2023-01-01 | Stop reason: HOSPADM

## 2023-01-01 RX ORDER — POTASSIUM CHLORIDE 20 MEQ/1
40 TABLET, EXTENDED RELEASE ORAL ONCE
Status: DISCONTINUED | OUTPATIENT
Start: 2023-01-01 | End: 2023-01-01

## 2023-01-01 RX ORDER — WARFARIN SODIUM 5 MG/1
5 TABLET ORAL
Status: DISCONTINUED | OUTPATIENT
Start: 2023-01-01 | End: 2023-01-01

## 2023-01-01 RX ORDER — HEPARIN SODIUM 5000 [USP'U]/ML
5000 INJECTION, SOLUTION INTRAVENOUS; SUBCUTANEOUS EVERY 8 HOURS SCHEDULED
Status: DISCONTINUED | OUTPATIENT
Start: 2023-01-01 | End: 2023-01-01

## 2023-01-01 RX ORDER — LANOLIN ALCOHOL/MO/W.PET/CERES
3 CREAM (GRAM) TOPICAL
Status: DISCONTINUED | OUTPATIENT
Start: 2023-01-01 | End: 2023-01-01 | Stop reason: HOSPADM

## 2023-01-01 RX ORDER — LABETALOL HYDROCHLORIDE 5 MG/ML
10 INJECTION, SOLUTION INTRAVENOUS EVERY 4 HOURS
Status: DISCONTINUED | OUTPATIENT
Start: 2023-01-01 | End: 2023-01-01

## 2023-01-01 RX ORDER — CALCIUM GLUCONATE 20 MG/ML
2 INJECTION, SOLUTION INTRAVENOUS ONCE
Status: COMPLETED | OUTPATIENT
Start: 2023-01-01 | End: 2023-01-01

## 2023-01-01 RX ORDER — MORPHINE SULFATE 100 MG/5ML
5 SOLUTION, CONCENTRATE ORAL
Qty: 30 ML | Refills: 0 | Status: SHIPPED | OUTPATIENT
Start: 2023-01-01 | End: 2023-12-02 | Stop reason: CLARIF

## 2023-01-01 RX ORDER — ONDANSETRON 2 MG/ML
4 INJECTION INTRAMUSCULAR; INTRAVENOUS EVERY 4 HOURS PRN
Status: DISCONTINUED | OUTPATIENT
Start: 2023-01-01 | End: 2023-01-01 | Stop reason: HOSPADM

## 2023-01-01 RX ORDER — HEPARIN SODIUM 1000 [USP'U]/ML
4000 INJECTION, SOLUTION INTRAVENOUS; SUBCUTANEOUS EVERY 6 HOURS PRN
Status: DISCONTINUED | OUTPATIENT
Start: 2023-01-01 | End: 2023-01-01

## 2023-01-01 RX ORDER — POTASSIUM CHLORIDE 14.9 MG/ML
20 INJECTION INTRAVENOUS ONCE
Status: COMPLETED | OUTPATIENT
Start: 2023-01-01 | End: 2023-01-01

## 2023-01-01 RX ORDER — METOCLOPRAMIDE HYDROCHLORIDE 5 MG/ML
10 INJECTION INTRAMUSCULAR; INTRAVENOUS EVERY 6 HOURS
Status: DISCONTINUED | OUTPATIENT
Start: 2023-01-01 | End: 2023-01-01 | Stop reason: HOSPADM

## 2023-01-01 RX ORDER — HYDROMORPHONE HCL/PF 1 MG/ML
0.2 SYRINGE (ML) INJECTION
Status: DISCONTINUED | OUTPATIENT
Start: 2023-01-01 | End: 2023-01-01

## 2023-01-01 RX ORDER — BUPIVACAINE HYDROCHLORIDE 5 MG/ML
INJECTION, SOLUTION EPIDURAL; INTRACAUDAL AS NEEDED
Status: DISCONTINUED | OUTPATIENT
Start: 2023-01-01 | End: 2023-01-01 | Stop reason: HOSPADM

## 2023-01-01 RX ORDER — METOPROLOL TARTRATE 1 MG/ML
5 INJECTION, SOLUTION INTRAVENOUS EVERY 6 HOURS PRN
Status: DISCONTINUED | OUTPATIENT
Start: 2023-01-01 | End: 2023-01-01

## 2023-01-01 RX ORDER — FENTANYL CITRATE/PF 50 MCG/ML
50 SYRINGE (ML) INJECTION
Status: DISCONTINUED | OUTPATIENT
Start: 2023-01-01 | End: 2023-01-01 | Stop reason: HOSPADM

## 2023-01-01 RX ORDER — METOCLOPRAMIDE HYDROCHLORIDE 5 MG/ML
10 INJECTION INTRAMUSCULAR; INTRAVENOUS EVERY 6 HOURS PRN
Status: DISCONTINUED | OUTPATIENT
Start: 2023-01-01 | End: 2023-01-01

## 2023-01-01 RX ORDER — ONDANSETRON 2 MG/ML
4 INJECTION INTRAMUSCULAR; INTRAVENOUS ONCE AS NEEDED
Status: DISCONTINUED | OUTPATIENT
Start: 2023-01-01 | End: 2023-01-01 | Stop reason: HOSPADM

## 2023-01-01 RX ORDER — ONDANSETRON 2 MG/ML
4 INJECTION INTRAMUSCULAR; INTRAVENOUS ONCE
Status: COMPLETED | OUTPATIENT
Start: 2023-01-01 | End: 2023-01-01

## 2023-01-01 RX ORDER — HEPARIN SODIUM 10000 [USP'U]/100ML
3-24 INJECTION, SOLUTION INTRAVENOUS
Status: DISCONTINUED | OUTPATIENT
Start: 2023-01-01 | End: 2023-01-01

## 2023-01-01 RX ORDER — HYDROMORPHONE HCL IN WATER/PF 6 MG/30 ML
0.2 PATIENT CONTROLLED ANALGESIA SYRINGE INTRAVENOUS
Status: DISCONTINUED | OUTPATIENT
Start: 2023-01-01 | End: 2023-01-01

## 2023-01-01 RX ORDER — SUCRALFATE 1 G/1
1 TABLET ORAL
Status: DISCONTINUED | OUTPATIENT
Start: 2023-01-01 | End: 2023-01-01

## 2023-01-01 RX ORDER — MAGNESIUM SULFATE HEPTAHYDRATE 40 MG/ML
4 INJECTION, SOLUTION INTRAVENOUS ONCE
Status: COMPLETED | OUTPATIENT
Start: 2023-01-01 | End: 2023-01-01

## 2023-01-01 RX ORDER — SUCCINYLCHOLINE/SOD CL,ISO/PF 100 MG/5ML
SYRINGE (ML) INTRAVENOUS AS NEEDED
Status: DISCONTINUED | OUTPATIENT
Start: 2023-01-01 | End: 2023-01-01

## 2023-01-01 RX ORDER — ACETAMINOPHEN 325 MG/1
975 TABLET ORAL EVERY 8 HOURS SCHEDULED
Status: DISCONTINUED | OUTPATIENT
Start: 2023-01-01 | End: 2023-01-01

## 2023-01-01 RX ORDER — BISACODYL 5 MG/1
5 TABLET, DELAYED RELEASE ORAL ONCE
Status: COMPLETED | OUTPATIENT
Start: 2023-01-01 | End: 2023-01-01

## 2023-01-01 RX ORDER — PANTOPRAZOLE SODIUM 40 MG/1
40 TABLET, DELAYED RELEASE ORAL DAILY
Status: DISCONTINUED | OUTPATIENT
Start: 2023-01-01 | End: 2023-01-01

## 2023-01-01 RX ORDER — ACETAMINOPHEN 10 MG/ML
1000 INJECTION, SOLUTION INTRAVENOUS EVERY 8 HOURS
Status: DISCONTINUED | OUTPATIENT
Start: 2023-01-01 | End: 2023-01-01

## 2023-01-01 RX ORDER — METOCLOPRAMIDE HYDROCHLORIDE 5 MG/ML
10 INJECTION INTRAMUSCULAR; INTRAVENOUS ONCE AS NEEDED
Status: DISCONTINUED | OUTPATIENT
Start: 2023-01-01 | End: 2023-01-01 | Stop reason: HOSPADM

## 2023-01-01 RX ORDER — MORPHINE SULFATE 100 MG/5ML
5 SOLUTION, CONCENTRATE ORAL
Status: DISCONTINUED | OUTPATIENT
Start: 2023-01-01 | End: 2023-01-01 | Stop reason: HOSPADM

## 2023-01-01 RX ORDER — CALCIUM CARBONATE 500 MG/1
1000 TABLET, CHEWABLE ORAL ONCE
Status: DISCONTINUED | OUTPATIENT
Start: 2023-01-01 | End: 2023-01-01

## 2023-01-01 RX ORDER — LORAZEPAM 0.5 MG/1
0.5 TABLET ORAL
Status: DISCONTINUED | OUTPATIENT
Start: 2023-01-01 | End: 2023-01-01 | Stop reason: HOSPADM

## 2023-01-01 RX ORDER — CEFAZOLIN SODIUM 2 G/50ML
2000 SOLUTION INTRAVENOUS EVERY 12 HOURS
Status: COMPLETED | OUTPATIENT
Start: 2023-01-01 | End: 2023-01-01

## 2023-01-01 RX ORDER — HALOPERIDOL 2 MG/ML
SOLUTION ORAL
Qty: 30 ML | Refills: 0 | Status: SHIPPED | OUTPATIENT
Start: 2023-01-01 | End: 2023-12-02 | Stop reason: CLARIF

## 2023-01-01 RX ORDER — LABETALOL HYDROCHLORIDE 5 MG/ML
10 INJECTION, SOLUTION INTRAVENOUS EVERY 4 HOURS PRN
Status: DISCONTINUED | OUTPATIENT
Start: 2023-01-01 | End: 2023-01-01

## 2023-01-01 RX ORDER — LORAZEPAM 2 MG/ML
CONCENTRATE ORAL
Qty: 30 ML | Refills: 0 | Status: SHIPPED | OUTPATIENT
Start: 2023-01-01 | End: 2023-12-02 | Stop reason: CLARIF

## 2023-01-01 RX ORDER — ESCITALOPRAM OXALATE 10 MG/1
10 TABLET ORAL DAILY
Status: DISCONTINUED | OUTPATIENT
Start: 2023-01-01 | End: 2023-01-01

## 2023-01-01 RX ORDER — ACETAMINOPHEN 325 MG/1
650 TABLET ORAL EVERY 6 HOURS PRN
Status: DISCONTINUED | OUTPATIENT
Start: 2023-01-01 | End: 2023-01-01

## 2023-01-01 RX ORDER — ALBUTEROL SULFATE 2.5 MG/3ML
2.5 SOLUTION RESPIRATORY (INHALATION) ONCE AS NEEDED
Status: DISCONTINUED | OUTPATIENT
Start: 2023-01-01 | End: 2023-01-01 | Stop reason: HOSPADM

## 2023-01-01 RX ORDER — FUROSEMIDE 20 MG/1
20 TABLET ORAL DAILY
Status: DISCONTINUED | OUTPATIENT
Start: 2023-01-01 | End: 2023-01-01

## 2023-01-01 RX ORDER — POTASSIUM CHLORIDE 20 MEQ/1
40 TABLET, EXTENDED RELEASE ORAL ONCE
Status: COMPLETED | OUTPATIENT
Start: 2023-01-01 | End: 2023-01-01

## 2023-01-01 RX ORDER — METRONIDAZOLE 500 MG/100ML
500 INJECTION, SOLUTION INTRAVENOUS EVERY 8 HOURS
Status: COMPLETED | OUTPATIENT
Start: 2023-01-01 | End: 2023-01-01

## 2023-01-01 RX ORDER — ONDANSETRON 2 MG/ML
4 INJECTION INTRAMUSCULAR; INTRAVENOUS ONCE AS NEEDED
Status: COMPLETED | OUTPATIENT
Start: 2023-01-01 | End: 2023-01-01

## 2023-01-01 RX ORDER — HEPARIN SODIUM 10000 [USP'U]/100ML
3-20 INJECTION, SOLUTION INTRAVENOUS
Status: DISCONTINUED | OUTPATIENT
Start: 2023-01-01 | End: 2023-01-01

## 2023-01-01 RX ORDER — FUROSEMIDE 10 MG/ML
10 INJECTION INTRAMUSCULAR; INTRAVENOUS DAILY
Status: DISCONTINUED | OUTPATIENT
Start: 2023-01-01 | End: 2023-01-01

## 2023-01-01 RX ORDER — DIPHENHYDRAMINE HYDROCHLORIDE 50 MG/ML
12.5 INJECTION INTRAMUSCULAR; INTRAVENOUS ONCE AS NEEDED
Status: DISCONTINUED | OUTPATIENT
Start: 2023-01-01 | End: 2023-01-01 | Stop reason: HOSPADM

## 2023-01-01 RX ORDER — HYDROMORPHONE HCL/PF 1 MG/ML
0.5 SYRINGE (ML) INJECTION ONCE
Status: COMPLETED | OUTPATIENT
Start: 2023-01-01 | End: 2023-01-01

## 2023-01-01 RX ORDER — OXYCODONE HYDROCHLORIDE 5 MG/1
5 TABLET ORAL EVERY 4 HOURS PRN
Status: DISCONTINUED | OUTPATIENT
Start: 2023-01-01 | End: 2023-01-01

## 2023-01-01 RX ORDER — IPRATROPIUM BROMIDE AND ALBUTEROL SULFATE 2.5; .5 MG/3ML; MG/3ML
3 SOLUTION RESPIRATORY (INHALATION) ONCE
Status: COMPLETED | OUTPATIENT
Start: 2023-01-01 | End: 2023-01-01

## 2023-01-01 RX ORDER — POLYETHYLENE GLYCOL 3350 17 G/17G
17 POWDER, FOR SOLUTION ORAL 2 TIMES DAILY
Status: DISCONTINUED | OUTPATIENT
Start: 2023-01-01 | End: 2023-01-01

## 2023-01-01 RX ORDER — LORAZEPAM 0.5 MG/1
0.5 TABLET ORAL
Qty: 10 TABLET | Refills: 0 | Status: SHIPPED | OUTPATIENT
Start: 2023-01-01 | End: 2023-01-01

## 2023-01-01 RX ORDER — LIDOCAINE HYDROCHLORIDE 10 MG/ML
INJECTION, SOLUTION EPIDURAL; INFILTRATION; INTRACAUDAL; PERINEURAL AS NEEDED
Status: DISCONTINUED | OUTPATIENT
Start: 2023-01-01 | End: 2023-01-01

## 2023-01-01 RX ORDER — PROPOFOL 10 MG/ML
INJECTION, EMULSION INTRAVENOUS AS NEEDED
Status: DISCONTINUED | OUTPATIENT
Start: 2023-01-01 | End: 2023-01-01

## 2023-01-01 RX ADMIN — METRONIDAZOLE 500 MG: 500 INJECTION, SOLUTION INTRAVENOUS at 22:47

## 2023-01-01 RX ADMIN — HYDROMORPHONE HYDROCHLORIDE 0.2 MG: 0.2 INJECTION, SOLUTION INTRAMUSCULAR; INTRAVENOUS; SUBCUTANEOUS at 12:50

## 2023-01-01 RX ADMIN — PANTOPRAZOLE SODIUM 40 MG: 40 INJECTION, POWDER, FOR SOLUTION INTRAVENOUS at 20:58

## 2023-01-01 RX ADMIN — ONDANSETRON 4 MG: 2 INJECTION INTRAMUSCULAR; INTRAVENOUS at 18:28

## 2023-01-01 RX ADMIN — PHYTONADIONE 10 MG: 10 INJECTION, EMULSION INTRAMUSCULAR; INTRAVENOUS; SUBCUTANEOUS at 15:08

## 2023-01-01 RX ADMIN — HEPARIN SODIUM 11.8 UNITS/KG/HR: 10000 INJECTION, SOLUTION INTRAVENOUS at 18:21

## 2023-01-01 RX ADMIN — METOCLOPRAMIDE 10 MG: 5 INJECTION, SOLUTION INTRAMUSCULAR; INTRAVENOUS at 13:54

## 2023-01-01 RX ADMIN — METOROPROLOL TARTRATE 5 MG: 5 INJECTION, SOLUTION INTRAVENOUS at 15:40

## 2023-01-01 RX ADMIN — HEPARIN SODIUM 2000 UNITS: 1000 INJECTION INTRAVENOUS; SUBCUTANEOUS at 13:09

## 2023-01-01 RX ADMIN — METHOCARBAMOL 1000 MG: 100 INJECTION INTRAMUSCULAR; INTRAVENOUS at 05:09

## 2023-01-01 RX ADMIN — PANTOPRAZOLE SODIUM 40 MG: 40 INJECTION, POWDER, FOR SOLUTION INTRAVENOUS at 21:06

## 2023-01-01 RX ADMIN — ONDANSETRON 4 MG: 2 INJECTION INTRAMUSCULAR; INTRAVENOUS at 00:30

## 2023-01-01 RX ADMIN — METOROPROLOL TARTRATE 5 MG: 5 INJECTION, SOLUTION INTRAVENOUS at 04:02

## 2023-01-01 RX ADMIN — METOROPROLOL TARTRATE 5 MG: 5 INJECTION, SOLUTION INTRAVENOUS at 04:49

## 2023-01-01 RX ADMIN — IPRATROPIUM BROMIDE AND ALBUTEROL SULFATE 3 ML: 2.5; .5 SOLUTION RESPIRATORY (INHALATION) at 15:49

## 2023-01-01 RX ADMIN — POLYETHYLENE GLYCOL 3350 17 G: 17 POWDER, FOR SOLUTION ORAL at 17:14

## 2023-01-01 RX ADMIN — METRONIDAZOLE 500 MG: 500 INJECTION, SOLUTION INTRAVENOUS at 07:27

## 2023-01-01 RX ADMIN — METOCLOPRAMIDE 10 MG: 5 INJECTION, SOLUTION INTRAMUSCULAR; INTRAVENOUS at 16:54

## 2023-01-01 RX ADMIN — METRONIDAZOLE 500 MG: 500 INJECTION, SOLUTION INTRAVENOUS at 23:49

## 2023-01-01 RX ADMIN — HYDROMORPHONE HYDROCHLORIDE 0.2 MG: 0.2 INJECTION, SOLUTION INTRAMUSCULAR; INTRAVENOUS; SUBCUTANEOUS at 04:03

## 2023-01-01 RX ADMIN — CHLORHEXIDINE GLUCONATE 15 ML: 1.2 SOLUTION ORAL at 00:14

## 2023-01-01 RX ADMIN — ONDANSETRON 4 MG: 2 INJECTION INTRAMUSCULAR; INTRAVENOUS at 22:49

## 2023-01-01 RX ADMIN — METRONIDAZOLE 500 MG: 500 INJECTION, SOLUTION INTRAVENOUS at 17:50

## 2023-01-01 RX ADMIN — CEFAZOLIN SODIUM 2000 MG: 2 SOLUTION INTRAVENOUS at 16:11

## 2023-01-01 RX ADMIN — CHLORHEXIDINE GLUCONATE 15 ML: 1.2 SOLUTION ORAL at 08:45

## 2023-01-01 RX ADMIN — METOCLOPRAMIDE 10 MG: 5 INJECTION, SOLUTION INTRAMUSCULAR; INTRAVENOUS at 21:53

## 2023-01-01 RX ADMIN — CEFAZOLIN SODIUM 2000 MG: 2 SOLUTION INTRAVENOUS at 05:21

## 2023-01-01 RX ADMIN — POLYETHYLENE GLYCOL 3350 17 G: 17 POWDER, FOR SOLUTION ORAL at 18:17

## 2023-01-01 RX ADMIN — METOROPROLOL TARTRATE 5 MG: 5 INJECTION, SOLUTION INTRAVENOUS at 17:17

## 2023-01-01 RX ADMIN — CALCIUM GLUCONATE 2 G: 20 INJECTION, SOLUTION INTRAVENOUS at 08:45

## 2023-01-01 RX ADMIN — SODIUM CHLORIDE, SODIUM GLUCONATE, SODIUM ACETATE, POTASSIUM CHLORIDE, MAGNESIUM CHLORIDE, SODIUM PHOSPHATE, DIBASIC, AND POTASSIUM PHOSPHATE 75 ML/HR: .53; .5; .37; .037; .03; .012; .00082 INJECTION, SOLUTION INTRAVENOUS at 06:11

## 2023-01-01 RX ADMIN — WARFARIN SODIUM 5 MG: 5 TABLET ORAL at 17:40

## 2023-01-01 RX ADMIN — CEFAZOLIN SODIUM 2000 MG: 2 SOLUTION INTRAVENOUS at 17:14

## 2023-01-01 RX ADMIN — METOCLOPRAMIDE 10 MG: 5 INJECTION, SOLUTION INTRAMUSCULAR; INTRAVENOUS at 02:07

## 2023-01-01 RX ADMIN — METRONIDAZOLE 500 MG: 500 INJECTION, SOLUTION INTRAVENOUS at 15:27

## 2023-01-01 RX ADMIN — HYDROMORPHONE HYDROCHLORIDE 0.2 MG: 0.2 INJECTION, SOLUTION INTRAMUSCULAR; INTRAVENOUS; SUBCUTANEOUS at 23:23

## 2023-01-01 RX ADMIN — METRONIDAZOLE 500 MG: 500 INJECTION, SOLUTION INTRAVENOUS at 22:54

## 2023-01-01 RX ADMIN — HEPARIN SODIUM 5000 UNITS: 5000 INJECTION INTRAVENOUS; SUBCUTANEOUS at 13:16

## 2023-01-01 RX ADMIN — LIDOCAINE HYDROCHLORIDE 50 MG: 10 INJECTION, SOLUTION EPIDURAL; INFILTRATION; INTRACAUDAL; PERINEURAL at 14:01

## 2023-01-01 RX ADMIN — CEFAZOLIN SODIUM 2000 MG: 2 SOLUTION INTRAVENOUS at 21:32

## 2023-01-01 RX ADMIN — ONDANSETRON 4 MG: 2 INJECTION INTRAMUSCULAR; INTRAVENOUS at 13:04

## 2023-01-01 RX ADMIN — METOCLOPRAMIDE 10 MG: 5 INJECTION, SOLUTION INTRAMUSCULAR; INTRAVENOUS at 20:38

## 2023-01-01 RX ADMIN — METOROPROLOL TARTRATE 5 MG: 5 INJECTION, SOLUTION INTRAVENOUS at 03:39

## 2023-01-01 RX ADMIN — SODIUM CHLORIDE, SODIUM GLUCONATE, SODIUM ACETATE, POTASSIUM CHLORIDE, MAGNESIUM CHLORIDE, SODIUM PHOSPHATE, DIBASIC, AND POTASSIUM PHOSPHATE 50 ML/HR: .53; .5; .37; .037; .03; .012; .00082 INJECTION, SOLUTION INTRAVENOUS at 10:46

## 2023-01-01 RX ADMIN — HYDROMORPHONE HYDROCHLORIDE 0.5 MG: 1 INJECTION, SOLUTION INTRAMUSCULAR; INTRAVENOUS; SUBCUTANEOUS at 02:39

## 2023-01-01 RX ADMIN — HYDROMORPHONE HYDROCHLORIDE 0.2 MG: 0.2 INJECTION, SOLUTION INTRAMUSCULAR; INTRAVENOUS; SUBCUTANEOUS at 13:17

## 2023-01-01 RX ADMIN — METOROPROLOL TARTRATE 5 MG: 5 INJECTION, SOLUTION INTRAVENOUS at 09:53

## 2023-01-01 RX ADMIN — METOCLOPRAMIDE 10 MG: 5 INJECTION, SOLUTION INTRAMUSCULAR; INTRAVENOUS at 03:01

## 2023-01-01 RX ADMIN — CALCIUM GLUCONATE 2 G: 20 INJECTION, SOLUTION INTRAVENOUS at 07:26

## 2023-01-01 RX ADMIN — HYDROMORPHONE HYDROCHLORIDE 0.2 MG: 0.2 INJECTION, SOLUTION INTRAMUSCULAR; INTRAVENOUS; SUBCUTANEOUS at 02:07

## 2023-01-01 RX ADMIN — METOROPROLOL TARTRATE 5 MG: 5 INJECTION, SOLUTION INTRAVENOUS at 04:19

## 2023-01-01 RX ADMIN — BISACODYL 5 MG: 5 TABLET, COATED ORAL at 09:10

## 2023-01-01 RX ADMIN — CHLORHEXIDINE GLUCONATE 15 ML: 1.2 SOLUTION ORAL at 08:23

## 2023-01-01 RX ADMIN — CHLORHEXIDINE GLUCONATE 15 ML: 1.2 SOLUTION ORAL at 13:57

## 2023-01-01 RX ADMIN — CEFAZOLIN SODIUM 2000 MG: 2 SOLUTION INTRAVENOUS at 04:19

## 2023-01-01 RX ADMIN — METOROPROLOL TARTRATE 5 MG: 5 INJECTION, SOLUTION INTRAVENOUS at 10:53

## 2023-01-01 RX ADMIN — HYDROMORPHONE HYDROCHLORIDE 0.2 MG: 0.2 INJECTION, SOLUTION INTRAMUSCULAR; INTRAVENOUS; SUBCUTANEOUS at 03:52

## 2023-01-01 RX ADMIN — HEPARIN SODIUM 9.8 UNITS/KG/HR: 10000 INJECTION, SOLUTION INTRAVENOUS at 19:31

## 2023-01-01 RX ADMIN — SODIUM CHLORIDE, SODIUM GLUCONATE, SODIUM ACETATE, POTASSIUM CHLORIDE, MAGNESIUM CHLORIDE, SODIUM PHOSPHATE, DIBASIC, AND POTASSIUM PHOSPHATE 100 ML/HR: .53; .5; .37; .037; .03; .012; .00082 INJECTION, SOLUTION INTRAVENOUS at 21:04

## 2023-01-01 RX ADMIN — HYDROMORPHONE HYDROCHLORIDE 0.5 MG: 1 INJECTION, SOLUTION INTRAMUSCULAR; INTRAVENOUS; SUBCUTANEOUS at 21:33

## 2023-01-01 RX ADMIN — CEFAZOLIN SODIUM 2000 MG: 2 SOLUTION INTRAVENOUS at 17:21

## 2023-01-01 RX ADMIN — METHOCARBAMOL 1000 MG: 100 INJECTION INTRAMUSCULAR; INTRAVENOUS at 21:11

## 2023-01-01 RX ADMIN — METOROPROLOL TARTRATE 5 MG: 5 INJECTION, SOLUTION INTRAVENOUS at 16:54

## 2023-01-01 RX ADMIN — HYDROMORPHONE HYDROCHLORIDE 0.5 MG: 1 INJECTION, SOLUTION INTRAMUSCULAR; INTRAVENOUS; SUBCUTANEOUS at 20:56

## 2023-01-01 RX ADMIN — HYDROMORPHONE HYDROCHLORIDE 0.5 MG: 1 INJECTION, SOLUTION INTRAMUSCULAR; INTRAVENOUS; SUBCUTANEOUS at 14:04

## 2023-01-01 RX ADMIN — HEPARIN SODIUM 5000 UNITS: 5000 INJECTION INTRAVENOUS; SUBCUTANEOUS at 05:26

## 2023-01-01 RX ADMIN — IOHEXOL 85 ML: 350 INJECTION, SOLUTION INTRAVENOUS at 09:26

## 2023-01-01 RX ADMIN — ONDANSETRON 4 MG: 2 INJECTION INTRAMUSCULAR; INTRAVENOUS at 18:51

## 2023-01-01 RX ADMIN — ONDANSETRON 4 MG: 2 INJECTION INTRAMUSCULAR; INTRAVENOUS at 14:54

## 2023-01-01 RX ADMIN — PANTOPRAZOLE SODIUM 40 MG: 40 INJECTION, POWDER, FOR SOLUTION INTRAVENOUS at 08:23

## 2023-01-01 RX ADMIN — MAGNESIUM SULFATE HEPTAHYDRATE 4 G: 40 INJECTION, SOLUTION INTRAVENOUS at 11:02

## 2023-01-01 RX ADMIN — SODIUM CHLORIDE, SODIUM GLUCONATE, SODIUM ACETATE, POTASSIUM CHLORIDE, MAGNESIUM CHLORIDE, SODIUM PHOSPHATE, DIBASIC, AND POTASSIUM PHOSPHATE 50 ML/HR: .53; .5; .37; .037; .03; .012; .00082 INJECTION, SOLUTION INTRAVENOUS at 11:29

## 2023-01-01 RX ADMIN — HYDROMORPHONE HYDROCHLORIDE 0.2 MG: 0.2 INJECTION, SOLUTION INTRAMUSCULAR; INTRAVENOUS; SUBCUTANEOUS at 21:27

## 2023-01-01 RX ADMIN — HYDROMORPHONE HYDROCHLORIDE 0.2 MG: 0.2 INJECTION, SOLUTION INTRAMUSCULAR; INTRAVENOUS; SUBCUTANEOUS at 21:25

## 2023-01-01 RX ADMIN — FUROSEMIDE 10 MG: 10 INJECTION, SOLUTION INTRAMUSCULAR; INTRAVENOUS at 16:57

## 2023-01-01 RX ADMIN — METOCLOPRAMIDE 10 MG: 5 INJECTION, SOLUTION INTRAMUSCULAR; INTRAVENOUS at 05:36

## 2023-01-01 RX ADMIN — HYDROMORPHONE HYDROCHLORIDE 0.2 MG: 0.2 INJECTION, SOLUTION INTRAMUSCULAR; INTRAVENOUS; SUBCUTANEOUS at 11:44

## 2023-01-01 RX ADMIN — ESCITALOPRAM OXALATE 10 MG: 10 TABLET ORAL at 10:36

## 2023-01-01 RX ADMIN — METRONIDAZOLE 500 MG: 500 INJECTION, SOLUTION INTRAVENOUS at 08:44

## 2023-01-01 RX ADMIN — FUROSEMIDE 20 MG: 10 INJECTION, SOLUTION INTRAMUSCULAR; INTRAVENOUS at 14:36

## 2023-01-01 RX ADMIN — HYDROMORPHONE HYDROCHLORIDE 0.2 MG: 0.2 INJECTION, SOLUTION INTRAMUSCULAR; INTRAVENOUS; SUBCUTANEOUS at 22:53

## 2023-01-01 RX ADMIN — METOROPROLOL TARTRATE 5 MG: 5 INJECTION, SOLUTION INTRAVENOUS at 21:53

## 2023-01-01 RX ADMIN — ALBUMIN (HUMAN) 12.5 G: 12.5 INJECTION, SOLUTION INTRAVENOUS at 13:43

## 2023-01-01 RX ADMIN — HYDROMORPHONE HYDROCHLORIDE 0.2 MG: 0.2 INJECTION, SOLUTION INTRAMUSCULAR; INTRAVENOUS; SUBCUTANEOUS at 19:35

## 2023-01-01 RX ADMIN — CHLORHEXIDINE GLUCONATE 15 ML: 1.2 SOLUTION ORAL at 21:06

## 2023-01-01 RX ADMIN — HEPARIN SODIUM 5000 UNITS: 5000 INJECTION INTRAVENOUS; SUBCUTANEOUS at 21:06

## 2023-01-01 RX ADMIN — HYDROMORPHONE HYDROCHLORIDE 0.5 MG: 1 INJECTION, SOLUTION INTRAMUSCULAR; INTRAVENOUS; SUBCUTANEOUS at 19:47

## 2023-01-01 RX ADMIN — PANTOPRAZOLE SODIUM 40 MG: 40 INJECTION, POWDER, FOR SOLUTION INTRAVENOUS at 08:44

## 2023-01-01 RX ADMIN — METRONIDAZOLE 500 MG: 500 INJECTION, SOLUTION INTRAVENOUS at 09:09

## 2023-01-01 RX ADMIN — PHYTONADIONE 10 MG: 10 INJECTION, EMULSION INTRAMUSCULAR; INTRAVENOUS; SUBCUTANEOUS at 17:38

## 2023-01-01 RX ADMIN — ACETAMINOPHEN 1000 MG: 10 INJECTION INTRAVENOUS at 21:06

## 2023-01-01 RX ADMIN — HYDROMORPHONE HYDROCHLORIDE 0.5 MG: 1 INJECTION, SOLUTION INTRAMUSCULAR; INTRAVENOUS; SUBCUTANEOUS at 05:26

## 2023-01-01 RX ADMIN — OXYCODONE HYDROCHLORIDE 5 MG: 5 TABLET ORAL at 22:44

## 2023-01-01 RX ADMIN — CHLORHEXIDINE GLUCONATE 15 ML: 1.2 SOLUTION ORAL at 08:06

## 2023-01-01 RX ADMIN — CHLORHEXIDINE GLUCONATE 15 ML: 1.2 SOLUTION ORAL at 21:53

## 2023-01-01 RX ADMIN — CEFAZOLIN SODIUM 2000 MG: 2 SOLUTION INTRAVENOUS at 03:50

## 2023-01-01 RX ADMIN — METOPROLOL TARTRATE 25 MG: 25 TABLET, FILM COATED ORAL at 10:36

## 2023-01-01 RX ADMIN — HEPARIN SODIUM 6.8 UNITS/KG/HR: 10000 INJECTION, SOLUTION INTRAVENOUS at 07:33

## 2023-01-01 RX ADMIN — METOROPROLOL TARTRATE 5 MG: 5 INJECTION, SOLUTION INTRAVENOUS at 10:30

## 2023-01-01 RX ADMIN — ONDANSETRON 4 MG: 2 INJECTION INTRAMUSCULAR; INTRAVENOUS at 15:00

## 2023-01-01 RX ADMIN — METOCLOPRAMIDE 10 MG: 5 INJECTION, SOLUTION INTRAMUSCULAR; INTRAVENOUS at 20:40

## 2023-01-01 RX ADMIN — POTASSIUM CHLORIDE 40 MEQ: 1500 TABLET, EXTENDED RELEASE ORAL at 09:52

## 2023-01-01 RX ADMIN — METRONIDAZOLE 500 MG: 500 INJECTION, SOLUTION INTRAVENOUS at 07:33

## 2023-01-01 RX ADMIN — ACETAMINOPHEN 650 MG: 325 TABLET, FILM COATED ORAL at 05:14

## 2023-01-01 RX ADMIN — METOROPROLOL TARTRATE 5 MG: 5 INJECTION, SOLUTION INTRAVENOUS at 21:19

## 2023-01-01 RX ADMIN — SODIUM CHLORIDE, SODIUM LACTATE, POTASSIUM CHLORIDE, AND CALCIUM CHLORIDE 600 ML: .6; .31; .03; .02 INJECTION, SOLUTION INTRAVENOUS at 16:15

## 2023-01-01 RX ADMIN — SODIUM CHLORIDE, SODIUM LACTATE, POTASSIUM CHLORIDE, AND CALCIUM CHLORIDE 1000 ML: .6; .31; .03; .02 INJECTION, SOLUTION INTRAVENOUS at 11:15

## 2023-01-01 RX ADMIN — ACETAMINOPHEN 1000 MG: 10 INJECTION INTRAVENOUS at 11:25

## 2023-01-01 RX ADMIN — PROMETHAZINE HYDROCHLORIDE 25 MG: 25 INJECTION INTRAMUSCULAR; INTRAVENOUS at 22:49

## 2023-01-01 RX ADMIN — SODIUM CHLORIDE, SODIUM GLUCONATE, SODIUM ACETATE, POTASSIUM CHLORIDE, MAGNESIUM CHLORIDE, SODIUM PHOSPHATE, DIBASIC, AND POTASSIUM PHOSPHATE 50 ML/HR: .53; .5; .37; .037; .03; .012; .00082 INJECTION, SOLUTION INTRAVENOUS at 15:33

## 2023-01-01 RX ADMIN — CHLORHEXIDINE GLUCONATE 15 ML: 1.2 SOLUTION ORAL at 21:42

## 2023-01-01 RX ADMIN — METRONIDAZOLE 500 MG: 500 INJECTION, SOLUTION INTRAVENOUS at 08:56

## 2023-01-01 RX ADMIN — WARFARIN SODIUM 5 MG: 5 TABLET ORAL at 18:15

## 2023-01-01 RX ADMIN — CEFAZOLIN SODIUM 2000 MG: 2 SOLUTION INTRAVENOUS at 15:54

## 2023-01-01 RX ADMIN — CALCIUM GLUCONATE 1 G: 20 INJECTION, SOLUTION INTRAVENOUS at 10:36

## 2023-01-01 RX ADMIN — PHENYLEPHRINE HYDROCHLORIDE 60 MCG/MIN: 10 INJECTION INTRAVENOUS at 14:11

## 2023-01-01 RX ADMIN — METOROPROLOL TARTRATE 5 MG: 5 INJECTION, SOLUTION INTRAVENOUS at 04:03

## 2023-01-01 RX ADMIN — OXYCODONE HYDROCHLORIDE 5 MG: 5 TABLET ORAL at 02:46

## 2023-01-01 RX ADMIN — METOCLOPRAMIDE 10 MG: 5 INJECTION, SOLUTION INTRAMUSCULAR; INTRAVENOUS at 08:47

## 2023-01-01 RX ADMIN — POLYETHYLENE GLYCOL 3350 17 G: 17 POWDER, FOR SOLUTION ORAL at 09:52

## 2023-01-01 RX ADMIN — HYDROMORPHONE HYDROCHLORIDE 0.2 MG: 0.2 INJECTION, SOLUTION INTRAMUSCULAR; INTRAVENOUS; SUBCUTANEOUS at 14:26

## 2023-01-01 RX ADMIN — ACETAMINOPHEN 1000 MG: 10 INJECTION INTRAVENOUS at 03:44

## 2023-01-01 RX ADMIN — HYDROMORPHONE HYDROCHLORIDE 0.5 MG: 1 INJECTION, SOLUTION INTRAMUSCULAR; INTRAVENOUS; SUBCUTANEOUS at 18:51

## 2023-01-01 RX ADMIN — METOROPROLOL TARTRATE 5 MG: 5 INJECTION, SOLUTION INTRAVENOUS at 10:16

## 2023-01-01 RX ADMIN — METOROPROLOL TARTRATE 5 MG: 5 INJECTION, SOLUTION INTRAVENOUS at 04:42

## 2023-01-01 RX ADMIN — METOROPROLOL TARTRATE 5 MG: 5 INJECTION, SOLUTION INTRAVENOUS at 11:25

## 2023-01-01 RX ADMIN — HYDROMORPHONE HYDROCHLORIDE 0.5 MG: 1 INJECTION, SOLUTION INTRAMUSCULAR; INTRAVENOUS; SUBCUTANEOUS at 18:16

## 2023-01-01 RX ADMIN — HYDROMORPHONE HYDROCHLORIDE 0.2 MG: 0.2 INJECTION, SOLUTION INTRAMUSCULAR; INTRAVENOUS; SUBCUTANEOUS at 20:37

## 2023-01-01 RX ADMIN — CHLORHEXIDINE GLUCONATE 15 ML: 1.2 SOLUTION ORAL at 09:44

## 2023-01-01 RX ADMIN — METOROPROLOL TARTRATE 5 MG: 5 INJECTION, SOLUTION INTRAVENOUS at 17:13

## 2023-01-01 RX ADMIN — HYDROMORPHONE HYDROCHLORIDE 0.2 MG: 0.2 INJECTION, SOLUTION INTRAMUSCULAR; INTRAVENOUS; SUBCUTANEOUS at 11:16

## 2023-01-01 RX ADMIN — SODIUM CHLORIDE, SODIUM GLUCONATE, SODIUM ACETATE, POTASSIUM CHLORIDE, MAGNESIUM CHLORIDE, SODIUM PHOSPHATE, DIBASIC, AND POTASSIUM PHOSPHATE 50 ML/HR: .53; .5; .37; .037; .03; .012; .00082 INJECTION, SOLUTION INTRAVENOUS at 07:09

## 2023-01-01 RX ADMIN — HYDROMORPHONE HYDROCHLORIDE 0.5 MG: 1 INJECTION, SOLUTION INTRAMUSCULAR; INTRAVENOUS; SUBCUTANEOUS at 12:52

## 2023-01-01 RX ADMIN — METOROPROLOL TARTRATE 5 MG: 5 INJECTION, SOLUTION INTRAVENOUS at 09:44

## 2023-01-01 RX ADMIN — METRONIDAZOLE 500 MG: 500 INJECTION, SOLUTION INTRAVENOUS at 14:36

## 2023-01-01 RX ADMIN — HYDROMORPHONE HYDROCHLORIDE 0.5 MG: 1 INJECTION, SOLUTION INTRAMUSCULAR; INTRAVENOUS; SUBCUTANEOUS at 11:02

## 2023-01-01 RX ADMIN — METOCLOPRAMIDE 10 MG: 5 INJECTION, SOLUTION INTRAMUSCULAR; INTRAVENOUS at 21:39

## 2023-01-01 RX ADMIN — METOCLOPRAMIDE 10 MG: 5 INJECTION, SOLUTION INTRAMUSCULAR; INTRAVENOUS at 10:16

## 2023-01-01 RX ADMIN — CEFAZOLIN SODIUM 2000 MG: 2 SOLUTION INTRAVENOUS at 03:39

## 2023-01-01 RX ADMIN — METRONIDAZOLE 500 MG: 500 INJECTION, SOLUTION INTRAVENOUS at 22:38

## 2023-01-01 RX ADMIN — FUROSEMIDE 10 MG: 10 INJECTION, SOLUTION INTRAMUSCULAR; INTRAVENOUS at 08:53

## 2023-01-01 RX ADMIN — ONDANSETRON 4 MG: 2 INJECTION INTRAMUSCULAR; INTRAVENOUS at 04:19

## 2023-01-01 RX ADMIN — CALCIUM GLUCONATE 2 G: 20 INJECTION, SOLUTION INTRAVENOUS at 09:21

## 2023-01-01 RX ADMIN — CHLORHEXIDINE GLUCONATE 15 ML: 1.2 SOLUTION ORAL at 09:24

## 2023-01-01 RX ADMIN — METOROPROLOL TARTRATE 5 MG: 5 INJECTION, SOLUTION INTRAVENOUS at 21:39

## 2023-01-01 RX ADMIN — CHLORHEXIDINE GLUCONATE 15 ML: 1.2 SOLUTION ORAL at 21:38

## 2023-01-01 RX ADMIN — SODIUM CHLORIDE, SODIUM GLUCONATE, SODIUM ACETATE, POTASSIUM CHLORIDE, MAGNESIUM CHLORIDE, SODIUM PHOSPHATE, DIBASIC, AND POTASSIUM PHOSPHATE 50 ML/HR: .53; .5; .37; .037; .03; .012; .00082 INJECTION, SOLUTION INTRAVENOUS at 12:06

## 2023-01-01 RX ADMIN — ONDANSETRON 4 MG: 2 INJECTION INTRAMUSCULAR; INTRAVENOUS at 04:30

## 2023-01-01 RX ADMIN — SODIUM CHLORIDE, SODIUM GLUCONATE, SODIUM ACETATE, POTASSIUM CHLORIDE, MAGNESIUM CHLORIDE, SODIUM PHOSPHATE, DIBASIC, AND POTASSIUM PHOSPHATE 100 ML/HR: .53; .5; .37; .037; .03; .012; .00082 INJECTION, SOLUTION INTRAVENOUS at 17:56

## 2023-01-01 RX ADMIN — SODIUM BICARBONATE: 84 INJECTION, SOLUTION INTRAVENOUS at 14:29

## 2023-01-01 RX ADMIN — METRONIDAZOLE 500 MG: 500 INJECTION, SOLUTION INTRAVENOUS at 00:12

## 2023-01-01 RX ADMIN — METRONIDAZOLE 500 MG: 500 INJECTION, SOLUTION INTRAVENOUS at 16:49

## 2023-01-01 RX ADMIN — METOROPROLOL TARTRATE 5 MG: 5 INJECTION, SOLUTION INTRAVENOUS at 09:21

## 2023-01-01 RX ADMIN — PIPERACILLIN AND TAZOBACTAM 3.38 G: 36; 4.5 INJECTION, POWDER, FOR SOLUTION INTRAVENOUS at 13:31

## 2023-01-01 RX ADMIN — SODIUM BICARBONATE: 84 INJECTION, SOLUTION INTRAVENOUS at 07:35

## 2023-01-01 RX ADMIN — Medication 1500 UNITS: at 17:23

## 2023-01-01 RX ADMIN — METOROPROLOL TARTRATE 5 MG: 5 INJECTION, SOLUTION INTRAVENOUS at 15:54

## 2023-01-01 RX ADMIN — HEPARIN SODIUM 5000 UNITS: 5000 INJECTION INTRAVENOUS; SUBCUTANEOUS at 21:09

## 2023-01-01 RX ADMIN — HYDROMORPHONE HYDROCHLORIDE 0.5 MG: 1 INJECTION, SOLUTION INTRAMUSCULAR; INTRAVENOUS; SUBCUTANEOUS at 23:21

## 2023-01-01 RX ADMIN — CHLORHEXIDINE GLUCONATE 15 ML: 1.2 SOLUTION ORAL at 21:09

## 2023-01-01 RX ADMIN — METOROPROLOL TARTRATE 5 MG: 5 INJECTION, SOLUTION INTRAVENOUS at 10:08

## 2023-01-01 RX ADMIN — HEPARIN SODIUM 5000 UNITS: 5000 INJECTION INTRAVENOUS; SUBCUTANEOUS at 05:38

## 2023-01-01 RX ADMIN — METOCLOPRAMIDE 10 MG: 5 INJECTION, SOLUTION INTRAMUSCULAR; INTRAVENOUS at 09:50

## 2023-01-01 RX ADMIN — CEFAZOLIN SODIUM 2000 MG: 2 SOLUTION INTRAVENOUS at 04:18

## 2023-01-01 RX ADMIN — PROPOFOL 110 MG: 10 INJECTION, EMULSION INTRAVENOUS at 14:01

## 2023-01-01 RX ADMIN — METOCLOPRAMIDE 10 MG: 5 INJECTION, SOLUTION INTRAMUSCULAR; INTRAVENOUS at 03:20

## 2023-01-01 RX ADMIN — CEFAZOLIN SODIUM 2000 ML: 2 SOLUTION INTRAVENOUS at 14:06

## 2023-01-01 RX ADMIN — METOROPROLOL TARTRATE 5 MG: 5 INJECTION, SOLUTION INTRAVENOUS at 21:41

## 2023-01-01 RX ADMIN — PANTOPRAZOLE SODIUM 40 MG: 40 TABLET, DELAYED RELEASE ORAL at 10:36

## 2023-01-01 RX ADMIN — OXYCODONE HYDROCHLORIDE 5 MG: 5 TABLET ORAL at 18:28

## 2023-01-01 RX ADMIN — METRONIDAZOLE 500 MG: 500 INJECTION, SOLUTION INTRAVENOUS at 09:10

## 2023-01-01 RX ADMIN — HYDROMORPHONE HYDROCHLORIDE 0.5 MG: 1 INJECTION, SOLUTION INTRAMUSCULAR; INTRAVENOUS; SUBCUTANEOUS at 10:30

## 2023-01-01 RX ADMIN — HEPARIN SODIUM 5000 UNITS: 5000 INJECTION INTRAVENOUS; SUBCUTANEOUS at 21:29

## 2023-01-01 RX ADMIN — CEFAZOLIN SODIUM 2000 MG: 2 SOLUTION INTRAVENOUS at 17:17

## 2023-01-01 RX ADMIN — METOROPROLOL TARTRATE 5 MG: 5 INJECTION, SOLUTION INTRAVENOUS at 04:17

## 2023-01-01 RX ADMIN — POTASSIUM CHLORIDE 20 MEQ: 14.9 INJECTION, SOLUTION INTRAVENOUS at 08:44

## 2023-01-01 RX ADMIN — FUROSEMIDE 10 MG: 10 INJECTION, SOLUTION INTRAMUSCULAR; INTRAVENOUS at 08:06

## 2023-01-01 RX ADMIN — METOCLOPRAMIDE 10 MG: 5 INJECTION, SOLUTION INTRAMUSCULAR; INTRAVENOUS at 01:17

## 2023-01-01 RX ADMIN — LORAZEPAM 0.5 MG: 0.5 TABLET ORAL at 04:21

## 2023-01-01 RX ADMIN — SUCRALFATE 1 G: 1 TABLET ORAL at 10:36

## 2023-01-01 RX ADMIN — HYDROMORPHONE HYDROCHLORIDE 0.5 MG: 1 INJECTION, SOLUTION INTRAMUSCULAR; INTRAVENOUS; SUBCUTANEOUS at 01:59

## 2023-01-01 RX ADMIN — PROMETHAZINE HYDROCHLORIDE 25 MG: 25 INJECTION INTRAMUSCULAR; INTRAVENOUS at 07:26

## 2023-01-01 RX ADMIN — HYDROMORPHONE HYDROCHLORIDE 0.2 MG: 0.2 INJECTION, SOLUTION INTRAMUSCULAR; INTRAVENOUS; SUBCUTANEOUS at 07:51

## 2023-01-01 RX ADMIN — METOROPROLOL TARTRATE 5 MG: 5 INJECTION, SOLUTION INTRAVENOUS at 21:38

## 2023-01-01 RX ADMIN — METRONIDAZOLE 500 MG: 500 INJECTION, SOLUTION INTRAVENOUS at 15:30

## 2023-01-01 RX ADMIN — METOCLOPRAMIDE 10 MG: 5 INJECTION, SOLUTION INTRAMUSCULAR; INTRAVENOUS at 09:33

## 2023-01-01 RX ADMIN — ONDANSETRON 4 MG: 2 INJECTION INTRAMUSCULAR; INTRAVENOUS at 11:16

## 2023-01-01 RX ADMIN — HEPARIN SODIUM 5000 UNITS: 5000 INJECTION INTRAVENOUS; SUBCUTANEOUS at 05:32

## 2023-01-01 RX ADMIN — HYDROMORPHONE HYDROCHLORIDE 0.5 MG: 1 INJECTION, SOLUTION INTRAMUSCULAR; INTRAVENOUS; SUBCUTANEOUS at 01:37

## 2023-01-01 RX ADMIN — POLYETHYLENE GLYCOL 3350 17 G: 17 POWDER, FOR SOLUTION ORAL at 09:44

## 2023-01-01 RX ADMIN — HYDROMORPHONE HYDROCHLORIDE 0.5 MG: 1 INJECTION, SOLUTION INTRAMUSCULAR; INTRAVENOUS; SUBCUTANEOUS at 07:01

## 2023-01-01 RX ADMIN — METRONIDAZOLE 500 MG: 500 INJECTION, SOLUTION INTRAVENOUS at 23:15

## 2023-01-01 RX ADMIN — HEPARIN SODIUM 5000 UNITS: 5000 INJECTION INTRAVENOUS; SUBCUTANEOUS at 13:30

## 2023-01-01 RX ADMIN — METRONIDAZOLE 500 MG: 500 INJECTION, SOLUTION INTRAVENOUS at 23:23

## 2023-01-01 RX ADMIN — Medication 80 MG: at 14:01

## 2023-01-01 RX ADMIN — METOCLOPRAMIDE 10 MG: 5 INJECTION, SOLUTION INTRAMUSCULAR; INTRAVENOUS at 01:20

## 2023-01-01 RX ADMIN — SODIUM CHLORIDE, SODIUM GLUCONATE, SODIUM ACETATE, POTASSIUM CHLORIDE, MAGNESIUM CHLORIDE, SODIUM PHOSPHATE, DIBASIC, AND POTASSIUM PHOSPHATE 100 ML/HR: .53; .5; .37; .037; .03; .012; .00082 INJECTION, SOLUTION INTRAVENOUS at 05:20

## 2023-01-01 RX ADMIN — SODIUM CHLORIDE, SODIUM LACTATE, POTASSIUM CHLORIDE, CALCIUM CHLORIDE: 600; 310; 30; 20 INJECTION, SOLUTION INTRAVENOUS at 13:43

## 2023-01-01 RX ADMIN — METRONIDAZOLE: 500 INJECTION, SOLUTION INTRAVENOUS at 14:32

## 2023-01-01 RX ADMIN — METOCLOPRAMIDE 10 MG: 5 INJECTION, SOLUTION INTRAMUSCULAR; INTRAVENOUS at 15:54

## 2023-01-01 RX ADMIN — HYDROMORPHONE HYDROCHLORIDE 0.2 MG: 0.2 INJECTION, SOLUTION INTRAMUSCULAR; INTRAVENOUS; SUBCUTANEOUS at 03:50

## 2023-01-01 RX ADMIN — ACETAMINOPHEN 1000 MG: 10 INJECTION INTRAVENOUS at 11:44

## 2023-01-01 RX ADMIN — METOROPROLOL TARTRATE 5 MG: 5 INJECTION, SOLUTION INTRAVENOUS at 17:25

## 2023-01-01 RX ADMIN — CEFAZOLIN SODIUM 2000 MG: 2 SOLUTION INTRAVENOUS at 03:53

## 2023-01-01 RX ADMIN — HYDROMORPHONE HYDROCHLORIDE 0.2 MG: 1 INJECTION, SOLUTION INTRAMUSCULAR; INTRAVENOUS; SUBCUTANEOUS at 09:08

## 2023-01-01 RX ADMIN — ACETAMINOPHEN 1000 MG: 10 INJECTION INTRAVENOUS at 21:05

## 2023-01-01 RX ADMIN — CEFAZOLIN SODIUM 2000 MG: 2 SOLUTION INTRAVENOUS at 15:40

## 2023-01-01 RX ADMIN — HYDROMORPHONE HYDROCHLORIDE 0.2 MG: 0.2 INJECTION, SOLUTION INTRAMUSCULAR; INTRAVENOUS; SUBCUTANEOUS at 00:30

## 2023-01-01 RX ADMIN — ONDANSETRON 4 MG: 2 INJECTION INTRAMUSCULAR; INTRAVENOUS at 03:44

## 2023-01-01 RX ADMIN — METRONIDAZOLE 500 MG: 500 INJECTION, SOLUTION INTRAVENOUS at 14:49

## 2023-01-01 RX ADMIN — CHLORHEXIDINE GLUCONATE 15 ML: 1.2 SOLUTION ORAL at 21:19

## 2023-01-01 RX ADMIN — METOCLOPRAMIDE 10 MG: 5 INJECTION, SOLUTION INTRAMUSCULAR; INTRAVENOUS at 21:01

## 2023-01-01 RX ADMIN — METOROPROLOL TARTRATE 5 MG: 5 INJECTION, SOLUTION INTRAVENOUS at 18:13

## 2023-01-01 RX ADMIN — CHLORHEXIDINE GLUCONATE 15 ML: 1.2 SOLUTION ORAL at 08:53

## 2023-01-01 RX ADMIN — CHLORHEXIDINE GLUCONATE 15 ML: 1.2 SOLUTION ORAL at 09:09

## 2023-01-01 RX ADMIN — ACETAMINOPHEN 1000 MG: 10 INJECTION INTRAVENOUS at 05:00

## 2023-01-01 RX ADMIN — HYDROMORPHONE HYDROCHLORIDE 0.5 MG: 1 INJECTION, SOLUTION INTRAMUSCULAR; INTRAVENOUS; SUBCUTANEOUS at 04:21

## 2023-01-01 RX ADMIN — METRONIDAZOLE 500 MG: 500 INJECTION, SOLUTION INTRAVENOUS at 07:02

## 2023-01-01 RX ADMIN — METOROPROLOL TARTRATE 5 MG: 5 INJECTION, SOLUTION INTRAVENOUS at 22:55

## 2023-01-11 ENCOUNTER — TELEPHONE (OUTPATIENT)
Dept: LAB | Facility: HOSPITAL | Age: 83
End: 2023-01-11

## 2023-01-11 ENCOUNTER — ANTICOAG VISIT (OUTPATIENT)
Dept: FAMILY MEDICINE CLINIC | Facility: CLINIC | Age: 83
End: 2023-01-11

## 2023-01-11 ENCOUNTER — APPOINTMENT (OUTPATIENT)
Dept: LAB | Facility: HOSPITAL | Age: 83
End: 2023-01-11

## 2023-02-08 ENCOUNTER — TELEPHONE (OUTPATIENT)
Dept: LAB | Facility: HOSPITAL | Age: 83
End: 2023-02-08

## 2023-02-08 ENCOUNTER — APPOINTMENT (OUTPATIENT)
Dept: LAB | Facility: HOSPITAL | Age: 83
End: 2023-02-08

## 2023-02-08 ENCOUNTER — ANTICOAG VISIT (OUTPATIENT)
Dept: FAMILY MEDICINE CLINIC | Facility: CLINIC | Age: 83
End: 2023-02-08

## 2023-02-09 ENCOUNTER — TELEPHONE (OUTPATIENT)
Dept: FAMILY MEDICINE CLINIC | Facility: CLINIC | Age: 83
End: 2023-02-09

## 2023-02-09 DIAGNOSIS — I48.0 PAROXYSMAL ATRIAL FIBRILLATION (HCC): Primary | ICD-10-CM

## 2023-02-09 NOTE — TELEPHONE ENCOUNTER
[place] lab called asking if you can please put in an order for the patient to have another INR done in one month

## 2023-02-16 ENCOUNTER — TELEPHONE (OUTPATIENT)
Dept: FAMILY MEDICINE CLINIC | Facility: CLINIC | Age: 83
End: 2023-02-16

## 2023-02-16 NOTE — TELEPHONE ENCOUNTER
Patient daughter called- patient is experiencing balance issues  Patient knees hurt-refusing to walk or do exercises  Daughter is requesting therapy for patient      Patient used to use [facility]   Daughter gave fax # [phone]  Person of choice for therapist there was Betito Orourke      Pt daughter Jason Knox [phone]

## 2023-02-28 ENCOUNTER — RA CDI HCC (OUTPATIENT)
Dept: OTHER | Facility: HOSPITAL | Age: 83
End: 2023-02-28

## 2023-03-08 ENCOUNTER — TELEPHONE (OUTPATIENT)
Dept: LAB | Facility: HOSPITAL | Age: 83
End: 2023-03-08

## 2023-03-08 ENCOUNTER — ANTICOAG VISIT (OUTPATIENT)
Dept: FAMILY MEDICINE CLINIC | Facility: CLINIC | Age: 83
End: 2023-03-08

## 2023-03-08 ENCOUNTER — APPOINTMENT (OUTPATIENT)
Dept: LAB | Facility: HOSPITAL | Age: 83
End: 2023-03-08
Attending: FAMILY MEDICINE

## 2023-03-20 DIAGNOSIS — I10 ESSENTIAL HYPERTENSION: ICD-10-CM

## 2023-03-20 RX ORDER — LISINOPRIL 5 MG/1
TABLET ORAL
Qty: 90 TABLET | Refills: 0 | Status: SHIPPED | OUTPATIENT
Start: 2023-03-20

## 2023-04-06 ENCOUNTER — ANTICOAG VISIT (OUTPATIENT)
Dept: FAMILY MEDICINE CLINIC | Facility: CLINIC | Age: 83
End: 2023-04-06

## 2023-05-02 ENCOUNTER — TELEPHONE (OUTPATIENT)
Dept: LAB | Facility: HOSPITAL | Age: 83
End: 2023-05-02

## 2023-05-04 ENCOUNTER — APPOINTMENT (OUTPATIENT)
Dept: LAB | Facility: HOSPITAL | Age: 83
End: 2023-05-04
Attending: FAMILY MEDICINE

## 2023-05-04 ENCOUNTER — ANTICOAG VISIT (OUTPATIENT)
Dept: FAMILY MEDICINE CLINIC | Facility: CLINIC | Age: 83
End: 2023-05-04

## 2023-05-15 DIAGNOSIS — F41.9 ANXIETY: ICD-10-CM

## 2023-05-16 RX ORDER — ALPRAZOLAM 0.25 MG/1
TABLET ORAL
Qty: 90 TABLET | Refills: 3 | Status: SHIPPED | OUTPATIENT
Start: 2023-05-16

## 2023-05-16 NOTE — TELEPHONE ENCOUNTER
1  [id number] 01/23/2023 11/08/2022 ALPRAZolam (Tablet)  90 0 30 0 25 MG NA SURAJ BROADT  [facility] Medicare 1 / 3 [place]    1  [id number] 11/08/2022 11/08/2022 ALPRAZolam (Tablet)  90 0 30 0 25 MG NA SURAJ BROADT  [facility] Medicare 0 / 3 [place]    1  [id number] 07/07/2022 02/14/2022 ALPRAZolam (Tablet)  90 0 30 0 25 MG NA [id number] Medicare 2 / 3

## 2023-06-05 ENCOUNTER — TELEPHONE (OUTPATIENT)
Dept: LAB | Facility: HOSPITAL | Age: 83
End: 2023-06-05

## 2023-06-05 DIAGNOSIS — I10 ESSENTIAL HYPERTENSION: ICD-10-CM

## 2023-06-05 RX ORDER — AMLODIPINE BESYLATE 5 MG/1
TABLET ORAL
Qty: 90 TABLET | Refills: 0 | Status: SHIPPED | OUTPATIENT
Start: 2023-06-05

## 2023-06-07 ENCOUNTER — ANTICOAG VISIT (OUTPATIENT)
Dept: FAMILY MEDICINE CLINIC | Facility: CLINIC | Age: 83
End: 2023-06-07

## 2023-06-07 ENCOUNTER — APPOINTMENT (OUTPATIENT)
Dept: LAB | Facility: HOSPITAL | Age: 83
End: 2023-06-07
Attending: FAMILY MEDICINE
Payer: MEDICARE

## 2023-06-22 ENCOUNTER — RA CDI HCC (OUTPATIENT)
Dept: OTHER | Facility: HOSPITAL | Age: 83
End: 2023-06-22

## 2023-06-22 NOTE — PROGRESS NOTES
I13 0  [facility]  coding opportunities          Chart Reviewed number of suggestions sent to Provider: 1     Patients Insurance     Medicare Insurance: [employer]

## 2023-07-05 ENCOUNTER — TELEPHONE (OUTPATIENT)
Dept: LAB | Facility: HOSPITAL | Age: 83
End: 2023-07-05

## 2023-07-06 ENCOUNTER — APPOINTMENT (OUTPATIENT)
Dept: LAB | Facility: HOSPITAL | Age: 83
End: 2023-07-06
Attending: FAMILY MEDICINE
Payer: MEDICARE

## 2023-07-07 ENCOUNTER — ANTICOAG VISIT (OUTPATIENT)
Dept: FAMILY MEDICINE CLINIC | Facility: CLINIC | Age: 83
End: 2023-07-07

## 2023-07-09 DIAGNOSIS — D64.9 ANEMIA: ICD-10-CM

## 2023-07-10 RX ORDER — PANTOPRAZOLE SODIUM 40 MG/1
TABLET, DELAYED RELEASE ORAL
Qty: 60 TABLET | Refills: 3 | Status: SHIPPED | OUTPATIENT
Start: 2023-07-10

## 2023-07-21 ENCOUNTER — RA CDI HCC (OUTPATIENT)
Dept: OTHER | Facility: HOSPITAL | Age: 83
End: 2023-07-21

## 2023-08-04 ENCOUNTER — TELEPHONE (OUTPATIENT)
Dept: LAB | Facility: HOSPITAL | Age: 83
End: 2023-08-04

## 2023-08-09 ENCOUNTER — ANTICOAG VISIT (OUTPATIENT)
Dept: FAMILY MEDICINE CLINIC | Facility: CLINIC | Age: 83
End: 2023-08-09

## 2023-08-09 ENCOUNTER — APPOINTMENT (OUTPATIENT)
Dept: LAB | Facility: HOSPITAL | Age: 83
End: 2023-08-09
Attending: FAMILY MEDICINE
Payer: MEDICARE

## 2023-08-11 ENCOUNTER — TELEPHONE (OUTPATIENT)
Dept: LAB | Facility: HOSPITAL | Age: 83
End: 2023-08-11

## 2023-08-16 ENCOUNTER — APPOINTMENT (OUTPATIENT)
Dept: LAB | Facility: HOSPITAL | Age: 83
End: 2023-08-16
Attending: FAMILY MEDICINE
Payer: MEDICARE

## 2023-08-16 ENCOUNTER — TELEPHONE (OUTPATIENT)
Dept: LAB | Facility: HOSPITAL | Age: 83
End: 2023-08-16

## 2023-08-16 ENCOUNTER — ANTICOAG VISIT (OUTPATIENT)
Dept: FAMILY MEDICINE CLINIC | Facility: CLINIC | Age: 83
End: 2023-08-16

## 2023-08-18 ENCOUNTER — ANTICOAG VISIT (OUTPATIENT)
Dept: FAMILY MEDICINE CLINIC | Facility: CLINIC | Age: 83
End: 2023-08-18

## 2023-08-18 ENCOUNTER — APPOINTMENT (OUTPATIENT)
Dept: LAB | Facility: HOSPITAL | Age: 83
End: 2023-08-18
Attending: FAMILY MEDICINE
Payer: MEDICARE

## 2023-08-18 DIAGNOSIS — I48.0 PAROXYSMAL ATRIAL FIBRILLATION (HCC): ICD-10-CM

## 2023-08-18 LAB
INR PPP: 2.38 (ref 0.84–1.19)
PROTHROMBIN TIME: 26.3 SECONDS (ref 11.6–14.5)

## 2023-08-18 PROCEDURE — 36415 COLL VENOUS BLD VENIPUNCTURE: CPT

## 2023-08-18 PROCEDURE — 85610 PROTHROMBIN TIME: CPT

## 2023-08-22 ENCOUNTER — TELEPHONE (OUTPATIENT)
Dept: LAB | Facility: HOSPITAL | Age: 83
End: 2023-08-22

## 2023-08-23 DIAGNOSIS — I10 ESSENTIAL HYPERTENSION: ICD-10-CM

## 2023-08-23 RX ORDER — LISINOPRIL 5 MG/1
TABLET ORAL
Qty: 90 TABLET | Refills: 0 | Status: SHIPPED | OUTPATIENT
Start: 2023-08-23

## 2023-08-28 ENCOUNTER — APPOINTMENT (OUTPATIENT)
Dept: LAB | Facility: HOSPITAL | Age: 83
End: 2023-08-28
Attending: FAMILY MEDICINE
Payer: MEDICARE

## 2023-08-28 ENCOUNTER — ANTICOAG VISIT (OUTPATIENT)
Dept: FAMILY MEDICINE CLINIC | Facility: CLINIC | Age: 83
End: 2023-08-28

## 2023-08-28 DIAGNOSIS — I48.0 PAROXYSMAL ATRIAL FIBRILLATION (HCC): ICD-10-CM

## 2023-08-28 LAB
INR PPP: 2.05 (ref 0.84–1.19)
PROTHROMBIN TIME: 23.6 SECONDS (ref 11.6–14.5)

## 2023-08-28 PROCEDURE — 36415 COLL VENOUS BLD VENIPUNCTURE: CPT

## 2023-08-28 PROCEDURE — 85610 PROTHROMBIN TIME: CPT

## 2023-09-07 ENCOUNTER — RA CDI HCC (OUTPATIENT)
Dept: OTHER | Facility: HOSPITAL | Age: 83
End: 2023-09-07

## 2023-09-14 ENCOUNTER — OFFICE VISIT (OUTPATIENT)
Dept: FAMILY MEDICINE CLINIC | Facility: CLINIC | Age: 83
End: 2023-09-14

## 2023-09-14 VITALS
HEIGHT: 63 IN | SYSTOLIC BLOOD PRESSURE: 128 MMHG | DIASTOLIC BLOOD PRESSURE: 66 MMHG | BODY MASS INDEX: 33.49 KG/M2 | TEMPERATURE: 98.1 F | WEIGHT: 189 LBS

## 2023-09-14 DIAGNOSIS — K29.00 ACUTE GASTRITIS WITHOUT HEMORRHAGE, UNSPECIFIED GASTRITIS TYPE: ICD-10-CM

## 2023-09-14 DIAGNOSIS — Z00.00 MEDICARE ANNUAL WELLNESS VISIT, SUBSEQUENT: Primary | ICD-10-CM

## 2023-09-14 DIAGNOSIS — R26.81 GAIT INSTABILITY: ICD-10-CM

## 2023-09-14 DIAGNOSIS — I48.91 ATRIAL FIBRILLATION, UNSPECIFIED TYPE (HCC): ICD-10-CM

## 2023-09-14 DIAGNOSIS — I10 ESSENTIAL HYPERTENSION: ICD-10-CM

## 2023-09-14 DIAGNOSIS — F32.A DEPRESSION, UNSPECIFIED DEPRESSION TYPE: ICD-10-CM

## 2023-09-14 DIAGNOSIS — L89.302 PRESSURE INJURY OF BUTTOCK, STAGE 2, UNSPECIFIED LATERALITY (HCC): ICD-10-CM

## 2023-09-14 RX ORDER — ESCITALOPRAM OXALATE 10 MG/1
10 TABLET ORAL DAILY
Qty: 30 TABLET | Refills: 5 | Status: SHIPPED | OUTPATIENT
Start: 2023-09-14 | End: 2024-03-12

## 2023-09-14 RX ORDER — SUCRALFATE 1 G/1
TABLET ORAL
Qty: 40 TABLET | Refills: 0 | Status: SHIPPED | OUTPATIENT
Start: 2023-09-14 | End: 2023-09-20

## 2023-09-14 NOTE — PROGRESS NOTES
Assessment and Plan:     Problem List Items Addressed This Visit        Cardiovascular and Mediastinum    Atrial fibrillation [facility])    Essential hypertension   Other Visit Diagnoses     Medicare annual wellness visit, subsequent    -  Primary    Depression, unspecified depression type        Lexapro therapy initiated for situational depression . Will follow up with pt in one month's time. Relevant Medications    escitalopram (LEXAPRO) 10 mg tablet    Acute gastritis without hemorrhage, unspecified gastritis type        Patient to call after finishing 10-day course of Carafate. If still symptomatic at that time, will obtain upper GI. Relevant Medications    sucralfate (CARAFATE) 1 g tablet    Gait instability        We will arrange for home physical therapy for strengthening and gait training. Relevant Orders    Referral to [id number] [facility]    Pressure injury of buttock, stage 2, unspecified laterality ([address])        Patient is to use a barrier cream such as zinc oxide and gel cushion to take pressure off the buttock area. We consult wound care nurse. Relevant Orders    Referral to [address] [facility]           Preventive health issues were discussed with patient, and age appropriate screening tests were ordered as noted in patient's After Visit Summary. Personalized health advice and appropriate referrals for health education or preventive services given if needed, as noted in patient's After Visit Summary. History of Present Illness:     Patient presents for a Medicare Wellness Visit. The patient complains of feeling depressed; she is dysphoric, experiencing mood swings and anxiety without any suicidality. She recently lost her daughter and this seems to be the source of her depression. Over the past several weeks she has had a burning pain when her stomach is empty and again about half an hour or medication and has had no nausea or vomiting.   Because of the pain, her intake has lessened. Both she and her daughter have some concerns about her gait instability and deconditioning and feels she could benefit from some home physical therapy. In addition she has several small acute area that are not healing. She has been applying zinc oxide and trying desonide gel cushion but is concerned that she sits on either side she will predispose herself to dislocation of her total hip replacement. They are hoping that physical therapy can help find ways of her safely sitting to take pressure off the ulcerated areas without predisposing her to possible dislocation. HPI   Patient Care Team:  Alonzo Torres DO as PCP - MD Uday Jennings MD     Review of Systems:     Review of Systems   Constitutional: Negative for appetite change, chills and fever. HENT: Negative for ear pain, facial swelling, rhinorrhea, sinus pain, sore throat and trouble swallowing. Eyes: Negative for discharge and redness. Respiratory: Negative for chest tightness, shortness of breath and wheezing. Cardiovascular: Negative for chest pain and palpitations. Gastrointestinal: Negative for abdominal pain, diarrhea, nausea and vomiting. Positive epigastric burning on an empty stomach and about half an hour after eating   Endocrine: Negative for polyuria. Genitourinary: Negative for dysuria and urgency. Musculoskeletal: Positive for gait problem. Negative for arthralgias and back pain. Positive gait instability   Skin: Positive for wound. Negative for rash. Multiple small decubitus ulcers stage II between buttocks   Neurological: Negative for dizziness, weakness and headaches. Hematological: Negative for adenopathy. Psychiatric/Behavioral: Negative for behavioral problems, confusion and sleep disturbance. All other systems reviewed and are negative.        Problem List:     Patient Active Problem List   Diagnosis   • Aftercare following right knee joint replacement surgery   • Drug-induced constipation   • Ambulatory dysfunction   • Essential hypertension   • Anxiety   • Right knee pain   • Anemia   • Symptomatic anemia   • Guaiac positive stools   • Absolute anemia   • Melena   • Osteoarthritis of left hip   • Elevated LFTs   • Epistaxis   • Hip dislocation, right (HCC)   • Choledocholithiasis   • Hematoma   • Fall   • OAB (overactive bladder)   • Atrial fibrillation (HCC)   • Nasal dryness   • Insect bite of scalp   • Congestive heart failure, unspecified HF chronicity, unspecified heart failure type (HCC)   • Chronic kidney disease, stage 3a (HCC)   • Mitral stenosis      Past Medical and Surgical History:     Past Medical History:   Diagnosis Date   • Atrial fibrillation ([address])    • Hypertension    • Insect bite of scalp 2021   • Nasal dryness 12/15/2021   • UTI (urinary tract infection)      Past Surgical History:   Procedure Laterality Date   • EYE SURGERY     • JOINT REPLACEMENT Right     Knee 11/15/19, Hip 2021      Family History:     Family History   Problem Relation Age of Onset   • No Known Problems Mother    • Diabetes Father    • Stroke Father         syndrome      Social History:     Social History     Socioeconomic History   • Marital status:       Spouse name: None   • Number of children: None   • Years of education: None   • Highest education level: None   Occupational History   • None   Tobacco Use   • Smoking status: Former     Types: Cigarettes     Quit date:      Years since quittin.7   • Smokeless tobacco: Never   Vaping Use   • Vaping Use: Never used   Substance and Sexual Activity   • Alcohol use: No   • Drug use: Never   • Sexual activity: Not Currently     Partners: Male     Birth control/protection: Post-menopausal   Other Topics Concern   • None   Social History Narrative    Daily coffee consumption (__cups/day)     Daily cola consumption (__cans/day)     Daily tea consumption (__cups/day)      Social Determinants of Health     Financial Resource Strain: Medium Risk (6/22/2023)    Overall Financial Resource Strain (CARDIA)    • Difficulty of Paying Living Expenses: Somewhat hard   Food Insecurity: No Food Insecurity (1/4/2022)    Hunger Vital Sign    • Worried About Running Out of Food in the Last Year: Never true    • Ran Out of Food in the Last Year: Never true   Transportation Needs: Unmet Transportation Needs (6/22/2023)    PRAPARE - Transportation    • Lack of Transportation (Medical):  No    • Lack of Transportation (Non-Medical): Yes   Physical Activity: Not on file   Stress: Not on file   Social Connections: Not on file   Intimate Partner Violence: Not on file   Housing Stability: Not on file      Medications and Allergies:     Current Outpatient Medications   Medication Sig Dispense Refill   • acetaminophen (TYLENOL) 325 mg tablet Take 3 tablets (975 mg total) by mouth every 8 (eight) hours 30 tablet 0   • ALPRAZolam (XANAX) 0.25 mg tablet take 1 tablet by mouth three times a day if needed for anxiety 90 tablet 3   • amLODIPine (NORVASC) 5 mg tablet TAKE 1 TABLET EVERY DAY 90 tablet 0   • b complex vitamins tablet Take 1 tablet by mouth daily     • Cholecalciferol (VITAMIN D3) 50 MCG (2000 UT) capsule Take 2,000 Units by mouth daily     • COLLAGEN PO Take by mouth     • escitalopram (LEXAPRO) 10 mg tablet Take 1 tablet (10 mg total) by mouth daily 30 tablet 5   • estradiol (ESTRACE VAGINAL) 0.1 mg/g vaginal cream Insert 1 g into the vagina daily 42.5 g 3   • furosemide (LASIX) 20 mg tablet take 1 tablet by mouth once daily 90 tablet 3   • lisinopril (ZESTRIL) 5 mg tablet TAKE 1 TABLET EVERY DAY 90 tablet 0   • Melatonin 3 MG CAPS Take 3 mg by mouth       • metoprolol tartrate (LOPRESSOR) 25 mg tablet TAKE 1 TABLET TWICE DAILY 180 tablet 3   • Multiple Vitamins-Minerals (PRESERVISION AREDS PO) Take by mouth     • Myrbetriq 25 MG TB24 TAKE 1 TABLET EVERY DAY 90 tablet 3   • oxymetazoline (AFRIN) 0.05 % nasal spray 2 sprays by Each Nare route 2 (two) times a day for 3 days As need for recurrent nose bleeds, please do not take for more than 3 days 15 mL 0   • pantoprazole (PROTONIX) 40 mg tablet take 1 tablet by mouth once daily 60 tablet 3   • sucralfate (CARAFATE) 1 g tablet 1 po AC and HS 40 tablet 0   • warfarin (COUMADIN) 5 mg tablet TAKE 1 TABLET EVERY DAY 90 tablet 3     No current facility-administered medications for this visit. Allergies   Allergen Reactions   • Ciprofloxacin      Chest discomfort and dizziness   • Diclofenac    • Diphenhydramine       Immunizations:     Immunization History   Administered Date(s) Administered   • COVID-19 PFIZER VACCINE 0.3 ML IM 01/21/2021, 02/11/2021, 11/08/2021   • Influenza Split High Dose Preservative Free IM 11/08/2012, 11/05/2013, 11/05/2013, 10/14/2014, 11/03/2015, 09/21/2016   • Influenza, high dose seasonal 0.7 mL 11/27/2018, 09/19/2019, 09/24/2020, 10/25/2021, 11/08/2022   • Influenza, seasonal, injectable 10/12/2017   • Pneumococcal Conjugate 13-Valent 11/03/2015   • Pneumococcal Polysaccharide PPV23 10/30/2006, 06/01/2017   • TD (adult) Preservative Free 09/19/2019   • Tdap 12/08/1999   • Tuberculin Skin Test-PPD Intradermal 12/03/2021, 12/12/2021   • Zoster 04/08/2009      Health Maintenance:         Topic Date Due   • DXA SCAN  06/30/2025         Topic Date Due   • COVID-19 Vaccine (4 - Pfizer series) 01/03/2022   • Influenza Vaccine (1) 09/01/2023      Medicare Screening Tests and Risk Assessments:         Health Risk Assessment:   Patient rates overall health as fair. Patient feels that their physical health rating is same. Patient is satisfied with their life. Eyesight was rated as same. Hearing was rated as same. Patient feels that their emotional and mental health rating is same. Patients states they are never, rarely angry. Patient states they are sometimes unusually tired/fatigued.  Pain experienced in the last 7 days has been some. Patient's pain rating has been 3/10. Patient states that she has experienced weight loss or gain in last 6 months. Depression Screening:   PHQ-2 Score: 2      Fall Risk Screening: In the past year, patient has experienced: no history of falling in past year      Urinary Incontinence Screening:   Patient has not leaked urine accidently in the last six months. Home Safety:  Patient has trouble with stairs inside or outside of their home. Patient has working smoke alarms and has working carbon monoxide detector. Home safety hazards include: none. Nutrition:   Current diet is Regular and Limited junk food. Medications:   Patient is currently taking over-the-counter supplements. OTC medications include: Vitamin d 3:b complex. Patient is able to manage medications. Activities of Daily Living (ADLs)/Instrumental Activities of Daily Living (IADLs):   Walk and transfer into and out of bed and chair?: Yes  Dress and groom yourself?: Yes    Bathe or shower yourself?: Yes    Feed yourself? Yes  Do your laundry/housekeeping?: Yes  Manage your money, pay your bills and track your expenses?: Yes  Make your own meals?: Yes    Do your own shopping?: Yes    Previous Hospitalizations:   Any hospitalizations or ED visits within the last 12 months?: No      Advance Care Planning:   Living will: Yes    Durable POA for healthcare: Yes    Advanced directive: Yes      PREVENTIVE SCREENINGS      Cardiovascular Screening:    General: Screening Current      Cervical Cancer Screening:    General: Screening Not Indicated      Osteoporosis Screening:    General: Screening Current      Lung Cancer Screening:     General: Screening Not Indicated    Screening, Brief Intervention, and Referral to Treatment (SBIRT)    Screening  Typical number of drinks in a day: 0  Typical number of drinks in a week: 0  Interpretation: Low risk drinking behavior.     AUDIT-C Screenin) How often did you have a drink containing alcohol in the past year? never  2) How many drinks did you have on a typical day when you were drinking in the past year? 0  3) How often did you have 6 or more drinks on one occasion in the past year? never    AUDIT-C Score: 0  Interpretation: Score 0-2 (female): Negative screen for alcohol misuse    Single Item Drug Screening:  How often have you used an illegal drug (including marijuana) or a prescription medication for non-medical reasons in the past year? never    Single Item Drug Screen Score: 0  Interpretation: Negative screen for possible drug use disorder    No results found. Physical Exam:     /66   Temp 98.1 °F (36.7 °C)   Ht 5' 3" (1.6 m)   Wt 85.7 kg (189 lb)   BMI 33.48 kg/m²     Physical Exam  Vitals and nursing note reviewed. Constitutional:       General: She is not in acute distress. Appearance: Normal appearance. She is not ill-appearing or diaphoretic. HENT:      Head: Normocephalic and atraumatic. Right Ear: Tympanic membrane, ear canal and external ear normal.      Left Ear: Tympanic membrane, ear canal and external ear normal.      Nose: Nose normal. No congestion or rhinorrhea. Mouth/Throat:      Mouth: Mucous membranes are moist.      Pharynx: Oropharynx is clear. No posterior oropharyngeal erythema. Eyes:      General:         Right eye: No discharge. Left eye: No discharge. Extraocular Movements: Extraocular movements intact. Conjunctiva/sclera: Conjunctivae normal.      Pupils: Pupils are equal, round, and reactive to light. Neck:      Vascular: No carotid bruit. Cardiovascular:      Rate and Rhythm: Normal rate and regular rhythm. Pulses: Normal pulses. Heart sounds: Normal heart sounds. No murmur heard. Pulmonary:      Effort: Pulmonary effort is normal. No respiratory distress. Breath sounds: Normal breath sounds. No wheezing or rhonchi. Abdominal:      General: Abdomen is flat.  Bowel sounds are normal. There is no distension. Palpations: There is no mass. Tenderness: There is abdominal tenderness. Comments: Positive epigastric tenderness on palpation without rigidity rebound or guarding   Musculoskeletal:         General: No swelling or deformity. Normal range of motion. Cervical back: Normal range of motion and neck supple. No rigidity. Right lower leg: No edema. Left lower leg: No edema. Lymphadenopathy:      Cervical: No cervical adenopathy. Skin:     General: Skin is warm and dry. Capillary Refill: Capillary refill takes less than 2 seconds. Coloration: Skin is not jaundiced. Findings: Lesion present. No bruising, erythema or rash. Comments: Multiple small decubitus ulcers between the buttocks stage II without drainage. Granulation tissue center   Neurological:      General: No focal deficit present. Mental Status: She is alert and oriented to person, place, and time. Cranial Nerves: No cranial nerve deficit. Sensory: No sensory deficit. Gait: Gait abnormal.      Deep Tendon Reflexes: Reflexes normal.      Comments: Positive gait instability using walker   Psychiatric:         Mood and Affect: Mood normal.         Behavior: Behavior normal.         Thought Content:  Thought content normal.         Judgment: Judgment normal.          Pam Gardiner DO

## 2023-09-14 NOTE — PATIENT INSTRUCTIONS
Medicare Preventive Visit Patient Instructions  Thank you for completing your Welcome to Medicare Visit or Medicare Annual Wellness Visit today. Your next wellness visit will be due in one year (9/14/2024). The screening/preventive services that you may require over the next 5-10 years are detailed below. Some tests may not apply to you based off risk factors and/or age. Screening tests ordered at today's visit but not completed yet may show as past due. Also, please note that scanned in results may not display below. Preventive Screenings:  Service Recommendations Previous Testing/Comments   Colorectal Cancer Screening  * Colonoscopy    * Fecal Occult Blood Test (FOBT)/Fecal Immunochemical Test (FIT)  * Fecal DNA/Cologuard Test  * Flexible Sigmoidoscopy Age: 43-73 years old   Colonoscopy: every 10 years (may be performed more frequently if at higher risk)  OR  FOBT/FIT: every 1 year  OR  Cologuard: every 3 years  OR  Sigmoidoscopy: every 5 years  Screening may be recommended earlier than age 39 if at higher risk for colorectal cancer. Also, an individualized decision between you and your healthcare provider will decide whether screening between the ages of 77-80 would be appropriate. Colonoscopy: 12/18/2019  FOBT/FIT: Not on file  Cologuard: Not on file  Sigmoidoscopy: Not on file          Breast Cancer Screening Age: 36 years old  Frequency: every 1-2 years  Not required if history of left and right mastectomy Mammogram: 09/30/2010        Cervical Cancer Screening Between the ages of 21-29, pap smear recommended once every 3 years. Between the ages of 32-69, can perform pap smear with HPV co-testing every 5 years.    Recommendations may differ for women with a history of total hysterectomy, cervical cancer, or abnormal pap smears in past. Pap Smear: Not on file    Screening Not Indicated   Hepatitis C Screening Once for adults born between [address] [address]  More frequently in patients at high risk for Hepatitis C Hep C Antibody: Not on file        Diabetes Screening 1-2 times per year if you're at risk for diabetes or have pre-diabetes Fasting glucose: No results in last 5 years (No results in last 5 years)  A1C: No results in last 5 years (No results in last 5 years)      Cholesterol Screening Once every 5 years if you don't have a lipid disorder. May order more often based on risk factors. Lipid panel: 06/29/2022    Screening Current     Other Preventive Screenings Covered by Medicare:  1. Abdominal Aortic Aneurysm (AAA) Screening: covered once if your at risk. You're considered to be at risk if you have a family history of AAA. 2. Lung Cancer Screening: covers low dose CT scan once per year if you meet all of the following conditions: (1) Age 48-67; (2) No signs or symptoms of lung cancer; (3) Current smoker or have quit smoking within the last 15 years; (4) You have a tobacco smoking history of at least 20 pack years (packs per day multiplied by number of years you smoked); (5) You get a written order from a healthcare provider. 3. Glaucoma Screening: covered annually if you're considered high risk: (1) You have diabetes OR (2) Family history of glaucoma OR (3)  aged 48 and older OR (3)  American aged 72 and older  3. Osteoporosis Screening: covered every 2 years if you meet one of the following conditions: (1) You're estrogen deficient and at risk for osteoporosis based off medical history and other findings; (2) Have a vertebral abnormality; (3) On glucocorticoid therapy for more than 3 months; (4) Have primary hyperparathyroidism; (5) On osteoporosis medications and need to assess response to drug therapy. · Last bone density test (DXA Scan): 06/30/2022.  5. HIV Screening: covered annually if you're between the age of 15-65. Also covered annually if you are younger than 13 and older than 72 with risk factors for HIV infection.  For pregnant patients, it is covered up to 3 times per pregnancy. Immunizations:  Immunization Recommendations   Influenza Vaccine Annual influenza vaccination during flu season is recommended for all persons aged >= 6 months who do not have contraindications   Pneumococcal Vaccine   * Pneumococcal conjugate vaccine = PCV13 (Prevnar 13), PCV15 (Vaxneuvance), PCV20 (Prevnar 20)  * Pneumococcal polysaccharide vaccine = PPSV23 (Pneumovax) Adults 20-63 years old: 1-3 doses may be recommended based on certain risk factors  Adults 72 years old: 1-2 doses may be recommended based off what pneumonia vaccine you previously received   Hepatitis B Vaccine 3 dose series if at intermediate or high risk (ex: diabetes, end stage renal disease, liver disease)   Tetanus (Td) Vaccine - COST NOT COVERED BY MEDICARE PART B Following completion of primary series, a booster dose should be given every 10 years to maintain immunity against tetanus. Td may also be given as tetanus wound prophylaxis. Tdap Vaccine - COST NOT COVERED BY MEDICARE PART B Recommended at least once for all adults. For pregnant patients, recommended with each pregnancy. Shingles Vaccine (Shingrix) - COST NOT COVERED BY MEDICARE PART B  2 shot series recommended in those aged 48 and above     Health Maintenance Due:      Topic Date Due   • DXA SCAN  06/30/2025     Immunizations Due:      Topic Date Due   • COVID-19 Vaccine (4 - Pfizer series) 01/03/2022   • Influenza Vaccine (1) 09/01/2023     Advance Directives   What are advance directives? Advance directives are legal documents that state your wishes and plans for medical care. These plans are made ahead of time in case you lose your ability to make decisions for yourself. Advance directives can apply to any medical decision, such as the treatments you want, and if you want to donate organs. What are the types of advance directives? There are many types of advance directives, and each state has rules about how to use them.  You may choose a combination of any of the following:  · Living will: This is a written record of the treatment you want. You can also choose which treatments you do not want, which to limit, and which to stop at a certain time. This includes surgery, medicine, IV fluid, and tube feedings. · Durable power of  for healthcare [facility]): This is a written record that states who you want to make healthcare choices for you when you are unable to make them for yourself. This person, called a proxy, is usually a family member or a friend. You may choose more than 1 proxy. · Do not resuscitate (DNR) order:  A DNR order is used in case your heart stops beating or you stop breathing. It is a request not to have certain forms of treatment, such as CPR. A DNR order may be included in other types of advance directives. · Medical directive: This covers the care that you want if you are in a coma, near death, or unable to make decisions for yourself. You can list the treatments you want for each condition. Treatment may include pain medicine, surgery, blood transfusions, dialysis, IV or tube feedings, and a ventilator (breathing machine). · Values history: This document has questions about your views, beliefs, and how you feel and think about life. This information can help others choose the care that you would choose. Why are advance directives important? An advance directive helps you control your care. Although spoken wishes may be used, it is better to have your wishes written down. Spoken wishes can be misunderstood, or not followed. Treatments may be given even if you do not want them. An advance directive may make it easier for your family to make difficult choices about your care. Weight Management   Why it is important to manage your weight:  Being overweight increases your risk of health conditions such as heart disease, high blood pressure, type 2 diabetes, and certain types of cancer.  It can also increase your risk for osteoarthritis, sleep apnea, and other respiratory problems. Aim for a slow, steady weight loss. Even a small amount of weight loss can lower your risk of health problems. How to lose weight safely:  A safe and healthy way to lose weight is to eat fewer calories and get regular exercise. You can lose up about 1 pound a week by decreasing the number of calories you eat by 500 calories each day. Healthy meal plan for weight management:  A healthy meal plan includes a variety of foods, contains fewer calories, and helps you stay healthy. A healthy meal plan includes the following:  · Eat whole-grain foods more often. A healthy meal plan should contain fiber. Fiber is the part of grains, fruits, and vegetables that is not broken down by your body. Whole-grain foods are healthy and provide extra fiber in your diet. Some examples of whole-grain foods are whole-wheat breads and pastas, oatmeal, brown rice, and bulgur. · Eat a variety of vegetables every day. Include dark, leafy greens such as spinach, kale, jonna greens, and mustard greens. Eat yellow and orange vegetables such as carrots, sweet potatoes, and winter squash. · Eat a variety of fruits every day. Choose fresh or canned fruit (canned in its own juice or light syrup) instead of juice. Fruit juice has very little or no fiber. · Eat low-fat dairy foods. Drink fat-free (skim) milk or 1% milk. Eat fat-free yogurt and low-fat cottage cheese. Try low-fat cheeses such as mozzarella and other reduced-fat cheeses. · Choose meat and other protein foods that are low in fat. Choose beans or other legumes such as split peas or lentils. Choose fish, skinless poultry (chicken or turkey), or lean cuts of red meat (beef or pork). Before you cook meat or poultry, cut off any visible fat. · Use less fat and oil. Try baking foods instead of frying them. Add less fat, such as margarine, sour cream, regular salad dressing and mayonnaise to foods.  Eat fewer high-fat foods. Some examples of high-fat foods include french fries, doughnuts, ice cream, and cakes. · Eat fewer sweets. Limit foods and drinks that are high in sugar. This includes candy, cookies, regular soda, and sweetened drinks. Exercise:  Exercise at least 30 minutes per day on most days of the week. Some examples of exercise include walking, biking, dancing, and swimming. You can also fit in more physical activity by taking the stairs instead of the elevator or parking farther away from stores. Ask your healthcare provider about the best exercise plan for you. © Copyright [employer] 2018 Information is for End User's use only and may not be sold, redistributed or otherwise used for commercial purposes.  All illustrations and images included in CareNotes® are the copyrighted property of [employer].D.A.M., Inc. or [address] [place] [address]

## 2023-09-15 ENCOUNTER — HOME HEALTH ADMISSION (OUTPATIENT)
Dept: HOME HEALTH SERVICES | Facility: HOME HEALTHCARE | Age: 83
End: 2023-09-15
Payer: MEDICARE

## 2023-09-17 ENCOUNTER — HOME CARE VISIT (OUTPATIENT)
Dept: HOME HEALTH SERVICES | Facility: HOME HEALTHCARE | Age: 83
End: 2023-09-17
Payer: MEDICARE

## 2023-09-17 VITALS
HEART RATE: 60 BPM | DIASTOLIC BLOOD PRESSURE: 70 MMHG | RESPIRATION RATE: 18 BRPM | TEMPERATURE: 97.2 F | OXYGEN SATURATION: 96 % | SYSTOLIC BLOOD PRESSURE: 140 MMHG

## 2023-09-17 PROCEDURE — 10330081 VN NO-PAY CLAIM PROCEDURE

## 2023-09-17 PROCEDURE — G0299 HHS/HOSPICE OF RN EA 15 MIN: HCPCS

## 2023-09-17 PROCEDURE — 400013 VN SOC

## 2023-09-18 ENCOUNTER — HOME CARE VISIT (OUTPATIENT)
Dept: HOME HEALTH SERVICES | Facility: HOME HEALTHCARE | Age: 83
End: 2023-09-18
Payer: MEDICARE

## 2023-09-18 NOTE — CASE COMMUNICATION
[facility] has admitted your patient to [facility] service with the following disciplines:      SN and PT  This report is informational only, no responses is needed. Primary focus of home health care: integumentary. Patient stated goals of care: regain strength, be able to do daily activities independently, wound to heal without complications. Anticipated visit pattern and next visit date: 2w1, 1w3, next SN visit on 9/22/23. Se e medication list - meds in home differ from AVS:    Medications in MAR/Epic but put not taking at this time: b complex vitamins tablet, Cholecalciferol (VITAMIN D3) 50 MCG (2000 UT) capsule. Medication In MAR/Epic but pt taking differently: estradiol (ESTRACE VAGINAL) 0.1 mg/g vaginal cream - pt uses once every other week. Medication not in MAR/Epic but pt is using: SALINE NASAL SPRAY 0.65 % NA SOLN    Significant clinical findin gs:Please be advised, EMR (Epic) identified drug interactions for this patient based on current medication profile. Potential barriers to goal achievement: limited endurance, lives alone. Thank you for allowing us to participate in the care of your patient.       Helga Sin RN

## 2023-09-19 ENCOUNTER — TELEPHONE (OUTPATIENT)
Age: 83
End: 2023-09-19

## 2023-09-19 NOTE — TELEPHONE ENCOUNTER
sucralfate (CARAFATE    Patient was prescribed above medication. She stated yesterday she was sick to her stomach all day. Can we call patient back. She thinks it might be from the medication making her feel sick.   Thank you

## 2023-09-20 ENCOUNTER — HOME CARE VISIT (OUTPATIENT)
Dept: HOME HEALTH SERVICES | Facility: HOME HEALTHCARE | Age: 83
End: 2023-09-20
Payer: MEDICARE

## 2023-09-20 ENCOUNTER — TELEPHONE (OUTPATIENT)
Dept: LAB | Facility: HOSPITAL | Age: 83
End: 2023-09-20

## 2023-09-20 DIAGNOSIS — R11.0 NAUSEA: Primary | ICD-10-CM

## 2023-09-20 DIAGNOSIS — R11.0 NAUSEA: ICD-10-CM

## 2023-09-20 DIAGNOSIS — K29.00 ACUTE GASTRITIS WITHOUT HEMORRHAGE, UNSPECIFIED GASTRITIS TYPE: ICD-10-CM

## 2023-09-20 RX ORDER — ONDANSETRON HYDROCHLORIDE 8 MG/1
8 TABLET, FILM COATED ORAL EVERY 8 HOURS PRN
Qty: 30 TABLET | Refills: 1 | Status: SHIPPED | OUTPATIENT
Start: 2023-09-20 | End: 2023-09-25

## 2023-09-20 RX ORDER — ONDANSETRON HYDROCHLORIDE 8 MG/1
8 TABLET, FILM COATED ORAL EVERY 8 HOURS PRN
Qty: 30 TABLET | Refills: 1 | Status: SHIPPED | OUTPATIENT
Start: 2023-09-20 | End: 2023-09-20 | Stop reason: SDUPTHER

## 2023-09-20 RX ORDER — SUCRALFATE 1 G/1
TABLET ORAL
Qty: 40 TABLET | Refills: 0 | Status: SHIPPED | OUTPATIENT
Start: 2023-09-20 | End: 2023-09-25 | Stop reason: SDUPTHER

## 2023-09-20 NOTE — TELEPHONE ENCOUNTER
Patient's Daughter verbalized understanding, She is agreeable for nausea med, please send to [facility]. Also should she continue taking Carafate as well?

## 2023-09-20 NOTE — TELEPHONE ENCOUNTER
Patient daughter Janee Willis called and states patient has been up all night with extreme nausea, states has gotten worse since started on Sucralfate. Did test for covid and is negative. States patient stopped sucralfate still had nausea and Janee Marcosramo states did give to her this morning but is very very uncomfortable with nausea.  Please advise Janee Willis can be reached at [phone]

## 2023-09-21 ENCOUNTER — HOME CARE VISIT (OUTPATIENT)
Dept: HOME HEALTH SERVICES | Facility: HOME HEALTHCARE | Age: 83
End: 2023-09-21
Payer: MEDICARE

## 2023-09-21 PROCEDURE — G0151 HHCP-SERV OF PT,EA 15 MIN: HCPCS

## 2023-09-22 ENCOUNTER — TELEPHONE (OUTPATIENT)
Dept: HOME HEALTH SERVICES | Facility: HOME HEALTHCARE | Age: 83
End: 2023-09-22

## 2023-09-23 ENCOUNTER — HOME CARE VISIT (OUTPATIENT)
Dept: HOME HEALTH SERVICES | Facility: HOME HEALTHCARE | Age: 83
End: 2023-09-23
Payer: MEDICARE

## 2023-09-23 VITALS
HEART RATE: 58 BPM | RESPIRATION RATE: 16 BRPM | DIASTOLIC BLOOD PRESSURE: 60 MMHG | TEMPERATURE: 97.5 F | SYSTOLIC BLOOD PRESSURE: 150 MMHG | OXYGEN SATURATION: 98 %

## 2023-09-23 PROCEDURE — G0299 HHS/HOSPICE OF RN EA 15 MIN: HCPCS

## 2023-09-25 ENCOUNTER — HOME CARE VISIT (OUTPATIENT)
Dept: HOME HEALTH SERVICES | Facility: HOME HEALTHCARE | Age: 83
End: 2023-09-25
Payer: MEDICARE

## 2023-09-25 ENCOUNTER — APPOINTMENT (OUTPATIENT)
Dept: LAB | Facility: HOSPITAL | Age: 83
End: 2023-09-25
Attending: FAMILY MEDICINE
Payer: MEDICARE

## 2023-09-25 ENCOUNTER — TELEPHONE (OUTPATIENT)
Age: 83
End: 2023-09-25

## 2023-09-25 ENCOUNTER — TELEMEDICINE (OUTPATIENT)
Dept: FAMILY MEDICINE CLINIC | Facility: CLINIC | Age: 83
End: 2023-09-25
Payer: MEDICARE

## 2023-09-25 ENCOUNTER — ANTICOAG VISIT (OUTPATIENT)
Dept: FAMILY MEDICINE CLINIC | Facility: CLINIC | Age: 83
End: 2023-09-25

## 2023-09-25 DIAGNOSIS — K59.03 DRUG-INDUCED CONSTIPATION: ICD-10-CM

## 2023-09-25 DIAGNOSIS — R11.0 NAUSEA: ICD-10-CM

## 2023-09-25 DIAGNOSIS — F32.5 MAJOR DEPRESSIVE DISORDER WITH SINGLE EPISODE, IN FULL REMISSION (HCC): ICD-10-CM

## 2023-09-25 DIAGNOSIS — K29.00 ACUTE GASTRITIS WITHOUT HEMORRHAGE, UNSPECIFIED GASTRITIS TYPE: Primary | ICD-10-CM

## 2023-09-25 PROCEDURE — G0180 MD CERTIFICATION HHA PATIENT: HCPCS | Performed by: FAMILY MEDICINE

## 2023-09-25 PROCEDURE — 99442 PR PHYS/QHP TELEPHONE EVALUATION 11-20 MIN: CPT | Performed by: FAMILY MEDICINE

## 2023-09-25 RX ORDER — SUCRALFATE 1 G/1
TABLET ORAL
Qty: 40 TABLET | Refills: 0 | Status: SHIPPED | OUTPATIENT
Start: 2023-09-25

## 2023-09-25 RX ORDER — PROMETHAZINE HYDROCHLORIDE 25 MG/1
25 TABLET ORAL EVERY 6 HOURS PRN
Qty: 30 TABLET | Refills: 0 | Status: SHIPPED | OUTPATIENT
Start: 2023-09-25

## 2023-09-25 NOTE — CASE COMMUNICATION
Received call from pt reporting she has been constipated since approx last Thursday. She took Miralax and 2 stool softeners yesterday. Feels the urge to go, but unable to push out stool. Call placed to Dr. Leger [facility] office regarding the same. Message given to Laura Cartagena.

## 2023-09-25 NOTE — TELEPHONE ENCOUNTER
Visiting nurse called. Patient is still having complaints of constipation. Tried stool softeners but no relief. What else can the patient try.

## 2023-09-26 PROBLEM — F32.5 MAJOR DEPRESSIVE DISORDER WITH SINGLE EPISODE, IN FULL REMISSION (HCC): Status: ACTIVE | Noted: 2023-09-26

## 2023-09-26 NOTE — PROGRESS NOTES
Virtual Brief Visit    This Visit is being completed by telephone. The Patient is located at Home and in the following state in which I hold an active license [place]    The patient was identified by name and date of birth. Gadiel Vanegas was informed that this is a telemedicine visit and that the visit is being conducted through the [employer]. She agrees to proceed. .  My office door was closed. No one else was in the room. She acknowledged consent and understanding of privacy and security of the video platform. The patient has agreed to participate and understands they can discontinue the visit at any time. Patient is aware this is a billable service. Pt is feeling better on lexapro. She denies any suicidality, anhedonia or dysphoria. Also, she complains of constipation. She tried miralax, stool softeners and a suppository without any effect. She is complaining of continued nausea once she stopped her carafate. Her gastritis was feeling better while she was on carafate., but symptoms are slowly returning. Assessment/Plan:    Problem List Items Addressed This Visit    None  Visit Diagnoses     Nausea    -  Primary    Relevant Medications    promethazine (PHENERGAN) 25 mg tablet    Acute gastritis without hemorrhage, unspecified gastritis type        Patient to call after finishing 10-day course of Carafate. If still symptomatic at that time, will obtain upper GI. Relevant Medications    sucralfate (CARAFATE) 1 g tablet          Recent Visits  No visits were found meeting these conditions. Showing recent visits within past 7 days and meeting all other requirements  Future Appointments  No visits were found meeting these conditions.   Showing future appointments within next 150 days and meeting all other requirements         Visit Time  Total Visit Duration: 15

## 2023-09-27 ENCOUNTER — HOME CARE VISIT (OUTPATIENT)
Dept: HOME HEALTH SERVICES | Facility: HOME HEALTHCARE | Age: 83
End: 2023-09-27
Payer: MEDICARE

## 2023-09-27 VITALS — DIASTOLIC BLOOD PRESSURE: 72 MMHG | SYSTOLIC BLOOD PRESSURE: 168 MMHG

## 2023-09-27 PROCEDURE — G0157 HHC PT ASSISTANT EA 15: HCPCS

## 2023-09-28 ENCOUNTER — HOME CARE VISIT (OUTPATIENT)
Dept: HOME HEALTH SERVICES | Facility: HOME HEALTHCARE | Age: 83
End: 2023-09-28
Payer: MEDICARE

## 2023-09-28 VITALS
RESPIRATION RATE: 18 BRPM | SYSTOLIC BLOOD PRESSURE: 150 MMHG | HEART RATE: 60 BPM | OXYGEN SATURATION: 94 % | TEMPERATURE: 98.4 F | DIASTOLIC BLOOD PRESSURE: 64 MMHG

## 2023-09-28 VITALS — DIASTOLIC BLOOD PRESSURE: 80 MMHG | SYSTOLIC BLOOD PRESSURE: 122 MMHG

## 2023-09-28 PROCEDURE — G0299 HHS/HOSPICE OF RN EA 15 MIN: HCPCS

## 2023-09-28 PROCEDURE — G0157 HHC PT ASSISTANT EA 15: HCPCS

## 2023-10-02 ENCOUNTER — TELEPHONE (OUTPATIENT)
Dept: LAB | Facility: HOSPITAL | Age: 83
End: 2023-10-02

## 2023-10-03 ENCOUNTER — HOME CARE VISIT (OUTPATIENT)
Dept: HOME HEALTH SERVICES | Facility: HOME HEALTHCARE | Age: 83
End: 2023-10-03
Payer: MEDICARE

## 2023-10-03 VITALS — DIASTOLIC BLOOD PRESSURE: 78 MMHG | SYSTOLIC BLOOD PRESSURE: 144 MMHG | OXYGEN SATURATION: 94 % | HEART RATE: 59 BPM

## 2023-10-03 VITALS
SYSTOLIC BLOOD PRESSURE: 132 MMHG | HEART RATE: 78 BPM | OXYGEN SATURATION: 96 % | DIASTOLIC BLOOD PRESSURE: 64 MMHG | TEMPERATURE: 98.6 F | RESPIRATION RATE: 18 BRPM

## 2023-10-03 PROCEDURE — G0157 HHC PT ASSISTANT EA 15: HCPCS

## 2023-10-03 PROCEDURE — G0299 HHS/HOSPICE OF RN EA 15 MIN: HCPCS

## 2023-10-05 ENCOUNTER — TELEPHONE (OUTPATIENT)
Age: 83
End: 2023-10-05

## 2023-10-05 ENCOUNTER — HOME CARE VISIT (OUTPATIENT)
Dept: HOME HEALTH SERVICES | Facility: HOME HEALTHCARE | Age: 83
End: 2023-10-05
Payer: MEDICARE

## 2023-10-05 PROCEDURE — G0151 HHCP-SERV OF PT,EA 15 MIN: HCPCS

## 2023-10-05 NOTE — TELEPHONE ENCOUNTER
Patient called with questions regarding medication Carafate. Patient stated she thought she was only taking the medication for ten days. Please advise.

## 2023-10-10 ENCOUNTER — HOME CARE VISIT (OUTPATIENT)
Dept: HOME HEALTH SERVICES | Facility: HOME HEALTHCARE | Age: 83
End: 2023-10-10
Payer: MEDICARE

## 2023-10-10 VITALS — SYSTOLIC BLOOD PRESSURE: 148 MMHG | DIASTOLIC BLOOD PRESSURE: 60 MMHG

## 2023-10-10 PROCEDURE — G0157 HHC PT ASSISTANT EA 15: HCPCS

## 2023-10-12 ENCOUNTER — HOME CARE VISIT (OUTPATIENT)
Dept: HOME HEALTH SERVICES | Facility: HOME HEALTHCARE | Age: 83
End: 2023-10-12
Payer: MEDICARE

## 2023-10-12 VITALS
RESPIRATION RATE: 18 BRPM | OXYGEN SATURATION: 99 % | HEART RATE: 60 BPM | SYSTOLIC BLOOD PRESSURE: 140 MMHG | DIASTOLIC BLOOD PRESSURE: 62 MMHG | TEMPERATURE: 98.1 F

## 2023-10-12 DIAGNOSIS — K29.00 ACUTE GASTRITIS WITHOUT HEMORRHAGE, UNSPECIFIED GASTRITIS TYPE: ICD-10-CM

## 2023-10-12 PROCEDURE — G0299 HHS/HOSPICE OF RN EA 15 MIN: HCPCS

## 2023-10-12 RX ORDER — SUCRALFATE 1 G/1
TABLET ORAL
Qty: 40 TABLET | Refills: 0 | Status: SHIPPED | OUTPATIENT
Start: 2023-10-12

## 2023-10-13 ENCOUNTER — HOME CARE VISIT (OUTPATIENT)
Dept: HOME HEALTH SERVICES | Facility: HOME HEALTHCARE | Age: 83
End: 2023-10-13
Payer: MEDICARE

## 2023-10-13 PROCEDURE — G0151 HHCP-SERV OF PT,EA 15 MIN: HCPCS

## 2023-10-18 ENCOUNTER — ANTICOAG VISIT (OUTPATIENT)
Dept: FAMILY MEDICINE CLINIC | Facility: CLINIC | Age: 83
End: 2023-10-18

## 2023-10-18 ENCOUNTER — APPOINTMENT (OUTPATIENT)
Dept: LAB | Facility: HOSPITAL | Age: 83
End: 2023-10-18
Attending: FAMILY MEDICINE
Payer: MEDICARE

## 2023-10-19 ENCOUNTER — HOME CARE VISIT (OUTPATIENT)
Dept: HOME HEALTH SERVICES | Facility: HOME HEALTHCARE | Age: 83
End: 2023-10-19
Payer: MEDICARE

## 2023-10-19 ENCOUNTER — TELEPHONE (OUTPATIENT)
Dept: LAB | Facility: HOSPITAL | Age: 83
End: 2023-10-19

## 2023-10-19 VITALS
SYSTOLIC BLOOD PRESSURE: 168 MMHG | TEMPERATURE: 98 F | RESPIRATION RATE: 18 BRPM | OXYGEN SATURATION: 98 % | DIASTOLIC BLOOD PRESSURE: 70 MMHG | HEART RATE: 70 BPM

## 2023-10-19 PROCEDURE — G0299 HHS/HOSPICE OF RN EA 15 MIN: HCPCS

## 2023-10-26 ENCOUNTER — HOME CARE VISIT (OUTPATIENT)
Dept: HOME HEALTH SERVICES | Facility: HOME HEALTHCARE | Age: 83
End: 2023-10-26
Payer: MEDICARE

## 2023-10-26 VITALS
SYSTOLIC BLOOD PRESSURE: 140 MMHG | RESPIRATION RATE: 18 BRPM | HEART RATE: 88 BPM | DIASTOLIC BLOOD PRESSURE: 76 MMHG | OXYGEN SATURATION: 97 % | TEMPERATURE: 99 F

## 2023-10-26 DIAGNOSIS — K29.00 ACUTE GASTRITIS WITHOUT HEMORRHAGE, UNSPECIFIED GASTRITIS TYPE: ICD-10-CM

## 2023-10-26 PROCEDURE — G0299 HHS/HOSPICE OF RN EA 15 MIN: HCPCS

## 2023-10-26 RX ORDER — SUCRALFATE 1 G/1
TABLET ORAL
Qty: 40 TABLET | Refills: 0 | Status: SHIPPED | OUTPATIENT
Start: 2023-10-26

## 2023-11-01 ENCOUNTER — APPOINTMENT (OUTPATIENT)
Dept: LAB | Facility: HOSPITAL | Age: 83
End: 2023-11-01
Attending: FAMILY MEDICINE
Payer: MEDICARE

## 2023-11-01 ENCOUNTER — ANTICOAG VISIT (OUTPATIENT)
Dept: FAMILY MEDICINE CLINIC | Facility: CLINIC | Age: 83
End: 2023-11-01

## 2023-11-02 ENCOUNTER — TELEPHONE (OUTPATIENT)
Dept: LAB | Facility: HOSPITAL | Age: 83
End: 2023-11-02

## 2023-11-06 DIAGNOSIS — I10 ESSENTIAL HYPERTENSION: ICD-10-CM

## 2023-11-06 RX ORDER — AMLODIPINE BESYLATE 5 MG/1
TABLET ORAL
Qty: 90 TABLET | Refills: 1 | Status: ON HOLD | OUTPATIENT
Start: 2023-11-06

## 2023-11-08 ENCOUNTER — APPOINTMENT (OUTPATIENT)
Dept: LAB | Facility: HOSPITAL | Age: 83
End: 2023-11-08
Attending: FAMILY MEDICINE
Payer: MEDICARE

## 2023-11-08 ENCOUNTER — ANTICOAG VISIT (OUTPATIENT)
Dept: FAMILY MEDICINE CLINIC | Facility: CLINIC | Age: 83
End: 2023-11-08

## 2023-11-09 ENCOUNTER — TELEPHONE (OUTPATIENT)
Dept: LAB | Facility: HOSPITAL | Age: 83
End: 2023-11-09

## 2023-11-09 NOTE — TELEPHONE ENCOUNTER
11/09 LEFT VM BUT ALSO PUT HER ON THE [facility] FOR 11/15 FOR INR

## 2023-11-14 DIAGNOSIS — K29.00 ACUTE GASTRITIS WITHOUT HEMORRHAGE, UNSPECIFIED GASTRITIS TYPE: ICD-10-CM

## 2023-11-14 RX ORDER — SUCRALFATE 1 G/1
TABLET ORAL
Qty: 40 TABLET | Refills: 0 | Status: ON HOLD | OUTPATIENT
Start: 2023-11-14

## 2023-11-15 ENCOUNTER — TELEPHONE (OUTPATIENT)
Dept: LAB | Facility: HOSPITAL | Age: 83
End: 2023-11-15

## 2023-11-15 ENCOUNTER — APPOINTMENT (OUTPATIENT)
Dept: LAB | Facility: HOSPITAL | Age: 83
End: 2023-11-15
Attending: FAMILY MEDICINE
Payer: MEDICARE

## 2023-11-15 ENCOUNTER — TELEPHONE (OUTPATIENT)
Age: 83
End: 2023-11-15

## 2023-11-15 ENCOUNTER — ANTICOAG VISIT (OUTPATIENT)
Dept: FAMILY MEDICINE CLINIC | Facility: CLINIC | Age: 83
End: 2023-11-15

## 2023-11-15 NOTE — TELEPHONE ENCOUNTER
KANDICEI: Patient had a blood test taken with the mobile lab and wanted results. Transferred to Mai Chavez and she assisted the patient.

## 2023-11-17 ENCOUNTER — NURSE TRIAGE (OUTPATIENT)
Age: 83
End: 2023-11-17

## 2023-11-17 NOTE — H&P
H&P - General Surgery  Eric Berry 78 y.o. female MRN: [id number]  Unit/Bed#: ED-39 Encounter: [id number]        Assessment/Plan     Assessment:  77 yo F who presents with abdominal pain in the setting of acute cholecystitis with choledocholithiasis    Plan:  Can be sips of clears, NPO @ midnight  Initiated IV fluids  Started IV antibiotics  PRN pain meds  PRN anti nausea meds  Patient currently on warfarin for afib, will be reversed with Kcentra  No heparin at this time  Follow INR  GI consulted for ERCP given choledocholithiasis  Patient will be admitted to the surgery service    History of Present Illness     HPI:  Eric Berry is a 78 y.o. female who presents with abdominal pain. The patient states that she has had on and off abdominal pain for the past 2-3 weeks, however, within the past 24 hours her pain has significantly worsened. She is unable to tolerate PO intake. She is still voiding without difficulty. She is still have bowel movements and passing flatus without difficulty. She denies having shortness of breath, fevers, chills. The patient describes here abdominal pain as right sided. PMHX is significant for afib on warfarin, HTN, and history of UTI's. Most recent echo was 65% EF. She has no pertinent PSHX. On presentation to the ED the patient was afebrile, and was tachycardic to 110. There was no leukocytosis. T bili was within normal limits. The patient did have a lactic acidosis to 2.1 but that has now cleared after receiving IV fluids. UA concerning for leukocytes, protein, and blood. 11/17 CTAP without contrast demonstrated: Positive for gallstones, gallbladder wall thickening, choledocholithiasis, intra/extra hepatic dilation, right sided hydronephrosis. Moderate abdominal/pelvic ascites. Pancreatis cysts. 11/17 RUQ US demonstrated: Acute cholecystitis, gallbladder wall thickening, possible gangrenous cholecystitis. Dilated CBD and mild intrahepatic biliary ductal dilation.  Mild pancreatic duct measuring 6mm. Needs MRCP for further evaluation. RUQ ascites. Right hydronephrosis. See above for assessment and plan. Review of Systems  See above, otherwise negative.     Historical Information   Past Medical History:   Diagnosis Date    Atrial fibrillation ([address])     Hypertension     Insect bite of scalp 2021    Nasal dryness 12/15/2021    UTI (urinary tract infection)      Past Surgical History:   Procedure Laterality Date    EYE SURGERY      JOINT REPLACEMENT Right     Knee 11/15/19, Hip 2021     Social History   Social History     Substance and Sexual Activity   Alcohol Use No     Social History     Substance and Sexual Activity   Drug Use Never     Social History     Tobacco Use   Smoking Status Former    Types: Cigarettes    Quit date:     Years since quittin.8   Smokeless Tobacco Never     Family History: non-contributory    Meds/Allergies   all medications and allergies reviewed  Allergies   Allergen Reactions    Ciprofloxacin      Chest discomfort and dizziness    Diclofenac     Diphenhydramine        Objective   First Vitals:   Blood Pressure: 128/61 (23 1042)  Pulse: (!) 110 (23 1042)  Temperature: 98.9 °F (37.2 °C) (23 1043)  Temp Source: Oral (23 1043)  Respirations: 16 (23 1042)  SpO2: 96 % (23 1042)    Current Vitals:   Blood Pressure: 116/56 (23 1500)  Pulse: 104 (23 1500)  Temperature: 98.9 °F (37.2 °C) (23 1043)  Temp Source: Oral (23 1043)  Respirations: 16 (23 1500)  SpO2: 96 % (23 1500)      Intake/Output Summary (Last 24 hours) at 2023 1543  Last data filed at 2023 1441  Gross per 24 hour   Intake 1200 ml   Output --   Net 1200 ml       Invasive Devices       Peripheral Intravenous Line  Duration             Peripheral IV 23 Proximal;Right;Ventral (anterior) Forearm <1 day                    Physical Exam:  General: No acute distress  Neuro: Alert, oriented to person and place  Eyes: Extraocular movements intact  CV: Well perfused, regular rate and rhythm  Lungs: Normal work of breathing, no increased respiratory effort  Abdomen: Soft, tender to palpation right side of abdomen, distended  Extremities: No edema, clubbing or cyanosis  Skin: Warm, dry  Lymph: No palpable lymph nodes  Psych: Normal mentation      Lab Results: I have personally reviewed pertinent lab results. Imaging: I have personally reviewed pertinent reports. EKG, Pathology, and Other Studies: I have personally reviewed pertinent reports.       Code Status: Prior  Advance Directive and Living Will:      Power of : Yes  POLST:      Preethi Bean MD  General Surgery Resident

## 2023-11-17 NOTE — TELEPHONE ENCOUNTER
Patient calls in stating she has right lower abdominal pain and pain all over abdomen also. Patient is nauseated and vomiting. Complaining  of weakness, loose stools and her abdomen feels hard. Recommend ER evaluation.

## 2023-11-17 NOTE — TELEPHONE ENCOUNTER
Regarding: stomach pain getting worse  ----- Message from Asuncion Soliz sent at 11/17/2023  8:23 AM EST -----  Stomach pain, hurts worse when she takes a breath. Dry heaves this morning and feeling weak. She wanted to see or talk to Dr. Perla Vázquez as she has seen her for this before. Please call patient to triage.   ThanK you    [phone]

## 2023-11-17 NOTE — PLAN OF CARE
Problem: Prexisting or High Potential for Compromised Skin Integrity  Goal: Skin integrity is maintained or improved  Description: INTERVENTIONS:  - Identify patients at risk for skin breakdown  - Assess and monitor skin integrity  - Assess and monitor nutrition and hydration status  - Monitor labs   - Assess for incontinence   - Turn and reposition patient  - Assist with mobility/ambulation  - Relieve pressure over bony prominences  - Avoid friction and shearing  - Provide appropriate hygiene as needed including keeping skin clean and dry  - Evaluate need for skin moisturizer/barrier cream  - Collaborate with interdisciplinary team   - Patient/family teaching  - Consider wound care consult   Outcome: Progressing     Problem: PAIN - ADULT  Goal: Verbalizes/displays adequate comfort level or baseline comfort level  Description: Interventions:  - Encourage patient to monitor pain and request assistance  - Assess pain using appropriate pain scale  - Administer analgesics based on type and severity of pain and evaluate response  - Implement non-pharmacological measures as appropriate and evaluate response  - Consider cultural and social influences on pain and pain management  - Notify physician/advanced practitioner if interventions unsuccessful or patient reports new pain  Outcome: Progressing     Problem: INFECTION - ADULT  Goal: Absence or prevention of progression during hospitalization  Description: INTERVENTIONS:  - Assess and monitor for signs and symptoms of infection  - Monitor lab/diagnostic results  - Monitor all insertion sites, i.e. indwelling lines, tubes, and drains  - Monitor endotracheal if appropriate and nasal secretions for changes in amount and color  - [place] appropriate cooling/warming therapies per order  - Administer medications as ordered  - Instruct and encourage patient and family to use good hand hygiene technique  - Identify and instruct in appropriate isolation precautions for identified infection/condition  Outcome: Progressing  Goal: Absence of fever/infection during neutropenic period  Description: INTERVENTIONS:  - Monitor WBC    Outcome: Progressing     Problem: SAFETY ADULT  Goal: Patient will remain free of falls  Description: INTERVENTIONS:  - Educate patient/family on patient safety including physical limitations  - Instruct patient to call for assistance with activity   - Consult OT/PT to assist with strengthening/mobility   - Keep Call bell within reach  - Keep bed low and locked with side rails adjusted as appropriate  - Keep care items and personal belongings within reach  - Initiate and maintain comfort rounds  - Make Fall Risk Sign visible to staff  - Offer Toileting every 2 Hours, in advance of need  - Initiate/Maintain alarm  - Obtain necessary fall risk management equipment:   - Apply yellow socks and bracelet for high fall risk patients  - Consider moving patient to room near nurses station  Outcome: Progressing  Goal: Maintain or return to baseline ADL function  Description: INTERVENTIONS:  -  Assess patient's ability to carry out ADLs; assess patient's baseline for ADL function and identify physical deficits which impact ability to perform ADLs (bathing, care of mouth/teeth, toileting, grooming, dressing, etc.)  - Assess/evaluate cause of self-care deficits   - Assess range of motion  - Assess patient's mobility; develop plan if impaired  - Assess patient's need for assistive devices and provide as appropriate  - Encourage maximum independence but intervene and supervise when necessary  - Involve family in performance of ADLs  - Assess for home care needs following discharge   - Consider OT consult to assist with ADL evaluation and planning for discharge  - Provide patient education as appropriate  Outcome: Progressing  Goal: Maintains/Returns to pre admission functional level  Description: INTERVENTIONS:  - Perform AM-PAC 6 Click Basic Mobility/ Daily Activity assessment daily.  - Set and communicate daily mobility goal to care team and patient/family/caregiver. - Collaborate with rehabilitation services on mobility goals if consulted  - Perform Range of Motion 3 times a day. - Reposition patient every 2 hours. - Dangle patient 3 times a day  - Stand patient 3 times a day  - Ambulate patient 3 times a day  - Out of bed to chair 3 times a day   - Out of bed for meals 3 times a day  - Out of bed for toileting  - Record patient progress and toleration of activity level   Outcome: Progressing     Problem: DISCHARGE PLANNING  Goal: Discharge to home or other facility with appropriate resources  Description: INTERVENTIONS:  - Identify barriers to discharge w/patient and caregiver  - Arrange for needed discharge resources and transportation as appropriate  - Identify discharge learning needs (meds, wound care, etc.)  - Arrange for interpretive services to assist at discharge as needed  - Refer to Case Management Department for coordinating discharge planning if the patient needs post-hospital services based on physician/advanced practitioner order or complex needs related to functional status, cognitive ability, or social support system  Outcome: Progressing     Problem: Knowledge Deficit  Goal: Patient/family/caregiver demonstrates understanding of disease process, treatment plan, medications, and discharge instructions  Description: Complete learning assessment and assess knowledge base.   Interventions:  - Provide teaching at level of understanding  - Provide teaching via preferred learning methods  Outcome: Progressing

## 2023-11-17 NOTE — ED ATTENDING ATTESTATION
11/17/2023  IMarie, DO, saw and evaluated the patient. I have discussed the patient with the resident/non-physician practitioner and agree with the resident's/non-physician practitioner's findings, Plan of Care, and MDM as documented in the resident's/non-physician practitioner's note, except where noted. All available labs and Radiology studies were reviewed. I was present for key portions of any procedure(s) performed by the resident/non-physician practitioner and I was immediately available to provide assistance. At this point I agree with the current assessment done in the Emergency Department. I have conducted an independent evaluation of this patient a history and physical is as follows:    ED Course   78year old female presents to the ED for evaluation of abdominal pain with associated nausea and vomiting. Patient reports that she has been having abdominal pain intermittently over the past few weeks. The pain is crampy and significantly worse over the past 1 day. She has had very poor appetite but has not noticed increased pain with eating. She is also having loose watery nonbloody stools. She denies fevers or chills. PMHX: Hip dislocation, hypertension, gastritis, atrial fibrillation    PE: Patient is awake and alert, mucous membranes are dry. Neck is supple. Patient is pale in appearance. Heart is tachycardic, regular. Lungs are clear to auscultation. Abdomen is diffusely tender, firm, slightly distended. No CVA tenderness. Normal active bowel sounds present. No lower extremity edema. Assessment:  78year old female presents with several week history of abdominal pain differential diagnosis includes but is not limited to acute cholecystitis, choledocholithiasis, pancreatitis, gastritis, peptic ulcer disease, small bowel obstruction, colitis, diverticulitis, neoplasm    Plan:  We will start IV pain medication, IV fluid hydration check CT scan abdomen pelvis to evaluate for intra-abdominal pathology. Update: Patient has significant abnormalities noted on CT in the right upper quadrant we will proceed with ultrasound upper quadrant to further characterize. Patient admitted to the general surgery service.         Critical Care Time  CriticalCare Time    Date/Time: 11/17/2023 9:01 PM    Performed by: Cachorro Hernández DO  Authorized by: Cachorro Hernández DO    Critical care provider statement:     Critical care time (minutes):  45    Critical care time was exclusive of:  Separately billable procedures and treating other patients and teaching time    Critical care was necessary to treat or prevent imminent or life-threatening deterioration of the following conditions:  Sepsis    Critical care was time spent personally by me on the following activities:  Blood draw for specimens, obtaining history from patient or surrogate, development of treatment plan with patient or surrogate, discussions with consultants, evaluation of patient's response to treatment, examination of patient, review of old charts, re-evaluation of patient's condition, ordering and review of radiographic studies, ordering and review of laboratory studies and ordering and performing treatments and interventions    I assumed direction of critical care for this patient from another provider in my specialty: no

## 2023-11-17 NOTE — PROGRESS NOTES
Progress Note  Eric Berry 78 y.o. female MRN: [id number]  Unit/Bed#: W -01 Encounter: [id number]    Assessment  82F with acute cholecystitis with choledocholithiasis. Plan  - NPO for OR today for lap sachin  - [email or web address]  - continue ancef/flagyl  - appreciate GI eval for ERCP  - appreciate nephrology recommendations  - DVT ppx    Subjective/Objective   No acute overnight events. Pain controlled. Reports nausea, no emesis. No BM, no flatus. Voiding, walking. VSS on 2L NC.  /61   Pulse 102   Temp 97.9 °F (36.6 °C)   Resp 20   Ht 5' 3" (1.6 m)   Wt 85.7 kg (189 lb)   SpO2 93%   BMI 33.48 kg/m²      Physical Exam:  General: NAD  HENT: NCAT  CV: nl rate  Lungs: nl wob. No resp distress. ABD: Soft, distended, epigastric/RUQ tenderness.   Extrem: No CCE  Neuro: alert & oriented    UOP: unrecorded    11/17 bcx pending  Recent Labs     11/17/23  1117 11/18/23  0455   WBC 6.14 13.24*   HGB 12.2 10.5*   * 381   SODIUM 136 135   K 3.8 4.2   CL 97 99   CO2 26 26   BUN 27* 37*   CREATININE 1.67* 2.06*   GLUC 125 148*   CALCIUM 9.2 8.4   MG  --  1.6*   PHOS  --  4.3*   AST 13 14   ALT 8 7   ALKPHOS 126* 91   TBILI 0.68 0.47

## 2023-11-17 NOTE — ED PROVIDER NOTES
History  Chief Complaint   Patient presents with    Abdominal Pain     Abd pain generalized but worse on right side, radiating to right groin. Pt states she has been feeling unwell x2 weeks and anorexia and nausea. Vomiting started last night (3-4x) and is described as clear. Pt states she has been having loose stools x2 weeks. Pt denies bloody/black stools. Pt denies sick contact and fevers. HPI  (José Manuel Mcnulty) José Manuel Mcnulty is a 78 y.o. female     They presented to the emergency department on November 17, 2023. Chief Complaint   Patient presents with    Abdominal Pain     Abd pain generalized but worse on right side, radiating to right groin. Pt states she has been feeling unwell x2 weeks and anorexia and nausea. Vomiting started last night (3-4x) and is described as clear. Pt states she has been having loose stools x2 weeks. Pt denies bloody/black stools. Pt denies sick contact and fevers. She has a history of atrial fibrillation on warfarin, hypertension, chronic kidney disease. Patient states been ongoing for the last couple weeks however significantly worse last night. Patient states the pain is 8 out of 10 and described as cramping in nature. Patient notes associated nausea and vomiting. Patient states she had 2-3 loose bowel movements today. No blood in stool or emesis. Denies chest pain, shortness of breath, fever, dysuria. Patient states she had some pink blood on tissue when wiping. Patient denies any abdominal surgeries. Prior to Admission Medications   Prescriptions Last Dose Informant Patient Reported? Taking?    ALPRAZolam (XANAX) 0.25 mg tablet Not Taking Self No No   Sig: take 1 tablet by mouth three times a day if needed for anxiety   Patient not taking: No sig reported   COLLAGEN PO Not Taking Self Yes No   Sig: Take by mouth   Patient not taking: Reported on 11/17/2023   Cholecalciferol (VITAMIN D3) 50 MCG (2000 UT) capsule Not Taking Self Yes No   Sig: Take 2,000 Units by mouth daily   Patient not taking: Reported on 2023   Melatonin 3 MG CAPS Not Taking Self Yes No   Sig: Take 3 mg by mouth     Patient not taking: Reported on 2023   Multiple Vitamins-Minerals (PRESERVISION AREDS 2 PO) Not Taking  Yes No   Sig: Take 1 capsule by mouth 2 (two) times a day. Indications: supplement   Patient not taking: Reported on 2023   Myrbetriq 25 MG  Self No Yes   Sig: TAKE 1 TABLET EVERY DAY   acetaminophen (TYLENOL) 325 mg tablet 2023 Self No Yes   Sig: Take 3 tablets (975 mg total) by mouth every 8 (eight) hours   amLODIPine (NORVASC) 5 mg tablet 2023  No Yes   Sig: TAKE 1 TABLET EVERY DAY   b complex vitamins tablet Not Taking Self Yes No   Sig: Take 1 tablet by mouth daily   Patient not taking: Reported on 2023   escitalopram (LEXAPRO) 10 mg tablet 2023  No Yes   Sig: Take 1 tablet (10 mg total) by mouth daily   estradiol (ESTRACE VAGINAL) 0.1 mg/g vaginal cream   No No   Sig: Insert 1 g into the vagina daily   Patient taking differently: Insert 1 g into the vagina daily. Indications: once every other week   furosemide (LASIX) 20 mg tablet 2023 Self No Yes   Sig: take 1 tablet by mouth once daily   influenza vaccine, high-dose (FLUZONE HIGH-DOSE) 0.7 ML CISCO Not Taking  Yes No   Sig: Inject 0.7 mL into a muscle 1 (one) time.  Indications: Influenza A and B Inactivated Vaccination   Patient not taking: Reported on 2023   lisinopril (ZESTRIL) 5 mg tablet 2023 Self No Yes   Sig: TAKE 1 TABLET EVERY DAY   metoprolol tartrate (LOPRESSOR) 25 mg tablet 2023  No Yes   Sig: TAKE 1 TABLET TWICE DAILY   oxymetazoline (AFRIN) 0.05 % nasal spray  Self No No   Si sprays by Each Nare route 2 (two) times a day for 3 days As need for recurrent nose bleeds, please do not take for more than 3 days   pantoprazole (PROTONIX) 40 mg tablet 2023 Self No Yes   Sig: take 1 tablet by mouth once daily   promethazine (PHENERGAN) 25 mg tablet 2023  No Yes   Sig: Take 1 tablet (25 mg total) by mouth every 6 (six) hours as needed for nausea or vomiting   sodium chloride (OCEAN) 0.65 % nasal spray More than a month  Yes No   Si sprays into each nostril as needed for congestion. Indications: dryness   sucralfate (CARAFATE) 1 g tablet 2023  No Yes   Sig: take 1 tablet by mouth before meals and at bedtime   warfarin (COUMADIN) 5 mg tablet 2023 Self No Yes   Sig: TAKE 1 TABLET EVERY DAY      Facility-Administered Medications: None       Past Medical History:   Diagnosis Date    Atrial fibrillation ([address])     Hypertension     Insect bite of scalp 2021    Nasal dryness 12/15/2021    UTI (urinary tract infection)        Past Surgical History:   Procedure Laterality Date    EYE SURGERY      JOINT REPLACEMENT Right     Knee 11/15/19, Hip 2021       Family History   Problem Relation Age of Onset    No Known Problems Mother     Diabetes Father     Stroke Father         syndrome     I have reviewed and agree with the history as documented. E-Cigarette/Vaping    E-Cigarette Use Never User      E-Cigarette/Vaping Substances     Social History     Tobacco Use    Smoking status: Former     Types: Cigarettes     Quit date:      Years since quittin.8    Smokeless tobacco: Never   Vaping Use    Vaping Use: Never used   Substance Use Topics    Alcohol use: Never    Drug use: Never        Review of Systems   Constitutional:  Negative for chills and fever. HENT:  Negative for ear pain and sore throat. Eyes:  Negative for pain and visual disturbance. Respiratory:  Negative for cough and shortness of breath. Cardiovascular:  Negative for chest pain and palpitations. Gastrointestinal:  Positive for abdominal pain, diarrhea, nausea and vomiting. Negative for blood in stool. No hematemesis   Genitourinary:  Positive for hematuria. Negative for dysuria.    Musculoskeletal:  Negative for arthralgias and back pain. Skin:  Negative for color change and rash. Neurological:  Negative for seizures and syncope. All other systems reviewed and are negative. Physical Exam  ED Triage Vitals   Temperature Pulse Respirations Blood Pressure SpO2   11/17/23 1043 11/17/23 1042 11/17/23 1042 11/17/23 1042 11/17/23 1042   98.9 °F (37.2 °C) (!) 110 16 128/61 96 %      Temp Source Heart Rate Source Patient Position - Orthostatic VS BP Location FiO2 (%)   11/17/23 1043 11/17/23 1042 11/17/23 1042 11/17/23 1042 --   Oral Monitor Sitting Right arm       Pain Score       11/17/23 1116       8             Orthostatic Vital Signs  Vitals:    11/17/23 1300 11/17/23 1500 11/17/23 1621 11/17/23 1939   BP: 100/54 116/56 114/70 119/63   Pulse: 102 104 99 105   Patient Position - Orthostatic VS:  Sitting         Physical Exam  Vitals and nursing note reviewed. Constitutional:       General: She is in acute distress (d/t pain). Appearance: She is well-developed. Comments: Appears uncomfortable. HENT:      Head: Normocephalic and atraumatic. Mouth/Throat:      Mouth: Mucous membranes are dry. Pharynx: Oropharynx is clear. Uvula midline. Eyes:      Conjunctiva/sclera: Conjunctivae normal.   Cardiovascular:      Rate and Rhythm: Tachycardia present. Rhythm irregular. Heart sounds: No murmur heard. Pulmonary:      Effort: Pulmonary effort is normal. No respiratory distress. Breath sounds: Normal breath sounds. Abdominal:      General: Bowel sounds are normal.      Palpations: Abdomen is soft. Tenderness: There is generalized abdominal tenderness. There is no guarding or rebound. Positive signs include Núñez's sign. Comments: Abdomen is firm but not rigid. Musculoskeletal:         General: No swelling. Cervical back: Neck supple. Skin:     General: Skin is warm and dry. Capillary Refill: Capillary refill takes less than 2 seconds.    Neurological:      Mental Status: She is alert.   Psychiatric:         Mood and Affect: Mood normal.         ED Medications  Medications   multi-electrolyte (PLASMALYTE-A/ISOLYTE-S PH 7.4) IV solution (100 mL/hr Intravenous New Bag 11/17/23 1756)   ceFAZolin (ANCEF) IVPB (premix in dextrose) 2,000 mg 50 mL (2,000 mg Intravenous New Bag 11/17/23 1721)   metroNIDAZOLE (FLAGYL) IVPB (premix) 500 mg 100 mL (500 mg Intravenous New Bag 11/17/23 1750)   acetaminophen (TYLENOL) tablet 650 mg (has no administration in time range)   oxyCODONE (ROXICODONE) split tablet 2.5 mg (has no administration in time range)   oxyCODONE (ROXICODONE) IR tablet 5 mg (5 mg Oral Given 11/17/23 1828)   HYDROmorphone HCl (DILAUDID) injection 0.2 mg (has no administration in time range)   labetalol (NORMODYNE) injection 10 mg (has no administration in time range)   ondansetron (ZOFRAN) injection 4 mg (4 mg Intravenous Given 11/17/23 1828)   promethazine (PHENERGAN) injection 25 mg (has no administration in time range)   metoclopramide (REGLAN) injection 10 mg (has no administration in time range)   ondansetron (ZOFRAN) injection 4 mg (4 mg Intravenous Given 11/17/23 1116)   lactated ringers bolus 1,000 mL (0 mL Intravenous Stopped 11/17/23 1441)   HYDROmorphone HCl (DILAUDID) injection 0.2 mg (0.2 mg Intravenous Given 11/17/23 1116)   HYDROmorphone HCl (DILAUDID) injection 0.2 mg (0.2 mg Intravenous Given 11/17/23 1250)   ondansetron (ZOFRAN) injection 4 mg (4 mg Intravenous Given 11/17/23 1304)   piperacillin-tazobactam (ZOSYN) 3.375 g in sodium chloride 0.9 % 100 mL IVPB (0 g Intravenous Stopped 11/17/23 1441)   lactated ringers bolus 600 mL (0 mL Intravenous Stopped 11/17/23 1824)   HYDROmorphone (DILAUDID) injection 0.5 mg (0.5 mg Intravenous Given 11/17/23 8007)   phytonadione (AQUA-MEPHYTON) 10 mg/mL 10 mg in sodium chloride 0.9 % 50 mL IVPB (0 mg Intravenous Stopped 11/17/23 7044)   prothrombin complex concentrate (human) (Kcentra) 1,500 Units (1,500 Units Intravenous Given 11/17/23 1723)       Diagnostic Studies  Results Reviewed       Procedure Component Value Units Date/Time    HS Troponin I 2hr [[id number]] Collected: 11/17/23 1929    Lab Status: Edited Result - FINAL Specimen: Blood from Arm, Left Updated: 11/17/23 2010    Narrative: The previously reported component HS TNI 2HR is no longer being reported. The previously reported component 2HR DELTA HS TROPONIN is no longer being reported. HS Troponin I 4hr [[id number]]  (Abnormal) Collected: 11/17/23 1929    Lab Status: Final result Specimen: Blood from Arm, Left Updated: 11/17/23 2007     hs TnI 4hr 74 ng/L      Delta 4hr hsTnI 29 ng/L     Protime-INR [[id number]]  (Abnormal) Collected: 11/17/23 1704    Lab Status: Final result Specimen: Blood from Arm, Left Updated: 11/17/23 1726     Protime 34.8 seconds      INR 3.32    Blood culture #1 [[id number]] Collected: 11/17/23 1248    Lab Status: Preliminary result Specimen: Blood from Arm, Right Updated: 11/17/23 1701     Blood Culture Received in Microbiology Lab. Culture in Progress. Blood culture #2 [[id number]] Collected: 11/17/23 1330    Lab Status: Preliminary result Specimen: Blood from Arm, Right Updated: 11/17/23 1701     Blood Culture Received in Microbiology Lab. Culture in Progress. HS Troponin 0hr (reflex protocol) [[id number]]  (Normal) Collected: 11/17/23 1330    Lab Status: Final result Specimen: Blood from Arm, Right Updated: 11/17/23 1555     hs TnI 0hr 45 ng/L     Urine Microscopic [[id number]]  (Abnormal) Collected: 11/17/23 1404    Lab Status: Final result Specimen: Urine, Clean Catch Updated: 11/17/23 1430     RBC, UA 10-20 /hpf      WBC, UA 10-20 /hpf      Epithelial Cells Innumerable /hpf      Bacteria, UA Occasional /hpf      MUCUS THREADS Occasional     Hyaline Casts, UA 10-25 /lpf     Urine culture [[id number]] Collected: 11/17/23 1404    Lab Status:  In process Specimen: Urine, Clean Catch Updated: 11/17/23 1430    Lactic acid 2 Hours [[id number]] (Normal) Collected: 11/17/23 1330    Lab Status: Final result Specimen: Blood from Arm, Right Updated: 11/17/23 1425     LACTIC ACID 1.6 mmol/L     Narrative:      Result may be elevated if tourniquet was used during collection. UA w Reflex to Microscopic w Reflex to Culture [[id number]]  (Abnormal) Collected: 11/17/23 1404    Lab Status: Final result Specimen: Urine, Clean Catch Updated: 11/17/23 1411     Color, UA Yellow     Clarity, UA Turbid     Specific Gravity, UA 1.019     pH, UA 5.5     Leukocytes, UA Moderate     Nitrite, UA Negative     Protein, UA 50 (1+) mg/dl      Glucose, UA Negative mg/dl      Ketones, UA Negative mg/dl      Urobilinogen, UA <2.0 mg/dl      Bilirubin, UA Negative     Occult Blood, UA Large    Protime-INR [[id number]]  (Abnormal) Collected: 11/17/23 1248    Lab Status: Final result Specimen: Blood from Arm, Right Updated: 11/17/23 1410     Protime 31.9 seconds      INR 2.97    APTT [[id number]]  (Abnormal) Collected: 11/17/23 1248    Lab Status: Final result Specimen: Blood from Arm, Right Updated: 11/17/23 1410     PTT 56 seconds     RBC Morphology Reflex Test [[id number]] Collected: 11/17/23 1117    Lab Status: Final result Specimen: Blood from Arm, Right Updated: 11/17/23 1201    CBC and differential [[id number]]  (Abnormal) Collected: 11/17/23 1117    Lab Status: Final result Specimen: Blood from Arm, Right Updated: 11/17/23 1157     WBC 6.14 Thousand/uL      RBC 3.95 Million/uL      Hemoglobin 12.2 g/dL      Hematocrit 39.2 %      MCV 99 fL      MCH 30.9 pg      MCHC 31.1 g/dL      RDW 13.3 %      MPV 9.3 fL      Platelets 050 Thousands/uL     Narrative: This is an appended report. These results have been appended to a previously verified report.     Manual Differential(PHLEBS Do Not Order) [[id number]]  (Abnormal) Collected: 11/17/23 1117    Lab Status: Final result Specimen: Blood from Arm, Right Updated: 11/17/23 1157     Segmented % 81 %      Bands % 11 %      Lymphocytes % 5 %      Monocytes % 3 %      Eosinophils, % 0 %      Basophils % 0 %      Absolute Neutrophils 5.65 Thousand/uL      Lymphocytes Absolute 0.31 Thousand/uL      Monocytes Absolute 0.18 Thousand/uL      Eosinophils Absolute 0.00 Thousand/uL      Basophils Absolute 0.00 Thousand/uL      Total Counted --     RBC Morphology Normal     Platelet Estimate Adequate    Lactic acid, plasma (w/reflex if result > 2.0) [[id number]]  (Abnormal) Collected: 11/17/23 1117    Lab Status: Final result Specimen: Blood from Arm, Right Updated: 11/17/23 1148     LACTIC ACID 2.1 mmol/L     Narrative:      Result may be elevated if tourniquet was used during collection.     Comprehensive metabolic panel [[id number]]  (Abnormal) Collected: 11/17/23 1117    Lab Status: Final result Specimen: Blood from Arm, Right Updated: 11/17/23 1146     Sodium 136 mmol/L      Potassium 3.8 mmol/L      Chloride 97 mmol/L      CO2 26 mmol/L      ANION GAP 13 mmol/L      BUN 27 mg/dL      Creatinine 1.67 mg/dL      Glucose 125 mg/dL      Calcium 9.2 mg/dL      AST 13 U/L      ALT 8 U/L      Alkaline Phosphatase 126 U/L      Total Protein 7.0 g/dL      Albumin 3.7 g/dL      Total Bilirubin 0.68 mg/dL      eGFR 27 ml/min/1.73sq m     Narrative:      [place] guidelines for Chronic Kidney Disease (CKD):     Stage 1 with normal or high GFR (GFR > 90 mL/min/1.73 square meters)    Stage 2 Mild CKD (GFR = 60-89 mL/min/1.73 square meters)    Stage 3A Moderate CKD (GFR = 45-59 mL/min/1.73 square meters)    Stage 3B Moderate CKD (GFR = 30-44 mL/min/1.73 square meters)    Stage 4 Severe CKD (GFR = 15-29 mL/min/1.73 square meters)    Stage 5 End Stage CKD (GFR <15 mL/min/1.73 square meters)  Note: GFR calculation is accurate only with a steady state creatinine    Lipase [[id number]]  (Normal) Collected: 11/17/23 1117    Lab Status: Final result Specimen: Blood from Arm, Right Updated: 11/17/23 1146     Lipase 16 u/L                    US right upper quadrant   Final Result by Gregory Lawson MD (11/17 6956)      Findings compatible with acute cholecystitis. Gallbladder wall thickening with irregular margins which may represent sloughed membranes, concerning for gangrenous cholecystitis. Dilated common bile duct and mild intrahepatic biliary ductal dilatation, in keeping with the known choledocholithiasis on recent CT. Mildly dilated pancreatic duct measuring 6 mm, which may relate to choledocholithiasis. Recommend contrast-enhanced MRI/MRCP of the abdomen for further evaluation. Right hydronephrosis. Small right upper abdominal ascites. The study was marked in [facility] for immediate notification. Workstation performed: [id number]         CT abdomen pelvis wo contrast   Final Result by Bart Duran MD (11/17 7495)         1. Gallstones with apparent gallbladder wall thickening and nodularity raising the possibility of cholecystitis. There is some nonspecific heterogeneous low-attenuation in the liver adjacent to the gallbladder fundus, not appreciated on the prior CT    study from 11/29/2021, which could reflect steatosis although an infiltrative process not excluded. Recommend further evaluation with ultrasound and/or contrast-enhanced CT or MRI. 2. Choledocholithiasis with intra and extrahepatic biliary ductal dilatation. Correlate with liver function studies. Recommend ultrasound for further evaluation. 3. New right-sided moderate hydronephrosis without discernible obstructing mass or calculus. 4. Moderate abdominal and pelvic ascites. 5. Subcentimeter pancreatic cysts, noted on the prior study, for which MRI/MRCP evaluation is advised if not previously done. 6. Multiple subcentimeter bibasilar pulmonary nodules not clearly identified on the prior CT. Changes may be infectious/inflammatory or neoplastic in nature. Consider dedicated CT of the chest.   7. Additional findings as noted.       The study was marked in [employer] for immediate notification. Workstation performed: [id number]               Procedures  ECG 12 Lead Documentation Only    Date/Time: 11/17/2023 11:00 AM    Performed by: Dax Monte DO  Authorized by: Dax Monte DO    Patient location:  ED  Previous ECG:     Previous ECG:  Compared to current    Comparison ECG info:  10/22/21: Normal sinus rhythm    Similarity:  Changes noted  Interpretation:     Interpretation: abnormal    Quality:     Tracing quality:  Limited by artifact  Rate:     ECG rate:  109    ECG rate assessment: tachycardic    Rhythm:     Rhythm: sinus tachycardia    Ectopy:     Ectopy: none    QRS:     QRS axis:  Normal  Conduction:     Conduction: normal    ST segments:     ST segments:  Abnormal (in inferior leads (II, III, aVF))  T waves:     T waves: inverted      Inverted:  II, III and aVF        ED Course  ED Course as of 11/17/23 2053 Fri Nov 17, 2023   1053 Blood Pressure: 128/61   1053 Temperature: 98.9 °F (37.2 °C)   1053 Pulse(!): 110   1053 Respirations: 16   1053 SpO2: 96 %   1053 77 yo F history of atrial fibrillation on warfarin, hypertension, chronic kidney disease, who presents to the emergency department with diffuse abdominal pain. Patient states been ongoing for the last couple weeks however significantly worse last night. Patient states the pain is 8 out of 10 and described as cramping in nature. Patient notes associated nausea and vomiting. Patient states she had 2-3 loose bowel movements today. No blood in stool or emesis. Denies chest pain, shortness of breath, fever, dysuria. Patient states she had some pink blood on tissue when wiping. Patient denies any abdominal surgeries. Physical exam: Patient appears uncomfortable, heart is tachycardic and irregular, lungs are clear to auscultation bilaterally, abdomen is diffusely tender. Mucous membranes are dry.     Concern for: Small bowel obstruction versus pancreatitis versus neoplasm versus gastroenteritis versus SMA   1133 CBC and differential(!)  No leukocytosis or leukopenia. Hemoglobin hematocrit within normal limits. Platelets elevated. 1147 Comprehensive metabolic panel(!)  69% bands -bandemia   1147 Lipase: 16   1152 LACTIC ACID(!!): 2.1  Currently giving IVF   1157 Manual Differential(PHLEBS Do Not Order)(!)  11   1310 We will give patient ideal body weight 30 cc per bolus which is 1600 mL.   1405 CT abdomen pelvis wo contrast  IMPRESSION:        1. Gallstones with apparent gallbladder wall thickening and nodularity raising the possibility of cholecystitis. There is some nonspecific heterogeneous low-attenuation in the liver adjacent to the gallbladder fundus, not appreciated on the prior CT   study from 11/29/2021, which could reflect steatosis although an infiltrative process not excluded. Recommend further evaluation with ultrasound and/or contrast-enhanced CT or MRI. 2. Choledocholithiasis with intra and extrahepatic biliary ductal dilatation. Correlate with liver function studies. Recommend ultrasound for further evaluation. 3. New right-sided moderate hydronephrosis without discernible obstructing mass or calculus. 4. Moderate abdominal and pelvic ascites. 5. Subcentimeter pancreatic cysts, noted on the prior study, for which MRI/MRCP evaluation is advised if not previously done. 6. Multiple subcentimeter bibasilar pulmonary nodules not clearly identified on the prior CT. Changes may be infectious/inflammatory or neoplastic in nature. Consider dedicated CT of the chest.  7. Additional findings as noted. 1415 POCT INR(!): 2.97   1415 PROTIME(!): 31.9   1415 PTT(!): 56   1415 UA w Reflex to Microscopic w Reflex to Culture(!)  Moderate leukocytes. Negative nitrates. Positive protein. Large amount of blood. 1431 Urine Microscopic(!)  1020 red blood cells. 10-20 white cells. Innumerable epithelial cells. Occasional bacteria. Occasional mucus present.   25 hyaline cast.  Concern that this was contaminant    1431 LACTIC ACID: 1.6  Repeat lactic   1431 Discussed results with patient and family at this time. They expressed understanding. Discussed that surgery will be evaluating them in the emergency department she would likely be admitted. Discussed that we will obtain a right upper quadrant ultrasound to further evaluate her gallbladder and liver. Patient expressed understanding was agreeable with this plan. Patient and family given the opportunity as questions emergency department. All question concerns were addressed the emergency department. [facility] right upper quadrant  Findings compatible with acute cholecystitis. Gallbladder wall thickening with irregular margins which may represent sloughed membranes, concerning for gangrenous cholecystitis. Dilated common bile duct and mild intrahepatic biliary ductal dilatation, in keeping with the known choledocholithiasis on recent CT. Mildly dilated pancreatic duct measuring 6 mm, which may relate to choledocholithiasis. Recommend contrast-enhanced MRI/MRCP of the abdomen for further evaluation. Right hydronephrosis. Small right upper abdominal ascites. 56 Discussed surgery resident evaluated patient in the emergency department agree with admission. Initial Sepsis Screening       Row Name 11/17/23 3732                Is the patient's history suggestive of a new or worsening infection? Yes (Proceed)  -KS        Suspected source of infection acute abdominal infection  -KS        Indicate SIRS criteria Tachycardia > 90 bpm;Leukocytosis (WBC > 26479 IJL) OR Leukopenia (WBC <4000 IJL) OR Bandemia (WBC >10% bands)  -KS        Are two or more of the above signs & symptoms of infection both present and new to the patient?  Yes (Proceed)  -KS        Assess for evidence of organ dysfunction: Are any of the below criteria present within 6 hours of suspected infection and SIRS criteria that are NOT considered to be chronic conditions? Lactate > 2.0  -KS        Date of presentation of severe sepsis 11/17/23  -KS        Time of presentation of severe sepsis 1300  -KS        Sepsis Note: Click "NEXT" below (NOT "close") to generate sepsis note based on above information. --                  User Key  (r) = Recorded By, (t) = Taken By, (c) = Cosigned By      [address] [place] [address] Name Provider Type    DO Socorro Herrera                  Default Flowsheet Data (last 720 hours)       Sepsis Reassess       Row Name 11/17/23 2050                   Repeat Volume Status and Tissue Perfusion Assessment Performed    Date of Reassessment: 11/17/23  -KS        Time of Reassessment: 1400  -KS        Sepsis Reassessment Note: Click "NEXT" below (NOT "close") to generate sepsis reassessment note. YES (proceed by clicking "NEXT")  -KS        Repeat Volume Status and Tissue Perfusion Assessment Performed --                  User Key  (r) = Recorded By, (t) = Taken By, (c) = Cosigned By      [address] [place] [address] Name Provider Type    DO Socorro Herrera                            Medical Decision Making  Amount and/or Complexity of Data Reviewed  Labs: ordered. Decision-making details documented in ED Course. Radiology: ordered. Decision-making details documented in ED Course. Risk  Prescription drug management. Decision regarding hospitalization.           Disposition  Final diagnoses:   Choledocholithiasis     Time reflects when diagnosis was documented in both MDM as applicable and the Disposition within this note       Time User Action Codes Description Comment    11/17/2023  2:18 PM Crystal Sinjenny Add [K80.50] Choledocholithiasis     11/17/2023  3:34  [address], [address] [K81.9] Cholecystitis           ED Disposition       ED Disposition   Admit    Condition   Stable    Date/Time   Fri Nov 17, 2023  3:37 PM    Comment   Case was discussed with Surgery Resident and the patient's admission status was agreed to be Admission Status: inpatient status to the service of Dr. Rosalba Villalobos .                Follow-up Information    None         Current Discharge Medication List        CONTINUE these medications which have NOT CHANGED    Details   acetaminophen (TYLENOL) 325 mg tablet Take 3 tablets (975 mg total) by mouth every 8 (eight) hours  Qty: 30 tablet, Refills: 0    Associated Diagnoses: Fall, initial encounter      amLODIPine (NORVASC) 5 mg tablet TAKE 1 TABLET EVERY DAY  Qty: 90 tablet, Refills: 1    Associated Diagnoses: Essential hypertension      escitalopram (LEXAPRO) 10 mg tablet Take 1 tablet (10 mg total) by mouth daily  Qty: 30 tablet, Refills: 5    Associated Diagnoses: Depression, unspecified depression type      furosemide (LASIX) 20 mg tablet take 1 tablet by mouth once daily  Qty: 90 tablet, Refills: 3    Associated Diagnoses: Paroxysmal atrial fibrillation (HCC)      lisinopril (ZESTRIL) 5 mg tablet TAKE 1 TABLET EVERY DAY  Qty: 90 tablet, Refills: 0    Associated Diagnoses: Essential hypertension      metoprolol tartrate (LOPRESSOR) 25 mg tablet TAKE 1 TABLET TWICE DAILY  Qty: 180 tablet, Refills: 1    Associated Diagnoses: Essential hypertension      Myrbetriq 25 MG TB24 TAKE 1 TABLET EVERY DAY  Qty: 90 tablet, Refills: 3    Associated Diagnoses: Urinary frequency      pantoprazole (PROTONIX) 40 mg tablet take 1 tablet by mouth once daily  Qty: 60 tablet, Refills: 3    Associated Diagnoses: Anemia      promethazine (PHENERGAN) 25 mg tablet Take 1 tablet (25 mg total) by mouth every 6 (six) hours as needed for nausea or vomiting  Qty: 30 tablet, Refills: 0    Associated Diagnoses: Nausea      sucralfate (CARAFATE) 1 g tablet take 1 tablet by mouth before meals and at bedtime  Qty: 40 tablet, Refills: 0    Associated Diagnoses: Acute gastritis without hemorrhage, unspecified gastritis type      warfarin (COUMADIN) 5 mg tablet TAKE 1 TABLET EVERY DAY  Qty: 90 tablet, Refills: 3 Associated Diagnoses: Fall, initial encounter      ALPRAZolam (XANAX) 0.25 mg tablet take 1 tablet by mouth three times a day if needed for anxiety  Qty: 90 tablet, Refills: 3    Associated Diagnoses: Anxiety      b complex vitamins tablet Take 1 tablet by mouth daily      Cholecalciferol (VITAMIN D3) 50 MCG (2000 UT) capsule Take 2,000 Units by mouth daily      COLLAGEN PO Take by mouth      estradiol (ESTRACE VAGINAL) 0.1 mg/g vaginal cream Insert 1 g into the vagina daily  Qty: 42.5 g, Refills: 3    Associated Diagnoses: Atrophic vaginitis      influenza vaccine, high-dose (FLUZONE HIGH-DOSE) 0.7 ML CISCO Inject 0.7 mL into a muscle 1 (one) time. Indications: Influenza A and B Inactivated Vaccination      Melatonin 3 MG CAPS Take 3 mg by mouth        Multiple Vitamins-Minerals (PRESERVISION AREDS 2 PO) Take 1 capsule by mouth 2 (two) times a day. Indications: supplement      oxymetazoline (AFRIN) 0.05 % nasal spray 2 sprays by Each Nare route 2 (two) times a day for 3 days As need for recurrent nose bleeds, please do not take for more than 3 days  Qty: 15 mL, Refills: 0    Associated Diagnoses: Left-sided epistaxis      sodium chloride (OCEAN) 0.65 % nasal spray 2 sprays into each nostril as needed for congestion. Indications: dryness           No discharge procedures on file. PDMP Review         Value Time User    PDMP Reviewed  Yes 11/8/2022 11:20 AM Lynn Tate DO             ED Provider  Attending physically available and evaluated Logan Cannon. I managed the patient along with the ED Attending.     Electronically Signed by           Alexis Simpson DO  11/17/23 2053

## 2023-11-17 NOTE — SEPSIS NOTE
Sepsis Note   Jael Nowak 78 y.o. female MRN: [id number]  Unit/Bed#: W -01 Encounter: [id number]       Initial Sepsis Screening       Row Name 11/17/23 1254                Is the patient's history suggestive of a new or worsening infection? Yes (Proceed)  -KS        Suspected source of infection acute abdominal infection  -KS        Indicate SIRS criteria Tachycardia > 90 bpm;Leukocytosis (WBC > 91057 IJL) OR Leukopenia (WBC <4000 IJL) OR Bandemia (WBC >10% bands)  -KS        Are two or more of the above signs & symptoms of infection both present and new to the patient? Yes (Proceed)  -KS        Assess for evidence of organ dysfunction: Are any of the below criteria present within 6 hours of suspected infection and SIRS criteria that are NOT considered to be chronic conditions? Lactate > 2.0  -KS        Date of presentation of severe sepsis 11/17/23  -KS        Time of presentation of severe sepsis 1300  -KS        Sepsis Note: Click "NEXT" below (NOT "close") to generate sepsis note based on above information. --                  User Key  (r) = Recorded By, (t) = Taken By, (c) = Cosigned By      [facility] Name Provider Type    MONIKA Olsen DO Resident                    Default Flowsheet Data (last 720 hours)       Sepsis Reassess       Row Name 11/17/23 2050                   Repeat Volume Status and Tissue Perfusion Assessment Performed    Date of Reassessment: 11/17/23  -KS        Time of Reassessment: 1400  -KS        Sepsis Reassessment Note: Click "NEXT" below (NOT "close") to generate sepsis reassessment note. YES (proceed by clicking "NEXT")  -KS        Repeat Volume Status and Tissue Perfusion Assessment Performed --                  User Key  (r) = Recorded By, (t) = Taken By, (c) = Cosigned By      [employer] [facility] Name Provider Type    MONIKA Olsen DO Resident                    Body mass index is 33.48 kg/m².   Wt Readings from Last 1 Encounters:   11/17/23 85.7 kg (189 lb) IBW (Ideal Body Weight): 52.4 kg    Ideal body weight: 52.4 kg (115 lb 8.3 oz)  Adjusted ideal body weight: 65.7 kg (144 lb 14.6 oz)

## 2023-11-18 ENCOUNTER — ANESTHESIA EVENT (INPATIENT)
Dept: PERIOP | Facility: HOSPITAL | Age: 83
DRG: 853 | End: 2023-11-18
Payer: MEDICARE

## 2023-11-18 ENCOUNTER — ANESTHESIA (INPATIENT)
Dept: PERIOP | Facility: HOSPITAL | Age: 83
DRG: 853 | End: 2023-11-18
Payer: MEDICARE

## 2023-11-18 PROBLEM — K82.2 PERFORATED GALLBLADDER: Status: ACTIVE | Noted: 2023-11-18

## 2023-11-18 PROBLEM — N17.9 AKI (ACUTE KIDNEY INJURY) (HCC): Status: ACTIVE | Noted: 2023-11-18

## 2023-11-18 PROBLEM — K65.3 BILE PERITONITIS (HCC): Status: ACTIVE | Noted: 2023-01-01

## 2023-11-18 RX ORDER — GLYCOPYRROLATE 0.2 MG/ML
INJECTION INTRAMUSCULAR; INTRAVENOUS AS NEEDED
Status: DISCONTINUED | OUTPATIENT
Start: 2023-11-18 | End: 2023-11-18

## 2023-11-18 RX ORDER — NEOSTIGMINE METHYLSULFATE 1 MG/ML
INJECTION INTRAVENOUS AS NEEDED
Status: DISCONTINUED | OUTPATIENT
Start: 2023-11-18 | End: 2023-11-18

## 2023-11-18 RX ORDER — CEFAZOLIN SODIUM 2 G/50ML
SOLUTION INTRAVENOUS AS NEEDED
Status: DISCONTINUED | OUTPATIENT
Start: 2023-11-18 | End: 2023-11-18

## 2023-11-18 RX ORDER — FENTANYL CITRATE 50 UG/ML
INJECTION, SOLUTION INTRAMUSCULAR; INTRAVENOUS AS NEEDED
Status: DISCONTINUED | OUTPATIENT
Start: 2023-11-18 | End: 2023-11-18

## 2023-11-18 RX ORDER — METRONIDAZOLE 500 MG/100ML
INJECTION, SOLUTION INTRAVENOUS CONTINUOUS PRN
Status: DISCONTINUED | OUTPATIENT
Start: 2023-11-18 | End: 2023-11-18

## 2023-11-18 RX ORDER — LIDOCAINE HYDROCHLORIDE 10 MG/ML
INJECTION, SOLUTION EPIDURAL; INFILTRATION; INTRACAUDAL; PERINEURAL AS NEEDED
Status: DISCONTINUED | OUTPATIENT
Start: 2023-11-18 | End: 2023-11-18

## 2023-11-18 RX ORDER — SUCCINYLCHOLINE/SOD CL,ISO/PF 100 MG/5ML
SYRINGE (ML) INTRAVENOUS AS NEEDED
Status: DISCONTINUED | OUTPATIENT
Start: 2023-11-18 | End: 2023-11-18

## 2023-11-18 RX ORDER — SODIUM CHLORIDE, SODIUM LACTATE, POTASSIUM CHLORIDE, CALCIUM CHLORIDE 600; 310; 30; 20 MG/100ML; MG/100ML; MG/100ML; MG/100ML
INJECTION, SOLUTION INTRAVENOUS CONTINUOUS PRN
Status: DISCONTINUED | OUTPATIENT
Start: 2023-11-18 | End: 2023-11-18

## 2023-11-18 RX ORDER — ONDANSETRON 2 MG/ML
INJECTION INTRAMUSCULAR; INTRAVENOUS AS NEEDED
Status: DISCONTINUED | OUTPATIENT
Start: 2023-11-18 | End: 2023-11-18

## 2023-11-18 RX ORDER — ALBUMIN, HUMAN INJ 5% 5 %
SOLUTION INTRAVENOUS CONTINUOUS PRN
Status: DISCONTINUED | OUTPATIENT
Start: 2023-11-18 | End: 2023-11-18

## 2023-11-18 RX ORDER — ROCURONIUM BROMIDE 10 MG/ML
INJECTION, SOLUTION INTRAVENOUS AS NEEDED
Status: DISCONTINUED | OUTPATIENT
Start: 2023-11-18 | End: 2023-11-18

## 2023-11-18 RX ORDER — SODIUM CHLORIDE 9 MG/ML
INJECTION, SOLUTION INTRAVENOUS CONTINUOUS PRN
Status: DISCONTINUED | OUTPATIENT
Start: 2023-11-18 | End: 2023-11-18

## 2023-11-18 RX ORDER — PROPOFOL 10 MG/ML
INJECTION, EMULSION INTRAVENOUS AS NEEDED
Status: DISCONTINUED | OUTPATIENT
Start: 2023-11-18 | End: 2023-11-18

## 2023-11-18 RX ADMIN — SODIUM CHLORIDE: 0.9 INJECTION, SOLUTION INTRAVENOUS at 16:26

## 2023-11-18 RX ADMIN — Medication 100 MG: at 16:14

## 2023-11-18 RX ADMIN — SODIUM CHLORIDE, SODIUM LACTATE, POTASSIUM CHLORIDE, AND CALCIUM CHLORIDE: .6; .31; .03; .02 INJECTION, SOLUTION INTRAVENOUS at 18:45

## 2023-11-18 RX ADMIN — SODIUM CHLORIDE: 0.9 INJECTION, SOLUTION INTRAVENOUS at 18:45

## 2023-11-18 RX ADMIN — METRONIDAZOLE: 500 INJECTION, SOLUTION INTRAVENOUS at 16:28

## 2023-11-18 RX ADMIN — SODIUM CHLORIDE, SODIUM LACTATE, POTASSIUM CHLORIDE, AND CALCIUM CHLORIDE: .6; .31; .03; .02 INJECTION, SOLUTION INTRAVENOUS at 16:04

## 2023-11-18 RX ADMIN — ALBUMIN (HUMAN): 12.5 INJECTION, SOLUTION INTRAVENOUS at 17:40

## 2023-11-18 RX ADMIN — FENTANYL CITRATE 25 MCG: 50 INJECTION INTRAMUSCULAR; INTRAVENOUS at 18:04

## 2023-11-18 RX ADMIN — ROCURONIUM BROMIDE 40 MG: 10 INJECTION, SOLUTION INTRAVENOUS at 16:35

## 2023-11-18 RX ADMIN — LIDOCAINE HYDROCHLORIDE 50 MG: 10 INJECTION, SOLUTION EPIDURAL; INFILTRATION; INTRACAUDAL; PERINEURAL at 16:14

## 2023-11-18 RX ADMIN — PROPOFOL 100 MG: 10 INJECTION, EMULSION INTRAVENOUS at 16:14

## 2023-11-18 RX ADMIN — NEOSTIGMINE METHYLSULFATE 4 MG: 1 INJECTION INTRAVENOUS at 19:30

## 2023-11-18 RX ADMIN — ONDANSETRON 4 MG: 2 INJECTION INTRAMUSCULAR; INTRAVENOUS at 19:24

## 2023-11-18 RX ADMIN — GLYCOPYRROLATE 0.6 MG: 0.2 INJECTION INTRAMUSCULAR; INTRAVENOUS at 19:30

## 2023-11-18 RX ADMIN — PHENYLEPHRINE HYDROCHLORIDE 40 MCG/MIN: 10 INJECTION INTRAVENOUS at 16:40

## 2023-11-18 RX ADMIN — FENTANYL CITRATE 25 MCG: 50 INJECTION INTRAMUSCULAR; INTRAVENOUS at 17:04

## 2023-11-18 RX ADMIN — ROCURONIUM BROMIDE 10 MG: 10 INJECTION, SOLUTION INTRAVENOUS at 18:14

## 2023-11-18 RX ADMIN — FENTANYL CITRATE 25 MCG: 50 INJECTION INTRAMUSCULAR; INTRAVENOUS at 19:33

## 2023-11-18 RX ADMIN — FENTANYL CITRATE 25 MCG: 50 INJECTION INTRAMUSCULAR; INTRAVENOUS at 16:14

## 2023-11-18 RX ADMIN — CEFAZOLIN SODIUM 2000 MG: 2 SOLUTION INTRAVENOUS at 16:22

## 2023-11-18 NOTE — ANESTHESIA PREPROCEDURE EVALUATION
Procedure:  CHOLECYSTECTOMY LAPAROSCOPIC, possible open. (Abdomen)    Relevant Problems   CARDIO   (+) Atrial fibrillation (HCC)   (+) Congestive heart failure, unspecified HF chronicity, unspecified heart failure type (HCC)   (+) Essential hypertension      /RENAL   (+) JESSE (acute kidney injury) (HCC)   (+) Chronic kidney disease, stage 3a (HCC)      HEMATOLOGY   (+) Absolute anemia   (+) Anemia   (+) Symptomatic anemia      MUSCULOSKELETAL   (+) Osteoarthritis of left hip      NEURO/PSYCH   (+) Anxiety   (+) Major depressive disorder with single episode, in full remission [facility])        Physical Exam    Airway    Mallampati score: II  TM Distance: >3 FB  Neck ROM: full     Dental   No notable dental hx     Cardiovascular  Rhythm: regular, Rate: normal    Pulmonary   Decreased breath sounds    Other Findings  Short of breath, speaking in short sentencespost-pubertal.      Anesthesia Plan  ASA Score- 3 Emergent    Anesthesia Type- general with ASA Monitors. Additional Monitors: arterial line. Airway Plan: ETT. Plan Factors-    Chart reviewed. Imaging results reviewed. Existing labs reviewed. Patient summary reviewed. Induction- intravenous. Postoperative Plan- Plan for postoperative opioid use. Planned trial extubation    Informed Consent- Anesthetic plan and risks discussed with patient. I personally reviewed this patient with the CRNA. Discussed and agreed on the Anesthesia Plan with the CRNA. Jackie Sandoval

## 2023-11-18 NOTE — CONSULTS
Consultation - [facility]) [facility]  Timur Ruvalcaba 78 y.o. female MRN: [id number]  Unit/Bed#: [facility] Encounter: [id number]        Inpatient consult to gastroenterology  Consult performed by: PALAK Johnson  Consult ordered by: Shaniqua Kaur MD          Reason for Consult / Principal Problem:     Choledocholithiasis      ASSESSMENT AND PLAN:      78 y.o. female with history of A-fib on Coumadin, hypertension, UTIs, pancreatic cysts, and cholelithiasis admitted with acute cholecystitis and GI consulted due to choledocholithiasis seen on imaging. #1 Choledocholithiasis  Patient noted with incidental finding of choledocholithiasis on imaging back in 2021 and recommended for nonemergent ERCP at that time once able to hold coumadin for 5 days. LFTs were normal at that time and continue to be normal now with no evidence of cholangitis. She is going for laparoscopic cholecystectomy by surgery today.    -Continue to hold coumadin  -Monitor LFTs  -Continue antibiotics per surgery  -Recommend ERCP for removal of choledocholithiasis. Prep and procedure discussed with patient and family at bedside.  -We will make patient n.p.o. past midnight Sunday night for tentative ERCP on Monday. Will discuss further with ERCP attending at that time    #2 Pancreatic cysts with PD dilation  Patient with history of several subcentimeter pancreatic cysts unchanged from prior study with mildly dilated pancreatic duct measuring 6 mm which may relate to choledocholithiasis. -Obtain MRI/MRCP      Thank you for the consultation. Case discussed with Dr. Mile You  ______________________________________________________________________    HPI:  Timur Ruvalcaba is a 78 y.o. female with history of A-fib on Coumadin, hypertension, UTIs who presented to the ER 11/17 due to severe upper abdominal pain with nausea.   She had been experiencing intermittent upper abdominal pain and nausea for the last few weeks and was put on Carafate in addition to PPI by her primary physician but states symptoms did not improve. They became more severe and constant prompting presentation to the ER where she was found to have acute cholecystitis, choledocholithiasis with intra and extrahepatic biliary dilatation, and multiple subcentimeter pancreatic cysts and mildly dilated PD measuring 6 mm on imaging. CBC showed no leukocytosis and LFTs, lipase normal with the exception of isolated elevated alkaline phosphatase of 126 which is now resolved today. She was started on antibiotics and her Coumadin was held and reversed with Kcentra and vitamin K. She is going for laparoscopic cholecystectomy today and GI was consulted for ERCP due to choledocholithiasis seen on imaging. Patient was seen previously by GI in December 2021 due to incidental finding of choledocholithiasis on imaging with normal LFTs and recommended for ERCP not emergently when Coumadin could be held as well as ultimate cholecystectomy and MRI/MRCP for surveillance of pancreatic cysts. Endoscopic history-  EGD/colonoscopy in Dec 2019 due to iron deficiency anemia  EGD-1 cm hiatal hernia and Schatzki's ring noted at the GE junction, couple of healing ulcerations noted in postbulbar area at the duodenal sweep. Colonoscopy-few diverticula in the sigmoid colon, normal colonic mucosa, small internal hemorrhoids. REVIEW OF SYSTEMS:    CONSTITUTIONAL: Denies any fever, chills, rigors, and weight loss. HEENT: No earache or tinnitus. Denies hearing loss or visual disturbances. CARDIOVASCULAR: No chest pain or palpitations. RESPIRATORY: Denies any cough, hemoptysis, shortness of breath or dyspnea on exertion. GASTROINTESTINAL: As noted in the History of Present Illness. GENITOURINARY: No problems with urination. Denies any hematuria or dysuria. NEUROLOGIC: No dizziness or vertigo, denies headaches. MUSCULOSKELETAL: Denies any muscle or joint pain.    SKIN: Denies skin rashes or itching. ENDOCRINE: Denies excessive thirst. Denies intolerance to heat or cold. PSYCHOSOCIAL: Denies depression or anxiety. Denies any recent memory loss.        Historical Information   Past Medical History:   Diagnosis Date    Atrial fibrillation ([address])     Hypertension     Insect bite of scalp 2021    Nasal dryness 12/15/2021    UTI (urinary tract infection)      Past Surgical History:   Procedure Laterality Date    EYE SURGERY      JOINT REPLACEMENT Right     Knee 11/15/19, Hip 2021     Social History   Social History     Substance and Sexual Activity   Alcohol Use Never     Social History     Substance and Sexual Activity   Drug Use Never     Social History     Tobacco Use   Smoking Status Former    Types: Cigarettes    Quit date:     Years since quittin.9   Smokeless Tobacco Never     Family History   Problem Relation Age of Onset    No Known Problems Mother     Diabetes Father     Stroke Father         syndrome       Meds/Allergies     Medications Prior to Admission   Medication    acetaminophen (TYLENOL) 325 mg tablet    amLODIPine (NORVASC) 5 mg tablet    escitalopram (LEXAPRO) 10 mg tablet    furosemide (LASIX) 20 mg tablet    lisinopril (ZESTRIL) 5 mg tablet    metoprolol tartrate (LOPRESSOR) 25 mg tablet    Myrbetriq 25 MG TB24    pantoprazole (PROTONIX) 40 mg tablet    promethazine (PHENERGAN) 25 mg tablet    sucralfate (CARAFATE) 1 g tablet    warfarin (COUMADIN) 5 mg tablet    ALPRAZolam (XANAX) 0.25 mg tablet    b complex vitamins tablet    Cholecalciferol (VITAMIN D3) 50 MCG (2000) capsule    COLLAGEN PO    estradiol (ESTRACE VAGINAL) 0.1 mg/g vaginal cream    influenza vaccine, high-dose (FLUZONE HIGH-DOSE) 0.7 ML CISCO    Melatonin 3 MG CAPS    Multiple Vitamins-Minerals (PRESERVISION AREDS 2 PO)    oxymetazoline (AFRIN) 0.05 % nasal spray    sodium chloride (OCEAN) 0.65 % nasal spray     Current Facility-Administered Medications   Medication Dose Route Frequency acetaminophen (TYLENOL) tablet 650 mg  650 mg Oral Q6H PRN    ceFAZolin (ANCEF) IVPB (premix in dextrose) 2,000 mg 50 mL  2,000 mg Intravenous Q12H    [MAR Hold] escitalopram (LEXAPRO) tablet 10 mg  10 mg Oral Daily    HYDROmorphone HCl (DILAUDID) injection 0.2 mg  0.2 mg Intravenous Q3H PRN    [MAR Hold] labetalol (NORMODYNE) injection 10 mg  10 mg Intravenous Q4H PRN    [MAR Hold] metoclopramide (REGLAN) injection 10 mg  10 mg Intravenous Q6H PRN    [MAR Hold] metoprolol tartrate (LOPRESSOR) tablet 25 mg  25 mg Oral Q12H FLORENTIN    metroNIDAZOLE (FLAGYL) IVPB (premix) 500 mg 100 mL  500 mg Intravenous Q8H    multi-electrolyte (PLASMALYTE-A/ISOLYTE-S PH 7.4) IV solution  75 mL/hr Intravenous Continuous    [MAR Hold] ondansetron (ZOFRAN) injection 4 mg  4 mg Intravenous Q4H PRN    oxyCODONE (ROXICODONE) IR tablet 5 mg  5 mg Oral Q4H PRN    oxyCODONE (ROXICODONE) split tablet 2.5 mg  2.5 mg Oral Q4H PRN    [MAR Hold] pantoprazole (PROTONIX) EC tablet 40 mg  40 mg Oral Early Morning    [MAR Hold] promethazine (PHENERGAN) injection 25 mg  25 mg Intramuscular Q6H PRN    [MAR Hold] sucralfate (CARAFATE) tablet 1 g  1 g Oral 4x Daily (AC & HS)     Facility-Administered Medications Ordered in Other Encounters   Medication Dose Route Frequency    ceFAZolin (ANCEF) IVPB (premix in dextrose)   Intravenous PRN    fentanyl citrate (PF) 100 MCG/2ML   Intravenous PRN    lidocaine (PF) (XYLOCAINE-MPF) 1 % injection   Intravenous PRN    metroNIDAZOLE (FLAGYL) IVPB (premix)   Intravenous Continuous PRN    phenylephrine bolus from bag   Intravenous PRN    propofol (DIPRIVAN) 200 MG/20ML bolus injection   Intravenous PRN    ROCuronium (ZEMURON) injection   Intravenous PRN    Succinylcholine Chloride 100 mg/5 mL syringe   Intravenous PRN       Allergies   Allergen Reactions    Ciprofloxacin      Chest discomfort and dizziness    Diclofenac     Diphenhydramine Rash and Chest Pain           Objective     Blood pressure 158/70, pulse 91, temperature 98 °F (36.7 °C), temperature source Temporal, resp. rate 22, height 5' 3" (1.6 m), weight 85.7 kg (189 lb), SpO2 95 %. Body mass index is 33.48 kg/m². Intake/Output Summary (Last 24 hours) at 11/18/2023 1643  Last data filed at 11/18/2023 0426  Gross per 24 hour   Intake 1400 ml   Output --   Net 1400 ml         PHYSICAL EXAM:      General Appearance:   Alert, cooperative, no distress   HEENT:   Normocephalic, atraumatic, anicteric. Neck:  Supple, symmetrical, trachea midline   Lungs:   Clear to auscultation bilaterally; no rales, rhonchi or wheezing; respirations unlabored    Heart[de-identified]   Regular rate and rhythm; no murmur, rub, or gallop.    Abdomen:   Soft, non-tender, non-distended; normal bowel sounds; no masses, no organomegaly    Genitalia:   Deferred    Rectal:   Deferred    Extremities:  No cyanosis, clubbing or edema    Pulses:  2+ and symmetric all extremities    Skin:  No jaundice, rashes, or lesions    Lymph nodes:  No palpable cervical lymphadenopathy        Lab Results:   Admission on 11/17/2023   Component Date Value    WBC 11/17/2023 6.14     RBC 11/17/2023 3.95     Hemoglobin 11/17/2023 12.2     Hematocrit 11/17/2023 39.2     MCV 11/17/2023 99 (H)     MCH 11/17/2023 30.9     MCHC 11/17/2023 31.1 (L)     RDW 11/17/2023 13.3     MPV 11/17/2023 9.3     Platelets 34/57/9306 413 (H)     Sodium 11/17/2023 136     Potassium 11/17/2023 3.8     Chloride 11/17/2023 97     CO2 11/17/2023 26     ANION GAP 11/17/2023 13     BUN 11/17/2023 27 (H)     Creatinine 11/17/2023 1.67 (H)     Glucose 11/17/2023 125     Calcium 11/17/2023 9.2     AST 11/17/2023 13     ALT 11/17/2023 8     Alkaline Phosphatase 11/17/2023 126 (H)     Total Protein 11/17/2023 7.0     Albumin 11/17/2023 3.7     Total Bilirubin 11/17/2023 0.68     eGFR 11/17/2023 27     LACTIC ACID 11/17/2023 2.1 (HH)     Lipase 11/17/2023 16     Color, UA 11/17/2023 Yellow     Clarity, UA 11/17/2023 Turbid     Specific Gravity, UA 11/17/2023 1.019     pH, UA 11/17/2023 5.5     Leukocytes, UA 11/17/2023 Moderate (A)     Nitrite, UA 11/17/2023 Negative     Protein, UA 11/17/2023 50 (1+) (A)     Glucose, UA 11/17/2023 Negative     Ketones, UA 11/17/2023 Negative     Urobilinogen, UA 11/17/2023 <2.0     Bilirubin, UA 11/17/2023 Negative     Occult Blood, UA 11/17/2023 Large (A)     LACTIC ACID 11/17/2023 1.6     Segmented % 11/17/2023 81 (H)     Bands % 11/17/2023 11 (H)     Lymphocytes % 11/17/2023 5 (L)     Monocytes % 11/17/2023 3 (L)     Eosinophils, % 11/17/2023 0     Basophils % 11/17/2023 0     Absolute Neutrophils 11/17/2023 5.65     Lymphocytes Absolute 11/17/2023 0.31 (L)     Monocytes Absolute 11/17/2023 0.18     Eosinophils Absolute 11/17/2023 0.00     Basophils Absolute 11/17/2023 0.00     RBC Morphology 11/17/2023 Normal     Platelet Estimate 37/99/5312 Adequate     Protime 11/17/2023 31.9 (H)     INR 11/17/2023 2.97 (H)     PTT 11/17/2023 56 (H)     Blood Culture 11/17/2023 No Growth at 24 hrs.      Blood Culture 11/17/2023 No Growth at 24 hrs.     hs TnI 0hr 11/17/2023 45     RBC, UA 11/17/2023 10-20 (A)     WBC, UA 11/17/2023 10-20 (A)     Epithelial Cells 11/17/2023 Innumerable (A)     Bacteria, UA 11/17/2023 Occasional     MUCUS THREADS 11/17/2023 Occasional (A)     Hyaline Casts, UA 11/17/2023 10-25 (A)     Urine Culture 11/17/2023 60,000-69,000 cfu/ml     ABO Grouping 11/17/2023 B     Rh Factor 11/17/2023 Positive     Antibody Screen 11/17/2023 Negative     Specimen Expiration Date 11/17/2023 [id number]     Protime 11/17/2023 34.8 (H)     INR 11/17/2023 3.32 (H)     hs TnI 4hr 11/17/2023 74 (H)     Delta 4hr hsTnI 11/17/2023 29 (H)     Ventricular Rate 11/17/2023 102     Atrial Rate 11/17/2023 102     HI Interval 11/17/2023 172     QRSD Interval 11/17/2023 86     QT Interval 11/17/2023 352     QTC Interval 11/17/2023 458     P Axis 11/17/2023 45     QRS Pioneer 11/17/2023 58     T Wave Pioneer 11/17/2023 -3     Ventricular Rate 11/17/2023 99 Atrial Rate 11/17/2023 99     NH Interval 11/17/2023 168     QRSD Interval 11/17/2023 86     QT Interval 11/17/2023 358     QTC Interval 11/17/2023 459     P Axis 11/17/2023 56     QRS Cuney 11/17/2023 59     T Wave Cuney 11/17/2023 -8     Sodium 11/18/2023 135     Potassium 11/18/2023 4.2     Chloride 11/18/2023 99     CO2 11/18/2023 26     ANION GAP 11/18/2023 10     BUN 11/18/2023 37 (H)     Creatinine 11/18/2023 2.06 (H)     Glucose 11/18/2023 148 (H)     Calcium 11/18/2023 8.4     Corrected Calcium 11/18/2023 9.0     AST 11/18/2023 14     ALT 11/18/2023 7     Alkaline Phosphatase 11/18/2023 91     Total Protein 11/18/2023 6.1 (L)     Albumin 11/18/2023 3.2 (L)     Total Bilirubin 11/18/2023 0.47     eGFR 11/18/2023 21     Magnesium 11/18/2023 1.6 (L)     Phosphorus 11/18/2023 4.3 (H)     WBC 11/18/2023 13.24 (H)     RBC 11/18/2023 3.38 (L)     Hemoglobin 11/18/2023 10.5 (L)     Hematocrit 11/18/2023 33.7 (L)     MCV 11/18/2023 100 (H)     MCH 11/18/2023 31.1     MCHC 11/18/2023 31.2 (L)     RDW 11/18/2023 13.4     MPV 11/18/2023 9.6     Platelets 77/15/3832 381     nRBC 11/18/2023 0     Neutrophils Relative 11/18/2023 87 (H)     Immat GRANS % 11/18/2023 1     Lymphocytes Relative 11/18/2023 4 (L)     Monocytes Relative 11/18/2023 8     Eosinophils Relative 11/18/2023 0     Basophils Relative 11/18/2023 0     Neutrophils Absolute 11/18/2023 11.55 (H)     Immature Grans Absolute 11/18/2023 0.10     Lymphocytes Absolute 11/18/2023 0.50 (L)     Monocytes Absolute 11/18/2023 1.08     Eosinophils Absolute 11/18/2023 0.00     Basophils Absolute 11/18/2023 0.01     Protime 11/18/2023 16.8 (H)     INR 11/18/2023 1.29 (H)     Sodium 11/18/2023 133 (L)     Potassium 11/18/2023 4.3     Chloride 11/18/2023 98     CO2 11/18/2023 25     ANION GAP 11/18/2023 10     BUN 11/18/2023 41 (H)     Creatinine 11/18/2023 2.19 (H)     Glucose 11/18/2023 135     Calcium 11/18/2023 8.8     Corrected Calcium 11/18/2023 9.4     AST 11/18/2023 17     ALT 11/18/2023 7     Alkaline Phosphatase 11/18/2023 94     Total Protein 11/18/2023 6.4     Albumin 11/18/2023 3.2 (L)     Total Bilirubin 11/18/2023 0.45     eGFR 11/18/2023 20     LACTIC ACID 11/18/2023 1.1     Magnesium 11/18/2023 2.5     Phosphorus 11/18/2023 4.7 (H)     Unit Product Code 11/18/2023 T1313X52     Unit Number 11/18/2023 [id number]     Unit ABO 11/18/2023 B     Unit RH 11/18/2023 POS     Crossmatch 11/18/2023 Compatible     Unit Dispense Status 11/18/2023 Crossmatched     Unit Product Volume 11/18/2023 350     Unit Product Code 11/18/2023 O6485Z95     Unit Number 11/18/2023 [id number]     Unit ABO 11/18/2023 B     Unit RH 11/18/2023 POS     Crossmatch 11/18/2023 Compatible     Unit Dispense Status 11/18/2023 Crossmatched     Unit Product Volume 11/18/2023 350        Imaging Studies: I have personally reviewed pertinent imaging studies.

## 2023-11-18 NOTE — PLAN OF CARE
Problem: Prexisting or High Potential for Compromised Skin Integrity  Goal: Skin integrity is maintained or improved  Description: INTERVENTIONS:  - Identify patients at risk for skin breakdown  - Assess and monitor skin integrity  - Assess and monitor nutrition and hydration status  - Monitor labs   - Assess for incontinence   - Turn and reposition patient  - Assist with mobility/ambulation  - Relieve pressure over bony prominences  - Avoid friction and shearing  - Provide appropriate hygiene as needed including keeping skin clean and dry  - Evaluate need for skin moisturizer/barrier cream  - Collaborate with interdisciplinary team   - Patient/family teaching  - Consider wound care consult   Outcome: Progressing     Problem: PAIN - ADULT  Goal: Verbalizes/displays adequate comfort level or baseline comfort level  Description: Interventions:  - Encourage patient to monitor pain and request assistance  - Assess pain using appropriate pain scale  - Administer analgesics based on type and severity of pain and evaluate response  - Implement non-pharmacological measures as appropriate and evaluate response  - Consider cultural and social influences on pain and pain management  - Notify physician/advanced practitioner if interventions unsuccessful or patient reports new pain  Outcome: Progressing     Problem: INFECTION - ADULT  Goal: Absence or prevention of progression during hospitalization  Description: INTERVENTIONS:  - Assess and monitor for signs and symptoms of infection  - Monitor lab/diagnostic results  - Monitor all insertion sites, i.e. indwelling lines, tubes, and drains  - Monitor endotracheal if appropriate and nasal secretions for changes in amount and color  - [place] appropriate cooling/warming therapies per order  - Administer medications as ordered  - Instruct and encourage patient and family to use good hand hygiene technique  - Identify and instruct in appropriate isolation precautions for identified infection/condition  Outcome: Progressing  Goal: Absence of fever/infection during neutropenic period  Description: INTERVENTIONS:  - Monitor WBC    Outcome: Progressing     Problem: SAFETY ADULT  Goal: Patient will remain free of falls  Description: INTERVENTIONS:  - Educate patient/family on patient safety including physical limitations  - Instruct patient to call for assistance with activity   - Consult OT/PT to assist with strengthening/mobility   - Keep Call bell within reach  - Keep bed low and locked with side rails adjusted as appropriate  - Keep care items and personal belongings within reach  - Initiate and maintain comfort rounds  - Make Fall Risk Sign visible to staff  - Offer Toileting every 2 Hours, in advance of need  - Initiate/Maintain alarm  - Obtain necessary fall risk management equipment:   - Apply yellow socks and bracelet for high fall risk patients  - Consider moving patient to room near nurses station  Outcome: Progressing  Goal: Maintain or return to baseline ADL function  Description: INTERVENTIONS:  -  Assess patient's ability to carry out ADLs; assess patient's baseline for ADL function and identify physical deficits which impact ability to perform ADLs (bathing, care of mouth/teeth, toileting, grooming, dressing, etc.)  - Assess/evaluate cause of self-care deficits   - Assess range of motion  - Assess patient's mobility; develop plan if impaired  - Assess patient's need for assistive devices and provide as appropriate  - Encourage maximum independence but intervene and supervise when necessary  - Involve family in performance of ADLs  - Assess for home care needs following discharge   - Consider OT consult to assist with ADL evaluation and planning for discharge  - Provide patient education as appropriate  Outcome: Progressing  Goal: Maintains/Returns to pre admission functional level  Description: INTERVENTIONS:  - Perform AM-PAC 6 Click Basic Mobility/ Daily Activity assessment daily.  - Set and communicate daily mobility goal to care team and patient/family/caregiver. - Collaborate with rehabilitation services on mobility goals if consulted  - Perform Range of Motion 3 times a day. - Reposition patient every 2 hours. - Dangle patient 3 times a day  - Stand patient 3 times a day  - Ambulate patient 3 times a day  - Out of bed to chair 3 times a day   - Out of bed for meals 3 times a day  - Out of bed for toileting  - Record patient progress and toleration of activity level   Outcome: Progressing     Problem: DISCHARGE PLANNING  Goal: Discharge to home or other facility with appropriate resources  Description: INTERVENTIONS:  - Identify barriers to discharge w/patient and caregiver  - Arrange for needed discharge resources and transportation as appropriate  - Identify discharge learning needs (meds, wound care, etc.)  - Arrange for interpretive services to assist at discharge as needed  - Refer to Case Management Department for coordinating discharge planning if the patient needs post-hospital services based on physician/advanced practitioner order or complex needs related to functional status, cognitive ability, or social support system  Outcome: Progressing     Problem: Knowledge Deficit  Goal: Patient/family/caregiver demonstrates understanding of disease process, treatment plan, medications, and discharge instructions  Description: Complete learning assessment and assess knowledge base.   Interventions:  - Provide teaching at level of understanding  - Provide teaching via preferred learning methods  Outcome: Progressing

## 2023-11-18 NOTE — CONSULTS
Consultation - Nephrology   Prachi Sue 78 y.o. female MRN: [id number]  Unit/Bed#: W -01 Encounter: [id number]    ASSESSMENT AND PLAN:  77 yo woman with PMH of A-fib on Coumadin, hypertension, history of UTIs p/w abdominal pain for the last 2 to 3 weeks found to have cholecystitis. Nephrology is consulted for management of JESSE    PLAN      #Non-Oliguric KDIGO JESSE stage 2 on CKD? Etiology: Multifactorial secondary and obstructive uropathy Baseline creatinine: Most recent baseline is 0.8 mg/dL from September 2022  Current creatinine: 2.06 m/dL, trending up  Peak creatinine: Trending  UA: Hematuria, leukocyturia, hyaline casts  Renal imaging : Right hydronephrosis  Treatment:  No urgent indication for dialysis at this time  Maintain MAP:  Over 65 mmHg if possible/avoid hypoperfusion:  Hold parameters on blood pressure medications  Avoid nephrotoxic agents such as NSAIDs, and IV contrast if possible. Avoid opioids   Adjust medications to GFR  Continue hydration  Consult to urology for hydronephrosis    #Obstructive uropathy  No clear evidence of obstruction, no kidney stones  Pulmonary nodules, cystitis, possible malignancy ?     #Acid-base Disorder  serum HCO3 26 mmol/L  At goal    #Volume status/hypertension:  Volume: Some evidence of  hypovolemia  Blood pressure: Tendency to hypotension /52  Recommend:  Decrease Plasma-Lyte to 75 mL (patient is n.p.o. and will go to the OR )  Avoid large volumes of IV fluids after surgery    #Anemia:  Current hemoglobin: 10.5 mg/dL  Treatment:  Transfuse for hemoglobin less than 7.0 per primary service      Cholecystitis  N.p.o. for possible Marta  Management as per surgery    #Pulmonary nodules  Rule out malignancy    #Sepsis  Leukocytosis, tendency to hypotension, tachycardic  Secondary to cholecystitis  Antibiotics as per primary team    #SOB  Chest x-ray        Ultrasound with right hydronephrosis        HISTORY OF PRESENT ILLNESS:  Requesting Physician: Aruna Flynn [address], DO  Reason for Consult: JESSE Solis Ra is a 78 y.o.  female with PMH of A-fib on Coumadin, hypertension, history of UTIswho was admitted to [facility] after presenting with abdominal pain found to have cholecystitis. A renal consultation is requested today for assistance in the management of JESSE.     PAST MEDICAL HISTORY:  Past Medical History:   Diagnosis Date    Atrial fibrillation ([address])     Hypertension     Insect bite of scalp 2021    Nasal dryness 12/15/2021    UTI (urinary tract infection)        PAST SURGICAL HISTORY:  Past Surgical History:   Procedure Laterality Date    EYE SURGERY      JOINT REPLACEMENT Right     Knee 11/15/19, Hip 2021       ALLERGIES:  Allergies   Allergen Reactions    Ciprofloxacin      Chest discomfort and dizziness    Diclofenac     Diphenhydramine        SOCIAL HISTORY:  Social History     Substance and Sexual Activity   Alcohol Use Never     Social History     Substance and Sexual Activity   Drug Use Never     Social History     Tobacco Use   Smoking Status Former    Types: Cigarettes    Quit date:     Years since quittin.9   Smokeless Tobacco Never       FAMILY HISTORY:  Family History   Problem Relation Age of Onset    No Known Problems Mother     Diabetes Father     Stroke Father         syndrome       MEDICATIONS:    Current Facility-Administered Medications:     acetaminophen (TYLENOL) tablet 650 mg, 650 mg, Oral, Q6H PRN, Fadumo Hargrove MD, 650 mg at 23 0514    albumin human (FLEXBUMIN) 5 % injection 12.5 g, 12.5 g, Intravenous, Once, PALAK Tran    ceFAZolin (ANCEF) IVPB (premix in dextrose) 2,000 mg 50 mL, 2,000 mg, Intravenous, Q12H, Fadumo Hargrove MD, Last Rate: 100 mL/hr at 23 0426, Rate Verify at 23 0426    escitalopram (LEXAPRO) tablet 10 mg, 10 mg, Oral, Daily, Amalia Samayoa MD, 10 mg at 23 1036    HYDROmorphone HCl (DILAUDID) injection 0.2 mg, 0.2 mg, Intravenous, Q3H PRN, [facility] Maricruz Kelly MD, 0.2 mg at 11/18/23 0403    labetalol (NORMODYNE) injection 10 mg, 10 mg, Intravenous, Q4H PRN, Rose Cutler MD    magnesium sulfate 4 g/100 mL IVPB (premix) 4 g, 4 g, Intravenous, Once, Delmi Fung MD, Last Rate: 25 mL/hr at 11/18/23 1102, 4 g at 11/18/23 1102    metoclopramide (REGLAN) injection 10 mg, 10 mg, Intravenous, Q6H PRN, Beni Hansen DO, 10 mg at 11/18/23 0301    metoprolol tartrate (LOPRESSOR) tablet 25 mg, 25 mg, Oral, Q12H [facility], Delmi Fung MD, 25 mg at 11/18/23 1036    metroNIDAZOLE (FLAGYL) IVPB (premix) 500 mg 100 mL, 500 mg, Intravenous, Q8H, Rose Cutler MD, Last Rate: 200 mL/hr at 11/18/23 0910, 500 mg at 11/18/23 0910    multi-electrolyte (PLASMALYTE-A/ISOLYTE-S PH 7.4) IV solution, 75 mL/hr, Intravenous, Continuous, Delmi Fung MD, Last Rate: 75 mL/hr at 11/18/23 1254, 75 mL/hr at 11/18/23 1254    ondansetron (ZOFRAN) injection 4 mg, 4 mg, Intravenous, Q4H PRN, Rose Cutler MD, 4 mg at 11/18/23 0030    oxyCODONE (ROXICODONE) IR tablet 5 mg, 5 mg, Oral, Q4H PRN, Rose Cutler MD, 5 mg at 11/18/23 0246    oxyCODONE (ROXICODONE) split tablet 2.5 mg, 2.5 mg, Oral, Q4H PRN, Rose Cutler MD    pantoprazole (PROTONIX) EC tablet 40 mg, 40 mg, Oral, Early Morning, Delmi Fung MD, 40 mg at 11/18/23 1036    promethazine (PHENERGAN) injection 25 mg, 25 mg, Intramuscular, Q6H PRN, Scherrie Koyanagi, MD, 25 mg at 11/17/23 5104    sucralfate (CARAFATE) tablet 1 g, 1 g, Oral, 4x Daily (AC & HS), Delmi Fung MD, 1 g at 11/18/23 1036    REVIEW OF SYSTEMS:  Complete 10 point review of systems were obtained and discussed in length with the patient. Complete review of systems were negative / unremarkable except mentioned in the HPI section.      Review of Systems - Psychological ROS: negative  Ophthalmic ROS: negative  ENT ROS: negative  Hematological and Lymphatic ROS: positive for - fatigue  Endocrine ROS: negative  Respiratory ROS: positive for - shortness of breath  Cardiovascular ROS: no chest pain or dyspnea on exertion  Gastrointestinal ROS: abdominal pain, Genito-Urinary ROS: no dysuria, trouble voiding, or hematuria  Musculoskeletal ROS: negative  Neurological ROS: no TIA or stroke symptoms  Dermatological ROS: negative     PHYSICAL EXAM:  Current Weight: Weight - Scale: 85.7 kg (189 lb)  First Weight: Weight - Scale: 85.7 kg (189 lb)  Vitals:    11/18/23 1034   BP: 114/52   Pulse: 102   Resp:    Temp: 97.7 °F (36.5 °C)   SpO2: 96%       Intake/Output Summary (Last 24 hours) at 11/18/2023 1339  Last data filed at 11/18/2023 0426  Gross per 24 hour   Intake 2600 ml   Output --   Net 2600 ml     Wt Readings from Last 3 Encounters:   11/17/23 85.7 kg (189 lb)   09/14/23 85.7 kg (189 lb)   11/08/22 89.8 kg (198 lb)     Temp Readings from Last 3 Encounters:   11/18/23 97.7 °F (36.5 °C)   10/26/23 99 °F (37.2 °C) (Temporal)   10/19/23 98 °F (36.7 °C) (Temporal)     BP Readings from Last 3 Encounters:   11/18/23 114/52   10/26/23 140/76   10/19/23 168/70     Pulse Readings from Last 3 Encounters:   11/18/23 102   10/26/23 88   10/19/23 70      General:  no acute distress at this time, elderly  Skin:  No acute rash  Eyes:  No scleral icterus and noninjected  ENT:  mucous membranes moist  Neck:  no carotid bruits  Chest: Decreased in both bases, some wheezing , good respiratory effort, no use of accessory respiratory muscles  CVS:  Regular rate and rhythm without rub   Abdomen:  soft and nontender   Extremities: trace lower extremity edema  Neuro:  No gross focality  Psych:  Alert , cooperative       Invasive Devices:      Lab Results:   Results from last 7 days   Lab Units 11/18/23  1255 11/18/23  0455 11/17/23  1117   WBC Thousand/uL  --  13.24* 6.14   HEMOGLOBIN g/dL  --  10.5* 12.2   HEMATOCRIT %  --  33.7* 39.2   PLATELETS Thousands/uL  --  381 413*   POTASSIUM mmol/L 4.3 4.2 3.8   CHLORIDE mmol/L 98 99 97   CO2 mmol/L 25 26 26   BUN mg/dL 41* 37* 27*   CREATININE mg/dL 2.19* 2.06* 1.67*   CALCIUM mg/dL 8.8 8.4 9.2   MAGNESIUM mg/dL  --  1.6*  --    PHOSPHORUS mg/dL  --  4.3*  --        Other Studies:   US right upper quadrant   Final Result by Robert Valle MD (11/17 1775)      Findings compatible with acute cholecystitis. Gallbladder wall thickening with irregular margins which may represent sloughed membranes, concerning for gangrenous cholecystitis. Dilated common bile duct and mild intrahepatic biliary ductal dilatation, in keeping with the known choledocholithiasis on recent CT. Mildly dilated pancreatic duct measuring 6 mm, which may relate to choledocholithiasis. Recommend contrast-enhanced MRI/MRCP of the abdomen for further evaluation. Right hydronephrosis. Small right upper abdominal ascites. The study was marked in [facility] for immediate notification. Workstation performed: [id number]         CT abdomen pelvis wo contrast   Final Result by Jett Walton MD (11/17 0353)         1. Gallstones with apparent gallbladder wall thickening and nodularity raising the possibility of cholecystitis. There is some nonspecific heterogeneous low-attenuation in the liver adjacent to the gallbladder fundus, not appreciated on the prior CT    study from 11/29/2021, which could reflect steatosis although an infiltrative process not excluded. Recommend further evaluation with ultrasound and/or contrast-enhanced CT or MRI. 2. Choledocholithiasis with intra and extrahepatic biliary ductal dilatation. Correlate with liver function studies. Recommend ultrasound for further evaluation. 3. New right-sided moderate hydronephrosis without discernible obstructing mass or calculus. 4. Moderate abdominal and pelvic ascites. 5. Subcentimeter pancreatic cysts, noted on the prior study, for which MRI/MRCP evaluation is advised if not previously done.    6. Multiple subcentimeter bibasilar pulmonary nodules not clearly identified on the prior CT. Changes may be infectious/inflammatory or neoplastic in nature. Consider dedicated CT of the chest.   7. Additional findings as noted. The study was marked in [facility] for immediate notification. Workstation performed: [id number]         XR chest portable    (Results Pending)       Portions of the record may have been created with voice recognition software. Occasional wrong word or "sound a like" substitutions may have occurred due to the inherent limitations of voice recognition software. Read the chart carefully and recognize, using context, where substitutions have occurred.

## 2023-11-18 NOTE — ANESTHESIA PROCEDURE NOTES
Arterial Line Insertion    Performed by: Boyd Pablo CRNA  Authorized by: Letty Ma MD  Consent: Verbal consent obtained. Written consent obtained. Consent given by: patient  Patient understanding: patient states understanding of the procedure being performed  Patient consent: the patient's understanding of the procedure matches consent given  Procedure consent: procedure consent matches procedure scheduled  Preparation: Patient was prepped and draped in the usual sterile fashion. Indications: hemodynamic monitoring  Orientation:  Left  Location: radial artery  Sedation:  Patient sedated: geta. Procedure Details:  Needle gauge: 20  Seldinger technique: Seldinger technique used  Number of attempts: 1    Post-procedure:  Post-procedure: dressing applied  Waveform: good waveform  Post-procedure CNS: normal and unchanged  Patient tolerance: Patient tolerated the procedure well with no immediate complications  Comments:  Inserted by Phillip Brink CRNA

## 2023-11-19 NOTE — ASSESSMENT & PLAN NOTE
Lab Results   Component Value Date    EGFR 22 11/18/2023    EGFR 20 11/18/2023    EGFR 21 11/18/2023    CREATININE 1.99 (H) 11/18/2023    CREATININE 2.19 (H) 11/18/2023    CREATININE 2.06 (H) 11/18/2023     Continue IVF for tonight, while patient NPO   Urinary retention protocol  Trend labs and manage electrolytes PRN

## 2023-11-19 NOTE — OP NOTE
OPERATIVE REPORT  PATIENT NAME: Timur Ruvalcaba    :  1940  MRN: [id number]  Pt Location: AN OR ROOM 04    SURGERY DATE: 2023    Surgeon(s) and Role:     * [facility],  - Primary     * Twan Matias MD - Assisting    Preop Diagnosis:  Cholecystitis [K81.9]    Post-Op Diagnosis Codes:     * Cholecystitis [K81.9]    Procedure(s):  LAPAROSCOPIC CONVERTED TO OPEN EXPLORATORY LAPAROTOMY. DRAINAGE OF 4 QUADRANT ABDOMINAL ABSCESS, DRAINAGE OF SUBPHRENIC AND SUBHEPATIC ABSCESSES. PERITONEAL LAVAGE. OMENTAL BIOPSY. LIVER BIOPSY. OPEN CHOLECYSTOSTOMY TUBE PLACEMENT, UMBILICAL HERNIA REPAIR    Specimen(s):  ID Type Source Tests Collected by Time Destination   1 : FOR CYTOLOGY Body Fluid Peritoneal Fluid NON-GYNECOLOGIC CYTOLOGY [facility],  2023 1744    2 :  Tissue Omentum TISSUE EXAM [facility],  2023 1808    3 : Liver biopsy Tissue Liver TISSUE EXAM [facility],  2023 1817    4 : Hepatic implant Tissue Soft Tissue, Other TISSUE EXAM [facility],  2023 1839    5 : Gallbladder fluid for cytology Body Fluid Gallbladder NON-GYNECOLOGIC CYTOLOGY [facility],  2023 1855    A : Peritoneal fluid cultures Body Fluid Peritoneal Fluid ANAEROBIC CULTURE AND GRAM STAIN, BODY FLUID CULTURE AND GRAM STAIN [facility],  2023 1747    B : Gallbladder fluid Body Fluid Gallbladder ANAEROBIC CULTURE AND GRAM STAIN, BODY FLUID CULTURE AND GRAM STAIN [facility],  2023 1854        Estimated Blood Loss:   Minimal    Drains:  Closed/Suction Drain Left LUQ Bulb 19 Fr. (Active)   Number of days: 0       Closed/Suction Drain Right RUQ Bulb 12 Fr. (Active)   Number of days: 0       NG/OG/Enteral Tube Nasogastric 18 Fr Right nare (Active)   Number of days: 0       Urethral Catheter Latex 16 Fr.  (Active)   Number of days: 0       [REMOVED] NG/OG/Enteral Tube Nasogastric 18 Fr Center mouth (Removed)   Number of days: 0       Anesthesia Type:   Choice    Operative Indications:  Cholecystitis [K81.9]      Operative Findings:  4 quadrant bile peritonitis with associated purulence in all 4 quadrants, in the subphrenic space and the subhepatic space - 1.6 L purulent bile drain. Omentum densely adhered to the gallbladder - taken down bluntly revealing a gallbladder mass with associated liver mass. No obvious gallbladder perforation, but given the above findings and densely adhered omentum to the anterior portion of the gallbladder, suspect there was a perforation which had sealed off by the time of surgery. Open cholecystostomy tube placement revealing purulent bile, similar in character to bilious ascites, encountered upon opening. Calcified studding of the small bowel mesentery diffusely with associated small bowel congestion without evidence of obstruction. No evidence of perforation in the GI tract. Superficial Veress needle injuries to stomach which were imbricated with 3-0 silk suture. Uterus and ovaries appeared inflamed, likely reactive, but without signs concerning for malignancy. 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 Culture and cytology of the bilious ascites and the fluid drained from the gallbladder. Omental biopsy obtained. Liver biopsy obtained. Complications:   None    Procedure and Technique:  The patient was brought to the operating room and identified verbally and via wristband. She was transferred to the operating room table and positioned supine. General endotracheal anesthesia was induced successfully. The patient's abdomen was prepped and draped in the usual sterile fashion. Pre-operative antibiotics were administered. A time out was performed confirming the patient, procedure, and site. All parties were in agreement.     Attention was turned to the abdomen where a stab incision was made with an 11 blade scalpel at [place]. The Veress needle was inserted and three initial attempts were unsuccessful. A 4th attempt was made and was ultimately successful. Once appropriate pneumoperitoneum was obtained, a 5 mm trocar was placed in the suprapubic region with the optiview technique. This attempt was unsuccessful. Therefore a 5 mm trocar was placed at [place] with the optiview technique, which was a successful attempt. A 5 mm supraumbilical trocar was placed under laparoscopic visualization. An additional right upper quadrant 5 mm trocar was placed under laparoscopic visualization. Patient was positioned appropriately. Dense omental adhesions were noted overlying the gallbladder and liver. There was significant fullness in the right hemiabdomen with overlying omentum. Three 0.5 mm superficial injuries were noted to the anterior portion of the stomach likely from Veress needle insertion. The omental adhesions overlying the gallbladder were dense and note easily . 1.6 L bilious/purulent ascites was suctioned from the abdomen in all 4 quadrants, including the subphrenic and subhepatic space. Despite maximal pneumoperitoneum, working space was limited. A liver lesion was appreciated adjacent to the gallbladder. Minimal progress was made laparoscopically. Decision was made to convert to an open procedure given that 4 quadrant bile peritonitis and inability to delineate the patient's anatomy. A midline laparotomy incision was made with a 10 blade scalpel. Dissection was deepened through the subcutaneous tissue down to the level of fascia with Bovie electrocautery. The fascia was incised length of the incision with cut electrocautery. The omentum was bluntly  from the gallbladder using finger fracturing. The gallbladder was dense, full, with a mass located at the posterior lateral portion of the gallbladder. The surrounding liver also contained a mass. No perforation was evident at time of exploration, however the area overlying the gallbladder where the omentum was densely adhered was difficult to separate. This was consistent with a prior perforation that had sealed off prior to exploration. The remainder of the gallbladder was densely adhered to underlying structures that were inseparable with blunt dissection. If further dissection was pursued, there was concern that adjacent hepatobiliary structures would be at risk for injury. Therefore, cholecystectomy was not performed. Attention was turned to the right hemiabdomen where fullness was appreciated. The omentum was lifted and the small bowel was noted to be dilated and congested. There was calcified studding throughout the entire mesentery of the small bowel. No evidence small bowel intraluminal masses were appreciated. This picture was concerning for malignancy. Attention was turned to the pelvis where the uterus, fallopian tubes, and ovaries were evaluated. These were noted to be inflamed, however they were otherwise unremarkable. Attention was turned to the colon, which was noted to be unremarkable throughout its course. Given this picture was concerning for malignancy, the bilious ascites was sent for culture and cytology. The omentum overlying this area was also sent for biopsy using Bovie electrocautery. A 1 cm x 1 cm portion of the liver mass was biopsied using Bovie electrocautery. Intraoperative discussions were held with interventional radiology. We discussed placing an open cholecystostomy tube to decompress the gallbladder. Both parties were in agreement. We proceeded with placement of an open cholecystostomy tube. This revealed drainage of purulent bilious fluid. This was sent for culture and cytology. The drain was fastened to the skin with 3-0 nylon suture.   The superficial injuries to the anterior portion of the stomach were repaired with 3-0 silk suture in a Lembert fashion. A left upper quadrant 19 [place] ADILIA drain was placed adjacent to the repair and coursing into the right paracolic gutter. The abdomen was then irrigated with 4 L of warm normal saline. 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 Multiple pictures were taken throughout the course of the operation to document these concerning findings. The patient was then allowed to awaken, extubated, and transferred to the PACU having tolerated the procedure well. All instrument, needle, and sponge counts were correct at the end of the case. Radiofrequency detection was negative.     Dr. Jacqueline Britt was present for the entire procedure      Patient Disposition:  PACU         SIGNATURE: Marco Kohler DO  DATE: November 18, 2023  TIME: 7:34 PM

## 2023-11-19 NOTE — CONSULTS
[address] [facility]  Consult  Name: Liane Baez 78 y.o. female I MRN: [id number]  Unit/Bed#: ICU 15 I Date of Admission: 11/17/2023   Date of Service: 11/19/2023 I Hospital Day: 2    Consults    Assessment/Plan   * Bile peritonitis ([address])  Assessment & Plan  Pt now s/p ex lap with drainage of 4 quadrant bile peritonitis with associated purulence likely 2/2 to previously perforated gallbladder that had sealed off with omentum prior to operation, drainage of subphrenic and subhepatic abscess, peritoneal lavage, omental biopsy, liver biopsy, repair of umbilical hernia   Planned lap sachin was converted to open when number of quadrants involved became clear, IR was called in to place cholecystotomy tube for source control and drainage  Biopsies, cytology, and cultures taken   Manage drain output   Multimodal pain control     Essential hypertension  Assessment & Plan  Holding all PTA antihypertensives given concern for possible hemodynamic instability, restart as appropriate     Major depressive disorder with single episode, in full remission ([address])  Assessment & Plan  Holding PTA Lexapro while NPO, restart as able    Anxiety  Assessment & Plan  Pt takes xanax PTA, can use PRN     Chronic kidney disease, stage 3a [facility])  Assessment & Plan  Lab Results   Component Value Date    EGFR 22 11/18/2023    EGFR 20 11/18/2023    EGFR 21 11/18/2023    CREATININE 1.99 (H) 11/18/2023    CREATININE 2.19 (H) 11/18/2023    CREATININE 2.06 (H) 11/18/2023     Continue IVF for tonight, while patient NPO   Urinary retention protocol  Trend labs and manage electrolytes PRN    Congestive heart failure, unspecified HF chronicity, unspecified heart failure type [facility])  Assessment & Plan  Wt Readings from Last 3 Encounters:   11/17/23 85.7 kg (189 lb)   09/14/23 85.7 kg (189 lb)   11/08/22 89.8 kg (198 lb)     Last echo March of 2022 with EF of 65%, G2DD   Holding PTA Lasix for gentle hydration   Close monitoring of fluid status           Atrial fibrillation ([address])  Assessment & Plan  Currently rate controlled, not on any rate controlling medications   Holding PTA Coumadin, restart when appropriate            History of Present Illness     HPI: Hieu Rodrigues is a 78 y. o. with a PMH significant for afib on coumadin, depression, and anxiety who presented to the ED with complaints of abdominal pain for the last two weeks, worse the last 2 days, with decreased PO intake. Pt has been experiencing "stomach burning" since September, and feels as though she hasn't eaten much since that time. Imaging completed in the ED was concerning for perforated gallbladder so she was taken to the OR for emergent lap sachin. Given how extensive her bile peritonitis was, they concerned to open and IR placed cholecystostomy tube for source control and drainage. Her abdomen was washed out, abscesses drained, and biopsies were taken. Admit to ICU as SD1 for post op monitoring. History obtained from chart review and the patient. Review of Systems   Constitutional: Negative. HENT: Negative. Respiratory: Negative. Cardiovascular: Negative. Gastrointestinal:  Positive for abdominal pain. Genitourinary: Negative. Musculoskeletal: Negative. Skin: Negative. Neurological: Negative. Psychiatric/Behavioral: Negative.          Disposition: Stepdown Level 1     Historical Information   Past Medical History:  No date: Atrial fibrillation ([address])  No date: Hypertension  12/16/2021: Insect bite of scalp  12/15/2021: Nasal dryness  No date: UTI (urinary tract infection) Past Surgical History:  No date: EYE SURGERY  No date: JOINT REPLACEMENT; Right      Comment:  Knee 11/15/19, Hip 8/2021   Current Outpatient Medications   Medication Instructions    acetaminophen (TYLENOL) 975 mg, Oral, Every 8 hours scheduled    ALPRAZolam (XANAX) 0.25 mg tablet take 1 tablet by mouth three times a day if needed for anxiety    amLODIPine (NORVASC) 5 mg tablet TAKE 1 TABLET EVERY DAY    b complex vitamins tablet 1 tablet, Daily    COLLAGEN PO Take by mouth    escitalopram (LEXAPRO) 10 mg, Oral, Daily    estradiol (ESTRACE VAGINAL) 1 g, Vaginal, Daily    furosemide (LASIX) 20 mg tablet take 1 tablet by mouth once daily    influenza vaccine, high-dose (FLUZONE HIGH-DOSE) 0.7 ML CISCO 0.7 mL, Once    lisinopril (ZESTRIL) 5 mg tablet TAKE 1 TABLET EVERY DAY    Melatonin 3 mg    metoprolol tartrate (LOPRESSOR) 25 mg tablet TAKE 1 TABLET TWICE DAILY    Multiple Vitamins-Minerals (PRESERVISION AREDS 2 PO) 1 capsule, 2 times daily    Myrbetriq 25 MG TB24 TAKE 1 TABLET EVERY DAY    oxymetazoline (AFRIN) 0.05 % nasal spray 2 sprays, Each Nare, 2 times daily, As need for recurrent nose bleeds, please do not take for more than 3 days    pantoprazole (PROTONIX) 40 mg tablet take 1 tablet by mouth once daily    promethazine (PHENERGAN) 25 mg, Oral, Every 6 hours PRN    sodium chloride (OCEAN) 0.65 % nasal spray 2 sprays, Nasal, As needed    sodium chloride, PF, 0.9 % 5 mL, Intracatheter, Daily, Intracatheter flushing daily.  May substitute prefilled syringe with normal saline 10 mL vials, 10 mL syringes, and 18 g blunt needles    sucralfate (CARAFATE) 1 g tablet take 1 tablet by mouth before meals and at bedtime    Vitamin D3 2,000 Units, Daily    warfarin (COUMADIN) 5 mg tablet TAKE 1 TABLET EVERY DAY    Allergies   Allergen Reactions    Ciprofloxacin      Chest discomfort and dizziness    Diclofenac     Diphenhydramine Rash and Chest Pain      Social History     Tobacco Use    Smoking status: Former     Types: Cigarettes     Quit date:      Years since quittin.9    Smokeless tobacco: Never   Vaping Use    Vaping Use: Never used   Substance Use Topics    Alcohol use: Never    Drug use: Never    Family History   Problem Relation Age of Onset    No Known Problems Mother     Diabetes Father     Stroke Father         syndrome        Objective Vitals I/O      Most Recent Min/Max in 24hrs   Temp 98 °F (36.7 °C) Temp  Min: 97.6 °F (36.4 °C)  Max: 98.3 °F (36.8 °C)   Pulse 62 Pulse  Min: 62  Max: 102   Resp 14 Resp  Min: 14  Max: 30   /62 BP  Min: 114/52  Max: 158/70   O2 Sat 98 % SpO2  Min: 93 %  Max: 99 %      Intake/Output Summary (Last 24 hours) at 11/19/2023 0127  Last data filed at 11/18/2023 2140  Gross per 24 hour   Intake 3443.33 ml   Output 655 ml   Net 2788.33 ml       Diet NPO    Invasive Monitoring           Physical Exam   Physical Exam  Vitals reviewed. Eyes:      Conjunctiva/sclera: Conjunctivae normal.      Pupils: Pupils are equal, round, and reactive to light. Skin:     General: Skin is warm and dry. HENT:      Head: Normocephalic and atraumatic. Mouth/Throat:      Mouth: Mucous membranes are moist.      Pharynx: Oropharynx is clear. Cardiovascular:      Rate and Rhythm: Normal rate and regular rhythm. Musculoskeletal:      Cervical back: Neck supple. Abdominal:      Palpations: Abdomen is soft. Tenderness: There is abdominal tenderness. Comments: Appropriate post operative bryan incisional tenderness   Dressing in place   Drain in place    Constitutional:       General: She is awake. She is not in acute distress. Appearance: She is well-developed. Pulmonary:      Effort: Pulmonary effort is normal.      Breath sounds: Normal breath sounds. Psychiatric:         Behavior: Behavior is cooperative. Neurological:      General: No focal deficit present. Mental Status: She is alert and oriented to person, place and time. Diagnostic Studies      EKG: Telemetry reviewed. Imaging:  I have personally reviewed pertinent reports.    and I have personally reviewed pertinent films in PACS     Medications:  Scheduled PRN   acetaminophen, 1,000 mg, Q8H  cefazolin, 2,000 mg, Q12H  chlorhexidine, 15 mL, Q12H FLORENTIN  heparin (porcine), 5,000 Units, Q8H [address]  metroNIDAZOLE, 500 mg, Q8H  pantoprazole, 40 mg, Q12H [address]      HYDROmorphone, 0.2 mg, Q3H PRN  HYDROmorphone, 0.5 mg, Q3H PRN  HYDROmorphone, 0.2 mg, Q3H PRN  labetalol, 10 mg, Q4H PRN  ondansetron, 4 mg, Q4H PRN  promethazine, 25 mg, Q6H PRN       Continuous    multi-electrolyte, 100 mL/hr, Last Rate: 100 mL/hr (11/18/23 2104)         Labs:    CBC    Recent Labs     11/17/23  1117 11/18/23  0455 11/18/23  2145   WBC 6.14 13.24* 10.13   HGB 12.2 10.5* 9.5*   HCT 39.2 33.7* 30.8*   * 381 308   BANDSPCT 11*  --   --      BMP    Recent Labs     11/18/23  1255 11/18/23  2145   SODIUM 133* 134*   K 4.3 3.8   CL 98 104   CO2 25 22   AGAP 10 8   BUN 41* 40*   CREATININE 2.19* 1.99*   CALCIUM 8.8 7.8*       Coags    Recent Labs     11/17/23  1248 11/17/23  1704 11/18/23  0455   INR 2.97* 3.32* 1.29*   PTT 56*  --   --         Additional Electrolytes  Recent Labs     11/18/23  1255 11/18/23  2145   MG 2.5 2.4   PHOS 4.7* 4.7*          Blood Gas    No recent results  No recent results LFTs  Recent Labs     11/18/23  1255 11/18/23  2145   ALT 7 7   AST 17 22   ALKPHOS 94 75   ALB 3.2* 2.7*   TBILI 0.45 0.91       Infectious  No recent results  Glucose  Recent Labs     11/17/23  1117 11/18/23  0455 11/18/23  1255 11/18/23  2145   GLUC 125 148* 135 169*          Critical Care Time Delivered : Upon my evaluation, this patient had a high probability of imminent or life-threatening deterioration due to peritonitis, which required my direct attention, intervention, and personal management. I have personally provided 30 minutes of critical care time, exclusive of procedures, teaching, family meetings, and any prior time recorded by providers other than myself.      Frida Rick PA-C

## 2023-11-19 NOTE — DISCHARGE INSTR - OTHER ORDERS
Skin care plans:  1-Hydraguard to bilateral heels BID and PRN  2-Elevate heels to offload pressure. 3-Ehob cushion in chair when out of bed. 4-Moisturize skin daily with skin nourishing cream.  5-Turn/reposition q2h or when medically stable for pressure re-distribution on skin. 6-Cleanse left buttocks wound with soap and water, pat dry, apply Triad paste TID and PRN Soilage/displacement.

## 2023-11-19 NOTE — ANESTHESIA POSTPROCEDURE EVALUATION
Post-Op Assessment Note    CV Status:  Stable  Pain Score: 0    Pain management: adequate       Mental Status:  Sleepy   Hydration Status:  Euvolemic   PONV Controlled:  Controlled   Airway Patency:  Patent     Post Op Vitals Reviewed: Yes    No anethesia notable event occurred.     Staff: CRNA   Comments: Pt able to maintain own airway, VSS, report to recovery RN              /63 (11/18/23 1945)    Temp 97.7 °F (36.5 °C) (11/18/23 1945)    Pulse 77 (11/18/23 1945)   Resp 20 (11/18/23 1945)    SpO2 99 % (11/18/23 1945)

## 2023-11-19 NOTE — CONSULTS
CONSULT    Patient Name: Cathy Kaur  Patient MRN: [id number]  Date of Service: 11/19/2023   Date / Time Note Created: 11/19/2023 11:58 AM   Referring Provider: Angelica Shaikh DO    Provider Creating Note: PALAK Delcid    PCP: Jessa Thomas  Attending Provider:  Angelica Shaikh DO    Reason for Consult: Right hydronephrosis    HPI --Cathy Kaur is an 78-year-old female known to our service with a history of atrophic vaginitis, recurrent UTI, presenting to [facility] with a 2-week history of progressive and worsening abdominal pain and anorexia for 2-3 weeks. She was admitted after imaging demonstrated acute cholecystitis, gallbladder wall thickening, possible gangrenous cholecystitis, dilated common bile duct and intrahepatic biliary ductal dilatation. Our service was contacted for incidental finding of right hydronephrosis without evidence of obstructing nephrolithiasis. In the interim, patient was taken by the general surgical service for extensive exploratory laparotomy, converted to open, drainage of abdominal abscess x4, peritoneal lavage, partial omentectomy, liver biopsy, open cholecystostomy, umbilical hernia repair and tube placement. Patient is afebrile and hemodynamically stable. peak creatinine 2.19--1.97 today. White count 11.08. Urine analysis positive for 10-20 RBCs and WBCs per high-powered field, but only occasional bacteria.     Procedural intervention including source:the patient                 Active Problems:    Patient Active Problem List   Diagnosis    Aftercare following right knee joint replacement surgery    Drug-induced constipation    Ambulatory dysfunction    Essential hypertension    Anxiety    Right knee pain    Anemia    Symptomatic anemia    Guaiac positive stools    Absolute anemia    Melena    Osteoarthritis of left hip    Elevated LFTs    Epistaxis    Hip dislocation, right (HCC)    Choledocholithiasis    Hematoma    Fall    OAB (overactive bladder)    Atrial fibrillation (HCC)    Nasal dryness    Insect bite of scalp    Congestive heart failure, unspecified HF chronicity, unspecified heart failure type (HCC)    Chronic kidney disease, stage 3a (HCC)    Mitral stenosis    Major depressive disorder with single episode, in full remission ([address])    JESSE (acute kidney injury) ([address])    Bile peritonitis ([address])    Perforated gallbladder             Impressions  History of atrophic vaginitis and recurrent UTI  Right hydronephrosis-presumably secondary to mass effect of gangrenous cholecystitis. No evidence of obstructing stone. Recommendations  Continue medical and surgical optimization and treatment for primary concern. Trend creatinine. Gradually improving. Reimage Tuesday morning. Per attending [address] preferred  No plans at this juncture for  surgical intervention. We will follow clinical progress and  update as indicated. Past Medical History:   Diagnosis Date    Atrial fibrillation ([address])     Hypertension     Insect bite of scalp 2021    Nasal dryness 12/15/2021    UTI (urinary tract infection)        Past Surgical History:   Procedure Laterality Date    EYE SURGERY      JOINT REPLACEMENT Right     Knee 11/15/19, Hip 2021       Family History   Problem Relation Age of Onset    No Known Problems Mother     Diabetes Father     Stroke Father         syndrome       Social History     Socioeconomic History    Marital status:       Spouse name: Not on file    Number of children: Not on file    Years of education: Not on file    Highest education level: Not on file   Occupational History    Not on file   Tobacco Use    Smoking status: Former     Types: Cigarettes     Quit date: 200     Years since quittin.9    Smokeless tobacco: Never   Vaping Use    Vaping Use: Never used   Substance and Sexual Activity    Alcohol use: Never    Drug use: Never    Sexual activity: Not Currently     Partners: Male     Birth control/protection: Post-menopausal   Other Topics Concern    Not on file   Social History Narrative    Daily coffee consumption (__cups/day)     Daily cola consumption (__cans/day)     Daily tea consumption (__cups/day)      Social Determinants of Health     Financial Resource Strain: Medium Risk (6/22/2023)    Overall Financial Resource Strain (CARDIA)     Difficulty of Paying Living Expenses: Somewhat hard   Food Insecurity: No Food Insecurity (1/4/2022)    Hunger Vital Sign     Worried About Running Out of Food in the Last Year: Never true     Ran Out of Food in the Last Year: Never true   Transportation Needs: Unmet Transportation Needs (6/22/2023)    PRAPARE - Transportation     Lack of Transportation (Medical): No     Lack of Transportation (Non-Medical): Yes   Physical Activity: Not on file   Stress: Not on file   Social Connections: Not on file   Intimate Partner Violence: Not on file   Housing Stability: Not on file       Allergies   Allergen Reactions    Ciprofloxacin      Chest discomfort and dizziness    Diclofenac     Diphenhydramine Rash and Chest Pain       Review of Systems  Review of Systems - History obtained from chart review and the patient  General ROS: positive for  - malaise  negative for - chills or fever  Respiratory ROS: no cough, shortness of breath, or wheezing  Cardiovascular ROS: no chest pain or dyspnea on exertion  Gastrointestinal ROS: positive for - abdominal pain, appetite loss, and nausea/vomiting  Genito-Urinary ROS: no dysuria, trouble voiding, or hematuria  Neurological ROS: no TIA or stroke symptoms       Chart Review   Allergies, current medications, history, problem list    Vital Signs  /59 (BP Location: Left arm)   Pulse 82   Temp 98 °F (36.7 °C) (Oral)   Resp 16   Ht 5' 3" (1.6 m)   Wt 89 kg (196 lb 3.4 oz)   SpO2 96%   BMI 34.76 kg/m²     Physical Exam  General appearance: alert and oriented, in no acute distress, appears stated age, and cooperative  Head: Normocephalic, without obvious abnormality, atraumatic  Neck: no JVD and supple, symmetrical, trachea midline  Lungs: diminished breath sounds  Heart: regular rate and rhythm, S1, S2 normal, no murmur, click, rub or gallop  Abdomen: abnormal findings:  moderate and abdominal dressing clean dry and intact tenderness in the entire abdomen and NGT, T-tube and ADILIA  Extremities: extremities normal, warm and well-perfused; no cyanosis, clubbing, or edema  Pulses: 2+ and symmetric  Neurologic: Grossly normal  Mcmanus--clear yellow urine     Laboratory Studies  Lab Results   Component Value Date    HGBA1C 5.7 (H) 12/02/2015     01/19/2017    K 3.9 11/19/2023    K 4.8 01/19/2017     11/19/2023     01/19/2017    CO2 23 11/19/2023    CO2 28 11/29/2021    GLUCOSE 129 11/29/2021    CREATININE 1.97 (H) 11/19/2023    CREATININE 0.87 01/19/2017    BUN 41 (H) 11/19/2023    BUN 24 01/19/2017    MG 2.5 11/19/2023    PHOS 5.2 (H) 11/19/2023     Lab Results   Component Value Date    WBC 11.08 (H) 11/19/2023    WBC 5.1 01/19/2017    RBC 3.12 (L) 11/19/2023    RBC 4.15 01/19/2017    HGB 9.6 (L) 11/19/2023    HGB 13.1 01/19/2017    HCT 31.2 (L) 11/19/2023    HCT 39.9 01/19/2017     (H) 11/19/2023    MCV 96.3 01/19/2017    MCH 30.8 11/19/2023    MCH 31.6 01/19/2017    RDW 13.6 11/19/2023    RDW 14.3 01/19/2017     11/19/2023     01/19/2017       Imaging and Other Studies  )US right upper quadrant    Result Date: 11/17/2023  Narrative: RIGHT UPPER QUADRANT ULTRASOUND INDICATION:     upper abd pain, liver abnormality on CT. COMPARISON: CT abdomen pelvis 11/17/2023. TECHNIQUE:   Real-time ultrasound of the right upper quadrant was performed with a curvilinear transducer with both volumetric sweeps and still imaging techniques. FINDINGS: PANCREAS: Mildly dilated pancreatic duct measuring 6 mm. AORTA AND IVC:  Visualized portions are normal for patient age. LIVER: Size:  Within normal range.   The liver measures 17.0 cm in the midclavicular line. Contour:  Surface contour is smooth. Parenchyma:  Echogenicity and echotexture are within normal limits. No liver mass identified. Limited imaging of the main portal vein shows it to be patent and hepatopetal. BILIARY: The gallbladder is normal in caliber. Gallbladder wall thickening with irregular margins which may represent sloughed membranes, concerning for gangrenous cholecystitis. Mild pericholecystic fluid. There are multiple calculi and sludge present. A positive sonographic Núñez's sign was elicited. Mild intrahepatic biliary dilatation. CBD measures 17 mm, in keeping with the known choledocholithiasis on recent CT. KIDNEY: Right kidney measures 8.8 x 4.4 x 3.8 cm. Volume 77.0 mL Right hydronephrosis. ASCITES:   Small right upper abdominal ascites. Impression: Findings compatible with acute cholecystitis. Gallbladder wall thickening with irregular margins which may represent sloughed membranes, concerning for gangrenous cholecystitis. Dilated common bile duct and mild intrahepatic biliary ductal dilatation, in keeping with the known choledocholithiasis on recent CT. Mildly dilated pancreatic duct measuring 6 mm, which may relate to choledocholithiasis. Recommend contrast-enhanced MRI/MRCP of the abdomen for further evaluation. Right hydronephrosis. Small right upper abdominal ascites. The study was marked in [facility] for immediate notification. Workstation performed: [id number]     CT abdomen pelvis wo contrast    Result Date: 11/17/2023  Narrative: CT ABDOMEN AND PELVIS WITHOUT IV CONTRAST INDICATION:   Abdominal pain, acute, nonlocalized diffuse abdominal pain, NV. COMPARISON: CT 11/29/2021 TECHNIQUE:  CT examination of the abdomen and pelvis was performed without intravenous contrast. Multiplanar 2D reformatted images were created from the source data.  This examination, like all CT scans performed in the [facility], was performed utilizing techniques to minimize radiation dose exposure, including the use of iterative reconstruction and automated exposure control. Radiation dose length product (DLP) for this visit:  1011 mGy-cm Enteric contrast was not administered. FINDINGS: ABDOMEN LOWER CHEST: Mild dependent subsegmental atelectasis both lung bases noted. Mild bronchiectasis right lower lobe noted. There are several scattered nonspecific small subcentimeter nodules present, not clearly appreciated on the prior examination, which could be postinflammatory/infectious or neoplastic in nature. The heart is normal in size. Coronary artery and mitral annular calcification present. Atherosclerotic calcification of the thoracic aorta is noted. Yevgeniy Hudson LIVER/BILIARY TREE: Mild intrahepatic and extrahepatic biliary ductal dilatation noted. Some low-attenuation in segment 4/5 of the liver adjacent to the gallbladder fundus is present difficult to evaluate on this unenhanced exam. Given the gallbladder wall thickening and an nodularity, possibility of gallbladder neoplasia with hepatic infiltration cannot be excluded. Recommend further evaluation with contrast-enhanced CT or MRI/MRCP. GALLBLADDER: Faintly calcified gallstones within the gallbladder lumen again noted. There is moderate gallbladder distention and probable mild wall thickening and nodularity. Cholecystitis to be excluded. There is a 4 mm calcified gallstone in the distal  common bile duct. There is mild-moderate intra and extrahepatic biliary ductal dilatation present with common bile duct measuring up to 14 mm in diameter. Correlate with liver function studies. SPLEEN:  Unremarkable. PANCREAS: Somewhat atrophic pancreas. Small pancreatic cysts are again identified as described previously including 6 mm pancreatic head cyst, 6 mm pancreatic body/tail junction cyst, and 6 mm pancreatic tail cyst, unchanged from the prior study.  As recommended previously, this should be evaluated further with MRI/MRCP if not already performed. ADRENAL GLANDS:  Unremarkable. KIDNEYS/URETERS: There is moderate right hydroureteronephrosis present, new since the prior CT of 11/29/2021, without discernible obstructing calculus or mass on this limited unenhanced study left kidney appears unremarkable. Junnie Dk STOMACH AND BOWEL: Moderate fluid distention of the stomach is noted. No definite bowel wall thickening or intestinal obstruction is identified. APPENDIX:  No findings to suggest appendicitis. ABDOMINOPELVIC CAVITY: Moderate abdominal and pelvic pelvic ascites is present. No pneumoperitoneum identified. No adenopathy noted. VESSELS: Atherosclerotic changes are present. No evidence of aneurysm. PELVIS REPRODUCTIVE ORGANS:  Unremarkable for patient's age. URINARY BLADDER: Suboptimally evaluated due to under distention. ABDOMINAL WALL/INGUINAL REGIONS:  Unremarkable. 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 Degenerative change in the spine present. Grade 1 anterior listhesis L4 on L5 due to degenerative facet arthropathy. No acute fracture. No lucent or sclerotic osseous lesion is appreciated. Impression: 1. Gallstones with apparent gallbladder wall thickening and nodularity raising the possibility of cholecystitis. There is some nonspecific heterogeneous low-attenuation in the liver adjacent to the gallbladder fundus, not appreciated on the prior CT study from 11/29/2021, which could reflect steatosis although an infiltrative process not excluded. Recommend further evaluation with ultrasound and/or contrast-enhanced CT or MRI. 2. Choledocholithiasis with intra and extrahepatic biliary ductal dilatation. Correlate with liver function studies. Recommend ultrasound for further evaluation. 3. New right-sided moderate hydronephrosis without discernible obstructing mass or calculus. 4. Moderate abdominal and pelvic ascites.  5. Subcentimeter pancreatic cysts, noted on the prior study, for which MRI/MRCP evaluation is advised if not previously done. 6. Multiple subcentimeter bibasilar pulmonary nodules not clearly identified on the prior CT. Changes may be infectious/inflammatory or neoplastic in nature. Consider dedicated CT of the chest. 7. Additional findings as noted. The study was marked in [facility] for immediate notification.  Workstation performed: [id number]         Medications   Current Facility-Administered Medications   Medication Dose Route Frequency Provider Last Rate    acetaminophen  1,000 mg Intravenous Q8H Dlemi Fung MD 1,000 mg (11/19/23 1144)    cefazolin  2,000 mg Intravenous Q12H Rose Cutler MD 2,000 mg (11/19/23 0521)    chlorhexidine  15 mL Mouth/Throat Q12H JEFFREY Ruiz      heparin (porcine)  5,000 Units Subcutaneous [facility] Delmi Fung MD      HYDROmorphone  0.2 mg Intravenous Q3H PRN Delmi Fung MD      HYDROmorphone  0.5 mg Intravenous Q3H PRN Delmi Fung MD      HYDROmorphone  0.2 mg Intravenous Q3H PRN Rose Cutler MD      labetalol  10 mg Intravenous Q4H PRN Mercy Lee Mal, DO      lactated ringers  1,000 mL Intravenous Once PRN Verlan Co, DO      And    lactated ringers  1,000 mL Intravenous Once PRN [place] Lee Resendez, DO      metroNIDAZOLE  500 mg Intravenous [address]  mg (11/19/23 0844)    multi-electrolyte  100 mL/hr Intravenous Continuous Mercy Lee Resendez,  mL/hr (11/19/23 0520)    ondansetron  4 mg Intravenous Q4H PRN Verlan Co, DO      pantoprazole  40 mg Intravenous Q12H 2305 Maritza Murray MD      promethazine  25 mg Intramuscular Q6H PRN Mercy Lee Mal, DO      sodium chloride  1,000 mL Intravenous Once PRN Verlan Co, DO      And    sodium chloride  1,000 mL Intravenous Once PRN Verlan Co, DO               PALAK Sheppard

## 2023-11-19 NOTE — ASSESSMENT & PLAN NOTE
Currently rate controlled, hold PTA PO lopressor for now while NPO  Will start IV Lopressor 5mg BID given elevated HR in 120s this AM in a fib and have a PRN dose available as well  Holding PTA Coumadin, restart when appropriate

## 2023-11-19 NOTE — QUICK NOTE
Patient previously discussed with interventional radiology and general surgery    Right upper quadrant inflammatory signs around the gallbladder concerning for perforated cholecystitis     After interdisciplinary discussion general surgery took patient to the OR given new ascites suspicious for perforation and bile ascites    During the surgery likely malignancy was encountered and decision was made to place percutaneous cholecystostomy    IR style tube was utilized, 12 [place]    I recommend flushing this 5 cc/day for patency    I have ordered outpatient tube check/exchange for 2 months  This is a non transhepatic tube    I have also ordered our standard flush order for 5 cc/day    Recommend visiting nurse service evaluation if appropriate    Please contact IR for any assistance regarding standard cholecystostomy tube management

## 2023-11-19 NOTE — ASSESSMENT & PLAN NOTE
Currently rate controlled, not on any rate controlling medications   Holding PTA Coumadin, restart when appropriate

## 2023-11-19 NOTE — QUICK NOTE
I updated the patient's daughter at bedside with the patient's clinical status, her labs, and upcoming plans. They are agreeable to have palliative care beginning to follow the case to help with goals, decision making, and pending results. The patient's daughter is okay with palliative care speaking with the patient and introducing themselves, but the daughter will not be able to visit again until Wednesday the 22nd. She is asking for an inpatient meeting on that day if possible to meet the palliative team. Feel free to call the daughter with any questions.

## 2023-11-19 NOTE — PROGRESS NOTES
Progress Note - General Surgery  Reny Mccauley 78 y.o. female MRN: [id number]  Unit/Bed#: ICU 15 Encounter: [id number]      Assessment:  78 y.o. female with acute cholecystitis complicated by perforation and four quadrant bile peritonitis. Now 1 Day Post-Op s/p Laparoscopic converted to open drainage of intra-abdominal abscesses x 4, abdominal washout, gastric serosal repair, omental biopsy, liver biopsy, open cholecystostomy tube placement on 11/18. Stable. ADILIA Drain: 30   Cholecystostomy tube: unrecorded cc  WBC: 11 from 10  Hgb: 9.6 from 9.5  Cr: 1.97 from 2.19    Plan:  - Strict NPO  - NGT to LCWS  - Plasmalyte @ 100 cc/hr  - PRN analgesia and antiemetics  - Continue IV antibiotics with ancef and flagyl. Plan for four days post-operatively. Trend WBC and fever curve. Follow up intraoperative cultures  - Liver biopsy, omental biopsy, fluid cytology collected intraoperatively. Will follow up results. - DVT ppx with SQH  - BID protonix  - Continue cutler  - Nephrology consulted for JESSE, appreciate recs  - Urology consulted for right hydronephrosis, appreciate recs  - Remove mepilex POD1, Remove midline marley POD2  - PT/OT. Ok to clamp NGT so that patient may ambulate  - RUQ cholecystostomy drain, flush daily with 10cc normal saline  - LUQ drain near gastric repair, continue to bulb suction  - Wound care consult for buttocks wound. Subjective: No acute events overnight. Afebrile, hemodynamically stable. No nausea, or vomiting, fevers or chills. Objective:   Temp:  [97.4 °F (36.3 °C)-98.3 °F (36.8 °C)] 97.7 °F (36.5 °C)  HR:  [] 69  Resp:  [13-37] 22  BP: (114-158)/(52-70) 121/58  Arterial Line BP: ()/(39-47) 98/40    Physical Exam:  General: No acute distress, alert and oriented  CV: Well perfused, regular rate  Lungs: Normal work of breathing, no increased respiratory effort  Abdomen: Soft, appropriately-tender, non-distended. Incision(s) clean, dry and intact.  ADILIA drain in place with SS output. Cholecystostomy tube in place   Extremities: No edema, clubbing or cyanosis  Skin: Warm, dry      I/O         11/17 0701 11/18 0700 11/18 0701 11/19 0700    I.V. (mL/kg) 1050 (12.3) 2300 (26.8)    IV Piggyback 1550 350    Total Intake(mL/kg) 2600 (30.3) 2650 (30.9)    Urine (mL/kg/hr)  200 (0.2)    Emesis/NG output  400    Drains  30    Blood  25    Total Output  655    Net +2600 +1995          Unmeasured Urine Occurrence 1 x 1 x            Lab, Imaging and other studies: I have personally reviewed pertinent reports.   , CBC with diff:   Lab Results   Component Value Date    WBC 11.08 (H) 11/19/2023    HGB 9.6 (L) 11/19/2023    HCT 31.2 (L) 11/19/2023     (H) 11/19/2023     11/19/2023    RBC 3.12 (L) 11/19/2023    MCH 30.8 11/19/2023    MCHC 30.8 (L) 11/19/2023    RDW 13.6 11/19/2023    MPV 9.6 11/19/2023    NRBC 0 11/19/2023   , BMP/CMP:   Lab Results   Component Value Date    SODIUM 135 11/19/2023    K 3.9 11/19/2023     11/19/2023    CO2 23 11/19/2023    BUN 41 (H) 11/19/2023    CREATININE 1.97 (H) 11/19/2023    CALCIUM 8.0 (L) 11/19/2023    AST 21 11/19/2023    ALT 5 (L) 11/19/2023    ALKPHOS 93 11/19/2023    EGFR 22 11/19/2023           Dima Mendez MD  11/19/2023 9:05 AM

## 2023-11-19 NOTE — PROGRESS NOTES
NEPHROLOGY PROGRESS NOTE   Solon Schaumann 78 y.o. female MRN: [id number]  Unit/Bed#: ICU 14 Encounter: [id number]  Reason for Consult: JESSE    ASSESSMENT AND PLAN:  77 yo woman with PMH of A-fib on Coumadin, hypertension, history of UTIs p/w abdominal pain for the last 2 to 3 weeks found to have cholecystitis. Nephrology is consulted for management of JESSE     PLAN        #Non-Oliguric KDIGO JESSE stage 2 on CKD? Etiology: Multifactorial secondary and obstructive uropathy Baseline creatinine: Most recent baseline is 0.8 mg/dL from September 2022  Current creatinine: Plateauing at 1.9 mg/dL  Peak creatinine: 2.19 mg/dL  UA: Hematuria, leukocyturia, hyaline casts  Renal imaging : Right hydronephrosis  Treatment:  No urgent indication of dialysis at this time  Maintain MAP:  Over 65 mmHg if possible/avoid hypoperfusion:  Hold parameters on blood pressure medications  Avoid nephrotoxic agents such as NSAIDs, and IV contrast if possible. Avoid opioids   Adjust medications to GFR  Pending urology evaluation for stent versus PCN     #Obstructive uropathy  No clear evidence of obstruction, no kidney stones  Pending urology evaluation     #Acid-base Disorder  serum HCO3 26 mmol/L  At goal     #Volume status/hypertension:  Volume: Slightly hypervolemic  Blood pressure: Hypotensive in the settings of sepsis, BP 98/40 on arterial line versus monitor 121/58  Recommend:  Recommend decrease Plasma-Lyte to 50 to 75 mL/min to avoid volume load.   Patient is short of breath  Monitor urinary output      #Anemia:  Current hemoglobin: 9.6 mg/dL  Treatment:  Transfuse for hemoglobin less than 7.0 per primary service       #Cholecystitis  S/p open drainage of intra-abdominal abscess, abdominal washout, gastric serosal repair  Underwent omental and liver biopsy  Cholecystostomy      #Sepsis  Secondary to peritonitis with perforated cholecystitis  Antibiotics as per ICU team     #Pulmonary nodules  Rule out malignancy #Sepsis  Leukocytosis, tendency to hypotension, tachycardic  Secondary to cholecystitis  Antibiotics as per primary team     #SOB  Luminary nodules          SUBJECTIVE:  Patient seen and examined at bedside.   Patient complains of shortness of breath      OBJECTIVE:  Current Weight: Weight - Scale: 89 kg (196 lb 3.4 oz)  Vitals:    11/19/23 0800   BP: 121/58   Pulse: 68   Resp: 15   Temp:    SpO2: 97%       Intake/Output Summary (Last 24 hours) at 11/19/2023 0955  Last data filed at 11/19/2023 0901  Gross per 24 hour   Intake 3760 ml   Output 870 ml   Net 2890 ml     Wt Readings from Last 3 Encounters:   11/19/23 89 kg (196 lb 3.4 oz)   09/14/23 85.7 kg (189 lb)   11/08/22 89.8 kg (198 lb)     Temp Readings from Last 3 Encounters:   11/19/23 97.7 °F (36.5 °C) (Oral)   10/26/23 99 °F (37.2 °C) (Temporal)   10/19/23 98 °F (36.7 °C) (Temporal)     BP Readings from Last 3 Encounters:   11/19/23 121/58   10/26/23 140/76   10/19/23 168/70     Pulse Readings from Last 3 Encounters:   11/19/23 68   10/26/23 88   10/19/23 70        General:  no acute distress at this time, critically ill  Skin:  No acute rash  Eyes:  No scleral icterus and noninjected  ENT:  mucous membranes moist  Neck:  no carotid bruits  Chest: Some decreased in both lungs, no crackles, good respiratory effort, no use of accessory respiratory muscles  CVS:  Regular rate and rhythm without rub   Abdomen:  soft and nontender   Extremities: trace lower extremity edema  Neuro:  No gross focality  Psych:  Alert , cooperative ]      Medications:    Current Facility-Administered Medications:     acetaminophen (Ofirmev) injection 1,000 mg, 1,000 mg, Intravenous, Q8H, Angelique Gilbert MD, Last Rate: 400 mL/hr at 11/19/23 0500, 1,000 mg at 11/19/23 0500    ceFAZolin (ANCEF) IVPB (premix in dextrose) 2,000 mg 50 mL, 2,000 mg, Intravenous, Q12H, Ermelinda Lombardi MD, Last Rate: 100 mL/hr at 11/19/23 0521, 2,000 mg at 11/19/23 0521    chlorhexidine ([address] [employer] [facility]) 0.12 % oral rinse 15 mL, 15 mL, Mouth/Throat, Q12H [facility], Lawanda Sagastume PA-C, 15 mL at 11/19/23 0845    heparin (porcine) subcutaneous injection 5,000 Units, 5,000 Units, Subcutaneous, Q8H [facility], Diamond Tineo MD, 5,000 Units at 11/19/23 0526    HYDROmorphone (DILAUDID) injection 0.2 mg, 0.2 mg, Intravenous, Q3H PRN, Diamond Tineo MD, 0.2 mg at 11/19/23 0908    HYDROmorphone (DILAUDID) injection 0.5 mg, 0.5 mg, Intravenous, Q3H PRN, Diamond Tineo MD, 0.5 mg at 11/19/23 0526    HYDROmorphone HCl (DILAUDID) injection 0.2 mg, 0.2 mg, Intravenous, Q3H PRN, Leticia Sage MD, 0.2 mg at 11/18/23 2323    labetalol (NORMODYNE) injection 10 mg, 10 mg, Intravenous, Q4H PRN, Cecille Resendez DO    lactated ringers bolus 1,000 mL, 1,000 mL, Intravenous, Once PRN **AND** lactated ringers bolus 1,000 mL, 1,000 mL, Intravenous, Once PRN, Cecille Resendez DO    metroNIDAZOLE (FLAGYL) IVPB (premix) 500 mg 100 mL, 500 mg, Intravenous, Q8H, Leticia Sage MD, Last Rate: 200 mL/hr at 11/19/23 0844, 500 mg at 11/19/23 0844    multi-electrolyte (PLASMALYTE-A/ISOLYTE-S PH 7.4) IV solution, 100 mL/hr, Intravenous, Continuous, Cecille Resendez DO, Last Rate: 100 mL/hr at 11/19/23 0520, 100 mL/hr at 11/19/23 0520    ondansetron (ZOFRAN) injection 4 mg, 4 mg, Intravenous, Q4H PRN, Cecille Resendez DO, 4 mg at 11/18/23 0030    pantoprazole (PROTONIX) injection 40 mg, 40 mg, Intravenous, Q12H [facility], Diamond Tineo MD, 40 mg at 11/19/23 0844    potassium chloride 20 mEq IVPB (premix), 20 mEq, Intravenous, Once, Parach[place] [employer], CRNP, Last Rate: 50 mL/hr at 11/19/23 0844, 20 mEq at 11/19/23 0844    promethazine (PHENERGAN) injection 25 mg, 25 mg, Intramuscular, Q6H PRN, Sharfi Resendez DO, 25 mg at 11/17/23 3336    sodium chloride 0.9 % bolus 1,000 mL, 1,000 mL, Intravenous, Once PRN **AND** sodium chloride 0.9 % bolus 1,000 mL, 1,000 mL, Intravenous, Once PRN, Sharif Almeida DO Mal    Laboratory Results:  Results from last 7 days   Lab Units 11/19/23  0532 11/18/23  2145 11/18/23  1255 11/18/23  0455 11/17/23  1117   WBC Thousand/uL 11.08* 10.13  --  13.24* 6.14   HEMOGLOBIN g/dL 9.6* 9.5*  --  10.5* 12.2   HEMATOCRIT % 31.2* 30.8*  --  33.7* 39.2   PLATELETS Thousands/uL 319 308  --  381 413*   SODIUM mmol/L 135 134* 133* 135 136   POTASSIUM mmol/L 3.9 3.8 4.3 4.2 3.8   CHLORIDE mmol/L 103 104 98 99 97   CO2 mmol/L 23 22 25 26 26   BUN mg/dL 41* 40* 41* 37* 27*   CREATININE mg/dL 1.97* 1.99* 2.19* 2.06* 1.67*   CALCIUM mg/dL 8.0* 7.8* 8.8 8.4 9.2   MAGNESIUM mg/dL 2.5 2.4 2.5 1.6*  --    PHOSPHORUS mg/dL 5.2* 4.7* 4.7* 4.3*  --        US right upper quadrant   Final Result by Hayde Del Cid MD (11/17 5243)      Findings compatible with acute cholecystitis. Gallbladder wall thickening with irregular margins which may represent sloughed membranes, concerning for gangrenous cholecystitis. Dilated common bile duct and mild intrahepatic biliary ductal dilatation, in keeping with the known choledocholithiasis on recent CT. Mildly dilated pancreatic duct measuring 6 mm, which may relate to choledocholithiasis. Recommend contrast-enhanced MRI/MRCP of the abdomen for further evaluation. Right hydronephrosis. Small right upper abdominal ascites. The study was marked in [facility] for immediate notification. Workstation performed: [id number]         CT abdomen pelvis wo contrast   Final Result by Lynette Sr MD (11/17 6314)         1. Gallstones with apparent gallbladder wall thickening and nodularity raising the possibility of cholecystitis. There is some nonspecific heterogeneous low-attenuation in the liver adjacent to the gallbladder fundus, not appreciated on the prior CT    study from 11/29/2021, which could reflect steatosis although an infiltrative process not excluded.  Recommend further evaluation with ultrasound and/or contrast-enhanced CT or MRI. 2. Choledocholithiasis with intra and extrahepatic biliary ductal dilatation. Correlate with liver function studies. Recommend ultrasound for further evaluation. 3. New right-sided moderate hydronephrosis without discernible obstructing mass or calculus. 4. Moderate abdominal and pelvic ascites. 5. Subcentimeter pancreatic cysts, noted on the prior study, for which MRI/MRCP evaluation is advised if not previously done. 6. Multiple subcentimeter bibasilar pulmonary nodules not clearly identified on the prior CT. Changes may be infectious/inflammatory or neoplastic in nature. Consider dedicated CT of the chest.   7. Additional findings as noted. The study was marked in [facility] for immediate notification. Workstation performed: [id number]         XR chest portable    (Results Pending)   IR cholecystostomy tube check/change/reposition/reinsertion/upsize    (Results Pending)   XR abdomen 1 view kub    (Results Pending)       Portions of the record may have been created with voice recognition software. Occasional wrong word or "sound a like" substitutions may have occurred due to the inherent limitations of voice recognition software. Read the chart carefully and recognize, using context, where substitutions have occurred.

## 2023-11-19 NOTE — ASSESSMENT & PLAN NOTE
Wt Readings from Last 3 Encounters:   11/17/23 85.7 kg (189 lb)   09/14/23 85.7 kg (189 lb)   11/08/22 89.8 kg (198 lb)     Last echo March of 2022 with EF of 65%, G2DD   Holding PTA Lasix for gentle hydration   Close monitoring of fluid status

## 2023-11-19 NOTE — ASSESSMENT & PLAN NOTE
Holding all PTA antihypertensives given concern for possible hemodynamic instability, restart as appropriate

## 2023-11-19 NOTE — WOUND OSTOMY CARE
Consult Note - Wound   Domi Mcmahon 78 y.o. female MRN: [id number]  Unit/Bed#: ICU 14 Encounter: [id number]        History and Present Illness:  Patient is 79 yo female [facility] with bile peritonitis. Patient has history of a-fib, HTN, and UTI. Patient is incontinent of bowel and indwelling cutler catheter Patient is mod/max assist with turning from side to side for assessment. Patient is independent with meals. Assessment Findings:   B/L heels intact and blanching, preventative skin care orders placed. POA stage II left buttocks- mixed etiology of moisture, pressure, and friction, wound is partial thickness with pink tissue, periwound skin is fragile, scant drainage present. Applied Triad paste as wound bed is close to rectum and dressing would have potential to become soiled. No induration, fluctuance, odor, warmth/temperature differences, redness, or purulence noted to the above noted wounds and skin areas assessed. New dressings applied per orders listed below. Patient tolerated well- no s/s of non-verbal pain or discomfort observed during the encounter. Bedside nurse aware of plan of care. See flow sheets for more detailed assessment findings. Wound care will continue to follow. Skin care plans:  1-Hydraguard to bilateral heels BID and PRN  2-Elevate heels to offload pressure. 3-Ehob cushion in chair when out of bed. 4-Moisturize skin daily with skin nourishing cream.  5-Turn/reposition q2h or when medically stable for pressure re-distribution on skin. 6-Cleanse left buttocks wound with soap and water, pat dry, apply Triad paste TID and PRN Soilage/displacement.      Wounds:  Wound 11/17/23 Pressure Injury Buttocks Left (Active)   Wound Image   11/19/23 1018   Wound Description Granulation tissue 11/19/23 1018   Pressure Injury Stage 2 11/19/23 1018   Francisca-wound Assessment Hyperpigmented 11/19/23 1018   Wound Length (cm) 2 cm 11/19/23 1018   Wound Width (cm) 2 cm 11/19/23 1018 Wound Depth (cm) 0.1 cm 11/19/23 1018   Wound Surface Area (cm^2) 4 cm^2 11/19/23 1018   Wound Volume (cm^3) 0.4 cm^3 11/19/23 1018   Calculated Wound Volume (cm^3) 0.4 cm^3 11/19/23 1018   Tunneling 0 cm 11/19/23 1018   Tunneling in depth located at 0 11/19/23 1018   Undermining 0 11/19/23 1018   Undermining is depth extending from 0 11/19/23 1018   Wound Site Closure ERUM 11/19/23 1018   Drainage Amount None 11/19/23 1018   Non-staged Wound Description Partial thickness 11/19/23 1018   Treatments Cleansed 11/19/23 1018   Dressing Moisture barrier 11/19/23 1018   Wound packed? No 11/19/23 1018   Packing- # removed 0 11/19/23 1018   Packing- # inserted 0 11/19/23 1018   Dressing Changed New 11/19/23 1018   Patient Tolerance Tolerated well 11/19/23 1018   Dressing Status Clean;Dry; Intact 11/19/23 1018         Ban ARMENTAN, RN, [employer]

## 2023-11-19 NOTE — ASSESSMENT & PLAN NOTE
Pt now s/p ex lap with drainage of 4 quadrant bile peritonitis with associated purulence likely 2/2 to previously perforated gallbladder that had sealed off with omentum prior to operation, drainage of subphrenic and subhepatic abscess, peritoneal lavage, omental biopsy, liver biopsy, repair of umbilical hernia   Planned lap sachin was converted to open when number of quadrants involved became clear, IR was called in to place cholecystotomy tube for source control and drainage  Biopsies, cytology, and cultures taken   Manage drain output   Multimodal pain control

## 2023-11-20 ENCOUNTER — TELEPHONE (OUTPATIENT)
Dept: LAB | Facility: HOSPITAL | Age: 83
End: 2023-11-20

## 2023-11-20 PROBLEM — N13.30 HYDRONEPHROSIS: Status: ACTIVE | Noted: 2023-11-20

## 2023-11-20 PROBLEM — C80.0 CARCINOMATOSIS (HCC): Status: ACTIVE | Noted: 2023-01-01

## 2023-11-20 NOTE — PROGRESS NOTES
[facility]  Progress Note  Name: Austin Melgoza  MRN: [id number]  Unit/Bed#: ICU 14 I Date of Admission: 11/17/2023   Date of Service: 11/20/2023 I Hospital Day: 3    Assessment/Plan   Major depressive disorder with single episode, in full remission [facility])  Assessment & Plan  Holding PTA Lexapro 10mg while NPO, restart as able    Anxiety  Assessment & Plan  Pt takes xanax 0.25 mg TID PRN at home  Currently holding given NPO, continue to monitor for s/sx of anxiety and withdrawl    Congestive heart failure, unspecified HF chronicity, unspecified heart failure type [facility])  Assessment & Plan  Wt Readings from Last 3 Encounters:   11/20/23 89.2 kg (196 lb 10.4 oz)   09/14/23 85.7 kg (189 lb)   11/08/22 89.8 kg (198 lb)     Last echo March of 2022 with EF of 65%, G2DD   Currently on 4LNC   Continue home lasix 20mg PO daily   Close monitoring of fluid status           Atrial fibrillation ([address])  Assessment & Plan  Currently rate controlled, hold PTA PO lopressor for now while NPO  Will start IV Lopressor 5mg BID given elevated HR in 120s this AM in a fib and have a PRN dose available as well  Holding PTA Coumadin, restart when appropriate     Essential hypertension  Assessment & Plan  Continue home lasix 20mg PO daily   Holding all other PTA antihypertensives given concern for possible hemodynamic instability, restart as appropriate   Home meds  Amlodipine 5mg daily, lisinopril 5mg daily, lopressor 25mg bid     * Bile peritonitis ([address])  Assessment & Plan  Pt now s/p ex lap with drainage of 4 quadrant bile peritonitis with associated purulence likely 2/2 to previously perforated gallbladder that had sealed off with omentum prior to operation, drainage of subphrenic and subhepatic abscess, peritoneal lavage, omental biopsy, liver biopsy, repair of umbilical hernia   Planned lap sachin was converted to open when number of quadrants involved became clear, IR was called in to place cholecystotomy tube for source control and drainage  Biopsies, cytology, and cultures taken - f/u   Manage drain output   Continue Flagyl 500mg q8 and ancef 2g BID   Multimodal pain control - scheduled IV tylenol and IV PRN dilaudid 0.2mg/0.5mg   Continue NPO status  Continue protonix 40mg - change to daily from BID   Continue PRN zofran  Palliative consult placed     Hydronephrosis  Assessment & Plan  R sided hydronephrosis  Repeat imaging via kidney bladder US on Tuesday   Urology following- appreciate eval and recs  Non-surgical mgmt currently   Continue pursuing medical mgmt     Chronic kidney disease, stage 3a [facility])  Assessment & Plan  Lab Results   Component Value Date    EGFR 16 11/20/2023    EGFR 22 11/19/2023    EGFR 22 11/18/2023    CREATININE 2.54 (H) 11/20/2023    CREATININE 1.97 (H) 11/19/2023    CREATININE 1.99 (H) 11/18/2023     JESSE on CKD3A, likely post renal due to R hydronephrosis as well as pre-renal component given hypovolemia due to NPO status   Continue IVF Isolyte @ 50 while patient NPO   Trend labs and manage electrolytes PRN  Nephrology and urology following   Continue to monitor I's/O's    Carcinomatosis [facility])  Assessment & Plan  Suspected based on operative findings   F/u fluid and biopsy pathology - all pending as of 11/20 AM  Continue multimodal pain control   Palliative consult placed    Anemia  Assessment & Plan  Continue to monitor H and H   Transfuse for hgb <7  Likely multifactorial given critical illness and recent surgery, no obvious signs of bleeding currently              Disposition: Stepdown Level 1    ICU Core Measures     A: Assess, Prevent, and Manage Pain Has pain been assessed? Yes  Need for changes to pain regimen? No   B: Both SAT/SAT  N/A   C: Choice of Sedation RASS Goal: 0 Alert and Calm  Need for changes to sedation or analgesia regimen? No   D: Delirium CAM-ICU: Negative   E: Early Mobility  Plan for early mobility? Yes   F: Family Engagement Plan for family engagement today?  Yes Antibiotic Review: Patient on appropriate coverage based on culture data. Review of Invasive Devices: Mcmanus Plan: Mcmanus to be removed. Order has been placed        Prophylaxis:  VTE VTE covered by:  heparin (porcine), Subcutaneous, 5,000 Units at 11/20/23 0532       Stress Ulcer  covered bypantoprazole (PROTONIX) injection 40 mg [[id number]]         Significant 24hr Events     HPI/ 24hr events:     Corinda Habermann is a 78 y. o. with a PMH significant for afib on coumadin, depression, and anxiety who is POD 2 open cholecystectomy with tube placement, abscess drainage, biopsies performed and umbilical hernia repair. Originally presented to the ED with complaints of abdominal pain for the last two weeks, worse the last 2 days, with decreased PO intake. Pt has been experiencing "stomach burning" since September, and feels as though she hasn't eaten much since that time. Imaging completed in the ED was concerning for perforated gallbladder so she was taken to the OR for emergent lap sachin. Given how extensive her bile peritonitis was, they converted to open and placed cholecystostomy tube for source control and drainage. Carcinomatosis found- concerns for SB malignancy with mets to GB/liver vs primary GB/liver. GB and liver mass. Her abdomen was washed out, 4 quadrant abscesses drained and subphrenic and subhepatic abscess, and biopsies (omental/liver) were taken. Umbilical hernia repair completed. Superficial injury to stomach repaired with silk sutured. Taken to  ICU as SD1 for post op monitoring.      24 hr events:   No significant events      Subjective   Review of Systems   Objective                            Vitals I/O      Most Recent Min/Max in 24hrs   Temp 98 °F (36.7 °C) Temp  Min: 97.7 °F (36.5 °C)  Max: 99.4 °F (37.4 °C)   Pulse (!) 124 Pulse  Min: 68  Max: 144   Resp (!) 23 Resp  Min: 15  Max: 39   /57 BP  Min: 98/49  Max: 155/63   O2 Sat 93 % SpO2  Min: 92 %  Max: 97 % Intake/Output Summary (Last 24 hours) at 11/20/2023 0748  Last data filed at 11/20/2023 0600  Gross per 24 hour   Intake 1920 ml   Output 1160 ml   Net 760 ml       Diet NPO    Invasive Monitoring           Physical Exam   Physical Exam  Vitals and nursing note reviewed. Eyes:      General: Vision grossly intact. Extraocular Movements: Extraocular movements intact. Conjunctiva/sclera: Conjunctivae normal.      Pupils: Pupils are equal, round, and reactive to light. Skin:     General: Skin is warm. HENT:      Head: Normocephalic and atraumatic. Right Ear: Tympanic membrane normal.      Left Ear: Tympanic membrane normal.      Mouth/Throat:      Mouth: Mucous membranes are moist.   Neck:   no midline tenderness Cardiovascular:      Rate and Rhythm: Normal rate and regular rhythm. Heart sounds: Normal heart sounds. Musculoskeletal:         General: Normal range of motion. Right lower leg: No edema. Left lower leg: No edema. Abdominal: General: A surgical scar is present. Bowel sounds are normal. There is no distension. Palpations: Abdomen is soft. Tenderness: There is abdominal tenderness in the epigastric area and periumbilical area. There is no right CVA tenderness or left CVA tenderness. Comments: Minimal epigastric and periumbilical tenderness  Soft abdomen    NG tube in place with bilious drainage   RUQ cholecystostomy tube with minimal sang output   LUQ ADILIA with sang output     Midline ex lap incision with stapes and dressing is c/d/I    Constitutional:       General: She is not in acute distress. Appearance: She is well-developed and well-nourished. She is not ill-appearing. Interventions: She is not sedated and not restrained. Comments: WDWN, resting comfortaly sitting up    Pulmonary:      Effort: Pulmonary effort is normal.      Breath sounds: Normal breath sounds.       Comments: On 2LNC   Psychiatric:         Mood and Affect: Mood and affect normal.   Neurological:      General: No focal deficit present. Mental Status: She is alert, oriented to person, place, and time and oriented to person, place and time. Mental status is at baseline. She is CAM ICU negative. GCS: GCS eye subscore is 4. GCS verbal subscore is 5. GCS motor subscore is 6. Cranial Nerves: Cranial nerves 2-12 are intact. No dysarthria or facial asymmetry. Sensory: Sensation is intact. Motor: gross motor function is at baseline for patient. Strength full and intact in all extremities. Coordination: Coordination is intact.       Comments: Intelligent, in tact short term and long term memory            Diagnostic Studies      EKG: Reviewed  Imaging: Reviewed and discussed above where pertinent      Medications:  Scheduled PRN   acetaminophen, 1,000 mg, Q8H  calcium gluconate, 2 g, Once  cefazolin, 2,000 mg, Q12H  chlorhexidine, 15 mL, Q12H FLORENTIN  heparin (porcine), 5,000 Units, Q8H [facility]  metroNIDAZOLE, 500 mg, Q8H  pantoprazole, 40 mg, Q12H FLORENTIN      HYDROmorphone, 0.2 mg, Q3H PRN  HYDROmorphone, 0.5 mg, Q3H PRN  labetalol, 10 mg, Q4H PRN  metoprolol, 5 mg, Q6H PRN  ondansetron, 4 mg, Q4H PRN  promethazine, 25 mg, Q6H PRN       Continuous    multi-electrolyte, 50 mL/hr, Last Rate: 50 mL/hr (11/19/23 1437)         Labs:    CBC    Recent Labs     11/19/23  0532 11/20/23  0448   WBC 11.08* 10.28*   HGB 9.6* 9.2*   HCT 31.2* 29.6*    310     BMP    Recent Labs     11/19/23  0532 11/20/23  0448   SODIUM 135 137   K 3.9 4.0    104   CO2 23 21   AGAP 9 12   BUN 41* 48*   CREATININE 1.97* 2.54*   CALCIUM 8.0* 8.1*       Coags    Recent Labs     11/20/23  0448   INR 1.41*        Additional Electrolytes  Recent Labs     11/19/23  0532 11/20/23  0448   MG 2.5 2.5   PHOS 5.2* 5.4*   CAIONIZED 1.08* 1.05*          Blood Gas    No recent results  No recent results LFTs  Recent Labs     11/19/23  0532 11/20/23  0448   ALT 5* <3*   AST 21 12*   ALKPHOS 93 101 ALB 2.6* 2.4*   TBILI 1.09* 0.49       Infectious  No recent results  Glucose  Recent Labs     11/18/23  1255 11/18/23  2145 11/19/23  0532 11/20/23  0448   GLUC 135 169* 122 92               Critical Care Time Delivered : Upon my evaluation, this patient had a high probability of imminent or life-threatening deterioration due to Biliary peritonitis, which required my direct attention, intervention, and personal management. I have personally provided 42 minutes of critical care time, exclusive of procedures, teaching, family meetings, and any prior time recorded by providers other than myself.      Anny Lehman PA-C

## 2023-11-20 NOTE — CASE MANAGEMENT
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    JESSE (acute kidney injury) ([address]) 2023    Bile peritonitis ([address]) 2023    Perforated gallbladder 2023    Major depressive disorder with single episode, in full remission ([address]) 2023    Congestive heart failure, unspecified HF chronicity, unspecified heart failure type ([address]) 2022    Chronic kidney disease, stage 3a ([address]) 2022    Mitral stenosis 2022    Insect bite of scalp 2021    Nasal dryness 12/15/2021    OAB (overactive bladder) 2021    Atrial fibrillation ([address]) 2021    Fall 2021    Hip dislocation, right ([address]) 2021    Choledocholithiasis 2021    Hematoma 2021    Epistaxis 10/22/2021    Osteoarthritis of left hip 2020    Elevated LFTs 2020    Absolute anemia 2019    Melena 2019    Symptomatic anemia 2019    Guaiac positive stools 2019    Right knee pain 2019    Anemia 2019    Aftercare following right knee joint replacement surgery 2019    Drug-induced constipation 2019    Ambulatory dysfunction 2019    Essential hypertension 2019    Anxiety 2019      LOS (days): 3  Geometric Mean LOS (GMLOS) (days): 8.10  Days to GMLOS:5.1     OBJECTIVE:    Risk of Unplanned Readmission Score: 23.35         Current admission status: Inpatient       Preferred Pharmacy:   [id number] [address] [address]  Phone: [phone] Fax: [phone] [address] [facility], [address] [address] [address]  Phone: [phone] Fax: [phone]    [id number] [place], [address] [facility] [address] [place] [address]  Phone: [phone] Fax: [phone]    Primary Care Provider: Sav Kerr DO    Primary Insurance: MEDICARE  Secondary Insurance: AARP    ASSESSMENT:  [address], [address] [facility]   Primary Phone: [phone] (Mobile)                 Patient Information  Admitted from[de-identified] Home  Mental Status: Alert, Other (Comment) (Pt sleeping)  During Assessment patient was accompanied by: Not accompanied during assessment  Assessment information provided by[de-identified] Daughter  Primary Caregiver: Self  Support Systems: Daughter  [place] of Residence: [address] do you live in?: [address] entry access options.  Select all that apply.: Ramp  Type of Current Residence: [facility]  Living Arrangements: Lives Alone    Activities of Daily Living Prior to Admission  Functional Status: Independent  Completes ADLs independently?: Yes  Ambulates independently?: Yes  Does patient use assisted devices?: Yes  Assisted Devices (DME) used: [employer]  Does patient currently own DME?: Yes  What DME does the patient currently own?: [employer]  Does patient have a history of Outpatient Therapy (PT/OT)?: Yes  Does the patient have a history of Short-Term Rehab?: Yes  Does patient have a history of HHC?: Yes  Does patient currently have [address]?: No    Patient Information Continued  Income Source: Pension/[facility]  Does patient have prescription coverage?: Yes  Does patient receive dialysis treatments?: No  Does patient have a history of substance abuse?: No         Means of Transportation  Means of Transport to Appts[de-identified] Drives Self (locally)      Housing Stability: Not on file   Food Insecurity: No Food Insecurity (1/4/2022)    Hunger Vital Sign     Worried About Running Out of Food in the Last Year: Never true     [address] in the Last Year: Never true   Transportation Needs: Unmet Transportation Needs (6/22/2023)    PRAPARE - Transportation     Lack of Transportation (Medical): No     Lack of Transportation (Non-Medical): Yes   Utilities: Not on file     DISCHARGE DETAILS:    Discharge planning discussed with[de-identified] pt's daughterJoshua of Choice: Yes  Comments - Freedom of Choice: Referrals to STR (NO [facility] and include [facility])    Contacts  Patient Contacts: daughter To Pang  Relationship to Patient[de-identified] Family  Contact Method: Phone  Reason/Outcome: Emergency Contact, Continuity of Care, Discharge Planning    Other Referral/Resources/Interventions Provided:  Interventions: Short Term Rehab  Referral Comments: CM attempted to meet with pt however pt is sleeping. CM spoke with pt daughter, To Pang. Introduced self/role with dcp. CM reviewed PT recs for STR. To Bird reports pt would like to go to rehab after the hospital to build her strength. Aware CM will send referrals. To Bird reports pt would not want [facility]. She would like [facility] on the list. CM did make her aware that [facility] at [facility]. To Avrilr unsure if pt would want ot go that far, but to have them on the referrals.

## 2023-11-20 NOTE — PROGRESS NOTES
Progress Note - General Surgery  Kaya Colón 78 y.o. female MRN: [id number]  Unit/Bed#: ICU 15 Encounter: [id number]      Assessment:  78 y.o. female with acute cholecystitis complicated by perforation and four quadrant bile peritonitis. Now 2 Days Post-Op s/p Laparoscopic converted to open drainage of intra-abdominal abscesses x 4, abdominal washout, gastric serosal repair, omental biopsy, liver biopsy, open cholecystostomy tube placement on 11/18. Plan:  NPO/NGT to LCS   MIVF @100  Cont abx ancef/flagyl  F/u Intra-op Bxs  Dvt ppx  Cont cutler  Plan for tabitha out today 11/20  PT/OT  Continue and maintain drains, monitor output  Flush RUQ cholecystostomy drain w/ daily 10 cc NS flushes  F.u GI for ERCP/MRCP plan  Rest of care per ICU    Subjective: no acute events overnight, intermittent N w/o emesis, not passing flatus or bm. Cutler in place. Tabitha out this am, pt tolerated well. Objective:  Temp:  [97.7 °F (36.5 °C)-99.4 °F (37.4 °C)] 98 °F (36.7 °C)  HR:  [] 124  Resp:  [15-39] 23  BP: ()/(49-63) 125/57  Arterial Line BP: (124)/(40) 124/40    Physical Exam:  General: No acute distress, alert and oriented  CV: Well perfused, regular rate  Lungs: Normal work of breathing, no increased respiratory effort  Abdomen: Soft, appropriately-tender, non-distended. Incision(s) clean, dry and intact. ADILIA drain in place with SS output.  Cholecystostomy tube in place   Extremities: No edema, clubbing or cyanosis  Skin: Warm, dry      I/O         11/17 0701  11/18 0700 11/18 0701  11/19 0700    I.V. (mL/kg) 1050 (12.3) 2300 (26.8)    IV Piggyback 1550 350    Total Intake(mL/kg) 2600 (30.3) 2650 (30.9)    Urine (mL/kg/hr)  200 (0.2)    Emesis/NG output  400    Drains  30    Blood  25    Total Output  655    Net +2600 +1995          Unmeasured Urine Occurrence 1 x 1 x          Recent Labs     11/17/23  1117 11/18/23  0455 11/19/23  0532 11/20/23  0448   WBC 6.14   < > 11.08* 10.28*   HGB 12.2   < > 9.6* 9.2* *   < > 319 310   SODIUM 136   < > 135 137   K 3.8   < > 3.9 4.0   CL 97   < > 103 104   CO2 26   < > 23 21   BUN 27*   < > 41* 48*   CREATININE 1.67*   < > 1.97* 2.54*   GLUC 125   < > 122 92   CALCIUM 9.2   < > 8.0* 8.1*   MG  --    < > 2.5 2.5   PHOS  --    < > 5.2* 5.4*   AST 13   < > 21 12*   ALT 8   < > 5* <3*   ALKPHOS 126*   < > 93 101   TBILI 0.68   < > 1.09* 0.49   LIPASE 16  --   --   --     < > = values in this interval not displayed.        Concha Hitchcock MD  11/20/2023 7:16 AM

## 2023-11-20 NOTE — ASSESSMENT & PLAN NOTE
R sided hydronephrosis  Repeat imaging via kidney bladder US on Tuesday   Urology following- appreciate eval and recs  Non-surgical mgmt currently   Continue pursuing medical mgmt

## 2023-11-20 NOTE — ASSESSMENT & PLAN NOTE
Wt Readings from Last 3 Encounters:   11/20/23 89.2 kg (196 lb 10.4 oz)   09/14/23 85.7 kg (189 lb)   11/08/22 89.8 kg (198 lb)     Last echo March of 2022 with EF of 65%, G2DD   Currently on 4LNC   Continue home lasix 20mg PO daily   Close monitoring of fluid status

## 2023-11-20 NOTE — ASSESSMENT & PLAN NOTE
Pt takes xanax 0.25 mg TID PRN at home  Currently holding given NPO, continue to monitor for s/sx of anxiety and withdrawl

## 2023-11-20 NOTE — CONSULTS
Visited w? Pt for about 15 minutes on 23. Pt stated that NG tube made it difficult to talk but that she wanted to anyway. Pt stated that one of her daughters  a couple of months ago and that her other daughter is "carrying all the weight" of dealing with the logistics (as well as the emotional burdens) surrounding that death, as well as all the work involved in caring for the Pt. Pt states she worries about surviving daughter due to all this stress. Pt stated that she feels like she is dying and that she is sure this will add more to her daughter's emotional burden and work load. Pt stated she would like to be anointed and, perhaps, to receive Communion when allowed but that she would prefer a [facility]  to do so. I will inform Fabian Barry when he returns, tomorrow. Pt stated that her daughter took her Rosary as she does not want to lose it in the hospital.  I told Pt  that I can bring her a plastic, temporary Rosary, which seemed to please her. Pt. Asked for prayer (provided) and accepted a [place], as well.

## 2023-11-20 NOTE — ASSESSMENT & PLAN NOTE
Continue to monitor H and H   Transfuse for hgb <7  Likely multifactorial given critical illness and recent surgery, no obvious signs of bleeding currently

## 2023-11-20 NOTE — ASSESSMENT & PLAN NOTE
Suspected based on operative findings   F/u fluid and biopsy pathology - all pending as of 11/20 AM  Continue multimodal pain control   Palliative consult placed

## 2023-11-20 NOTE — CONSULTS
Consultation - Palliative & Supportive Care   Jose Alejandro Michael Court  78 y.o.  female  ICU 14/ICU 15   MRN: [id number]  Encounter: [id number]    ASSESSMENT:    Patient Active Problem List   Diagnosis    Aftercare following right knee joint replacement surgery    Drug-induced constipation    Ambulatory dysfunction    Essential hypertension    Anxiety    Right knee pain    Anemia    Symptomatic anemia    Guaiac positive stools    Absolute anemia    Melena    Osteoarthritis of left hip    Elevated LFTs    Epistaxis    Hip dislocation, right (HCC)    Choledocholithiasis    Hematoma    Fall    OAB (overactive bladder)    Atrial fibrillation (HCC)    Nasal dryness    Insect bite of scalp    Congestive heart failure, unspecified HF chronicity, unspecified heart failure type (HCC)    Chronic kidney disease, stage 3a (HCC)    Mitral stenosis    Major depressive disorder with single episode, in full remission ([address])    JESSE (acute kidney injury) ([address])    Bile peritonitis ([address])    Perforated gallbladder    Hydronephrosis    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 Goals:   Level 1-full code, wanting to await biopsy results prior to engage in full goals of care discussions    Discussion held this morning with patient as well as her daughter, Fanny Cortes. Patient has no spouse, 2 daughters, however, 1 daughter has passed away in September. Fanny Cortes is her only remaining child/family member. Patient and parents request today, I did introduce palliative care services and our role in patient care.   They stated they do not want an in-depth conversation today until they have all the information/results returned from the patient's biopsies. Patient is able to recall her medical history leading up to and since her admission to the hospital.  She understands the reasoning for surgery as well as her ongoing need for medical care at this time. She understands she is awaiting for biopsy results given the nature of the findings that were obtained during surgical intervention and drain placements. Patient states she is unsure what she wants until she has more information at this time. She understands that "things are not looking good". She does state her goal is to receive rehab after this hospitalization to help her get stronger. She was otherwise independent with activities of daily living prior to this admission. She states abdominal pain has been ongoing for the past several months and did not seek care until just recently due to the severity of her discomfort pain. We reviewed her CODE STATUS today. I did discuss what cardiac and pulmonary resuscitation would entail. Tania Mendez states she would want full resuscitation with cardiac and pulmonary resuscitation. However, she would want limitations in place and that if there is no meaningful recovery to her baseline, she would not want to be kept alive on life support. At this time, patient and daughter are both treatment focused on some more information is obtained. They were open to a family meeting once more information is available. As for symptoms, patient states her pain is well-managed at this time with IV Dilaudid. She denies any discomfort other than the tube in her nose as well as her dry mouth. She otherwise has no other complaints or concerns at this time. Palliative will follow for ongoing goals of care discussions as his biopsy results return and her clinical course evolves.     2. Social Support:  Supportive listening provided  Normalized experience of patient/family  Provided anxiety containment  Patient is [employer] and is requesting consult to spiritual services. Consult placed today. 3. Symptom management:  Per critical care  Patient's pain is well controlled at this time with IV Dilaudid 0.2 mg / 0.5 mg as needed    4. Follow-up  We appreciate the opportunity to participate in this patient's care. We will continue to follow. [facility] to follow up. Please do not hesitate to contact our on-call provider through our clinic answering service at [phone] should there be an acute change or other symptom control concerns. Code status: Level 1 - Full Code   Decisional apparatus:  Patient does have capacity to make medical decisions on my exam today. If such capacity is lost, patient's substitute decision maker would default to daughter, Ever Walls, by [place] Act 169. Advance Directive / Living Will / POLST:      I have reviewed the patient's controlled substance dispensing history in the Prescription Drug Monitoring Program in compliance with the [facility] regulations before prescribing any controlled substances. IDENTIFICATION:  Inpatient consult to Palliative Care  Consult performed by: Hasmukh Castro DO  Consult ordered by: Shanda Serrano, [address] [place] [address]        Reason for Consult / Principal Problem: Goals of care    HISTORY OF PRESENT ILLNESS:    Shanita Ott is a 78 y.o. female with past medical history of atrial fibrillation on chronic anticoagulation with warfarin, and hypertension who [facility] due to ongoing abdominal pain that is fairly been intermittent for the past several weeks. On initial evaluation, patient was noted to be tachycardic with lactic acidosis. Initial work-up reveals concerns for ascites, hydronephrosis, and acute cholecystitis with concerns for possible gangrenous cholecystitis. Patient had a follow-up right upper quadrant ultrasound which revealed findings compatible with acute cholecystitis, gallbladder wall thickening, mildly dilated pancreatic duct measuring 6 mm which may relate to choledocholithiasis.   Due to patient's concerning findings, patient required surgical intervention in which a laparoscopic procedure was converted to open exploratory laparotomy. Patient was noted to have an abdominal abscess along with subphrenic and subhepatic abscesses. She required drain placement as well as biopsy of the omentum and liver. She also required a cholecystostomy tube placement. Patient's intraoperative findings were concerning for potential small bowel malignancy with metastatic lesions to the gallbladder/liver versus a primary malignancy to the gallbladder/liver. Cultures of the bilious ascites as well as gallbladder fluid were obtained during this time. They are currently pending biopsy results. Palliative care was consulted to initiate goals of care conversations given the concerns of potential malignancy and carcinomatosis. Interview and exam limited by: None    Review of Systems   Constitutional:  Positive for fatigue. Negative for chills and fever. HENT:  Positive for sinus pressure (from NG tube of right nare). Respiratory:  Negative for cough and shortness of breath. Cardiovascular:  Positive for leg swelling. Negative for chest pain and palpitations. Gastrointestinal:  Positive for abdominal pain (well-controlled with current pain regimen). Negative for nausea and vomiting. Musculoskeletal:  Negative for arthralgias and back pain. Neurological:  Negative for numbness. Psychiatric/Behavioral:  Negative for confusion. All other systems reviewed and are negative. Past Medical History:   Diagnosis Date    Atrial fibrillation ([address])     Hypertension     Insect bite of scalp 12/16/2021    Nasal dryness 12/15/2021    UTI (urinary tract infection)      Past Surgical History:   Procedure Laterality Date    EYE SURGERY      JOINT REPLACEMENT Right     Knee 11/15/19, Hip 8/2021     Social History     Socioeconomic History    Marital status:       Spouse name: Not on file    Number of children: Not on file    Years of education: Not on file    Highest education level: Not on file   Occupational History    Not on file   Tobacco Use    Smoking status: Former     Types: Cigarettes     Quit date: 200     Years since quittin.9    Smokeless tobacco: Never   Vaping Use    Vaping Use: Never used   Substance and Sexual Activity    Alcohol use: Never    Drug use: Never    Sexual activity: Not Currently     Partners: Male     Birth control/protection: Post-menopausal   Other Topics Concern    Not on file   Social History Narrative    Daily coffee consumption (__cups/day)     Daily cola consumption (__cans/day)     Daily tea consumption (__cups/day)      Social Determinants of Health     Financial Resource Strain: Medium Risk (2023)    Overall Financial Resource Strain (CARDIA)     Difficulty of Paying Living Expenses: Somewhat hard   Food Insecurity: No Food Insecurity (2022)    Hunger Vital Sign     Worried About Running Out of Food in the Last Year: Never true     Ran Out of Food in the Last Year: Never true   Transportation Needs: Unmet Transportation Needs (2023)    PRAPARE - Transportation     Lack of Transportation (Medical): No     Lack of Transportation (Non-Medical): Yes   Physical Activity: Not on file   Stress: Not on file   Social Connections: Not on file   Intimate Partner Violence: Not on file   Housing Stability: Not on file     Family History   Problem Relation Age of Onset    No Known Problems Mother     Diabetes Father     Stroke Father         syndrome       MEDICATIONS / ALLERGIES:  all current active meds have been reviewed, current meds:   Current Facility-Administered Medications   Medication Dose Route Frequency    acetaminophen (Ofirmev) injection 1,000 mg  1,000 mg Intravenous Q8H    ceFAZolin (ANCEF) IVPB (premix in dextrose) 2,000 mg 50 mL  2,000 mg Intravenous Q12H    chlorhexidine (PERIDEX) 0.12 % oral rinse 15 mL  15 mL Mouth/Throat Q12H [address]    heparin (porcine) subcutaneous injection 5,000 Units  5,000 Units Subcutaneous Q8H [address]    HYDROmorphone (DILAUDID) injection 0.2 mg  0.2 mg Intravenous Q3H PRN    HYDROmorphone (DILAUDID) injection 0.5 mg  0.5 mg Intravenous Q3H PRN    labetalol (NORMODYNE) injection 10 mg  10 mg Intravenous Q4H PRN    metoprolol (LOPRESSOR) injection 5 mg  5 mg Intravenous Q6H PRN    metoprolol (LOPRESSOR) injection 5 mg  5 mg Intravenous Q6H    metroNIDAZOLE (FLAGYL) IVPB (premix) 500 mg 100 mL  500 mg Intravenous Q8H    multi-electrolyte (PLASMALYTE-A/ISOLYTE-S PH 7.4) IV solution  50 mL/hr Intravenous Continuous    ondansetron (ZOFRAN) injection 4 mg  4 mg Intravenous Q4H PRN    [START ON 11/21/2023] pantoprazole (PROTONIX) injection 40 mg  40 mg Intravenous Q24H FLORENTIN    promethazine (PHENERGAN) injection 25 mg  25 mg Intramuscular Q6H PRN   , and PTA meds:   Prior to Admission Medications   Prescriptions Last Dose Informant Patient Reported? Taking? ALPRAZolam (XANAX) 0.25 mg tablet Not Taking Self No No   Sig: take 1 tablet by mouth three times a day if needed for anxiety   Patient not taking: No sig reported   COLLAGEN PO Not Taking Self Yes No   Sig: Take by mouth   Patient not taking: Reported on 11/17/2023   Cholecalciferol (VITAMIN D3) 50 MCG (2000 UT) capsule Not Taking Self Yes No   Sig: Take 2,000 Units by mouth daily   Patient not taking: Reported on 11/17/2023   Melatonin 3 MG CAPS Not Taking Self Yes No   Sig: Take 3 mg by mouth     Patient not taking: Reported on 11/17/2023   Multiple Vitamins-Minerals (PRESERVISION AREDS 2 PO) Not Taking  Yes No   Sig: Take 1 capsule by mouth 2 (two) times a day.  Indications: supplement   Patient not taking: Reported on 11/17/2023   Myrbetriq 25 MG TB24 11/16/2023 Self No Yes   Sig: TAKE 1 TABLET EVERY DAY   acetaminophen (TYLENOL) 325 mg tablet 11/16/2023 Self No Yes   Sig: Take 3 tablets (975 mg total) by mouth every 8 (eight) hours   amLODIPine (NORVASC) 5 mg tablet 11/16/2023  No Yes   Sig: TAKE 1 TABLET EVERY DAY   b complex vitamins tablet Not Taking Self Yes No   Sig: Take 1 tablet by mouth daily   Patient not taking: Reported on 2023   escitalopram (LEXAPRO) 10 mg tablet 2023  No Yes   Sig: Take 1 tablet (10 mg total) by mouth daily   estradiol (ESTRACE VAGINAL) 0.1 mg/g vaginal cream   No No   Sig: Insert 1 g into the vagina daily   Patient taking differently: Insert 1 g into the vagina daily. Indications: once every other week   furosemide (LASIX) 20 mg tablet 2023 Self No Yes   Sig: take 1 tablet by mouth once daily   influenza vaccine, high-dose (FLUZONE HIGH-DOSE) 0.7 ML CISCO Not Taking  Yes No   Sig: Inject 0.7 mL into a muscle 1 (one) time. Indications: Influenza A and B Inactivated Vaccination   Patient not taking: Reported on 2023   lisinopril (ZESTRIL) 5 mg tablet 2023 Self No Yes   Sig: TAKE 1 TABLET EVERY DAY   metoprolol tartrate (LOPRESSOR) 25 mg tablet 2023  No Yes   Sig: TAKE 1 TABLET TWICE DAILY   oxymetazoline (AFRIN) 0.05 % nasal spray  Self No No   Si sprays by Each Nare route 2 (two) times a day for 3 days As need for recurrent nose bleeds, please do not take for more than 3 days   pantoprazole (PROTONIX) 40 mg tablet 2023 Self No Yes   Sig: take 1 tablet by mouth once daily   promethazine (PHENERGAN) 25 mg tablet 2023  No Yes   Sig: Take 1 tablet (25 mg total) by mouth every 6 (six) hours as needed for nausea or vomiting   sodium chloride (OCEAN) 0.65 % nasal spray More than a month  Yes No   Si sprays into each nostril as needed for congestion.  Indications: dryness   sucralfate (CARAFATE) 1 g tablet 2023  No Yes   Sig: take 1 tablet by mouth before meals and at bedtime   warfarin (COUMADIN) 5 mg tablet 2023 Self No Yes   Sig: TAKE 1 TABLET EVERY DAY      Facility-Administered Medications: None       Allergies   Allergen Reactions    Ciprofloxacin      Chest discomfort and dizziness    Diclofenac Diphenhydramine Rash and Chest Pain       OBJECTIVE:  /61   Pulse (!) 120   Temp 98 °F (36.7 °C) (Oral)   Resp (!) 24   Ht 5' 3" (1.6 m)   Wt 89.2 kg (196 lb 10.4 oz)   SpO2 95%   BMI 34.84 kg/m²   Nursing notes reviewed. Physical Exam  Vitals and nursing note reviewed. Constitutional:       General: She is not in acute distress. Appearance: She is ill-appearing. She is not toxic-appearing or diaphoretic. HENT:      Head: Normocephalic and atraumatic. Eyes:      Conjunctiva/sclera: Conjunctivae normal.   Cardiovascular:      Rate and Rhythm: Tachycardia present. Heart sounds: No murmur heard. Pulmonary:      Effort: Pulmonary effort is normal. No respiratory distress. Abdominal:      General: There is no distension. Tenderness: There is generalized abdominal tenderness. Musculoskeletal:         General: No swelling. Cervical back: Neck supple. Skin:     General: Skin is warm and dry. Coloration: Skin is not cyanotic or jaundiced. Neurological:      General: No focal deficit present. Mental Status: She is alert. Psychiatric:         Mood and Affect: Mood normal. Mood is not anxious or depressed. Behavior: Behavior normal.         Lab Results: I have personally reviewed pertinent labs.     HEMATOLOGY PROFILE:  Results from last 7 days   Lab Units 11/20/23 0448 11/19/23 0532 11/18/23 2145 11/18/23 0455   WBC Thousand/uL 10.28* 11.08* 10.13 13.24*   HEMOGLOBIN g/dL 9.2* 9.6* 9.5* 10.5*   HEMATOCRIT % 29.6* 31.2* 30.8* 33.7*   PLATELETS Thousands/uL 310 319 308 381   NEUTROS PCT %  --  89* 91* 87*   MONOS PCT %  --  6 6 8   MONO PCT % 4  --   --   --    EOS PCT % 0 0 0 0       CHEMISTRY PROFILE:  Results from last 7 days   Lab Units 11/20/23 0448 11/19/23 0532 11/18/23 2145   SODIUM mmol/L 137 135 134*   POTASSIUM mmol/L 4.0 3.9 3.8   CHLORIDE mmol/L 104 103 104   CO2 mmol/L 21 23 22   BUN mg/dL 48* 41* 40*   CREATININE mg/dL 2.54* 1.97* 1.99* ALBUMIN g/dL 2.4* 2.6* 2.7*   CALCIUM mg/dL 8.1* 8.0* 7.8*   ALK PHOS U/L 101 93 75   ALT U/L <3* 5* 7   AST U/L 12* 21 22       Albumin:  0   Lab Value Date/Time    ALB 2.4 (L) 11/20/2023 0448    ALB 3.9 01/19/2017 0800     Imaging Studies: I have personally reviewed pertinent reports. EKG, Pathology, and Other Studies: I have personally reviewed pertinent reports. Counseling / Coordination of Care: Total floor / unit time spent today 60 minutes. Greater than 50% of total time was spent with the patient and / or family counseling and / or coordination of care. A description of the counseling / coordination of care: symptom assessment and management, medication review, psychosocial support, chart review, imaging review, lab review, advanced directives, goals of care, supportive listening, and anticipatory guidance. Shiv Ayoub DO  [facility] Palliative and Supportive [facility]  [phone]    Portions of this document may have been created using dictation software and as such some "sound alike" terms may have been generated by the system. Do not hesitate to contact me with any questions or clarifications.

## 2023-11-20 NOTE — ASSESSMENT & PLAN NOTE
Lab Results   Component Value Date    EGFR 16 11/20/2023    EGFR 22 11/19/2023    EGFR 22 11/18/2023    CREATININE 2.54 (H) 11/20/2023    CREATININE 1.97 (H) 11/19/2023    CREATININE 1.99 (H) 11/18/2023     JESSE on CKD3A, likely post renal due to R hydronephrosis as well as pre-renal component given hypovolemia due to NPO status   Continue IVF Isolyte @ 50 while patient NPO   Trend labs and manage electrolytes PRN  Nephrology and urology following   Continue to monitor I's/O's

## 2023-11-20 NOTE — ASSESSMENT & PLAN NOTE
Pt now s/p ex lap with drainage of 4 quadrant bile peritonitis with associated purulence likely 2/2 to previously perforated gallbladder that had sealed off with omentum prior to operation, drainage of subphrenic and subhepatic abscess, peritoneal lavage, omental biopsy, liver biopsy, repair of umbilical hernia   Planned lap sachin was converted to open when number of quadrants involved became clear, IR was called in to place cholecystotomy tube for source control and drainage  Biopsies, cytology, and cultures taken - f/u   Manage drain output   Continue Flagyl 500mg q8 and ancef 2g BID   Multimodal pain control - scheduled IV tylenol and IV PRN dilaudid 0.2mg/0.5mg   Continue NPO status  Continue protonix 40mg - change to daily from BID   Continue PRN zofran  Palliative consult placed

## 2023-11-20 NOTE — PROGRESS NOTES
Progress Note - Urology  Kaya Colón 1940, 78 y.o. female MRN: [id number]    Unit/Bed#: ICU 14 Encounter: [id number]      Assessment & Plan:  Right hydronephrosis  JESSE  - Jerzy Glover is a 79 yo s/p exploratory lapartomy, placement of cholecystostomy tube, intraoperative biopsies, carcinomatosis awaiting biopsy results. Current NPO, NGT.   - Cr 2.54, baseline 0.8  - US ordered for today to re-assess R hydro  - Urine 645 mL cutler catheter output  - If continued R hydro and increasing Cr, discussed decompression with stent vs PCN with patient and daughter  - For now, follow up [address] and trend Cr  - Appreciate Palliative care  - Urology will continue to follow and update plan accordingly. Subjective:   HPI:Seen at bedside. Abdominal pain. Review of Systems   Constitutional:  Negative for chills and fever. Respiratory:  Negative for cough and shortness of breath. Cardiovascular:  Negative for chest pain and palpitations. Gastrointestinal:  Positive for abdominal pain. Negative for vomiting. Genitourinary:  Negative for dysuria and hematuria. Musculoskeletal:  Negative for arthralgias and back pain. Skin:  Negative for color change and rash. Neurological:  Negative for seizures and syncope. All other systems reviewed and are negative. Objective:    Vitals: Blood pressure 121/55, pulse (!) 117, temperature 98 °F (36.7 °C), temperature source Oral, resp. rate (!) 33, height 5' 3" (1.6 m), weight 89.2 kg (196 lb 10.4 oz), SpO2 93 %. ,Body mass index is 34.84 kg/m². Physical Exam  Vitals reviewed. Constitutional:       General: She is not in acute distress. Appearance: Normal appearance. She is normal weight. She is ill-appearing. She is not toxic-appearing or diaphoretic. HENT:      Head: Normocephalic and atraumatic.       Right Ear: External ear normal.      Left Ear: External ear normal.      Nose: Nose normal.      Comments: NGT     Mouth/Throat:      Mouth: Mucous membranes are moist.   Eyes:      General: No scleral icterus. Conjunctiva/sclera: Conjunctivae normal.   Cardiovascular:      Rate and Rhythm: Normal rate. Pulses: Normal pulses. Pulmonary:      Effort: Pulmonary effort is normal.   Abdominal:      General: Abdomen is flat. Palpations: Abdomen is soft. Comments: RUQ drain. ADILIA drain   Musculoskeletal:      Cervical back: Normal range of motion. Skin:     General: Skin is warm. Findings: No rash. Neurological:      General: No focal deficit present. Mental Status: She is oriented to person, place, and time. Mental status is at baseline. Psychiatric:         Mood and Affect: Mood normal.         Behavior: Behavior normal.         Thought Content: Thought content normal.         Judgment: Judgment normal.       Labs:  Recent Labs     23  0455 23  2145 23  0532 23  0448   WBC 13.24* 10.13 11.08* 10.28*       Recent Labs     23  0455 23  2145 23  0532 23  0448   HGB 10.5* 9.5* 9.6* 9.2*     Recent Labs     23  0455 23  2145 23  0532 23  0448   HCT 33.7* 30.8* 31.2* 29.6*     Recent Labs     23  0455 23  1255 23  2145 23  0532 23  0448   CREATININE 2.06* 2.19* 1.99* 1.97* 2.54*       History:    Past Medical History:   Diagnosis Date    Atrial fibrillation ([address])     Hypertension     Insect bite of scalp 2021    Nasal dryness 12/15/2021    UTI (urinary tract infection)      Social History     Socioeconomic History    Marital status:       Spouse name: None    Number of children: None    Years of education: None    Highest education level: None   Occupational History    None   Tobacco Use    Smoking status: Former     Types: Cigarettes     Quit date:      Years since quittin.9    Smokeless tobacco: Never   Vaping Use    Vaping Use: Never used   Substance and Sexual Activity    Alcohol use: Never    Drug use: Never    Sexual activity: Not Currently     Partners: Male     Birth control/protection: Post-menopausal   Other Topics Concern    None   Social History Narrative    Daily coffee consumption (__cups/day)     Daily cola consumption (__cans/day)     Daily tea consumption (__cups/day)      Social Determinants of Health     Financial Resource Strain: Medium Risk (6/22/2023)    Overall Financial Resource Strain (CARDIA)     Difficulty of Paying Living Expenses: Somewhat hard   Food Insecurity: No Food Insecurity (1/4/2022)    Hunger Vital Sign     Worried About Running Out of Food in the Last Year: Never true     Ran Out of Food in the Last Year: Never true   Transportation Needs: Unmet Transportation Needs (6/22/2023)    PRAPARE - Transportation     Lack of Transportation (Medical): No     Lack of Transportation (Non-Medical): Yes   Physical Activity: Not on file   Stress: Not on file   Social Connections: Not on file   Intimate Partner Violence: Not on file   Housing Stability: Not on file     Past Surgical History:   Procedure Laterality Date    EYE SURGERY      JOINT REPLACEMENT Right     Knee 11/15/19, Hip 8/2021     Family History   Problem Relation Age of Onset    No Known Problems Mother     Diabetes Father     Stroke Father         syndrome       Daniel [place]  Date: 11/20/2023 Time: 11:20 AM

## 2023-11-20 NOTE — PROGRESS NOTES
Progress Note - Shanita Ott 78 y.o. female MRN: [id number]    Unit/Bed#: ICU 14 Encounter: [id number]    Assessment and Plan:   Principal Problem:    Bile peritonitis ([address])  Active Problems:    Essential hypertension    Anxiety    Anemia    Atrial fibrillation (HCC)    Congestive heart failure, unspecified HF chronicity, unspecified heart failure type ([address])    Chronic kidney disease, stage 3a (HCC)    Major depressive disorder with single episode, in full remission ([address])    Hydronephrosis    Carcinomatosis ([address])    #1. Choledocholithiasis: patient had US in 2021 with signs of choledocholithiasis and was to follow up outpatient for ERCP since her LFTs were normal and she was on coumadin at the time but  never had this done. She again had signs of 4 mm choledocholithiasis in distal duct with ductal dilation on recent CT and US this admission again with normal LFTs  -discussed with surgery team as well as patient and her daughter at bedside. Patient has chronic choledocholithiasis with normal LFTs, recent significant surgery with findings of perforated cholecystitis, bile peritonitis, intra-abdominal abscesses, gastric needle injury with repair and findings concerning for malignancy s/p bx. She also has cholecystostomy tube in place allowing for decompression  -the risks of ERCP at this time are much higher than the benefit due to concern for causing a perforation of the stomach at needle injury site with introduction of air during endoscopy. Also, would recommend goals of care discussion once biopsies are back from malignancy appearing lesions. I did discuss the risk of developing cholangitis with patient and daughter without doing ERCP however this stone as been chronic and patient is on antibiotics. -would recommend monitoring LFTs closely, if there are any signs of an obstruction with worsening LFTs, we can consider a more emergent ERCP.  Otherwise, can consider this non-emergently after proper healing or forgo completely pending goals of care discussions  -GI will follow peripherally, call with any worsening LFTs or concerns for CBD obstruction.     ----------------------------------------------------------------------------------------------------------------    Subjective:     Abdominal pain comes and goes. Is going to try to get out of chair     Objective:     Vitals: Blood pressure 125/71, pulse (!) 120, temperature 98 °F (36.7 °C), temperature source Oral, resp. rate (!) 24, height 5' 3" (1.6 m), weight 89.2 kg (196 lb 10.4 oz), SpO2 94 %. ,Body mass index is 34.84 kg/m².       Intake/Output Summary (Last 24 hours) at 11/20/2023 1100  Last data filed at 11/20/2023 0994  Gross per 24 hour   Intake 2510 ml   Output 1145 ml   Net 1365 ml       Physical Exam:     General Appearance: Alert, appears stated age and cooperative  Lungs: Clear to auscultation bilaterally, no rales or rhonchi, no labored breathing/accessory muscle use  Heart: tachycardic, Regular rhythm, S1, S2 normal, no murmur, click, rub or gallop  Abdomen: Soft, some tenderness at midline, midline incision dressed, bulb suction drain present in abdomen with mildly bloody serosanguinous fluid in bulb,  non-distended; bowel sounds normal; no masses or no organomegaly  Extremities: No cyanosis, clubbing, or edema    Invasive Devices       Peripheral Intravenous Line  Duration             Peripheral IV 11/17/23 Proximal;Right;Ventral (anterior) Forearm 3 days    Peripheral IV 11/17/23 Distal;Right;Ventral (anterior) Forearm 2 days    Peripheral IV 11/18/23 Left Hand 1 day              Drain  Duration             Closed/Suction Drain Left LUQ Bulb 19 Fr. 1 day    Closed/Suction Drain Right RUQ Other (Comment) 12 Fr. 1 day    NG/OG/Enteral Tube Nasogastric 18 Fr Right nare 1 day    Urethral Catheter Latex 16 Fr. 1 day                    Lab Results:  Results from last 7 days   Lab Units 11/20/23  0448 11/19/23  0532   WBC Thousand/uL 10.28* 11.08* HEMOGLOBIN g/dL 9.2* 9.6*   HEMATOCRIT % 29.6* 31.2*   PLATELETS Thousands/uL 310 319   NEUTROS PCT %  --  89*   LYMPHS PCT %  --  3*   LYMPHO PCT % 12*  --    MONOS PCT %  --  6   MONO PCT % 4  --    EOS PCT % 0 0     Results from last 7 days   Lab Units 11/20/23  0448   POTASSIUM mmol/L 4.0   CHLORIDE mmol/L 104   CO2 mmol/L 21   BUN mg/dL 48*   CREATININE mg/dL 2.54*   CALCIUM mg/dL 8.1*   ALK PHOS U/L 101   ALT U/L <3*   AST U/L 12*     Invalid input(s): "BILI"  Results from last 7 days   Lab Units 11/20/23  0448   INR  1.41*     Results from last 7 days   Lab Units 11/17/23  1117   LIPASE u/L 16       Imaging Studies: I have personally reviewed pertinent imaging studies. XR abdomen 1 view kub    Result Date: 11/19/2023  Impression: Interval placement of right upper quadrant percutaneous cholecystostomy tube with additional tubes as described above Workstation performed: [id number]     US right upper quadrant    Result Date: 11/17/2023  Impression: Findings compatible with acute cholecystitis. Gallbladder wall thickening with irregular margins which may represent sloughed membranes, concerning for gangrenous cholecystitis. Dilated common bile duct and mild intrahepatic biliary ductal dilatation, in keeping with the known choledocholithiasis on recent CT. Mildly dilated pancreatic duct measuring 6 mm, which may relate to choledocholithiasis. Recommend contrast-enhanced MRI/MRCP of the abdomen for further evaluation. Right hydronephrosis. Small right upper abdominal ascites. The study was marked in [facility] for immediate notification. Workstation performed: [id number]     CT abdomen pelvis wo contrast    Result Date: 11/17/2023  Impression: 1. Gallstones with apparent gallbladder wall thickening and nodularity raising the possibility of cholecystitis.  There is some nonspecific heterogeneous low-attenuation in the liver adjacent to the gallbladder fundus, not appreciated on the prior CT study from 11/29/2021, which could reflect steatosis although an infiltrative process not excluded. Recommend further evaluation with ultrasound and/or contrast-enhanced CT or MRI. 2. Choledocholithiasis with intra and extrahepatic biliary ductal dilatation. Correlate with liver function studies. Recommend ultrasound for further evaluation. 3. New right-sided moderate hydronephrosis without discernible obstructing mass or calculus. 4. Moderate abdominal and pelvic ascites. 5. Subcentimeter pancreatic cysts, noted on the prior study, for which MRI/MRCP evaluation is advised if not previously done. 6. Multiple subcentimeter bibasilar pulmonary nodules not clearly identified on the prior CT. Changes may be infectious/inflammatory or neoplastic in nature. Consider dedicated CT of the chest. 7. Additional findings as noted. The study was marked in [facility] for immediate notification.  Workstation performed: [id number]

## 2023-11-20 NOTE — ASSESSMENT & PLAN NOTE
Continue home lasix 20mg PO daily   Holding all other PTA antihypertensives given concern for possible hemodynamic instability, restart as appropriate   Home meds  Amlodipine 5mg daily, lisinopril 5mg daily, lopressor 25mg bid

## 2023-11-20 NOTE — PLAN OF CARE
Problem: PHYSICAL THERAPY ADULT  Goal: Performs mobility at highest level of function for planned discharge setting. See evaluation for individualized goals. Description: Treatment/Interventions: Functional transfer training, LE strengthening/ROM, Elevations, Therapeutic exercise, Cognitive reorientation, Patient/family training, Equipment eval/education, Bed mobility, Gait training, Spoke to nursing, Spoke to case management, OT  Equipment Recommended:  (TBD, pt has RW at home)       See flowsheet documentation for full assessment, interventions and recommendations. Note: Prognosis: Fair  Problem List: Decreased strength, Decreased endurance, Impaired balance, Decreased mobility, Decreased cognition, Impaired judgement, Obesity, Decreased skin integrity  Assessment: Pt is a 78 y.o. female seen for PT evaluation s/p admit to [address] [place] [address] on 11/17/2023. Pt was admitted with a primary dx of: bile peritonitis. PT now consulted for assessment of mobility and d/c needs. Pt with OOB to chair orders. Pts current comorbidities and personal factors effecting treatment include: afib, HTN, CKD, anxiety. Pts current clinical presentation is Unstable/Unpredictable (high complexity) due to Ongoing medical management for primary dx, Decreased activity tolerance compared to baseline, Fall risk, Increased assistance needed from caregiver at current time, Ongoing telemetry monitoring, Cog status, Increased O2 via NC from pts baseline, s/p surgical intervention. Prior to admission, pt was independent with use of RW. Upon evaluation, pt currently is requiring ModA for bed mobility; MaxA for transfers.  Pt presents at PT eval functioning below baseline and currently w/ overall mobility deficits 2* to: BLE weakness, impaired balance, decreased endurance, pain, decreased activity tolerance compared to baseline, decreased functional mobility tolerance compared to baseline, decreased safety awareness, impaired judgement, fall risk, decreased cognition. Pt currently at a fall risk 2* to impairments listed above. Pt will continue to benefit from skilled acute PT interventions to address stated impairments; to maximize functional mobility; for ongoing pt/ family training; and DME needs. At conclusion of PT session pt returned BTB, bed alarm engaged, all needs in reach, and RN notified of session findings/recommendations with phone and call bell within reach. Pt denies any further questions at this time. Recommend Level II (Moderate Resource Intensity). Rehab Resource Intensity Level, PT: II (Moderate Resource Intensity)    See flowsheet documentation for full assessment.

## 2023-11-20 NOTE — PLAN OF CARE
Problem: Prexisting or High Potential for Compromised Skin Integrity  Goal: Skin integrity is maintained or improved  Description: INTERVENTIONS:  - Identify patients at risk for skin breakdown  - Assess and monitor skin integrity  - Assess and monitor nutrition and hydration status  - Monitor labs   - Assess for incontinence   - Turn and reposition patient  - Assist with mobility/ambulation  - Relieve pressure over bony prominences  - Avoid friction and shearing  - Provide appropriate hygiene as needed including keeping skin clean and dry  - Evaluate need for skin moisturizer/barrier cream  - Collaborate with interdisciplinary team   - Patient/family teaching  - Consider wound care consult   Outcome: Progressing     Problem: PAIN - ADULT  Goal: Verbalizes/displays adequate comfort level or baseline comfort level  Description: Interventions:  - Encourage patient to monitor pain and request assistance  - Assess pain using appropriate pain scale  - Administer analgesics based on type and severity of pain and evaluate response  - Implement non-pharmacological measures as appropriate and evaluate response  - Consider cultural and social influences on pain and pain management  - Notify physician/advanced practitioner if interventions unsuccessful or patient reports new pain  Outcome: Progressing     Problem: INFECTION - ADULT  Goal: Absence or prevention of progression during hospitalization  Description: INTERVENTIONS:  - Assess and monitor for signs and symptoms of infection  - Monitor lab/diagnostic results  - Monitor all insertion sites, i.e. indwelling lines, tubes, and drains  - Monitor endotracheal if appropriate and nasal secretions for changes in amount and color  - [place] appropriate cooling/warming therapies per order  - Administer medications as ordered  - Instruct and encourage patient and family to use good hand hygiene technique  - Identify and instruct in appropriate isolation precautions for identified infection/condition  Outcome: Progressing  Goal: Absence of fever/infection during neutropenic period  Description: INTERVENTIONS:  - Monitor WBC    Outcome: Progressing

## 2023-11-20 NOTE — PROGRESS NOTES
[address] NOTE   Alf Grossmanor 78 y.o. female MRN: [id number]  Unit/Bed#: ICU 15 Encounter: [id number]  Reason for Consult: Acute kidney injury    ASSESSMENT and PLAN:  47-year-old female with a history of atrial fibrillation on Coumadin, hypertension, UTIs who presented with abdominal pain for 2 to 3 weeks and found to have cholecystitis with perforation and peritonitis. Nephrology was consulted for management of acute kidney injury. Acute kidney injury(POA):  Etiology:   Obstructive uropathy, intraoperative hemodynamic perturbations, hypotension/relative hypotension in the context of acute illness which may have progressed to ATN. Mcmanus inserted over the weekend. Making urine. NG drainage appears to be fairly significant  Baseline unclear. Creatinine 0.8 mg/dL as of September 2022   Creatinine 1.67 on admission increasing and fluctuating near 2 to 2.2 mg/dL  Current creatinine: Up to 2.54. Increasing creatinine in the last 24 hours. CT without contrast imaging: Moderate right hydroureteronephrosis. Urinalysis: 1+ protein, large blood, hematuria, leukocyturia, 10-25 hyaline casts. Innumerable epithelial cells  Plan:  Renal ultrasound pending-to re-assess right hydroureteronephrosis  Maintain MAP greater than 65 for renal perfusion  Mcmanus catheter for monitoring intake and output  Continue maintenance fluids, Plasma-Lyte 50 mL/h  Avoid nephrotoxic agents  Patient is uncertain whether she would pursue dialysis if needed. She would like to discuss this with her family.     Obstructive uropathy:  Etiology is unclear  Seen by urology with a plan for reimaging Tuesday  Follow-up ultrasound pending    Cholecystitis:  Complicated by perforation and peritonitis  Postop day 2 drainage of intra-abdominal abscess, washout, gastric serosal repair, omental biopsy, liver biopsy and open cholecystostomy tube placement  Surgery following    Volume status/hypertension:  On low-dose IV fluids, 50 mL/h  Appears fairly euvolemic on physical exam.  Denies shortness of breath. On 2 L nasal cannula. Blood pressure acceptable although has been intermittently low in the last several days  Patient reports that her usual blood pressure runs closer to 778 systolic. Acid-base/electrolytes:  Acceptable  Bicarbonate 21, continue Plasma-Lyte    Anemia:  Management per primary team  Hemoglobin 9.2  Transfuse for hemoglobin less than 7    Hyperphosphatemia: Phosphorus 5.4, increasing. Monitor    History of recurrent UTI    DISPOSITION:  Follow-up renal ultrasound  Mcmanus catheter for strict I&O measurement  Continue maintenance IV fluids  Supportive care for blood pressure/keep MAP greater than 65  Avoid nephrotoxic agents  Unclear whether patient would except dialysis if needed. She is discussing with her family. SUBJECTIVE / 24H INTERVAL HISTORY:  States that she is feeling better today she had a very rough time over the weekend and was not feeling well. She has not had any vomiting or diarrhea. She is not passing flatus. She denies shortness of breath. She feels that she is less swollen than she was over the weekend but her hands and feet feel a little puffy. Denies dizziness, headaches. No chest pain. OBJECTIVE:  Current Weight: Weight - Scale: 89.2 kg (196 lb 10.4 oz)  Vitals:    11/20/23 0700 11/20/23 0715 11/20/23 0800 11/20/23 0900   BP: 125/57 125/57 137/61 135/60   BP Location: Left arm      Pulse: (!) 113 (!) 122 (!) 120 (!) 121   Resp: (!) 23 (!) 24 (!) 24 (!) 30   Temp: 98 °F (36.7 °C)      TempSrc: Oral      SpO2: 93% 95% 95% 92%   Weight:       Height:           Intake/Output Summary (Last 24 hours) at 11/20/2023 1033  Last data filed at 11/20/2023 4219  Gross per 24 hour   Intake 2510 ml   Output 1145 ml   Net 1365 ml     General: NAD, comfortably lying in bed  Skin: no rash  Eyes: anicteric sclera  ENT: Oropharynx dry  Neck: supple  Chest: Fine Rales anterior right chest and right base. Fairly clear left. No wheezes or rhonchi. On 2 L nasal cannula  CVS: s1s2, no murmur, no gallop, no rub.   Irregular, tachycardic  Abdomen: Slightly distended, drainage tube, no bowel sounds noted  Extremities:  trace edema LE b/l  : Mcmanus draining clear yellow urine  Neuro: AAOX3  Psych: normal affect   Medications:    Current Facility-Administered Medications:     acetaminophen (Ofirmev) injection 1,000 mg, 1,000 mg, Intravenous, Q8H, Kayley Head MD, Last Rate: 400 mL/hr at 11/20/23 0344, 1,000 mg at 11/20/23 0344    ceFAZolin (ANCEF) IVPB (premix in dextrose) 2,000 mg 50 mL, 2,000 mg, Intravenous, Q12H, Luann Flowers MD, Last Rate: 100 mL/hr at 11/20/23 0353, 2,000 mg at 11/20/23 0353    chlorhexidine (PERIDEX) 0.12 % oral rinse 15 mL, 15 mL, Mouth/Throat, Q12H [facility], Sean Rowan PA-C, 15 mL at 11/20/23 0823    heparin (porcine) subcutaneous injection 5,000 Units, 5,000 Units, Subcutaneous, Q8H [facility], Kayley Head MD, 5,000 Units at 11/20/23 0532    HYDROmorphone (DILAUDID) injection 0.2 mg, 0.2 mg, Intravenous, Q3H PRN, Kayley Head MD, 0.2 mg at 11/19/23 0908    HYDROmorphone (DILAUDID) injection 0.5 mg, 0.5 mg, Intravenous, Q3H PRN, Kayley Head MD, 0.5 mg at 11/20/23 1030    labetalol (NORMODYNE) injection 10 mg, 10 mg, Intravenous, Q4H PRN, Cecille Resendez DO    metoprolol (LOPRESSOR) injection 5 mg, 5 mg, Intravenous, Q6H PRN, Jose Alfredo Esteban MD, 5 mg at 11/20/23 0402    metoprolol (LOPRESSOR) injection 5 mg, 5 mg, Intravenous, Q6H, Uche Jose MD, 5 mg at 11/20/23 1030    metroNIDAZOLE (FLAGYL) IVPB (premix) 500 mg 100 mL, 500 mg, Intravenous, Q8H, Luann Flowers MD, Last Rate: 200 mL/hr at 11/20/23 0727, 500 mg at 11/20/23 6464    multi-electrolyte (PLASMALYTE-A/ISOLYTE-S PH 7.4) IV solution, 50 mL/hr, Intravenous, Continuous, Prateek PALAK Patricia, Last Rate: 50 mL/hr at 11/19/23 1437, 50 mL/hr at 11/19/23 1437    ondansetron [facility]) injection 4 mg, 4 mg, Intravenous, Q4H PRN, Julia Resendez DO, 4 mg at 11/19/23 1851    pantoprazole (PROTONIX) injection 40 mg, 40 mg, Intravenous, Q12H [facility], Kayley Head MD, 40 mg at 11/20/23 5003    promethazine (PHENERGAN) injection 25 mg, 25 mg, Intramuscular, Q6H PRN, Kristan Cabot, DO, 25 mg at 11/17/23 2249    Laboratory Results:  Results from last 7 days   Lab Units 11/20/23  0448 11/19/23  0532 11/18/23  2145 11/18/23  1255 11/18/23  0455 11/17/23  1117   WBC Thousand/uL 10.28* 11.08* 10.13  --  13.24* 6.14   HEMOGLOBIN g/dL 9.2* 9.6* 9.5*  --  10.5* 12.2   HEMATOCRIT % 29.6* 31.2* 30.8*  --  33.7* 39.2   PLATELETS Thousands/uL 310 319 308  --  381 413*   POTASSIUM mmol/L 4.0 3.9 3.8 4.3 4.2 3.8   CHLORIDE mmol/L 104 103 104 98 99 97   CO2 mmol/L 21 23 22 25 26 26   BUN mg/dL 48* 41* 40* 41* 37* 27*   CREATININE mg/dL 2.54* 1.97* 1.99* 2.19* 2.06* 1.67*   CALCIUM mg/dL 8.1* 8.0* 7.8* 8.8 8.4 9.2   MAGNESIUM mg/dL 2.5 2.5 2.4 2.5 1.6*  --    PHOSPHORUS mg/dL 5.4* 5.2* 4.7* 4.7* 4.3*  --

## 2023-11-20 NOTE — PHYSICAL THERAPY NOTE
Physical Therapy Evaluation    Patient's Name: Kaya Colón    Admitting Diagnosis  Choledocholithiasis [K80.50]  Cholecystitis [K81.9]  Abdominal pain [R10.9]    Problem List  Patient Active Problem List   Diagnosis    Aftercare following right knee joint replacement surgery    Drug-induced constipation    Ambulatory dysfunction    Essential hypertension    Anxiety    Right knee pain    Anemia    Symptomatic anemia    Guaiac positive stools    Absolute anemia    Melena    Osteoarthritis of left hip    Elevated LFTs    Epistaxis    Hip dislocation, right (HCC)    Choledocholithiasis    Hematoma    Fall    OAB (overactive bladder)    Atrial fibrillation (HCC)    Nasal dryness    Insect bite of scalp    Congestive heart failure, unspecified HF chronicity, unspecified heart failure type (HCC)    Chronic kidney disease, stage 3a ([address])    Mitral stenosis    Major depressive disorder with single episode, in full remission ([address])    JESSE (acute kidney injury) ([address])    Bile peritonitis ([address])    Perforated gallbladder    Hydronephrosis    Carcinomatosis ([address])       Past Medical History  Past Medical History:   Diagnosis Date    Atrial fibrillation ([address])     Hypertension     Insect bite of scalp 12/16/2021    Nasal dryness 12/15/2021    UTI (urinary tract infection)        Past Surgical History  Past Surgical History:   Procedure Laterality Date    EYE SURGERY      JOINT REPLACEMENT Right     Knee 11/15/19, Hip 8/2021 11/20/23 1336(Pt seen from [id number], entered  in incorrect column)   Note Type   Note type Evaluation   Pain Assessment   Pain Assessment Tool 0-10   Pain Score 8   Pain Location/Orientation Location: Abdomen   Restrictions/Precautions   Weight Bearing Precautions Per Order No   Other Precautions Chair Alarm; Bed Alarm;Telemetry;O2;Fall Risk;Multiple lines  (NG to suction, colostomy,)   Home Living   Type of [address] [place] [address]  One level; Laundry in basement;Performs ADLs on one level; Able to live on main level with bedroom/bathroom; Ramped entrance   Bathroom Shower/Tub Tub/shower unit   [employer]   (BSC over toilet)   Bathroom Equipment Tub transfer bench;Commode;Grab bars in shower   [address] [place];Cane  (rollator)   Prior Function   Lives With (S)  [address] [place] [address]  Help From   (pt sees dtr every 2-3 weeks)   IADLs Family/Friend/Other provides meals; Family/Friend/Other provides transportation; Family/Friend/Other provides medication management  (dtr leaves frozen meals, pt states she asks for rides for MD appointments when necessary)   Falls in the last 6 months 0  (pt denies falls)   Comments at baseline pt lives home alone, ambualtes with RW, pt utilizes stair glide to laundry   General   Additional Pertinent History Post-Op s/p Laparoscopic converted to open drainage of intra-abdominal abscesses x 4, abdominal washout, gastric serosal repair, omental biopsy, liver biopsy, open cholecystostomy tube placement   Family/Caregiver Present No   Cognition   Arousal/Participation Cooperative   Orientation Level Oriented X4   Following Commands Follows one step commands without difficulty   Comments pt ID by wristband, name and    Subjective   Subjective pt agreeable to PT eval, however states feeling fatigued from being up in recliner chair   RLE Assessment   RLE Assessment X  (grossly 3 to 3+/5)   LLE Assessment   LLE Assessment X  (grossly 3 to 3+/5)   Vision-Basic Assessment   Current Vision Wears glasses all the time   Bed Mobility   Supine to Sit Unable to assess   Sit to Supine 3  Moderate assistance   Additional items Assist x 1; Increased time required;LE management   Transfers   Sit to Stand 2  Maximal assistance   Additional items Assist x 1; Increased time required;Verbal cues   Stand to Sit 2  Maximal assistance   Additional items Assist x 1; Increased time required;Verbal cues   Additional Comments vc for hand placement, trunk positioning.  x2 STS performed from EOB   Ambulation/Elevation   Ambulation/Elevation Additional Comments pt did not ambulate with PT and refused ambulation, pt was observed performing short ambulation with PCA and RN, pt with flexed posture, poor UE support with walker and arms extended   Balance   Static Sitting Fair   Dynamic Sitting Fair -   Static Standing Poor   Dynamic Standing Poor -   Activity Tolerance   Activity Tolerance Patient limited by fatigue;Patient limited by pain   Medical Staff Made Aware CM   Nurse Made Aware RN Cherelle Menjivar, RN nasima, PCA   Assessment   Prognosis Fair   Problem List Decreased strength;Decreased endurance; Impaired balance;Decreased mobility; Decreased cognition; Impaired judgement;Obesity; Decreased skin integrity   Assessment Pt is a 78 y.o. female seen for PT evaluation s/p admit to [address] [place] [address] on 11/17/2023. Pt was admitted with a primary dx of: bile peritonitis. PT now consulted for assessment of mobility and d/c needs. Pt with OOB to chair orders. Pts current comorbidities and personal factors effecting treatment include: afib, HTN, CKD, anxiety. Pts current clinical presentation is Unstable/Unpredictable (high complexity) due to Ongoing medical management for primary dx, Decreased activity tolerance compared to baseline, Fall risk, Increased assistance needed from caregiver at current time, Ongoing telemetry monitoring, Cog status, Increased O2 via NC from pts baseline, s/p surgical intervention. Prior to admission, pt was independent with use of RW. Upon evaluation, pt currently is requiring ModA for bed mobility; MaxA for transfers. Pt presents at PT eval functioning below baseline and currently w/ overall mobility deficits 2* to: BLE weakness, impaired balance, decreased endurance, pain, decreased activity tolerance compared to baseline, decreased functional mobility tolerance compared to baseline, decreased safety awareness, impaired judgement, fall risk, decreased cognition.  Pt currently at a fall risk 2* to impairments listed above. Pt will continue to benefit from skilled acute PT interventions to address stated impairments; to maximize functional mobility; for ongoing pt/ family training; and DME needs. At conclusion of PT session pt returned BTB, bed alarm engaged, all needs in reach, and RN notified of session findings/recommendations with phone and call bell within reach. Pt denies any further questions at this time. Recommend Level II (Moderate Resource Intensity). Goals   Patient Goals to get better   STG Expiration Date 11/30/23   Short Term Goal #1 In 10 days pt will be able to: 1. Demonstrate ability to perform all aspects of bed mobility independently to improve functional safety. 2. Perform functional transfers independently to facilitate safe return to previous living environment. 3.  Ambulate 50 ft with LRAD independently with stable vitals to improve safety with household distances and reduce fall risk. 4. Improve LE strength grades by 1 to increase ease of functional mobility with transfers and gait. 5. Pt will demonstrate improved balance by one grade in order to decrease risk of falls. 6. Pt will tolerate 3 hours OOB in chair to promote upright posture and positional tolerance. PT Treatment Day 0   Plan   Treatment/Interventions Functional transfer training;LE strengthening/ROM; Elevations; Therapeutic exercise;Cognitive reorientation;Patient/family training;Equipment eval/education; Bed mobility;Gait training;Spoke to nursing;Spoke to case management;OT   PT Frequency 3-5x/wk   Discharge Recommendation   Rehab Resource Intensity Level, PT II (Moderate Resource Intensity)   Equipment Recommended   (TBD, pt has RW at home)   AM-PAC Basic Mobility Inpatient   Turning in Flat Bed Without Bedrails 2   Lying on Back to Sitting on Edge of Flat Bed Without Bedrails 2   Moving Bed to Chair 2   Standing Up From Chair Using Arms 1   Walk in Room 1   Climb 3-5 Stairs With Railing 1 Basic Mobility Inpatient Raw Score 9   Turning Head Towards Sound 3   Follow Simple Instructions 2   Low Function Basic Mobility Raw Score  14   Low Function Basic Mobility Standardized Score  22.01   Highest Level Of Mobility   -HLM Goal 3: Sit at edge of bed   -HLM Achieved 3: Sit at edge of bed   End of Consult   Patient Position at End of Consult Supine;Bed/Chair alarm activated; All needs within reach   The patient's AM-PAC Basic Mobility Inpatient Short Form Raw Score is 9. A Raw score of less than or equal to 16 suggests the patient may benefit from discharge to post-acute rehabilitation services. Please also refer to the recommendation of the Physical Therapist for safe discharge planning.   Foster Soulier, PT

## 2023-11-21 NOTE — QUICK NOTE
Critical Care will sign off. Recommendations:     - F/u INR post op and plan for heparin gtt bridge to Coumadin  - F/u Surgical biopsy pathology. Family meeting with palliative 11/22  - F/u GB fluid pathology     Please reach back out to critical care AP (via [employer] role: Critical Care-[facility]) at any time for any additional concerns or for re-evaluation.

## 2023-11-21 NOTE — ASSESSMENT & PLAN NOTE
Suspected based on operative findings   F/u fluid and biopsy pathology - all pending as of 11/20 AM  Continue multimodal pain control   Palliative consult placed  Palliative care meeting 11/22 PM

## 2023-11-21 NOTE — ASSESSMENT & PLAN NOTE
R sided hydronephrosis  Repeat imaging via kidney bladder US on 11/20 showing stable hydronephrosis  Urology following- appreciate eval and recs  Cystoscopy and retrograde pyelogram scheduled for today   Continue pursuing medical mgmt

## 2023-11-21 NOTE — ANESTHESIA POSTPROCEDURE EVALUATION
Post-Op Assessment Note    CV Status:  Stable  Pain Score: 0    Pain management: adequate       Mental Status:  Awake and alert   Hydration Status:  Stable   PONV Controlled:  None   Airway Patency:  Patent and adequate     Post Op Vitals Reviewed: Yes    No anethesia notable event occurred.     Staff: CRNA, Anesthesiologist               BP   131/61   Temp      Pulse  84   Resp   16   SpO2   98%

## 2023-11-21 NOTE — ANESTHESIA PROCEDURE NOTES
Anesthesia Notable Event    Date/Time: 11/21/2023 2:35 PM    Performed by: Gogo Medel CRNA  Authorized by: Florence Baumann MD

## 2023-11-21 NOTE — ASSESSMENT & PLAN NOTE
Lab Results   Component Value Date    EGFR 13 11/21/2023    EGFR 14 11/20/2023    EGFR 16 11/20/2023    CREATININE 2.97 (H) 11/21/2023    CREATININE 2.84 (H) 11/20/2023    CREATININE 2.54 (H) 11/20/2023     JESSE on CKD3A, likely post renal due to R hydronephrosis as well as pre-renal component given hypovolemia due to NPO status   Continue IVF Isolyte @ 50 while patient NPO   Continue home IV Lasix 10mg   Trend labs and manage electrolytes PRN  Nephrology and urology following   Plan for cystoscopy and retrograde pyelogram today  Continue to monitor I's/O's

## 2023-11-21 NOTE — ASSESSMENT & PLAN NOTE
Continue home lasix 20mg PO, IV 10mg equivalent   Continue metoprolol 5mg q6  Holding all other PTA antihypertensives given NPO  Home meds  Amlodipine 5mg daily, lisinopril 5mg daily, lopressor 25mg bid

## 2023-11-21 NOTE — ASSESSMENT & PLAN NOTE
Pt now s/p ex lap with drainage of 4 quadrant bile peritonitis with associated purulence likely 2/2 to previously perforated gallbladder that had sealed off with omentum prior to operation, drainage of subphrenic and subhepatic abscess, peritoneal lavage, omental biopsy, liver biopsy, repair of umbilical hernia   Planned lap sachin was converted to open when number of quadrants involved became clear, IR was called in to place cholecystotomy tube for source control and drainage  Biopsies, cytology, and cultures taken - f/u   Manage drain output   Continue NG to continuous suction   Continue Flagyl 500mg q8 and ancef 2g BID - Day 5 of antibiotics   Multimodal pain control - scheduled IV tylenol and IV PRN dilaudid 0.2mg/0.5mg   Continue NPO status  Continue protonix 40mg - change to daily from BID   Continue PRN zofran  Palliative consult placed

## 2023-11-21 NOTE — OP NOTE
OPERATIVE REPORT  PATIENT NAME: Brian Harper    :  1940  MRN: [id number]  Pt Location: AN OR ROOM 04    SURGERY DATE: 2023    Surgeon(s) and Role:     * Buckley Habermann, MD - Primary    Preop Diagnosis:  Hydronephrosis, unspecified hydronephrosis type [N13.30]    Post-Op Diagnosis Codes: * Hydronephrosis, unspecified hydronephrosis type [N13.30]  Right ureteral stricture  Bladder lesion measuring 4 cm²    Procedure(s):  Right - CYSTOSCOPY RETROGRADE PYELOGRAM WITH INSERTION STENT URETERAL    Specimen(s):  ID Type Source Tests Collected by Time Destination   A : right renal urine culture Urine Urine, Renal, Right URINE CULTURE Buckley Habermann, MD 2023 1421        Estimated Blood Loss:   Minimal    Drains:  Closed/Suction Drain Left LUQ Bulb 19 Fr. (Active)   Site Description Unable to view 23   Dressing Status Clean;Dry; Intact 23   Drainage Appearance Serous 23   Status To bulb suction 23   Output (mL) 10 mL 23 1114   Number of days: 3       Closed/Suction Drain Right RUQ Other (Comment) 12 Fr. (Active)   Site Description Unable to view 23   Dressing Status Clean;Dry; Intact 23   Drainage Appearance Tan;Serous 23 0949   Status Open to gravity drainage 23 0800   Intake (mL) 10 mL 23 0901   Output (mL) 5 mL 23 0600   Number of days: 3       NG/OG/Enteral Tube Nasogastric 18 Fr Right nare (Active)   Placement Reverification Aspiration 23   Site Assessment Clean;Dry; Intact 23   Status Clamped 23 0945   Drainage Appearance Green 23   Intake (mL) 0 mL 23   Output (mL) 0 mL 23 0600   Number of days: 3       Urethral Catheter Non-latex 16 Fr. (Active)   Number of days: 0       Ureteral Internal Stent Right ureter 6 Fr.  (Active)   Number of days: 0       Anesthesia Type:   General    Operative Indications:  Patient with moderate to severe right-sided hydronephrosis down to the mid ureter    Operative Findings: There was a 2 cm 2 cm flat lesion located on the upper wall towards the back left of the bladder that looked inflammatory nature rather than typical of urothelial cancer. This was photographed. Consideration made for biopsy but given patient's other medical issues held off. Right retrograde pyelogram showed a mid ureteral stricture with proximal hydroureteronephrosis. Urine collected from the right kidney for culture but did not appear overtly infected. There was a hydronephrotic drip. 6 [place] stent placed with mild resistance. Complications:   None    Procedure and Technique:  The patient was brought to the operating room. Anesthesia was induced. They were moved to dorsal lithotomy position. Antibiotics were confirmed. A time-out was performed identifying the correct patient, site, and procedure. A rigid 25 [place]an cystoscope was introduced into the bladder. The urethra was unremarkable. The bladder was surveyed and showed a 2 x 2 centimeter area on the upper aspect of the back right wall of the bladder that appeared flat and inflammatory in nature. This did not look like typical urothelial cancer but could represent a different malignant process. Consideration was made for biopsy but given the patient's current ongoing issues and potential for hospice this was not performed. Attention was turned to the right ureteral orifice. A 5 [place] open-end catheter was used to intubate the ureteral orifice with the aid of a guidewire. .  A gentle retrograde pyeloureterogram was performed which showed a very tight and ratty appearing mid ureter measuring 3 to 4 cm with proximal hydronephrosis with convoluted ureter. A Solo wire was passed through the open-ended catheter up the ureter into the right renal pelvis under fluoroscopic guidance.   The 5 [place] open-ended catheter was passed up over the wire with mild resistance felt in the mid ureter and wire removed. Urine was seen to have a hydronephrotic drip. It appeared clear. Was collected for culture. Wire was replaced. 5 fr catheter was removed. A 6x 24 stent was placed over the wire under visual guidance in the bladder and fluoroscopic guidance proximally. There was some resistance in the mid ureter in the area of the stricture but the stent was able to be carefully advanced. Once seen to be in appropriate position the wire was removed and a good coil was seen in the collecting system on fluoroscopy and visually in the bladder. The stent string was not left on. The bladder was drained. This completed the case. The patient was woken from anesthesia and transferred to PACU in good condition. PLAN:  The patient's stent plan will be based on the pathology from her exploratory laparotomy. If she has disseminated cancer we will unlikely be removed and instead require exchanges every 3 months. If she does not have malignancy and/or her intra-abdominal processes are expected to improve then we can consider stent removal in 4 to 8 weeks after repeat imaging. A qualified resident physician was not available.     Patient Disposition:  PACU             SIGNATURE: Kaylynn Trujillo MD  DATE: November 21, 2023  TIME: 2:36 PM

## 2023-11-21 NOTE — PLAN OF CARE
Problem: Prexisting or High Potential for Compromised Skin Integrity  Goal: Skin integrity is maintained or improved  Description: INTERVENTIONS:  - Identify patients at risk for skin breakdown  - Assess and monitor skin integrity  - Assess and monitor nutrition and hydration status  - Monitor labs   - Assess for incontinence   - Turn and reposition patient  - Assist with mobility/ambulation  - Relieve pressure over bony prominences  - Avoid friction and shearing  - Provide appropriate hygiene as needed including keeping skin clean and dry  - Evaluate need for skin moisturizer/barrier cream  - Collaborate with interdisciplinary team   - Patient/family teaching  - Consider wound care consult   Outcome: Progressing     Problem: PAIN - ADULT  Goal: Verbalizes/displays adequate comfort level or baseline comfort level  Description: Interventions:  - Encourage patient to monitor pain and request assistance  - Assess pain using appropriate pain scale  - Administer analgesics based on type and severity of pain and evaluate response  - Implement non-pharmacological measures as appropriate and evaluate response  - Consider cultural and social influences on pain and pain management  - Notify physician/advanced practitioner if interventions unsuccessful or patient reports new pain  Outcome: Progressing     Problem: INFECTION - ADULT  Goal: Absence or prevention of progression during hospitalization  Description: INTERVENTIONS:  - Assess and monitor for signs and symptoms of infection  - Monitor lab/diagnostic results  - Monitor all insertion sites, i.e. indwelling lines, tubes, and drains  - Monitor endotracheal if appropriate and nasal secretions for changes in amount and color  - [place] appropriate cooling/warming therapies per order  - Administer medications as ordered  - Instruct and encourage patient and family to use good hand hygiene technique  - Identify and instruct in appropriate isolation precautions for identified infection/condition  Outcome: Progressing  Goal: Absence of fever/infection during neutropenic period  Description: INTERVENTIONS:  - Monitor WBC    Outcome: Progressing     Problem: SAFETY ADULT  Goal: Patient will remain free of falls  Description: INTERVENTIONS:  - Educate patient/family on patient safety including physical limitations  - Instruct patient to call for assistance with activity   - Consult OT/PT to assist with strengthening/mobility   - Keep Call bell within reach  - Keep bed low and locked with side rails adjusted as appropriate  - Keep care items and personal belongings within reach  - Initiate and maintain comfort rounds  - Make Fall Risk Sign visible to staff  - Offer Toileting every 2 Hours, in advance of need  - Initiate/Maintain alarm  - Obtain necessary fall risk management equipment:   - Apply yellow socks and bracelet for high fall risk patients  - Consider moving patient to room near nurses station  Outcome: Progressing  Goal: Maintain or return to baseline ADL function  Description: INTERVENTIONS:  -  Assess patient's ability to carry out ADLs; assess patient's baseline for ADL function and identify physical deficits which impact ability to perform ADLs (bathing, care of mouth/teeth, toileting, grooming, dressing, etc.)  - Assess/evaluate cause of self-care deficits   - Assess range of motion  - Assess patient's mobility; develop plan if impaired  - Assess patient's need for assistive devices and provide as appropriate  - Encourage maximum independence but intervene and supervise when necessary  - Involve family in performance of ADLs  - Assess for home care needs following discharge   - Consider OT consult to assist with ADL evaluation and planning for discharge  - Provide patient education as appropriate  Outcome: Progressing  Goal: Maintains/Returns to pre admission functional level  Description: INTERVENTIONS:  - Perform AM-PAC 6 Click Basic Mobility/ Daily Activity assessment daily.  - Set and communicate daily mobility goal to care team and patient/family/caregiver. - Collaborate with rehabilitation services on mobility goals if consulted  - Perform Range of Motion 3 times a day. - Reposition patient every 2 hours. - Dangle patient 3 times a day  - Stand patient 3 times a day  - Ambulate patient 3 times a day  - Out of bed to chair 3 times a day   - Out of bed for meals 3 times a day  - Out of bed for toileting  - Record patient progress and toleration of activity level   Outcome: Progressing     Problem: DISCHARGE PLANNING  Goal: Discharge to home or other facility with appropriate resources  Description: INTERVENTIONS:  - Identify barriers to discharge w/patient and caregiver  - Arrange for needed discharge resources and transportation as appropriate  - Identify discharge learning needs (meds, wound care, etc.)  - Arrange for interpretive services to assist at discharge as needed  - Refer to Case Management Department for coordinating discharge planning if the patient needs post-hospital services based on physician/advanced practitioner order or complex needs related to functional status, cognitive ability, or social support system  Outcome: Progressing     Problem: Knowledge Deficit  Goal: Patient/family/caregiver demonstrates understanding of disease process, treatment plan, medications, and discharge instructions  Description: Complete learning assessment and assess knowledge base. Interventions:  - Provide teaching at level of understanding  - Provide teaching via preferred learning methods  Outcome: Progressing     Problem: Nutrition/Hydration-ADULT  Goal: Nutrient/Hydration intake appropriate for improving, restoring or maintaining nutritional needs  Description: Monitor and assess patient's nutrition/hydration status for malnutrition. Collaborate with interdisciplinary team and initiate plan and interventions as ordered.   Monitor patient's weight and dietary intake as ordered or per policy. Utilize nutrition screening tool and intervene as necessary. Determine patient's food preferences and provide high-protein, high-caloric foods as appropriate.      INTERVENTIONS:  - Monitor oral intake, urinary output, labs, and treatment plans  - Assess nutrition and hydration status and recommend course of action  - Evaluate amount of meals eaten  - Assist patient with eating if necessary   - Allow adequate time for meals  - Recommend/ encourage appropriate diets, oral nutritional supplements, and vitamin/mineral supplements  - Order, calculate, and assess calorie counts as needed  - Recommend, monitor, and adjust tube feedings and TPN/PPN based on assessed needs  - Assess need for intravenous fluids  - Provide specific nutrition/hydration education as appropriate  - Include patient/family/caregiver in decisions related to nutrition  Outcome: Progressing

## 2023-11-21 NOTE — QUICK NOTE
Patient seen and examined this morning asleep in bed without signs of acute distress or discomfort. Did not disturb patient while she was asleep. Upon chart review, patient planned for OR at this time for cystoscopy and right ureteral stent placement. Biopsy results are still pending at this time. Patient and family wished to await return of results prior to further goals of care discussions. Palliative Care will continue to follow for goals of care discussions as results return.     Genaro Araiza, DO  Palliative Care

## 2023-11-21 NOTE — PROGRESS NOTES
Progress Note - Urology   Deal 1940, 78 y.o. female MRN: [id number]    Unit/Bed#: OR [place] Encounter: [id number]    Assessment & Plan:  Right hydronephrosis  JESSE  - Ashlie Haywood is a 77 yo s/p exploratory lapartomy, placement of cholecystostomy tube, intraoperative biopsies, carcinomatosis awaiting biopsy results. Current NPO, NGT.   - Vital signs stable, afebrile  - Creatinine 2.97, yesterday 2.84. Baseline 0.8. Nephrology following.   - 24 urine output 745 mL  - Repeat ultrasound yesterday showing moderate right hydronephrosis  - Discussed with patient cystoscopy, retrograde pyelogram, insertion of right ureteral stent. Discussed risks of procedure including risk of anesthesia, bleeding, infection, damage to surrounding structures, inability placement, need for nephrostomy tube drainage  - Consent signed  - Proceed to the OR    Subjective:   HPI: Seen at bedside. Review of Systems   Constitutional:  Negative for chills and fever. Respiratory:  Negative for cough and shortness of breath. Cardiovascular:  Negative for chest pain and palpitations. Gastrointestinal:  Positive for abdominal pain. Negative for vomiting. Genitourinary:  Negative for dysuria and hematuria. Musculoskeletal:  Negative for arthralgias and back pain. Skin:  Negative for color change and rash. Neurological:  Negative for seizures and syncope. All other systems reviewed and are negative. Objective:  Vitals: Blood pressure 126/58, pulse 86, temperature 98.4 °F (36.9 °C), resp. rate 18, height 5' 3" (1.6 m), weight 89.4 kg (197 lb), SpO2 96 %. ,Body mass index is 34.9 kg/m². Physical Exam  Vitals reviewed. Constitutional:       General: She is not in acute distress. Appearance: Normal appearance. She is normal weight. She is ill-appearing. She is not toxic-appearing or diaphoretic. HENT:      Head: Normocephalic and atraumatic.       Right Ear: External ear normal.      Left Ear: External ear normal. Nose: Nose normal.      Comments: NGT     Mouth/Throat:      Mouth: Mucous membranes are moist.   Eyes:      General: No scleral icterus. Conjunctiva/sclera: Conjunctivae normal.   Cardiovascular:      Rate and Rhythm: Normal rate and regular rhythm. Pulses: Normal pulses. Heart sounds: Normal heart sounds. No murmur heard. No friction rub. No gallop. Pulmonary:      Effort: Pulmonary effort is normal. No respiratory distress. Breath sounds: Normal breath sounds. No stridor. No wheezing or rhonchi. Abdominal:      General: Abdomen is flat. There is no distension. Palpations: Abdomen is soft. Tenderness: There is no abdominal tenderness. There is no right CVA tenderness or guarding. Genitourinary:     Comments: Mcmanus in place  Musculoskeletal:         General: Normal range of motion. Cervical back: Normal range of motion. Skin:     General: Skin is warm. Findings: No rash. Neurological:      General: No focal deficit present. Mental Status: She is oriented to person, place, and time. Mental status is at baseline. Psychiatric:         Mood and Affect: Mood normal.         Behavior: Behavior normal.         Thought Content:  Thought content normal.         Judgment: Judgment normal.       Labs:  Recent Labs     11/18/23 2145 11/19/23  0532 11/20/23  0448 11/21/23  0425   WBC 10.13 11.08* 10.28* 10.63*       Recent Labs     11/18/23 2145 11/19/23  0532 11/20/23  0448 11/21/23  0425   HGB 9.5* 9.6* 9.2* 9.6*     Recent Labs     11/18/23  2145 11/19/23  0532 11/20/23  0448 11/21/23  0425   HCT 30.8* 31.2* 29.6* 32.3*     Recent Labs     11/18/23  1255 11/18/23  2145 11/19/23  0532 11/20/23  0448 11/20/23  1456 11/21/23  0425   CREATININE 2.19* 1.99* 1.97* 2.54* 2.84* 2.97*     History:    Past Medical History:   Diagnosis Date    Atrial fibrillation ([address])     Hypertension     Insect bite of scalp 12/16/2021    Nasal dryness 12/15/2021    UTI (urinary tract infection)      Social History     Socioeconomic History    Marital status:       Spouse name: None    Number of children: None    Years of education: None    Highest education level: None   Occupational History    None   Tobacco Use    Smoking status: Former     Types: Cigarettes     Quit date:      Years since quittin.9    Smokeless tobacco: Never   Vaping Use    Vaping Use: Never used   Substance and Sexual Activity    Alcohol use: Never    Drug use: Never    Sexual activity: Not Currently     Partners: Male     Birth control/protection: Post-menopausal   Other Topics Concern    None   Social History Narrative    Daily coffee consumption (__cups/day)     Daily cola consumption (__cans/day)     Daily tea consumption (__cups/day)      Social Determinants of Health     Financial Resource Strain: Medium Risk (2023)    Overall Financial Resource Strain (CARDIA)     Difficulty of Paying Living Expenses: Somewhat hard   Food Insecurity: No Food Insecurity (2022)    Hunger Vital Sign     Worried About Running Out of Food in the Last Year: Never true     Ran Out of Food in the Last Year: Never true   Transportation Needs: Unmet Transportation Needs (2023)    PRAPARE - Transportation     Lack of Transportation (Medical): No     Lack of Transportation (Non-Medical): Yes   Physical Activity: Not on file   Stress: Not on file   Social Connections: Not on file   Intimate Partner Violence: Not on file   Housing Stability: Not on file     Past Surgical History:   Procedure Laterality Date    CHOLECYSTECTOMY LAPAROSCOPIC N/A 2023    Procedure: [place], DRAINAGE OF ABDOMINAL ABSCESS x4, PERITONEAL LAVAGE, PARTIAL OMENTECTOMY, LIVER BIOPSY, OPEN CHOLECYSTOSCOPY AND DRAIN PLACEMENT, UMBILICAL HERNIA REPAIR;  Surgeon: Lizzie Smith DO;  Location: [facility];  Service: General    EYE SURGERY      JOINT REPLACEMENT Right     Knee 11/15/19, Hip 2021 Family History   Problem Relation Age of Onset    No Known Problems Mother     Diabetes Father     Stroke Father         syndrome       F[place] Ill, [place]  Date: 11/21/2023 Time: 12:02 PM

## 2023-11-21 NOTE — ASSESSMENT & PLAN NOTE
Pt takes xanax 0.25 mg TID PRN at home  Currently holding given NPO, continue to monitor for s/sx of anxiety and withdrawal  Per review of PDMP- hasn't pciked up since August  Patient stating she rarely takes

## 2023-11-21 NOTE — PROGRESS NOTES
Progress Note - General Surgery   [facility] Resident on Surgery Service   Genesis Soto 78 y.o. female MRN: [id number]  Unit/Bed#: ICU 15 Encounter: [id number]    Assessment:  78 y.o. female with acute cholecystitis complicated by perforation and four quadrant bile peritonitis. Now s/p Laparoscopic converted to open drainage of intra-abdominal abscesses x 4, abdominal washout, gastric serosal repair, omental biopsy, liver biopsy, open cholecystostomy tube placement on 11/18. Afebrile. VSS. On 2L of NC saturating greater than 92%   cc   cc bilious output  R drain with 5 cc serobilious  L drain with 40 cc serous output  WBC 10.6 from 10.2  Hgb 9.6 from 9.2  Cr 2.97 from 2.84    Plan: Will attempt clamp trial of NG tube today  If passes clamp trial, may remove tube and advance diet as tolerated  Continue IV fluids  PRN pain meds  PRN anti nausea meds  DVT prophylaxis  F/u pathology results  May transition to level 2 stepdown    Subjective/Objective     Subjective: No acute events overnight. Patient endorses minimal abdominal pain. Endorses some mild nausea. Denies having vomiting, fevers, chills, chest pain, shortness of breath. Currently NPO with NG tube in place. No bowel movements and no flatus. Voiding without difficulty. Objective:     Blood pressure 146/65, pulse 80, temperature 98 °F (36.7 °C), temperature source Oral, resp. rate 20, height 5' 3" (1.6 m), weight 89.5 kg (197 lb 5 oz), SpO2 94 %. ,Body mass index is 34.95 kg/m². Intake/Output Summary (Last 24 hours) at 11/21/2023 0857  Last data filed at 11/21/2023 0800  Gross per 24 hour   Intake 1600 ml   Output 1315 ml   Net 285 ml       Invasive Devices       Peripheral Intravenous Line  Duration             Peripheral IV 11/21/23 Distal;Dorsal (posterior); Right Forearm <1 day    Peripheral IV 11/21/23 Distal;Left;Ventral (anterior) Forearm <1 day              Drain  Duration             Closed/Suction Drain Left LUQ Bulb 19 Fr. 2 days    Closed/Suction Drain Right RUQ Other (Comment) 12 Fr. 2 days    NG/OG/Enteral Tube Nasogastric 18 Fr Right nare 2 days    Urethral Catheter Latex 16 Fr. 2 days                    General: NAD  HENT: NCAT MMM  Neck: supple, no JVD  CV: nl rate  Lungs: nl wob. No resp distress  ABD: Soft, appropriately tender, nondistended. Incisions are clean/dry/intact. Extrem: No CCE  Neuro: AAOx3       Scheduled Meds:  Current Facility-Administered Medications   Medication Dose Route Frequency Provider Last Rate    calcium gluconate  1 g Intravenous Once Diamante Haff, DO      And    calcium gluconate  2 g Intravenous Once Diamante Haff, DO      cefazolin  2,000 mg Intravenous Q12H Brett Hastings MD 2,000 mg (11/21/23 0350)    chlorhexidine  15 mL Mouth/Throat Q12H [address] Carrie Lee PA-C      furosemide  10 mg Intravenous Daily Author Delfina PA-C      heparin (porcine)  5,000 Units Subcutaneous [facility] Eugene Victor MD      HYDROmorphone  0.5 mg Intravenous Q2H PRN Max 8/day PALAK Solis      HYDROmorphone  0.2 mg Intravenous Q2H PRN Max 8/day PALAK Solis      labetalol  10 mg Intravenous Q4H PRN Baltazar Resendez DO      metoprolol  5 mg Intravenous Q6H PRN Emma Cabezas MD      metoprolol  5 mg Intravenous Q6H Mily Metcalf MD      metroNIDAZOLE  500 mg Intravenous [address],  mg (11/21/23 1956)    multi-electrolyte  50 mL/hr Intravenous Continuous Arlester Kaia CRNP 50 mL/hr (11/20/23 1129)    ondansetron  4 mg Intravenous Q4H PRN Kimberly Hughes DO      promethazine  25 mg Intramuscular Q6H PRN Baltazar Resendez DO       Continuous Infusions:multi-electrolyte, 50 mL/hr, Last Rate: 50 mL/hr (11/20/23 1129)      PRN Meds:. HYDROmorphone    HYDROmorphone    labetalol    metoprolol    ondansetron    promethazine      Lab, Imaging and other studies:I have personally reviewed pertinent lab results.     VTE Pharmacologic Prophylaxis: Heparin  VTE Mechanical Prophylaxis: sequential compression device      Luann Flowers MD  11/21/2023 8:57 AM

## 2023-11-21 NOTE — OCCUPATIONAL THERAPY NOTE
Occupational Therapy Cancelled Session    Patient Name: Genesis Soto  OXGPV'O Date: 11/21/2023 11/21/23 9692   Note Type   Note type Cancelled Session   Cancel Reasons Patient to operating room   Additional Comments OT orders received and chart review performed. Pt admitted w/ abdominal pain. Pt currently EMMA at OR for cystoscopy w/ ureter stent placement. Will hold OT eval and follow up post-op.      Francisco Alfaro, JUAN, OTR/L  [place] License [id number]  [address] [place], [place] [id number]

## 2023-11-21 NOTE — ANESTHESIA PREPROCEDURE EVALUATION
Procedure:  CYSTOSCOPY RETROGRADE PYELOGRAM WITH INSERTION STENT URETERAL (Right: Bladder)    Relevant Problems   ANESTHESIA (within normal limits)      CARDIO   (+) Atrial fibrillation (HCC)   (+) Congestive heart failure, unspecified HF chronicity, unspecified heart failure type (HCC)   (+) Essential hypertension      ENDO (within normal limits)      GI/HEPATIC (within normal limits)      /RENAL   (+) JESSE (acute kidney injury) (HCC)   (+) Chronic kidney disease, stage 3a (HCC)   (+) Hydronephrosis      GYN (within normal limits)      HEMATOLOGY   (+) Absolute anemia   (+) Anemia   (+) Symptomatic anemia      MUSCULOSKELETAL   (+) Osteoarthritis of left hip      NEURO/PSYCH   (+) Anxiety   (+) Major depressive disorder with single episode, in full remission ([address])      PULMONARY (within normal limits)      Digestive   (+) Perforated gallbladder      Other   (+) Bile peritonitis (HCC)   (+) Carcinomatosis (HCC)        Physical Exam    Airway  Comment: NGT insitu  Mallampati score: II  TM Distance: >3 FB  Neck ROM: full     Dental   No notable dental hx     Cardiovascular  Rhythm: regular, Rate: normal, Cardiovascular exam normal    Pulmonary  Pulmonary exam normal Breath sounds clear to auscultation    Other Findings  post-pubertal.      Anesthesia Plan  ASA Score- 3     Anesthesia Type- general with ASA Monitors. Additional Monitors:     Airway Plan: ETT. Plan Factors-Exercise tolerance (METS): >4 METS. Chart reviewed. EKG reviewed. Imaging results reviewed. Existing labs reviewed. Patient summary reviewed. Induction- intravenous and rapid sequence induction. Postoperative Plan- Plan for postoperative opioid use. Informed Consent- Anesthetic plan and risks discussed with patient. I personally reviewed this patient with the CRNA. Discussed and agreed on the Anesthesia Plan with the CRNA. Estiven Castro               Recent labs personally reviewed:  Lab Results   Component Value Date WBC 10.63 (H) 11/21/2023    HGB 9.6 (L) 11/21/2023     11/21/2023     Lab Results   Component Value Date     01/19/2017    K 4.8 11/21/2023    BUN 59 (H) 11/21/2023    CREATININE 2.97 (H) 11/21/2023    GLUCOSE 129 11/29/2021     Lab Results   Component Value Date    PTT 56 (H) 11/17/2023      Lab Results   Component Value Date    INR 1.41 (H) 11/20/2023       Blood type B    Lab Results   Component Value Date    HGBA1C 5.7 (H) 12/02/2015       I, Jess Hu MD, have personally seen and evaluated the patient prior to anesthetic care. I have reviewed the pre-anesthetic record, and other medical records if appropriate to the anesthetic care. If a CRNA is involved in the case, I have reviewed the CRNA assessment, if present, and agree. Risks/benefits and alternatives discussed with patient including possible PONV, sore throat, and possibility of rare anesthetic and surgical emergencies.

## 2023-11-21 NOTE — ASSESSMENT & PLAN NOTE
Wt Readings from Last 3 Encounters:   11/21/23 89.5 kg (197 lb 5 oz)   09/14/23 85.7 kg (189 lb)   11/08/22 89.8 kg (198 lb)     Last echo March of 2022 with EF of 65%, G2DD   Currently on 4LNC   Continue home lasix 10mg IV daily   Close monitoring of fluid status   Appears euvolemic to hypervolemic currently

## 2023-11-21 NOTE — PROGRESS NOTES
Progress Note - General Surgery   [facility] Resident on Surgery Service   Conrad Sofia 78 y.o. female MRN: [id number]  Unit/Bed#: ICU 15 Encounter: [id number]    Assessment:  78 y.o. female with acute cholecystitis complicated by perforation and four quadrant bile peritonitis. Now s/p Laparoscopic converted to open drainage of intra-abdominal abscesses x 4, abdominal washout, gastric serosal repair, omental biopsy, liver biopsy, open cholecystostomy tube placement on 11/18. Vital signs stable within normal limits on room air    Urine output 1025   cc bilious output  R drain with 20 cc serous, blood-tinged  L drain with 35 from cc serosanguineous    Cr 3.02 from 2.97  Phosphorus 5.9 from 5.4  Magnesium 2.7 from 2.5  INR 1.56 from 1.45    Plan:  Clamp trial NG tube for 6 hours today  Nephrology on board, appreciate recommendations  Plan to continue IV antibiotics through 11/24  IV fluids  Maintain drains, monitor output  Encourage ambulation/out of bed, 3 times daily  Encourage incentive spirometer use, 10 times per hour  Pain control as needed  Antiemetic as needed  DVT prophylaxis-H  Follow-up pathology  Out of ICU today    Subjective/Objective     Subjective: No acute events overnight. Patient reports no acute events overnight. Patient reports some uncomfortable sensations with regards to the Mcmanus catheter. Patient reports some nausea yesterday but denies fevers chills and shortness of breath. Patient denies gas and bowel movements at this time. Objective:     Blood pressure 115/54, pulse 69, temperature (!) 97 °F (36.1 °C), resp. rate 20, height 5' 3" (1.6 m), weight 89.4 kg (197 lb), SpO2 95 %. ,Body mass index is 34.9 kg/m².       Intake/Output Summary (Last 24 hours) at 11/21/2023 1850  Last data filed at 11/21/2023 1738  Gross per 24 hour   Intake 1300 ml   Output 1595 ml   Net -295 ml       Invasive Devices       Peripheral Intravenous Line  Duration             Peripheral IV 11/21/23 Distal;Dorsal (posterior); Right Forearm <1 day    Peripheral IV 11/21/23 Distal;Left;Ventral (anterior) Forearm <1 day              Drain  Duration             Closed/Suction Drain Left LUQ Bulb 19 Fr. 3 days    Closed/Suction Drain Right RUQ Other (Comment) 12 Fr. 3 days    NG/OG/Enteral Tube Nasogastric 18 Fr Right nare 3 days    Ureteral Internal Stent Right ureter 6 Fr. <1 day    Urethral Catheter Non-latex 16 Fr. <1 day                    General: NAD  HENT: NCAT MMM  Neck: supple, no JVD  CV: nl rate  Lungs: nl wob. No resp distress  ABD: Soft, appropriately tender, nondistended. Incisions are clean/dry/intact.   Extrem: No CCE  Neuro: AAOx3       Scheduled Meds:  Current Facility-Administered Medications   Medication Dose Route Frequency Provider Last Rate    cefazolin  2,000 mg Intravenous Q12H Metro Miguel, PA-C 2,000 mg (11/21/23 1540)    chlorhexidine  15 mL Mouth/Throat Q12H [address] Katie Anabel, PA-C      furosemide  10 mg Intravenous Daily Katie Anabel, PA-C      heparin (porcine)  3-20 Units/kg/hr (Order-Specific) Intravenous Titrated Qawalangin Shaun, CRNP 11.8 Units/kg/hr (11/21/23 1821)    heparin (porcine)  2,000 Units Intravenous Q6H PRN Qawal[place]in Shaun, CRNP      heparin (porcine)  4,000 Units Intravenous Q6H PRN Qawal[place]in Shaun, CRNP      HYDROmorphone  0.5 mg Intravenous Q2H PRN Max 8/day Metro Miguel, PA-C      HYDROmorphone  0.2 mg Intravenous Q2H PRN Max 8/day Metro Miguel, PA-C      labetalol  10 mg Intravenous Q4H PRN Metro Miguel, PA-C      metoprolol  5 mg Intravenous Q6H PRN Metro Miguel, PA-C      metoprolol  5 mg Intravenous Q6H Katie Anabel, PA-C      metroNIDAZOLE  500 mg Intravenous Q8H Katie Anabel, PA-C 500 mg (11/21/23 1649)    multi-electrolyte  75 mL/hr Intravenous Continuous Ade Borso Sukhwinder, CRNP 75 mL/hr (11/21/23 1602)    ondansetron  4 mg Intravenous Q4H PRN Kristen Rivera PA-C      promethazine  25 mg Intramuscular Q6H PRN [place] JEFFREY Blackwood      warfarin  5 mg Oral Daily (warfarin) PALAK Parish       Continuous Infusions:heparin (porcine), 3-20 Units/kg/hr (Order-Specific), Last Rate: 11.8 Units/kg/hr (11/21/23 1821)  multi-electrolyte, 75 mL/hr, Last Rate: 75 mL/hr (11/21/23 1602)      PRN Meds:.  heparin (porcine)    heparin (porcine)    HYDROmorphone    HYDROmorphone    labetalol    metoprolol    ondansetron    promethazine      Lab, Imaging and other studies:I have personally reviewed pertinent lab results.     VTE Pharmacologic Prophylaxis: Heparin  VTE Mechanical Prophylaxis: sequential compression device    Ben Ricks MD  11/21/2023 6:50 PM

## 2023-11-21 NOTE — PROGRESS NOTES
[address] NOTE   Solon Schaumann 78 y.o. female MRN: [id number]  Unit/Bed#: OR Velma Encounter: [id number]  Reason for Consult: Acute kidney injury    ASSESSMENT and PLAN:  78-year-old female with a history of atrial fibrillation on Coumadin, hypertension, UTIs who presented with abdominal pain for 2 to 3 weeks and found to have cholecystitis with perforation and peritonitis. Nephrology was consulted for management of acute kidney injury. Acute kidney injury(POA):  Etiology:   Obstructive uropathy, intraoperative hemodynamic perturbations, hypotension/relative hypotension in the context of acute illness which may have progressed to ATN. Mcmanus catheter in place. Urine output 745 mL the last 24 hours  NG tube in place. Remains n.p.o. Baseline unclear. Creatinine 0.8 mg/dL as of September 2022   Creatinine 1.67 on admission increasing and fluctuating near 2 to 2.2 mg/dL and subsequently increasing up to 2.54 on 11/20. Renal ultrasound shows persistent right moderate hydroureteronephrosis  1/21: Taken to the OR for stent placement  CT without contrast imaging: Moderate right hydroureteronephrosis. Renal ultrasound: Normal echogenicity. Stable moderate hydronephrosis right kidney. No hydronephrosis left kidney  Urinalysis: 1+ protein, large blood, hematuria, leukocyturia, 10-25 hyaline casts. Innumerable epithelial cells  Plan:  Continue IV fluids: Currently on Plasma-Lyte 50 mL/h. .  Increase rate to 75 mL/h. Patient looks dry. Avoid hypotension. Maintain MAP greater than 65  Mcmanus catheter, strict monitoring of intake and output  Avoid nephrotoxic agents  Discussion regarding possible need for hemodialysis on 11/20. It appears that they are awaiting biopsy results to make further decisions regarding goals of care.     Obstructive uropathy:  Ureteral stricture  Urology following  Status post stent placement right ureteral stricture 11/21    Cholecystitis:  Complicated by perforation and peritonitis  Postop day 3 drainage of intra-abdominal abscess, washout, gastric serosal repair, omental biopsy, liver biopsy and open cholecystostomy tube placement  Surgery following    Volume status/hypertension:  Appears dry, increase IV fluids  Appears fairly euvolemic on physical exam.  No shortness of breath. On nasal cannula oxygen  Blood pressure acceptable  Patient reports that her usual blood pressure runs closer to 122 systolic. Acid-base/electrolytes:  Acceptable  Bicarbonate down to 19. May need to switch to bicarbonate containing IV fluids if no improvement. Will discuss with Dr. Pollo Savage    Anemia:  Management per primary team  Hemoglobin 9.6, stable  Transfuse for hemoglobin less than 7    Hyperphosphatemia: Phosphorus 5.4, increasing. Monitor    History of recurrent UTI: Improved after patient began using estrogen cream    DISPOSITION:  Check labs in the a.m. Continue IV fluids. SUBJECTIVE / 24H INTERVAL HISTORY:  Seen postoperatively. Complaining of some pressure in her lower abdominal area. Also complains of thirst.  Mouth extremely dry. Difficulty speaking until she was able to moisten her mouth and we had a long conversation. Her daughter was present. We were discussing outpatient labs and I clarified with them that all her labs were basically INR is since last September 2022 when renal function was checked. Persistent hydronephrosis due to right ureteral stricture. Status post stent placement.     OBJECTIVE:  Current Weight: Weight - Scale: 89.4 kg (197 lb)  Vitals:    11/21/23 1130 11/21/23 1445 11/21/23 1500 11/21/23 1520   BP: 126/58 131/61 138/62 139/61   BP Location:       Pulse: 86 81 84 88   Resp: 18 18 18 18   Temp: 98.4 °F (36.9 °C) (!) 97 °F (36.1 °C)     TempSrc:       SpO2: 96% 97% 99% 99%   Weight:       Height:           Intake/Output Summary (Last 24 hours) at 11/21/2023 1526  Last data filed at 11/21/2023 1507  Gross per 24 hour   Intake 1700 ml   Output 1755 ml   Net -55 ml       General: NAD  Skin: no rash. Skin warm and dry  Eyes: anicteric sclera  ENT: moist mucous membrane  Neck: supple, no JVD  Chest: Normal effort. Equal breath sounds. Chest clear anteriorly  CVS: s1s2, no murmur, no gallop, no rub. Normal rate, irregular rhythm  Abdomen: Abdomen soft, slightly distended  Extremities: 0 to trace edema LE b/l.   Extremities warm  : Mcmanus catheter in place with slightly bloody urine  Neuro: AAOX3  Psych: normal affect   Medications:    Current Facility-Administered Medications:     [MAR Hold] ceFAZolin (ANCEF) IVPB (premix in dextrose) 2,000 mg 50 mL, 2,000 mg, Intravenous, Q12H, Kasey Rowell MD, Last Rate: 100 mL/hr at 11/21/23 0350, 2,000 mL at 11/21/23 1406    [MAR Hold] chlorhexidine (PERIDEX) 0.12 % oral rinse 15 mL, 15 mL, Mouth/Throat, Q12H [facility], Paul Mckinley PA-C, 15 mL at 11/21/23 0806    diphenhydrAMINE (BENADRYL) injection 12.5 mg, 12.5 mg, Intravenous, Once PRN, Rich Gaffney MD    fentaNYL (SUBLIMAZE) injection 50 mcg, 50 mcg, Intravenous, Q3 min PRN, Rich Gaffney MD    [MAR Hold] furosemide (LASIX) injection 10 mg, 10 mg, Intravenous, Daily, Katie Little PA-C, 10 mg at 11/21/23 0806    [facility] Hold] heparin (porcine) subcutaneous injection 5,000 Units, 5,000 Units, Subcutaneous, Q8H [facility], Kailyn Young MD, 5,000 Units at 11/21/23 0538    [facility] Hold] HYDROmorphone (DILAUDID) injection 0.5 mg, 0.5 mg, Intravenous, Q2H PRN Max 8/day, Ng Torey, CRNP, 0.5 mg at 11/21/23 0701    HYDROmorphone (DILAUDID) injection 0.5 mg, 0.5 mg, Intravenous, Q5 Min PRN, Rich Gaffney MD    [facility] Hold] HYDROmorphone HCl (DILAUDID) injection 0.2 mg, 0.2 mg, Intravenous, Q2H PRN Max 8/day, Ng Torey, PALAK, 0.2 mg at 11/21/23 0350    HYDROmorphone HCl (DILAUDID) injection 0.2 mg, 0.2 mg, Intravenous, Q5 Min PRN, Rich Gaffney MD    [MAR Hold] labetalol (NORMODYNE) injection 10 mg, 10 mg, Intravenous, Q4H PRN, Sebastian Yan, DO lidocaine (PF) (XYLOCAINE-MPF) 1 % injection 0.5 mL, 0.5 mL, Infiltration, Once PRN, Tony Vu MD    metoclopramide (REGLAN) injection 10 mg, 10 mg, Intravenous, Once PRN, Tony Vu MD    [MAR Hold] metoprolol (LOPRESSOR) injection 5 mg, 5 mg, Intravenous, Q6H PRN, Ca Luis MD, 5 mg at 11/20/23 0402    [MAR Hold] metoprolol (LOPRESSOR) injection 5 mg, 5 mg, Intravenous, Q6H, Lawrence Lee MD, 5 mg at 11/21/23 0921    [MAR Hold] metroNIDAZOLE (FLAGYL) IVPB (premix) 500 mg 100 mL, 500 mg, Intravenous, Q8H, Raymundo Scott MD, Last Rate: 200 mL/hr at 11/21/23 1356, New Bag at 11/21/23 1432    multi-electrolyte (PLASMALYTE-A/ISOLYTE-S PH 7.4) IV solution, 50 mL/hr, Intravenous, Continuous, PALAK Hayden, Last Rate: 50 mL/hr at 11/20/23 1129, 50 mL/hr at 11/20/23 1129    [MAR Hold] ondansetron (ZOFRAN) injection 4 mg, 4 mg, Intravenous, Q4H PRN, Beto Resendez DO, 4 mg at 11/19/23 1851    [MAR Hold] promethazine (PHENERGAN) injection 25 mg, 25 mg, Intramuscular, Q6H PRN, Ese Sam DO, 25 mg at 11/17/23 2249    Laboratory Results:  Results from last 7 days   Lab Units 11/21/23  0425 11/20/23  1456 11/20/23  0448 11/19/23  0532 11/18/23  2145 11/18/23  1255 11/18/23  0455 11/17/23  1117   WBC Thousand/uL 10.63*  --  10.28* 11.08* 10.13  --  13.24* 6.14   HEMOGLOBIN g/dL 9.6*  --  9.2* 9.6* 9.5*  --  10.5* 12.2   HEMATOCRIT % 32.3*  --  29.6* 31.2* 30.8*  --  33.7* 39.2   PLATELETS Thousands/uL 326  --  310 319 308  --  381 413*   POTASSIUM mmol/L 4.8 4.4 4.0 3.9 3.8 4.3 4.2 3.8   CHLORIDE mmol/L 103 104 104 103 104 98 99 97   CO2 mmol/L 19* 20* 21 23 22 25 26 26   BUN mg/dL 59* 54* 48* 41* 40* 41* 37* 27*   CREATININE mg/dL 2.97* 2.84* 2.54* 1.97* 1.99* 2.19* 2.06* 1.67*   CALCIUM mg/dL 8.3* 8.6 8.1* 8.0* 7.8* 8.8 8.4 9.2   MAGNESIUM mg/dL  --   --  2.5 2.5 2.4 2.5 1.6*  --    PHOSPHORUS mg/dL  --   --  5.4* 5.2* 4.7* 4.7* 4.3*  --

## 2023-11-21 NOTE — PROGRESS NOTES
[facility]  Progress Note  Name: Edel Villalobos  MRN: [id number]  Unit/Bed#: ICU 14 I Date of Admission: 11/17/2023   Date of Service: 11/21/2023 I Hospital Day: 4    Assessment/Plan   Major depressive disorder with single episode, in full remission [facility])  Assessment & Plan  Holding PTA Lexapro 10mg while NPO, restart as able    Anxiety  Assessment & Plan  Pt takes xanax 0.25 mg TID PRN at home  Currently holding given NPO, continue to monitor for s/sx of anxiety and withdrawl    Congestive heart failure, unspecified HF chronicity, unspecified heart failure type [facility])  Assessment & Plan  Wt Readings from Last 3 Encounters:   11/21/23 89.5 kg (197 lb 5 oz)   09/14/23 85.7 kg (189 lb)   11/08/22 89.8 kg (198 lb)     Last echo March of 2022 with EF of 65%, G2DD   Currently on 4LNC   Continue home lasix 20mg PO daily   Close monitoring of fluid status           Atrial fibrillation ([address])  Assessment & Plan  Currently rate controlled, hold PTA PO lopressor for now while NPO  Will start IV Lopressor 5mg BID given elevated HR in 120s this AM in a fib and have a PRN dose available as well  Holding PTA Coumadin, restart when appropriate     Essential hypertension  Assessment & Plan  Continue home lasix 20mg PO daily   Holding all other PTA antihypertensives given concern for possible hemodynamic instability, restart as appropriate   Home meds  Amlodipine 5mg daily, lisinopril 5mg daily, lopressor 25mg bid     * Bile peritonitis ([address])  Assessment & Plan  Pt now s/p ex lap with drainage of 4 quadrant bile peritonitis with associated purulence likely 2/2 to previously perforated gallbladder that had sealed off with omentum prior to operation, drainage of subphrenic and subhepatic abscess, peritoneal lavage, omental biopsy, liver biopsy, repair of umbilical hernia   Planned lap sachin was converted to open when number of quadrants involved became clear, IR was called in to place cholecystotomy tube for source control and drainage  Biopsies, cytology, and cultures taken - f/u   Manage drain output   Continue Flagyl 500mg q8 and ancef 2g BID   Multimodal pain control - scheduled IV tylenol and IV PRN dilaudid 0.2mg/0.5mg   Continue NPO status  Continue protonix 40mg - change to daily from BID   Continue PRN zofran  Palliative consult placed     Hydronephrosis  Assessment & Plan  R sided hydronephrosis  Repeat imaging via kidney bladder US on Tuesday   Urology following- appreciate eval and recs  Non-surgical mgmt currently   Continue pursuing medical mgmt     Chronic kidney disease, stage 3a [facility])  Assessment & Plan  Lab Results   Component Value Date    EGFR 13 11/21/2023    EGFR 14 11/20/2023    EGFR 16 11/20/2023    CREATININE 2.97 (H) 11/21/2023    CREATININE 2.84 (H) 11/20/2023    CREATININE 2.54 (H) 11/20/2023     JESSE on CKD3A, likely post renal due to R hydronephrosis as well as pre-renal component given hypovolemia due to NPO status   Continue IVF Isolyte @ 50 while patient NPO   Trend labs and manage electrolytes PRN  Nephrology and urology following   Continue to monitor I's/O's    Carcinomatosis [facility])  Assessment & Plan  Suspected based on operative findings   F/u fluid and biopsy pathology - all pending as of 11/20 AM  Continue multimodal pain control   Palliative consult placed    Anemia  Assessment & Plan  Continue to monitor H and H   Transfuse for hgb <7  Likely multifactorial given critical illness and recent surgery, no obvious signs of bleeding currently              Disposition: Stepdown Level 1    ICU Core Measures     A: Assess, Prevent, and Manage Pain Has pain been assessed? Yes  Need for changes to pain regimen? No   B: Both SAT/SAT  N/A   C: Choice of Sedation RASS Goal: 0 Alert and Calm  Need for changes to sedation or analgesia regimen? No   D: Delirium CAM-ICU: Negative   E: Early Mobility  Plan for early mobility? Yes   F: Family Engagement Plan for family engagement today?  Yes Antibiotic Review: Awaiting culture results. Review of Invasive Devices: Mcmanus Plan: Mcmanus placed by urology. Removal plans per their team        Prophylaxis:  VTE VTE covered by:  heparin (porcine), Subcutaneous, 5,000 Units at 11/21/23 0538       Stress Ulcer  covered bypantoprazole (PROTONIX) EC tablet 40 mg [[id number]]         Significant 24hr Events     24hr events:   - Worsening JESSE  - Required additional dilaudid dose for pain      Subjective   Review of Systems   Constitutional:  Negative for chills and fever. HENT:  Negative for ear pain and sore throat. Eyes:  Negative for pain and visual disturbance. Respiratory:  Negative for cough and shortness of breath. Cardiovascular:  Negative for chest pain and palpitations. Gastrointestinal:  Negative for abdominal pain and vomiting. Genitourinary:  Negative for dysuria and hematuria. Musculoskeletal:  Negative for arthralgias and back pain. Skin:  Negative for color change and rash. Neurological:  Negative for seizures and syncope. All other systems reviewed and are negative. Objective                            Vitals I/O      Most Recent Min/Max in 24hrs   Temp 97.9 °F (36.6 °C) Temp  Min: 97.9 °F (36.6 °C)  Max: 98.4 °F (36.9 °C)   Pulse 84 Pulse  Min: 69  Max: 122   Resp 17 Resp  Min: 12  Max: 30   /65 BP  Min: 104/68  Max: 146/65   O2 Sat 98 % SpO2  Min: 92 %  Max: 99 %      Intake/Output Summary (Last 24 hours) at 11/21/2023 0628  Last data filed at 11/21/2023 0358  Gross per 24 hour   Intake 1666.67 ml   Output 1165 ml   Net 501.67 ml       Diet NPO    Invasive Monitoring           Physical Exam   Physical Exam  Vitals and nursing note reviewed. Eyes:      General: Vision grossly intact. Extraocular Movements: Extraocular movements intact. Conjunctiva/sclera: Conjunctivae normal.      Pupils: Pupils are equal, round, and reactive to light. Skin:     General: Skin is warm.    HENT:      Head: Normocephalic and atraumatic. Right Ear: Tympanic membrane normal.      Left Ear: Tympanic membrane normal.      Mouth/Throat:      Mouth: Mucous membranes are moist.   Neck:   no midline tenderness Cardiovascular:      Rate and Rhythm: Normal rate and regular rhythm. Heart sounds: Normal heart sounds. Musculoskeletal:         General: Normal range of motion. Right lower leg: No edema. Left lower leg: No edema. Abdominal: General: A surgical scar is present. Bowel sounds are normal. There is no distension. Palpations: Abdomen is soft. Tenderness: There is no abdominal tenderness. Comments: Incision is c/d/I     Soft ntnd  LUQ - serous   RUQ- dark bilious   NG tube- sanjay bilious    Constitutional:       General: She is not in acute distress. Appearance: She is well-developed and well-nourished. She is not ill-appearing or toxic-appearing. Interventions: She is not sedated and not restrained. Pulmonary:      Effort: Pulmonary effort is normal.      Breath sounds: Normal breath sounds. Psychiatric:         Mood and Affect: Mood and affect normal.   Neurological:      General: No focal deficit present. Mental Status: She is alert and oriented to person, place and time. Mental status is at baseline. She is CAM ICU negative. Cranial Nerves: No dysarthria or facial asymmetry. Sensory: Sensation is intact. Motor: gross motor function is at baseline for patient. Strength full and intact in all extremities. Diagnostic Studies      EKG: Telemetry reviewed- no acute events   Imaging: US- I have personally reviewed pertinent reports.        Medications:  Scheduled PRN   acetaminophen, 975 mg, Q8H [address]  cefazolin, 2,000 mg, Q12H  chlorhexidine, 15 mL, Q12H FLORENTIN  escitalopram, 10 mg, Daily  furosemide, 10 mg, Daily  heparin (porcine), 5,000 Units, Q8H FLORENTIN  metoprolol, 5 mg, Q6H  metroNIDAZOLE, 500 mg, Q8H  pantoprazole, 40 mg, Daily      HYDROmorphone, 0.5 mg, Q2H PRN Max 8/day  HYDROmorphone, 0.2 mg, Q2H PRN Max 8/day  labetalol, 10 mg, Q4H PRN  metoprolol, 5 mg, Q6H PRN  ondansetron, 4 mg, Q4H PRN  promethazine, 25 mg, Q6H PRN       Continuous    multi-electrolyte, 50 mL/hr, Last Rate: 50 mL/hr (11/20/23 1129)         Labs:    CBC    Recent Labs     11/20/23 0448 11/21/23  0425   WBC 10.28* 10.63*   HGB 9.2* 9.6*   HCT 29.6* 32.3*    326     BMP    Recent Labs     11/20/23  1456 11/21/23  0425   SODIUM 137 135   K 4.4 4.8    103   CO2 20* 19*   AGAP 13 13   BUN 54* 59*   CREATININE 2.84* 2.97*   CALCIUM 8.6 8.3*       Coags    Recent Labs     11/20/23 0448   INR 1.41*        Additional Electrolytes  Recent Labs     11/20/23  0448 11/21/23  0425   MG 2.5  --    PHOS 5.4*  --    CAIONIZED 1.05* 0.94*          Blood Gas    No recent results  No recent results LFTs  Recent Labs     11/20/23 0448   ALT <3*   AST 12*   ALKPHOS 101   ALB 2.4*   TBILI 0.49       Infectious  No recent results  Glucose  Recent Labs     11/20/23  0448 11/20/23  1456 11/21/23  0425   GLUC 92 98 81               Critical Care Time Delivered : Upon my evaluation, this patient had a high probability of imminent or life-threatening deterioration due to JESSE ON CKD, Carcinomatosis, which required my direct attention, intervention, and personal management. I have personally provided 37 minutes of critical care time, exclusive of procedures, teaching, family meetings, and any prior time recorded by providers other than myself.      Guanaco Soto PA-C

## 2023-11-22 NOTE — PLAN OF CARE
Problem: Prexisting or High Potential for Compromised Skin Integrity  Goal: Skin integrity is maintained or improved  Description: INTERVENTIONS:  - Identify patients at risk for skin breakdown  - Assess and monitor skin integrity  - Assess and monitor nutrition and hydration status  - Monitor labs   - Assess for incontinence   - Turn and reposition patient  - Assist with mobility/ambulation  - Relieve pressure over bony prominences  - Avoid friction and shearing  - Provide appropriate hygiene as needed including keeping skin clean and dry  - Evaluate need for skin moisturizer/barrier cream  - Collaborate with interdisciplinary team   - Patient/family teaching  - Consider wound care consult   Outcome: Progressing     Problem: PAIN - ADULT  Goal: Verbalizes/displays adequate comfort level or baseline comfort level  Description: Interventions:  - Encourage patient to monitor pain and request assistance  - Assess pain using appropriate pain scale  - Administer analgesics based on type and severity of pain and evaluate response  - Implement non-pharmacological measures as appropriate and evaluate response  - Consider cultural and social influences on pain and pain management  - Notify physician/advanced practitioner if interventions unsuccessful or patient reports new pain  Outcome: Progressing     Problem: INFECTION - ADULT  Goal: Absence or prevention of progression during hospitalization  Description: INTERVENTIONS:  - Assess and monitor for signs and symptoms of infection  - Monitor lab/diagnostic results  - Monitor all insertion sites, i.e. indwelling lines, tubes, and drains  - Monitor endotracheal if appropriate and nasal secretions for changes in amount and color  - [place] appropriate cooling/warming therapies per order  - Administer medications as ordered  - Instruct and encourage patient and family to use good hand hygiene technique  - Identify and instruct in appropriate isolation precautions for identified infection/condition  Outcome: Progressing  Goal: Absence of fever/infection during neutropenic period  Description: INTERVENTIONS:  - Monitor WBC    Outcome: Progressing     Problem: SAFETY ADULT  Goal: Patient will remain free of falls  Description: INTERVENTIONS:  - Educate patient/family on patient safety including physical limitations  - Instruct patient to call for assistance with activity   - Consult OT/PT to assist with strengthening/mobility   - Keep Call bell within reach  - Keep bed low and locked with side rails adjusted as appropriate  - Keep care items and personal belongings within reach  - Initiate and maintain comfort rounds  - Make Fall Risk Sign visible to staff  - Offer Toileting every 2 Hours, in advance of need  - Initiate/Maintain alarm  - Obtain necessary fall risk management equipment:   - Apply yellow socks and bracelet for high fall risk patients  - Consider moving patient to room near nurses station  Outcome: Progressing  Goal: Maintain or return to baseline ADL function  Description: INTERVENTIONS:  -  Assess patient's ability to carry out ADLs; assess patient's baseline for ADL function and identify physical deficits which impact ability to perform ADLs (bathing, care of mouth/teeth, toileting, grooming, dressing, etc.)  - Assess/evaluate cause of self-care deficits   - Assess range of motion  - Assess patient's mobility; develop plan if impaired  - Assess patient's need for assistive devices and provide as appropriate  - Encourage maximum independence but intervene and supervise when necessary  - Involve family in performance of ADLs  - Assess for home care needs following discharge   - Consider OT consult to assist with ADL evaluation and planning for discharge  - Provide patient education as appropriate  Outcome: Progressing  Goal: Maintains/Returns to pre admission functional level  Description: INTERVENTIONS:  - Perform AM-PAC 6 Click Basic Mobility/ Daily Activity assessment daily.  - Set and communicate daily mobility goal to care team and patient/family/caregiver. - Collaborate with rehabilitation services on mobility goals if consulted  - Perform Range of Motion 3 times a day. - Reposition patient every 2 hours. - Dangle patient 3 times a day  - Stand patient 3 times a day  - Ambulate patient 3 times a day  - Out of bed to chair 3 times a day   - Out of bed for meals 3 times a day  - Out of bed for toileting  - Record patient progress and toleration of activity level   Outcome: Progressing     Problem: DISCHARGE PLANNING  Goal: Discharge to home or other facility with appropriate resources  Description: INTERVENTIONS:  - Identify barriers to discharge w/patient and caregiver  - Arrange for needed discharge resources and transportation as appropriate  - Identify discharge learning needs (meds, wound care, etc.)  - Arrange for interpretive services to assist at discharge as needed  - Refer to Case Management Department for coordinating discharge planning if the patient needs post-hospital services based on physician/advanced practitioner order or complex needs related to functional status, cognitive ability, or social support system  Outcome: Progressing     Problem: Knowledge Deficit  Goal: Patient/family/caregiver demonstrates understanding of disease process, treatment plan, medications, and discharge instructions  Description: Complete learning assessment and assess knowledge base. Interventions:  - Provide teaching at level of understanding  - Provide teaching via preferred learning methods  Outcome: Progressing     Problem: Nutrition/Hydration-ADULT  Goal: Nutrient/Hydration intake appropriate for improving, restoring or maintaining nutritional needs  Description: Monitor and assess patient's nutrition/hydration status for malnutrition. Collaborate with interdisciplinary team and initiate plan and interventions as ordered.   Monitor patient's weight and dietary intake as ordered or per policy. Utilize nutrition screening tool and intervene as necessary. Determine patient's food preferences and provide high-protein, high-caloric foods as appropriate.      INTERVENTIONS:  - Monitor oral intake, urinary output, labs, and treatment plans  - Assess nutrition and hydration status and recommend course of action  - Evaluate amount of meals eaten  - Assist patient with eating if necessary   - Allow adequate time for meals  - Recommend/ encourage appropriate diets, oral nutritional supplements, and vitamin/mineral supplements  - Order, calculate, and assess calorie counts as needed  - Recommend, monitor, and adjust tube feedings and TPN/PPN based on assessed needs  - Assess need for intravenous fluids  - Provide specific nutrition/hydration education as appropriate  - Include patient/family/caregiver in decisions related to nutrition  Outcome: Progressing

## 2023-11-22 NOTE — PLAN OF CARE
Problem: Nutrition/Hydration-ADULT  Goal: Nutrient/Hydration intake appropriate for improving, restoring or maintaining nutritional needs  Description: Monitor and assess patient's nutrition/hydration status for malnutrition. Collaborate with interdisciplinary team and initiate plan and interventions as ordered. Monitor patient's weight and dietary intake as ordered or per policy. Utilize nutrition screening tool and intervene as necessary. Determine patient's food preferences and provide high-protein, high-caloric foods as appropriate.      INTERVENTIONS:  - Monitor oral intake, urinary output, labs, and treatment plans  - Assess nutrition and hydration status and recommend course of action  - Evaluate amount of meals eaten  - Assist patient with eating if necessary   - Allow adequate time for meals  - Recommend/ encourage appropriate diets, oral nutritional supplements, and vitamin/mineral supplements  - Order, calculate, and assess calorie counts as needed  - Recommend, monitor, and adjust tube feedings and TPN/PPN based on assessed needs  - Assess need for intravenous fluids  - Provide specific nutrition/hydration education as appropriate  - Include patient/family/caregiver in decisions related to nutrition  Outcome: Not Progressing   NPO

## 2023-11-22 NOTE — PHYSICAL THERAPY NOTE
PHYSICAL THERAPY TREATMENT NOTE    Patient Name: Liane Baez  XMCGA'Z Date: 11/22/2023 11/22/23 1432   PT Last Visit   PT Visit Date 11/22/23   Pain Assessment   Pain Assessment Tool 0-10   Pain Score 7   Pain Location/Orientation Location: Abdomen   Hospital Pain Intervention(s) Repositioned; Ambulation/increased activity; Other (Comment)  (notified Jocelynn Carrasco)   Restrictions/Precautions   Other Precautions Chair Alarm; Bed Alarm;Multiple lines;Telemetry; Fall Risk;Pain  (NG tube)   General   Chart Reviewed Yes   Additional Pertinent History 11/21/2 CYSTOSCOPY RETROGRADE PYELOGRAM WITH INSERTION STENT URETERAL. Family/Caregiver Present No   Cognition   Arousal/Participation Alert   Attention Attends with cues to redirect   Orientation Level Oriented to person; Other (Comment)  (pt was identifeid w/ full name, birth date)   Following Commands Follows one step commands without difficulty   Comments pt stated feeling lightheaded w/ sitting up. sitting blood pressure 92/49, 2nd sitting blood pressure 108/44. return to supine 128/96, follow up supine 114/53. Subjective   Subjective pt seen supine in bed. agreed to PT session. states having abdominal pain. Bed Mobility   Rolling L 2  Maximal assistance   Additional items Assist x 1;Bedrails; Increased time required;LE management   Supine to Sit 2  Maximal assistance   Additional items Assist x 2;HOB elevated; Bedrails; Increased time required;Verbal cues;LE management  (for bedrail use)   Sit to Supine 2  Maximal assistance   Additional items Assist x 2;HOB elevated; Increased time required;Verbal cues;LE management  (for trunk/LE positioning)   Additional Comments pt seated edge of bed requiring standing to min assist.   Transfers   Sit to Stand 2  Maximal assistance   Additional items Assist x 2; Increased time required;Verbal cues  (for LE positioning)   Stand to Sit 2  Maximal assistance   Additional items Assist x 2; Increased time required;Verbal cues  (for body positioning)   Stand pivot Unable to assess   Additional Comments pt stood approximately 20 seconds w/ roller walker and maxx2. additional standing was not possible due to fatigue and pain. pt was unable to weight shift or mobilize to bedside chair. pt declined additional attempts at mobilization. Ambulation/Elevation   Gait pattern Not appropriate   Assistive Device Rolling walker   Balance   Static Sitting Fair -  (to poor+)   Static Standing Zero  (assist x2, w/ roller walker)   Ambulatory Zero   Activity Tolerance   Activity Tolerance Patient limited by fatigue;Patient limited by pain   Nurse Made [facility]   Assessment   Problem List Decreased strength;Decreased endurance; Impaired balance;Decreased mobility; Decreased cognition; Impaired judgement;Obesity; Decreased skin integrity;Pain;Decreased safety awareness   Assessment pt shows decline in mobility status from previous session w/ increased level of assist needed to mobilize safely and decreased activity tolerance. input was needed for task focus and mobility technique/safety. pt remains at risk for falls and continued inpatient PT is needed to reduce fall risk and progress mobility training as appropriate. Goals   Patient Goals go home   STG Expiration Date 11/30/23   Short Term Goal #1 pt will be able to: 1. Demonstrate ability to perform all aspects of bed mobility independently to improve functional safety. 2. Perform functional transfers independently to facilitate safe return to previous living environment. 3.  Ambulate 50 ft with LRAD independently with stable vitals to improve safety with household distances and reduce fall risk. 4. Improve LE strength grades by 1 to increase ease of functional mobility with transfers and gait. 5. Pt will demonstrate improved balance by one grade in order to decrease risk of falls.  6. Pt will tolerate 3 hours OOB in chair to promote upright posture and positional tolerance. PT Treatment Day 1   Plan   Treatment/Interventions Functional transfer training;LE strengthening/ROM; Elevations; Therapeutic exercise;Cognitive reorientation;Patient/family training;Equipment eval/education; Bed mobility;Gait training   Progress Slow progress, decreased activity tolerance   PT Frequency 3-5x/wk   Discharge Recommendation   Rehab Resource Intensity Level, PT II (Moderate Resource Intensity)   AM-PAC Basic Mobility Inpatient   Turning in Flat Bed Without Bedrails 2   Lying on Back to Sitting on Edge of Flat Bed Without Bedrails 1   Moving Bed to Chair 1   Standing Up From Chair Using Arms 1   Walk in Room 1   Climb 3-5 Stairs With Railing 1   Basic Mobility Inpatient Raw Score 7   Turning Head Towards Sound 4   Follow Simple Instructions 3   Low Function Basic Mobility Raw Score  14   Low Function Basic Mobility Standardized Score  22.01   Highest Level Of Mobility   JH-HLM Goal 2: Bed activities/Dependent transfer   JH-HLM Achieved 3: Sit at edge of bed   End of Consult   Patient Position at End of Consult Supine;Bed/Chair alarm activated; All needs within reach     The patient's AM-PAC Basic Mobility Inpatient Short Form Raw Score is 7. A Raw score of less than or equal to 16 suggests the patient may benefit from discharge to post-acute rehabilitation services. Please also refer to the recommendation of the Physical Therapist for safe discharge planning. Pt requires PT/OT co-treat due to signficant assistance with mobility and cognitive-behavioral impairments. Skilled inpatient PT recommended while in hospital to progress pt toward treatment goals.     Cesar Iglesias, PT

## 2023-11-22 NOTE — OCCUPATIONAL THERAPY NOTE
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[address])    Past Medical History  Past Medical History:   Diagnosis Date    Atrial fibrillation ([address])     Hypertension     Insect bite of scalp 12/16/2021    Nasal dryness 12/15/2021    UTI (urinary tract infection)      Past Surgical History  Past Surgical History:   Procedure Laterality Date    CHOLECYSTECTOMY LAPAROSCOPIC N/A 11/18/2023    Procedure: LAPAROSCOPIC CONVERTED TO OPEN EXPLORATORY LAPAROTOMY, DRAINAGE OF ABDOMINAL ABSCESS x4, PERITONEAL LAVAGE, PARTIAL OMENTECTOMY, LIVER BIOPSY, OPEN CHOLECYSTOSCOPY AND DRAIN PLACEMENT, UMBILICAL HERNIA REPAIR;  Surgeon: Amy Galloway DO;  Location: [facility];  Service: General    EYE SURGERY      FL RETROGRADE PYELOGRAM  11/21/2023    JOINT REPLACEMENT Right     Knee 11/15/19, Hip 8/2021    IL CYSTO BLADDER W/URETERAL CATHETERIZATION Right 11/21/2023    Procedure: CYSTOSCOPY RETROGRADE PYELOGRAM WITH INSERTION STENT URETERAL;  Surgeon: Iva Fay MD;  Location[facility];  Service: Urology         11/22/23 1431   OT Last Visit   OT Visit Date 11/22/23   Note Type   Note type Evaluation   Pain Assessment   Pain Assessment Tool 0-10   Pain Score 7   Pain Location/Orientation Location: Abdomen   Pain Onset/Description Onset: Ongoing; Descriptor: Aching   Effect of Pain on Daily Activities limits activity tolerance, comfort, speed of ADLs/mobility   Patient's Stated Pain Goal No pain   Hospital Pain Intervention(s) Repositioned; Ambulation/increased activity; Emotional support Restrictions/Precautions   Weight Bearing Precautions Per Order No   Other Precautions Chair Alarm; Bed Alarm;Cognitive;Multiple lines; Fall Risk;Pain  (NG clamped, ADILIA drains x 2, Choly tube)   Home Living   Type of [address] [place] [address] [address] One level;Performs ADLs on one level; Able to live on main level with bedroom/bathroom; Laundry in basement;Ramped entrance   Bathroom Shower/Tub Tub/shower unit   [employer] Raised  (BSC over toilet)   Bathroom Equipment Tub transfer bench;Commode;Grab bars in shower   [address] Walker;Cane  (rollator)   Prior Function   Level of [place] Independent with ADLs; Independent with functional mobility; Needs assistance with IADLS   Lives With Alone   Receives Help From Family  (sees daughter 2x/week)   IADLs Family/Friend/Other provides transportation; Family/Friend/Other provides meals; Family/Friend/Other provides medication management  (daughter leaves frozen meals, has transport to appointments from daughter)   Falls in the last 6 months 0   Vocational Retired   Comments Pt reports she is (I) w/ ADLs PTA, uses RW for mobility. Has stair glide to enter basement for laundry. Daughter assists w/ IADLs   Lifestyle   Autonomy Pt lives alone in a 1 level house and is (I) w/ ADLs PTA, RW, (-) falls, (+)  short distances   Reciprocal Relationships Supportive local daughter   Service to Others Retired   Intrinsic Gratification Pt enjoys watching TV   General   Additional Pertinent History Pt admitted w/ abdominal pain, POD # 1 Cystoscopy retrograde pyelogram w/ insertion stent ureteral. PMH includes: HTN, anemia, anxiety, A-fib, CHF, CKD   Family/Caregiver Present No   Subjective   Subjective "I'm sorry for not doing good"   ADL   Where Assessed Edge of bed   Eating Assistance 5  Supervision/Setup   Eating Deficit Setup; Other (Comment)  (use of sponge)   Grooming Assistance 4  Minimal Assistance   UB Bathing Assistance 3  Moderate Assistance   LB Bathing Assistance 1  Total Assistance   UB Dressing Assistance 3  Moderate Assistance   LB Dressing Assistance 1  Total Assistance   Toileting Assistance  1  Total Assistance   Bed Mobility   Rolling L 2  Maximal assistance   Additional items Assist x 1;Bedrails; Increased time required;Verbal cues;LE management   Supine to Sit 2  Maximal assistance   Additional items Assist x 2;Bedrails;HOB elevated; Increased time required;LE management;Verbal cues   Sit to Supine 2  Maximal assistance   Additional items Assist x 2; Increased time required;Verbal cues;LE management   Additional Comments Able to tolerate approx 5 minutes sitting EOB w/ S. Limited by lightheadedness/nausea at bedside. BP 92/49 then 108/44 after 2 minutes. Transfers   Sit to Stand 2  Maximal assistance   Additional items Assist x 2; Increased time required;Verbal cues   Stand to Sit 2  Maximal assistance   Additional items Assist x 2; Increased time required;Verbal cues   Additional Comments x 1 STS performed from EOB w/ RW. Difficulty maintaining upright posture and limitee by weakness/fatigue, required return to sitting   Balance   Static Sitting Fair +   Dynamic Sitting Fair   Static Standing Zero   Activity Tolerance   Activity Tolerance Patient limited by fatigue;Patient limited by pain  (lighteadedness/nausea)   Medical Staff Made Aware Care coordinated w/ PT    Nurse Made Aware yes, RN Halie Morrow ok to see pt and updated on outcomes   RUE Assessment   RUE Assessment WFL   LUE Assessment   LUE Assessment WFL   Hand Function   Gross Motor Coordination Functional  (limited by fatigue)   Fine Motor Coordination Functional   Sensation   Light Touch No apparent deficits   Vision-Basic Assessment   Current Vision Wears glasses all the time   Cognition   Overall Cognitive Status Impaired   Arousal/Participation Alert; Cooperative   Attention Attends with cues to redirect   Orientation Level Oriented X4   Memory Decreased recall of recent events;Decreased recall of precautions   Following Commands Follows one step commands with increased time or repetition   Comments Pleasant and agreeable. Rquired cues to initiate and problem solve task. Conversational hwoever limited by fatigue. Assessment   Limitation Decreased ADL status; Decreased Safe judgement during ADL;Decreased cognition;Decreased endurance;Decreased self-care trans;Decreased high-level ADLs  (pain, balance, fxnl mobility, act garrett, fxnl reach, standing garrett, strength, fxnl sitting balance, and fxnl sitting garrett, insight and pacing, emotional regulation, response time)   Prognosis Good   Assessment Pt is a 78 y.o. female seen for OT evaluation s/p admit to [facility] on 11/17/2023 w/Bile peritonitis ([address] Prior to admission, pt was living alone in a 1 level house, (I) with ADLs, (A) with IADLs, (-) falls, (+) . Personal and environmental factors affecting patient at time of evaluation include limited social support, inaccessible home environment, inaccessible bathroom environment, difficulty completing ADLs, and difficulty completing IADLs. Personal factors supporting patient at time of evaluation include (I) PLOF, supportive local daughter, attitude towards recovery, and no APOLONIA. Based upon this evaluation, pt is functioning below baseline. Pt will benefit from continued skilled inpatient OT to maximize safety, level of independence overall performance in ADLs, functional transfers, functional mobility to return to functional baseline/highest level of function. Goals   Patient Goals to feel better. Get to the chair when I'm strong enough   LTG Time Frame 10-14   Long Term Goal #1 see goals below   Plan   Treatment Interventions ADL retraining;Functional transfer training; Endurance training;Cognitive reorientation;Patient/family training;Equipment evaluation/education;Continued evaluation; Compensatory technique education; Energy conservation; Activityengagement   Goal Expiration Date 12/02/23   OT Treatment Day 0   OT Frequency 3-5x/wk   Discharge Recommendation   Rehab Resource Intensity Level, OT I (Maximum Resource Intensity)   Additional Comments  Pt seen as a co-eval with PT due to the patient's co-morbidities and clinically unstable presentation indicated by chart review. During session, pt benefited from two skilled therapists due to extensive physical assistance to achieve transitional movements and pt's decreased activity tolerance. Additional Comments 2 The patient's raw score on the AM-PAC Daily Activity inpatient short form is 12, standardized score is 30.6, less than 39.4. From an OT standpoint, patients at this level may benefit from d/c to Post acute rehabilitation services. [facility] Daily Activity Inpatient   Lower Body Dressing 1   Bathing 2   Toileting 1   Upper Body Dressing 2   Grooming 3   Eating 3   Daily Activity Raw Score 12   Daily Activity Standardized Score (Calc for Raw Score >=11) 30.6   AM-[facility] Applied Cognition Inpatient   Following a Speech/Presentation 3   Understanding Ordinary Conversation 4   Taking Medications 2   Remembering Where Things Are Placed or Put Away 3   Remembering List of 4-5 Errands 2   Taking Care of Complicated Tasks 2   Applied Cognition Raw Score 16   Applied Cognition Standardized Score 35.03   End of Consult   Patient Position at End of Consult Supine;Bed/Chair alarm activated; All needs within reach   Nurse Communication Nurse aware of consult     GOALS:    *Goals established to promote patient goal of to feel better, get to the chair when I'm stronger:      *Patient will perform grooming tasks sitting EOB with (S) in order to increase overall independence     *UB ADL with (S) for inc'd independence with self care    *LB ADL with Mod (A) using AE prn for inc'd independence with self care    *Toileting with Mod (A) for clothing management and hygiene to increase hygiene/thoroughness in order to reduce caregiver burden    *Pt will demonstrate use of long handled AE during 100% of tx sessions for increased ADL safety and independence following D/C     *ADL transfers with Mod (A) for inc'd independence with ADLs/purposeful tasks    *Bed mobility- Mod (A) for inc'd independence to manage own comfort and initiate EOB & OOB purposeful tasks    *Patient will increase functional mobility to and from bathroom with rolling walker with mod assist to increase independence with toileting    *Patient will increase OOB/sitting tolerance to 2-4 hours per day to increase participation in self-care and leisure tasks with no s/s of exertion. *Increase stand tolerance x 3  m for inc'd tolerance with standing purposeful tasks    *Patient will improve functional activity tolerance to 20 minutes of sustained functional tasks to increase participation in basic self-care and decrease assistance level. *Patient will engage in ongoing cognitive assessment to assist with safe discharge planning/recommendations.       JUAN Kuhn, OTR/L    [place] License [id number]  [address] [place] [address] [id number]

## 2023-11-22 NOTE — PROGRESS NOTES
[address] NOTE   Reny Mccauley 78 y.o. female MRN: [id number]  Unit/Bed#: ICU 15 Encounter: [id number]  Reason for Consult: Acute kidney injury    ASSESSMENT and PLAN:  78-year-old female with a history of atrial fibrillation on Coumadin, hypertension, UTIs who presented with abdominal pain for 2 to 3 weeks and found to have cholecystitis with perforation and peritonitis. Nephrology was consulted for management of acute kidney injury. Nonoliguric acute kidney injury(POA):  Etiology:   Obstructive uropathy,-initial CT without contrast showed moderate right hydroureteronephrosis  intraoperative hemodynamic perturbations, hypotension/relative hypotension in the context of acute illness which may have progressed to ATN. Remains n.p.o. Today NG tube was clamped in anticipation of possible removal this afternoon. We will continue IV fluids at this time  Baseline unclear. Creatinine 0.8 mg/dL as of September 2022   Urinalysis:1+ protein, large blood, hematuria, leukocyturia, 10-25 hyaline casts. Innumerable epithelial cells  Creatinine 1.67 on admission increasing and fluctuating near 2 to 2.2 mg/dL and subsequently increasing up to 2.97 on 11/21. Renal ultrasound shows persistent right moderate hydroureteronephrosis therefore patient taken to the OR on 11/21 for right ureteral stent placement. Fluid rate increased to 75 mL/h for additional hydration. Patient was n.p.o. with NG tube in place  11/22: Creatinine seems to be plateauing at 9.89 mg/dL  Urine output improving post stent placement. Mcmanus catheter remains in place  NG tube clamped. Possible removal this afternoon per surgery  Plan:  Continue IV fluids until patient is able to take p.o. Continue Plasma-Lyte and start bicarbonate containing fluids due to low bicarbonate/metabolic acidosis  Avoid hypotension. Maintain MAP greater than 65  Mcmanus catheter per urology.   Strict intake and output monitoring  Avoid nephrotoxic agents  Check labs in the a.m. Discontinue Lasix    Obstructive uropathy:  Right ureteral stricture with moderate right hydroureteronephrosis  Urology following  Status post stent placement -11/21    Cholecystitis:  Complicated by perforation and peritonitis  Postop day 3 drainage of intra-abdominal abscess, washout, gastric serosal repair, omental biopsy, liver biopsy and open cholecystostomy tube placement  NG tube clamped this morning  Surgery following    Volume status/hypertension:  No evidence of volume overload. Remains NPO. Continue IV fluids  Blood pressure acceptable    Acid-base/electrolytes:  Acceptable  Persistent low bicarbonate. We will switch to bicarbonate containing IV fluids. Closely monitor potassium level    Anemia:  Management per primary team  Hemoglobin 9.6, stable  Transfuse for hemoglobin less than 7    Hyperphosphatemia: Phosphorus 5.9 increasing. Monitor. When taking p.o. placed on low phosphorus diet. History of recurrent UTI: Improved after patient began using estrogen cream    DISPOSITION:  Continue IV fluids but switch fluids to D5 +150 mg of sodium bicarbonate at 75 mL/h  Discontinue Lasix  Low phosphorus diet when taking p.o. SUBJECTIVE / 24H INTERVAL HISTORY:  Patient seen. Family in attendance, patient's daughter is usually with her mom. NG tube clamped. Patient is moistening her mouth with a sponge. She denies nausea, vomiting. No diarrhea. No reports of pain. Complains of being weak.     OBJECTIVE:  Current Weight: Weight - Scale: 91.3 kg (201 lb 4.5 oz)  Vitals:    11/21/23 2138 11/21/23 2259 11/22/23 0542 11/22/23 0700   BP: 103/51 102/51  107/53   BP Location:  Right arm  Right arm   Pulse: 79 73  71   Resp: 21 19 17   Temp:  97.9 °F (36.6 °C)  97.7 °F (36.5 °C)   TempSrc:  Axillary  Oral   SpO2: 91% 91%  94%   Weight:   91.3 kg (201 lb 4.5 oz)    Height:           Intake/Output Summary (Last 24 hours) at 11/22/2023 1028  Last data filed at 11/22/2023 0600  Gross per 24 hour   Intake 2258.02 ml   Output 1270 ml   Net 988.02 ml       General: NAD  Skin: no rash  Eyes: anicteric sclera  ENT: moist mucous membrane. NG tube clamped  Neck: supple  Chest: CTA b/l, no ronchii, no wheeze, no rubs, no rales. Normal effort  CVS: s1s2, no murmur, no gallop, no rub. Irregular rhythm, normal rate  Abdomen: Abdomen slightly distended appearing,  normal bowel sounds. Extremities: 0 to trace edema. Good peripheral pulses. Extremities warm and well perfused.    Mcmanus catheter  Neuro: AAOX3  Psych: normal affect   Medications:    Current Facility-Administered Medications:     ceFAZolin (ANCEF) IVPB (premix in dextrose) 2,000 mg 50 mL, 2,000 mg, Intravenous, Q12H, Vasyl Weiner PA-C, Stopped at 11/22/23 0410    chlorhexidine (PERIDEX) 0.12 % oral rinse 15 mL, 15 mL, Mouth/Throat, Q12H [facility], Katie Little PA-C, 15 mL at 11/22/23 0853    furosemide (LASIX) injection 10 mg, 10 mg, Intravenous, Daily, Katie Little PA-C, 10 mg at 11/22/23 0853    heparin (porcine) 25,000 units in 0.45% NaCl 250 mL infusion (premix), 3-20 Units/kg/hr (Order-Specific), Intravenous, Titrated, PALAK Carrillo, Last Rate: 8.3 mL/hr at 11/22/23 0130, 9.8 Units/kg/hr at 11/22/23 0130    heparin (porcine) injection 2,000 Units, 2,000 Units, Intravenous, Q6H PRN, PALAK Carrillo    heparin (porcine) injection 4,000 Units, 4,000 Units, Intravenous, Q6H PRN, PALAK Carrillo    HYDROmorphone (DILAUDID) injection 0.5 mg, 0.5 mg, Intravenous, Q2H PRN Max 8/day, Katie Little PA-C, 0.5 mg at 11/21/23 1816    HYDROmorphone HCl (DILAUDID) injection 0.2 mg, 0.2 mg, Intravenous, Q2H PRN Max 8/day, Katie Little PA-C, 0.2 mg at 11/21/23 0350    labetalol (NORMODYNE) injection 10 mg, 10 mg, Intravenous, Q4H PRN, Vasyl Weiner PA-C    metoprolol (LOPRESSOR) injection 5 mg, 5 mg, Intravenous, Q6H PRN, Vasyl Weiner PA-C, 5 mg at 11/20/23 0402    metoprolol (LOPRESSOR) injection 5 mg, 5 mg, Intravenous, Q6H, Katie Garciaan, PA-C, 5 mg at 11/22/23 0339    metroNIDAZOLE (FLAGYL) IVPB (premix) 500 mg 100 mL, 500 mg, Intravenous, Q8H, Katie Little PA-C, Last Rate: 200 mL/hr at 11/22/23 0856, 500 mg at 11/22/23 0856    multi-electrolyte (PLASMALYTE-A/ISOLYTE-S PH 7.4) IV solution, 75 mL/hr, Intravenous, Continuous, PALAK Lopez, Last Rate: 75 mL/hr at 11/22/23 0611, 75 mL/hr at 11/22/23 0611    ondansetron (ZOFRAN) injection 4 mg, 4 mg, Intravenous, Q4H PRN, VON Pimentel-MACIEL, 4 mg at 11/22/23 0344    promethazine (PHENERGAN) injection 25 mg, 25 mg, Intramuscular, Q6H PRN, VON Pimentel-MACIEL, 25 mg at 11/17/23 2249    warfarin (COUMADIN) tablet 5 mg, 5 mg, Oral, Daily (warfarin), PALAK Pederson    Laboratory Results:  Results from last 7 days   Lab Units 11/22/23  0651 11/21/23  0425 11/20/23  1456 11/20/23  0448 11/19/23  0532 11/18/23  2145 11/18/23  1255 11/18/23  0455 11/17/23  1117   WBC Thousand/uL  --  10.63*  --  10.28* 11.08* 10.13  --  13.24* 6.14   HEMOGLOBIN g/dL  --  9.6*  --  9.2* 9.6* 9.5*  --  10.5* 12.2   HEMATOCRIT %  --  32.3*  --  29.6* 31.2* 30.8*  --  33.7* 39.2   PLATELETS Thousands/uL  --  326  --  310 319 308  --  381 413*   POTASSIUM mmol/L 4.3 4.8 4.4 4.0 3.9 3.8 4.3 4.2 3.8   CHLORIDE mmol/L 106 103 104 104 103 104 98 99 97   CO2 mmol/L 16* 19* 20* 21 23 22 25 26 26   BUN mg/dL 67* 59* 54* 48* 41* 40* 41* 37* 27*   CREATININE mg/dL 3.02* 2.97* 2.84* 2.54* 1.97* 1.99* 2.19* 2.06* 1.67*   CALCIUM mg/dL 7.7* 8.3* 8.6 8.1* 8.0* 7.8* 8.8 8.4 9.2   MAGNESIUM mg/dL 2.7  --   --  2.5 2.5 2.4 2.5 1.6*  --    PHOSPHORUS mg/dL 5.9*  --   --  5.4* 5.2* 4.7* 4.7* 4.3*  --

## 2023-11-22 NOTE — PROGRESS NOTES
Progress Note - Urology  Kaya Colón 1940, 78 y.o. female MRN: [id number]    Unit/Bed#: ICU 14 Encounter: [id number]        Assessment & Plan:  Right hydronephrosis  JESSE  - Jerzy Glover is a 79 yo s/p exploratory lapartomy, placement of cholecystostomy tube, intraoperative biopsies, carcinomatosis awaiting biopsy results. Currently NPO, NGT-clamp trial ongoing.   - VSS, afebrile  - 24 UOP 1025 mL  - s/p Right ureteral stent placement yesterday by Dr. Connie More  - Cr 3.02, yesterday 2.97. Baseline 0.8. Continue to trend. Nephrology following. If no improvement in Cr tomorrow, would repeat imaging to reassess R hydro with stent in place. If continued obstruction with stent in place, would need R PCN  - Appreciate nephrology recommendations  - Urology will continue to follow     Subjective:   HPI: Seen at bedside with daughter. Denies any complaints at rest. Reports abdominal pain with movement and uncomfortable NGT. Review of Systems   Constitutional:  Negative for chills and fever. Respiratory:  Negative for cough and shortness of breath. Cardiovascular:  Negative for chest pain and palpitations. Gastrointestinal:  Positive for abdominal pain. Negative for vomiting. Genitourinary:  Negative for hematuria. Musculoskeletal:  Negative for arthralgias and back pain. Skin:  Negative for color change and rash. Neurological:  Negative for seizures and syncope. All other systems reviewed and are negative. Objective:    Vitals: Blood pressure 107/53, pulse 71, temperature 97.7 °F (36.5 °C), temperature source Oral, resp. rate 17, height 5' 3" (1.6 m), weight 91.3 kg (201 lb 4.5 oz), SpO2 94 %. ,Body mass index is 35.66 kg/m². Physical Exam  Vitals reviewed. Constitutional:       General: She is not in acute distress. Appearance: Normal appearance. She is normal weight. She is not ill-appearing, toxic-appearing or diaphoretic. HENT:      Head: Normocephalic and atraumatic.       Right Ear: External ear normal.      Left Ear: External ear normal.      Nose: Nose normal.      Mouth/Throat:      Mouth: Mucous membranes are moist.   Eyes:      General: No scleral icterus. Conjunctiva/sclera: Conjunctivae normal.   Cardiovascular:      Rate and Rhythm: Normal rate. Pulses: Normal pulses. Pulmonary:      Effort: Pulmonary effort is normal.   Abdominal:      General: Abdomen is flat. Palpations: Abdomen is soft. Genitourinary:     Comments: Mcmanus in place draining clear yellow urine  Musculoskeletal:         General: Normal range of motion. Cervical back: Normal range of motion. Skin:     General: Skin is warm. Findings: No rash. Neurological:      General: No focal deficit present. Mental Status: She is alert and oriented to person, place, and time. Mental status is at baseline. Psychiatric:         Mood and Affect: Mood normal.         Behavior: Behavior normal.         Thought Content: Thought content normal.         Judgment: Judgment normal.         Labs:  Recent Labs     23  0448 23  0425   WBC 10.28* 10.63*       Recent Labs     23  0448 23  0425   HGB 9.2* 9.6*     Recent Labs     23  0448 23  0425   HCT 29.6* 32.3*     Recent Labs     23  0448 23  1456 23  0425 23  0651   CREATININE 2.54* 2.84* 2.97* 3.02*         History:    Past Medical History:   Diagnosis Date    Atrial fibrillation ([address])     Hypertension     Insect bite of scalp 2021    Nasal dryness 12/15/2021    UTI (urinary tract infection)      Social History     Socioeconomic History    Marital status:       Spouse name: None    Number of children: None    Years of education: None    Highest education level: None   Occupational History    None   Tobacco Use    Smoking status: Former     Types: Cigarettes     Quit date:      Years since quittin.9    Smokeless tobacco: Never   Vaping Use    Vaping Use: Never used Substance and Sexual Activity    Alcohol use: Never    Drug use: Never    Sexual activity: Not Currently     Partners: Male     Birth control/protection: Post-menopausal   Other Topics Concern    None   Social History Narrative    Daily coffee consumption (__cups/day)     Daily cola consumption (__cans/day)     Daily tea consumption (__cups/day)      Social Determinants of Health     Financial Resource Strain: Medium Risk (6/22/2023)    Overall Financial Resource Strain (CARDIA)     Difficulty of Paying Living Expenses: Somewhat hard   Food Insecurity: No Food Insecurity (1/4/2022)    Hunger Vital Sign     Worried About Running Out of Food in the Last Year: Never true     Ran Out of Food in the Last Year: Never true   Transportation Needs: Unmet Transportation Needs (6/22/2023)    PRAPARE - Transportation     Lack of Transportation (Medical): No     Lack of Transportation (Non-Medical): Yes   Physical Activity: Not on file   Stress: Not on file   Social Connections: Not on file   Intimate Partner Violence: Not on file   Housing Stability: Not on file     Past Surgical History:   Procedure Laterality Date    CHOLECYSTECTOMY LAPAROSCOPIC N/A 11/18/2023    Procedure: [place], DRAINAGE OF ABDOMINAL ABSCESS x4, PERITONEAL LAVAGE, PARTIAL OMENTECTOMY, LIVER BIOPSY, OPEN CHOLECYSTOSCOPY AND DRAIN PLACEMENT, UMBILICAL HERNIA REPAIR;  Surgeon: Cailin Ho DO;  Location: [facility];  Service: General    EYE SURGERY      FL RETROGRADE PYELOGRAM  11/21/2023    JOINT REPLACEMENT Right     Knee 11/15/19, Hip 8/2021     Family History   Problem Relation Age of Onset    No Known Problems Mother     Diabetes Father     Stroke Father         syndrome       Johnny Munroe PA-C  Date: 11/22/2023 Time: 10:12 AM

## 2023-11-22 NOTE — PLAN OF CARE
Problem: PHYSICAL THERAPY ADULT  Goal: Performs mobility at highest level of function for planned discharge setting. See evaluation for individualized goals. Description: Treatment/Interventions: Functional transfer training, LE strengthening/ROM, Elevations, Therapeutic exercise, Cognitive reorientation, Patient/family training, Equipment eval/education, Bed mobility, Gait training, Spoke to nursing, Spoke to case management, OT  Equipment Recommended:  (TBD, pt has RW at home)       See flowsheet documentation for full assessment, interventions and recommendations. Outcome: Not Progressing  Note: Prognosis: Fair  Problem List: Decreased strength, Decreased endurance, Impaired balance, Decreased mobility, Decreased cognition, Impaired judgement, Obesity, Decreased skin integrity, Pain, Decreased safety awareness  Assessment: pt shows decline in mobility status from previous session w/ increased level of assist needed to mobilize safely and decreased activity tolerance. input was needed for task focus and mobility technique/safety. pt remains at risk for falls and continued inpatient PT is needed to reduce fall risk and progress mobility training as appropriate. Rehab Resource Intensity Level, PT: II (Moderate Resource Intensity)    See flowsheet documentation for full assessment.

## 2023-11-22 NOTE — CASE MANAGEMENT
Case Management Progress Note    Patient name Irma Ra  Location ICU 14/ICU 14 MRN [id number]  : 1940 Date 2023       LOS (days): 4  Geometric Mean LOS (GMLOS) (days): 8.10  Days to GMLOS:3.9        OBJECTIVE:        Current admission status: Inpatient  Preferred Pharmacy:   [id number] [place], [address]  [address] [facility] [address]  Phone: [phone] Fax: [phone]    [facility], [address] [facility]  [address] [place] [address]  Phone: [phone] Fax: [phone]    [id number]- [place], [address] [facility]. [address] [place] [address]  Phone: [phone] Fax: [phone]    Primary Care Provider: Aron Ya DO    Primary Insurance: MEDICARE  Secondary Insurance: AARP    PROGRESS NOTE:    Weekly Care Management Length of Stay Review     Current LOS: 4 Days    Most Recent Labs:     Lab Results   Component Value Date/Time    WBC 10.63 (H) 2023 04:25 AM    HGB 9.6 (L) 2023 04:25 AM    HCT 32.3 (L) 2023 04:25 AM     2023 04:25 AM    SODIUM 135 2023 04:25 AM    K 4.8 2023 04:25 AM     2023 04:25 AM    CO2 19 (L) 2023 04:25 AM    BUN 59 (H) 2023 04:25 AM    CREATININE 2.97 (H) 2023 04:25 AM    GLUC 81 2023 04:25 AM    ALKPHOS 101 2023 04:48 AM    ALT <3 (L) 2023 04:48 AM    AST 12 (L) 2023 04:48 AM    ALB 2.4 (L) 2023 04:48 AM    TBILI 0.49 2023 04:48 AM    INR 1.45 (H) 2023 05:03 PM       Most Recent Vitals:   Vitals:    23   BP: (!) 101/49   Pulse: 78   Resp: 16   Temp: 97.5 °F (36.4 °C)   SpO2: 95%        Identified Barriers to Discharge/Discharge Goals/Care Management Interventions: NGT, biopsy pending, palliative care consulted, nto medically stable    Intended Discharge Disposition: SNF referrals sent    Expected Discharge Date:

## 2023-11-22 NOTE — QUICK NOTE
Patient seen and examined at bedside following her procedure with urology today. Patient reports only an uncomfortable sensation secondary to her Mcmanus. Otherwise patient feels well and denies nausea vomiting fevers chills and shortness of breath at this time. On exam patient's abdomen is soft nontender nondistended. NG tube output dark bilious. Mcmanus in place.   Vital signs heart rate 78, /49, SPO2 93% on room air    Plan  N.p.o./NG tube  IVF 75  ACS heparin drip  Maintain drains  Maintain Mcmanus Mcmanus

## 2023-11-22 NOTE — QUICK NOTE
Chart and case were reviewed by Bakari Zimmerman, [address] [place] [address]. Mode of review included electronic chart check,. Goals goals are currently clear. Patient and family wish to continue level 1 code status and do not wish to discuss goals until all tests have resulted. Symptoms are being managed by primary team.    For dispo plan, please review Case Management notes. Palliative care will return on 11/24/23. For urgent issues or any questions/concerns, please notify on-call provider via [facility] Payam Valenzuela. You may also call our answering service 24/7 at [phone]. PALAK Robles  Palliative and Supportive Care  Clinic/Answering Service: [phone]  You can find me on Coco!

## 2023-11-22 NOTE — PLAN OF CARE
Problem: OCCUPATIONAL THERAPY ADULT  Goal: Performs self-care activities at highest level of function for planned discharge setting. See evaluation for individualized goals. Description: Treatment Interventions: ADL retraining, Functional transfer training, Endurance training, Cognitive reorientation, Patient/family training, Equipment evaluation/education, Continued evaluation, Compensatory technique education, Energy conservation, Activityengagement          See flowsheet documentation for full assessment, interventions and recommendations. Note: Limitation: Decreased ADL status, Decreased Safe judgement during ADL, Decreased cognition, Decreased endurance, Decreased self-care trans, Decreased high-level ADLs (pain, balance, fxnl mobility, act garrett, fxnl reach, standing garrett, strength, fxnl sitting balance, and fxnl sitting garrett, insight and pacing, emotional regulation, response time)  Prognosis: Good  Assessment: Pt is a 78 y.o. female seen for OT evaluation s/p admit to [address] [place] [address] on 11/17/2023 w/Bile peritonitis ([address] Prior to admission, pt was living alone in a 1 level house, (I) with ADLs, (A) with IADLs, (-) falls, (+) . Personal and environmental factors affecting patient at time of evaluation include limited social support, inaccessible home environment, inaccessible bathroom environment, difficulty completing ADLs, and difficulty completing IADLs. Personal factors supporting patient at time of evaluation include (I) PLOF, supportive local daughter, attitude towards recovery, and no APOLONIA. Based upon this evaluation, pt is functioning below baseline. Pt will benefit from continued skilled inpatient OT to maximize safety, level of independence overall performance in ADLs, functional transfers, functional mobility to return to functional baseline/highest level of function.      Rehab Resource Intensity Level, OT: I (Maximum Resource Intensity)     Alise Jaquez, OTD, OTR/L  [place] License [id number]  [facility] [id number]

## 2023-11-23 NOTE — PLAN OF CARE
Problem: Prexisting or High Potential for Compromised Skin Integrity  Goal: Skin integrity is maintained or improved  Description: INTERVENTIONS:  - Identify patients at risk for skin breakdown  - Assess and monitor skin integrity  - Assess and monitor nutrition and hydration status  - Monitor labs   - Assess for incontinence   - Turn and reposition patient  - Assist with mobility/ambulation  - Relieve pressure over bony prominences  - Avoid friction and shearing  - Provide appropriate hygiene as needed including keeping skin clean and dry  - Evaluate need for skin moisturizer/barrier cream  - Collaborate with interdisciplinary team   - Patient/family teaching  - Consider wound care consult   Outcome: Progressing     Problem: PAIN - ADULT  Goal: Verbalizes/displays adequate comfort level or baseline comfort level  Description: Interventions:  - Encourage patient to monitor pain and request assistance  - Assess pain using appropriate pain scale  - Administer analgesics based on type and severity of pain and evaluate response  - Implement non-pharmacological measures as appropriate and evaluate response  - Consider cultural and social influences on pain and pain management  - Notify physician/advanced practitioner if interventions unsuccessful or patient reports new pain  Outcome: Progressing     Problem: INFECTION - ADULT  Goal: Absence or prevention of progression during hospitalization  Description: INTERVENTIONS:  - Assess and monitor for signs and symptoms of infection  - Monitor lab/diagnostic results  - Monitor all insertion sites, i.e. indwelling lines, tubes, and drains  - Monitor endotracheal if appropriate and nasal secretions for changes in amount and color  - [place] appropriate cooling/warming therapies per order  - Administer medications as ordered  - Instruct and encourage patient and family to use good hand hygiene technique  - Identify and instruct in appropriate isolation precautions for identified infection/condition  Outcome: Progressing  Goal: Absence of fever/infection during neutropenic period  Description: INTERVENTIONS:  - Monitor WBC    Outcome: Progressing     Problem: SAFETY ADULT  Goal: Patient will remain free of falls  Description: INTERVENTIONS:  - Educate patient/family on patient safety including physical limitations  - Instruct patient to call for assistance with activity   - Consult OT/PT to assist with strengthening/mobility   - Keep Call bell within reach  - Keep bed low and locked with side rails adjusted as appropriate  - Keep care items and personal belongings within reach  - Initiate and maintain comfort rounds  - Make Fall Risk Sign visible to staff  - Offer Toileting every 2 Hours, in advance of need  - Initiate/Maintain alarm  - Obtain necessary fall risk management equipment:   - Apply yellow socks and bracelet for high fall risk patients  - Consider moving patient to room near nurses station  Outcome: Progressing  Goal: Maintain or return to baseline ADL function  Description: INTERVENTIONS:  -  Assess patient's ability to carry out ADLs; assess patient's baseline for ADL function and identify physical deficits which impact ability to perform ADLs (bathing, care of mouth/teeth, toileting, grooming, dressing, etc.)  - Assess/evaluate cause of self-care deficits   - Assess range of motion  - Assess patient's mobility; develop plan if impaired  - Assess patient's need for assistive devices and provide as appropriate  - Encourage maximum independence but intervene and supervise when necessary  - Involve family in performance of ADLs  - Assess for home care needs following discharge   - Consider OT consult to assist with ADL evaluation and planning for discharge  - Provide patient education as appropriate  Outcome: Progressing  Goal: Maintains/Returns to pre admission functional level  Description: INTERVENTIONS:  - Perform AM-PAC 6 Click Basic Mobility/ Daily Activity assessment daily.  - Set and communicate daily mobility goal to care team and patient/family/caregiver. - Collaborate with rehabilitation services on mobility goals if consulted  - Perform Range of Motion 3 times a day. - Reposition patient every 2 hours. - Dangle patient 3 times a day  - Stand patient 3 times a day  - Ambulate patient 3 times a day  - Out of bed to chair 3 times a day   - Out of bed for meals 3 times a day  - Out of bed for toileting  - Record patient progress and toleration of activity level   Outcome: Progressing     Problem: DISCHARGE PLANNING  Goal: Discharge to home or other facility with appropriate resources  Description: INTERVENTIONS:  - Identify barriers to discharge w/patient and caregiver  - Arrange for needed discharge resources and transportation as appropriate  - Identify discharge learning needs (meds, wound care, etc.)  - Arrange for interpretive services to assist at discharge as needed  - Refer to Case Management Department for coordinating discharge planning if the patient needs post-hospital services based on physician/advanced practitioner order or complex needs related to functional status, cognitive ability, or social support system  Outcome: Progressing     Problem: Knowledge Deficit  Goal: Patient/family/caregiver demonstrates understanding of disease process, treatment plan, medications, and discharge instructions  Description: Complete learning assessment and assess knowledge base. Interventions:  - Provide teaching at level of understanding  - Provide teaching via preferred learning methods  Outcome: Progressing     Problem: Nutrition/Hydration-ADULT  Goal: Nutrient/Hydration intake appropriate for improving, restoring or maintaining nutritional needs  Description: Monitor and assess patient's nutrition/hydration status for malnutrition. Collaborate with interdisciplinary team and initiate plan and interventions as ordered.   Monitor patient's weight and dietary intake as ordered or per policy. Utilize nutrition screening tool and intervene as necessary. Determine patient's food preferences and provide high-protein, high-caloric foods as appropriate. INTERVENTIONS:  - Monitor oral intake, urinary output, labs, and treatment plans  - Assess nutrition and hydration status and recommend course of action  - Evaluate amount of meals eaten  - Assist patient with eating if necessary   - Allow adequate time for meals  - Recommend/ encourage appropriate diets, oral nutritional supplements, and vitamin/mineral supplements  - Order, calculate, and assess calorie counts as needed  - Recommend, monitor, and adjust tube feedings and TPN/PPN based on assessed needs  - Assess need for intravenous fluids  - Provide specific nutrition/hydration education as appropriate  - Include patient/family/caregiver in decisions related to nutrition  Outcome: Progressing     Problem: NEUROSENSORY - ADULT  Goal: Achieves maximal functionality and self care  Description: INTERVENTIONS  - Monitor swallowing and airway patency with patient fatigue and changes in neurological status  - Encourage and assist patient to increase activity and self care.    - Encourage visually impaired, hearing impaired and aphasic patients to use assistive/communication devices  Outcome: Progressing     Problem: CARDIOVASCULAR - ADULT  Goal: Maintains optimal cardiac output and hemodynamic stability  Description: INTERVENTIONS:  - Monitor I/O, vital signs and rhythm  - Monitor for S/S and trends of decreased cardiac output  - Administer and titrate ordered vasoactive medications to optimize hemodynamic stability  - Assess quality of pulses, skin color and temperature  - Assess for signs of decreased coronary artery perfusion  - Instruct patient to report change in severity of symptoms  Outcome: Progressing  Goal: Absence of cardiac dysrhythmias or at baseline rhythm  Description: INTERVENTIONS:  - Continuous cardiac monitoring, vital signs, obtain 12 lead EKG if ordered  - Administer antiarrhythmic and heart rate control medications as ordered  - Monitor electrolytes and administer replacement therapy as ordered  Outcome: Progressing     Problem: RESPIRATORY - ADULT  Goal: Achieves optimal ventilation and oxygenation  Description: INTERVENTIONS:  - Assess for changes in respiratory status  - Assess for changes in mentation and behavior  - Position to facilitate oxygenation and minimize respiratory effort  - Oxygen administered by appropriate delivery if ordered  - Initiate smoking cessation education as indicated  - Encourage broncho-pulmonary hygiene including cough, deep breathe, Incentive Spirometry  - Assess the need for suctioning and aspirate as needed  - Assess and instruct to report SOB or any respiratory difficulty  - Respiratory Therapy support as indicated  Outcome: Progressing     Problem: GASTROINTESTINAL - ADULT  Goal: Minimal or absence of nausea and/or vomiting  Description: INTERVENTIONS:  - Administer IV fluids if ordered to ensure adequate hydration  - Maintain NPO status until nausea and vomiting are resolved  - Nasogastric tube if ordered  - Administer ordered antiemetic medications as needed  - Provide nonpharmacologic comfort measures as appropriate  - Advance diet as tolerated, if ordered  - Consider nutrition services referral to assist patient with adequate nutrition and appropriate food choices  Outcome: Progressing  Goal: Maintains or returns to baseline bowel function  Description: INTERVENTIONS:  - Assess bowel function  - Encourage oral fluids to ensure adequate hydration  - Administer IV fluids if ordered to ensure adequate hydration  - Administer ordered medications as needed  - Encourage mobilization and activity  - Consider nutritional services referral to assist patient with adequate nutrition and appropriate food choices  Outcome: Progressing  Goal: Maintains adequate nutritional intake  Description: INTERVENTIONS:  - Monitor percentage of each meal consumed  - Identify factors contributing to decreased intake, treat as appropriate  - Assist with meals as needed  - Monitor I&O, weight, and lab values if indicated  - Obtain nutrition services referral as needed  Outcome: Progressing  Goal: Oral mucous membranes remain intact  Description: INTERVENTIONS  - Assess oral mucosa and hygiene practices  - Implement preventative oral hygiene regimen  - Implement oral medicated treatments as ordered  - Initiate Nutrition services referral as needed  Outcome: Progressing     Problem: GENITOURINARY - ADULT  Goal: Maintains or returns to baseline urinary function  Description: INTERVENTIONS:  - Assess urinary function  - Encourage oral fluids to ensure adequate hydration if ordered  - Administer IV fluids as ordered to ensure adequate hydration  - Administer ordered medications as needed  - Offer frequent toileting  - Follow urinary retention protocol if ordered  Outcome: Progressing  Goal: Absence of urinary retention  Description: INTERVENTIONS:  - Assess patient’s ability to void and empty bladder  - Monitor I/O  - Bladder scan as needed  - Discuss with physician/AP medications to alleviate retention as needed  - Discuss catheterization for long term situations as appropriate  Outcome: Progressing  Goal: Urinary catheter remains patent  Description: INTERVENTIONS:  - Assess patency of urinary catheter  - If patient has a chronic cutler, consider changing catheter if non-functioning  - Follow guidelines for intermittent irrigation of non-functioning urinary catheter  Outcome: Progressing     Problem: METABOLIC, FLUID AND ELECTROLYTES - ADULT  Goal: Electrolytes maintained within normal limits  Description: INTERVENTIONS:  - Monitor labs and assess patient for signs and symptoms of electrolyte imbalances  - Administer electrolyte replacement as ordered  - Monitor response to electrolyte replacements, including repeat lab results as appropriate  - Instruct patient on fluid and nutrition as appropriate  Outcome: Progressing  Goal: Fluid balance maintained  Description: INTERVENTIONS:  - Monitor labs   - Monitor I/O and WT  - Instruct patient on fluid and nutrition as appropriate  - Assess for signs & symptoms of volume excess or deficit  Outcome: Progressing     Problem: SKIN/TISSUE INTEGRITY - ADULT  Goal: Skin Integrity remains intact(Skin Breakdown Prevention)  Description: Assess:  -Inspect skin when repositioning, toileting, and assisting with ADLS  -Assess extremities for adequate circulation and sensation     Bed Management:  -Have minimal linens on bed & keep smooth, unwrinkled  -Change linens as needed when moist or perspiring    Toileting:  -Offer bedside commode    Activity:  -Encourage activity and walks on unit  -Encourage or provide ROM exercises   -Use appropriate equipment to lift or move patient in bed    Skin Care:  -Avoid use of baby powder, tape, friction and shearing, hot water or constrictive clothing  -Do not massage red bony areas  Outcome: Progressing  Goal: Incision(s), wounds(s) or drain site(s) healing without S/S of infection  Description: INTERVENTIONS  - Assess and document dressing, incision, wound bed, drain sites and surrounding tissue  - Provide patient and family education  Outcome: Progressing  Goal: Pressure injury heals and does not worsen  Description: Interventions:  - Implement low air loss mattress or specialty surface (Criteria met)  - Apply silicone foam dressing  - Apply fecal or urinary incontinence containment device   - Utilize friction reducing device or surface for transfers   - Consider nutrition services referral as needed  Outcome: Progressing     Problem: MUSCULOSKELETAL - ADULT  Goal: Maintain or return mobility to safest level of function  Description: INTERVENTIONS:  - Assess patient's ability to carry out ADLs; assess patient's baseline for ADL function and identify physical deficits which impact ability to perform ADLs (bathing, care of mouth/teeth, toileting, grooming, dressing, etc.)  - Assess/evaluate cause of self-care deficits   - Assess range of motion  - Assess patient's mobility  - Assess patient's need for assistive devices and provide as appropriate  - Encourage maximum independence but intervene and supervise when necessary  - Involve family in performance of ADLs  - Assess for home care needs following discharge   - Consider OT consult to assist with ADL evaluation and planning for discharge  - Provide patient education as appropriate  Outcome: Progressing  Goal: Maintain proper alignment of affected body part  Description: INTERVENTIONS:  - Support, maintain and protect limb and body alignment  - Provide patient/ family with appropriate education  Outcome: Progressing

## 2023-11-23 NOTE — PLAN OF CARE
Problem: Prexisting or High Potential for Compromised Skin Integrity  Goal: Skin integrity is maintained or improved  Description: INTERVENTIONS:  - Identify patients at risk for skin breakdown  - Assess and monitor skin integrity  - Assess and monitor nutrition and hydration status  - Monitor labs   - Assess for incontinence   - Turn and reposition patient  - Assist with mobility/ambulation  - Relieve pressure over bony prominences  - Avoid friction and shearing  - Provide appropriate hygiene as needed including keeping skin clean and dry  - Evaluate need for skin moisturizer/barrier cream  - Collaborate with interdisciplinary team   - Patient/family teaching  - Consider wound care consult   Outcome: Progressing     Problem: PAIN - ADULT  Goal: Verbalizes/displays adequate comfort level or baseline comfort level  Description: Interventions:  - Encourage patient to monitor pain and request assistance  - Assess pain using appropriate pain scale  - Administer analgesics based on type and severity of pain and evaluate response  - Implement non-pharmacological measures as appropriate and evaluate response  - Consider cultural and social influences on pain and pain management  - Notify physician/advanced practitioner if interventions unsuccessful or patient reports new pain  Outcome: Progressing     Problem: INFECTION - ADULT  Goal: Absence or prevention of progression during hospitalization  Description: INTERVENTIONS:  - Assess and monitor for signs and symptoms of infection  - Monitor lab/diagnostic results  - Monitor all insertion sites, i.e. indwelling lines, tubes, and drains  - Monitor endotracheal if appropriate and nasal secretions for changes in amount and color  - [place] appropriate cooling/warming therapies per order  - Administer medications as ordered  - Instruct and encourage patient and family to use good hand hygiene technique  - Identify and instruct in appropriate isolation precautions for identified infection/condition  Outcome: Progressing  Goal: Absence of fever/infection during neutropenic period  Description: INTERVENTIONS:  - Monitor WBC    Outcome: Progressing     Problem: SAFETY ADULT  Goal: Patient will remain free of falls  Description: INTERVENTIONS:  - Educate patient/family on patient safety including physical limitations  - Instruct patient to call for assistance with activity   - Consult OT/PT to assist with strengthening/mobility   - Keep Call bell within reach  - Keep bed low and locked with side rails adjusted as appropriate  - Keep care items and personal belongings within reach  - Initiate and maintain comfort rounds  - Make Fall Risk Sign visible to staff  - Offer Toileting every 2 Hours, in advance of need  - Initiate/Maintain alarm  - Obtain necessary fall risk management equipment:   - Apply yellow socks and bracelet for high fall risk patients  - Consider moving patient to room near nurses station  Outcome: Progressing  Goal: Maintain or return to baseline ADL function  Description: INTERVENTIONS:  -  Assess patient's ability to carry out ADLs; assess patient's baseline for ADL function and identify physical deficits which impact ability to perform ADLs (bathing, care of mouth/teeth, toileting, grooming, dressing, etc.)  - Assess/evaluate cause of self-care deficits   - Assess range of motion  - Assess patient's mobility; develop plan if impaired  - Assess patient's need for assistive devices and provide as appropriate  - Encourage maximum independence but intervene and supervise when necessary  - Involve family in performance of ADLs  - Assess for home care needs following discharge   - Consider OT consult to assist with ADL evaluation and planning for discharge  - Provide patient education as appropriate  Outcome: Progressing  Goal: Maintains/Returns to pre admission functional level  Description: INTERVENTIONS:  - Perform AM-PAC 6 Click Basic Mobility/ Daily Activity assessment daily.  - Set and communicate daily mobility goal to care team and patient/family/caregiver. - Collaborate with rehabilitation services on mobility goals if consulted  - Perform Range of Motion 3 times a day. - Reposition patient every 2 hours. - Dangle patient 3 times a day  - Stand patient 3 times a day  - Ambulate patient 3 times a day  - Out of bed to chair 3 times a day   - Out of bed for meals 3 times a day  - Out of bed for toileting  - Record patient progress and toleration of activity level   Outcome: Progressing     Problem: DISCHARGE PLANNING  Goal: Discharge to home or other facility with appropriate resources  Description: INTERVENTIONS:  - Identify barriers to discharge w/patient and caregiver  - Arrange for needed discharge resources and transportation as appropriate  - Identify discharge learning needs (meds, wound care, etc.)  - Arrange for interpretive services to assist at discharge as needed  - Refer to Case Management Department for coordinating discharge planning if the patient needs post-hospital services based on physician/advanced practitioner order or complex needs related to functional status, cognitive ability, or social support system  Outcome: Progressing     Problem: Knowledge Deficit  Goal: Patient/family/caregiver demonstrates understanding of disease process, treatment plan, medications, and discharge instructions  Description: Complete learning assessment and assess knowledge base. Interventions:  - Provide teaching at level of understanding  - Provide teaching via preferred learning methods  Outcome: Progressing     Problem: Nutrition/Hydration-ADULT  Goal: Nutrient/Hydration intake appropriate for improving, restoring or maintaining nutritional needs  Description: Monitor and assess patient's nutrition/hydration status for malnutrition. Collaborate with interdisciplinary team and initiate plan and interventions as ordered.   Monitor patient's weight and dietary intake as ordered or per policy. Utilize nutrition screening tool and intervene as necessary. Determine patient's food preferences and provide high-protein, high-caloric foods as appropriate.      INTERVENTIONS:  - Monitor oral intake, urinary output, labs, and treatment plans  - Assess nutrition and hydration status and recommend course of action  - Evaluate amount of meals eaten  - Assist patient with eating if necessary   - Allow adequate time for meals  - Recommend/ encourage appropriate diets, oral nutritional supplements, and vitamin/mineral supplements  - Order, calculate, and assess calorie counts as needed  - Recommend, monitor, and adjust tube feedings and TPN/PPN based on assessed needs  - Assess need for intravenous fluids  - Provide specific nutrition/hydration education as appropriate  - Include patient/family/caregiver in decisions related to nutrition  Outcome: Progressing

## 2023-11-23 NOTE — PROGRESS NOTES
Progress Note - General Surgery   KEN Resident on Surgery Service   Gadiel Vanegas 78 y.o. female MRN: [id number]  Unit/Bed#: ICU 15 Encounter: [id number]    Assessment:  78 y.o. female with acute cholecystitis complicated by perforation and four quadrant bile peritonitis. Now s/p Laparoscopic converted to open drainage of intra-abdominal abscesses x 4, abdominal washout, gastric serosal repair, omental biopsy, liver biopsy, open cholecystostomy tube placement on 11/18. Now major concern for metastatic cancer of unknown origin. Will need to discuss with family ultimate goals in treatment plan. Afebrile. VSS.   cc  L drain with 0 cc output serobilious  R drain with 0 cc output serous  Cr 3.52 from 3.72 from 2.92  F/u rest of electrolyte labs  Most recent body cultures have grown enterobacter, GPR, and slackia  Path concerning for metastatic adenocarcinoma    Plan:  Continue diet as tolerated  Appreciate nephrology, consider CRRT if continues to worsen  Discuss with nephrology if anticoagulation is contributing to worsening renal function  Encourage PO fluid intake  Will obtain another full set of labs tomorrow  Re-engage palliative care  PRN pain meds  PRN anti nausea meds  Encourage IS  Encourage out of bed and ambulating  Appreciate ICU level of care    Subjective/Objective     Subjective: No acute events overnight. Patient endorses nausea. Denies having vomiting, fevers, chills, chest pain, shortness of breath. No bowel movements and no flatus. Tolerating PO intake. Voiding into cutler, urine output is dark. Objective:     Blood pressure 151/63, pulse 79, temperature (!) 97.3 °F (36.3 °C), temperature source Axillary, resp. rate 15, height 5' 3" (1.6 m), weight 94.7 kg (208 lb 12.4 oz), SpO2 95 %. ,Body mass index is 36.98 kg/m².       Intake/Output Summary (Last 24 hours) at 11/23/2023 0843  Last data filed at 11/23/2023 0600  Gross per 24 hour   Intake 1248.95 ml   Output 425 ml Net 823.95 ml       Invasive Devices       Peripheral Intravenous Line  Duration             Peripheral IV 11/21/23 Distal;Dorsal (posterior); Right Forearm 2 days    Peripheral IV 11/21/23 Distal;Left;Ventral (anterior) Forearm 2 days    Peripheral IV 11/22/23 Left;Proximal;Ventral (anterior) Antecubital <1 day              Drain  Duration             Closed/Suction Drain Left LUQ Bulb 19 Fr. 4 days    Closed/Suction Drain Right RUQ Other (Comment) 12 Fr. 4 days    Ureteral Internal Stent Right ureter 6 Fr. 1 day    Urethral Catheter Non-latex 16 Fr. 1 day                    General: NAD  HENT: NCAT MMM  Neck: supple, no JVD  CV: nl rate  Lungs: nl wob. No resp distress  ABD: Soft, tender right lower quadrant and left lower quadrant, nondistended. See above for drain output and drain character.    Extrem: No CCE  Neuro: AAOx3       Scheduled Meds:  Current Facility-Administered Medications   Medication Dose Route Frequency Provider Last Rate    cefazolin  2,000 mg Intravenous Q12H Katie Anabel, PA-C 2,000 mg (11/23/23 0419)    chlorhexidine  15 mL Mouth/Throat Q12H [address] Katiejaycee Little, PA-C      heparin (porcine)  3-20 Units/kg/hr (Order-Specific) Intravenous Titrated PALAK Jones 7.8 Units/kg/hr (11/23/23 0641)    heparin (porcine)  2,000 Units Intravenous Q6H PRN David Uriostegui CRNP      heparin (porcine)  4,000 Units Intravenous Q6H PRN PALAK Jones      HYDROmorphone  0.5 mg Intravenous Q2H PRN Max 8/day Zaki Todd, PA-C      HYDROmorphone  0.2 mg Intravenous Q2H PRN Max 8/day Zaki Todd, PA-C      labetalol  10 mg Intravenous Q4H PRN Zaki Todd PA-C      metoclopramide  10 mg Intravenous Q6H Brock Prince MD      metoprolol  5 mg Intravenous Q6H PRN Zaki Todd, PA-C      metoprolol  5 mg Intravenous Q6H Katie Anabel, PA-C      metroNIDAZOLE  500 mg Intravenous Q8H Katie Anabel, PA-C 500 mg (11/23/23 0733)    ondansetron  4 mg Intravenous Q4H PRN Louann Ontiveros PA-C      sodium bicarbonate 150 mEq in dextrose 5 % 1,000 mL infusion   Intravenous Continuous Guyann Oar, CRNP 75 mL/hr at 11/23/23 4473    warfarin  5 mg Oral Daily (warfarin) PALAK Johnson       Continuous Infusions:heparin (porcine), 3-20 Units/kg/hr (Order-Specific), Last Rate: 7.8 Units/kg/hr (11/23/23 0641)  sodium bicarbonate 150 mEq in dextrose 5 % 1,000 mL infusion, , Last Rate: 75 mL/hr at 11/23/23 0735      PRN Meds:.  heparin (porcine)    heparin (porcine)    HYDROmorphone    HYDROmorphone    labetalol    metoprolol    ondansetron      Lab, Imaging and other studies:I have personally reviewed pertinent lab results.     VTE Pharmacologic Prophylaxis: Heparin  VTE Mechanical Prophylaxis: sequential compression device      Sagar King MD  11/23/2023 8:43 AM

## 2023-11-23 NOTE — PROGRESS NOTES
NEPHROLOGY PROGRESS NOTE   Timur Ruvalcaba 78 y.o. female MRN: [id number]  Unit/Bed#: ICU 14 Encounter: [id number]    ASSESSMENT & PLAN:  38-year-old female presented with abdominal pain found to have cholecystitis with perforation and peritonitis. Status post open drainage of intra-abdominal abscess by surgery along with liver biopsy, open cholecystostomy tube placement on 11/8. Nephrology consulted for Acute kidney injury  Acute kidney injury, POA  -Baseline creatinine: 0.8 mg/dl in September 2022  -Admission creatinine: 1.67 mg/dl  - Work up:   UA with microscopy: 1+ protein, large blood, leukocyturia  Imaging: CT without contrast showed mild right hydronephrosis  Renal ultrasound with moderate right hydronephrosis  -Etiology: Obstructive uropathy, possible ATN from intraoperative hypotension  -Right ureteral stent placement by urology on 11/21  -Plan:   Renal function worsened to creatinine 3.5 mg/dL on blood work from today, urine output around 425 mL in last 24 hours, no indication for renal replacement therapy at this time. Continue to monitor per discussed with patient as well as daughter that if renal function continue to worsen, may need renal replacement therapy and daughter agreeable with the plan to go for dialysis if needed. Order for repeat renal ultrasound to see for resolution of right hydronephrosis post ureteral stent placement. If found to have persistent hydronephrosis would recommend input from urology for possible PCN placement. Decreased IV fluid to 50 ml/h and changed to Isolyte as bicarb level improved to normal range and patient has lower extremity edema  NG tube removed and patient having oral intake  Avoid nephrotoxins and dose all medications per EGFR. Avoid hypotension. Obstructive uropathy/right hydroureteronephrosis  -Repeat renal ultrasound with persistent right hydronephrosis and so patient underwent ureteral stent placement by urology in 11/21. Intraoperatively was found to have right ureteral stricture  -Continue to monitor renal function which is worsening, ordered for repeat renal ultrasound               Primary hypertension  -Blood pressure has trended up, avoid hypotension, continue to monitor     Metabolic acidosis:   -Resolved with bicarbonate drip, switch to Isolyte at 50 mill per hour     hyperphosphatemia: Phosphorus 5.9, recommend low phosphorus diet. Continue to monitor and expect phosphorus to improve with once renal function starts improving     Anemia, unspecified   -hemoglobin 9.6 g/dL on blood work from 11/21. Continue to monitor     Acute cholecystitis/bile peritonitis secondary to perforated cholecystitis: Antibiotics per primary team,  -status post open drainage of intra-abdominal abscess, abdominal washout and gastric serosal.  Along with placement of cholecystostomy tube. 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 Management per surgical team    Above plan to continue IV fluid was discussed with primary team and agree with the plan, also discussed that no need for dosage adjustment for anticoagulation in the setting of worsening renal function. This is less likely to be Coumadin nephrotoxicity as renal function has been worsening even prior to INR becoming therapeutic    SUBJECTIVE:  No new complaints. No chest pain or shortness of breath, no nausea vomiting    OBJECTIVE:  Current Weight: Weight - Scale: 94.7 kg (208 lb 12.4 oz)  Vitals:    11/23/23 0809   BP: 151/63   Pulse: 79   Resp: 15   Temp: (!) 97.3 °F (36.3 °C)   SpO2: 95%       Intake/Output Summary (Last 24 hours) at 11/23/2023 1137  Last data filed at 11/23/2023 0803  Gross per 24 hour   Intake 1933.64 ml   Output 425 ml   Net 1508.64 ml       Physical Exam  General:  Ill looking, awake.   Eyes: Conjunctivae pink,  Sclera anicteric  ENT: lips and mucous membranes moist  Neck: supple   Chest: Clear to Auscultation both lungs,  no crackles, ronchus or wheezing. CVS: S1 & S2 present, normal rate, regular rhythm, no murmur.   Abdomen: soft, non-tender, non-distended, Bowel sounds normoactive  Extremities: 1 +  edema of  legs  Skin: no rash  Neuro: awake, alert, oriented x 3   Psych: Mood and affect appropriate      Medications:    Current Facility-Administered Medications:     ceFAZolin (ANCEF) IVPB (premix in dextrose) 2,000 mg 50 mL, 2,000 mg, Intravenous, Q12H, Dipesh Cuba PA-C, Last Rate: 100 mL/hr at 11/23/23 0419, 2,000 mg at 11/23/23 0419    chlorhexidine (PERIDEX) 0.12 % oral rinse 15 mL, 15 mL, Mouth/Throat, Q12H [facility], Katie Little PA-C, 15 mL at 11/22/23 2119    heparin (porcine) 25,000 units in 0.45% NaCl 250 mL infusion (premix), 3-20 Units/kg/hr (Order-Specific), Intravenous, Titrated, PALAK Nagy, Last Rate: 6.6 mL/hr at 11/23/23 0641, 7.8 Units/kg/hr at 11/23/23 0641    heparin (porcine) injection 2,000 Units, 2,000 Units, Intravenous, Q6H PRN, PALAK Nagy    heparin (porcine) injection 4,000 Units, 4,000 Units, Intravenous, Q6H PRN, PALAK Nagy    HYDROmorphone (DILAUDID) injection 0.5 mg, 0.5 mg, Intravenous, Q2H PRN Max 8/day, Katie Little PA-C, 0.5 mg at 11/23/23 1102    HYDROmorphone HCl (DILAUDID) injection 0.2 mg, 0.2 mg, Intravenous, Q2H PRN Max 8/day, Katie Little PA-C, 0.2 mg at 11/23/23 0751    labetalol (NORMODYNE) injection 10 mg, 10 mg, Intravenous, Q4H PRN, Alvie Rosa Elena, PA-C    metoclopramide (REGLAN) injection 10 mg, 10 mg, Intravenous, Q6H, Carlton Martin MD, 10 mg at 11/23/23 1016    metoprolol (LOPRESSOR) injection 5 mg, 5 mg, Intravenous, Q6H PRN, Dipesh Cuba PA-C, 5 mg at 11/20/23 0402    metoprolol (LOPRESSOR) injection 5 mg, 5 mg, Intravenous, Q6H, Katie Little PA-C, 5 mg at 11/23/23 1016    metroNIDAZOLE (FLAGYL) IVPB (premix) 500 mg 100 mL, 500 mg, Intravenous, Q8H, Dipesh Cuba PA-C, Stopped at 11/23/23 1103 ondansetron (ZOFRAN) injection 4 mg, 4 mg, Intravenous, Q4H PRN, Jatin Dudley PA-C, 4 mg at 11/23/23 0419    sodium bicarbonate 150 mEq in dextrose 5 % 1,000 mL infusion, , Intravenous, Continuous, PALAK Lopez, Last Rate: 75 mL/hr at 11/23/23 9586, Restarted at 11/23/23 7644    warfarin (COUMADIN) tablet 5 mg, 5 mg, Oral, Daily (warfarin), PALAK Solis, 5 mg at 11/22/23 1815    Invasive Devices:   Urethral Catheter Non-latex 16 Fr. (Active)   Reasons to continue Urinary Catheter  Post-operative urological requirements 11/23/23 0803   Goal for Removal Other (Comment) 11/23/23 0803   Site Assessment Clean;Skin intact 11/23/23 0803   Mcmanus Care Done 11/22/23 2052   Collection Container Standard drainage bag 11/23/23 0803   Securement Method Securing device (Describe) 11/23/23 0803   Output (mL) 75 mL 11/23/23 0600       Lab Results:   Results from last 7 days   Lab Units 11/23/23  0611 11/22/23  0651 11/21/23  0425 11/20/23  1456 11/20/23  0448 11/19/23  0532 11/18/23  2145   WBC Thousand/uL  --   --  10.63*  --  10.28* 11.08* 10.13   HEMOGLOBIN g/dL  --   --  9.6*  --  9.2* 9.6* 9.5*   HEMATOCRIT %  --   --  32.3*  --  29.6* 31.2* 30.8*   PLATELETS Thousands/uL  --   --  326  --  310 319 308   POTASSIUM mmol/L 4.0 4.3 4.8   < > 4.0 3.9 3.8   CHLORIDE mmol/L 100 106 103   < > 104 103 104   CO2 mmol/L 23 16* 19*   < > 21 23 22   BUN mg/dL 82* 67* 59*   < > 48* 41* 40*   CREATININE mg/dL 3.52* 3.02* 2.97*   < > 2.54* 1.97* 1.99*   CALCIUM mg/dL 7.7* 7.7* 8.3*   < > 8.1* 8.0* 7.8*   MAGNESIUM mg/dL 2.7 2.7  --   --  2.5 2.5 2.4   PHOSPHORUS mg/dL  --  5.9*  --   --  5.4* 5.2* 4.7*   ALK PHOS U/L  --   --   --   --  101 93 75   ALT U/L  --   --   --   --  <3* 5* 7   AST U/L  --   --   --   --  12* 21 22    < > = values in this interval not displayed. Previous work up:         Portions of the record may have been created with voice recognition software.  Occasional wrong word or "sound a like" substitutions may have occurred due to the inherent limitations of voice recognition software. Read the chart carefully and recognize, using context, where substitutions have occurred. If you have any questions, please contact the dictating provider.

## 2023-11-23 NOTE — PROGRESS NOTES
Progress Note - Urology  Cathy Kaur 1940, 78 y.o. female MRN: [id number]    Unit/Bed#: W -01 Encounter: [id number]    Assessment and plan  Sepsis  Peritonitis from acute perforated cholecystitis; suspected component of carcinomatosis-pathology pending  Right hydronephrosis from mid ureteral stricture/narrowing  Small indeterminate bladder lesion on cystoscopy  Right ureteral stent inserted 11/21/23  JESSE with worsening creatinine despite stent  Renal US today to assess if residual hydronephrosis- if there is consult IR for nephrostomy tube insertion  Continues multispecialty care from surgical, nephrology, and medical teams    Subjective: denies complaints. A little out of it due to pain meds-urine clear brown. no fevers. I spoke with her daughter at length today as well, for now taking in all the data and testing, not making any long-term decisions    Review of Systems   Constitutional:  Positive for activity change, appetite change and fatigue. Negative for chills and fever. Respiratory: Negative. Cardiovascular: Negative. Genitourinary:  Positive for decreased urine volume. Negative for difficulty urinating, dysuria, flank pain, frequency, hematuria and urgency. Musculoskeletal: Negative. Psychiatric/Behavioral:  Positive for confusion. Objective:  Vitals: Blood pressure 133/53, pulse 74, temperature 97.6 °F (36.4 °C), temperature source Oral, resp. rate 18, height 5' 3" (1.6 m), weight 94.7 kg (208 lb 12.4 oz), SpO2 95 %. ,Body mass index is 36.98 kg/m². Intake/Output Summary (Last 24 hours) at 11/23/2023 1718  Last data filed at 11/23/2023 1400  Gross per 24 hour   Intake 2349.3 ml   Output 425 ml   Net 1924.3 ml     Invasive Devices       Peripheral Intravenous Line  Duration             Peripheral IV 11/21/23 Distal;Dorsal (posterior); Right Forearm 2 days    Peripheral IV 11/21/23 Distal;Left;Ventral (anterior) Forearm 2 days    Peripheral IV 11/22/23 Left;Proximal;Ventral (anterior) Antecubital 1 day              Drain  Duration             Closed/Suction Drain Left LUQ Bulb 19 Fr. 4 days    Closed/Suction Drain Right RUQ Other (Comment) 12 Fr. 4 days    Ureteral Internal Stent Right ureter 6 Fr. 2 days    Urethral Catheter Non-latex 16 Fr. 2 days                    Physical Exam  Vitals and nursing note reviewed. Constitutional:       General: She is not in acute distress. Appearance: She is well-developed. She is ill-appearing. She is not diaphoretic. Comments: Elderly white female seated upright in bed, daughter at bedside   HENT:      Head: Normocephalic and atraumatic. Cardiovascular:      Rate and Rhythm: Normal rate and regular rhythm. Pulmonary:      Effort: Pulmonary effort is normal.      Breath sounds: Normal breath sounds. Abdominal:      General: Bowel sounds are normal. There is no distension. Palpations: Abdomen is soft. Tenderness: There is no abdominal tenderness. Genitourinary:     Comments: Mcmanus catheter draining clear brown urine  Skin:     General: Skin is warm. Capillary Refill: Capillary refill takes less than 2 seconds. Neurological:      Mental Status: She is alert.       Comments: A little sleepy and disoriented just received pain meds   Psychiatric:         Speech: Speech normal.         Labs:  Recent Labs     11/21/23  0425 11/23/23  1204   WBC 10.63* 7.18     Recent Labs     11/21/23  0425 11/23/23  1204   HGB 9.6* 10.4*       Recent Labs     11/21/23  0425 11/22/23  0651 11/23/23  0611   CREATININE 2.97* 3.02* 3.52*       History:    Past Medical History:   Diagnosis Date    Atrial fibrillation ([address])     Hypertension     Insect bite of scalp 12/16/2021    Nasal dryness 12/15/2021    UTI (urinary tract infection)      Past Surgical History:   Procedure Laterality Date    CHOLECYSTECTOMY LAPAROSCOPIC N/A 11/18/2023    Procedure: LAPAROSCOPIC CONVERTED TO OPEN EXPLORATORY LAPAROTOMY, DRAINAGE OF ABDOMINAL ABSCESS x4, PERITONEAL LAVAGE, PARTIAL OMENTECTOMY, LIVER BIOPSY, OPEN CHOLECYSTOSCOPY AND DRAIN PLACEMENT, UMBILICAL HERNIA REPAIR;  Surgeon: Anali Johnston DO;  Location: [facility];  Service: General    EYE SURGERY      FL RETROGRADE PYELOGRAM  2023    JOINT REPLACEMENT Right     Knee 11/15/19, Hip 2021    AL CYSTO BLADDER W/URETERAL CATHETERIZATION Right 2023    Procedure: CYSTOSCOPY RETROGRADE PYELOGRAM WITH INSERTION STENT URETERAL;  Surgeon: Huey Crespo MD;  Location: [facility];  Service: Urology     Family History   Problem Relation Age of Onset    No Known Problems Mother     Diabetes Father     Stroke Father         syndrome     Social History     Socioeconomic History    Marital status:       Spouse name: None    Number of children: None    Years of education: None    Highest education level: None   Occupational History    None   Tobacco Use    Smoking status: Former     Types: Cigarettes     Quit date:      Years since quittin.9    Smokeless tobacco: Never   Vaping Use    Vaping Use: Never used   Substance and Sexual Activity    Alcohol use: Never    Drug use: Never    Sexual activity: Not Currently     Partners: Male     Birth control/protection: Post-menopausal   Other Topics Concern    None   Social History Narrative    Daily coffee consumption (__cups/day)     Daily cola consumption (__cans/day)     Daily tea consumption (__cups/day)      Social Determinants of Health     Financial Resource Strain: Medium Risk (2023)    Overall Financial Resource Strain (CARDIA)     Difficulty of Paying Living Expenses: Somewhat hard   Food Insecurity: No Food Insecurity (2022)    Hunger Vital Sign     Worried About Running Out of Food in the Last Year: Never true     Ran Out of Food in the Last Year: Never true   Transportation Needs: Unmet Transportation Needs (2023)    PRAPARE - Transportation     Lack of Transportation (Medical): No     Lack of Transportation (Non-Medical): Yes   Physical Activity: Not on file   Stress: Not on file   Social Connections: Not on file   Intimate Partner Violence: Not on file   Housing Stability: Not on file         Mahi Fiore  Date: 11/23/2023 Time: 5:18 PM

## 2023-11-24 NOTE — PROGRESS NOTES
Progress Note - Mac Casper 78 y.o. female MRN: [id number]    Unit/Bed#: W -01 Encounter: [id number]      Assessment:  Mac Caspre is an 78-year-old female known to our service for history of atrophic vaginitis, recurrent UTI presenting to the [place] with a chief complaint of abdominal pain and anorexia with imaging demonstrating gangrenous cholecystitis and CBD dilatation. Patient is status post extensive exploratory laparotomy, open drainage of abdominal abscesses x4, peritoneal lavage, partial omentectomy, liver biopsy, open cholecystotomy, umbilical hernia repair and tube placement with pathology worrisome for malignancy, unclear primary. Tumor markers pending. Intervally, patient was seen in urologic consultation for right hydronephrosis, and this postoperative day 3 cystoscopy, right retrograde pyelography and insertion of right ureteral stent. Patient is afebrile, hemodynamically stable with complaints of mild diffuse abdominal pain. She is followed by nephrology for persistent JESSE with serum creatinine today of 3.54 from baseline of 0.8 in September 2022. Recent urine cultures negative. Gallbladder fluid positive for Enterobacter. Plan:  Continue medical optimization and antibiosis. Appreciate interdisciplinary management of complex patient requiring multiple consultants. Trend labs. Await ultrasound--pending. Maintain Mcmanus catheter to straight drainage. Do not remove. Other drains per general surgery. Patient may require PCN insertion there is evidence of persistent hydronephrosis given patient's poor renal recovery. However caution, patient on warfarin. We will follow along. Subjective:   Denies fever, chills. Feels achy. Objective:     Vitals: Blood pressure 123/61, pulse 87, temperature 98 °F (36.7 °C), resp. rate 18, height 5' 3" (1.6 m), weight 94.7 kg (208 lb 12.4 oz), SpO2 92 %. ,Body mass index is 36.98 kg/m².       Intake/Output Summary (Last 24 hours) at 11/24/2023 1103  Last data filed at 11/24/2023 0911  Gross per 24 hour   Intake 535.66 ml   Output 790 ml   Net -254.34 ml       Physical Exam: General appearance: alert, appears stated age, cooperative, and no distress  Head: Normocephalic, without obvious abnormality, atraumatic  Neck: no JVD and supple, symmetrical, trachea midline  Lungs: diminished breath sounds  Heart: regular rate and rhythm, S1, S2 normal, no murmur, click, rub or gallop  Abdomen: abnormal findings:  mild tenderness in the upper abdomen  Extremities: extremities normal, warm and well-perfused; no cyanosis, clubbing, or edema  Pulses: 2+ and symmetric  Neurologic: Grossly normal  Abdominal dressing-- Clean dry and intact. ADILIA. Mcmanus-bridgett urine. Invasive Devices       Peripheral Intravenous Line  Duration             Peripheral IV 11/21/23 Distal;Dorsal (posterior); Right Forearm 3 days    Peripheral IV 11/21/23 Distal;Left;Ventral (anterior) Forearm 3 days    Peripheral IV 11/22/23 Left;Proximal;Ventral (anterior) Antecubital 1 day              Drain  Duration             Closed/Suction Drain Left LUQ Bulb 19 Fr. 5 days    Closed/Suction Drain Right RUQ Other (Comment) 12 Fr. 5 days    Ureteral Internal Stent Right ureter 6 Fr. 2 days    Urethral Catheter Non-latex 16 Fr. 2 days                  Lab Results   Component Value Date    WBC 8.31 11/24/2023    HGB 10.0 (L) 11/24/2023    HCT 31.5 (L) 11/24/2023    MCV 97 11/24/2023     11/24/2023     Lab Results   Component Value Date    SODIUM 138 11/24/2023    K 3.6 11/24/2023     11/24/2023    CO2 24 11/24/2023    BUN 90 (H) 11/24/2023    CREATININE 3.54 (H) 11/24/2023    GLUC 130 11/24/2023    CALCIUM 7.8 (L) 11/24/2023         Lab, Imaging and other studies: I have personally reviewed pertinent reports.

## 2023-11-24 NOTE — SOCIAL WORK
Palliative LSW saw patient at the bedside today. LSW appreciates the opportunity to provide patient/family with inpatient emotional support and guidance while patient continues to receive medical attention from the medical team.     Topics discussed: Met with pt, pt's dtr Re Darden, brother and VANDANA at bedside. Introduced role of Ade Kathy CONTEH. Pt's main concern during visit today is her nausea. She finds the Zofran to be helpful but is still feeling nauseous. She also reports having long-standing hx of difficulty sleeping--typically uses melatonin at home. She would be like to receive melatonin while she is in the hospital. Primary team made aware of request. Pt's dtr Re Darden spent time reflecting on pt's medical status and recent procedures. Dtr concerned that pt seemed more sleepy/confused yesterday (she feels this has been related to the pain medication pt had been receiving), but is happy that pt appears more alert today. Pt reports she feels she is holding up ok emotionally overall at this time. She is well-supported by her family. Re Darden expressed pt/family are awaiting conversation with oncology team to determine next steps prior to making any decisions re: Gild. Emotional support provided to pt/family with discussing challenges of being hospitalized. Contact information for LSW provided to pt/family. Will continue to follow for on-going support. Areas that need follow-up: Emotional Support; On-going [employer] conversations as pt's clinical presentation evolves  Resources given: None  Others present: Pt's dtr Re Darden, pt's brother and VANDANA      I have spent 45 minutes with Patient and family today in which greater than 50% of this time was spent in counseling/coordination of care regarding Counseling / Coordination of care.      LSW will continue to follow as requested by the medical team, patient, or family

## 2023-11-24 NOTE — PROGRESS NOTES
Progress Note - General Surgery   Madiha Young 78 y.o. female MRN: [id number]  Unit/Bed#: W -01 Encounter: [id number]    Assessment:  78 y.o. female with acute cholecystitis complicated by perforation, sepsis present on admission 2/2 bile peritonitis. Now s/p Laparoscopic converted to open drainage of intra-abdominal abscesses x 4, abdominal washout, gastric serosal repair, omental biopsy, liver biopsy, open cholecystostomy tube placement on 11/18, introp bx c/f metastatic adenocarcinoma, undetermined primary malignancy at this time. Plan:  -Clear toast and crackers  -plan to re-engage palliative care given new tissue bx diagnosis  -Appreciate Nephro: no CRRT at this time  -Appreciate Uro: Renal US to evaluate for hydronephrosis- may warrant IR for PCN  -dvt ppx  -anti-emetics and analgesia prn  -abx to end today  -PT/OT  -d/c hep gtt  -tumor markers ordered    Subjective/Objective     Subjective: Overnight pt c/o intermittent nausea that comes and goes, does not seem to be helped by current prn anti-emetics. Pt reports feeling soreness throughout her body, mainly at hips and back. No emesis. Small flatus, no bm. Reports belching occasionally. Objective: AVSS on RA    Blood pressure (!) 116/49, pulse 72, temperature 98.2 °F (36.8 °C), resp. rate 18, height 5' 3" (1.6 m), weight 94.7 kg (208 lb 12.4 oz), SpO2 93 %. ,Body mass index is 36.98 kg/m². UOP: 300cc PM shift  LUQ ADILIA: 60cc ss  RUQ Marta ADILIA: 30cc serobilious     Invasive Devices       Peripheral Intravenous Line  Duration             Peripheral IV 11/21/23 Distal;Dorsal (posterior); Right Forearm 2 days    Peripheral IV 11/21/23 Distal;Left;Ventral (anterior) Forearm 2 days    Peripheral IV 11/22/23 Left;Proximal;Ventral (anterior) Antecubital 1 day              Drain  Duration             Closed/Suction Drain Left LUQ Bulb 19 Fr. 5 days    Closed/Suction Drain Right RUQ Other (Comment) 12 Fr. 5 days    Ureteral Internal Stent Right ureter 6 Fr. 2 days    Urethral Catheter Non-latex 16 Fr. 2 days                    Physical Exam:  General: No acute distress, alert and oriented  CV: RRR  Lungs: Normal work of breathing   Abdomen: abd soft, non distended, minimally tender.  RUQ sachin tube w/ sero-bilious drainage, LUQ ss output, midline dressing clean/dry/intact  Skin: Warm, dry    Lab, Imaging and other studies:  Recent Labs     11/23/23  0611 11/23/23  1204 11/24/23  0314   WBC  --  7.18 8.31   HGB  --  10.4* 10.0*   PLT  --  312 275   SODIUM 137  --  138   K 4.0  --  3.6     --  100   CO2 23  --  24   BUN 82*  --  90*   CREATININE 3.52*  --  3.54*   GLUC 200*  --  130   CALCIUM 7.7*  --  7.8*   MG 2.7  --  2.7   PHOS  --  5.3* 4.9*

## 2023-11-24 NOTE — PROGRESS NOTES
Progress Note - Palliative & Supportive Care  Jose Alejandro Olmedo Eric  78 y.o.  female  W /W -01   MRN: [id number]  Encounter: [id number]     Assessment  Bile peritonitis, intra-abdominal abscess choledocholithiasis, s/p ex lap and cholecystostomy tube placement  Metastatic adenocarcinoma - unknown primary source  Worsening JESSE  Hydronephrosis  CHF  Palliative care encounter  Goals of care counseling    Plan:  Symptom management  Defer symptom management per primary team at this time  PRN Use of Pain Medications: 6 PRN doses  24 hour Total OME for 11/23: approximately 36mg    Patient reporting nausea at time of visit, spoke with nurse to request dose of Zofran for patient    2. Goals:  Level 1 code status  Disease focused care without limits placed at this time. Oncology to discuss biopsy results and treatment options with patient and daughter prior to further in depth [place] conversations. Will continue discussions regarding [place] as patient's clinical presentation evolves. Encouraged follow up with Palliative Medicine on an outpatient basis after discharge for continued symptom management. 3.  Social support:  Patient is well supported by daughter Mike Milian listening provided  Normalized experience of patient/family  Provided anxiety containment  Provided anticipatory guidance  Encouraged self care  Patient is [employer], spiritual care is visiting with patient - she is finding these visits helpful  She is agreeable to [facility] SW involvement    4. Follow up  Palliative Care will continue to follow and goals of care discussions will be ongoing. Please reach out via [employer] if questions or concerns arise. 5. Care Coordination  Reviewed case with [facility] Nury CONTEH    24 Hour History  Chart reviewed before visit. Patient in bed at time of visit. She reports needing to be repositioned and is having nausea - contacted RN for Zofran and to turn patient.  Offers no other complaints at this time.    Patient reports she is holding up "not so good." Discussed with patient [address] Kathy pfeiffer and patient was agreeable to having someone to talk things out with. She also reports that the  visited with her and she found his visit to be helpful. Biopsy results show metastatic adenocarcinoma to the omentum and liver - unknown primary source. At time of biopsy, goal was to wait for results and discuss treatment options prior to pursuing further goals of care discussions. Oncology consult was placed this morning and [facility] will follow up tomorrow once oncology has spoken with patient and daughter. Patient reports she spoke with surgery this morning and is aware that oncology will be seeing her. Review of Systems   All other systems reviewed and are negative.     Medications    Current Facility-Administered Medications:     calcium carbonate (TUMS) chewable tablet 1,000 mg, 1,000 mg, Oral, Once, Shashank Rossi PA-C    chlorhexidine (PERIDEX) 0.12 % oral rinse 15 mL, 15 mL, Mouth/Throat, Q12H FLORENTIN, Katie Little PA-C, 15 mL at 11/23/23 2142    HYDROmorphone (DILAUDID) injection 0.5 mg, 0.5 mg, Intravenous, Q2H PRN Max 8/day, Katie Little PA-C, 0.5 mg at 11/24/23 0137    HYDROmorphone HCl (DILAUDID) injection 0.2 mg, 0.2 mg, Intravenous, Q2H PRN Max 8/day, Larry Diaz PA-C, 0.2 mg at 11/24/23 0352    labetalol (NORMODYNE) injection 10 mg, 10 mg, Intravenous, Q4H PRN, Larry Diaz PA-C    metoclopramide (REGLAN) injection 10 mg, 10 mg, Intravenous, Q6H, Yris Metcalf MD, 10 mg at 11/24/23 0536    metoprolol (LOPRESSOR) injection 5 mg, 5 mg, Intravenous, Q6H PRN, Larry Diaz PA-C, 5 mg at 11/20/23 0402    metoprolol (LOPRESSOR) injection 5 mg, 5 mg, Intravenous, Q6H, Katie Little PA-C, 5 mg at 11/24/23 0417    metroNIDAZOLE (FLAGYL) IVPB (premix) 500 mg 100 mL, 500 mg, Intravenous, Q8H, Shashank Rossi PA-C, Last Rate: 200 mL/hr at 11/23/23 2254, 500 mg at 11/23/23 2254 multi-electrolyte (PLASMALYTE-A/ISOLYTE-S PH 7.4) IV solution, 50 mL/hr, Intravenous, Continuous, Malena Alston MD, Last Rate: 50 mL/hr at 11/23/23 1206, 50 mL/hr at 11/23/23 1206    ondansetron (ZOFRAN) injection 4 mg, 4 mg, Intravenous, Q4H PRN, Jatin Dudley PA-C, 4 mg at 11/24/23 0430    warfarin (COUMADIN) tablet 5 mg, 5 mg, Oral, Daily (warfarin), Concha Hitchcock MD, 5 mg at 11/22/23 1815    Objective  /61   Pulse 79   Temp 98 °F (36.7 °C)   Resp 18   Ht 5' 3" (1.6 m)   Wt 94.7 kg (208 lb 12.4 oz)   SpO2 94%   BMI 36.98 kg/m²   Physical Exam  Vitals and nursing note reviewed. Constitutional:       General: She is awake. She is not in acute distress. Appearance: She is obese. She is ill-appearing. HENT:      Head: Normocephalic. Mouth/Throat:      Mouth: Mucous membranes are moist.   Eyes:      Extraocular Movements: Extraocular movements intact. Cardiovascular:      Rate and Rhythm: Normal rate. Pulmonary:      Effort: Pulmonary effort is normal. No respiratory distress. Abdominal:      General: There is no distension. Musculoskeletal:      Cervical back: Normal range of motion and neck supple. Skin:     General: Skin is warm and dry. Findings: Bruising present. Neurological:      General: No focal deficit present. Mental Status: She is lethargic. Psychiatric:         Behavior: Behavior is cooperative.        Lab Results: CBC:   Lab Results   Component Value Date    WBC 8.31 11/24/2023    HGB 10.0 (L) 11/24/2023    HCT 31.5 (L) 11/24/2023    MCV 97 11/24/2023     11/24/2023    RBC 3.24 (L) 11/24/2023    MCH 30.9 11/24/2023    MCHC 31.7 11/24/2023    RDW 14.1 11/24/2023    MPV 10.8 11/24/2023    NRBC 0 11/24/2023   , BMP:  Lab Results   Component Value Date    SODIUM 138 11/24/2023    K 3.6 11/24/2023     11/24/2023    CO2 24 11/24/2023    BUN 90 (H) 11/24/2023    CREATININE 3.54 (H) 11/24/2023    GLUC 130 11/24/2023    CALCIUM 7.8 (L) 11/24/2023 AGAP 14 11/24/2023    EGFR 11 11/24/2023   , PT/PTT:  Lab Results   Component Value Date    PTT 72 (H) 11/24/2023     Imaging Studies: I have personally reviewed pertinent reports. FL retrograde pyelogram  Result Date: 11/21/2023  Impression: Fluoroscopic guidance provided for right retrograde pyelogram and right ureteral stent placement. Echo complete w/ contrast if indicated  Result Date: 11/21/2023  Narrative:   Left Ventricle: Left ventricular cavity size is normal. Wall thickness is mildly increased. There is mild concentric hypertrophy. The left ventricular ejection fraction is 70%. Systolic function is hyperdynamic. Wall motion is normal. Unable to assess diastolic function due to mitral valve disease. Right Ventricle: Right ventricular cavity size is normal. Systolic function is normal.   Left Atrium: The atrium is moderately dilated. Mitral Valve: There is moderate diffuse calcification of the anterior leaflet and posterior leaflet. There is moderately reduced mobility. There is moderate annular calcification. There is moderate regurgitation. There is moderate stenosis. The mitral valve mean gradient is 5mmHg. The mitral valve area by pressure half time is 2.27cm2. Tricuspid Valve: There is mild regurgitation. The right ventricular systolic pressure is moderately elevated. The estimated right ventricular systolic pressure is 77.44 mmHg. EKG, Pathology, and Other Studies: I have personally reviewed pertinent reports. Counseling / Coordination of Care  Total floor / unit time spent today 45 minutes. Greater than 50% of total time was spent with the patient and / or family counseling and / or coordinating of care. A description of the counseling / coordination of care: Chart reviewed, provided medical updates, discussed palliative care and symptom management, provided anticipatory guidance, determined competency and POA/HCA, determined social/family support, provided psychosocial support. PALAK Holm  Palliative & Supportive Care    Portions of this document may have been created using dictation software and as such some "sound alike" terms may have been generated by the system. Do not hesitate to contact me with any questions or clarifications.

## 2023-11-24 NOTE — PLAN OF CARE
Problem: Prexisting or High Potential for Compromised Skin Integrity  Goal: Skin integrity is maintained or improved  Description: INTERVENTIONS:  - Identify patients at risk for skin breakdown  - Assess and monitor skin integrity  - Assess and monitor nutrition and hydration status  - Monitor labs   - Assess for incontinence   - Turn and reposition patient  - Assist with mobility/ambulation  - Relieve pressure over bony prominences  - Avoid friction and shearing  - Provide appropriate hygiene as needed including keeping skin clean and dry  - Evaluate need for skin moisturizer/barrier cream  - Collaborate with interdisciplinary team   - Patient/family teaching  - Consider wound care consult   Outcome: Progressing     Problem: PAIN - ADULT  Goal: Verbalizes/displays adequate comfort level or baseline comfort level  Description: Interventions:  - Encourage patient to monitor pain and request assistance  - Assess pain using appropriate pain scale  - Administer analgesics based on type and severity of pain and evaluate response  - Implement non-pharmacological measures as appropriate and evaluate response  - Consider cultural and social influences on pain and pain management  - Notify physician/advanced practitioner if interventions unsuccessful or patient reports new pain  Outcome: Progressing     Problem: INFECTION - ADULT  Goal: Absence or prevention of progression during hospitalization  Description: INTERVENTIONS:  - Assess and monitor for signs and symptoms of infection  - Monitor lab/diagnostic results  - Monitor all insertion sites, i.e. indwelling lines, tubes, and drains  - Monitor endotracheal if appropriate and nasal secretions for changes in amount and color  - [place] appropriate cooling/warming therapies per order  - Administer medications as ordered  - Instruct and encourage patient and family to use good hand hygiene technique  - Identify and instruct in appropriate isolation precautions for identified infection/condition  Outcome: Progressing  Goal: Absence of fever/infection during neutropenic period  Description: INTERVENTIONS:  - Monitor WBC    Outcome: Progressing     Problem: SAFETY ADULT  Goal: Patient will remain free of falls  Description: INTERVENTIONS:  - Educate patient/family on patient safety including physical limitations  - Instruct patient to call for assistance with activity   - Consult OT/PT to assist with strengthening/mobility   - Keep Call bell within reach  - Keep bed low and locked with side rails adjusted as appropriate  - Keep care items and personal belongings within reach  - Initiate and maintain comfort rounds  - Make Fall Risk Sign visible to staff  - Offer Toileting every 2 Hours, in advance of need  - Initiate/Maintain alarm  - Obtain necessary fall risk management equipment:   - Apply yellow socks and bracelet for high fall risk patients  - Consider moving patient to room near nurses station  Outcome: Progressing  Goal: Maintain or return to baseline ADL function  Description: INTERVENTIONS:  -  Assess patient's ability to carry out ADLs; assess patient's baseline for ADL function and identify physical deficits which impact ability to perform ADLs (bathing, care of mouth/teeth, toileting, grooming, dressing, etc.)  - Assess/evaluate cause of self-care deficits   - Assess range of motion  - Assess patient's mobility; develop plan if impaired  - Assess patient's need for assistive devices and provide as appropriate  - Encourage maximum independence but intervene and supervise when necessary  - Involve family in performance of ADLs  - Assess for home care needs following discharge   - Consider OT consult to assist with ADL evaluation and planning for discharge  - Provide patient education as appropriate  Outcome: Progressing  Goal: Maintains/Returns to pre admission functional level  Description: INTERVENTIONS:  - Perform AM-PAC 6 Click Basic Mobility/ Daily Activity assessment daily.  - Set and communicate daily mobility goal to care team and patient/family/caregiver. - Collaborate with rehabilitation services on mobility goals if consulted  - Perform Range of Motion 3 times a day. - Reposition patient every 2 hours. - Dangle patient 3 times a day  - Stand patient 3 times a day  - Ambulate patient 3 times a day  - Out of bed to chair 3 times a day   - Out of bed for meals 3 times a day  - Out of bed for toileting  - Record patient progress and toleration of activity level   Outcome: Progressing     Problem: DISCHARGE PLANNING  Goal: Discharge to home or other facility with appropriate resources  Description: INTERVENTIONS:  - Identify barriers to discharge w/patient and caregiver  - Arrange for needed discharge resources and transportation as appropriate  - Identify discharge learning needs (meds, wound care, etc.)  - Arrange for interpretive services to assist at discharge as needed  - Refer to Case Management Department for coordinating discharge planning if the patient needs post-hospital services based on physician/advanced practitioner order or complex needs related to functional status, cognitive ability, or social support system  Outcome: Progressing     Problem: Knowledge Deficit  Goal: Patient/family/caregiver demonstrates understanding of disease process, treatment plan, medications, and discharge instructions  Description: Complete learning assessment and assess knowledge base. Interventions:  - Provide teaching at level of understanding  - Provide teaching via preferred learning methods  Outcome: Progressing     Problem: Nutrition/Hydration-ADULT  Goal: Nutrient/Hydration intake appropriate for improving, restoring or maintaining nutritional needs  Description: Monitor and assess patient's nutrition/hydration status for malnutrition. Collaborate with interdisciplinary team and initiate plan and interventions as ordered.   Monitor patient's weight and dietary intake as ordered or per policy. Utilize nutrition screening tool and intervene as necessary. Determine patient's food preferences and provide high-protein, high-caloric foods as appropriate. INTERVENTIONS:  - Monitor oral intake, urinary output, labs, and treatment plans  - Assess nutrition and hydration status and recommend course of action  - Evaluate amount of meals eaten  - Assist patient with eating if necessary   - Allow adequate time for meals  - Recommend/ encourage appropriate diets, oral nutritional supplements, and vitamin/mineral supplements  - Order, calculate, and assess calorie counts as needed  - Recommend, monitor, and adjust tube feedings and TPN/PPN based on assessed needs  - Assess need for intravenous fluids  - Provide specific nutrition/hydration education as appropriate  - Include patient/family/caregiver in decisions related to nutrition  Outcome: Progressing     Problem: NEUROSENSORY - ADULT  Goal: Achieves maximal functionality and self care  Description: INTERVENTIONS  - Monitor swallowing and airway patency with patient fatigue and changes in neurological status  - Encourage and assist patient to increase activity and self care.    - Encourage visually impaired, hearing impaired and aphasic patients to use assistive/communication devices  Outcome: Progressing     Problem: CARDIOVASCULAR - ADULT  Goal: Maintains optimal cardiac output and hemodynamic stability  Description: INTERVENTIONS:  - Monitor I/O, vital signs and rhythm  - Monitor for S/S and trends of decreased cardiac output  - Administer and titrate ordered vasoactive medications to optimize hemodynamic stability  - Assess quality of pulses, skin color and temperature  - Assess for signs of decreased coronary artery perfusion  - Instruct patient to report change in severity of symptoms  Outcome: Progressing  Goal: Absence of cardiac dysrhythmias or at baseline rhythm  Description: INTERVENTIONS:  - Continuous cardiac monitoring, vital signs, obtain 12 lead EKG if ordered  - Administer antiarrhythmic and heart rate control medications as ordered  - Monitor electrolytes and administer replacement therapy as ordered  Outcome: Progressing     Problem: RESPIRATORY - ADULT  Goal: Achieves optimal ventilation and oxygenation  Description: INTERVENTIONS:  - Assess for changes in respiratory status  - Assess for changes in mentation and behavior  - Position to facilitate oxygenation and minimize respiratory effort  - Oxygen administered by appropriate delivery if ordered  - Initiate smoking cessation education as indicated  - Encourage broncho-pulmonary hygiene including cough, deep breathe, Incentive Spirometry  - Assess the need for suctioning and aspirate as needed  - Assess and instruct to report SOB or any respiratory difficulty  - Respiratory Therapy support as indicated  Outcome: Progressing     Problem: GASTROINTESTINAL - ADULT  Goal: Minimal or absence of nausea and/or vomiting  Description: INTERVENTIONS:  - Administer IV fluids if ordered to ensure adequate hydration  - Maintain NPO status until nausea and vomiting are resolved  - Nasogastric tube if ordered  - Administer ordered antiemetic medications as needed  - Provide nonpharmacologic comfort measures as appropriate  - Advance diet as tolerated, if ordered  - Consider nutrition services referral to assist patient with adequate nutrition and appropriate food choices  Outcome: Progressing  Goal: Maintains or returns to baseline bowel function  Description: INTERVENTIONS:  - Assess bowel function  - Encourage oral fluids to ensure adequate hydration  - Administer IV fluids if ordered to ensure adequate hydration  - Administer ordered medications as needed  - Encourage mobilization and activity  - Consider nutritional services referral to assist patient with adequate nutrition and appropriate food choices  Outcome: Progressing  Goal: Maintains adequate nutritional intake  Description: INTERVENTIONS:  - Monitor percentage of each meal consumed  - Identify factors contributing to decreased intake, treat as appropriate  - Assist with meals as needed  - Monitor I&O, weight, and lab values if indicated  - Obtain nutrition services referral as needed  Outcome: Progressing  Goal: Oral mucous membranes remain intact  Description: INTERVENTIONS  - Assess oral mucosa and hygiene practices  - Implement preventative oral hygiene regimen  - Implement oral medicated treatments as ordered  - Initiate Nutrition services referral as needed  Outcome: Progressing     Problem: METABOLIC, FLUID AND ELECTROLYTES - ADULT  Goal: Electrolytes maintained within normal limits  Description: INTERVENTIONS:  - Monitor labs and assess patient for signs and symptoms of electrolyte imbalances  - Administer electrolyte replacement as ordered  - Monitor response to electrolyte replacements, including repeat lab results as appropriate  - Instruct patient on fluid and nutrition as appropriate  Outcome: Progressing  Goal: Fluid balance maintained  Description: INTERVENTIONS:  - Monitor labs   - Monitor I/O and WT  - Instruct patient on fluid and nutrition as appropriate  - Assess for signs & symptoms of volume excess or deficit  Outcome: Progressing     Problem: SKIN/TISSUE INTEGRITY - ADULT  Goal: Skin Integrity remains intact(Skin Breakdown Prevention)  Description: Assess:  -Perform [place] assessment every   -Clean and moisturize skin every   -Inspect skin when repositioning, toileting, and assisting with ADLS  -Assess under medical devices such as  every   -Assess extremities for adequate circulation and sensation     Bed Management:  -Have minimal linens on bed & keep smooth, unwrinkled  -Change linens as needed when moist or perspiring  -Avoid sitting or lying in one position for more than  hours while in bed  -Keep HOB at degrees     Toileting:  -Offer bedside commode  -Assess for incontinence every   -Use incontinent care products after each incontinent episode such as     Activity:  -Mobilize patient  times a day  -Encourage activity and walks on unit  -Encourage or provide ROM exercises   -Turn and reposition patient every  Hours  -Use appropriate equipment to lift or move patient in bed  -Instruct/ Assist with weight shifting every  when out of bed in chair  -Consider limitation of chair time  hour intervals    Skin Care:  -Avoid use of baby powder, tape, friction and shearing, hot water or constrictive clothing  -Relieve pressure over bony prominences using   -Do not massage red bony areas    Next Steps:  -Teach patient strategies to minimize risks such as    -Consider consults to  interdisciplinary teams such as   Outcome: Progressing  Goal: Incision(s), wounds(s) or drain site(s) healing without S/S of infection  Description: INTERVENTIONS  - Assess and document dressing, incision, wound bed, drain sites and surrounding tissue  - Provide patient and family education  - Perform skin care/dressing changes every   Outcome: Progressing  Goal: Pressure injury heals and does not worsen  Description: Interventions:  - Implement low air loss mattress or specialty surface (Criteria met)  - Apply silicone foam dressing  - Instruct/assist with weight shifting every  minutes when in chair   - Limit chair time to  hour intervals  - Use special pressure reducing interventions such as  when in chair   - Apply fecal or urinary incontinence containment device   - Perform passive or active ROM every   - Turn and reposition patient & offload bony prominences every  hours   - Utilize friction reducing device or surface for transfers   - Consider consults to  interdisciplinary teams such as   - Use incontinent care products after each incontinent episode such as  - Consider nutrition services referral as needed  Outcome: Progressing

## 2023-11-24 NOTE — PLAN OF CARE
Problem: PAIN - ADULT  Goal: Verbalizes/displays adequate comfort level or baseline comfort level  Description: Interventions:  - Encourage patient to monitor pain and request assistance  - Assess pain using appropriate pain scale  - Administer analgesics based on type and severity of pain and evaluate response  - Implement non-pharmacological measures as appropriate and evaluate response  - Consider cultural and social influences on pain and pain management  - Notify physician/advanced practitioner if interventions unsuccessful or patient reports new pain  11/24/2023 0457 by Micha Roger RN  Outcome: Progressing  11/24/2023 0456 by Micha Roger RN  Outcome: Progressing     Problem: INFECTION - ADULT  Goal: Absence or prevention of progression during hospitalization  Description: INTERVENTIONS:  - Assess and monitor for signs and symptoms of infection  - Monitor lab/diagnostic results  - Monitor all insertion sites, i.e. indwelling lines, tubes, and drains  - Monitor endotracheal if appropriate and nasal secretions for changes in amount and color  - [place] appropriate cooling/warming therapies per order  - Administer medications as ordered  - Instruct and encourage patient and family to use good hand hygiene technique  - Identify and instruct in appropriate isolation precautions for identified infection/condition  11/24/2023 0457 by Micha Roger RN  Outcome: Progressing  11/24/2023 0456 by Micha Roger RN  Outcome: Progressing  Goal: Absence of fever/infection during neutropenic period  Description: INTERVENTIONS:  - Monitor WBC    11/24/2023 0457 by Micha Roger RN  Outcome: Progressing  11/24/2023 0456 by Micha Roger RN  Outcome: Progressing     Problem: SAFETY ADULT  Goal: Patient will remain free of falls  Description: INTERVENTIONS:  - Educate patient/family on patient safety including physical limitations  - Instruct patient to call for assistance with activity   - Consult OT/PT to assist with strengthening/mobility   - Keep Call bell within reach  - Keep bed low and locked with side rails adjusted as appropriate  - Keep care items and personal belongings within reach  - Initiate and maintain comfort rounds  - Make Fall Risk Sign visible to staff  - Offer Toileting every 2 Hours, in advance of need  - Initiate/Maintain alarm  - Obtain necessary fall risk management equipment:   - Apply yellow socks and bracelet for high fall risk patients  - Consider moving patient to room near nurses station  11/24/2023 0457 by Bimal Jackson RN  Outcome: Progressing  11/24/2023 0456 by Biaml Jackson RN  Outcome: Progressing  Goal: Maintain or return to baseline ADL function  Description: INTERVENTIONS:  -  Assess patient's ability to carry out ADLs; assess patient's baseline for ADL function and identify physical deficits which impact ability to perform ADLs (bathing, care of mouth/teeth, toileting, grooming, dressing, etc.)  - Assess/evaluate cause of self-care deficits   - Assess range of motion  - Assess patient's mobility; develop plan if impaired  - Assess patient's need for assistive devices and provide as appropriate  - Encourage maximum independence but intervene and supervise when necessary  - Involve family in performance of ADLs  - Assess for home care needs following discharge   - Consider OT consult to assist with ADL evaluation and planning for discharge  - Provide patient education as appropriate  11/24/2023 0457 by Bimal Jackson RN  Outcome: Progressing  11/24/2023 0456 by Bimal Jackson RN  Outcome: Progressing  Goal: Maintains/Returns to pre admission functional level  Description: INTERVENTIONS:  - Perform AM-PAC 6 Click Basic Mobility/ Daily Activity assessment daily.  - Set and communicate daily mobility goal to care team and patient/family/caregiver.    - Collaborate with rehabilitation services on mobility goals if consulted  - Perform Range of Motion 3 times a day. - Reposition patient every 2 hours.   - Dangle patient 3 times a day  - Stand patient 3 times a day  - Ambulate patient 3 times a day  - Out of bed to chair 3 times a day   - Out of bed for meals 3 times a day  - Out of bed for toileting  - Record patient progress and toleration of activity level   11/24/2023 0457 by Yolande Colon RN  Outcome: Progressing  11/24/2023 0456 by Yolande Colon RN  Outcome: Progressing

## 2023-11-24 NOTE — PROGRESS NOTES
[address] LORNA Casper 78 y.o. female MRN: [id number]  Unit/Bed#: W -01 Encounter: [id number]  Reason for Consult: Acute kidney injury    ASSESSMENT and PLAN:  73-year-old female with a history of atrial fibrillation on Coumadin, hypertension, UTIs who presented with abdominal pain for 2 to 3 weeks and found to have cholecystitis with perforation and peritonitis. Nephrology was consulted for management of acute kidney injury. Nonoliguric acute kidney injury(POA):  Etiology:   Obstructive uropathy and ATN due to intraoperative hemodynamic perturbations, hypotension, relative hypotension  Baseline unclear. Creatinine 0.8 mg/dL as of September 2022   Urinalysis:1+ protein, large blood, hematuria, leukocyturia, 10-25 hyaline casts. Innumerable epithelial cells  Creatinine 1.67 on admission increasing and fluctuating near 2 to 2.2 mg/dL and subsequently increasing up to 2.97 on 11/21. Renal ultrasound shows persistent right moderate hydroureteronephrosis therefore patient taken to the OR on 11/21 for right ureteral stent placement. Continued on IV fluids for hydration. 11/23 creatinine increased to 3.5. IV fluids continued but dose tapered. 11/24: Creatinine elevated/no change over the last 24 hours. Hopefully plateauing and will improve. Awaiting follow-up renal ultrasound. Plan:  Await results of renal ultrasound  Continue supportive care, low-dose IV fluids. Intake remains poor  Spoke with patient's daughter. At this time disease focused care. New diagnosis of metastatic cancer. They remain in agreement regarding renal replacement therapy if necessary. Palliative care following in light of biopsy report.   Awaiting oncology input    Obstructive uropathy:  Right ureteral stricture with moderate right hydroureteronephrosis  Urology following  Status post stent placement -11/21  Follow-up renal ultrasound pending    Cholecystitis:  Complicated by perforation and peritonitis  Postop drainage of intra-abdominal abscess, washout, gastric serosal repair, omental biopsy, liver biopsy and open cholecystostomy tube placement  Taking clear liquids. Reports upset stomach with fluid intake  Not passing flatus. Abdomen quiet  Surgery following    Volume status/hypertension:  Mild third spacing. No evidence of total volume overload  Blood pressure acceptable    Acid-base/electrolytes:  Acceptable  Bicarbonate acceptable  Continue Plasma-Lyte    Anemia:  Management per primary team  10.0, stable  Transfuse for hemoglobin less than 7    Hyperphosphatemia:   Phosphorus 4.9, decreasing  No indication for binder at this time. When oral intake has increased low phosphorus restrictions to diet    Metastatic adenocarcinoma:  Primary source unknown  Awaiting oncology consult    History of recurrent UTI: Improved after patient began using estrogen cream    DISPOSITION:  Continue low-dose IV fluids Plasma-Lyte 50 mL/h  Await renal ultrasound  Await oncology's recommendations    SUBJECTIVE / 24H INTERVAL HISTORY:  Patient feeling unwell. She is vague regarding her complaints. When questioned further she states that she has difficulty drinking water because it upsets her stomach. Oral intake makes her stomach upset. She is not passing flatus. She denies vomiting. No overnight events reported.     OBJECTIVE:  Current Weight: Weight - Scale: 94.7 kg (208 lb 12.4 oz)  Vitals:    11/23/23 1949 11/23/23 2105 11/24/23 0807 11/24/23 1009   BP: 117/53 (!) 116/49 123/61 123/61   BP Location:       Pulse: 72 72 79 87   Resp:   18    Temp: 98 °F (36.7 °C) 98.2 °F (36.8 °C) 98 °F (36.7 °C)    TempSrc:       SpO2: 93% 93% 94% 92%   Weight:       Height:           Intake/Output Summary (Last 24 hours) at 11/24/2023 1047  Last data filed at 11/24/2023 0911  Gross per 24 hour   Intake 535.66 ml   Output 790 ml   Net -254.34 ml       General: Appearing female lying quietly in bed  Skin: no rash  Eyes: anicteric sclera  ENT: moist mucous membrane  Neck: supple  Chest: Full effort. Equal breath sounds. Anterior chest clear. Decreased at the bases. CVS: s1s2, no murmur, no gallop, no rub.   Irregular rhythm, normal rate  Abdomen:  soft, no bowel sounds  Extremities: +1 edema LE b/l  :  cutler draining bridgett urine  Neuro: AAOX3  Psych: normal affect   Medications:    Current Facility-Administered Medications:     calcium carbonate (TUMS) chewable tablet 1,000 mg, 1,000 mg, Oral, Once, Jesse Maribeth PA-C    chlorhexidine (PERIDEX) 0.12 % oral rinse 15 mL, 15 mL, Mouth/Throat, Q12H FLORENTIN, Katie Anabel, PA-C, 15 mL at 11/24/23 0909    HYDROmorphone (DILAUDID) injection 0.5 mg, 0.5 mg, Intravenous, Q2H PRN Max 8/day, Shira Ode, PA-C, 0.5 mg at 11/24/23 0137    HYDROmorphone HCl (DILAUDID) injection 0.2 mg, 0.2 mg, Intravenous, Q2H PRN Max 8/day, Katie Anabel, PA-C, 0.2 mg at 11/24/23 0352    labetalol (NORMODYNE) injection 10 mg, 10 mg, Intravenous, Q4H PRN, Shirlyn Ode, PA-C    metoclopramide (REGLAN) injection 10 mg, 10 mg, Intravenous, Q6H, Rema Soulier, MD, 10 mg at 11/24/23 0536    metoprolol (LOPRESSOR) injection 5 mg, 5 mg, Intravenous, Q6H PRN, Shirlyn Ode, PA-C, 5 mg at 11/20/23 0402    metoprolol (LOPRESSOR) injection 5 mg, 5 mg, Intravenous, Q6H, Katie Anabel, PA-C, 5 mg at 11/24/23 1008    multi-electrolyte (PLASMALYTE-A/ISOLYTE-S PH 7.4) IV solution, 50 mL/hr, Intravenous, Continuous, Mary Sykes MD, Last Rate: 50 mL/hr at 11/24/23 1046, 50 mL/hr at 11/24/23 1046    ondansetron (ZOFRAN) injection 4 mg, 4 mg, Intravenous, Q4H PRN, Shira Garcia PA-C, 4 mg at 11/24/23 0430    warfarin (COUMADIN) tablet 5 mg, 5 mg, Oral, Daily (warfarin), Rema Soulier, MD, 5 mg at 11/22/23 1815    Laboratory Results:  Results from last 7 days   Lab Units 11/24/23  0314 11/23/23  1204 11/23/23  4330 11/22/23  0651 11/21/23  0425 11/20/23  1456 11/20/23  0448 11/19/23  0532 11/18/23  1717 11/18/23  1255 11/18/23  0455   WBC Thousand/uL 8.31 7.18  --   --  10.63*  --  10.28* 11.08* 10.13  --  13.24*   HEMOGLOBIN g/dL 10.0* 10.4*  --   --  9.6*  --  9.2* 9.6* 9.5*  --  10.5*   HEMATOCRIT % 31.5* 33.3*  --   --  32.3*  --  29.6* 31.2* 30.8*  --  33.7*   PLATELETS Thousands/uL 275 312  --   --  326  --  310 319 308  --  381   POTASSIUM mmol/L 3.6  --  4.0 4.3 4.8 4.4 4.0 3.9 3.8 4.3 4.2   CHLORIDE mmol/L 100  --  100 106 103 104 104 103 104 98 99   CO2 mmol/L 24  --  23 16* 19* 20* 21 23 22 25 26   BUN mg/dL 90*  --  82* 67* 59* 54* 48* 41* 40* 41* 37*   CREATININE mg/dL 3.54*  --  3.52* 3.02* 2.97* 2.84* 2.54* 1.97* 1.99* 2.19* 2.06*   CALCIUM mg/dL 7.8*  --  7.7* 7.7* 8.3* 8.6 8.1* 8.0* 7.8* 8.8 8.4   MAGNESIUM mg/dL 2.7  --  2.7 2.7  --   --  2.5 2.5 2.4 2.5 1.6*   PHOSPHORUS mg/dL 4.9* 5.3*  --  5.9*  --   --  5.4* 5.2* 4.7* 4.7* 4.3*

## 2023-11-24 NOTE — CONSULTS
Medical Oncology/Hematology Consult Note  Cam Dunham, female, 78 y.o., 1940,  W /W -01, [id number]     Assessment and Plan  1. Adenocarcinoma  Diagnosed intraoperatively and on surgical specimens. Staining is suggestive of upper GI malignancy, although cannot rule out pancreaticobiliary or lower GI. Discussed in depth with the patient and her family and they do wish to pursue workup and evaluation of her underlying malignancy. Discussed general goals of treatment for palliative intent. With metastatic disease, we discussed this would not be curative but would be palliative in nature/disease control. Discussed treatment could encompass chemotherapy or immunotherapy or other targeted therapies, but will be determined once diagnosis is established. Will place orders for work up with further imaging and GI consult for consideration of EGD. Would be good if we could get as much done while she is here in the hospital, but if unable to get all performed, can be completed as an outpatient. Reason for consultation: Newly diagnosed adenocarcinoma    History of present illness:   Ms. Kelsi Sapp is an 70-year-old female who presented to the ER on 11/17/2023 with abdominal pain mainly on the right side and radiating. It has been ongoing for a few weeks and decreased eating and nausea. Her daughter states that she had intermittent abdominal pain over the summer but really escalation of symptoms prior to her admission. Underwent CT abdomen and pelvis on 11/17/2023 that demonstrates gallstones with apparent gallbladder wall thickening and nodularity raising the possibility of cholecystitis. Some nonspecific heterogeneous low-attenuation of the liver adjacent to the gallbladder that is new from her previous CT scan in 11/20/2021. Could reflect steatosis although an infiltrative process is not excluded. Choledocholithiasis with intra and extrahepatic biliary ductal dilatation.   New right-sided moderate hydronephrosis without discernible obstruction mass or calculus. Moderate abdominal and pelvic ascites. Subcentimeter pancreatic cyst that was on prior study. Multiple subcentimeter basal pulmonary nodules not seen on previous imagings may be infectious or inflammatory or neoplastic in nature. Also underwent a right upper quadrant ultrasound on 11/17/2023. Results demonstrate acute cholecystitis. Gallbladder wall thickening with irregular margins may represent sloughed membranes but concerning for gangrenous cholecystitis. Dilated common bile duct and mild intrahepatic biliary ductal dilatation. Mildly dilated pancreatic duct measuring 6 mm which may be related to the choledocholithiasis and do recommend imaging for further evaluation. Right hydronephrosis. Small right upper abdominal ascites. She went to the OR on 11/18/2023. Imaging demonstrated cholecystitis with perforation and sepsis secondary to bile peritonitis. Originally scheduled for laparoscopic surgery but was converted to open due to drainage of intra-abdominal abscesses x 4, abdominal washout, gastric serosal repair, omental biopsy, liver biopsy, open cholecystectomy tube placed, IntraOp biopsy concerning for metastatic adenocarcinoma. On antibiotics. Elevated creatinine since admission. Pathology from her surgical specimens demonstrates metastatic adenocarcinoma from the omental biopsy. Stains suggestive of an upper GI primary. However a pancreaticobiliary or lower GI primary cannot be excluded. Liver biopsy demonstrates metastatic adenocarcinoma. Hepatic implant also demonstrates metastatic adenocarcinoma. Had peritoneal fluid and gallbladder fluid collected. Peritoneal fluid demonstrates malignant cells present adenocarcinoma. Patient states that she did have colonoscopy within the past 5 years. No issues on her colonoscopy.   Did not have any swallowing issues but does describe having some abdominal pain on and off over the summer. There is a significant family history of cancer in her brothers and sisters, although is unclear what cancer the each had. She was a smoker in the past.  Denies drinking. Denies drug use. Has 2 children, a son and a daughter. Review of Systems:   Review of Systems   Constitutional:  Positive for activity change and fatigue. Negative for chills and fever. HENT:  Negative for ear pain, sore throat, trouble swallowing and voice change. Eyes:  Negative for pain and visual disturbance. Respiratory:  Negative for cough and shortness of breath. Cardiovascular:  Negative for chest pain and palpitations. Gastrointestinal:  Positive for abdominal pain and nausea. Genitourinary:  Negative for dysuria and hematuria. Musculoskeletal:  Negative for arthralgias and back pain. Skin:  Negative for color change and rash. Neurological:  Negative for seizures and syncope. Hematological:  Negative for adenopathy. All other systems reviewed and are negative.       Past Medical History:   Diagnosis Date    Atrial fibrillation ([address])     Hypertension     Insect bite of scalp 12/16/2021    Nasal dryness 12/15/2021    UTI (urinary tract infection)        Past Surgical History:   Procedure Laterality Date    CHOLECYSTECTOMY LAPAROSCOPIC N/A 11/18/2023    Procedure: LAPAROSCOPIC CONVERTED TO OPEN EXPLORATORY LAPAROTOMY, DRAINAGE OF ABDOMINAL ABSCESS x4, PERITONEAL LAVAGE, PARTIAL OMENTECTOMY, LIVER BIOPSY, OPEN CHOLECYSTOSCOPY AND DRAIN PLACEMENT, UMBILICAL HERNIA REPAIR;  Surgeon: Alexus Mccormack DO;  Location: [facility];  Service: General    EYE SURGERY      FL RETROGRADE PYELOGRAM  11/21/2023    JOINT REPLACEMENT Right     Knee 11/15/19, Hip 8/2021    OK CYSTO BLADDER W/URETERAL CATHETERIZATION Right 11/21/2023    Procedure: CYSTOSCOPY RETROGRADE PYELOGRAM WITH INSERTION STENT URETERAL;  Surgeon: Raffaele Denton MD;  Location: [facility];  Service: Urology       Family History   Problem Relation Age of Onset    No Known Problems Mother     Diabetes Father     Stroke Father         syndrome       Social History     Socioeconomic History    Marital status:       Spouse name: None    Number of children: None    Years of education: None    Highest education level: None   Occupational History    None   Tobacco Use    Smoking status: Former     Types: Cigarettes     Quit date:      Years since quittin.9    Smokeless tobacco: Never   Vaping Use    Vaping Use: Never used   Substance and Sexual Activity    Alcohol use: Never    Drug use: Never    Sexual activity: Not Currently     Partners: Male     Birth control/protection: Post-menopausal   Other Topics Concern    None   Social History Narrative    Daily coffee consumption (__cups/day)     Daily cola consumption (__cans/day)     Daily tea consumption (__cups/day)      Social Determinants of Health     Financial Resource Strain: Medium Risk (2023)    Overall Financial Resource Strain (CARDIA)     Difficulty of Paying Living Expenses: Somewhat hard   Food Insecurity: No Food Insecurity (2022)    Hunger Vital Sign     Worried About Running Out of Food in the Last Year: Never true     Ran Out of Food in the Last Year: Never true   Transportation Needs: Unmet Transportation Needs (2023)    PRAPARE - Transportation     Lack of Transportation (Medical): No     Lack of Transportation (Non-Medical): Yes   Physical Activity: Not on file   Stress: Not on file   Social Connections: Not on file   Intimate Partner Violence: Not on file   Housing Stability: Not on file         Current Facility-Administered Medications:     calcium carbonate (TUMS) chewable tablet 1,000 mg, 1,000 mg, Oral, Once, [email or web address]    chlorhexidine (PERIDEX) 0.12 % oral rinse 15 mL, 15 mL, Mouth/Throat, Q12H [address] PA-C, 15 mL at 23 0909    HYDROmorphone (DILAUDID) injection 0.5 mg, 0.5 mg, Intravenous, Q2H PRN Max 8/dayHerbert Anabel, PA-C, 0.5 mg at 11/24/23 0137    HYDROmorphone HCl (DILAUDID) injection 0.2 mg, 0.2 mg, Intravenous, Q2H PRN Max 8/day, Katie Franciscoergan, PA-C, 0.2 mg at 11/24/23 0352    labetalol (NORMODYNE) injection 10 mg, 10 mg, Intravenous, Q4H PRN, Shira Garcia PA-C    metoclopramide (REGLAN) injection 10 mg, 10 mg, Intravenous, Q6H, Rema Soulier, MD, 10 mg at 11/24/23 0536    metoprolol (LOPRESSOR) injection 5 mg, 5 mg, Intravenous, Q6H PRN, VON Mike-MACIEL, 5 mg at 11/20/23 0402    metoprolol (LOPRESSOR) injection 5 mg, 5 mg, Intravenous, Q6H, VON Blevins-MACIEL, 5 mg at 11/24/23 1008    multi-electrolyte (PLASMALYTE-A/ISOLYTE-S PH 7.4) IV solution, 50 mL/hr, Intravenous, Continuous, Mary Sykes MD, Last Rate: 50 mL/hr at 11/24/23 1046, 50 mL/hr at 11/24/23 1046    ondansetron (ZOFRAN) injection 4 mg, 4 mg, Intravenous, Q4H PRN, Shira Garcia PA-C, 4 mg at 11/24/23 0430    warfarin (COUMADIN) tablet 5 mg, 5 mg, Oral, Daily (warfarin), Rema Soulier, MD, 5 mg at 11/22/23 1815    Medications Prior to Admission   Medication    acetaminophen (TYLENOL) 325 mg tablet    amLODIPine (NORVASC) 5 mg tablet    escitalopram (LEXAPRO) 10 mg tablet    furosemide (LASIX) 20 mg tablet    lisinopril (ZESTRIL) 5 mg tablet    metoprolol tartrate (LOPRESSOR) 25 mg tablet    Myrbetriq 25 MG TB24    pantoprazole (PROTONIX) 40 mg tablet    promethazine (PHENERGAN) 25 mg tablet    sucralfate (CARAFATE) 1 g tablet    warfarin (COUMADIN) 5 mg tablet    ALPRAZolam (XANAX) 0.25 mg tablet    b complex vitamins tablet    Cholecalciferol (VITAMIN D3) 50 MCG (2000 UT) capsule    COLLAGEN PO    estradiol (ESTRACE VAGINAL) 0.1 mg/g vaginal cream    influenza vaccine, high-dose (FLUZONE HIGH-DOSE) 0.7 ML CISCO    Melatonin 3 MG CAPS    Multiple Vitamins-Minerals (PRESERVISION AREDS 2 PO)    oxymetazoline (AFRIN) 0.05 % nasal spray    sodium chloride (OCEAN) 0.65 % nasal spray       Allergies   Allergen Reactions    Ciprofloxacin Chest discomfort and dizziness    Diclofenac     Diphenhydramine Rash and Chest Pain         Physical Exam:    /61   Pulse 87   Temp 98 °F (36.7 °C)   Resp 18   Ht 5' 3" (1.6 m)   Wt 94.7 kg (208 lb 12.4 oz)   SpO2 92%   BMI 36.98 kg/m²     Physical Exam  Constitutional:       General: She is not in acute distress. Appearance: She is ill-appearing. HENT:      Head: Normocephalic and atraumatic. Nose: Nose normal.      Mouth/Throat:      Mouth: Mucous membranes are moist.   Eyes:      General: No scleral icterus. Right eye: No discharge. Left eye: No discharge. Conjunctiva/sclera: Conjunctivae normal.   Pulmonary:      Effort: Pulmonary effort is normal. No respiratory distress. Musculoskeletal:      Cervical back: Normal range of motion. Skin:     General: Skin is warm. Coloration: Skin is not jaundiced. Findings: No lesion. Neurological:      Mental Status: She is oriented to person, place, and time. Cranial Nerves: No cranial nerve deficit. Psychiatric:         Mood and Affect: Mood normal.         Behavior: Behavior normal.         Thought Content:  Thought content normal.         Judgment: Judgment normal.         Recent Results (from the past 48 hour(s))   APTT    Collection Time: 11/22/23  3:07 PM   Result Value Ref Range    PTT 65 (H) 23 - 37 seconds   APTT    Collection Time: 11/23/23  5:20 AM   Result Value Ref Range    PTT 97 (H) 23 - 37 seconds   Protime-INR    Collection Time: 11/23/23  5:20 AM   Result Value Ref Range    Protime 28.0 (H) 11.6 - 14.5 seconds    INR 2.51 (H) 0.84 - 0.99   Basic metabolic panel    Collection Time: 11/23/23  6:11 AM   Result Value Ref Range    Sodium 137 135 - 147 mmol/L    Potassium 4.0 3.5 - 5.3 mmol/L    Chloride 100 96 - 108 mmol/L    CO2 23 21 - 32 mmol/L    ANION GAP 14 mmol/L    BUN 82 (H) 5 - 25 mg/dL    Creatinine 3.52 (H) 0.60 - 1.30 mg/dL    Glucose 200 (H) 65 - 140 mg/dL    Calcium 7.7 (L) 8.4 - 10.2 mg/dL    eGFR 11 ml/min/1.73sq m   Magnesium    Collection Time: 11/23/23  6:11 AM   Result Value Ref Range    Magnesium 2.7 1.9 - 2.7 mg/dL   CBC and differential    Collection Time: 11/23/23 12:04 PM   Result Value Ref Range    WBC 7.18 4.31 - 10.16 Thousand/uL    RBC 3.40 (L) 3.81 - 5.12 Million/uL    Hemoglobin 10.4 (L) 11.5 - 15.4 g/dL    Hematocrit 33.3 (L) 34.8 - 46.1 %    MCV 98 82 - 98 fL    MCH 30.6 26.8 - 34.3 pg    MCHC 31.2 (L) 31.4 - 37.4 g/dL    RDW 14.1 11.6 - 15.1 %    MPV 10.4 8.9 - 12.7 fL    Platelets 971 282 - 762 Thousands/uL    nRBC 0 /100 WBCs    Neutrophils Relative 86 (H) 43 - 75 %    Immat GRANS % 2 0 - 2 %    Lymphocytes Relative 5 (L) 14 - 44 %    Monocytes Relative 7 4 - 12 %    Eosinophils Relative 0 0 - 6 %    Basophils Relative 0 0 - 1 %    Neutrophils Absolute 6.17 1.85 - 7.62 Thousands/µL    Immature Grans Absolute 0.16 0.00 - 0.20 Thousand/uL    Lymphocytes Absolute 0.35 (L) 0.60 - 4.47 Thousands/µL    Monocytes Absolute 0.48 0.17 - 1.22 Thousand/µL    Eosinophils Absolute 0.01 0.00 - 0.61 Thousand/µL    Basophils Absolute 0.01 0.00 - 0.10 Thousands/µL   Phosphorus    Collection Time: 11/23/23 12:04 PM   Result Value Ref Range    Phosphorus 5.3 (H) 2.3 - 4.1 mg/dL   APTT    Collection Time: 11/23/23 12:04 PM   Result Value Ref Range    PTT 59 (H) 23 - 37 seconds   APTT    Collection Time: 11/23/23  8:15 PM   Result Value Ref Range     (H) 23 - 37 seconds   APTT    Collection Time: 11/24/23  2:58 AM   Result Value Ref Range    PTT 72 (H) 23 - 37 seconds   Basic metabolic panel    Collection Time: 11/24/23  3:14 AM   Result Value Ref Range    Sodium 138 135 - 147 mmol/L    Potassium 3.6 3.5 - 5.3 mmol/L    Chloride 100 96 - 108 mmol/L    CO2 24 21 - 32 mmol/L    ANION GAP 14 mmol/L    BUN 90 (H) 5 - 25 mg/dL    Creatinine 3.54 (H) 0.60 - 1.30 mg/dL    Glucose 130 65 - 140 mg/dL    Calcium 7.8 (L) 8.4 - 10.2 mg/dL    eGFR 11 ml/min/1.73sq m   CBC and differential Collection Time: 11/24/23  3:14 AM   Result Value Ref Range    WBC 8.31 4.31 - 10.16 Thousand/uL    RBC 3.24 (L) 3.81 - 5.12 Million/uL    Hemoglobin 10.0 (L) 11.5 - 15.4 g/dL    Hematocrit 31.5 (L) 34.8 - 46.1 %    MCV 97 82 - 98 fL    MCH 30.9 26.8 - 34.3 pg    MCHC 31.7 31.4 - 37.4 g/dL    RDW 14.1 11.6 - 15.1 %    MPV 10.8 8.9 - 12.7 fL    Platelets 794 483 - 874 Thousands/uL    nRBC 0 /100 WBCs    Neutrophils Relative 85 (H) 43 - 75 %    Immat GRANS % 2 0 - 2 %    Lymphocytes Relative 6 (L) 14 - 44 %    Monocytes Relative 7 4 - 12 %    Eosinophils Relative 0 0 - 6 %    Basophils Relative 0 0 - 1 %    Neutrophils Absolute 7.03 1.85 - 7.62 Thousands/µL    Immature Grans Absolute 0.18 0.00 - 0.20 Thousand/uL    Lymphocytes Absolute 0.48 (L) 0.60 - 4.47 Thousands/µL    Monocytes Absolute 0.59 0.17 - 1.22 Thousand/µL    Eosinophils Absolute 0.02 0.00 - 0.61 Thousand/µL    Basophils Absolute 0.01 0.00 - 0.10 Thousands/µL   Magnesium    Collection Time: 11/24/23  3:14 AM   Result Value Ref Range    Magnesium 2.7 1.9 - 2.7 mg/dL   Phosphorus    Collection Time: 11/24/23  3:14 AM   Result Value Ref Range    Phosphorus 4.9 (H) 2.3 - 4.1 mg/dL   APTT    Collection Time: 11/24/23  9:02 AM   Result Value Ref Range    PTT 60 (H) 23 - 37 seconds       FL retrograde pyelogram    Result Date: 11/21/2023  Narrative: RIGHT RETROGRADE PYELOGRAM INDICATION:   Hydronephrosis. COMPARISON: Renal ultrasound 11/20/2023 IMAGES:  6 FLUOROSCOPY TIME:   14 seconds CONTRAST: 15 mL of iohexol (OMNIPAQUE) FINDINGS: Fluoroscopic guidance provided for retrograde pyelogram and right ureteral stent placement. Abrupt caliber change suspicious for stricture is seen involving the midportion of the right ureter with upstream ureteral dilatation. There is moderate to severe right hydronephrosis. No persistent filling defects are noted. Percutaneous nephrostomy tube is partially visualized. Osseous and soft tissue detail limited by technique. Impression: Fluoroscopic guidance provided for right retrograde pyelogram and right ureteral stent placement. Please see procedure report for further details. Workstation performed: [id number]     Echo complete w/ contrast if indicated    Result Date: 11/21/2023  Narrative:   Left Ventricle: Left ventricular cavity size is normal. Wall thickness is mildly increased. There is mild concentric hypertrophy. The left ventricular ejection fraction is 70%. Systolic function is hyperdynamic. Wall motion is normal. Unable to assess diastolic function due to mitral valve disease. Right Ventricle: Right ventricular cavity size is normal. Systolic function is normal.   Left Atrium: The atrium is moderately dilated. Mitral Valve: There is moderate diffuse calcification of the anterior leaflet and posterior leaflet. There is moderately reduced mobility. There is moderate annular calcification. There is moderate regurgitation. There is moderate stenosis. The mitral valve mean gradient is 5mmHg. The mitral valve area by pressure half time is 2.27cm2. Tricuspid Valve: There is mild regurgitation. The right ventricular systolic pressure is moderately elevated. The estimated right ventricular systolic pressure is 76.56 mmHg. US kidney and bladder    Result Date: 11/20/2023  Narrative: RENAL ULTRASOUND INDICATION:   Re-eval hydro. COMPARISON: 11/17/2020 TECHNIQUE:   Ultrasound of the retroperitoneum was performed with a curvilinear transducer utilizing volumetric sweeps and still imaging techniques. FINDINGS: KIDNEYS: Symmetric and normal size. Right kidney:  9.0 x 4.6 x 4.2 cm. Volume 91.6 mL Left kidney:  9.6 x 4.6 x 4.6 cm. Volume 105.5 mL Right kidney Normal echogenicity and contour. No mass is identified. There is stable moderate hydronephrosis. No shadowing calculi. No perinephric fluid collections. Left kidney Normal echogenicity and contour. No mass is identified. No hydronephrosis. No shadowing calculi.  No perinephric fluid collections. URETERS: Nonvisualized. BLADDER: The bladder is collapsed with a Mcmanus catheter. Impression: Stable moderate right-sided hydronephrosis Workstation performed: [id number]     XR chest portable    Result Date: 11/20/2023  Narrative: CHEST INDICATION:   SOB. COMPARISON: 11/29/2021 EXAM PERFORMED/VIEWS:  XR CHEST PORTABLE FINDINGS: Cardiomediastinal silhouette appears unremarkable. Lung markings are crowded secondary to low lung volumes. Within limitations of this examination there is no focal airspace opacity to suggest pneumonia. No pneumothorax or pleural effusion. No acute osseous abnormality identified within limitations of portable radiography,     Impression: No active pulmonary disease on examination which is somewhat limited secondary to low lung volumes. Workstation performed: [id number]     XR abdomen 1 view kub    Result Date: 11/19/2023  Narrative: ABDOMEN INDICATION:   Evaluate perc cholecystostomy tube position. Laparotomy with intraoperative cholecystostomy tube placement Imaging to obtain baseline position COMPARISON: Prior CT VIEWS:  AP supine FINDINGS: Right upper quadrant cholecystostomy tube terminating in the expected position of the gallbladder Upper abdominal surgical drain Enteric tube terminating in the left upper quadrant at the expected position of the stomach Additional curvilinear structure over the right heart, possibly outside the body Right hip arthroplasty There is a nonobstructive bowel gas pattern. Surgical clips Difficult determine if there is free air but this would not be unusual in the immediate postoperative period Visualized lung bases are clear. Visualized osseous structures are unremarkable for the patient's age.      Impression: Interval placement of right upper quadrant percutaneous cholecystostomy tube with additional tubes as described above Workstation performed: [id number]     US right upper quadrant    Result Date: 11/17/2023  Narrative: RIGHT UPPER QUADRANT ULTRASOUND INDICATION:     upper abd pain, liver abnormality on CT. COMPARISON: CT abdomen pelvis 11/17/2023. TECHNIQUE:   Real-time ultrasound of the right upper quadrant was performed with a curvilinear transducer with both volumetric sweeps and still imaging techniques. FINDINGS: PANCREAS: Mildly dilated pancreatic duct measuring 6 mm. AORTA AND IVC:  Visualized portions are normal for patient age. LIVER: Size:  Within normal range. The liver measures 17.0 cm in the midclavicular line. Contour:  Surface contour is smooth. Parenchyma:  Echogenicity and echotexture are within normal limits. No liver mass identified. Limited imaging of the main portal vein shows it to be patent and hepatopetal. BILIARY: The gallbladder is normal in caliber. Gallbladder wall thickening with irregular margins which may represent sloughed membranes, concerning for gangrenous cholecystitis. Mild pericholecystic fluid. There are multiple calculi and sludge present. A positive sonographic Núñez's sign was elicited. Mild intrahepatic biliary dilatation. CBD measures 17 mm, in keeping with the known choledocholithiasis on recent CT. KIDNEY: Right kidney measures 8.8 x 4.4 x 3.8 cm. Volume 77.0 mL Right hydronephrosis. ASCITES:   Small right upper abdominal ascites. Impression: Findings compatible with acute cholecystitis. Gallbladder wall thickening with irregular margins which may represent sloughed membranes, concerning for gangrenous cholecystitis. Dilated common bile duct and mild intrahepatic biliary ductal dilatation, in keeping with the known choledocholithiasis on recent CT. Mildly dilated pancreatic duct measuring 6 mm, which may relate to choledocholithiasis. Recommend contrast-enhanced MRI/MRCP of the abdomen for further evaluation. Right hydronephrosis. Small right upper abdominal ascites. The study was marked in [facility] for immediate notification.  Workstation performed: [id number]     CT abdomen pelvis wo contrast    Result Date: 11/17/2023  Narrative: CT ABDOMEN AND PELVIS WITHOUT IV CONTRAST INDICATION:   Abdominal pain, acute, nonlocalized diffuse abdominal pain, NV. COMPARISON: CT 11/29/2021 TECHNIQUE:  CT examination of the abdomen and pelvis was performed without intravenous contrast. Multiplanar 2D reformatted images were created from the source data. This examination, like all CT scans performed in the [facility], was performed utilizing techniques to minimize radiation dose exposure, including the use of iterative reconstruction and automated exposure control. Radiation dose length product (DLP) for this visit:  1011 mGy-cm Enteric contrast was not administered. FINDINGS: ABDOMEN LOWER CHEST: Mild dependent subsegmental atelectasis both lung bases noted. Mild bronchiectasis right lower lobe noted. There are several scattered nonspecific small subcentimeter nodules present, not clearly appreciated on the prior examination, which could be postinflammatory/infectious or neoplastic in nature. The heart is normal in size. Coronary artery and mitral annular calcification present. Atherosclerotic calcification of the thoracic aorta is noted. Darylene Pipe LIVER/BILIARY TREE: Mild intrahepatic and extrahepatic biliary ductal dilatation noted. Some low-attenuation in segment 4/5 of the liver adjacent to the gallbladder fundus is present difficult to evaluate on this unenhanced exam. Given the gallbladder wall thickening and an nodularity, possibility of gallbladder neoplasia with hepatic infiltration cannot be excluded. Recommend further evaluation with contrast-enhanced CT or MRI/MRCP. GALLBLADDER: Faintly calcified gallstones within the gallbladder lumen again noted. There is moderate gallbladder distention and probable mild wall thickening and nodularity. Cholecystitis to be excluded. There is a 4 mm calcified gallstone in the distal  common bile duct.  There is mild-moderate intra and extrahepatic biliary ductal dilatation present with common bile duct measuring up to 14 mm in diameter. Correlate with liver function studies. SPLEEN:  Unremarkable. PANCREAS: Somewhat atrophic pancreas. Small pancreatic cysts are again identified as described previously including 6 mm pancreatic head cyst, 6 mm pancreatic body/tail junction cyst, and 6 mm pancreatic tail cyst, unchanged from the prior study. As recommended previously, this should be evaluated further with MRI/MRCP if not already performed. ADRENAL GLANDS:  Unremarkable. KIDNEYS/URETERS: There is moderate right hydroureteronephrosis present, new since the prior CT of 11/29/2021, without discernible obstructing calculus or mass on this limited unenhanced study left kidney appears unremarkable. Samul Jake STOMACH AND BOWEL: Moderate fluid distention of the stomach is noted. No definite bowel wall thickening or intestinal obstruction is identified. APPENDIX:  No findings to suggest appendicitis. ABDOMINOPELVIC CAVITY: Moderate abdominal and pelvic pelvic ascites is present. No pneumoperitoneum identified. No adenopathy noted. VESSELS: Atherosclerotic changes are present. No evidence of aneurysm. PELVIS REPRODUCTIVE ORGANS:  Unremarkable for patient's age. URINARY BLADDER: Suboptimally evaluated due to under distention. ABDOMINAL WALL/INGUINAL REGIONS:  Unremarkable. 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 Degenerative change in the spine present. Grade 1 anterior listhesis L4 on L5 due to degenerative facet arthropathy. No acute fracture. No lucent or sclerotic osseous lesion is appreciated. Impression: 1. Gallstones with apparent gallbladder wall thickening and nodularity raising the possibility of cholecystitis.  There is some nonspecific heterogeneous low-attenuation in the liver adjacent to the gallbladder fundus, not appreciated on the prior CT study from 11/29/2021, which could reflect steatosis although an infiltrative process not excluded. Recommend further evaluation with ultrasound and/or contrast-enhanced CT or MRI. 2. Choledocholithiasis with intra and extrahepatic biliary ductal dilatation. Correlate with liver function studies. Recommend ultrasound for further evaluation. 3. New right-sided moderate hydronephrosis without discernible obstructing mass or calculus. 4. Moderate abdominal and pelvic ascites. 5. Subcentimeter pancreatic cysts, noted on the prior study, for which MRI/MRCP evaluation is advised if not previously done. 6. Multiple subcentimeter bibasilar pulmonary nodules not clearly identified on the prior CT. Changes may be infectious/inflammatory or neoplastic in nature. Consider dedicated CT of the chest. 7. Additional findings as noted. The study was marked in [facility] for immediate notification. Workstation performed: [id number]       Labs and pertinent reports reviewed. This note has been generated by voice recognition software system. Therefore, there may be spelling, grammar, and or syntax errors. Please contact if questions arise.      Dany Palacio MD, PhD

## 2023-11-25 NOTE — PLAN OF CARE
Problem: Prexisting or High Potential for Compromised Skin Integrity  Goal: Skin integrity is maintained or improved  Description: INTERVENTIONS:  - Identify patients at risk for skin breakdown  - Assess and monitor skin integrity  - Assess and monitor nutrition and hydration status  - Monitor labs   - Assess for incontinence   - Turn and reposition patient  - Assist with mobility/ambulation  - Relieve pressure over bony prominences  - Avoid friction and shearing  - Provide appropriate hygiene as needed including keeping skin clean and dry  - Evaluate need for skin moisturizer/barrier cream  - Collaborate with interdisciplinary team   - Patient/family teaching  - Consider wound care consult   Outcome: Progressing     Problem: PAIN - ADULT  Goal: Verbalizes/displays adequate comfort level or baseline comfort level  Description: Interventions:  - Encourage patient to monitor pain and request assistance  - Assess pain using appropriate pain scale  - Administer analgesics based on type and severity of pain and evaluate response  - Implement non-pharmacological measures as appropriate and evaluate response  - Consider cultural and social influences on pain and pain management  - Notify physician/advanced practitioner if interventions unsuccessful or patient reports new pain  Outcome: Progressing     Problem: INFECTION - ADULT  Goal: Absence or prevention of progression during hospitalization  Description: INTERVENTIONS:  - Assess and monitor for signs and symptoms of infection  - Monitor lab/diagnostic results  - Monitor all insertion sites, i.e. indwelling lines, tubes, and drains  - Monitor endotracheal if appropriate and nasal secretions for changes in amount and color  - [place] appropriate cooling/warming therapies per order  - Administer medications as ordered  - Instruct and encourage patient and family to use good hand hygiene technique  - Identify and instruct in appropriate isolation precautions for identified infection/condition  Outcome: Progressing  Goal: Absence of fever/infection during neutropenic period  Description: INTERVENTIONS:  - Monitor WBC    Outcome: Progressing     Problem: SAFETY ADULT  Goal: Patient will remain free of falls  Description: INTERVENTIONS:  - Educate patient/family on patient safety including physical limitations  - Instruct patient to call for assistance with activity   - Consult OT/PT to assist with strengthening/mobility   - Keep Call bell within reach  - Keep bed low and locked with side rails adjusted as appropriate  - Keep care items and personal belongings within reach  - Initiate and maintain comfort rounds  - Make Fall Risk Sign visible to staff  - Offer Toileting every 2 Hours, in advance of need  - Initiate/Maintain alarm  - Obtain necessary fall risk management equipment:   - Apply yellow socks and bracelet for high fall risk patients  - Consider moving patient to room near nurses station  Outcome: Not Progressing  Goal: Maintain or return to baseline ADL function  Description: INTERVENTIONS:  -  Assess patient's ability to carry out ADLs; assess patient's baseline for ADL function and identify physical deficits which impact ability to perform ADLs (bathing, care of mouth/teeth, toileting, grooming, dressing, etc.)  - Assess/evaluate cause of self-care deficits   - Assess range of motion  - Assess patient's mobility; develop plan if impaired  - Assess patient's need for assistive devices and provide as appropriate  - Encourage maximum independence but intervene and supervise when necessary  - Involve family in performance of ADLs  - Assess for home care needs following discharge   - Consider OT consult to assist with ADL evaluation and planning for discharge  - Provide patient education as appropriate  Outcome: Not Progressing  Goal: Maintains/Returns to pre admission functional level  Description: INTERVENTIONS:  - Perform AM-PAC 6 Click Basic Mobility/ Daily Activity assessment daily.  - Set and communicate daily mobility goal to care team and patient/family/caregiver. - Collaborate with rehabilitation services on mobility goals if consulted  - Perform Range of Motion 3 times a day. - Reposition patient every 2 hours. - Dangle patient 3 times a day  - Stand patient 3 times a day  - Ambulate patient 3 times a day  - Out of bed to chair 3 times a day   - Out of bed for meals 3 times a day  - Out of bed for toileting  - Record patient progress and toleration of activity level   Outcome: Progressing     Problem: DISCHARGE PLANNING  Goal: Discharge to home or other facility with appropriate resources  Description: INTERVENTIONS:  - Identify barriers to discharge w/patient and caregiver  - Arrange for needed discharge resources and transportation as appropriate  - Identify discharge learning needs (meds, wound care, etc.)  - Arrange for interpretive services to assist at discharge as needed  - Refer to Case Management Department for coordinating discharge planning if the patient needs post-hospital services based on physician/advanced practitioner order or complex needs related to functional status, cognitive ability, or social support system  Outcome: Not Progressing     Problem: Knowledge Deficit  Goal: Patient/family/caregiver demonstrates understanding of disease process, treatment plan, medications, and discharge instructions  Description: Complete learning assessment and assess knowledge base. Interventions:  - Provide teaching at level of understanding  - Provide teaching via preferred learning methods  Outcome: Not Progressing     Problem: Nutrition/Hydration-ADULT  Goal: Nutrient/Hydration intake appropriate for improving, restoring or maintaining nutritional needs  Description: Monitor and assess patient's nutrition/hydration status for malnutrition. Collaborate with interdisciplinary team and initiate plan and interventions as ordered.   Monitor patient's weight and dietary intake as ordered or per policy. Utilize nutrition screening tool and intervene as necessary. Determine patient's food preferences and provide high-protein, high-caloric foods as appropriate. INTERVENTIONS:  - Monitor oral intake, urinary output, labs, and treatment plans  - Assess nutrition and hydration status and recommend course of action  - Evaluate amount of meals eaten  - Assist patient with eating if necessary   - Allow adequate time for meals  - Recommend/ encourage appropriate diets, oral nutritional supplements, and vitamin/mineral supplements  - Order, calculate, and assess calorie counts as needed  - Recommend, monitor, and adjust tube feedings and TPN/PPN based on assessed needs  - Assess need for intravenous fluids  - Provide specific nutrition/hydration education as appropriate  - Include patient/family/caregiver in decisions related to nutrition  Outcome: Not Progressing     Problem: NEUROSENSORY - ADULT  Goal: Achieves maximal functionality and self care  Description: INTERVENTIONS  - Monitor swallowing and airway patency with patient fatigue and changes in neurological status  - Encourage and assist patient to increase activity and self care.    - Encourage visually impaired, hearing impaired and aphasic patients to use assistive/communication devices  Outcome: Not Progressing     Problem: CARDIOVASCULAR - ADULT  Goal: Maintains optimal cardiac output and hemodynamic stability  Description: INTERVENTIONS:  - Monitor I/O, vital signs and rhythm  - Monitor for S/S and trends of decreased cardiac output  - Administer and titrate ordered vasoactive medications to optimize hemodynamic stability  - Assess quality of pulses, skin color and temperature  - Assess for signs of decreased coronary artery perfusion  - Instruct patient to report change in severity of symptoms  Outcome: Not Progressing  Goal: Absence of cardiac dysrhythmias or at baseline rhythm  Description: INTERVENTIONS:  - Continuous cardiac monitoring, vital signs, obtain 12 lead EKG if ordered  - Administer antiarrhythmic and heart rate control medications as ordered  - Monitor electrolytes and administer replacement therapy as ordered  Outcome: Not Progressing     Problem: RESPIRATORY - ADULT  Goal: Achieves optimal ventilation and oxygenation  Description: INTERVENTIONS:  - Assess for changes in respiratory status  - Assess for changes in mentation and behavior  - Position to facilitate oxygenation and minimize respiratory effort  - Oxygen administered by appropriate delivery if ordered  - Initiate smoking cessation education as indicated  - Encourage broncho-pulmonary hygiene including cough, deep breathe, Incentive Spirometry  - Assess the need for suctioning and aspirate as needed  - Assess and instruct to report SOB or any respiratory difficulty  - Respiratory Therapy support as indicated  Outcome: Not Progressing     Problem: GASTROINTESTINAL - ADULT  Goal: Minimal or absence of nausea and/or vomiting  Description: INTERVENTIONS:  - Administer IV fluids if ordered to ensure adequate hydration  - Maintain NPO status until nausea and vomiting are resolved  - Nasogastric tube if ordered  - Administer ordered antiemetic medications as needed  - Provide nonpharmacologic comfort measures as appropriate  - Advance diet as tolerated, if ordered  - Consider nutrition services referral to assist patient with adequate nutrition and appropriate food choices  Outcome: Not Progressing  Goal: Maintains or returns to baseline bowel function  Description: INTERVENTIONS:  - Assess bowel function  - Encourage oral fluids to ensure adequate hydration  - Administer IV fluids if ordered to ensure adequate hydration  - Administer ordered medications as needed  - Encourage mobilization and activity  - Consider nutritional services referral to assist patient with adequate nutrition and appropriate food choices  Outcome: Not Progressing  Goal: Maintains adequate nutritional intake  Description: INTERVENTIONS:  - Monitor percentage of each meal consumed  - Identify factors contributing to decreased intake, treat as appropriate  - Assist with meals as needed  - Monitor I&O, weight, and lab values if indicated  - Obtain nutrition services referral as needed  Outcome: Not Progressing  Goal: Oral mucous membranes remain intact  Description: INTERVENTIONS  - Assess oral mucosa and hygiene practices  - Implement preventative oral hygiene regimen  - Implement oral medicated treatments as ordered  - Initiate Nutrition services referral as needed  Outcome: Not Progressing     Problem: GENITOURINARY - ADULT  Goal: Maintains or returns to baseline urinary function  Description: INTERVENTIONS:  - Assess urinary function  - Encourage oral fluids to ensure adequate hydration if ordered  - Administer IV fluids as ordered to ensure adequate hydration  - Administer ordered medications as needed  - Offer frequent toileting  - Follow urinary retention protocol if ordered  Outcome: Not Progressing  Goal: Absence of urinary retention  Description: INTERVENTIONS:  - Assess patient’s ability to void and empty bladder  - Monitor I/O  - Bladder scan as needed  - Discuss with physician/AP medications to alleviate retention as needed  - Discuss catheterization for long term situations as appropriate  Outcome: Not Progressing  Goal: Urinary catheter remains patent  Description: INTERVENTIONS:  - Assess patency of urinary catheter  - If patient has a chronic cutler, consider changing catheter if non-functioning  - Follow guidelines for intermittent irrigation of non-functioning urinary catheter  Outcome: Not Progressing     Problem: METABOLIC, FLUID AND ELECTROLYTES - ADULT  Goal: Electrolytes maintained within normal limits  Description: INTERVENTIONS:  - Monitor labs and assess patient for signs and symptoms of electrolyte imbalances  - Administer electrolyte replacement as ordered  - Monitor response to electrolyte replacements, including repeat lab results as appropriate  - Instruct patient on fluid and nutrition as appropriate  Outcome: Not Progressing  Goal: Fluid balance maintained  Description: INTERVENTIONS:  - Monitor labs   - Monitor I/O and WT  - Instruct patient on fluid and nutrition as appropriate  - Assess for signs & symptoms of volume excess or deficit  Outcome: Not Progressing     Problem: SKIN/TISSUE INTEGRITY - ADULT  Goal: Skin Integrity remains intact(Skin Breakdown Prevention)  Description: Assess:  -Perform [place] assessment every   -Clean and moisturize skin every   -Inspect skin when repositioning, toileting, and assisting with ADLS  -Assess under medical devices such as  every   -Assess extremities for adequate circulation and sensation     Bed Management:  -Have minimal linens on bed & keep smooth, unwrinkled  -Change linens as needed when moist or perspiring  -Avoid sitting or lying in one position for more than  hours while in bed  -Keep HOB at degrees     Toileting:  -Offer bedside commode  -Assess for incontinence every   -Use incontinent care products after each incontinent episode such as    Activity:  -Mobilize patient  times a day  -Encourage activity and walks on unit  -Encourage or provide ROM exercises   -Turn and reposition patient every  Hours  -Use appropriate equipment to lift or move patient in bed  -Instruct/ Assist with weight shifting every  when out of bed in chair  -Consider limitation of chair time  hour intervals    Skin Care:  -Avoid use of baby powder, tape, friction and shearing, hot water or constrictive clothing  -Relieve pressure over bony prominences using   -Do not massage red bony areas    Next Steps:  -Teach patient strategies to minimize risks such as    -Consider consults to  interdisciplinary teams such as   Outcome: Not Progressing  Goal: Incision(s), wounds(s) or drain site(s) healing without S/S of infection  Description: INTERVENTIONS  - Assess and document dressing, incision, wound bed, drain sites and surrounding tissue  - Provide patient and family education  - Perform skin care/dressing changes every   Outcome: Not Progressing  Goal: Pressure injury heals and does not worsen  Description: Interventions:  - Implement low air loss mattress or specialty surface (Criteria met)  - Apply silicone foam dressing  - Instruct/assist with weight shifting every  minutes when in chair   - Limit chair time to  hour intervals  - Use special pressure reducing interventions such as  when in chair   - Apply fecal or urinary incontinence containment device   - Perform passive or active ROM every   - Turn and reposition patient & offload bony prominences every  hours   - Utilize friction reducing device or surface for transfers   - Consider consults to  interdisciplinary teams such as   - Use incontinent care products after each incontinent episode such as   - Consider nutrition services referral as needed  Outcome: Not Progressing     Problem: MUSCULOSKELETAL - ADULT  Goal: Maintain or return mobility to safest level of function  Description: INTERVENTIONS:  - Assess patient's ability to carry out ADLs; assess patient's baseline for ADL function and identify physical deficits which impact ability to perform ADLs (bathing, care of mouth/teeth, toileting, grooming, dressing, etc.)  - Assess/evaluate cause of self-care deficits   - Assess range of motion  - Assess patient's mobility  - Assess patient's need for assistive devices and provide as appropriate  - Encourage maximum independence but intervene and supervise when necessary  - Involve family in performance of ADLs  - Assess for home care needs following discharge   - Consider OT consult to assist with ADL evaluation and planning for discharge  - Provide patient education as appropriate  Outcome: Not Progressing  Goal: Maintain proper alignment of affected body part  Description: INTERVENTIONS:  - Support, maintain and protect limb and body alignment  - Provide patient/ family with appropriate education  Outcome: Not Progressing

## 2023-11-25 NOTE — PLAN OF CARE
Problem: Prexisting or High Potential for Compromised Skin Integrity  Goal: Skin integrity is maintained or improved  Description: INTERVENTIONS:  - Identify patients at risk for skin breakdown  - Assess and monitor skin integrity  - Assess and monitor nutrition and hydration status  - Monitor labs   - Assess for incontinence   - Turn and reposition patient  - Assist with mobility/ambulation  - Relieve pressure over bony prominences  - Avoid friction and shearing  - Provide appropriate hygiene as needed including keeping skin clean and dry  - Evaluate need for skin moisturizer/barrier cream  - Collaborate with interdisciplinary team   - Patient/family teaching  - Consider wound care consult   Outcome: Progressing     Problem: PAIN - ADULT  Goal: Verbalizes/displays adequate comfort level or baseline comfort level  Description: Interventions:  - Encourage patient to monitor pain and request assistance  - Assess pain using appropriate pain scale  - Administer analgesics based on type and severity of pain and evaluate response  - Implement non-pharmacological measures as appropriate and evaluate response  - Consider cultural and social influences on pain and pain management  - Notify physician/advanced practitioner if interventions unsuccessful or patient reports new pain  Outcome: Progressing     Problem: INFECTION - ADULT  Goal: Absence or prevention of progression during hospitalization  Description: INTERVENTIONS:  - Assess and monitor for signs and symptoms of infection  - Monitor lab/diagnostic results  - Monitor all insertion sites, i.e. indwelling lines, tubes, and drains  - Monitor endotracheal if appropriate and nasal secretions for changes in amount and color  - [place] appropriate cooling/warming therapies per order  - Administer medications as ordered  - Instruct and encourage patient and family to use good hand hygiene technique  - Identify and instruct in appropriate isolation precautions for identified infection/condition  Outcome: Progressing  Goal: Absence of fever/infection during neutropenic period  Description: INTERVENTIONS:  - Monitor WBC    Outcome: Progressing     Problem: SAFETY ADULT  Goal: Patient will remain free of falls  Description: INTERVENTIONS:  - Educate patient/family on patient safety including physical limitations  - Instruct patient to call for assistance with activity   - Consult OT/PT to assist with strengthening/mobility   - Keep Call bell within reach  - Keep bed low and locked with side rails adjusted as appropriate  - Keep care items and personal belongings within reach  - Initiate and maintain comfort rounds  - Make Fall Risk Sign visible to staff  - Offer Toileting every 2 Hours, in advance of need  - Initiate/Maintain alarm  - Obtain necessary fall risk management equipment:   - Apply yellow socks and bracelet for high fall risk patients  - Consider moving patient to room near nurses station  Outcome: Progressing  Goal: Maintain or return to baseline ADL function  Description: INTERVENTIONS:  -  Assess patient's ability to carry out ADLs; assess patient's baseline for ADL function and identify physical deficits which impact ability to perform ADLs (bathing, care of mouth/teeth, toileting, grooming, dressing, etc.)  - Assess/evaluate cause of self-care deficits   - Assess range of motion  - Assess patient's mobility; develop plan if impaired  - Assess patient's need for assistive devices and provide as appropriate  - Encourage maximum independence but intervene and supervise when necessary  - Involve family in performance of ADLs  - Assess for home care needs following discharge   - Consider OT consult to assist with ADL evaluation and planning for discharge  - Provide patient education as appropriate  Outcome: Progressing  Goal: Maintains/Returns to pre admission functional level  Description: INTERVENTIONS:  - Perform AM-PAC 6 Click Basic Mobility/ Daily Activity assessment daily.  - Set and communicate daily mobility goal to care team and patient/family/caregiver. - Collaborate with rehabilitation services on mobility goals if consulted  - Perform Range of Motion 3 times a day. - Reposition patient every 2 hours. - Dangle patient 3 times a day  - Stand patient 3 times a day  - Ambulate patient 3 times a day  - Out of bed to chair 3 times a day   - Out of bed for meals 3 times a day  - Out of bed for toileting  - Record patient progress and toleration of activity level   Outcome: Progressing     Problem: DISCHARGE PLANNING  Goal: Discharge to home or other facility with appropriate resources  Description: INTERVENTIONS:  - Identify barriers to discharge w/patient and caregiver  - Arrange for needed discharge resources and transportation as appropriate  - Identify discharge learning needs (meds, wound care, etc.)  - Arrange for interpretive services to assist at discharge as needed  - Refer to Case Management Department for coordinating discharge planning if the patient needs post-hospital services based on physician/advanced practitioner order or complex needs related to functional status, cognitive ability, or social support system  Outcome: Progressing     Problem: Knowledge Deficit  Goal: Patient/family/caregiver demonstrates understanding of disease process, treatment plan, medications, and discharge instructions  Description: Complete learning assessment and assess knowledge base. Interventions:  - Provide teaching at level of understanding  - Provide teaching via preferred learning methods  Outcome: Progressing     Problem: Nutrition/Hydration-ADULT  Goal: Nutrient/Hydration intake appropriate for improving, restoring or maintaining nutritional needs  Description: Monitor and assess patient's nutrition/hydration status for malnutrition. Collaborate with interdisciplinary team and initiate plan and interventions as ordered.   Monitor patient's weight and dietary intake as ordered or per policy. Utilize nutrition screening tool and intervene as necessary. Determine patient's food preferences and provide high-protein, high-caloric foods as appropriate. INTERVENTIONS:  - Monitor oral intake, urinary output, labs, and treatment plans  - Assess nutrition and hydration status and recommend course of action  - Evaluate amount of meals eaten  - Assist patient with eating if necessary   - Allow adequate time for meals  - Recommend/ encourage appropriate diets, oral nutritional supplements, and vitamin/mineral supplements  - Order, calculate, and assess calorie counts as needed  - Recommend, monitor, and adjust tube feedings and TPN/PPN based on assessed needs  - Assess need for intravenous fluids  - Provide specific nutrition/hydration education as appropriate  - Include patient/family/caregiver in decisions related to nutrition  Outcome: Progressing     Problem: NEUROSENSORY - ADULT  Goal: Achieves maximal functionality and self care  Description: INTERVENTIONS  - Monitor swallowing and airway patency with patient fatigue and changes in neurological status  - Encourage and assist patient to increase activity and self care.    - Encourage visually impaired, hearing impaired and aphasic patients to use assistive/communication devices  Outcome: Progressing     Problem: CARDIOVASCULAR - ADULT  Goal: Maintains optimal cardiac output and hemodynamic stability  Description: INTERVENTIONS:  - Monitor I/O, vital signs and rhythm  - Monitor for S/S and trends of decreased cardiac output  - Administer and titrate ordered vasoactive medications to optimize hemodynamic stability  - Assess quality of pulses, skin color and temperature  - Assess for signs of decreased coronary artery perfusion  - Instruct patient to report change in severity of symptoms  Outcome: Progressing  Goal: Absence of cardiac dysrhythmias or at baseline rhythm  Description: INTERVENTIONS:  - Continuous cardiac monitoring, vital signs, obtain 12 lead EKG if ordered  - Administer antiarrhythmic and heart rate control medications as ordered  - Monitor electrolytes and administer replacement therapy as ordered  Outcome: Progressing     Problem: GASTROINTESTINAL - ADULT  Goal: Minimal or absence of nausea and/or vomiting  Description: INTERVENTIONS:  - Administer IV fluids if ordered to ensure adequate hydration  - Maintain NPO status until nausea and vomiting are resolved  - Nasogastric tube if ordered  - Administer ordered antiemetic medications as needed  - Provide nonpharmacologic comfort measures as appropriate  - Advance diet as tolerated, if ordered  - Consider nutrition services referral to assist patient with adequate nutrition and appropriate food choices  Outcome: Progressing  Goal: Maintains or returns to baseline bowel function  Description: INTERVENTIONS:  - Assess bowel function  - Encourage oral fluids to ensure adequate hydration  - Administer IV fluids if ordered to ensure adequate hydration  - Administer ordered medications as needed  - Encourage mobilization and activity  - Consider nutritional services referral to assist patient with adequate nutrition and appropriate food choices  Outcome: Progressing  Goal: Maintains adequate nutritional intake  Description: INTERVENTIONS:  - Monitor percentage of each meal consumed  - Identify factors contributing to decreased intake, treat as appropriate  - Assist with meals as needed  - Monitor I&O, weight, and lab values if indicated  - Obtain nutrition services referral as needed  Outcome: Progressing  Goal: Oral mucous membranes remain intact  Description: INTERVENTIONS  - Assess oral mucosa and hygiene practices  - Implement preventative oral hygiene regimen  - Implement oral medicated treatments as ordered  - Initiate Nutrition services referral as needed  Outcome: Progressing     Problem: GENITOURINARY - ADULT  Goal: Maintains or returns to baseline urinary function  Description: INTERVENTIONS:  - Assess urinary function  - Encourage oral fluids to ensure adequate hydration if ordered  - Administer IV fluids as ordered to ensure adequate hydration  - Administer ordered medications as needed  - Offer frequent toileting  - Follow urinary retention protocol if ordered  Outcome: Progressing  Goal: Absence of urinary retention  Description: INTERVENTIONS:  - Assess patient’s ability to void and empty bladder  - Monitor I/O  - Bladder scan as needed  - Discuss with physician/AP medications to alleviate retention as needed  - Discuss catheterization for long term situations as appropriate  Outcome: Progressing  Goal: Urinary catheter remains patent  Description: INTERVENTIONS:  - Assess patency of urinary catheter  - If patient has a chronic cutler, consider changing catheter if non-functioning  - Follow guidelines for intermittent irrigation of non-functioning urinary catheter  Outcome: Progressing     Problem: METABOLIC, FLUID AND ELECTROLYTES - ADULT  Goal: Electrolytes maintained within normal limits  Description: INTERVENTIONS:  - Monitor labs and assess patient for signs and symptoms of electrolyte imbalances  - Administer electrolyte replacement as ordered  - Monitor response to electrolyte replacements, including repeat lab results as appropriate  - Instruct patient on fluid and nutrition as appropriate  Outcome: Progressing  Goal: Fluid balance maintained  Description: INTERVENTIONS:  - Monitor labs   - Monitor I/O and WT  - Instruct patient on fluid and nutrition as appropriate  - Assess for signs & symptoms of volume excess or deficit  Outcome: Progressing     Problem: SKIN/TISSUE INTEGRITY - ADULT  Goal: Skin Integrity remains intact(Skin Breakdown Prevention)  Description: Assess:  -Perform [place] assessment every shift   -Clean and moisturize skin every 8hrs  -Inspect skin when repositioning, toileting, and assisting with ADLS  -Assess under medical devices such as ADILIA dressing, nephrostomy dressing every 4  -Assess extremities for adequate circulation and sensation     Bed Management:  -Have minimal linens on bed & keep smooth, unwrinkled  -Change linens as needed when moist or perspiring  -Avoid sitting or lying in one position for more than 2 hours while in bed    Skin Care:  -Avoid use of baby powder, tape, friction and shearing, hot water or constrictive clothing  -Relieve pressure over bony prominences using foam wedges  -Do not massage red bony areas    Outcome: Progressing  Goal: Incision(s), wounds(s) or drain site(s) healing without S/S of infection  Description: INTERVENTIONS  - Assess and document dressing, incision, wound bed, drain sites and surrounding tissue  - Provide patient and family education  Outcome: Progressing  Goal: Pressure injury heals and does not worsen  Description: Interventions:  - Implement low air loss mattress or specialty surface (Criteria met)  - Apply silicone foam dressing  - Consider nutrition services referral as needed  Outcome: Progressing     Problem: MUSCULOSKELETAL - ADULT  Goal: Maintain or return mobility to safest level of function  Description: INTERVENTIONS:  - Assess patient's ability to carry out ADLs; assess patient's baseline for ADL function and identify physical deficits which impact ability to perform ADLs (bathing, care of mouth/teeth, toileting, grooming, dressing, etc.)  - Assess/evaluate cause of self-care deficits   - Assess range of motion  - Assess patient's mobility  - Assess patient's need for assistive devices and provide as appropriate  - Encourage maximum independence but intervene and supervise when necessary  - Involve family in performance of ADLs  - Assess for home care needs following discharge   - Consider OT consult to assist with ADL evaluation and planning for discharge  - Provide patient education as appropriate  Outcome: Progressing  Goal: Maintain proper alignment of affected body part  Description: INTERVENTIONS:  - Support, maintain and protect limb and body alignment  - Provide patient/ family with appropriate education  Outcome: Progressing

## 2023-11-25 NOTE — PLAN OF CARE
Problem: PHYSICAL THERAPY ADULT  Goal: Performs mobility at highest level of function for planned discharge setting. See evaluation for individualized goals. Description: Treatment/Interventions: Functional transfer training, LE strengthening/ROM, Elevations, Therapeutic exercise, Cognitive reorientation, Patient/family training, Equipment eval/education, Bed mobility, Gait training, Spoke to nursing, Spoke to case management, OT  Equipment Recommended:  (TBD, pt has RW at home)       See flowsheet documentation for full assessment, interventions and recommendations. Outcome: Not Progressing  Note: Prognosis: Fair  Problem List: Decreased strength, Decreased endurance, Decreased range of motion, Impaired balance, Decreased mobility, Impaired judgement, Obesity, Decreased skin integrity, Decreased safety awareness, Pain, Decreased cognition  Assessment: pt began tx session lying supine in the bed and was agreeable to only participate in bed mobility and TE in the bed. PT intervention to focus on bed mobility in order to reduce risk for pressure injuries and reduces assistance required for all bed mobility. pt continues to require max ax1 for all rolling and repositioning in the bed from pt L to R with LE and trunk management. pt was unable to maintain sidelying position w/o assistance. pt was able to participate in TE activities >20 minutes but required between PROM, AAROM and AROM for all exercises. pt did require several therapeutic rest breaks due to fatigue and + time to focus on task at hand as pt would lose focus on task at hand. Post tx pt continues to demonstrate the following deficits: limited bed mobility, limited activitry tolerance and increased assistanec required to perform TE activities. pt would benefit from continued skilled PT intervention in order to address defiicts listed above. Continue to recommend DC w/ level 2 moderate rehab resource intensity when medically cleared.  Post tx pt in bed with daughter present bed alarm activated and all pt needs met        Rehab Resource Intensity Level, PT: II (Moderate Resource Intensity)    See flowsheet documentation for full assessment.

## 2023-11-25 NOTE — PROGRESS NOTES
Progress Note - Nancy Boss 78 y.o. female MRN: [id number]    Unit/Bed#: W -01 Encounter: [id number]      Assessment:  Nancy Boss is an 78-year-old female known to our service for history of atrophic vaginitis, recurrent UTI presenting to the [facility] with a chief complaint of abdominal pain and anorexia with imaging demonstrating gangrenous cholecystitis and CBD dilatation. Patient is status post extensive exploratory laparotomy, open drainage of abdominal abscesses x4, peritoneal lavage, partial omentectomy, liver biopsy, open cholecystotomy, umbilical hernia repair and tube placement with pathology worrisome for malignancy, unclear primary. Tumor markers pending. Intervally, patient was seen in urologic consultation for right hydronephrosis, and this postoperative day 4 cystoscopy, right retrograde pyelography and insertion of right ureteral stent. Patient is afebrile, hemodynamically stable with complaints of mild diffuse abdominal pain. She is followed by nephrology for persistent JESSE with serum creatinine of 3.54--3.27 today from baseline of 0.8 in September 2022. Recent urine cultures negative. Gallbladder fluid positive for Enterobacter. Repeat ultrasound obtained for reevaluation due to slow downward trend of creatinine, exhibiting well-positioned ureteral stent with complete resolution of previous right-sided hydronephrosis. Plan:  Continue medical optimization and antibiosis. Appreciate interdisciplinary management of complex patient requiring multiple consultants. Trend labs. Maintain Mcmanus catheter to straight drainage. Can be removed prior to discharge once creatinine nadirs. 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 No additional  intervention indicated this hospital stay. Our service will contact patient/caregiver to arrange for nonemergent/elective ureteral stent exchange when due.   Do not hesitate to contact our service with any questions or new  concerns. Subjective:   Denies fever, chills. Feels achy. Objective:     Vitals: Blood pressure 130/72, pulse 97, temperature 97.5 °F (36.4 °C), resp. rate 18, height 5' 3" (1.6 m), weight 95.1 kg (209 lb 10.5 oz), SpO2 93 %. ,Body mass index is 37.14 kg/m². Intake/Output Summary (Last 24 hours) at 11/25/2023 1040  Last data filed at 11/25/2023 0900  Gross per 24 hour   Intake 0 ml   Output 15 ml   Net -15 ml       Physical Exam:  General appearance: alert and oriented, in no acute distress, appears stated age, cooperative, and no distress  Head: Normocephalic, without obvious abnormality, atraumatic  Neck: no JVD and supple, symmetrical, trachea midline  Lungs: diminished breath sounds  Heart: regular rate and rhythm, S1, S2 normal, no murmur, click, rub or gallop  Abdomen: abnormal findings:  mild tenderness in the upper abdomen and dressing CDI. Drains x2  Extremities: extremities normal, warm and well-perfused; no cyanosis, clubbing, or edema  Pulses: 2+ and symmetric  Neurologic: Grossly normal  Mcmanus--bridgett urine      Invasive Devices       Peripheral Intravenous Line  Duration             Peripheral IV 11/21/23 Distal;Dorsal (posterior); Right Forearm 4 days    Peripheral IV 11/21/23 Distal;Left;Ventral (anterior) Forearm 4 days    Peripheral IV 11/22/23 Left;Proximal;Ventral (anterior) Antecubital 2 days              Drain  Duration             Closed/Suction Drain Left LUQ Bulb 19 Fr. 6 days    Closed/Suction Drain Right RUQ Other (Comment) 12 Fr. 6 days    Ureteral Internal Stent Right ureter 6 Fr. 3 days    Urethral Catheter Non-latex 16 Fr. 3 days                  Lab Results   Component Value Date    WBC 12.48 (H) 11/25/2023    HGB 10.6 (L) 11/25/2023    HCT 34.4 (L) 11/25/2023    MCV 98 11/25/2023     11/25/2023     Lab Results   Component Value Date    SODIUM 140 11/25/2023    K 3.9 11/25/2023     11/25/2023    CO2 23 11/25/2023    BUN 87 (H) 11/25/2023    CREATININE 3.27 (H) 11/25/2023    GLUC 128 11/25/2023    CALCIUM 7.9 (L) 11/25/2023         Lab, Imaging and other studies: I have personally reviewed pertinent reports.

## 2023-11-25 NOTE — PHYSICAL THERAPY NOTE
PHYSICAL THERAPY NOTE          Patient Name: José Manuel Mcnulty  CBTTO'O Date: 11/25/2023 11/25/23 1350   PT Last Visit   PT Visit Date 11/25/23   Note Type   Note Type Treatment   Pain Assessment   Pain Assessment Tool 0-10   Pain Score No Pain   Patient's Stated Pain Goal No pain   Hospital Pain Intervention(s) Repositioned; Ambulation/increased activity; Emotional support; Rest   Multiple Pain Sites No   Pain Rating: FLACC (Rest) - Face 0   Pain Rating: FLACC (Rest) - Legs 0   Pain Rating: FLACC (Rest) - Activity 0   Pain Rating: FLACC (Rest) - Cry 0   Pain Rating: FLACC (Rest) - Consolability 0   Score: FLACC (Rest) 0   Pain Rating: FLACC (Activity) - Face 0   Pain Rating: FLACC (Activity) - Legs 0   Pain Rating: FLACC (Activity) - Activity 0   Pain Rating: FLACC (Activity) - Cry 0   Pain Rating: FLACC (Activity) - Consolability 0   Score: FLACC (Activity) 0   General   Chart Reviewed Yes   Response to Previous Treatment Patient with no complaints from previous session. Family/Caregiver Present Yes  (daughter)   Cognition   Overall Cognitive Status Impaired   Arousal/Participation Alert; Responsive; Cooperative   Attention Attends with cues to redirect   Orientation Level Oriented X4   Memory Decreased recall of recent events;Decreased recall of precautions   Following Commands Follows one step commands with increased time or repetition   Comments pt agreeable tyo participate in bed activities but refuses to sit EOB and attempt functional transfers   Subjective   Subjective pt was agreeable to only bed mobility and TE in supine for todays tx session   Bed Mobility   Rolling R 2  Maximal assistance   Additional items Assist x 1;HOB elevated; Bedrails; Increased time required;Verbal cues;LE management; Other  (trunk management)   Rolling L 2  Maximal assistance   Additional items Assist x 1;HOB elevated; Bedrails; Increased time required;Verbal cues;LE management; Other  (trunk management)   Supine to Sit Unable to assess   Additional Comments pt refusing to attempt supine<>sit EOB transfer as pt was fatigue from going down stairs for CT   Transfers   Sit to Stand Unable to assess   Ambulation/Elevation   Gait pattern Not appropriate   Balance   Static Sitting Fair -   Dynamic Sitting Fair   Static Standing Zero   Dynamic Standing Poor -   Ambulatory Zero   Endurance Deficit   Endurance Deficit Yes   Endurance Deficit Description limited activity tolerance, bed mobility, TE activities \   Activity Tolerance   Activity Tolerance Patient limited by fatigue; Other (Comment)  (generalized weakness)   Nurse Made Aware Spoke to RN   Exercises   Quad Sets Supine;10 reps;AROM; Bilateral   Heelslides Supine;10 reps;AAROM; Right;PROM; Left   Glute Sets Supine;10 reps;AROM; Bilateral   Hip Flexion Supine;10 reps;AAROM; Right;PROM; Left   Hip Abduction Supine;10 reps;AAROM; Right;PROM; Left   Hip Adduction Supine;10 reps;AAROM; Right;PROM; Left   Knee AROM Short Arc Quad Supine;10 reps;AROM; Bilateral   Ankle Pumps Supine;20 reps;AROM; Bilateral   Assessment   Prognosis Fair   Problem List Decreased strength;Decreased endurance;Decreased range of motion; Impaired balance;Decreased mobility; Impaired judgement;Obesity; Decreased skin integrity; Decreased safety awareness;Pain;Decreased cognition   Assessment pt began tx session lying supine in the bed and was agreeable to only participate in bed mobility and TE in the bed. PT intervention to focus on bed mobility in order to reduce risk for pressure injuries and reduces assistance required for all bed mobility. pt continues to require max ax1 for all rolling and repositioning in the bed from pt L to R with LE and trunk management. pt was unable to maintain sidelying position w/o assistance. pt was able to participate in TE activities >20 minutes but required between PROM, AAROM and AROM for all exercises.  pt did require several therapeutic rest breaks due to fatigue and + time to focus on task at hand as pt would lose focus on task at hand. Post tx pt continues to demonstrate the following deficits: limited bed mobility, limited activitry tolerance and increased assistanec required to perform TE activities. pt would benefit from continued skilled PT intervention in order to address defiicts listed above. Continue to recommend DC w/ level 2 moderate rehab resource intensity when medically cleared. Post tx pt in bed with daughter present bed alarm activated and all pt needs met   Goals   Patient Goals none stated this tx session   STG Expiration Date 11/30/23   PT Treatment Day 2   Plan   Treatment/Interventions Functional transfer training;LE strengthening/ROM; Elevations; Endurance training; Therapeutic exercise;Patient/family training;Equipment eval/education; Bed mobility;Gait training;Cognitive reorientation   Progress Slow progress, decreased activity tolerance   PT Frequency 3-5x/wk   Discharge Recommendation   Rehab Resource Intensity Level, PT II (Moderate Resource Intensity)   AM-PAC Basic Mobility Inpatient   Turning in Flat Bed Without Bedrails 2   Lying on Back to Sitting on Edge of Flat Bed Without Bedrails 1   Moving Bed to Chair 1   Standing Up From Chair Using Arms 1   Walk in Room 1   Climb 3-5 Stairs With Railing 1   Basic Mobility Inpatient Raw Score 7   Highest Level Of Mobility   JH-HLM Goal 2: Bed activities/Dependent transfer   JH-HLM Achieved 2: Bed activities/Dependent transfer   Education   Education Provided   (TE activities)   Patient Reinforcement needed   End of Consult   Patient Position at End of Consult Supine;Bed/Chair alarm activated; All needs within reach   The patient's AM-PAC Basic Mobility Inpatient Short Form Raw Score is 7. A Raw score of less than or equal to 16 suggests the patient may benefit from discharge to post-acute rehabilitation services.  Please also refer to the recommendation of the Physical Therapist for safe discharge planning.     Benigno Conley

## 2023-11-25 NOTE — CONSULTS
Consultation - [address]  [facility]  Logan Cannon 78 y.o. female MRN: [id number]  Unit/Bed#: W -01 Encounter: [id number]        Inpatient consult to gastroenterology  Consult performed by: Cliff Real PA-C  Consult ordered by: Rodney Macias MD          Reason for Consult / Principal Problem: Choledocholithiasis, Carcinomatosis    ASSESSMENT and PLAN:    Principal Problem:    Bile peritonitis ([address])  Active Problems:    Essential hypertension    Anxiety    Anemia    Atrial fibrillation (HCC)    Congestive heart failure, unspecified HF chronicity, unspecified heart failure type ([address])    Chronic kidney disease, stage 3a ([address])    Major depressive disorder with single episode, in full remission ([address] [facility])    Hydronephrosis    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 I did speak with her daughter Miriam Garcia at [phone] which is her mobile phone and she will encourage her mom to proceed with any testing that we feel is necessary for.  -------------------------------------------------------------------------------------------------------------------    HPI: This is an 13-year-old white female with past medical history of atrial fibrillation on Coumadin, hypertension who was admitted back on November 17, 2023 with abdominal pain. She was unfortunately found to have perforated gallbladder with peritonitis and sepsis. She has subsequently undergone a laparoscopic converted to open abscess drainage and washout. CAT scan showed incidental cholelithiasis choledocholithiasis with intra and extrahepatic ductal dilation. She subsequently has undergone a percutaneous cholecystostomy tube on November 18, 2023.   An MRI MRCP has been ordered. 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 Cancer tumor markers thus far show elevated  at greater than 300. CEA and alpha-fetoprotein are normal.  CA 19-9 is in process. GI is being consulted due to the choledocholithiasis, carcinomatosis and unknown primary for the adenocarcinoma. Her vital signs are currently stable. She has a mild leukocytosis at 12.48. Hemoglobin is stable 10.0-10.6. Liver functions were normal back on November 20, 2023 again with Tamsen Piggs in place. She is currently on Coumadin for her atrial fibrillation and INR today is 3.91. She denies pain or nausea. She is tolerating her diet. Endoscopic History:  EGD -December 2019 for iron deficiency anemia showed a 1 cm hiatal hernia and Schatzki's ring  Colonoscopy -December 2019 done for iron deficiency anemia with adequate bowel prep showed a few sigmoid diverticula and small internal hemorrhoids noted    REVIEW OF SYSTEMS:    CONSTITUTIONAL: Denies any fever, chills, or rigors. Good appetite, and no recent weight loss. HEENT: No earache or tinnitus. Denies hearing loss or visual disturbances. CARDIOVASCULAR: No chest pain or palpitations. RESPIRATORY: Denies any cough, hemoptysis, shortness of breath or dyspnea on exertion. GASTROINTESTINAL: As noted in the History of Present Illness. GENITOURINARY: No problems with urination. Denies any hematuria or dysuria. NEUROLOGIC: No dizziness or vertigo, denies headaches. MUSCULOSKELETAL: Denies any muscle or joint pain. SKIN: Denies skin rashes or itching. ENDOCRINE: Denies excessive thirst. Denies intolerance to heat or cold. PSYCHOSOCIAL: Denies depression or anxiety. Denies any recent memory loss.        Historical Information   Past Medical History:   Diagnosis Date    Atrial fibrillation ([address])     Hypertension     Insect bite of scalp 12/16/2021    Nasal dryness 12/15/2021    UTI (urinary tract infection)      Past Surgical History:   Procedure Laterality Date    CHOLECYSTECTOMY LAPAROSCOPIC N/A 2023    Procedure: LAPAROSCOPIC CONVERTED TO OPEN EXPLORATORY LAPAROTOMY, DRAINAGE OF ABDOMINAL ABSCESS x4, PERITONEAL LAVAGE, PARTIAL OMENTECTOMY, LIVER BIOPSY, OPEN CHOLECYSTOSCOPY AND DRAIN PLACEMENT, UMBILICAL HERNIA REPAIR;  Surgeon: Aram Alicea DO;  Location: [facility];  Service: General    EYE SURGERY      FL RETROGRADE PYELOGRAM  2023    JOINT REPLACEMENT Right     Knee 11/15/19, Hip 2021    CA CYSTO BLADDER W/URETERAL CATHETERIZATION Right 2023    Procedure: CYSTOSCOPY RETROGRADE PYELOGRAM WITH INSERTION STENT URETERAL;  Surgeon: Bria Grayson MD;  Location: [facility];  Service: Urology     Social History   Social History     Substance and Sexual Activity   Alcohol Use Never     Social History     Substance and Sexual Activity   Drug Use Never     Social History     Tobacco Use   Smoking Status Former    Types: Cigarettes    Quit date:     Years since quittin.9   Smokeless Tobacco Never     Family History   Problem Relation Age of Onset    No Known Problems Mother     Diabetes Father     Stroke Father         syndrome       Meds/Allergies     Medications Prior to Admission   Medication    acetaminophen (TYLENOL) 325 mg tablet    amLODIPine (NORVASC) 5 mg tablet    escitalopram (LEXAPRO) 10 mg tablet    furosemide (LASIX) 20 mg tablet    lisinopril (ZESTRIL) 5 mg tablet    metoprolol tartrate (LOPRESSOR) 25 mg tablet    Myrbetriq 25 MG TB24    pantoprazole (PROTONIX) 40 mg tablet    promethazine (PHENERGAN) 25 mg tablet    sucralfate (CARAFATE) 1 g tablet    warfarin (COUMADIN) 5 mg tablet    ALPRAZolam (XANAX) 0.25 mg tablet    b complex vitamins tablet    Cholecalciferol (VITAMIN D3) 50 MCG (2000) capsule    COLLAGEN PO    estradiol (ESTRACE VAGINAL) 0.1 mg/g vaginal cream    influenza vaccine, high-dose (FLUZONE HIGH-DOSE) 0.7 ML CISCO    Melatonin 3 MG CAPS Multiple Vitamins-Minerals (PRESERVISION AREDS 2 PO)    oxymetazoline (AFRIN) 0.05 % nasal spray    sodium chloride (OCEAN) 0.65 % nasal spray     Current Facility-Administered Medications   Medication Dose Route Frequency    calcium carbonate (TUMS) chewable tablet 1,000 mg  1,000 mg Oral Once    chlorhexidine (PERIDEX) 0.12 % oral rinse 15 mL  15 mL Mouth/Throat Q12H FLORENTIN    HYDROmorphone (DILAUDID) injection 0.5 mg  0.5 mg Intravenous Q2H PRN Max 8/day    HYDROmorphone HCl (DILAUDID) injection 0.2 mg  0.2 mg Intravenous Q2H PRN Max 8/day    labetalol (NORMODYNE) injection 10 mg  10 mg Intravenous Q4H PRN    metoclopramide (REGLAN) injection 10 mg  10 mg Intravenous Q6H    metoprolol (LOPRESSOR) injection 5 mg  5 mg Intravenous Q6H PRN    metoprolol (LOPRESSOR) injection 5 mg  5 mg Intravenous Q6H    multi-electrolyte (PLASMALYTE-A/ISOLYTE-S PH 7.4) IV solution  50 mL/hr Intravenous Continuous    ondansetron (ZOFRAN) injection 4 mg  4 mg Intravenous Q4H PRN    warfarin (COUMADIN) tablet 5 mg  5 mg Oral Daily (warfarin)       Allergies   Allergen Reactions    Ciprofloxacin      Chest discomfort and dizziness    Diclofenac     Diphenhydramine Rash and Chest Pain           Objective     Blood pressure 130/72, pulse 97, temperature 97.5 °F (36.4 °C), resp. rate 18, height 5' 3" (1.6 m), weight 95.1 kg (209 lb 10.5 oz), SpO2 93 %. Intake/Output Summary (Last 24 hours) at 11/25/2023 0810  Last data filed at 11/24/2023 2334  Gross per 24 hour   Intake --   Output 415 ml   Net -415 ml         PHYSICAL EXAM:      General Appearance:   Alert, cooperative, no distress, appears stated age    HEENT:   Normocephalic, atraumatic, sclera anicteric   Neck:  Supple, symmetrical   Lungs:   Clear to auscultation bilaterally; no rales, rhonchi or wheezing; respirations unlabored    Heart[de-identified]   S1 and S2 normal; regular rate and rhythm; no murmur, rub, or gallop.    Abdomen:   Soft, non-tender, non-distended; normal bowel sounds; no masses, no organomegaly    Genitalia:   Deferred    Rectal:   Deferred    Extremities:  No cyanosis, clubbing or edema    Pulses:  2+ and symmetric all extremities    Skin:  Skin color, texture normal, no rashes or lesions    Lymph nodes:  Not assessed  Neuro: alert and oriented x 3  Psych: normal behavior       Lab Results:   Results from last 7 days   Lab Units 11/25/23  0506 11/24/23  0314   WBC Thousand/uL 12.48* 8.31   HEMOGLOBIN g/dL 10.6* 10.0*   HEMATOCRIT % 34.4* 31.5*   PLATELETS Thousands/uL 299 275   NEUTROS PCT %  --  85*   LYMPHS PCT %  --  6*   MONOS PCT %  --  7   EOS PCT %  --  0     Results from last 7 days   Lab Units 11/25/23  0506 11/24/23  0902   POTASSIUM mmol/L 3.9  --    CHLORIDE mmol/L 102  --    CO2 mmol/L 23  --    BUN mg/dL 87*  --    CREATININE mg/dL 3.27*  --    CALCIUM mg/dL 7.9*  --    ALK PHOS U/L  --  359*   ALT U/L  --  <3*   AST U/L  --  14     Results from last 7 days   Lab Units 11/25/23  0506   INR  3.91*           Imaging Studies: I have personally reviewed pertinent imaging studies. FL retrograde pyelogram    Result Date: 11/21/2023  Impression: Fluoroscopic guidance provided for right retrograde pyelogram and right ureteral stent placement. Please see procedure report for further details. Workstation performed: [email or web address] kidney and bladder    Result Date: 11/20/2023  Impression: Stable moderate right-sided hydronephrosis Workstation performed: [id number]     XR chest portable    Result Date: 11/20/2023  Impression: No active pulmonary disease on examination which is somewhat limited secondary to low lung volumes.  Workstation performed: [id number]     XR abdomen 1 view kub    Result Date: 11/19/2023  Impression: Interval placement of right upper quadrant percutaneous cholecystostomy tube with additional tubes as described above Workstation performed: [id number]     US right upper quadrant    Result Date: 11/17/2023  Impression: Findings compatible with acute cholecystitis. Gallbladder wall thickening with irregular margins which may represent sloughed membranes, concerning for gangrenous cholecystitis. Dilated common bile duct and mild intrahepatic biliary ductal dilatation, in keeping with the known choledocholithiasis on recent CT. Mildly dilated pancreatic duct measuring 6 mm, which may relate to choledocholithiasis. Recommend contrast-enhanced MRI/MRCP of the abdomen for further evaluation. Right hydronephrosis. Small right upper abdominal ascites. The study was marked in [facility] for immediate notification. Workstation performed: [id number]     CT abdomen pelvis wo contrast    Result Date: 11/17/2023  Impression: 1. Gallstones with apparent gallbladder wall thickening and nodularity raising the possibility of cholecystitis. There is some nonspecific heterogeneous low-attenuation in the liver adjacent to the gallbladder fundus, not appreciated on the prior CT study from 11/29/2021, which could reflect steatosis although an infiltrative process not excluded. Recommend further evaluation with ultrasound and/or contrast-enhanced CT or MRI. 2. Choledocholithiasis with intra and extrahepatic biliary ductal dilatation. Correlate with liver function studies. Recommend ultrasound for further evaluation. 3. New right-sided moderate hydronephrosis without discernible obstructing mass or calculus. 4. Moderate abdominal and pelvic ascites. 5. Subcentimeter pancreatic cysts, noted on the prior study, for which MRI/MRCP evaluation is advised if not previously done. 6. Multiple subcentimeter bibasilar pulmonary nodules not clearly identified on the prior CT. Changes may be infectious/inflammatory or neoplastic in nature. Consider dedicated CT of the chest. 7. Additional findings as noted. The study was marked in [facility] for immediate notification. Workstation performed: [id number]           Patient was seen and examined by Dr. Carry Ormond.  All key medical decisions were made by  João Sharma. Thank you for allowing us to participate in the care of this present patient. We will follow-up with you closely. Minna Zendejas PA-C

## 2023-11-25 NOTE — PROGRESS NOTES
NEPHROLOGY PROGRESS NOTE   Cathy Kaur 78 y.o. female MRN: [id number]  Unit/Bed#: W -01 Encounter: [id number]    ASSESSMENT & PLAN:  78-year-old female presented with abdominal pain found to have cholecystitis with perforation and peritonitis. Status post open drainage of intra-abdominal abscess by surgery along with liver biopsy, open cholecystostomy tube placement on 11/8. Nephrology consulted for Acute kidney injury     Acute kidney injury, POA  -Baseline creatinine: 0.8 mg/dl in September 2022  -Admission creatinine: 1.67 mg/dl  - Work up:   UA with microscopy: 1+ protein, large blood, leukocyturia  Imaging: CT without contrast showed mild right hydronephrosis  Renal ultrasound with moderate right hydronephrosis  -Etiology: Obstructive uropathy and possible ATN from intraoperative hypotension  -Right ureteral stent placement by urology on 11/21/23, repeat renal ultrasound from 11/24 showed interval resolution of hydronephrosis with stent placement.  -Peak creatinine 3.54 mg/dL on 11/24  -Plan:   Renal function improved today to creatinine 3.27 mg/dL. Electrolytes stable, urine output around 400 mL in last 24 hours. Hydronephrosis on repeat renal ultrasound resolving, renal function improving showed continue current treatment with IV fluid at 50 mill per hour. Clinically appears euvolemic. No indication for renal replacement therapy at this time. Patient and daughter agreeable to renal replacement therapy if needed as per my discussion on 11/24  Avoid nephrotoxins and dose all medications per EGFR. Avoid hypotension. Obstructive uropathy/right hydroureteronephrosis  - renal ultrasound with persistent right hydronephrosis and so patient underwent ureteral stent placement by urology in 11/21.   Intraoperatively was found to have right ureteral stricture  -Repeat renal ultrasound on 11/24 showed interval resolution of hydronephrosis with placement of ureteral stent     Primary hypertension  -Blood pressure stable. Avoid hypotension     Metabolic acidosis:   -Resolved with bicarbonate drip, currently on Isolyte, bicarb level at normal range, continue to monitor     Hyperphosphatemia: Phosphorus level improving to 4.9, recommend low phosphorus diet. Continue to monitor and expect phosphorus to improve with once renal function starts improving     Anemia, unspecified   -hemoglobin 10.6 g/dL, stable continue to monitor      Acute cholecystitis/bile peritonitis secondary to perforated cholecystitis: Antibiotics per primary team,  -status post open drainage of intra-abdominal abscess, abdominal washout    Along with placement of cholecystostomy tube. Continue management per surgical team  -Seen by GI and noted plan for MRI/MRCP. Recommend avoiding gadolinium if possible     Metastatic adenocarcinoma on biopsy from liver mass and omentum  -workup and management per primary team.  Palliative team on board. Follow-up oncology recommendations. Seen by oncology as well who discussed with patient that treatment would be palliative in nature. SUBJECTIVE:  Complain of generalized body ache. No chest pain or shortness of breath, no nausea vomiting    OBJECTIVE:  Current Weight: Weight - Scale: 95.1 kg (209 lb 10.5 oz)  Vitals:    11/25/23 0756   BP: 130/72   Pulse: 97   Resp: 18   Temp: 97.5 °F (36.4 °C)   SpO2: 93%       Intake/Output Summary (Last 24 hours) at 11/25/2023 1110  Last data filed at 11/25/2023 0900  Gross per 24 hour   Intake 0 ml   Output 15 ml   Net -15 ml       Physical Exam  General:  Ill looking, awake. Eyes: Conjunctivae pink,  Sclera anicteric  ENT: lips and mucous membranes moist  Neck: supple   Chest: Clear to Auscultation both lungs,  no crackles, ronchus or wheezing. CVS: S1 & S2 present, normal rate, regular rhythm, no murmur.   Abdomen: soft, non-tender, non-distended, Bowel sounds normoactive  Extremities:  trace edema of  legs  Skin: no rash  Neuro: awake, alert, oriented x 3   Psych: Mood and affect appropriate   Mcmanus catheter with clear urine  Medications:    Current Facility-Administered Medications:     calcium carbonate (TUMS) chewable tablet 1,000 mg, 1,000 mg, Oral, Once, Kelle Greer PA-C    chlorhexidine (PERIDEX) 0.12 % oral rinse 15 mL, 15 mL, Mouth/Throat, Q12H FLORENTIN, Katie Franciscoergan, PA-C, 15 mL at 11/25/23 0924    HYDROmorphone (DILAUDID) injection 0.5 mg, 0.5 mg, Intravenous, Q2H PRN Max 8/day, Faustine Fitting, PA-C, 0.5 mg at 11/24/23 2321    HYDROmorphone HCl (DILAUDID) injection 0.2 mg, 0.2 mg, Intravenous, Q2H PRN Max 8/day, Faustine Fitting, PA-C, 0.2 mg at 11/24/23 0352    labetalol (NORMODYNE) injection 10 mg, 10 mg, Intravenous, Q4H PRN, Faustine Fitting, PA-C    metoclopramide (REGLAN) injection 10 mg, 10 mg, Intravenous, Q6H, Reza Garcia MD, 10 mg at 11/25/23 0117    metoprolol (LOPRESSOR) injection 5 mg, 5 mg, Intravenous, Q6H PRN, Faustine Fitting, PA-C, 5 mg at 11/20/23 0402    metoprolol (LOPRESSOR) injection 5 mg, 5 mg, Intravenous, Q6H, Katie Little, PA-C, 5 mg at 11/25/23 1053    multi-electrolyte (PLASMALYTE-A/ISOLYTE-S PH 7.4) IV solution, 50 mL/hr, Intravenous, Continuous, Doroteo Carballo MD, Held at 11/25/23 0933    ondansetron [facility]) injection 4 mg, 4 mg, Intravenous, Q4H PRN, Faustine Fitting, PA-C, 4 mg at 11/24/23 0430    prothrombin complex concentrate (human) (Kcentra) 1,500 Units, 1,500 Units, Intravenous, Once, Armida Soto DO    Invasive Devices:   Urethral Catheter Non-latex 16 Fr.  (Active)   Reasons to continue Urinary Catheter  Post-operative urological requirements 11/23/23 0803   Goal for Removal Other (Comment) 11/23/23 0803   Site Assessment Clean;Skin intact 11/23/23 0803   Mcmanus Care Done 11/24/23 2100   Collection Container Standard drainage bag 11/23/23 0803   Securement Method Securing device (Describe) 11/23/23 0803   Output (mL) 400 mL 11/24/23 0911       Lab Results:   Results from last 7 days Lab Units 11/25/23  0506 11/24/23  0902 11/24/23  0314 11/23/23  1204 11/23/23  0611 11/20/23  1456 11/20/23  0448 11/19/23  0532   WBC Thousand/uL 12.48*  --  8.31 7.18  --    < > 10.28* 11.08*   HEMOGLOBIN g/dL 10.6*  --  10.0* 10.4*  --    < > 9.2* 9.6*   HEMATOCRIT % 34.4*  --  31.5* 33.3*  --    < > 29.6* 31.2*   PLATELETS Thousands/uL 299  --  275 312  --    < > 310 319   POTASSIUM mmol/L 3.9  --  3.6  --  4.0   < > 4.0 3.9   CHLORIDE mmol/L 102  --  100  --  100   < > 104 103   CO2 mmol/L 23  --  24  --  23   < > 21 23   BUN mg/dL 87*  --  90*  --  82*   < > 48* 41*   CREATININE mg/dL 3.27*  --  3.54*  --  3.52*   < > 2.54* 1.97*   CALCIUM mg/dL 7.9*  --  7.8*  --  7.7*   < > 8.1* 8.0*   MAGNESIUM mg/dL 2.7  --  2.7  --  2.7   < > 2.5 2.5   PHOSPHORUS mg/dL 4.9*  --  4.9* 5.3*  --    < > 5.4* 5.2*   ALK PHOS U/L  --  359*  --   --   --   --  101 93   ALT U/L  --  <3*  --   --   --   --  <3* 5*   AST U/L  --  14  --   --   --   --  12* 21    < > = values in this interval not displayed. Previous work up:      Portions of the record may have been created with voice recognition software. Occasional wrong word or "sound a like" substitutions may have occurred due to the inherent limitations of voice recognition software. Read the chart carefully and recognize, using context, where substitutions have occurred. If you have any questions, please contact the dictating provider.

## 2023-11-25 NOTE — PROGRESS NOTES
Progress Note - General Surgery   [facility] Resident on Surgery Service   Janay Kathleen 78 y.o. female MRN: [id number]  Unit/Bed#: W MS [id number] Encounter: [id number]    Assessment:  78 y.o. female with acute cholecystitis complicated by perforation, sepsis present on admission 2/2 bile peritonitis. Now s/p Laparoscopic converted to open drainage of intra-abdominal abscesses x 4, abdominal washout, gastric serosal repair, omental biopsy, liver biopsy, open cholecystostomy tube placement on 11/18, introp bx c/f metastatic adenocarcinoma, undetermined primary malignancy at this time. Afebrile. VSS.   cc  Left ADILIA drain 15 cc serous output  Right cholecystostomy drain no output recorded, serobilious in bag  WBC 12.48 from 8.31  Hgb 10.6 from 10  Cr 3.27 from 3.54    Plan:  NPO, sips with meds for possible EGD  Continue IV fluids  PRN pain meds  PRN anti nausea meds  Encourage out of bed/ambulation  Encourage IS  Appreciate nephrology recs  Appreciate palliative recs  Appreciate GI recs  Appreciate heme/onc [address] [facility] recs  F/u final reading of renal ultrasound  Maintain drains  Maintain cutler  Dispo planning    Subjective/Objective     Subjective: No acute events overnight. Patient appears to be somewhat delirious. Endorses nausea. Denies having vomiting, fevers, chills, chest pain, shortness of breath. No bowel movements. Passing flatus. Voiding into cutler without difficulty. Objective:     Blood pressure 117/73, pulse 93, temperature 97.7 °F (36.5 °C), resp. rate 17, height 5' 3" (1.6 m), weight 95.1 kg (209 lb 10.5 oz), SpO2 97 %. ,Body mass index is 37.14 kg/m². Intake/Output Summary (Last 24 hours) at 11/25/2023 8959  Last data filed at 11/24/2023 2337  Gross per 24 hour   Intake 120 ml   Output 415 ml   Net -295 ml       Invasive Devices       Peripheral Intravenous Line  Duration             Peripheral IV 11/21/23 Distal;Dorsal (posterior); Right Forearm 4 days Peripheral IV 11/21/23 Distal;Left;Ventral (anterior) Forearm 4 days    Peripheral IV 11/22/23 Left;Proximal;Ventral (anterior) Antecubital 2 days              Drain  Duration             Closed/Suction Drain Left LUQ Bulb 19 Fr. 6 days    Closed/Suction Drain Right RUQ Other (Comment) 12 Fr. 6 days    Ureteral Internal Stent Right ureter 6 Fr. 3 days    Urethral Catheter Non-latex 16 Fr. 3 days                    General: NAD  HENT: NCAT MMM  Neck: supple, no JVD  CV: nl rate  Lungs: nl wob. No resp distress  ABD: Soft, tender right lower quadrant and left lower quadrant, nondistended. See above for drain output and drain character. Extrem: No CCE  Neuro: AAOx3       Scheduled Meds:  Current Facility-Administered Medications   Medication Dose Route Frequency Provider Last Rate    calcium carbonate  1,000 mg Oral Once Marcheta Borne, PA-C      chlorhexidine  15 mL Mouth/Throat Q12H [address] Amari Achille, PA-C      HYDROmorphone  0.5 mg Intravenous Q2H PRN Max 8/day Amari Achille, PA-C      HYDROmorphone  0.2 mg Intravenous Q2H PRN Max 8/day Amari Achille, PA-C      labetalol  10 mg Intravenous Q4H PRN Amari Achille, PA-C      metoclopramide  10 mg Intravenous Q6H Marla Crawley MD      metoprolol  5 mg Intravenous Q6H PRN Amari Achille, PA-C      metoprolol  5 mg Intravenous Q6H Amari Achille, PA-C      multi-electrolyte  50 mL/hr Intravenous Continuous Suad Castillo MD 50 mL/hr (11/24/23 1046)    ondansetron  4 mg Intravenous Q4H PRN Amari Achille, PA-C      warfarin  5 mg Oral Daily (warfarin) Marla Crawley MD       Continuous Infusions:multi-electrolyte, 50 mL/hr, Last Rate: 50 mL/hr (11/24/23 1046)      PRN Meds:. HYDROmorphone    HYDROmorphone    labetalol    metoprolol    ondansetron      Lab, Imaging and other studies:I have personally reviewed pertinent lab results.     VTE Pharmacologic Prophylaxis: Warfarin (Coumadin)  VTE Mechanical Prophylaxis: sequential compression device      Herrera Oscar Javed MD  11/25/2023 6:32 AM

## 2023-11-25 NOTE — QUICK NOTE
Patient is refusing all her medications and basic daily care. There has been an increase of paranoia as far as what is being done and what is being given to her. Phytonadione was ordered in which she refused and stated " it is my choice and I do not want it." Physician made aware and will be following.

## 2023-11-26 NOTE — PLAN OF CARE
Problem: Prexisting or High Potential for Compromised Skin Integrity  Goal: Skin integrity is maintained or improved  Description: INTERVENTIONS:  - Identify patients at risk for skin breakdown  - Assess and monitor skin integrity  - Assess and monitor nutrition and hydration status  - Monitor labs   - Assess for incontinence   - Turn and reposition patient  - Assist with mobility/ambulation  - Relieve pressure over bony prominences  - Avoid friction and shearing  - Provide appropriate hygiene as needed including keeping skin clean and dry  - Evaluate need for skin moisturizer/barrier cream  - Collaborate with interdisciplinary team   - Patient/family teaching  - Consider wound care consult   Outcome: Progressing     Problem: PAIN - ADULT  Goal: Verbalizes/displays adequate comfort level or baseline comfort level  Description: Interventions:  - Encourage patient to monitor pain and request assistance  - Assess pain using appropriate pain scale  - Administer analgesics based on type and severity of pain and evaluate response  - Implement non-pharmacological measures as appropriate and evaluate response  - Consider cultural and social influences on pain and pain management  - Notify physician/advanced practitioner if interventions unsuccessful or patient reports new pain  Outcome: Progressing     Problem: INFECTION - ADULT  Goal: Absence or prevention of progression during hospitalization  Description: INTERVENTIONS:  - Assess and monitor for signs and symptoms of infection  - Monitor lab/diagnostic results  - Monitor all insertion sites, i.e. indwelling lines, tubes, and drains  - Monitor endotracheal if appropriate and nasal secretions for changes in amount and color  - [place] appropriate cooling/warming therapies per order  - Administer medications as ordered  - Instruct and encourage patient and family to use good hand hygiene technique  - Identify and instruct in appropriate isolation precautions for identified infection/condition  Outcome: Progressing  Goal: Absence of fever/infection during neutropenic period  Description: INTERVENTIONS:  - Monitor WBC    Outcome: Progressing     Problem: SAFETY ADULT  Goal: Patient will remain free of falls  Description: INTERVENTIONS:  - Educate patient/family on patient safety including physical limitations  - Instruct patient to call for assistance with activity   - Consult OT/PT to assist with strengthening/mobility   - Keep Call bell within reach  - Keep bed low and locked with side rails adjusted as appropriate  - Keep care items and personal belongings within reach  - Initiate and maintain comfort rounds  - Make Fall Risk Sign visible to staff  - Offer Toileting every 2 Hours, in advance of need  - Initiate/Maintain alarm  - Obtain necessary fall risk management equipment:   - Apply yellow socks and bracelet for high fall risk patients  - Consider moving patient to room near nurses station  Outcome: Progressing  Goal: Maintain or return to baseline ADL function  Description: INTERVENTIONS:  -  Assess patient's ability to carry out ADLs; assess patient's baseline for ADL function and identify physical deficits which impact ability to perform ADLs (bathing, care of mouth/teeth, toileting, grooming, dressing, etc.)  - Assess/evaluate cause of self-care deficits   - Assess range of motion  - Assess patient's mobility; develop plan if impaired  - Assess patient's need for assistive devices and provide as appropriate  - Encourage maximum independence but intervene and supervise when necessary  - Involve family in performance of ADLs  - Assess for home care needs following discharge   - Consider OT consult to assist with ADL evaluation and planning for discharge  - Provide patient education as appropriate  Outcome: Progressing  Goal: Maintains/Returns to pre admission functional level  Description: INTERVENTIONS:  - Perform AM-PAC 6 Click Basic Mobility/ Daily Activity assessment daily.  - Set and communicate daily mobility goal to care team and patient/family/caregiver. - Collaborate with rehabilitation services on mobility goals if consulted  - Perform Range of Motion 3 times a day. - Reposition patient every 2 hours. - Dangle patient 3 times a day  - Stand patient 3 times a day  - Ambulate patient 3 times a day  - Out of bed to chair 3 times a day   - Out of bed for meals 3 times a day  - Out of bed for toileting  - Record patient progress and toleration of activity level   Outcome: Progressing     Problem: DISCHARGE PLANNING  Goal: Discharge to home or other facility with appropriate resources  Description: INTERVENTIONS:  - Identify barriers to discharge w/patient and caregiver  - Arrange for needed discharge resources and transportation as appropriate  - Identify discharge learning needs (meds, wound care, etc.)  - Arrange for interpretive services to assist at discharge as needed  - Refer to Case Management Department for coordinating discharge planning if the patient needs post-hospital services based on physician/advanced practitioner order or complex needs related to functional status, cognitive ability, or social support system  Outcome: Progressing     Problem: Knowledge Deficit  Goal: Patient/family/caregiver demonstrates understanding of disease process, treatment plan, medications, and discharge instructions  Description: Complete learning assessment and assess knowledge base. Interventions:  - Provide teaching at level of understanding  - Provide teaching via preferred learning methods  Outcome: Progressing     Problem: Nutrition/Hydration-ADULT  Goal: Nutrient/Hydration intake appropriate for improving, restoring or maintaining nutritional needs  Description: Monitor and assess patient's nutrition/hydration status for malnutrition. Collaborate with interdisciplinary team and initiate plan and interventions as ordered.   Monitor patient's weight and dietary intake as ordered or per policy. Utilize nutrition screening tool and intervene as necessary. Determine patient's food preferences and provide high-protein, high-caloric foods as appropriate. INTERVENTIONS:  - Monitor oral intake, urinary output, labs, and treatment plans  - Assess nutrition and hydration status and recommend course of action  - Evaluate amount of meals eaten  - Assist patient with eating if necessary   - Allow adequate time for meals  - Recommend/ encourage appropriate diets, oral nutritional supplements, and vitamin/mineral supplements  - Order, calculate, and assess calorie counts as needed  - Recommend, monitor, and adjust tube feedings and TPN/PPN based on assessed needs  - Assess need for intravenous fluids  - Provide specific nutrition/hydration education as appropriate  - Include patient/family/caregiver in decisions related to nutrition  Outcome: Progressing     Problem: NEUROSENSORY - ADULT  Goal: Achieves maximal functionality and self care  Description: INTERVENTIONS  - Monitor swallowing and airway patency with patient fatigue and changes in neurological status  - Encourage and assist patient to increase activity and self care.    - Encourage visually impaired, hearing impaired and aphasic patients to use assistive/communication devices  Outcome: Progressing     Problem: CARDIOVASCULAR - ADULT  Goal: Maintains optimal cardiac output and hemodynamic stability  Description: INTERVENTIONS:  - Monitor I/O, vital signs and rhythm  - Monitor for S/S and trends of decreased cardiac output  - Administer and titrate ordered vasoactive medications to optimize hemodynamic stability  - Assess quality of pulses, skin color and temperature  - Assess for signs of decreased coronary artery perfusion  - Instruct patient to report change in severity of symptoms  Outcome: Progressing  Goal: Absence of cardiac dysrhythmias or at baseline rhythm  Description: INTERVENTIONS:  - Continuous cardiac monitoring, vital signs, obtain 12 lead EKG if ordered  - Administer antiarrhythmic and heart rate control medications as ordered  - Monitor electrolytes and administer replacement therapy as ordered  Outcome: Progressing     Problem: RESPIRATORY - ADULT  Goal: Achieves optimal ventilation and oxygenation  Description: INTERVENTIONS:  - Assess for changes in respiratory status  - Assess for changes in mentation and behavior  - Position to facilitate oxygenation and minimize respiratory effort  - Oxygen administered by appropriate delivery if ordered  - Initiate smoking cessation education as indicated  - Encourage broncho-pulmonary hygiene including cough, deep breathe, Incentive Spirometry  - Assess the need for suctioning and aspirate as needed  - Assess and instruct to report SOB or any respiratory difficulty  - Respiratory Therapy support as indicated  Outcome: Progressing     Problem: GASTROINTESTINAL - ADULT  Goal: Minimal or absence of nausea and/or vomiting  Description: INTERVENTIONS:  - Administer IV fluids if ordered to ensure adequate hydration  - Maintain NPO status until nausea and vomiting are resolved  - Nasogastric tube if ordered  - Administer ordered antiemetic medications as needed  - Provide nonpharmacologic comfort measures as appropriate  - Advance diet as tolerated, if ordered  - Consider nutrition services referral to assist patient with adequate nutrition and appropriate food choices  Outcome: Progressing  Goal: Maintains or returns to baseline bowel function  Description: INTERVENTIONS:  - Assess bowel function  - Encourage oral fluids to ensure adequate hydration  - Administer IV fluids if ordered to ensure adequate hydration  - Administer ordered medications as needed  - Encourage mobilization and activity  - Consider nutritional services referral to assist patient with adequate nutrition and appropriate food choices  Outcome: Progressing  Goal: Maintains adequate nutritional intake  Description: INTERVENTIONS:  - Monitor percentage of each meal consumed  - Identify factors contributing to decreased intake, treat as appropriate  - Assist with meals as needed  - Monitor I&O, weight, and lab values if indicated  - Obtain nutrition services referral as needed  Outcome: Progressing  Goal: Oral mucous membranes remain intact  Description: INTERVENTIONS  - Assess oral mucosa and hygiene practices  - Implement preventative oral hygiene regimen  - Implement oral medicated treatments as ordered  - Initiate Nutrition services referral as needed  Outcome: Progressing     Problem: GENITOURINARY - ADULT  Goal: Maintains or returns to baseline urinary function  Description: INTERVENTIONS:  - Assess urinary function  - Encourage oral fluids to ensure adequate hydration if ordered  - Administer IV fluids as ordered to ensure adequate hydration  - Administer ordered medications as needed  - Offer frequent toileting  - Follow urinary retention protocol if ordered  Outcome: Progressing  Goal: Absence of urinary retention  Description: INTERVENTIONS:  - Assess patient’s ability to void and empty bladder  - Monitor I/O  - Bladder scan as needed  - Discuss with physician/AP medications to alleviate retention as needed  - Discuss catheterization for long term situations as appropriate  Outcome: Progressing  Goal: Urinary catheter remains patent  Description: INTERVENTIONS:  - Assess patency of urinary catheter  - If patient has a chronic cutler, consider changing catheter if non-functioning  - Follow guidelines for intermittent irrigation of non-functioning urinary catheter  Outcome: Progressing     Problem: METABOLIC, FLUID AND ELECTROLYTES - ADULT  Goal: Electrolytes maintained within normal limits  Description: INTERVENTIONS:  - Monitor labs and assess patient for signs and symptoms of electrolyte imbalances  - Administer electrolyte replacement as ordered  - Monitor response to electrolyte replacements, including repeat lab results as appropriate  - Instruct patient on fluid and nutrition as appropriate  Outcome: Progressing  Goal: Fluid balance maintained  Description: INTERVENTIONS:  - Monitor labs   - Monitor I/O and WT  - Instruct patient on fluid and nutrition as appropriate  - Assess for signs & symptoms of volume excess or deficit  Outcome: Progressing     Problem: SKIN/TISSUE INTEGRITY - ADULT  Goal: Skin Integrity remains intact(Skin Breakdown Prevention)  Description: Assess:  -Assess extremities for adequate circulation and sensation     Bed Management:  -Have minimal linens on bed & keep smooth, unwrinkled  -Change linens as needed when moist or perspiring    Toileting:  -Offer bedside commode  -Assess for incontinence every   -Use incontinent care products after each incontinent episode such as     Activity:  -Mobilize patient  times a day  -Encourage activity and walks on unit  -Encourage or provide ROM exercises   -Turn and reposition patient every  Hours  -Use appropriate equipment to lift or move patient in bed  -Instruct/ Assist with weight shifting every  when out of bed in chair  -Consider limitation of chair time  hour intervals    Skin Care:  -Avoid use of baby powder, tape, friction and shearing, hot water or constrictive clothing  -Relieve pressure over bony prominences using   -Do not massage red bony areas    Next Steps:  -Teach patient strategies to minimize risks such as    -Consider consults to  interdisciplinary teams such as   Outcome: Progressing  Goal: Incision(s), wounds(s) or drain site(s) healing without S/S of infection  Description: INTERVENTIONS  - Assess and document dressing, incision, wound bed, drain sites and surrounding tissue  - Provide patient and family education  - Perform skin care/dressing changes every   Outcome: Progressing  Goal: Pressure injury heals and does not worsen  Description: Interventions:  - Implement low air loss mattress or specialty surface (Criteria met)  - Apply silicone foam dressing  - Instruct/assist with weight shifting every  minutes when in chair   - Limit chair time to  hour intervals  - Use special pressure reducing interventions such as  when in chair   - Apply fecal or urinary incontinence containment device   - Perform passive or active ROM every   - Turn and reposition patient & offload bony prominences every  hours   - Utilize friction reducing device or surface for transfers   - Consider consults to  interdisciplinary teams such as   - Use incontinent care products after each incontinent episode such as   - Consider nutrition services referral as needed  Outcome: Progressing     Problem: MUSCULOSKELETAL - ADULT  Goal: Maintain or return mobility to safest level of function  Description: INTERVENTIONS:  - Assess patient's ability to carry out ADLs; assess patient's baseline for ADL function and identify physical deficits which impact ability to perform ADLs (bathing, care of mouth/teeth, toileting, grooming, dressing, etc.)  - Assess/evaluate cause of self-care deficits   - Assess range of motion  - Assess patient's mobility  - Assess patient's need for assistive devices and provide as appropriate  - Encourage maximum independence but intervene and supervise when necessary  - Involve family in performance of ADLs  - Assess for home care needs following discharge   - Consider OT consult to assist with ADL evaluation and planning for discharge  - Provide patient education as appropriate  Outcome: Progressing  Goal: Maintain proper alignment of affected body part  Description: INTERVENTIONS:  - Support, maintain and protect limb and body alignment  - Provide patient/ family with appropriate education  Outcome: Progressing

## 2023-11-26 NOTE — PROGRESS NOTES
NEPHROLOGY PROGRESS NOTE   Conrad Sofia 78 y.o. female MRN: [id number]  Unit/Bed#: W -01 Encounter: [id number]    ASSESSMENT & PLAN:  77-year-old female presented with abdominal pain found to have cholecystitis with perforation and peritonitis. Status post open drainage of intra-abdominal abscess by surgery along with liver biopsy, open cholecystostomy tube placement on 11/8. Nephrology consulted for Acute kidney injury     Acute kidney injury, POA  -Baseline creatinine: 0.8 mg/dl in September 2022  -Admission creatinine: 1.67 mg/dl  - Work up:   UA with microscopy: 1+ protein, large blood, leukocyturia  Imaging: CT without contrast showed mild right hydronephrosis  Renal ultrasound with moderate right hydronephrosis  -Etiology: Obstructive uropathy and possible ATN from intraoperative hypotension  -Right ureteral stent placement by urology on 11/21/23, repeat renal ultrasound from 11/24 showed interval resolution of hydronephrosis with stent placement.  -Peak creatinine 3.54 mg/dL on 11/24  -Plan:   Renal function improved today to creatinine 2.66 milligrams per deciliter today. Has hypokalemia which is replaced urine output improving urine output around 400 mL in last 24 hours. Hydronephrosis on repeat renal ultrasound resolving  Patient going for CT with IV contrast, will continue current IV fluids but if renal function remains stable, okay to stop IV fluids tomorrow and give IV Lasix. Discussed with primary team about risk of contrast nephropathy receiving IV contrast but CT IV contrast was felt necessary to look for infection. Also primary team was informed about the risk   No indication for renal replacement therapy at this time. Patient and daughter agreeable to renal replacement therapy if needed as per my discussion on 11/24  Avoid nephrotoxins and dose all medications per EGFR. Avoid hypotension.                 Obstructive uropathy/right hydroureteronephrosis  - renal ultrasound with persistent right hydronephrosis and so patient underwent ureteral stent placement by urology in 11/21. Intraoperatively was found to have right ureteral stricture  -Repeat renal ultrasound on 11/24 showed interval resolution of hydronephrosis with placement of ureteral stent     Primary hypertension  -Blood pressure slowly trending up, continue to monitor. Avoid hypotension. May need to start an antihypertensive medication if the blood pressure remains elevated     Metabolic acidosis:   -Resolved with bicarbonate drip, currently on Isolyte, bicarb level at normal range, continue to monitor    Hypokalemia: Potassium 3.3-replaced     Hyperphosphatemia: Phosphorus level improving to 4.9, recommend low phosphorus diet. Continue to monitor and expect phosphorus to improve with once renal function starts improving further     Anemia, unspecified   -hemoglobin 9.8  g/dL, stable continue to monitor     Lower extremity edema likely due to third spacing, overall does not feel fluid overloaded, serum albumin is low. Continue to monitor with current IV fluids. Hold off on diuretics today due to patient receiving CT with contrast     Acute cholecystitis/bile peritonitis secondary to perforated cholecystitis: Antibiotics per primary team,  -status post open drainage of intra-abdominal abscess, abdominal washout    Along with placement of cholecystostomy tube. Continue management per surgical team  -Seen by GI and noted plan for MRI/MRCP. Recommend avoiding gadolinium if possible. Metastatic adenocarcinoma on biopsy from liver mass and omentum  -workup and management per primary team.  Palliative team on board. Follow-up oncology recommendations. Seen by oncology as well who discussed with patient that treatment would be palliative in nature. Slurring of speech, patient underwent CT head which was suggestive of possible MCA thrombus.   Recommend neurology input     Above plan regarding management of acute kidney injury with IV fluid specially with patient receiving CT with IV contrast today was discussed with primary team and agreed with the plan. Recommended holding off on diuretics today due to risk of contrast nephropathy    SUBJECTIVE:  Patient denies any new complaints urine output improving. No shortness of breath    OBJECTIVE:  Current Weight: Weight - Scale: 95.4 kg (210 lb 5.1 oz)  Vitals:    11/26/23 0715   BP: 158/73   Pulse: 101   Resp: 17   Temp: (!) 97.4 °F (36.3 °C)   SpO2: 93%       Intake/Output Summary (Last 24 hours) at 11/26/2023 1040  Last data filed at 11/26/2023 0558  Gross per 24 hour   Intake --   Output 1040 ml   Net -1040 ml         Physical Exam  General:  Ill looking, awake. Eyes: Conjunctivae pink,  Sclera anicteric  ENT: lips and mucous membranes moist  Neck: supple   Chest: Clear to Auscultation both lungs,  no crackles, ronchus or wheezing. CVS: S1 & S2 present, normal rate, regular rhythm, no murmur.   Abdomen: soft, non-tender, non-distended, Bowel sounds normoactive  Extremities: 1 + edema of  legs  Skin: no rash  Neuro: awake, alert, oriented  Psych: Mood and affect appropriate    Mcmanus catheter with clear urine  Medications:    Current Facility-Administered Medications:     calcium carbonate (TUMS) chewable tablet 1,000 mg, 1,000 mg, Oral, Once, Cleave [place] PA-C    chlorhexidine (PERIDEX) 0.12 % oral rinse 15 mL, 15 mL, Mouth/Throat, Q12H FLORENTIN, Katie Anabel, PA-C, 15 mL at 11/25/23 0924    HYDROmorphone (DILAUDID) injection 0.5 mg, 0.5 mg, Intravenous, Q2H PRN Max 8/day, Antonella Sevin, PA-C, 0.5 mg at 11/24/23 2321    HYDROmorphone HCl (DILAUDID) injection 0.2 mg, 0.2 mg, Intravenous, Q2H PRN Max 8/day, Katie Anabel, PA-C, 0.2 mg at 11/24/23 0352    labetalol (NORMODYNE) injection 10 mg, 10 mg, Intravenous, Q4H PRN, Antonella Sevin, PA-C    metoclopramide (REGLAN) injection 10 mg, 10 mg, Intravenous, Q6H, Josette Del Cid MD, 10 mg at 11/25/23 7120    metoprolol (LOPRESSOR) injection 5 mg, 5 mg, Intravenous, Q6H PRN, Andreas Whitaker PA-C, 5 mg at 11/20/23 0402    metoprolol (LOPRESSOR) injection 5 mg, 5 mg, Intravenous, Q6H, VON Blevins-C, 5 mg at 11/26/23 0953    multi-electrolyte (PLASMALYTE-A/ISOLYTE-S PH 7.4) IV solution, 75 mL/hr, Intravenous, Continuous, Preethi Koehler MD, Last Rate: 50 mL/hr at 11/26/23 0709, 50 mL/hr at 11/26/23 0709    ondansetron (ZOFRAN) injection 4 mg, 4 mg, Intravenous, Q4H PRN, Andreas Whitaker PA-C, 4 mg at 11/24/23 0430    polyethylene glycol (MIRALAX) packet 17 g, 17 g, Oral, BID, Fabricio Guo MD, 17 g at 11/26/23 0952    Invasive Devices:   Urethral Catheter Non-latex 16 Fr.  (Active)   Reasons to continue Urinary Catheter  Post-operative urological requirements 11/23/23 0803   Goal for Removal Other (Comment) 11/23/23 0803   Site Assessment Clean;Skin intact 11/23/23 0803   Mcmanus Care Done 11/24/23 2100   Collection Container Standard drainage bag 11/23/23 0803   Securement Method Securing device (Describe) 11/23/23 0803   Output (mL) 400 mL 11/24/23 0911       Lab Results:   Results from last 7 days   Lab Units 11/26/23  0440 11/25/23  0506 11/24/23  0902 11/24/23  0314 11/23/23  1204 11/23/23  0611 11/20/23  1456 11/20/23  0448   WBC Thousand/uL 15.33* 12.48*  --  8.31 7.18  --    < > 10.28*   HEMOGLOBIN g/dL 9.8* 10.6*  --  10.0* 10.4*  --    < > 9.2*   HEMATOCRIT % 31.4* 34.4*  --  31.5* 33.3*  --    < > 29.6*   PLATELETS Thousands/uL 296 299  --  275 312  --    < > 310   POTASSIUM mmol/L 3.3* 3.9  --  3.6  --  4.0   < > 4.0   CHLORIDE mmol/L 101 102  --  100  --  100   < > 104   CO2 mmol/L 22 23  --  24  --  23   < > 21   BUN mg/dL 81* 87*  --  90*  --  82*   < > 48*   CREATININE mg/dL 2.66* 3.27*  --  3.54*  --  3.52*   < > 2.54*   CALCIUM mg/dL 7.0* 7.9*  --  7.8*  --  7.7*   < > 8.1*   MAGNESIUM mg/dL  --  2.7  --  2.7  --  2.7   < > 2.5   PHOSPHORUS mg/dL  --  4.9*  --  4.9* 5.3*  --    < > 5.4*   ALK PHOS U/L  --   --  359*  -- --   --   --  101   ALT U/L  --   --  <3*  --   --   --   --  <3*   AST U/L  --   --  14  --   --   --   --  12*    < > = values in this interval not displayed. Previous work up:      Portions of the record may have been created with voice recognition software. Occasional wrong word or "sound a like" substitutions may have occurred due to the inherent limitations of voice recognition software. Read the chart carefully and recognize, using context, where substitutions have occurred. If you have any questions, please contact the dictating provider.

## 2023-11-26 NOTE — PROGRESS NOTES
Progress Note - Kaya Colón 78 y.o. female MRN: [id number]    Unit/Bed#: W -01 Encounter: [id number]    Reason for Consult / Principal Problem: Choledocholithiasis, Carcinomatosis     ASSESSMENT and PLAN:    Principal Problem:    Bile peritonitis ([address])  Active Problems:    Essential hypertension    Anxiety    Anemia    Atrial fibrillation (HCC)    Congestive heart failure, unspecified HF chronicity, unspecified heart failure type ([address])    Chronic kidney disease, stage 3a (HCC)    Major depressive disorder with single episode, in full remission ([address])    Hydronephrosis    Carcinomatosis ([address])     - agree MRI/MRCP  - currently temporized from cholangitis with PERC Marta  - pending MRI, if endoscopy (eg EGD +/- ERCP) is needed, coumadin would have to be held and INR reversed  -CT of the abdomen and pelvis done Sunday morning and awaiting read by radiology due to increasing leukocytosis. This was ordered by general surgery  -Patient also knows she would like to discuss with her daughter any procedures that may be considered. I did speak with her daughter Feliciano Hood at [phone] which is her mobile phone and she will encourage her mom to proceed with any testing that we feel is necessary for. Awaiting MRI MRCP, then will consider endoscopy based on findings. INR has been reversed and is 1.42 today.    ----------------------------------------------------------------------------------------------------------------    Subjective:     42-year-old white female with past medical history of atrial fibrillation on Coumadin, hypertension who was admitted back on November 17, 2023 with abdominal pain. She was unfortunately found to have perforated gallbladder with peritonitis and sepsis. She has subsequently undergone a laparoscopic converted to open abscess drainage and washout. CAT scan showed incidental cholelithiasis choledocholithiasis with intra and extrahepatic ductal dilation.   She subsequently has undergone a percutaneous cholecystostomy tube on November 18, 2023. An MRI MRCP has been ordered. 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 Cancer tumor markers thus far show elevated  at greater than 300. CEA and alpha-fetoprotein are normal.  CA 19-9 is in process. GI is being consulted due to the choledocholithiasis, carcinomatosis and unknown primary for the adenocarcinoma. At time of consult her vital signs are currently stable. She has a mild leukocytosis at 12.48. Hemoglobin is stable 10.0-10.6. Liver functions were normal back on November 20, 2023 again with [place] York in place. She is currently on Coumadin for her atrial fibrillation and INR 11/25/23 is 3.91. Interval History: Vital signs stable. INR today is 1.42 and this is down from 3.91. There is a mild leukocytosis with white blood cell count 12-15. Hemoglobin 10.6-9.8. MRI/MRCP has been ordered. Repeat CT ab/pel was ordered by surgery and done this morning due to rising leukocytosis 12.48 to 15.33. Not yet read by radiologist.  Kidney function is improving with BUN 87 down to 81 and creatinine 3.27 down to 2.66. Objective:     Vitals: Blood pressure 158/73, pulse 101, temperature (!) 97.4 °F (36.3 °C), resp. rate 17, height 5' 3" (1.6 m), weight 95.4 kg (210 lb 5.1 oz), SpO2 93 %. ,Body mass index is 37.26 kg/m².       Intake/Output Summary (Last 24 hours) at 11/26/2023 8540  Last data filed at 11/26/2023 0558  Gross per 24 hour   Intake 0 ml   Output 1040 ml   Net -1040 ml       Physical Exam:     General Appearance: Alert, appears stated age and cooperative  HEENT: NCAT, sclera anicteric  Lungs: Clear to auscultation bilaterally, no rales or rhonchi, no labored breathing/accessory muscle use  Heart: Regular rate and rhythm, S1, S2 normal, no murmur, click, rub or gallop  Abdomen: Tenderness to palpation, Soft, non-distended; bowel sounds normal; no masses or no organomegaly  Extremities: No cyanosis, clubbing, or edema  Neuro: Alert  Psych: Normal behavior    Invasive Devices       Peripheral Intravenous Line  Duration             Peripheral IV 11/21/23 Distal;Left;Ventral (anterior) Forearm 5 days    Peripheral IV 11/22/23 Left;Proximal;Ventral (anterior) Antecubital 3 days    Peripheral IV 11/26/23 Left;Ventral (anterior) Forearm <1 day              Drain  Duration             Closed/Suction Drain Left LUQ Bulb 19 Fr. 7 days    Closed/Suction Drain Right RUQ Other (Comment) 12 Fr. 7 days    Ureteral Internal Stent Right ureter 6 Fr. 4 days    Urethral Catheter Non-latex 16 Fr. 4 days                    Lab Results:  Results from last 7 days   Lab Units 11/26/23 0440 11/25/23  0506 11/24/23  0314   WBC Thousand/uL 15.33* 12.48* 8.31   HEMOGLOBIN g/dL 9.8* 10.6* 10.0*   HEMATOCRIT % 31.4* 34.4* 31.5*   PLATELETS Thousands/uL 296 299 275   NEUTROS PCT %  --   --  85*   LYMPHS PCT %  --   --  6*   LYMPHO PCT %  --  7*  --    MONOS PCT %  --   --  7   MONO PCT %  --  3*  --    EOS PCT %  --  0 0     Results from last 7 days   Lab Units 11/26/23 0440 11/25/23  0506 11/24/23  0902   POTASSIUM mmol/L 3.3*   < >  --    CHLORIDE mmol/L 101   < >  --    CO2 mmol/L 22   < >  --    BUN mg/dL 81*   < >  --    CREATININE mg/dL 2.66*   < >  --    CALCIUM mg/dL 7.0*   < >  --    ALK PHOS U/L  --   --  359*   ALT U/L  --   --  <3*   AST U/L  --   --  14    < > = values in this interval not displayed. Invalid input(s): "BILI"  Results from last 7 days   Lab Units 11/26/23 0440   INR  1.42*           Imaging Studies: I have personally reviewed pertinent imaging studies. FL retrograde pyelogram    Result Date: 11/21/2023  Impression: Fluoroscopic guidance provided for right retrograde pyelogram and right ureteral stent placement. Please see procedure report for further details.  Workstation performed: [email or web address] kidney and bladder    Result Date: 11/20/2023  Impression: Stable moderate right-sided hydronephrosis Workstation performed: [id number]     XR chest portable    Result Date: 11/20/2023  Impression: No active pulmonary disease on examination which is somewhat limited secondary to low lung volumes. Workstation performed: [id number]     XR abdomen 1 view kub    Result Date: 11/19/2023  Impression: Interval placement of right upper quadrant percutaneous cholecystostomy tube with additional tubes as described above Workstation performed: [id number]     US right upper quadrant    Result Date: 11/17/2023  Impression: Findings compatible with acute cholecystitis. Gallbladder wall thickening with irregular margins which may represent sloughed membranes, concerning for gangrenous cholecystitis. Dilated common bile duct and mild intrahepatic biliary ductal dilatation, in keeping with the known choledocholithiasis on recent CT. Mildly dilated pancreatic duct measuring 6 mm, which may relate to choledocholithiasis. Recommend contrast-enhanced MRI/MRCP of the abdomen for further evaluation. Right hydronephrosis. Small right upper abdominal ascites. The study was marked in [facility] for immediate notification. Workstation performed: [id number]     CT abdomen pelvis wo contrast    Result Date: 11/17/2023  Impression: 1. Gallstones with apparent gallbladder wall thickening and nodularity raising the possibility of cholecystitis. There is some nonspecific heterogeneous low-attenuation in the liver adjacent to the gallbladder fundus, not appreciated on the prior CT study from 11/29/2021, which could reflect steatosis although an infiltrative process not excluded. Recommend further evaluation with ultrasound and/or contrast-enhanced CT or MRI. 2. Choledocholithiasis with intra and extrahepatic biliary ductal dilatation. Correlate with liver function studies. Recommend ultrasound for further evaluation.  3. New right-sided moderate hydronephrosis without discernible obstructing mass or calculus. 4. Moderate abdominal and pelvic ascites. 5. Subcentimeter pancreatic cysts, noted on the prior study, for which MRI/MRCP evaluation is advised if not previously done. 6. Multiple subcentimeter bibasilar pulmonary nodules not clearly identified on the prior CT. Changes may be infectious/inflammatory or neoplastic in nature. Consider dedicated CT of the chest. 7. Additional findings as noted. The study was marked in [facility] for immediate notification. Workstation performed: [id number]         Martín Starkey.  Rudy Lynn PA-C

## 2023-11-26 NOTE — QUICK NOTE
GENERAL SURGERY QUICK NOTE    Patient's daughter requested to speak to Surgery team. Spoke to the daughter who expressed that she would no longer like for the patient to proceed with treatment. Patient would like to continue with palliative care. Stroke workup has been stopped, heparin has been discontinued, blood draws have been discontinued, and we will have palliative see the patient tomorrow regarding next steps in palliative treatment. Patient will no longer be NPO for EGD as the patient will no longer want to proceed with invasive procedures. Please reach out to the [facility] Surgery role with any additional questions or concerns.      Janelle Agustin MD  General Surgery

## 2023-11-26 NOTE — QUICK NOTE
Contacted by the surgery team regarding this patient around 3pm.   Per discussion with the primary team, resident Dr. Sandy Shukla, 79 yo F with complicated hx including Afib on warfarin, HTN, admitted for abdominal pain on 11/17. Currently concern for malignancy. Per Dr. Sandy Shukla, patient was reported to develop acute onset "slurred speech" by nurse around 6AM today. CTH head around 0900 reported possible new cacified MCA thrombus. Per primary team exam now, patient is at baseline with no speech impairment. This was also confirmed with family. Furthermore, it was reported that patient warfarin was held and reverted yesterday. Reason unclear. Today INR 1.42. Primary team wanted to know what to do with anticoagulation. /72   Pulse 103   Temp 97.5 °F (36.4 °C)   Resp 18   Ht 5' 3" (1.6 m)   Wt 95.4 kg (210 lb 5.1 oz)   SpO2 93%   BMI 37.26 kg/m²       Impression  Reported transient slurred speech  Abnormal CTH  -discussed with primary team, cannot completely rule out that patient may have had TIA episode due to patient has Afib and AC was reversed yesterday. Recommend to repeat [address] now. If no sign of acute large stroke (less likely given patient was reported at her baseline), may consider to start heparin gtt with acute stroke protocol (no bolus) from neurology perspective only, given patient is planned for EGD tomorrow. However, primary team will need to rule out other [facility] contraindication prior to initiating. (We are unclear why the [facility] was held and reversed yesterday, per primary team). -please obtain CTA head and neck to further clarify the possible MCA calcified thrombus. Patient is not a candidate for TNK or thrombectomy given reported back to baseline  -please put patient under stroke pathway for frequent neuro check q2-3 hours  -will need to repeat CTH with any new neurological change.      Other comorbidities, will defer to the primary team

## 2023-11-26 NOTE — QUICK NOTE
Patient's condition continues to become more complicated. Nursing reported possible slurring of her speech earlier in the morning. The resident and family did not seem to feel there was a marked change. Resident reached out to neurology who wanted a stroke workup. This should include an anticoagulation. We recently reversed her elevated INR 3.6 with FFP and vitamin K. This was held related presumably secondary to malnutrition as well as liver metastases. I suggested beginning with no bolus and simply putting heparin at 1200 units/h and checking a PTT in 12 hours. When I evaluate the patient this afternoon she is in no distress. Eating her food. No evidence for slurring of her speech. CT of her head had demonstrated a thrombus. There is also calcification present which may indicate that it is chronic. Thus the anticoagulation. CT of the chest and abdomen revealed not only liver metastases but lung metastases. Oncology will to reassess as they have ordered a MRCP to assess for primary. I am unsure if this will change her chemotherapy regimen. We have tissue from antral biopsy earlier. GI was planning on doing an EGD in the a.m. The anticoagulation complicates this procedure. They may wish to proceed without any biopsies, or they can hold anticoagulation for 2 hours and restart. A common bile duct stone is noted on CT imaging. MRCP is not needed to assess this abnormality. ERCP to be considered in the future. Made the best to consider ERCP while patient still on heparin and not on long-term anticoagulation. As noted, patient's clinical care becomes more complicated as imaging reveals more abnormalities.

## 2023-11-26 NOTE — PROGRESS NOTES
Progress Note - General Surgery   Prachi Sue 78 y.o. female MRN: [id number]  Unit/Bed#: W -01 Encounter: [id number]    Assessment:  78 y.o. female with acute cholecystitis complicated by perforation, sepsis present on admission 2/2 bile peritonitis. Now s/p Laparoscopic converted to open drainage of intra-abdominal abscesses x 4, abdominal washout, gastric serosal repair, omental biopsy, liver biopsy, open cholecystostomy tube placement on 11/18, introp bx c/f metastatic adenocarcinoma, elevated CA 19-9 and CA-125 Tumor markers as well as b/l metastatic pulmonary nodules    Plan:  Adv diet as tolerated  D/c ivf  F/u Nephro regarding anasarca psb diuresis and okay w/ IV contrast  Consider scanning CTAP for psb abscess given rising leukocytosis  Consider CTH stat d/t nursing concern for slurred speech  F/u MRCP  Ongoing GOC discussion  Appreciate GI, Neprho and Uro input    Subjective/Objective     Subjective: no acute events overnight. Pt c/o intermittent RUQ pain. No flatus or BM. Pt has been refusing meds, but after family present pt accepted medication. Per nursing pt has slightly more slurred speech. Pt is oriented and answers questions appropriately, no focal neuro deficit observed. Good ROM and sensation in all extremities. Objective: AVSS on RA    Blood pressure 158/73, pulse 101, temperature (!) 97.4 °F (36.3 °C), resp. rate 17, height 5' 3" (1.6 m), weight 95.4 kg (210 lb 5.1 oz), SpO2 93 %. ,Body mass index is 37.26 kg/m².     UOP: 725 cc  ADILIA: 40 cc ss    Invasive Devices       Peripheral Intravenous Line  Duration             Peripheral IV 11/21/23 Distal;Left;Ventral (anterior) Forearm 5 days    Peripheral IV 11/22/23 Left;Proximal;Ventral (anterior) Antecubital 3 days    Peripheral IV 11/26/23 Left;Ventral (anterior) Forearm <1 day              Drain  Duration             Closed/Suction Drain Left LUQ Bulb 19 Fr. 7 days    Closed/Suction Drain Right RUQ Other (Comment) 12 Fr. 7 days Ureteral Internal Stent Right ureter 6 Fr. 4 days    Urethral Catheter Non-latex 16 Fr. 4 days                    Physical Exam:  General: No acute distress, oriented  CV: RRR  Lungs: Normal work of breathing   Abdomen: soft, non distended, mild ttp RUQ. Incision/s c/d/i  Skin: Warm anasarca, pt r flank weeping, 2+ pitting edema bilaterally to upper and lower extremities.      Lab, Imaging and other studies:  Recent Labs     11/24/23  0314 11/24/23  0902 11/25/23  0506 11/26/23  0440   WBC 8.31  --  12.48* 15.33*   HGB 10.0*  --  10.6* 9.8*     --  299 296   SODIUM 138  --  140 141   K 3.6  --  3.9 3.3*     --  102 101   CO2 24  --  23 22   BUN 90*  --  87* 81*   CREATININE 3.54*  --  3.27* 2.66*   GLUC 130  --  128 176*   CALCIUM 7.8*  --  7.9* 7.0*   MG 2.7  --  2.7  --    PHOS 4.9*  --  4.9*  --    AST  --  14  --   --    ALT  --  <3*  --   --    ALKPHOS  --  359*  --   --    TBILI  --  0.55  --   --

## 2023-11-27 PROBLEM — Z51.5 PALLIATIVE CARE PATIENT: Status: ACTIVE | Noted: 2023-01-01

## 2023-11-27 NOTE — PLAN OF CARE
Problem: Prexisting or High Potential for Compromised Skin Integrity  Goal: Skin integrity is maintained or improved  Description: INTERVENTIONS:  - Identify patients at risk for skin breakdown  - Assess and monitor skin integrity  - Assess and monitor nutrition and hydration status  - Monitor labs   - Assess for incontinence   - Turn and reposition patient  - Assist with mobility/ambulation  - Relieve pressure over bony prominences  - Avoid friction and shearing  - Provide appropriate hygiene as needed including keeping skin clean and dry  - Evaluate need for skin moisturizer/barrier cream  - Collaborate with interdisciplinary team   - Patient/family teaching  - Consider wound care consult   Outcome: Progressing     Problem: PAIN - ADULT  Goal: Verbalizes/displays adequate comfort level or baseline comfort level  Description: Interventions:  - Encourage patient to monitor pain and request assistance  - Assess pain using appropriate pain scale  - Administer analgesics based on type and severity of pain and evaluate response  - Implement non-pharmacological measures as appropriate and evaluate response  - Consider cultural and social influences on pain and pain management  - Notify physician/advanced practitioner if interventions unsuccessful or patient reports new pain  Outcome: Progressing     Problem: INFECTION - ADULT  Goal: Absence or prevention of progression during hospitalization  Description: INTERVENTIONS:  - Assess and monitor for signs and symptoms of infection  - Monitor lab/diagnostic results  - Monitor all insertion sites, i.e. indwelling lines, tubes, and drains  - Monitor endotracheal if appropriate and nasal secretions for changes in amount and color  - [place] appropriate cooling/warming therapies per order  - Administer medications as ordered  - Instruct and encourage patient and family to use good hand hygiene technique  - Identify and instruct in appropriate isolation precautions for identified infection/condition  Outcome: Progressing  Goal: Absence of fever/infection during neutropenic period  Description: INTERVENTIONS:  - Monitor WBC    Outcome: Progressing     Problem: SAFETY ADULT  Goal: Patient will remain free of falls  Description: INTERVENTIONS:  - Educate patient/family on patient safety including physical limitations  - Instruct patient to call for assistance with activity   - Consult OT/PT to assist with strengthening/mobility   - Keep Call bell within reach  - Keep bed low and locked with side rails adjusted as appropriate  - Keep care items and personal belongings within reach  - Initiate and maintain comfort rounds  - Make Fall Risk Sign visible to staff  - Offer Toileting every 2 Hours, in advance of need  - Initiate/Maintain alarm  - Obtain necessary fall risk management equipment:   - Apply yellow socks and bracelet for high fall risk patients  - Consider moving patient to room near nurses station  Outcome: Progressing  Goal: Maintain or return to baseline ADL function  Description: INTERVENTIONS:  -  Assess patient's ability to carry out ADLs; assess patient's baseline for ADL function and identify physical deficits which impact ability to perform ADLs (bathing, care of mouth/teeth, toileting, grooming, dressing, etc.)  - Assess/evaluate cause of self-care deficits   - Assess range of motion  - Assess patient's mobility; develop plan if impaired  - Assess patient's need for assistive devices and provide as appropriate  - Encourage maximum independence but intervene and supervise when necessary  - Involve family in performance of ADLs  - Assess for home care needs following discharge   - Consider OT consult to assist with ADL evaluation and planning for discharge  - Provide patient education as appropriate  Outcome: Progressing  Goal: Maintains/Returns to pre admission functional level  Description: INTERVENTIONS:  - Perform AM-PAC 6 Click Basic Mobility/ Daily Activity assessment daily.  - Set and communicate daily mobility goal to care team and patient/family/caregiver. - Collaborate with rehabilitation services on mobility goals if consulted  - Perform Range of Motion 3 times a day. - Reposition patient every 2 hours. - Dangle patient 3 times a day  - Stand patient 3 times a day  - Ambulate patient 3 times a day  - Out of bed to chair 3 times a day   - Out of bed for meals 3 times a day  - Out of bed for toileting  - Record patient progress and toleration of activity level   Outcome: Progressing     Problem: DISCHARGE PLANNING  Goal: Discharge to home or other facility with appropriate resources  Description: INTERVENTIONS:  - Identify barriers to discharge w/patient and caregiver  - Arrange for needed discharge resources and transportation as appropriate  - Identify discharge learning needs (meds, wound care, etc.)  - Arrange for interpretive services to assist at discharge as needed  - Refer to Case Management Department for coordinating discharge planning if the patient needs post-hospital services based on physician/advanced practitioner order or complex needs related to functional status, cognitive ability, or social support system  Outcome: Progressing     Problem: Knowledge Deficit  Goal: Patient/family/caregiver demonstrates understanding of disease process, treatment plan, medications, and discharge instructions  Description: Complete learning assessment and assess knowledge base. Interventions:  - Provide teaching at level of understanding  - Provide teaching via preferred learning methods  Outcome: Progressing     Problem: Nutrition/Hydration-ADULT  Goal: Nutrient/Hydration intake appropriate for improving, restoring or maintaining nutritional needs  Description: Monitor and assess patient's nutrition/hydration status for malnutrition. Collaborate with interdisciplinary team and initiate plan and interventions as ordered.   Monitor patient's weight and dietary intake as ordered or per policy. Utilize nutrition screening tool and intervene as necessary. Determine patient's food preferences and provide high-protein, high-caloric foods as appropriate. INTERVENTIONS:  - Monitor oral intake, urinary output, labs, and treatment plans  - Assess nutrition and hydration status and recommend course of action  - Evaluate amount of meals eaten  - Assist patient with eating if necessary   - Allow adequate time for meals  - Recommend/ encourage appropriate diets, oral nutritional supplements, and vitamin/mineral supplements  - Order, calculate, and assess calorie counts as needed  - Recommend, monitor, and adjust tube feedings and TPN/PPN based on assessed needs  - Assess need for intravenous fluids  - Provide specific nutrition/hydration education as appropriate  - Include patient/family/caregiver in decisions related to nutrition  Outcome: Progressing     Problem: NEUROSENSORY - ADULT  Goal: Achieves maximal functionality and self care  Description: INTERVENTIONS  - Monitor swallowing and airway patency with patient fatigue and changes in neurological status  - Encourage and assist patient to increase activity and self care.    - Encourage visually impaired, hearing impaired and aphasic patients to use assistive/communication devices  Outcome: Progressing     Problem: CARDIOVASCULAR - ADULT  Goal: Maintains optimal cardiac output and hemodynamic stability  Description: INTERVENTIONS:  - Monitor I/O, vital signs and rhythm  - Monitor for S/S and trends of decreased cardiac output  - Administer and titrate ordered vasoactive medications to optimize hemodynamic stability  - Assess quality of pulses, skin color and temperature  - Assess for signs of decreased coronary artery perfusion  - Instruct patient to report change in severity of symptoms  Outcome: Progressing  Goal: Absence of cardiac dysrhythmias or at baseline rhythm  Description: INTERVENTIONS:  - Continuous cardiac monitoring, vital signs, obtain 12 lead EKG if ordered  - Administer antiarrhythmic and heart rate control medications as ordered  - Monitor electrolytes and administer replacement therapy as ordered  Outcome: Progressing     Problem: RESPIRATORY - ADULT  Goal: Achieves optimal ventilation and oxygenation  Description: INTERVENTIONS:  - Assess for changes in respiratory status  - Assess for changes in mentation and behavior  - Position to facilitate oxygenation and minimize respiratory effort  - Oxygen administered by appropriate delivery if ordered  - Initiate smoking cessation education as indicated  - Encourage broncho-pulmonary hygiene including cough, deep breathe, Incentive Spirometry  - Assess the need for suctioning and aspirate as needed  - Assess and instruct to report SOB or any respiratory difficulty  - Respiratory Therapy support as indicated  Outcome: Progressing     Problem: GASTROINTESTINAL - ADULT  Goal: Minimal or absence of nausea and/or vomiting  Description: INTERVENTIONS:  - Administer IV fluids if ordered to ensure adequate hydration  - Maintain NPO status until nausea and vomiting are resolved  - Nasogastric tube if ordered  - Administer ordered antiemetic medications as needed  - Provide nonpharmacologic comfort measures as appropriate  - Advance diet as tolerated, if ordered  - Consider nutrition services referral to assist patient with adequate nutrition and appropriate food choices  Outcome: Progressing  Goal: Maintains or returns to baseline bowel function  Description: INTERVENTIONS:  - Assess bowel function  - Encourage oral fluids to ensure adequate hydration  - Administer IV fluids if ordered to ensure adequate hydration  - Administer ordered medications as needed  - Encourage mobilization and activity  - Consider nutritional services referral to assist patient with adequate nutrition and appropriate food choices  Outcome: Progressing  Goal: Maintains adequate nutritional intake  Description: INTERVENTIONS:  - Monitor percentage of each meal consumed  - Identify factors contributing to decreased intake, treat as appropriate  - Assist with meals as needed  - Monitor I&O, weight, and lab values if indicated  - Obtain nutrition services referral as needed  Outcome: Progressing  Goal: Oral mucous membranes remain intact  Description: INTERVENTIONS  - Assess oral mucosa and hygiene practices  - Implement preventative oral hygiene regimen  - Implement oral medicated treatments as ordered  - Initiate Nutrition services referral as needed  Outcome: Progressing     Problem: GENITOURINARY - ADULT  Goal: Maintains or returns to baseline urinary function  Description: INTERVENTIONS:  - Assess urinary function  - Encourage oral fluids to ensure adequate hydration if ordered  - Administer IV fluids as ordered to ensure adequate hydration  - Administer ordered medications as needed  - Offer frequent toileting  - Follow urinary retention protocol if ordered  Outcome: Progressing  Goal: Absence of urinary retention  Description: INTERVENTIONS:  - Assess patient’s ability to void and empty bladder  - Monitor I/O  - Bladder scan as needed  - Discuss with physician/AP medications to alleviate retention as needed  - Discuss catheterization for long term situations as appropriate  Outcome: Progressing  Goal: Urinary catheter remains patent  Description: INTERVENTIONS:  - Assess patency of urinary catheter  - If patient has a chronic cutler, consider changing catheter if non-functioning  - Follow guidelines for intermittent irrigation of non-functioning urinary catheter  Outcome: Progressing     Problem: METABOLIC, FLUID AND ELECTROLYTES - ADULT  Goal: Electrolytes maintained within normal limits  Description: INTERVENTIONS:  - Monitor labs and assess patient for signs and symptoms of electrolyte imbalances  - Administer electrolyte replacement as ordered  - Monitor response to electrolyte replacements, including repeat lab results as appropriate  - Instruct patient on fluid and nutrition as appropriate  Outcome: Progressing  Goal: Fluid balance maintained  Description: INTERVENTIONS:  - Monitor labs   - Monitor I/O and WT  - Instruct patient on fluid and nutrition as appropriate  - Assess for signs & symptoms of volume excess or deficit  Outcome: Progressing     Problem: SKIN/TISSUE INTEGRITY - ADULT  Goal: Skin Integrity remains intact(Skin Breakdown Prevention)  Description: Assess:  -Perform [place] assessment every   -Clean and moisturize skin every   -Inspect skin when repositioning, toileting, and assisting with ADLS  -Assess under medical devices such as  every   -Assess extremities for adequate circulation and sensation     Bed Management:  -Have minimal linens on bed & keep smooth, unwrinkled  -Change linens as needed when moist or perspiring  -Avoid sitting or lying in one position for more than  hours while in bed  -Keep HOB at degrees     Toileting:  -Offer bedside commode  -Assess for incontinence every   -Use incontinent care products after each incontinent episode such as     Activity:  -Mobilize patient  times a day  -Encourage activity and walks on unit  -Encourage or provide ROM exercises   -Turn and reposition patient every  Hours  -Use appropriate equipment to lift or move patient in bed  -Instruct/ Assist with weight shifting every  when out of bed in chair  -Consider limitation of chair time  hour intervals    Skin Care:  -Avoid use of baby powder, tape, friction and shearing, hot water or constrictive clothing  -Relieve pressure over bony prominences using   -Do not massage red bony areas    Next Steps:  -Teach patient strategies to minimize risks such as   -Consider consults to  interdisciplinary teams such as   Outcome: Progressing  Goal: Incision(s), wounds(s) or drain site(s) healing without S/S of infection  Description: INTERVENTIONS  - Assess and document dressing, incision, wound bed, drain sites and surrounding tissue  - Provide patient and family education  - Perform skin care/dressing changes every   Outcome: Progressing  Goal: Pressure injury heals and does not worsen  Description: Interventions:  - Implement low air loss mattress or specialty surface (Criteria met)  - Apply silicone foam dressing  - Instruct/assist with weight shifting every  minutes when in chair   - Limit chair time to  hour intervals  - Use special pressure reducing interventions such as  when in chair   - Apply fecal or urinary incontinence containment device   - Perform passive or active ROM every   - Turn and reposition patient & offload bony prominences every  hours   - Utilize friction reducing device or surface for transfers   - Consider consults to  interdisciplinary teams such as   - Use incontinent care products after each incontinent episode such as  - Consider nutrition services referral as needed  Outcome: Progressing     Problem: MUSCULOSKELETAL - ADULT  Goal: Maintain or return mobility to safest level of function  Description: INTERVENTIONS:  - Assess patient's ability to carry out ADLs; assess patient's baseline for ADL function and identify physical deficits which impact ability to perform ADLs (bathing, care of mouth/teeth, toileting, grooming, dressing, etc.)  - Assess/evaluate cause of self-care deficits   - Assess range of motion  - Assess patient's mobility  - Assess patient's need for assistive devices and provide as appropriate  - Encourage maximum independence but intervene and supervise when necessary  - Involve family in performance of ADLs  - Assess for home care needs following discharge   - Consider OT consult to assist with ADL evaluation and planning for discharge  - Provide patient education as appropriate  Outcome: Progressing  Goal: Maintain proper alignment of affected body part  Description: INTERVENTIONS:  - Support, maintain and protect limb and body alignment  - Provide patient/ family with appropriate education  Outcome: Progressing

## 2023-11-27 NOTE — PROGRESS NOTES
Progress Note - Palliative & Supportive Care  Jose Alejandro Chapa  78 y.o.  female  W /W -01   MRN: [id number]  Encounter: [id number]     Assessment  Bile peritonitis, intra-abdominal abscess choledocholithiasis, s/p ex lap and cholecystostomy tube placement  Metastatic adenocarcinoma - unknown primary source  Hydronephrosis  JESSE  CHF  Palliative care encounter  Goals of care counseling    Plan:  Symptom management    Pain  PRN Use of Pain Medications: 2 PRN doses  24 hour Total OME for 11/26: approximately 8 OMEs  Start morphine oral concentrate 5mg q1h PRN  Increase hydromorphone 0.5mg q10min PRN    Nausea  Continue reglan 10mg q6h scheduled  Continue Zofran 4mg q4h PRN    Anxiety  Start lorazepam 0.5mg q10min PRN    2. Goals:  Level 4 code status  Goal is for comfort measures only  Daughter considering hospice, would prefer to wait until tomorrow to discuss hospice further due to being overwhelmed with decision for level 4. Will continue discussions regarding [address] [place] as patient's clinical presentation evolves. 3.  Social support for daughter Derrick Guerra:  Supportive listening provided  Normalized experience of patient/family  Provided anxiety containment  Provided anticipatory guidance  Encouraged self care  Discussed spiritual needs: Patient is [employer] daughter reports she received [employer] [facility] [employer]; she declined a visit today    4. Follow up  Palliative Care will continue to follow and goals of care discussions will be ongoing. Please reach out via [employer] if questions or concerns arise. 5. Care Coordination  Reviewed case with RN, Myranda  Reviewed case with CM, Jess  Reviewed case with [facility] Kathy Lai Nury    24 Hour History  Chart reviewed before visit. Oncology saw patient 11/24 to discuss necessary further workup to determine treatment options. At that time, patient and family agreeable to EGD and MRI/MRCP.     On 11/26/23 patient with transient episode of slurred speech, neurology consulted and patient started on stroke pathway. Later surgery team spoke with daughter and goals at that time were not to proceed with treatment. Stroke pathway was discontinued and further labs and diagnostics were discontinued. Patient seen in bed today with daughter at bedside. Patient has declined significantly since last visit on Friday 11/24. Today patient is lethargic, mumbles two to three word phrases. She is minimally engaged, staring off throughout visit. Offers no complaints at this time. Discussed with daughter comfort measures and hospice. She is tearful and understands that her mother's prognosis is poor. She does not want to pursue any further workup or disease directed treatments. Daughter wants comfort measures only starting today. Asked patient if she understands what comfort care and hospice means and she states "somewhat." Daughter became distressed when discussing hospice care and where that may occur. She would prefer home hospice. Explained that if patient is unable to tolerate oral medicines that may not be a safe option. She understands that patient condition may change and patient may no longer be able to return home for hospice cares. Would prefer to defer further discussions of hospice until tomorrow. Patient finished yogurt and Ensure this morning, but when drinking sips of water was coughing. Discussed with RN and encouraged to attempt oral comfort medicines first.    Review of Systems   All other systems reviewed and are negative.     Medications    Current Facility-Administered Medications:     calcium carbonate (TUMS) chewable tablet 1,000 mg, 1,000 mg, Oral, Once, Anastasiia Webber PA-C    chlorhexidine (PERIDEX) 0.12 % oral rinse 15 mL, 15 mL, Mouth/Throat, Q12H [facility], Katie Little PA-C, 15 mL at 11/25/23 0924    HYDROmorphone (DILAUDID) injection 0.5 mg, 0.5 mg, Intravenous, Q2H PRN Max 8/day, Katie Little PA-C, 0.5 mg at 11/24/23 2321    HYDROmorphone HCl (DILAUDID) injection 0.2 mg, 0.2 mg, Intravenous, Q2H PRN Max 8/day, Katie Little PA-C, 0.2 mg at 11/27/23 0207    labetalol (NORMODYNE) injection 10 mg, 10 mg, Intravenous, Q4H PRN, Abhilash Felipe PA-C    methocarbamol (ROBAXIN) injection 1,000 mg, 1,000 mg, Intravenous, Q8H [facility], Paola Carver MD, 1,000 mg at 11/27/23 0509    metoclopramide (REGLAN) injection 10 mg, 10 mg, Intravenous, Q6H, Nohemi Zuñiga MD, 10 mg at 11/27/23 0207    metoprolol (LOPRESSOR) injection 5 mg, 5 mg, Intravenous, Q6H PRN, Abhilash Felipe PA-C, 5 mg at 11/20/23 0402    metoprolol (LOPRESSOR) injection 5 mg, 5 mg, Intravenous, Q6H, Katie Little PA-C, 5 mg at 11/27/23 0403    ondansetron (ZOFRAN) injection 4 mg, 4 mg, Intravenous, Q4H PRN, Abhilash Felipe PA-C, 4 mg at 11/24/23 0430    polyethylene glycol (MIRALAX) packet 17 g, 17 g, Oral, BID, Loni Fuentes MD, 17 g at 11/26/23 1817    Objective  /64   Pulse 99   Temp 97.6 °F (36.4 °C)   Resp 17   Ht 5' 3" (1.6 m)   Wt 95.8 kg (211 lb 3.2 oz)   SpO2 94%   BMI 37.41 kg/m²   Physical Exam  Vitals and nursing note reviewed. Constitutional:       General: She is awake. She is not in acute distress. Appearance: She is obese. She is ill-appearing. HENT:      Head: Normocephalic. Mouth/Throat:      Mouth: Mucous membranes are moist.   Cardiovascular:      Rate and Rhythm: Tachycardia present. Pulmonary:      Effort: No respiratory distress. Musculoskeletal:      Cervical back: Normal range of motion and neck supple. Right lower leg: No edema. Left lower leg: No edema. Skin:     General: Skin is warm and dry. Coloration: Skin is pale. Findings: Bruising present. Neurological:      Mental Status: She is lethargic. Motor: Weakness present. Psychiatric:         Speech: Speech is delayed and slurred. Lab Results: No new labs for review. Imaging Studies: I have personally reviewed pertinent reports.     CT abdomen pelvis w contrast  Result Date: 11/26/2023  Impression: Limited evaluation of the pelvis due to beam hardening artifact 1. Improved ascites with residual partially loculated fluid collections with enhancing peritoneal surfaces though no gas, including collections in the pelvis and in Morison's pouch. These may be related to peritoneal carcinomatosis rather than infection  though this cannot be determined with certainty. 2.  Resolution of hydronephrosis status post stent placement. 3.  Continued biliary ductal dilatation though visualized common bile duct stone is nonobstructing. 4.  Status post cholecystostomy tube placement with completely collapsed gallbladder. Stable hypodensity adjacent to the gallbladder fossa is likely tumor. 5.  Pulmonary metastasis again seen with new moderate pleural effusions and overlying atelectasis. CT head wo contrast  Result Date: 11/26/2023  Impression: No intracranial hemorrhage. New punctate hyperdensity within the right sylvian fissure may represent a calcified MCA thrombus. If neurological symptoms persist, follow-up MRI imaging is recommended. Mild cerebral chronic microangiopathic changes. CT chest wo contrast  Result Date: 11/25/2023  Impression: Multiple bilateral 6 mm and smaller lung metastases. Small pleural effusions and mild bibasilar atelectasis. Pulmonary artery enlargement which can be seen with pulmonary hypertension. US kidney and bladder  Result Date: 11/25/2023  Impression: Interval resolution of right-sided hydronephrosis with stent placement. R    EKG, Pathology, and Other Studies: I have personally reviewed pertinent reports. Counseling / Coordination of Care  Total floor / unit time spent today 45 minutes. Greater than 50% of total time was spent with the patient and / or family counseling and / or coordinating of care.  A description of the counseling / coordination of care: Chart reviewed,  provided medical updates, determined goals of care, discussed palliative care and symptom management, discussed code status, discussed comfort care and hospice, provided anticipatory guidance, determined competency and POA/HCA, determined social/family support, provided psychosocial support. Mat Obrien, MSN, CRNP, [facility]  Palliative & Supportive Care    Portions of this document may have been created using dictation software and as such some "sound alike" terms may have been generated by the system. Do not hesitate to contact me with any questions or clarifications.

## 2023-11-27 NOTE — PROGRESS NOTES
Progress Note - General Surgery   [facility] Resident on Surgery Service   Liane Baez 78 y.o. female MRN: [id number]  Unit/Bed#: W -01 Encounter: [id number]    Assessment:  78 y.o. female with acute cholecystitis complicated by perforation, sepsis present on admission 2/2 bile peritonitis. Now s/p Laparoscopic converted to open drainage of intra-abdominal abscesses x 4, abdominal washout, gastric serosal repair, omental biopsy, liver biopsy, open cholecystostomy tube placement on 11/18, introp bx c/f metastatic adenocarcinoma, undetermined primary malignancy at this time. Afebrile. VSS. On 1.5 L NC saturating greater than 92%   cc  L drain with 15 cc serous output  R drain with 20 cc serobilious output  No AM labs    Plan:  Family no longer wants to proceed with treatment  Will have palliative care speak to family today  Will proceed with comfort measures at this time    Subjective/Objective     Subjective: See previous quick note about conversation with family. No acute events overnight. Denies nausea/vomiting. Having bowel movements and passing flatus. Voiding without difficulty. Objective:     Blood pressure 126/64, pulse 99, temperature 97.6 °F (36.4 °C), resp. rate 17, height 5' 3" (1.6 m), weight 95.8 kg (211 lb 3.2 oz), SpO2 94 %. ,Body mass index is 37.41 kg/m².       Intake/Output Summary (Last 24 hours) at 11/27/2023 0819  Last data filed at 11/27/2023 0600  Gross per 24 hour   Intake 480 ml   Output 785 ml   Net -305 ml       Invasive Devices       Peripheral Intravenous Line  Duration             Peripheral IV 11/26/23 Left;Ventral (anterior) Forearm 1 day              Drain  Duration             Closed/Suction Drain Left LUQ Bulb 19 Fr. 8 days    Closed/Suction Drain Right RUQ Other (Comment) 12 Fr. 8 days    Ureteral Internal Stent Right ureter 6 Fr. 5 days    Urethral Catheter Non-latex 16 Fr. 5 days                    General: NAD  HENT: NCAT MMM  Neck: supple, no JVD  CV: nl rate  Lungs: nl wob. No resp distress  ABD: Soft, tender right lower quadrant and left lower quadrant, nondistended. See above for drain output and drain character. Extrem: No CCE  Neuro: AAOx3       Scheduled Meds:  Current Facility-Administered Medications   Medication Dose Route Frequency Provider Last Rate    calcium carbonate  1,000 mg Oral Once Janit Gail, PA-C      chlorhexidine  15 mL Mouth/Throat Q12H [address] Creed Clas, PA-C      HYDROmorphone  0.5 mg Intravenous Q2H PRN Max 8/day Creed Clas, PA-C      HYDROmorphone  0.2 mg Intravenous Q2H PRN Max 8/day Creed Clas, PA-C      labetalol  10 mg Intravenous Q4H PRN Creed Clas, PA-C      methocarbamol  1,000 mg Intravenous [facility] Angelica Nath MD      metoclopramide  10 mg Intravenous Q6H Kristina Nascimento MD      metoprolol  5 mg Intravenous Q6H PRN Creed Clas, PA-C      metoprolol  5 mg Intravenous Q6H Katie Little, PA-C      ondansetron  4 mg Intravenous Q4H PRN Creed Clas, PA-C      polyethylene glycol  17 g Oral BID Saqib Ortiz MD       Continuous Infusions:   PRN Meds:. HYDROmorphone    HYDROmorphone    labetalol    metoprolol    ondansetron      Lab, Imaging and other studies:I have personally reviewed pertinent lab results.     VTE Pharmacologic Prophylaxis: none  VTE Mechanical Prophylaxis: sequential compression device      Angelica Nath MD  11/27/2023 8:19 AM

## 2023-11-27 NOTE — PROGRESS NOTES
Progress Note - Palliative & Supportive Care  Jose Alejandro Chapa  78 y.o.  female  W /W -01   MRN: [id number]  Encounter: [id number]     Assessment  Bile peritonitis, intra-abdominal abscess choledocholithiasis, s/p ex lap and cholecystostomy tube placement  Metastatic adenocarcinoma - unknown primary source  Worsening JESSE  Hydronephrosis  CHF  Palliative care encounter  Goals of care counseling     Plan:  Symptom management  Defer symptom management per primary team at this time  PRN Use of Pain Medications: 6 PRN doses  24 hour Total OME for 11/23: approximately 36mg     Patient reporting nausea at time of visit, spoke with nurse to request dose of Zofran for patient     2. Goals:  Level *** code status  Disease focused care without limits placed at this time. Oncology to discuss biopsy results and treatment options with patient and daughter prior to further in depth Teliportme conversations. Will continue discussions regarding Teliportme as patient's clinical presentation evolves. Encouraged follow up with Palliative Medicine on an outpatient basis after discharge for continued symptom management. 3.  Social support:  Patient is well supported by daughter Clovis Appiah listening provided  Normalized experience of patient/family  Provided anxiety containment  Provided anticipatory guidance  Encouraged self care  Patient is [employer], spiritual care is visiting with patient - she is finding these visits helpful  She is agreeable to [facility] SW involvement     4. Follow up  Palliative Care will continue to follow and goals of care discussions will be ongoing. Please reach out via [employer] if questions or concerns arise. 5. Care Coordination  Reviewed case with [facility] Nury CONTEH    24 Hour History  Chart reviewed before visit.             Review of Systems  ***    Medications    Current Facility-Administered Medications:     calcium carbonate (TUMS) chewable tablet 1,000 mg, 1,000 mg, Oral, Once, Alivia Odell PA-C    chlorhexidine (PERIDEX) 0.12 % oral rinse 15 mL, 15 mL, Mouth/Throat, Q12H [facility], VON Blevins-C, 15 mL at 11/25/23 0924    HYDROmorphone (DILAUDID) injection 0.5 mg, 0.5 mg, Intravenous, Q2H PRN Max 8/day, VON Palacios-C, 0.5 mg at 11/24/23 2321    HYDROmorphone HCl (DILAUDID) injection 0.2 mg, 0.2 mg, Intravenous, Q2H PRN Max 8/day, VON Palacios-C, 0.2 mg at 11/27/23 0207    labetalol (NORMODYNE) injection 10 mg, 10 mg, Intravenous, Q4H PRN, Andreas Whitaker PA-C    methocarbamol (ROBAXIN) injection 1,000 mg, 1,000 mg, Intravenous, Q8H [address], Preethi Koehler MD, 1,000 mg at 11/27/23 0509    metoclopramide (REGLAN) injection 10 mg, 10 mg, Intravenous, Q6H, Zion Leavitt MD, 10 mg at 11/27/23 0207    metoprolol (LOPRESSOR) injection 5 mg, 5 mg, Intravenous, Q6H PRN, VON Palacios-C, 5 mg at 11/20/23 0402    metoprolol (LOPRESSOR) injection 5 mg, 5 mg, Intravenous, Q6H, VON Blevins-MACIEL, 5 mg at 11/27/23 0403    ondansetron (ZOFRAN) injection 4 mg, 4 mg, Intravenous, Q4H PRN, VON Palacios-MACIEL, 4 mg at 11/24/23 0430    polyethylene glycol (MIRALAX) packet 17 g, 17 g, Oral, BID, Fabricio Guo MD, 17 g at 11/26/23 1817    Objective  /64   Pulse 99   Temp 97.6 °F (36.4 °C)   Resp 17   Ht 5' 3" (1.6 m)   Wt 95.8 kg (211 lb 3.2 oz)   SpO2 94%   BMI 37.41 kg/m²   Physical Exam  ***    Lab Results: I have personally reviewed pertinent labs. Imaging Studies: I have personally reviewed pertinent reports. EKG, Pathology, and Other Studies: I have personally reviewed pertinent reports. Counseling / Coordination of Care  Total floor / unit time spent today {NUMBERS; 0-45 BY 5:[id number]} minutes. Greater than 50% of total time was spent with the patient and / or family counseling and / or coordinating of care.  A description of the counseling / coordination of care: Chart reviewed, ***  provided medical updates, determined goals of care, discussed palliative care and symptom management, discussed code status, discussed comfort care and hospice, provided anticipatory guidance, determined competency and POA/HCA, determined social/family support, provided psychosocial support. {Reason why note was not shared:[id number]}    Gadiel Marks MSN, CRNP, [facility]  Palliative & Supportive Care    Portions of this document may have been created using dictation software and as such some "sound alike" terms may have been generated by the system. Do not hesitate to contact me with any questions or clarifications.

## 2023-11-27 NOTE — PLAN OF CARE
Problem: Prexisting or High Potential for Compromised Skin Integrity  Goal: Skin integrity is maintained or improved  Description: INTERVENTIONS:  - Identify patients at risk for skin breakdown  - Assess and monitor skin integrity  - Assess and monitor nutrition and hydration status  - Monitor labs   - Assess for incontinence   - Turn and reposition patient  - Assist with mobility/ambulation  - Relieve pressure over bony prominences  - Avoid friction and shearing  - Provide appropriate hygiene as needed including keeping skin clean and dry  - Evaluate need for skin moisturizer/barrier cream  - Collaborate with interdisciplinary team   - Patient/family teaching  - Consider wound care consult   Outcome: Progressing     Problem: PAIN - ADULT  Goal: Verbalizes/displays adequate comfort level or baseline comfort level  Description: Interventions:  - Encourage patient to monitor pain and request assistance  - Assess pain using appropriate pain scale  - Administer analgesics based on type and severity of pain and evaluate response  - Implement non-pharmacological measures as appropriate and evaluate response  - Consider cultural and social influences on pain and pain management  - Notify physician/advanced practitioner if interventions unsuccessful or patient reports new pain  Outcome: Progressing     Problem: INFECTION - ADULT  Goal: Absence or prevention of progression during hospitalization  Description: INTERVENTIONS:  - Assess and monitor for signs and symptoms of infection  - Monitor lab/diagnostic results  - Monitor all insertion sites, i.e. indwelling lines, tubes, and drains  - Monitor endotracheal if appropriate and nasal secretions for changes in amount and color  - [place] appropriate cooling/warming therapies per order  - Administer medications as ordered  - Instruct and encourage patient and family to use good hand hygiene technique  - Identify and instruct in appropriate isolation precautions for identified infection/condition  Outcome: Progressing  Goal: Absence of fever/infection during neutropenic period  Description: INTERVENTIONS:  - Monitor WBC    Outcome: Progressing     Problem: SAFETY ADULT  Goal: Patient will remain free of falls  Description: INTERVENTIONS:  - Educate patient/family on patient safety including physical limitations  - Instruct patient to call for assistance with activity   - Consult OT/PT to assist with strengthening/mobility   - Keep Call bell within reach  - Keep bed low and locked with side rails adjusted as appropriate  - Keep care items and personal belongings within reach  - Initiate and maintain comfort rounds  - Make Fall Risk Sign visible to staff  - Offer Toileting every 2 Hours, in advance of need  - Initiate/Maintain alarm  - Obtain necessary fall risk management equipment:   - Apply yellow socks and bracelet for high fall risk patients  - Consider moving patient to room near nurses station  Outcome: Progressing  Goal: Maintain or return to baseline ADL function  Description: INTERVENTIONS:  -  Assess patient's ability to carry out ADLs; assess patient's baseline for ADL function and identify physical deficits which impact ability to perform ADLs (bathing, care of mouth/teeth, toileting, grooming, dressing, etc.)  - Assess/evaluate cause of self-care deficits   - Assess range of motion  - Assess patient's mobility; develop plan if impaired  - Assess patient's need for assistive devices and provide as appropriate  - Encourage maximum independence but intervene and supervise when necessary  - Involve family in performance of ADLs  - Assess for home care needs following discharge   - Consider OT consult to assist with ADL evaluation and planning for discharge  - Provide patient education as appropriate  Outcome: Progressing  Goal: Maintains/Returns to pre admission functional level  Description: INTERVENTIONS:  - Perform AM-PAC 6 Click Basic Mobility/ Daily Activity assessment daily.  - Set and communicate daily mobility goal to care team and patient/family/caregiver. - Collaborate with rehabilitation services on mobility goals if consulted  - Perform Range of Motion 3 times a day. - Reposition patient every 2 hours. - Dangle patient 3 times a day  - Stand patient 3 times a day  - Ambulate patient 3 times a day  - Out of bed to chair 3 times a day   - Out of bed for meals 3 times a day  - Out of bed for toileting  - Record patient progress and toleration of activity level   Outcome: Progressing     Problem: DISCHARGE PLANNING  Goal: Discharge to home or other facility with appropriate resources  Description: INTERVENTIONS:  - Identify barriers to discharge w/patient and caregiver  - Arrange for needed discharge resources and transportation as appropriate  - Identify discharge learning needs (meds, wound care, etc.)  - Arrange for interpretive services to assist at discharge as needed  - Refer to Case Management Department for coordinating discharge planning if the patient needs post-hospital services based on physician/advanced practitioner order or complex needs related to functional status, cognitive ability, or social support system  Outcome: Progressing     Problem: Knowledge Deficit  Goal: Patient/family/caregiver demonstrates understanding of disease process, treatment plan, medications, and discharge instructions  Description: Complete learning assessment and assess knowledge base. Interventions:  - Provide teaching at level of understanding  - Provide teaching via preferred learning methods  Outcome: Progressing     Problem: Nutrition/Hydration-ADULT  Goal: Nutrient/Hydration intake appropriate for improving, restoring or maintaining nutritional needs  Description: Monitor and assess patient's nutrition/hydration status for malnutrition. Collaborate with interdisciplinary team and initiate plan and interventions as ordered.   Monitor patient's weight and dietary intake as ordered or per policy. Utilize nutrition screening tool and intervene as necessary. Determine patient's food preferences and provide high-protein, high-caloric foods as appropriate. INTERVENTIONS:  - Monitor oral intake, urinary output, labs, and treatment plans  - Assess nutrition and hydration status and recommend course of action  - Evaluate amount of meals eaten  - Assist patient with eating if necessary   - Allow adequate time for meals  - Recommend/ encourage appropriate diets, oral nutritional supplements, and vitamin/mineral supplements  - Order, calculate, and assess calorie counts as needed  - Recommend, monitor, and adjust tube feedings and TPN/PPN based on assessed needs  - Assess need for intravenous fluids  - Provide specific nutrition/hydration education as appropriate  - Include patient/family/caregiver in decisions related to nutrition  Outcome: Progressing     Problem: NEUROSENSORY - ADULT  Goal: Achieves maximal functionality and self care  Description: INTERVENTIONS  - Monitor swallowing and airway patency with patient fatigue and changes in neurological status  - Encourage and assist patient to increase activity and self care.    - Encourage visually impaired, hearing impaired and aphasic patients to use assistive/communication devices  Outcome: Progressing     Problem: CARDIOVASCULAR - ADULT  Goal: Maintains optimal cardiac output and hemodynamic stability  Description: INTERVENTIONS:  - Monitor I/O, vital signs and rhythm  - Monitor for S/S and trends of decreased cardiac output  - Administer and titrate ordered vasoactive medications to optimize hemodynamic stability  - Assess quality of pulses, skin color and temperature  - Assess for signs of decreased coronary artery perfusion  - Instruct patient to report change in severity of symptoms  Outcome: Progressing  Goal: Absence of cardiac dysrhythmias or at baseline rhythm  Description: INTERVENTIONS:  - Continuous cardiac monitoring, vital signs, obtain 12 lead EKG if ordered  - Administer antiarrhythmic and heart rate control medications as ordered  - Monitor electrolytes and administer replacement therapy as ordered  Outcome: Progressing     Problem: RESPIRATORY - ADULT  Goal: Achieves optimal ventilation and oxygenation  Description: INTERVENTIONS:  - Assess for changes in respiratory status  - Assess for changes in mentation and behavior  - Position to facilitate oxygenation and minimize respiratory effort  - Oxygen administered by appropriate delivery if ordered  - Initiate smoking cessation education as indicated  - Encourage broncho-pulmonary hygiene including cough, deep breathe, Incentive Spirometry  - Assess the need for suctioning and aspirate as needed  - Assess and instruct to report SOB or any respiratory difficulty  - Respiratory Therapy support as indicated  Outcome: Progressing     Problem: GASTROINTESTINAL - ADULT  Goal: Minimal or absence of nausea and/or vomiting  Description: INTERVENTIONS:  - Administer IV fluids if ordered to ensure adequate hydration  - Maintain NPO status until nausea and vomiting are resolved  - Nasogastric tube if ordered  - Administer ordered antiemetic medications as needed  - Provide nonpharmacologic comfort measures as appropriate  - Advance diet as tolerated, if ordered  - Consider nutrition services referral to assist patient with adequate nutrition and appropriate food choices  Outcome: Progressing  Goal: Maintains or returns to baseline bowel function  Description: INTERVENTIONS:  - Assess bowel function  - Encourage oral fluids to ensure adequate hydration  - Administer IV fluids if ordered to ensure adequate hydration  - Administer ordered medications as needed  - Encourage mobilization and activity  - Consider nutritional services referral to assist patient with adequate nutrition and appropriate food choices  Outcome: Progressing  Goal: Maintains adequate nutritional intake  Description: INTERVENTIONS:  - Monitor percentage of each meal consumed  - Identify factors contributing to decreased intake, treat as appropriate  - Assist with meals as needed  - Monitor I&O, weight, and lab values if indicated  - Obtain nutrition services referral as needed  Outcome: Progressing  Goal: Oral mucous membranes remain intact  Description: INTERVENTIONS  - Assess oral mucosa and hygiene practices  - Implement preventative oral hygiene regimen  - Implement oral medicated treatments as ordered  - Initiate Nutrition services referral as needed  Outcome: Progressing     Problem: GENITOURINARY - ADULT  Goal: Maintains or returns to baseline urinary function  Description: INTERVENTIONS:  - Assess urinary function  - Encourage oral fluids to ensure adequate hydration if ordered  - Administer IV fluids as ordered to ensure adequate hydration  - Administer ordered medications as needed  - Offer frequent toileting  - Follow urinary retention protocol if ordered  Outcome: Progressing  Goal: Absence of urinary retention  Description: INTERVENTIONS:  - Assess patient’s ability to void and empty bladder  - Monitor I/O  - Bladder scan as needed  - Discuss with physician/AP medications to alleviate retention as needed  - Discuss catheterization for long term situations as appropriate  Outcome: Progressing  Goal: Urinary catheter remains patent  Description: INTERVENTIONS:  - Assess patency of urinary catheter  - If patient has a chronic cutler, consider changing catheter if non-functioning  - Follow guidelines for intermittent irrigation of non-functioning urinary catheter  Outcome: Progressing     Problem: METABOLIC, FLUID AND ELECTROLYTES - ADULT  Goal: Electrolytes maintained within normal limits  Description: INTERVENTIONS:  - Monitor labs and assess patient for signs and symptoms of electrolyte imbalances  - Administer electrolyte replacement as ordered  - Monitor response to electrolyte replacements, including repeat lab results as appropriate  - Instruct patient on fluid and nutrition as appropriate  Outcome: Progressing  Goal: Fluid balance maintained  Description: INTERVENTIONS:  - Monitor labs   - Monitor I/O and WT  - Instruct patient on fluid and nutrition as appropriate  - Assess for signs & symptoms of volume excess or deficit  Outcome: Progressing     Problem: SKIN/TISSUE INTEGRITY - ADULT  Goal: Skin Integrity remains intact(Skin Breakdown Prevention)  Description: Assess:  -Perform Hong assessment every   -Clean and moisturize skin every   -Inspect skin when repositioning, toileting, and assisting with ADLS  -Assess under medical devices such as every   -Assess extremities for adequate circulation and sensation     Bed Management:  -Have minimal linens on bed & keep smooth, unwrinkled  -Change linens as needed when moist or perspiring  -Avoid sitting or lying in one position for more than  hours while in bed  -Keep HOB at degrees     Toileting:  -Offer bedside commode  -Assess for incontinence every   -Use incontinent care products after each incontinent episode such as     Activity:  -Mobilize patient times a day  -Encourage activity and walks on unit  -Encourage or provide ROM exercises   -Turn and reposition patient every  Hours  -Use appropriate equipment to lift or move patient in bed  -Instruct/ Assist with weight shifting every  when out of bed in chair  -Consider limitation of chair time  hour intervals    Skin Care:  -Avoid use of baby powder, tape, friction and shearing, hot water or constrictive clothing  -Relieve pressure over bony prominences using   -Do not massage red bony areas    Next Steps:  -Teach patient strategies to minimize risks such as    -Consider consults to  interdisciplinary teams such as   Outcome: Progressing  Goal: Incision(s), wounds(s) or drain site(s) healing without S/S of infection  Description: INTERVENTIONS  - Assess and document dressing, incision, wound bed, drain sites and surrounding tissue  - Provide patient and family education  - Perform skin care/dressing changes every   Outcome: Progressing  Goal: Pressure injury heals and does not worsen  Description: Interventions:  - Implement low air loss mattress or specialty surface (Criteria met)  - Apply silicone foam dressing  - Instruct/assist with weight shifting every  minutes when in chair   - Limit chair time to  hour intervals  - Use special pressure reducing interventions such as when in chair   - Apply fecal or urinary incontinence containment device   - Perform passive or active ROM every   - Turn and reposition patient & offload bony prominences every  hours   - Utilize friction reducing device or surface for transfers   - Consider consults to  interdisciplinary teams such as   - Use incontinent care products after each incontinent episode such as   - Consider nutrition services referral as needed  Outcome: Progressing     Problem: MUSCULOSKELETAL - ADULT  Goal: Maintain or return mobility to safest level of function  Description: INTERVENTIONS:  - Assess patient's ability to carry out ADLs; assess patient's baseline for ADL function and identify physical deficits which impact ability to perform ADLs (bathing, care of mouth/teeth, toileting, grooming, dressing, etc.)  - Assess/evaluate cause of self-care deficits   - Assess range of motion  - Assess patient's mobility  - Assess patient's need for assistive devices and provide as appropriate  - Encourage maximum independence but intervene and supervise when necessary  - Involve family in performance of ADLs  - Assess for home care needs following discharge   - Consider OT consult to assist with ADL evaluation and planning for discharge  - Provide patient education as appropriate  Outcome: Progressing  Goal: Maintain proper alignment of affected body part  Description: INTERVENTIONS:  - Support, maintain and protect limb and body alignment  - Provide patient/ family with appropriate education  Outcome: Progressing

## 2023-11-27 NOTE — WOUND OSTOMY CARE
Patient seen today for wound care follow up assessment. Patient has now transitioned to comfort measures. Wound care orders are placed and wound care will sign off at this time.        Ban ARMENTAN, RN, Kyle Simmons

## 2023-11-27 NOTE — CASE MANAGEMENT
Case Management Progress Note    Patient name Nancy Boss  Location W /W -01 MRN [id number]  : 1940 Date 2023       LOS (days): 10  Geometric Mean LOS (GMLOS) (days): 9.90  Days to GMLOS:-0.1        OBJECTIVE:        Current admission status: Inpatient  Preferred Pharmacy:   [id number] [place] [address]  [address] [facility] [address]  Phone: [phone] Fax: [phone]    [address] [facility], [address] [address] [address]  Phone: [phone] Fax: [phone]    [id number] [place], [address] [facility] [address] [place] [address]  Phone: [phone] Fax: [phone]    Primary Care Provider: Fady Underwood DO    Primary Insurance: MEDICARE  Secondary Insurance: AARP    PROGRESS NOTE:      Per Palliative Care, pt is now comfort care however family was not able to discuss hospice today. Palliative will meet with family and discuss hospice services tomorrow.

## 2023-11-28 NOTE — CASE MANAGEMENT
Case Management Discharge Planning Note    Patient name Corinda Habermann  Location W /W -01 MRN [id number]  : 1940 Date 2023       Current Admission Date: 2023  Current Admission Diagnosis:Bile peritonitis [facility])   Patient Active Problem List    Diagnosis Date Noted    Palliative care patient 2023    Hydronephrosis 2023    Carcinomatosis ([address]) 2023    JESSE (acute kidney injury) ([address]) 2023    Bile peritonitis ([address]) 2023    Perforated gallbladder 2023    Major depressive disorder with single episode, in full remission ([address]) 2023    Congestive heart failure, unspecified HF chronicity, unspecified heart failure type ([address]) 2022    Chronic kidney disease, stage 3a ([address]) 2022    Mitral stenosis 2022    Insect bite of scalp 2021    Nasal dryness 12/15/2021    OAB (overactive bladder) 2021    Atrial fibrillation ([address]) 2021    Fall 2021    Hip dislocation, right ([address]) 2021    Choledocholithiasis 2021    Hematoma 2021    Epistaxis 10/22/2021    Osteoarthritis of left hip 2020    Elevated LFTs 2020    Absolute anemia 2019    Melena 2019    Symptomatic anemia 2019    Guaiac positive stools 2019    Right knee pain 2019    Anemia 2019    Aftercare following right knee joint replacement surgery 2019    Drug-induced constipation 2019    Ambulatory dysfunction 2019    Essential hypertension 2019    Anxiety 2019      LOS (days): 11  Geometric Mean LOS (GMLOS) (days): 9.90  Days to GMLOS:-1.1     OBJECTIVE:  Risk of Unplanned Readmission Score: 21.98         Current admission status: Inpatient   Preferred Pharmacy:   [address] [place] [address] Ave [id number] Sheyla Nix [address]  [address] [facility] [address]  Phone: [phone] Fax: [phone] [address] [facility] - [place], [address] [place] [facility] [address]  Phone: [phone] Fax: [phone]    [id number] [place], [address] [facility] [place]. [address] [place] [address]  Phone: [phone] Fax: [phone]    Primary Care Provider: Megan Figueroa DO    Primary Insurance: MEDICARE  Secondary Insurance: AARP    DISCHARGE DETAILS:     Hospice referral has been made as requested by family. Per  Hospice liaison pt is appropriate for hospice. Family would like to take pt home on hospice. DME will be delivered to the home tomorrow morning. Liaison requested transportation be arranged for after 1200. Transportation has been requested for 1200 tomorrow via BLS. [facility] will be transporting pt to home at 1230. All parties involved have been notified of DC arrangements.

## 2023-11-28 NOTE — PROGRESS NOTES
Progress Note - Palliative & Supportive Care  Jose Alejandro Puentes Drown  78 y.o.  female  W /W -01   MRN: [id number]  Encounter: [id number]     Assessment  Bile peritonitis, intra-abdominal abscess choledocholithiasis, s/p ex lap and cholecystostomy tube placement  Metastatic adenocarcinoma - unknown primary source  Carcinomatosis  Hydronephrosis  JESSE  CHF  Palliative care encounter  Goals of care counseling    Plan:  Symptom management    Pain  PRN Use of Pain Medications: 1 PRN dose  24 hour Total OME for 11/27: 10mg  Continue morphine oral concentrate 5mg q1h PRN  Script sent  Continue hydromorphone 0.5mg IV q10min PRN  Utilize PO first    Nausea  Continue reglan 10mg q6h scheduled  Continue Zofran 4mg q4h PRN    Anxiety  Start lorazepam 0.5mg PO q1h PRN  Script sent  Continue lorazepam 0.5mg IV q10min PRN  Utilize PO first    2. Goals:  Level 4 code status  Goal is for comfort measures only and hospice at the patient's home  Hospice consult placed - consents and logistics to be arranged through crispin Smyth for discharge tomorrow if equipment is in place. 3.  Social support:  Patient is well supported by crispin Wiliannando Dayoivett listening provided  Normalized experience of patient/family  Provided anxiety containment  Provided anticipatory guidance  Encouraged self care  Discussed spiritual needs    4. Follow up  Palliative Care will continue to follow and goals of care discussions will be ongoing. Please reach out via [employer] if questions or concerns arise. 5. Care Coordination  Reviewed case with Jess FOWLER    24 Hour History  Chart reviewed before visit. Patient seen sitting up in bed today eating ice chips. Patient ate about 25% of breakfast. She appears comfortable. Reports pain comes and goes. Offers no other complaints at this time. Daughter Russel See at bedside agreeable to discussing hospice today. Hospice has been thoroughly explained.  Daughter had questions related hospice services and coverage and after all questions were answered she agreed that comfort care and Hospice is the appropriate care for patient at this time in life. They wish to pursue hospice at home and patient will be supported by daughter Cristal Ortega. Freedom of choice has explained and family wishes to proceed with a referral to [facility] RESHMA Team.     Review of Systems   All other systems reviewed and are negative. Medications    Current Facility-Administered Medications:     HYDROmorphone (DILAUDID) injection 0.5 mg, 0.5 mg, Intravenous, Q10 Min PRN, PALAK Sr, 0.5 mg at 11/27/23 1252    LORazepam (ATIVAN) injection 0.5 mg, 0.5 mg, Intravenous, Q10 Min PRN, PALAK Sr    metoclopramide (REGLAN) injection 10 mg, 10 mg, Intravenous, Q6H, Nohemi Zuñiga MD, 10 mg at 11/28/23 0320    Morphine Sulfate (Concentrate) oral concentrated solution 5 mg, 5 mg, Oral, Q1H PRN, PALAK Sr    ondansetron [facility]) injection 4 mg, 4 mg, Intravenous, Q4H PRN, Abhilash Felipe PA-C, 4 mg at 11/27/23 1454    Objective  /64   Pulse 99   Temp 97.6 °F (36.4 °C)   Resp 17   Ht 5' 3" (1.6 m)   Wt 95.8 kg (211 lb 3.2 oz)   SpO2 94%   BMI 37.41 kg/m²   Physical Exam  Vitals and nursing note reviewed. Constitutional:       General: She is awake. She is not in acute distress. Appearance: She is obese. Interventions: Nasal cannula in place. Comments: Awake but drowsy   HENT:      Head: Normocephalic. Mouth/Throat:      Mouth: Mucous membranes are moist.   Eyes:      Extraocular Movements: Extraocular movements intact. Cardiovascular:      Rate and Rhythm: Tachycardia present. Pulmonary:      Effort: Pulmonary effort is normal. No respiratory distress. Comments: 0.5L nasal cannula  Abdominal:      General: There is no distension. Palpations: Abdomen is soft. Musculoskeletal:      Cervical back: Normal range of motion and neck supple. Right lower leg: No edema. Left lower leg: No edema. Skin:     General: Skin is warm and dry. Findings: Bruising present. Neurological:      Comments: Patient minimally engaged and making limited eye contact, answers questions with 2-3 word answers with prompting   Psychiatric:         Speech: Speech is delayed. Behavior: Behavior is cooperative. Lab Results: No new labs for review. Imaging Studies: No new imaging for review. EKG, Pathology, and Other Studies: No new reports for review. Counseling / Coordination of Care  Total floor / unit time spent today 45 minutes. Greater than 50% of total time was spent with the patient and / or family counseling and / or coordinating of care. A description of the counseling / coordination of care: Chart reviewed,   provided medical updates, determined goals of care, discussed palliative care and symptom management, discussed comfort care and hospice, provided anticipatory guidance, determined competency and POA/HCA, determined social/family support, provided psychosocial support. Alanis Hussein, MSN, CRNP, [facility]  Palliative & Supportive Care    Portions of this document may have been created using dictation software and as such some "sound alike" terms may have been generated by the system. Do not hesitate to contact me with any questions or clarifications.

## 2023-11-28 NOTE — PLAN OF CARE
Problem: Prexisting or High Potential for Compromised Skin Integrity  Goal: Skin integrity is maintained or improved  Description: INTERVENTIONS:  - Identify patients at risk for skin breakdown  - Assess and monitor skin integrity  - Assess and monitor nutrition and hydration status  - Monitor labs   - Assess for incontinence   - Turn and reposition patient  - Assist with mobility/ambulation  - Relieve pressure over bony prominences  - Avoid friction and shearing  - Provide appropriate hygiene as needed including keeping skin clean and dry  - Evaluate need for skin moisturizer/barrier cream  - Collaborate with interdisciplinary team   - Patient/family teaching  - Consider wound care consult   11/27/2023 2304 by Antonia Alvarez RN  Outcome: Progressing  11/27/2023 2303 by Antonia Alvarez RN  Outcome: Progressing     Problem: PAIN - ADULT  Goal: Verbalizes/displays adequate comfort level or baseline comfort level  Description: Interventions:  - Encourage patient to monitor pain and request assistance  - Assess pain using appropriate pain scale  - Administer analgesics based on type and severity of pain and evaluate response  - Implement non-pharmacological measures as appropriate and evaluate response  - Consider cultural and social influences on pain and pain management  - Notify physician/advanced practitioner if interventions unsuccessful or patient reports new pain  11/27/2023 2304 by Antonia Alvarez RN  Outcome: Progressing  11/27/2023 2303 by Antonia Alvarez RN  Outcome: Progressing     Problem: INFECTION - ADULT  Goal: Absence or prevention of progression during hospitalization  Description: INTERVENTIONS:  - Assess and monitor for signs and symptoms of infection  - Monitor lab/diagnostic results  - Monitor all insertion sites, i.e. indwelling lines, tubes, and drains  - Monitor endotracheal if appropriate and nasal secretions for changes in amount and color  - [place] appropriate cooling/warming therapies per order  - Administer medications as ordered  - Instruct and encourage patient and family to use good hand hygiene technique  - Identify and instruct in appropriate isolation precautions for identified infection/condition  11/27/2023 2304 by Wesley Bowen RN  Outcome: Progressing  11/27/2023 2303 by Wesley Bowen RN  Outcome: Progressing  Goal: Absence of fever/infection during neutropenic period  Description: INTERVENTIONS:  - Monitor WBC    11/27/2023 2304 by Wesley Bowen RN  Outcome: Progressing  11/27/2023 2303 by Wesley Bowen RN  Outcome: Progressing     Problem: SAFETY ADULT  Goal: Patient will remain free of falls  Description: INTERVENTIONS:  - Educate patient/family on patient safety including physical limitations  - Instruct patient to call for assistance with activity   - Consult OT/PT to assist with strengthening/mobility   - Keep Call bell within reach  - Keep bed low and locked with side rails adjusted as appropriate  - Keep care items and personal belongings within reach  - Initiate and maintain comfort rounds  - Make Fall Risk Sign visible to staff  - Offer Toileting every 2 Hours, in advance of need  - Initiate/Maintain alarm  - Obtain necessary fall risk management equipment:   - Apply yellow socks and bracelet for high fall risk patients  - Consider moving patient to room near nurses station  11/27/2023 2304 by Wesley Bowen RN  Outcome: Progressing  11/27/2023 2303 by Wesley Bowen RN  Outcome: Progressing  Goal: Maintain or return to baseline ADL function  Description: INTERVENTIONS:  -  Assess patient's ability to carry out ADLs; assess patient's baseline for ADL function and identify physical deficits which impact ability to perform ADLs (bathing, care of mouth/teeth, toileting, grooming, dressing, etc.)  - Assess/evaluate cause of self-care deficits   - Assess range of motion  - Assess patient's mobility; develop plan if impaired  - Assess patient's need for assistive devices and provide as appropriate  - Encourage maximum independence but intervene and supervise when necessary  - Involve family in performance of ADLs  - Assess for home care needs following discharge   - Consider OT consult to assist with ADL evaluation and planning for discharge  - Provide patient education as appropriate  11/27/2023 2304 by Michal Shone, RN  Outcome: Progressing  11/27/2023 2303 by Michal Shone, RN  Outcome: Progressing  Goal: Maintains/Returns to pre admission functional level  Description: INTERVENTIONS:  - Perform AM-PAC 6 Click Basic Mobility/ Daily Activity assessment daily.  - Set and communicate daily mobility goal to care team and patient/family/caregiver. - Collaborate with rehabilitation services on mobility goals if consulted  - Perform Range of Motion 3 times a day. - Reposition patient every 2 hours.   - Dangle patient 3 times a day  - Stand patient 3 times a day  - Ambulate patient 3 times a day  - Out of bed to chair 3 times a day   - Out of bed for meals 3 times a day  - Out of bed for toileting  - Record patient progress and toleration of activity level   11/27/2023 2304 by Michal Shone, RN  Outcome: Progressing  11/27/2023 2303 by Michal Shone, RN  Outcome: Progressing     Problem: DISCHARGE PLANNING  Goal: Discharge to home or other facility with appropriate resources  Description: INTERVENTIONS:  - Identify barriers to discharge w/patient and caregiver  - Arrange for needed discharge resources and transportation as appropriate  - Identify discharge learning needs (meds, wound care, etc.)  - Arrange for interpretive services to assist at discharge as needed  - Refer to Case Management Department for coordinating discharge planning if the patient needs post-hospital services based on physician/advanced practitioner order or complex needs related to functional status, cognitive ability, or social support system  11/27/2023 2304 by Fatmata Rodriguez RN  Outcome: Progressing  11/27/2023 2303 by Fatmata Rodriguez RN  Outcome: Progressing     Problem: Knowledge Deficit  Goal: Patient/family/caregiver demonstrates understanding of disease process, treatment plan, medications, and discharge instructions  Description: Complete learning assessment and assess knowledge base. Interventions:  - Provide teaching at level of understanding  - Provide teaching via preferred learning methods  11/27/2023 2304 by Fatmata Rodriguez RN  Outcome: Progressing  11/27/2023 2303 by Fatmata Rodriguez RN  Outcome: Progressing     Problem: Nutrition/Hydration-ADULT  Goal: Nutrient/Hydration intake appropriate for improving, restoring or maintaining nutritional needs  Description: Monitor and assess patient's nutrition/hydration status for malnutrition. Collaborate with interdisciplinary team and initiate plan and interventions as ordered. Monitor patient's weight and dietary intake as ordered or per policy. Utilize nutrition screening tool and intervene as necessary. Determine patient's food preferences and provide high-protein, high-caloric foods as appropriate.      INTERVENTIONS:  - Monitor oral intake, urinary output, labs, and treatment plans  - Assess nutrition and hydration status and recommend course of action  - Evaluate amount of meals eaten  - Assist patient with eating if necessary   - Allow adequate time for meals  - Recommend/ encourage appropriate diets, oral nutritional supplements, and vitamin/mineral supplements  - Order, calculate, and assess calorie counts as needed  - Recommend, monitor, and adjust tube feedings and TPN/PPN based on assessed needs  - Assess need for intravenous fluids  - Provide specific nutrition/hydration education as appropriate  - Include patient/family/caregiver in decisions related to nutrition  11/27/2023 2304 by Fatmata Rodriguez RN  Outcome: Progressing  11/27/2023 2303 by Alberto Bryan OVI Stewart  Outcome: Progressing     Problem: NEUROSENSORY - ADULT  Goal: Achieves maximal functionality and self care  Description: INTERVENTIONS  - Monitor swallowing and airway patency with patient fatigue and changes in neurological status  - Encourage and assist patient to increase activity and self care.    - Encourage visually impaired, hearing impaired and aphasic patients to use assistive/communication devices  11/27/2023 2304 by Jaqueline Landin RN  Outcome: Progressing  11/27/2023 2303 by Jaqueline Landin RN  Outcome: Progressing     Problem: CARDIOVASCULAR - ADULT  Goal: Maintains optimal cardiac output and hemodynamic stability  Description: INTERVENTIONS:  - Monitor I/O, vital signs and rhythm  - Monitor for S/S and trends of decreased cardiac output  - Administer and titrate ordered vasoactive medications to optimize hemodynamic stability  - Assess quality of pulses, skin color and temperature  - Assess for signs of decreased coronary artery perfusion  - Instruct patient to report change in severity of symptoms  11/27/2023 2304 by Jaqueline Landin RN  Outcome: Progressing  11/27/2023 2303 by Jaqueline Landin RN  Outcome: Progressing  Goal: Absence of cardiac dysrhythmias or at baseline rhythm  Description: INTERVENTIONS:  - Continuous cardiac monitoring, vital signs, obtain 12 lead EKG if ordered  - Administer antiarrhythmic and heart rate control medications as ordered  - Monitor electrolytes and administer replacement therapy as ordered  11/27/2023 2304 by Jaqueline Landin RN  Outcome: Progressing  11/27/2023 2303 by Jaqueline Landin RN  Outcome: Progressing     Problem: RESPIRATORY - ADULT  Goal: Achieves optimal ventilation and oxygenation  Description: INTERVENTIONS:  - Assess for changes in respiratory status  - Assess for changes in mentation and behavior  - Position to facilitate oxygenation and minimize respiratory effort  - Oxygen administered by appropriate delivery if ordered  - Initiate smoking cessation education as indicated  - Encourage broncho-pulmonary hygiene including cough, deep breathe, Incentive Spirometry  - Assess the need for suctioning and aspirate as needed  - Assess and instruct to report SOB or any respiratory difficulty  - Respiratory Therapy support as indicated  11/27/2023 2304 by Sandy Butler RN  Outcome: Progressing  11/27/2023 2303 by Sandy Butler RN  Outcome: Progressing     Problem: GASTROINTESTINAL - ADULT  Goal: Minimal or absence of nausea and/or vomiting  Description: INTERVENTIONS:  - Administer IV fluids if ordered to ensure adequate hydration  - Maintain NPO status until nausea and vomiting are resolved  - Nasogastric tube if ordered  - Administer ordered antiemetic medications as needed  - Provide nonpharmacologic comfort measures as appropriate  - Advance diet as tolerated, if ordered  - Consider nutrition services referral to assist patient with adequate nutrition and appropriate food choices  11/27/2023 2304 by Sandy Butler RN  Outcome: Progressing  11/27/2023 2303 by Sandy Butler RN  Outcome: Progressing  Goal: Maintains or returns to baseline bowel function  Description: INTERVENTIONS:  - Assess bowel function  - Encourage oral fluids to ensure adequate hydration  - Administer IV fluids if ordered to ensure adequate hydration  - Administer ordered medications as needed  - Encourage mobilization and activity  - Consider nutritional services referral to assist patient with adequate nutrition and appropriate food choices  11/27/2023 2304 by Sandy Butler RN  Outcome: Progressing  11/27/2023 2303 by Sandy Butler RN  Outcome: Progressing  Goal: Maintains adequate nutritional intake  Description: INTERVENTIONS:  - Monitor percentage of each meal consumed  - Identify factors contributing to decreased intake, treat as appropriate  - Assist with meals as needed  - Monitor I&O, weight, and lab values if indicated  - Obtain nutrition services referral as needed  11/27/2023 2304 by Joanne Landers RN  Outcome: Progressing  11/27/2023 2303 by Joanne Landers RN  Outcome: Progressing  Goal: Oral mucous membranes remain intact  Description: INTERVENTIONS  - Assess oral mucosa and hygiene practices  - Implement preventative oral hygiene regimen  - Implement oral medicated treatments as ordered  - Initiate Nutrition services referral as needed  11/27/2023 2304 by Joanne Landers RN  Outcome: Progressing  11/27/2023 2303 by Joanne Landers RN  Outcome: Progressing     Problem: GENITOURINARY - ADULT  Goal: Maintains or returns to baseline urinary function  Description: INTERVENTIONS:  - Assess urinary function  - Encourage oral fluids to ensure adequate hydration if ordered  - Administer IV fluids as ordered to ensure adequate hydration  - Administer ordered medications as needed  - Offer frequent toileting  - Follow urinary retention protocol if ordered  11/27/2023 2304 by Joanne Landers RN  Outcome: Progressing  11/27/2023 2303 by Joanne Landers RN  Outcome: Progressing  Goal: Absence of urinary retention  Description: INTERVENTIONS:  - Assess patient’s ability to void and empty bladder  - Monitor I/O  - Bladder scan as needed  - Discuss with physician/AP medications to alleviate retention as needed  - Discuss catheterization for long term situations as appropriate  11/27/2023 2304 by Joanne Landers RN  Outcome: Progressing  11/27/2023 2303 by Joanne Landers RN  Outcome: Progressing  Goal: Urinary catheter remains patent  Description: INTERVENTIONS:  - Assess patency of urinary catheter  - If patient has a chronic cutler, consider changing catheter if non-functioning  - Follow guidelines for intermittent irrigation of non-functioning urinary catheter  11/27/2023 2304 by Joanne Landers RN  Outcome: Progressing  11/27/2023 2303 by Joanne Landers RN  Outcome: Progressing Problem: METABOLIC, FLUID AND ELECTROLYTES - ADULT  Goal: Electrolytes maintained within normal limits  Description: INTERVENTIONS:  - Monitor labs and assess patient for signs and symptoms of electrolyte imbalances  - Administer electrolyte replacement as ordered  - Monitor response to electrolyte replacements, including repeat lab results as appropriate  - Instruct patient on fluid and nutrition as appropriate  11/27/2023 2304 by Antonia Alvarez RN  Outcome: Progressing  11/27/2023 2303 by Antonia Alvarez RN  Outcome: Progressing  Goal: Fluid balance maintained  Description: INTERVENTIONS:  - Monitor labs   - Monitor I/O and WT  - Instruct patient on fluid and nutrition as appropriate  - Assess for signs & symptoms of volume excess or deficit  11/27/2023 2304 by Antonai Alvarez RN  Outcome: Progressing  11/27/2023 2303 by Antonia Alvarez RN  Outcome: Progressing     Problem: SKIN/TISSUE INTEGRITY - ADULT  Goal: Skin Integrity remains intact(Skin Breakdown Prevention)  Description: Assess:  -Perform Hong assessment every   -Clean and moisturize skin every   -Inspect skin when repositioning, toileting, and assisting with ADLS  -Assess under medical devices such as  every   -Assess extremities for adequate circulation and sensation     Bed Management:  -Have minimal linens on bed & keep smooth, unwrinkled  -Change linens as needed when moist or perspiring  -Avoid sitting or lying in one position for more than hours while in bed  -Keep HOB at degrees     Toileting:  -Offer bedside commode  -Assess for incontinence every   -Use incontinent care products after each incontinent episode such as     Activity:  -Mobilize patient  times a day  -Encourage activity and walks on unit  -Encourage or provide ROM exercises   -Turn and reposition patient every  Hours  -Use appropriate equipment to lift or move patient in bed  -Instruct/ Assist with weight shifting every  when out of bed in chair  -Consider limitation of chair time  hour intervals    Skin Care:  -Avoid use of baby powder, tape, friction and shearing, hot water or constrictive clothing  -Relieve pressure over bony prominences using  -Do not massage red bony areas    Next Steps:  -Teach patient strategies to minimize risks such as    -Consider consults to  interdisciplinary teams such as   11/27/2023 2304 by Minerva Degroot RN  Outcome: Progressing  11/27/2023 2303 by Minerva Degroot RN  Outcome: Progressing  Goal: Incision(s), wounds(s) or drain site(s) healing without S/S of infection  Description: INTERVENTIONS  - Assess and document dressing, incision, wound bed, drain sites and surrounding tissue  - Provide patient and family education  - Perform skin care/dressing changes every   11/27/2023 2304 by Minerva Degroot RN  Outcome: Progressing  11/27/2023 2303 by Minerva Degroot RN  Outcome: Progressing  Goal: Pressure injury heals and does not worsen  Description: Interventions:  - Implement low air loss mattress or specialty surface (Criteria met)  - Apply silicone foam dressing  - Instruct/assist with weight shifting every  minutes when in chair   - Limit chair time to hour intervals  - Use special pressure reducing interventions such as  when in chair   - Apply fecal or urinary incontinence containment device   - Perform passive or active ROM every   - Turn and reposition patient & offload bony prominences every  hours   - Utilize friction reducing device or surface for transfers   - Consider consults to  interdisciplinary teams such as  - Use incontinent care products after each incontinent episode such   - Consider nutrition services referral as needed  11/27/2023 2304 by Minerva Degroot RN  Outcome: Progressing  11/27/2023 2303 by Minerva Degroot RN  Outcome: Progressing     Problem: MUSCULOSKELETAL - ADULT  Goal: Maintain or return mobility to safest level of function  Description: INTERVENTIONS:  - Assess patient's ability to carry out ADLs; assess patient's baseline for ADL function and identify physical deficits which impact ability to perform ADLs (bathing, care of mouth/teeth, toileting, grooming, dressing, etc.)  - Assess/evaluate cause of self-care deficits   - Assess range of motion  - Assess patient's mobility  - Assess patient's need for assistive devices and provide as appropriate  - Encourage maximum independence but intervene and supervise when necessary  - Involve family in performance of ADLs  - Assess for home care needs following discharge   - Consider OT consult to assist with ADL evaluation and planning for discharge  - Provide patient education as appropriate  11/27/2023 2304 by Moira Macdonald RN  Outcome: Progressing  11/27/2023 2303 by Moira Macdonald RN  Outcome: Progressing  Goal: Maintain proper alignment of affected body part  Description: INTERVENTIONS:  - Support, maintain and protect limb and body alignment  - Provide patient/ family with appropriate education  11/27/2023 2304 by Moira Macdonald RN  Outcome: Progressing  11/27/2023 2303 by Moira Macdonald RN  Outcome: Progressing

## 2023-11-28 NOTE — PROGRESS NOTES
Progress Note - General Surgery   [facility] Resident on Surgery Service   Prachi Sue 78 y.o. female MRN: [id number]  Unit/Bed#: W -01 Encounter: [id number]    Assessment:  78 y.o. female with acute cholecystitis complicated by perforation, sepsis present on admission 2/2 bile peritonitis. Now s/p Laparoscopic converted to open drainage of intra-abdominal abscesses x 4, abdominal washout, gastric serosal repair, omental biopsy, liver biopsy, open cholecystostomy tube placement on 11/18, introp bx c/f metastatic adenocarcinoma, undetermined primary malignancy at this time. Plan:  Continue fulls toast crackers and ensures  Continue right cholecystostomy drain and left upper quadrant drain  Continue Mcmanus  Will plan to change midline dressing  Follow-up palliative/hospice discussion  Wean O2 as tolerated      Subjective/Objective     Subjective: No acute events overnight. Patient reports no nausea or vomiting has been tolerating diet. Had a bowel movement which per nursing reported to be foul-smelling and mucus. .     Objective: AVSS on 2 L nasal cannula    Blood pressure 110/55, pulse (!) 110, temperature 97.6 °F (36.4 °C), resp. rate 18, height 5' 3" (1.6 m), weight 95.8 kg (211 lb 3.2 oz), SpO2 94 %. ,Body mass index is 37.41 kg/m². Intake/Output Summary (Last 24 hours) at 11/28/2023 0905  Last data filed at 11/28/2023 0856  Gross per 24 hour   Intake 480 ml   Output 510 ml   Net -30 ml         Invasive Devices       Peripheral Intravenous Line  Duration             Peripheral IV 11/26/23 Left;Ventral (anterior) Forearm 2 days              Drain  Duration             Closed/Suction Drain Left LUQ Bulb 19 Fr. 9 days    Closed/Suction Drain Right RUQ Other (Comment) 12 Fr. 9 days    Ureteral Internal Stent Right ureter 6 Fr. 6 days    Urethral Catheter Non-latex 16 Fr. 6 days                    General: NAD  HENT: NCAT MMM  Neck: supple, no JVD  CV: nl rate  Lungs: nl wob.  No resp distress  ABD: Soft nondistended nontender ADILIA outputs serous character, midline incision moist with possible drainage  Extrem: No CCE  Neuro: AAOx3       Scheduled Meds:  Current Facility-Administered Medications   Medication Dose Route Frequency Provider Last Rate    HYDROmorphone  0.5 mg Intravenous Q10 Min PRN DEBORAH LemonsNP      LORazepam  0.5 mg Intravenous Q10 Min PRN DEBORAH LemonsNP      metoclopramide  10 mg Intravenous Q6H Momo Hernandez MD      Morphine Sulfate (Concentrate)  5 mg Oral Q1H PRN PALAK Lemons      ondansetron  4 mg Intravenous Q4H PRN Guanaco Soto PA-C       Continuous Infusions:   PRN Meds:. HYDROmorphone    LORazepam    Morphine Sulfate (Concentrate)    ondansetron      Lab, Imaging and other studies:I have personally reviewed pertinent lab results.     VTE Pharmacologic Prophylaxis: none  VTE Mechanical Prophylaxis: sequential compression device      Momo Hernandez MD  11/28/2023 9:05 AM

## 2023-11-28 NOTE — HOSPICE NOTE
Received hospice referral.  I met with pt and her daughter Ever Walls to discuss hospice services. Hospice benefits reviewed per Medicare guidelines and all questions answered. Dr Vicky Dubon approved for home hospice LOC. Consents reviewed and signed by pt's daughter Ever Walls. Discussed equipment need at home. Equipment ordered from [employer] and will be delivered to pt's home tomorrow morning. MALCOLM Mercado updated and is making arrangements for pt to be discharged tomorrow afternoon. Will follow pt until discharge.

## 2023-11-28 NOTE — Clinical Note
Seeking approval for Lizzie Genera [birth year] 79 YO female currently at [address] . Dxs: HTN, Anxiety, Anemia, Afib, CHF, CKD 3A, Bile Peritonitis, Hydronephrosis, Carcinomatosis. Dxed with acute cholecystitis complicated by perforation, sepsis present on admission 2/2 bile peritonitis. Now s/p Laparoscopic converted to open drainage of intra-abdominal abscesses x 4, abdominal washout, gastric serosal repair, omental biopsy, liver biopsy, open cholecystostomy tube placement on 11/18, Bx c/f metastatic adenocarcinoma, undetermined primary malignancy at this time. Getting IV Reglan 10mg q 6 hrs. Has used some PRN Zofran 4mg IV most recent 11/27 4074. Looking to go home with hospice.

## 2023-11-28 NOTE — PLAN OF CARE
Problem: Prexisting or High Potential for Compromised Skin Integrity  Goal: Skin integrity is maintained or improved  Description: INTERVENTIONS:  - Identify patients at risk for skin breakdown  - Assess and monitor skin integrity  - Assess and monitor nutrition and hydration status  - Monitor labs   - Assess for incontinence   - Turn and reposition patient  - Assist with mobility/ambulation  - Relieve pressure over bony prominences  - Avoid friction and shearing  - Provide appropriate hygiene as needed including keeping skin clean and dry  - Evaluate need for skin moisturizer/barrier cream  - Collaborate with interdisciplinary team   - Patient/family teaching  - Consider wound care consult   Outcome: Progressing     Problem: PAIN - ADULT  Goal: Verbalizes/displays adequate comfort level or baseline comfort level  Description: Interventions:  - Encourage patient to monitor pain and request assistance  - Assess pain using appropriate pain scale  - Administer analgesics based on type and severity of pain and evaluate response  - Implement non-pharmacological measures as appropriate and evaluate response  - Consider cultural and social influences on pain and pain management  - Notify physician/advanced practitioner if interventions unsuccessful or patient reports new pain  Outcome: Progressing     Problem: INFECTION - ADULT  Goal: Absence or prevention of progression during hospitalization  Description: INTERVENTIONS:  - Assess and monitor for signs and symptoms of infection  - Monitor lab/diagnostic results  - Monitor all insertion sites, i.e. indwelling lines, tubes, and drains  - Monitor endotracheal if appropriate and nasal secretions for changes in amount and color  - [employer] appropriate cooling/warming therapies per order  - Administer medications as ordered  - Instruct and encourage patient and family to use good hand hygiene technique  - Identify and instruct in appropriate isolation precautions for identified infection/condition  Outcome: Progressing  Goal: Absence of fever/infection during neutropenic period  Description: INTERVENTIONS:  - Monitor WBC    Outcome: Progressing     Problem: SAFETY ADULT  Goal: Patient will remain free of falls  Description: INTERVENTIONS:  - Educate patient/family on patient safety including physical limitations  - Instruct patient to call for assistance with activity   - Consult OT/PT to assist with strengthening/mobility   - Keep Call bell within reach  - Keep bed low and locked with side rails adjusted as appropriate  - Keep care items and personal belongings within reach  - Initiate and maintain comfort rounds  - Make Fall Risk Sign visible to staff  - Offer Toileting every 2 Hours, in advance of need  - Initiate/Maintain alarm  - Obtain necessary fall risk management equipment:   - Apply yellow socks and bracelet for high fall risk patients  - Consider moving patient to room near nurses station  Outcome: Progressing  Goal: Maintain or return to baseline ADL function  Description: INTERVENTIONS:  -  Assess patient's ability to carry out ADLs; assess patient's baseline for ADL function and identify physical deficits which impact ability to perform ADLs (bathing, care of mouth/teeth, toileting, grooming, dressing, etc.)  - Assess/evaluate cause of self-care deficits   - Assess range of motion  - Assess patient's mobility; develop plan if impaired  - Assess patient's need for assistive devices and provide as appropriate  - Encourage maximum independence but intervene and supervise when necessary  - Involve family in performance of ADLs  - Assess for home care needs following discharge   - Consider OT consult to assist with ADL evaluation and planning for discharge  - Provide patient education as appropriate  Outcome: Progressing  Goal: Maintains/Returns to pre admission functional level  Description: INTERVENTIONS:  - Perform AM-PAC 6 Click Basic Mobility/ Daily Activity assessment daily.  - Set and communicate daily mobility goal to care team and patient/family/caregiver. - Collaborate with rehabilitation services on mobility goals if consulted  - Perform Range of Motion 3 times a day. - Reposition patient every 2 hours. - Dangle patient 3 times a day  - Stand patient 3 times a day  - Ambulate patient 3 times a day  - Out of bed to chair 3 times a day   - Out of bed for meals 3 times a day  - Out of bed for toileting  - Record patient progress and toleration of activity level   Outcome: Progressing     Problem: DISCHARGE PLANNING  Goal: Discharge to home or other facility with appropriate resources  Description: INTERVENTIONS:  - Identify barriers to discharge w/patient and caregiver  - Arrange for needed discharge resources and transportation as appropriate  - Identify discharge learning needs (meds, wound care, etc.)  - Arrange for interpretive services to assist at discharge as needed  - Refer to Case Management Department for coordinating discharge planning if the patient needs post-hospital services based on physician/advanced practitioner order or complex needs related to functional status, cognitive ability, or social support system  Outcome: Progressing     Problem: Knowledge Deficit  Goal: Patient/family/caregiver demonstrates understanding of disease process, treatment plan, medications, and discharge instructions  Description: Complete learning assessment and assess knowledge base. Interventions:  - Provide teaching at level of understanding  - Provide teaching via preferred learning methods  Outcome: Progressing     Problem: Nutrition/Hydration-ADULT  Goal: Nutrient/Hydration intake appropriate for improving, restoring or maintaining nutritional needs  Description: Monitor and assess patient's nutrition/hydration status for malnutrition. Collaborate with interdisciplinary team and initiate plan and interventions as ordered.   Monitor patient's weight and dietary intake as ordered or per policy. Utilize nutrition screening tool and intervene as necessary. Determine patient's food preferences and provide high-protein, high-caloric foods as appropriate. INTERVENTIONS:  - Monitor oral intake, urinary output, labs, and treatment plans  - Assess nutrition and hydration status and recommend course of action  - Evaluate amount of meals eaten  - Assist patient with eating if necessary   - Allow adequate time for meals  - Recommend/ encourage appropriate diets, oral nutritional supplements, and vitamin/mineral supplements  - Order, calculate, and assess calorie counts as needed  - Recommend, monitor, and adjust tube feedings and TPN/PPN based on assessed needs  - Assess need for intravenous fluids  - Provide specific nutrition/hydration education as appropriate  - Include patient/family/caregiver in decisions related to nutrition  Outcome: Progressing     Problem: NEUROSENSORY - ADULT  Goal: Achieves maximal functionality and self care  Description: INTERVENTIONS  - Monitor swallowing and airway patency with patient fatigue and changes in neurological status  - Encourage and assist patient to increase activity and self care.    - Encourage visually impaired, hearing impaired and aphasic patients to use assistive/communication devices  Outcome: Progressing     Problem: CARDIOVASCULAR - ADULT  Goal: Maintains optimal cardiac output and hemodynamic stability  Description: INTERVENTIONS:  - Monitor I/O, vital signs and rhythm  - Monitor for S/S and trends of decreased cardiac output  - Administer and titrate ordered vasoactive medications to optimize hemodynamic stability  - Assess quality of pulses, skin color and temperature  - Assess for signs of decreased coronary artery perfusion  - Instruct patient to report change in severity of symptoms  Outcome: Progressing  Goal: Absence of cardiac dysrhythmias or at baseline rhythm  Description: INTERVENTIONS:  - Continuous cardiac monitoring, vital signs, obtain 12 lead EKG if ordered  - Administer antiarrhythmic and heart rate control medications as ordered  - Monitor electrolytes and administer replacement therapy as ordered  Outcome: Progressing     Problem: RESPIRATORY - ADULT  Goal: Achieves optimal ventilation and oxygenation  Description: INTERVENTIONS:  - Assess for changes in respiratory status  - Assess for changes in mentation and behavior  - Position to facilitate oxygenation and minimize respiratory effort  - Oxygen administered by appropriate delivery if ordered  - Initiate smoking cessation education as indicated  - Encourage broncho-pulmonary hygiene including cough, deep breathe, Incentive Spirometry  - Assess the need for suctioning and aspirate as needed  - Assess and instruct to report SOB or any respiratory difficulty  - Respiratory Therapy support as indicated  Outcome: Progressing     Problem: GASTROINTESTINAL - ADULT  Goal: Minimal or absence of nausea and/or vomiting  Description: INTERVENTIONS:  - Administer IV fluids if ordered to ensure adequate hydration  - Maintain NPO status until nausea and vomiting are resolved  - Nasogastric tube if ordered  - Administer ordered antiemetic medications as needed  - Provide nonpharmacologic comfort measures as appropriate  - Advance diet as tolerated, if ordered  - Consider nutrition services referral to assist patient with adequate nutrition and appropriate food choices  Outcome: Progressing  Goal: Maintains or returns to baseline bowel function  Description: INTERVENTIONS:  - Assess bowel function  - Encourage oral fluids to ensure adequate hydration  - Administer IV fluids if ordered to ensure adequate hydration  - Administer ordered medications as needed  - Encourage mobilization and activity  - Consider nutritional services referral to assist patient with adequate nutrition and appropriate food choices  Outcome: Progressing  Goal: Maintains adequate nutritional intake  Description: INTERVENTIONS:  - Monitor percentage of each meal consumed  - Identify factors contributing to decreased intake, treat as appropriate  - Assist with meals as needed  - Monitor I&O, weight, and lab values if indicated  - Obtain nutrition services referral as needed  Outcome: Progressing  Goal: Oral mucous membranes remain intact  Description: INTERVENTIONS  - Assess oral mucosa and hygiene practices  - Implement preventative oral hygiene regimen  - Implement oral medicated treatments as ordered  - Initiate Nutrition services referral as needed  Outcome: Progressing     Problem: GENITOURINARY - ADULT  Goal: Maintains or returns to baseline urinary function  Description: INTERVENTIONS:  - Assess urinary function  - Encourage oral fluids to ensure adequate hydration if ordered  - Administer IV fluids as ordered to ensure adequate hydration  - Administer ordered medications as needed  - Offer frequent toileting  - Follow urinary retention protocol if ordered  Outcome: Progressing  Goal: Absence of urinary retention  Description: INTERVENTIONS:  - Assess patient’s ability to void and empty bladder  - Monitor I/O  - Bladder scan as needed  - Discuss with physician/AP medications to alleviate retention as needed  - Discuss catheterization for long term situations as appropriate  Outcome: Progressing  Goal: Urinary catheter remains patent  Description: INTERVENTIONS:  - Assess patency of urinary catheter  - If patient has a chronic cutler, consider changing catheter if non-functioning  - Follow guidelines for intermittent irrigation of non-functioning urinary catheter  Outcome: Progressing     Problem: METABOLIC, FLUID AND ELECTROLYTES - ADULT  Goal: Electrolytes maintained within normal limits  Description: INTERVENTIONS:  - Monitor labs and assess patient for signs and symptoms of electrolyte imbalances  - Administer electrolyte replacement as ordered  - Monitor response to electrolyte replacements, including repeat lab results as appropriate  - Instruct patient on fluid and nutrition as appropriate  Outcome: Progressing  Goal: Fluid balance maintained  Description: INTERVENTIONS:  - Monitor labs   - Monitor I/O and WT  - Instruct patient on fluid and nutrition as appropriate  - Assess for signs & symptoms of volume excess or deficit  Outcome: Progressing     Problem: SKIN/TISSUE INTEGRITY - ADULT  Goal: Skin Integrity remains intact(Skin Breakdown Prevention)  Description: Assess:  -Perform Hong assessment every shift   -Clean and moisturize skin every PRN    -Inspect skin when repositioning, toileting, and assisting with ADLS  -Assess extremities for adequate circulation and sensation     Bed Management:  -Have minimal linens on bed & keep smooth, unwrinkled  -Change linens as needed when moist or perspiring  -Avoid sitting or lying in one position for more than 2 hours while in bed  -Keep HOB at 30 degrees     Toileting:  -Offer bedside commode  -Assess for incontinence every shift     Activity:  -Mobilize patient 3 times a day  -Encourage activity and walks on unit  -Encourage or provide ROM exercises   -Turn and reposition patient every 2 Hours  -Use appropriate equipment to lift or move patient in bed    Skin Care:  -Avoid use of baby powder, tape, friction and shearing, hot water or constrictive clothing  -Do not massage red bony areas    Outcome: Progressing  Goal: Incision(s), wounds(s) or drain site(s) healing without S/S of infection  Description: INTERVENTIONS  - Assess and document dressing, incision, wound bed, drain sites and surrounding tissue  - Provide patient and family education  - Perform skin care/dressing changes every PRN  Outcome: Progressing  Goal: Pressure injury heals and does not worsen  Description: Interventions:  - Implement low air loss mattress or specialty surface (Criteria met)  - Apply silicone foam dressing   - Apply fecal or urinary incontinence containment device   - Perform passive or active ROM every TID   - Turn and reposition patient & offload bony prominences every 2 hours   - Utilize friction reducing device or surface for transfers   - Consider nutrition services referral as needed  Outcome: Progressing     Problem: MUSCULOSKELETAL - ADULT  Goal: Maintain or return mobility to safest level of function  Description: INTERVENTIONS:  - Assess patient's ability to carry out ADLs; assess patient's baseline for ADL function and identify physical deficits which impact ability to perform ADLs (bathing, care of mouth/teeth, toileting, grooming, dressing, etc.)  - Assess/evaluate cause of self-care deficits   - Assess range of motion  - Assess patient's mobility  - Assess patient's need for assistive devices and provide as appropriate  - Encourage maximum independence but intervene and supervise when necessary  - Involve family in performance of ADLs  - Assess for home care needs following discharge   - Consider OT consult to assist with ADL evaluation and planning for discharge  - Provide patient education as appropriate  Outcome: Progressing  Goal: Maintain proper alignment of affected body part  Description: INTERVENTIONS:  - Support, maintain and protect limb and body alignment  - Provide patient/ family with appropriate education  Outcome: Progressing

## 2023-11-29 NOTE — PROGRESS NOTES
Progress Note -general surgery  : General surgery Resident on [address] 78 y.o. female MRN: [id number]  Unit/Bed#: W -01 Encounter: [id number]    Assessment:  78 y.o. female with acute cholecystitis complicated by perforation, sepsis present on admission 2/2 bile peritonitis. Now s/p Laparoscopic converted to open drainage of intra-abdominal abscesses x 4, abdominal washout, gastric serosal repair, omental biopsy, liver biopsy, open cholecystostomy tube placement on 11/18, introp bx c/f metastatic adenocarcinoma, undetermined primary malignancy at this time. Plan:  Diet as tolerated  Comfort measures at this time  Appreciate hospice recommendations    Subjective/Objective     Subjective: No events overnight. Patient seen and examined at bedside resting comfortably      Objective:     Physical Exam:  GEN: Patient resting comfortably, no acute distress    Vitals: BP: (114)/(66) 114/66  Body mass index is 37.41 kg/m². I/O         11/27 0701  11/28 0700 11/28 0701  11/29 0700 11/29 0701  11/30 0700    P. O. 240 480     Total Intake(mL/kg) 240 (2.5) 480 (5)     Urine (mL/kg/hr) 500 (0.2) 325 (0.1)     Drains 10 60     Stool 0 0     Total Output 510 385     Net -270 +95            Unmeasured Stool Occurrence 4 x 3 x             Blaise Colvin MD  11/29/2023 10:09 AM

## 2023-11-29 NOTE — PLAN OF CARE
Problem: Prexisting or High Potential for Compromised Skin Integrity  Goal: Skin integrity is maintained or improved  Description: INTERVENTIONS:  - Identify patients at risk for skin breakdown  - Assess and monitor skin integrity  - Assess and monitor nutrition and hydration status  - Monitor labs   - Assess for incontinence   - Turn and reposition patient  - Assist with mobility/ambulation  - Relieve pressure over bony prominences  - Avoid friction and shearing  - Provide appropriate hygiene as needed including keeping skin clean and dry  - Evaluate need for skin moisturizer/barrier cream  - Collaborate with interdisciplinary team   - Patient/family teaching  - Consider wound care consult   Outcome: Progressing     Problem: PAIN - ADULT  Goal: Verbalizes/displays adequate comfort level or baseline comfort level  Description: Interventions:  - Encourage patient to monitor pain and request assistance  - Assess pain using appropriate pain scale  - Administer analgesics based on type and severity of pain and evaluate response  - Implement non-pharmacological measures as appropriate and evaluate response  - Consider cultural and social influences on pain and pain management  - Notify physician/advanced practitioner if interventions unsuccessful or patient reports new pain  Outcome: Progressing     Problem: INFECTION - ADULT  Goal: Absence or prevention of progression during hospitalization  Description: INTERVENTIONS:  - Assess and monitor for signs and symptoms of infection  - Monitor lab/diagnostic results  - Monitor all insertion sites, i.e. indwelling lines, tubes, and drains  - Monitor endotracheal if appropriate and nasal secretions for changes in amount and color  - [place] appropriate cooling/warming therapies per order  - Administer medications as ordered  - Instruct and encourage patient and family to use good hand hygiene technique  - Identify and instruct in appropriate isolation precautions for identified infection/condition  Outcome: Progressing  Goal: Absence of fever/infection during neutropenic period  Description: INTERVENTIONS:  - Monitor WBC    Outcome: Progressing     Problem: SAFETY ADULT  Goal: Patient will remain free of falls  Description: INTERVENTIONS:  - Educate patient/family on patient safety including physical limitations  - Instruct patient to call for assistance with activity   - Consult OT/PT to assist with strengthening/mobility   - Keep Call bell within reach  - Keep bed low and locked with side rails adjusted as appropriate  - Keep care items and personal belongings within reach  - Initiate and maintain comfort rounds  - Make Fall Risk Sign visible to staff  - Offer Toileting every 2 Hours, in advance of need  - Initiate/Maintain alarm  - Obtain necessary fall risk management equipment:   - Apply yellow socks and bracelet for high fall risk patients  - Consider moving patient to room near nurses station  Outcome: Progressing  Goal: Maintain or return to baseline ADL function  Description: INTERVENTIONS:  -  Assess patient's ability to carry out ADLs; assess patient's baseline for ADL function and identify physical deficits which impact ability to perform ADLs (bathing, care of mouth/teeth, toileting, grooming, dressing, etc.)  - Assess/evaluate cause of self-care deficits   - Assess range of motion  - Assess patient's mobility; develop plan if impaired  - Assess patient's need for assistive devices and provide as appropriate  - Encourage maximum independence but intervene and supervise when necessary  - Involve family in performance of ADLs  - Assess for home care needs following discharge   - Consider OT consult to assist with ADL evaluation and planning for discharge  - Provide patient education as appropriate  Outcome: Progressing  Goal: Maintains/Returns to pre admission functional level  Description: INTERVENTIONS:  - Perform AM-PAC 6 Click Basic Mobility/ Daily Activity assessment daily.  - Set and communicate daily mobility goal to care team and patient/family/caregiver. - Collaborate with rehabilitation services on mobility goals if consulted  - Perform Range of Motion 3 times a day. - Reposition patient every 2 hours. - Dangle patient 3 times a day  - Stand patient 3 times a day  - Ambulate patient 3 times a day  - Out of bed to chair 3 times a day   - Out of bed for meals 3 times a day  - Out of bed for toileting  - Record patient progress and toleration of activity level   Outcome: Progressing     Problem: DISCHARGE PLANNING  Goal: Discharge to home or other facility with appropriate resources  Description: INTERVENTIONS:  - Identify barriers to discharge w/patient and caregiver  - Arrange for needed discharge resources and transportation as appropriate  - Identify discharge learning needs (meds, wound care, etc.)  - Arrange for interpretive services to assist at discharge as needed  - Refer to Case Management Department for coordinating discharge planning if the patient needs post-hospital services based on physician/advanced practitioner order or complex needs related to functional status, cognitive ability, or social support system  Outcome: Progressing     Problem: Nutrition/Hydration-ADULT  Goal: Nutrient/Hydration intake appropriate for improving, restoring or maintaining nutritional needs  Description: Monitor and assess patient's nutrition/hydration status for malnutrition. Collaborate with interdisciplinary team and initiate plan and interventions as ordered. Monitor patient's weight and dietary intake as ordered or per policy. Utilize nutrition screening tool and intervene as necessary. Determine patient's food preferences and provide high-protein, high-caloric foods as appropriate.      INTERVENTIONS:  - Monitor oral intake, urinary output, labs, and treatment plans  - Assess nutrition and hydration status and recommend course of action  - Evaluate amount of meals eaten  - Assist patient with eating if necessary   - Allow adequate time for meals  - Recommend/ encourage appropriate diets, oral nutritional supplements, and vitamin/mineral supplements  - Order, calculate, and assess calorie counts as needed  - Recommend, monitor, and adjust tube feedings and TPN/PPN based on assessed needs  - Assess need for intravenous fluids  - Provide specific nutrition/hydration education as appropriate  - Include patient/family/caregiver in decisions related to nutrition  Outcome: Progressing     Problem: CARDIOVASCULAR - ADULT  Goal: Maintains optimal cardiac output and hemodynamic stability  Description: INTERVENTIONS:  - Monitor I/O, vital signs and rhythm  - Monitor for S/S and trends of decreased cardiac output  - Administer and titrate ordered vasoactive medications to optimize hemodynamic stability  - Assess quality of pulses, skin color and temperature  - Assess for signs of decreased coronary artery perfusion  - Instruct patient to report change in severity of symptoms  Outcome: Progressing  Goal: Absence of cardiac dysrhythmias or at baseline rhythm  Description: INTERVENTIONS:  - Continuous cardiac monitoring, vital signs, obtain 12 lead EKG if ordered  - Administer antiarrhythmic and heart rate control medications as ordered  - Monitor electrolytes and administer replacement therapy as ordered  Outcome: Progressing     Problem: RESPIRATORY - ADULT  Goal: Achieves optimal ventilation and oxygenation  Description: INTERVENTIONS:  - Assess for changes in respiratory status  - Assess for changes in mentation and behavior  - Position to facilitate oxygenation and minimize respiratory effort  - Oxygen administered by appropriate delivery if ordered  - Initiate smoking cessation education as indicated  - Encourage broncho-pulmonary hygiene including cough, deep breathe, Incentive Spirometry  - Assess the need for suctioning and aspirate as needed  - Assess and instruct to report SOB or any respiratory difficulty  - Respiratory Therapy support as indicated  Outcome: Progressing     Problem: GASTROINTESTINAL - ADULT  Goal: Minimal or absence of nausea and/or vomiting  Description: INTERVENTIONS:  - Administer IV fluids if ordered to ensure adequate hydration  - Maintain NPO status until nausea and vomiting are resolved  - Nasogastric tube if ordered  - Administer ordered antiemetic medications as needed  - Provide nonpharmacologic comfort measures as appropriate  - Advance diet as tolerated, if ordered  - Consider nutrition services referral to assist patient with adequate nutrition and appropriate food choices  Outcome: Progressing  Goal: Maintains or returns to baseline bowel function  Description: INTERVENTIONS:  - Assess bowel function  - Encourage oral fluids to ensure adequate hydration  - Administer IV fluids if ordered to ensure adequate hydration  - Administer ordered medications as needed  - Encourage mobilization and activity  - Consider nutritional services referral to assist patient with adequate nutrition and appropriate food choices  Outcome: Progressing  Goal: Maintains adequate nutritional intake  Description: INTERVENTIONS:  - Monitor percentage of each meal consumed  - Identify factors contributing to decreased intake, treat as appropriate  - Assist with meals as needed  - Monitor I&O, weight, and lab values if indicated  - Obtain nutrition services referral as needed  Outcome: Progressing  Goal: Oral mucous membranes remain intact  Description: INTERVENTIONS  - Assess oral mucosa and hygiene practices  - Implement preventative oral hygiene regimen  - Implement oral medicated treatments as ordered  - Initiate Nutrition services referral as needed  Outcome: Progressing     Problem: GENITOURINARY - ADULT  Goal: Maintains or returns to baseline urinary function  Description: INTERVENTIONS:  - Assess urinary function  - Encourage oral fluids to ensure adequate hydration if ordered  - Administer IV fluids as ordered to ensure adequate hydration  - Administer ordered medications as needed  - Offer frequent toileting  - Follow urinary retention protocol if ordered  Outcome: Progressing  Goal: Absence of urinary retention  Description: INTERVENTIONS:  - Assess patient’s ability to void and empty bladder  - Monitor I/O  - Bladder scan as needed  - Discuss with physician/AP medications to alleviate retention as needed  - Discuss catheterization for long term situations as appropriate  Outcome: Progressing  Goal: Urinary catheter remains patent  Description: INTERVENTIONS:  - Assess patency of urinary catheter  - If patient has a chronic cutler, consider changing catheter if non-functioning  - Follow guidelines for intermittent irrigation of non-functioning urinary catheter  Outcome: Progressing     Problem: METABOLIC, FLUID AND ELECTROLYTES - ADULT  Goal: Electrolytes maintained within normal limits  Description: INTERVENTIONS:  - Monitor labs and assess patient for signs and symptoms of electrolyte imbalances  - Administer electrolyte replacement as ordered  - Monitor response to electrolyte replacements, including repeat lab results as appropriate  - Instruct patient on fluid and nutrition as appropriate  Outcome: Progressing  Goal: Fluid balance maintained  Description: INTERVENTIONS:  - Monitor labs   - Monitor I/O and WT  - Instruct patient on fluid and nutrition as appropriate  - Assess for signs & symptoms of volume excess or deficit  Outcome: Progressing     Problem: SKIN/TISSUE INTEGRITY - ADULT  Goal: Skin Integrity remains intact(Skin Breakdown Prevention)  Description: Assess:  -Perform Hong assessment every   -Clean and moisturize skin every   -Inspect skin when repositioning, toileting, and assisting with ADLS  -Assess under medical devices such as  every   -Assess extremities for adequate circulation and sensation     Bed Management:  -Have minimal linens on bed & keep smooth, unwrinkled  -Change linens as needed when moist or perspiring  -Avoid sitting or lying in one position for more than  hours while in bed  -Keep HOB at degrees     Toileting:  -Offer bedside commode  -Assess for incontinence every   -Use incontinent care products after each incontinent episode such as     Activity:  -Mobilize patient times a day  -Encourage activity and walks on unit  -Encourage or provide ROM exercises   -Turn and reposition patient every  Hours  -Use appropriate equipment to lift or move patient in bed  -Instruct/ Assist with weight shifting every  when out of bed in chair  -Consider limitation of chair time  hour intervals    Skin Care:  -Avoid use of baby powder, tape, friction and shearing, hot water or constrictive clothing  -Relieve pressure over bony prominences using   -Do not massage red bony areas    Next Steps:  -Teach patient strategies to minimize risks such as    -Consider consults to  interdisciplinary teams such as   Outcome: Progressing  Goal: Incision(s), wounds(s) or drain site(s) healing without S/S of infection  Description: INTERVENTIONS  - Assess and document dressing, incision, wound bed, drain sites and surrounding tissue  - Provide patient and family education  - Perform skin care/dressing changes every   Outcome: Progressing  Goal: Pressure injury heals and does not worsen  Description: Interventions:  - Implement low air loss mattress or specialty surface (Criteria met)  - Apply silicone foam dressing  - Instruct/assist with weight shifting every  minutes when in chair   - Limit chair time to  hour intervals  - Use special pressure reducing interventions such as  when in chair   - Apply fecal or urinary incontinence containment device   - Perform passive or active ROM every   - Turn and reposition patient & offload bony prominences every  hours   - Utilize friction reducing device or surface for transfers   - Consider consults to  interdisciplinary teams such as - Use incontinent care products after each incontinent episode such as  - Consider nutrition services referral as needed  Outcome: Progressing     Problem: MUSCULOSKELETAL - ADULT  Goal: Maintain or return mobility to safest level of function  Description: INTERVENTIONS:  - Assess patient's ability to carry out ADLs; assess patient's baseline for ADL function and identify physical deficits which impact ability to perform ADLs (bathing, care of mouth/teeth, toileting, grooming, dressing, etc.)  - Assess/evaluate cause of self-care deficits   - Assess range of motion  - Assess patient's mobility  - Assess patient's need for assistive devices and provide as appropriate  - Encourage maximum independence but intervene and supervise when necessary  - Involve family in performance of ADLs  - Assess for home care needs following discharge   - Consider OT consult to assist with ADL evaluation and planning for discharge  - Provide patient education as appropriate  Outcome: Progressing  Goal: Maintain proper alignment of affected body part  Description: INTERVENTIONS:  - Support, maintain and protect limb and body alignment  - Provide patient/ family with appropriate education  Outcome: Progressing

## 2023-11-29 NOTE — DISCHARGE SUMMARY
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 MRN: [id number]    Unit/Bed#: W -01 Encounter: [id number]    Admission Date: 11/17/2023   Discharge Date: 11/29/23    Admitting Diagnosis:   Choledocholithiasis [K80.50]  Cholecystitis [K81.9]  Abdominal pain [R10.9]    Discharge Diagnoses: Principal Problem:    Bile peritonitis ([facility])  Active Problems:    Essential hypertension    Anxiety    Anemia    Atrial fibrillation (HCC)    Congestive heart failure, unspecified HF chronicity, unspecified heart failure type ([address] )    Chronic kidney disease, stage 3a ([facility])    Major depressive disorder with single episode, in full remission ([facility])    Hydronephrosis    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 DRAINAGE OF 4 QUADRANT ABDOMINAL ABSCESS, DRAINAGE OF SUBPHRENIC AND SUBHEPATIC ABSCESSES. PERITONEAL LAVAGE. OMENTAL BIOPSY. LIVER BIOPSY. 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 Patient was started on IV antibiotics at admission. Patient continued to having increasing abdominal pain, leukocytosis. Patient was taken urgently to the OR for laparoscopic cholecystectomy. Intraoperative findings included: 4 quadrant bile peritonitis with associated purulence in all 4 quadrants, in the subphrenic space and the subhepatic space - 1.6 L purulent bile drain. Omentum densely adhered to the gallbladder - taken down bluntly revealing a gallbladder mass with associated liver mass. No obvious gallbladder perforation, but given the above findings and densely adhered omentum to the anterior portion of the gallbladder, suspect there was a perforation which had sealed off by the time of surgery.  Open cholecystostomy tube placement revealing purulent bile, similar in character to bilious ascites, encountered upon opening. Calcified studding of the small bowel mesentery diffusely with associated small bowel congestion without evidence of obstruction. No evidence of perforation in the GI tract. Superficial Veress needle injuries to stomach which were imbricated with 3-0 silk suture. Uterus and ovaries appeared inflamed, likely reactive, but without signs concerning for malignancy. 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 Culture and cytology of the bilious ascites and the fluid drained from the gallbladder. Omental biopsy obtained. Liver biopsy obtained. Intraoperatively, IR placed a percutaneous cholecystostomy tube. Patient was admitted to the ICU post-operatively for continued resuscitation and management. Patient was kept NPO with NG tube until bowel function returned. Patient was noted to have right hydronephrosis and underwent cystoscopy with urethral stent placement on 11/21. Patient had a clamp trial of  NG tube on 11/22 and was cleared. Patient had NG tube removed and was started on a clear liquid diet. Patient was transferred to medical floor at this time. 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 Palliative care, GI, and oncology consulted at this time. Tumor markers were elevated. Patient then developed stroke-like symptoms on 11/26. At this time, patient and family wished to no longer proceed with treatment other than palliative care. Patient was transitioned to comfort care and was discharged home with hospice on 11/29/2023. Condition at Discharge: poor     Discharge instructions/Information to patient and family:   See after visit summary for information provided to patient and family.       Provisions for Follow-Up Care:  See after visit summary for information related to follow-up care and any pertinent home health orders. Disposition: Home    Planned Readmission: No    Discharge Statement   I spent 18 minutes discharging the patient. This time was spent on the day of discharge. I had direct contact with the patient on the day of discharge. Discharge Medications:  See after visit summary for reconciled discharge medications provided to patient and family.

## 2023-11-29 NOTE — QUICK NOTE
ADILIA Drain Removal    Area was cleaned with alcohol swab. Suture x 1 was removed from skin. ADILIA bulb was taken off suction prior to removal. ADILIA x 1 was removed from left abdomen without complications. Patient tolerated procedure well. 4x4 dressing was applied overtop.      Hipolito Martinez PA-C  11/29/2023 12:44 PM

## 2023-11-30 NOTE — PROGRESS NOTES
11/30/2023 3:01 PM  [facility] home patient requests SOC medications. Filled electronically via Epic as per [place] State Law. Requested Prescriptions     Signed Prescriptions Disp Refills    LORazepam (ATIVAN) 2 mg/mL concentrated solution 30 mL 0     Sig: Take 0.5 mL (1 mg total) by mouth every 4 (four) hours. May also take 0.5 mL (1 mg total) every hour as needed for anxiety or sleep (dyspnea). NOTIFY HOSPICE if 3 PRN doses are administered in 12 hours without symptom relief.    haloperidol (HALDOL) oral concentrated solution 30 mL 0     Sig: Take 0.5 mL (1 mg total) by mouth every 4 (four) hours. May also take 0.5 mL (1 mg total) every hour as needed for agitation (nausea / vomiting). NOTIFY HOSPICE if 3 PRN doses are administered in 12 hours without symptom relief. Sheeba Espinosa, [address] [place] [address] [facility]  Hospice Answering Service: [phone]  You can find me on [email or web address]!

## 2023-12-01 ENCOUNTER — TELEPHONE (OUTPATIENT)
Dept: FAMILY MEDICINE CLINIC | Facility: CLINIC | Age: 83
End: 2023-12-01

## 2023-12-01 ENCOUNTER — HOME CARE VISIT (OUTPATIENT)
Dept: HOME HEALTH SERVICES | Facility: HOME HEALTHCARE | Age: 83
End: 2023-12-01
Payer: MEDICARE

## 2023-12-01 VITALS
WEIGHT: 211 LBS | SYSTOLIC BLOOD PRESSURE: 106 MMHG | BODY MASS INDEX: 37.38 KG/M2 | RESPIRATION RATE: 20 BRPM | TEMPERATURE: 96.7 F | HEART RATE: 100 BPM | DIASTOLIC BLOOD PRESSURE: 60 MMHG

## 2023-12-04 ENCOUNTER — HOME CARE VISIT (OUTPATIENT)
Dept: HOME HOSPICE | Facility: HOSPICE | Age: 83
End: 2023-12-04
Payer: MEDICARE

## 2023-12-07 ENCOUNTER — HOME CARE VISIT (OUTPATIENT)
Dept: HOME HOSPICE | Facility: HOSPICE | Age: 83
End: 2023-12-07
Payer: MEDICARE

## 2023-12-19 DIAGNOSIS — I48.0 PAROXYSMAL ATRIAL FIBRILLATION (HCC): ICD-10-CM

## 2023-12-19 RX ORDER — FUROSEMIDE 20 MG/1
TABLET ORAL
Qty: 90 TABLET | Refills: 3 | OUTPATIENT
Start: 2023-12-19

## 2024-03-01 NOTE — TELEPHONE ENCOUNTER
Patient calling for Myrbetriq refill  She said the pharmacy does not have any left and she would like to know how many refills she is getting  She still uses [facility] in [place]  What Type Of Note Output Would You Prefer (Optional)?: Standard Output How Severe Is Your Skin Lesion?: moderate Has Your Skin Lesion Been Treated?: not been treated Is This A New Presentation, Or A Follow-Up?: Skin Lesion

## 2024-07-09 NOTE — ASSESSMENT & PLAN NOTE
· Patient has guaiac-positive stool in the emergency department  Does not complain of any sanjay red bleeding, however she has been constipated as of late  · CT scan of the abdomen and pelvis did reveal high volume GI bleed  · No vomiting, hematemesis  · Gastroenterology consultation  hypertension

## (undated) DEVICE — GLOVE SRG BIOGEL ECLIPSE 8.5

## (undated) DEVICE — CHLORHEXIDINE 4PCT 4 OZ

## (undated) DEVICE — TROCAR: Brand: KII FIOS FIRST ENTRY

## (undated) DEVICE — ELECTRODE LAP SPATULA CRV E-Z CLEAN 33CM -0018C

## (undated) DEVICE — SYRINGE 10ML LL

## (undated) DEVICE — TELFA NON-ADHERENT ABSORBENT DRESSING: Brand: TELFA

## (undated) DEVICE — INVIEW CLEAR LEGGINGS: Brand: CONVERTORS

## (undated) DEVICE — CULTURE TUBE ANAEROBIC

## (undated) DEVICE — DRESSING MEPILEX BORDER 4 X 10 IN

## (undated) DEVICE — MULTIPURPOSE DRAINAGE CATHETER ULTRATHANE: Brand: COOK

## (undated) DEVICE — GLOVE SRG BIOGEL 7.5

## (undated) DEVICE — GUIDEWIRE STRGHT TIP 0.035 IN  SOLO PLUS

## (undated) DEVICE — INSUFFLATION NEEDLE TO ESTABLISH PNEUMOPERITONEUM.: Brand: INSUFFLATION NEEDLE

## (undated) DEVICE — CULTURE TUBE AEROBIC

## (undated) DEVICE — PREMIUM DRY TRAY LF: Brand: MEDLINE INDUSTRIES, INC.

## (undated) DEVICE — GLOVE INDICATOR PI UNDERGLOVE SZ 8.5 BLUE

## (undated) DEVICE — TOWEL SURG XR DETECT GREEN STRL RFD

## (undated) DEVICE — TROCAR: Brand: KII® SLEEVE

## (undated) DEVICE — ASTOUND STANDARD SURGICAL GOWN, XXL: Brand: CONVERTORS

## (undated) DEVICE — SPECIMEN CONTAINER STERILE PEEL PACK

## (undated) DEVICE — UROLOGIC DRAIN BAG: Brand: UNBRANDED

## (undated) DEVICE — CATH URETERAL 5FR X 70 CM FLEX TIP POLYUR BARD

## (undated) DEVICE — PACK TUR

## (undated) DEVICE — PENCIL ELECTROSURG E-Z CLEAN -0035H

## (undated) DEVICE — INTENDED FOR TISSUE SEPARATION, AND OTHER PROCEDURES THAT REQUIRE A SHARP SURGICAL BLADE TO PUNCTURE OR CUT.: Brand: BARD-PARKER SAFETY BLADES SIZE 11, STERILE

## (undated) DEVICE — STERILE SURGICAL LUBRICANT,  TUBE: Brand: SURGILUBE

## (undated) DEVICE — PACK PBDS LAP CHOLE RF